# Patient Record
Sex: FEMALE | Race: WHITE | NOT HISPANIC OR LATINO | Employment: OTHER | URBAN - METROPOLITAN AREA
[De-identification: names, ages, dates, MRNs, and addresses within clinical notes are randomized per-mention and may not be internally consistent; named-entity substitution may affect disease eponyms.]

---

## 2017-02-17 ENCOUNTER — HOSPITAL ENCOUNTER (INPATIENT)
Facility: HOSPITAL | Age: 77
LOS: 2 days | Discharge: HOME | DRG: 392 | End: 2017-02-21
Attending: EMERGENCY MEDICINE | Admitting: FAMILY MEDICINE
Payer: COMMERCIAL

## 2017-02-17 ENCOUNTER — GENERIC CONVERSION - ENCOUNTER (OUTPATIENT)
Dept: OTHER | Facility: OTHER | Age: 77
End: 2017-02-17

## 2017-02-17 ENCOUNTER — APPOINTMENT (EMERGENCY)
Dept: RADIOLOGY | Facility: HOSPITAL | Age: 77
DRG: 392 | End: 2017-02-17
Payer: COMMERCIAL

## 2017-02-17 DIAGNOSIS — R07.9 CHEST PAIN: ICD-10-CM

## 2017-02-17 DIAGNOSIS — R13.10 ODYNOPHAGIA: Primary | ICD-10-CM

## 2017-02-17 PROBLEM — E03.9 HYPOTHYROIDISM: Status: ACTIVE | Noted: 2017-02-17

## 2017-02-17 PROBLEM — I10 HYPERTENSION: Status: ACTIVE | Noted: 2017-02-17

## 2017-02-17 PROBLEM — E78.5 DYSLIPIDEMIA: Status: ACTIVE | Noted: 2017-02-17

## 2017-02-17 PROBLEM — H91.90 DEAFNESS: Status: ACTIVE | Noted: 2017-02-17

## 2017-02-17 LAB
ALBUMIN SERPL BCP-MCNC: 3.8 G/DL (ref 3.5–5)
ALP SERPL-CCNC: 65 U/L (ref 46–116)
ALT SERPL W P-5'-P-CCNC: 23 U/L (ref 12–78)
ANION GAP SERPL CALCULATED.3IONS-SCNC: 7 MMOL/L (ref 4–13)
AST SERPL W P-5'-P-CCNC: 20 U/L (ref 5–45)
BACTERIA UR QL AUTO: ABNORMAL /HPF
BASOPHILS # BLD AUTO: 0 THOUSANDS/ΜL (ref 0–0.1)
BASOPHILS NFR BLD AUTO: 1 % (ref 0–1)
BILIRUB SERPL-MCNC: 0.7 MG/DL (ref 0.2–1)
BILIRUB UR QL STRIP: NEGATIVE
BUN SERPL-MCNC: 17 MG/DL (ref 5–25)
CALCIUM SERPL-MCNC: 9.2 MG/DL (ref 8.3–10.1)
CHLORIDE SERPL-SCNC: 101 MMOL/L (ref 100–108)
CLARITY UR: CLEAR
CO2 SERPL-SCNC: 29 MMOL/L (ref 21–32)
COLOR UR: ABNORMAL
CREAT SERPL-MCNC: 0.92 MG/DL (ref 0.6–1.3)
EOSINOPHIL # BLD AUTO: 0.1 THOUSAND/ΜL (ref 0–0.61)
EOSINOPHIL NFR BLD AUTO: 2 % (ref 0–6)
ERYTHROCYTE [DISTWIDTH] IN BLOOD BY AUTOMATED COUNT: 13.7 % (ref 11.6–15.1)
GFR SERPL CREATININE-BSD FRML MDRD: 59.2 ML/MIN/1.73SQ M
GLUCOSE SERPL-MCNC: 86 MG/DL (ref 65–140)
GLUCOSE UR STRIP-MCNC: NEGATIVE MG/DL
HCT VFR BLD AUTO: 46.9 % (ref 37–47)
HGB BLD-MCNC: 15.1 G/DL (ref 12–16)
HGB UR QL STRIP.AUTO: ABNORMAL
KETONES UR STRIP-MCNC: NEGATIVE MG/DL
LEUKOCYTE ESTERASE UR QL STRIP: NEGATIVE
LIPASE SERPL-CCNC: 155 U/L (ref 73–393)
LYMPHOCYTES # BLD AUTO: 1.9 THOUSANDS/ΜL (ref 0.6–4.47)
LYMPHOCYTES NFR BLD AUTO: 37 % (ref 14–44)
MAGNESIUM SERPL-MCNC: 2.1 MG/DL (ref 1.6–2.6)
MCH RBC QN AUTO: 27.5 PG (ref 27–31)
MCHC RBC AUTO-ENTMCNC: 32.2 G/DL (ref 31.4–37.4)
MCV RBC AUTO: 85 FL (ref 82–98)
MONOCYTES # BLD AUTO: 0.6 THOUSAND/ΜL (ref 0.17–1.22)
MONOCYTES NFR BLD AUTO: 11 % (ref 4–12)
NEUTROPHILS # BLD AUTO: 2.6 THOUSANDS/ΜL (ref 1.85–7.62)
NEUTS SEG NFR BLD AUTO: 50 % (ref 43–75)
NITRITE UR QL STRIP: NEGATIVE
NON-SQ EPI CELLS URNS QL MICRO: ABNORMAL /HPF
NRBC BLD AUTO-RTO: 0 /100 WBCS
PH UR STRIP.AUTO: 6 [PH] (ref 5–9)
PLATELET # BLD AUTO: 169 THOUSANDS/UL (ref 130–400)
PMV BLD AUTO: 8.5 FL (ref 8.9–12.7)
POTASSIUM SERPL-SCNC: 4.3 MMOL/L (ref 3.5–5.3)
PROT SERPL-MCNC: 7.6 G/DL (ref 6.4–8.2)
PROT UR STRIP-MCNC: NEGATIVE MG/DL
RBC # BLD AUTO: 5.49 MILLION/UL (ref 4.2–5.4)
RBC #/AREA URNS AUTO: ABNORMAL /HPF
SODIUM SERPL-SCNC: 137 MMOL/L (ref 136–145)
SP GR UR STRIP.AUTO: <=1.005 (ref 1–1.03)
TROPONIN I SERPL-MCNC: <0.02 NG/ML
TSH SERPL DL<=0.05 MIU/L-ACNC: 2.65 UIU/ML (ref 0.36–3.74)
UROBILINOGEN UR QL STRIP.AUTO: 0.2 E.U./DL
WBC # BLD AUTO: 5.2 THOUSAND/UL (ref 4.8–10.8)
WBC #/AREA URNS AUTO: ABNORMAL /HPF

## 2017-02-17 PROCEDURE — 87086 URINE CULTURE/COLONY COUNT: CPT | Performed by: EMERGENCY MEDICINE

## 2017-02-17 PROCEDURE — 99285 EMERGENCY DEPT VISIT HI MDM: CPT

## 2017-02-17 PROCEDURE — 93005 ELECTROCARDIOGRAM TRACING: CPT | Performed by: EMERGENCY MEDICINE

## 2017-02-17 PROCEDURE — 96374 THER/PROPH/DIAG INJ IV PUSH: CPT

## 2017-02-17 PROCEDURE — 87081 CULTURE SCREEN ONLY: CPT | Performed by: FAMILY MEDICINE

## 2017-02-17 PROCEDURE — 84443 ASSAY THYROID STIM HORMONE: CPT | Performed by: EMERGENCY MEDICINE

## 2017-02-17 PROCEDURE — 96361 HYDRATE IV INFUSION ADD-ON: CPT

## 2017-02-17 PROCEDURE — 36415 COLL VENOUS BLD VENIPUNCTURE: CPT | Performed by: EMERGENCY MEDICINE

## 2017-02-17 PROCEDURE — 96375 TX/PRO/DX INJ NEW DRUG ADDON: CPT

## 2017-02-17 PROCEDURE — 83735 ASSAY OF MAGNESIUM: CPT | Performed by: EMERGENCY MEDICINE

## 2017-02-17 PROCEDURE — 81001 URINALYSIS AUTO W/SCOPE: CPT | Performed by: EMERGENCY MEDICINE

## 2017-02-17 PROCEDURE — 85025 COMPLETE CBC W/AUTO DIFF WBC: CPT | Performed by: EMERGENCY MEDICINE

## 2017-02-17 PROCEDURE — 80053 COMPREHEN METABOLIC PANEL: CPT | Performed by: EMERGENCY MEDICINE

## 2017-02-17 PROCEDURE — 71020 HB CHEST X-RAY 2VW FRONTAL&LATL: CPT

## 2017-02-17 PROCEDURE — 84484 ASSAY OF TROPONIN QUANT: CPT | Performed by: EMERGENCY MEDICINE

## 2017-02-17 PROCEDURE — 83690 ASSAY OF LIPASE: CPT | Performed by: EMERGENCY MEDICINE

## 2017-02-17 PROCEDURE — C9113 INJ PANTOPRAZOLE SODIUM, VIA: HCPCS | Performed by: EMERGENCY MEDICINE

## 2017-02-17 RX ORDER — SODIUM CHLORIDE 9 MG/ML
75 INJECTION, SOLUTION INTRAVENOUS CONTINUOUS
Status: DISCONTINUED | OUTPATIENT
Start: 2017-02-17 | End: 2017-02-20

## 2017-02-17 RX ORDER — ONDANSETRON 2 MG/ML
4 INJECTION INTRAMUSCULAR; INTRAVENOUS ONCE
Status: COMPLETED | OUTPATIENT
Start: 2017-02-17 | End: 2017-02-17

## 2017-02-17 RX ORDER — LEVOTHYROXINE SODIUM 0.07 MG/1
75 TABLET ORAL
Status: DISCONTINUED | OUTPATIENT
Start: 2017-02-18 | End: 2017-02-21 | Stop reason: HOSPADM

## 2017-02-17 RX ORDER — NEBIVOLOL 5 MG/1
5 TABLET ORAL DAILY
COMMUNITY
End: 2017-02-21 | Stop reason: HOSPADM

## 2017-02-17 RX ORDER — PANTOPRAZOLE SODIUM 40 MG/1
40 INJECTION, POWDER, FOR SOLUTION INTRAVENOUS ONCE
Status: COMPLETED | OUTPATIENT
Start: 2017-02-17 | End: 2017-02-17

## 2017-02-17 RX ORDER — NEBIVOLOL 5 MG/1
5 TABLET ORAL DAILY
Status: DISCONTINUED | OUTPATIENT
Start: 2017-02-18 | End: 2017-02-19

## 2017-02-17 RX ORDER — ROSUVASTATIN CALCIUM 10 MG/1
10 TABLET, COATED ORAL DAILY
COMMUNITY
End: 2018-04-04 | Stop reason: SDUPTHER

## 2017-02-17 RX ORDER — LEVOTHYROXINE SODIUM 0.07 MG/1
75 TABLET ORAL DAILY
COMMUNITY
End: 2018-03-01 | Stop reason: SDUPTHER

## 2017-02-17 RX ORDER — ASPIRIN 81 MG/1
81 TABLET, CHEWABLE ORAL DAILY
COMMUNITY
End: 2019-05-16 | Stop reason: SINTOL

## 2017-02-17 RX ORDER — PRAVASTATIN SODIUM 80 MG/1
80 TABLET ORAL
Status: DISCONTINUED | OUTPATIENT
Start: 2017-02-18 | End: 2017-02-21 | Stop reason: HOSPADM

## 2017-02-17 RX ADMIN — SODIUM CHLORIDE 125 ML/HR: 0.9 INJECTION, SOLUTION INTRAVENOUS at 16:57

## 2017-02-17 RX ADMIN — ONDANSETRON 4 MG: 2 INJECTION INTRAMUSCULAR; INTRAVENOUS at 16:57

## 2017-02-17 RX ADMIN — PANTOPRAZOLE SODIUM 40 MG: 40 INJECTION, POWDER, FOR SOLUTION INTRAVENOUS at 16:59

## 2017-02-18 LAB — TROPONIN I SERPL-MCNC: <0.02 NG/ML

## 2017-02-18 PROCEDURE — A9270 NON-COVERED ITEM OR SERVICE: HCPCS | Performed by: HOSPITALIST

## 2017-02-18 PROCEDURE — 84484 ASSAY OF TROPONIN QUANT: CPT | Performed by: HOSPITALIST

## 2017-02-18 PROCEDURE — A9270 NON-COVERED ITEM OR SERVICE: HCPCS | Performed by: FAMILY MEDICINE

## 2017-02-18 PROCEDURE — A9270 NON-COVERED ITEM OR SERVICE: HCPCS | Performed by: INTERNAL MEDICINE

## 2017-02-18 RX ORDER — PANTOPRAZOLE SODIUM 40 MG/1
40 INJECTION, POWDER, FOR SOLUTION INTRAVENOUS
Status: DISCONTINUED | OUTPATIENT
Start: 2017-02-19 | End: 2017-02-21 | Stop reason: HOSPADM

## 2017-02-18 RX ORDER — SUCRALFATE ORAL 1 G/10ML
1000 SUSPENSION ORAL
Status: DISCONTINUED | OUTPATIENT
Start: 2017-02-18 | End: 2017-02-21 | Stop reason: HOSPADM

## 2017-02-18 RX ORDER — AMLODIPINE BESYLATE 5 MG/1
5 TABLET ORAL DAILY
Status: DISCONTINUED | OUTPATIENT
Start: 2017-02-18 | End: 2017-02-21 | Stop reason: HOSPADM

## 2017-02-18 RX ADMIN — SUCRALFATE 1000 MG: 1 SUSPENSION ORAL at 17:48

## 2017-02-18 RX ADMIN — SUCRALFATE 1000 MG: 1 SUSPENSION ORAL at 11:36

## 2017-02-18 RX ADMIN — PRAVASTATIN SODIUM 80 MG: 80 TABLET ORAL at 17:48

## 2017-02-18 RX ADMIN — AMLODIPINE BESYLATE 5 MG: 5 TABLET ORAL at 11:36

## 2017-02-18 RX ADMIN — SODIUM CHLORIDE 75 ML/HR: 0.9 INJECTION, SOLUTION INTRAVENOUS at 09:51

## 2017-02-18 RX ADMIN — NEBIVOLOL HYDROCHLORIDE 5 MG: 5 TABLET ORAL at 09:30

## 2017-02-18 RX ADMIN — SODIUM CHLORIDE 75 ML/HR: 0.9 INJECTION, SOLUTION INTRAVENOUS at 21:35

## 2017-02-18 RX ADMIN — LEVOTHYROXINE SODIUM 75 MCG: 75 TABLET ORAL at 05:48

## 2017-02-19 LAB
ANION GAP SERPL CALCULATED.3IONS-SCNC: 5 MMOL/L (ref 4–13)
BACTERIA UR CULT: NORMAL
BUN SERPL-MCNC: 9 MG/DL (ref 5–25)
CALCIUM SERPL-MCNC: 8.4 MG/DL (ref 8.3–10.1)
CHLORIDE SERPL-SCNC: 107 MMOL/L (ref 100–108)
CO2 SERPL-SCNC: 27 MMOL/L (ref 21–32)
CREAT SERPL-MCNC: 0.89 MG/DL (ref 0.6–1.3)
ERYTHROCYTE [DISTWIDTH] IN BLOOD BY AUTOMATED COUNT: 13.4 % (ref 11.6–15.1)
GFR SERPL CREATININE-BSD FRML MDRD: >60 ML/MIN/1.73SQ M
GLUCOSE SERPL-MCNC: 84 MG/DL (ref 65–140)
HCT VFR BLD AUTO: 41.5 % (ref 37–47)
HGB BLD-MCNC: 13.6 G/DL (ref 12–16)
MCH RBC QN AUTO: 27.8 PG (ref 27–31)
MCHC RBC AUTO-ENTMCNC: 32.7 G/DL (ref 31.4–37.4)
MCV RBC AUTO: 85 FL (ref 82–98)
MRSA NOSE QL CULT: NORMAL
PLATELET # BLD AUTO: 165 THOUSANDS/UL (ref 130–400)
PMV BLD AUTO: 8.6 FL (ref 8.9–12.7)
POTASSIUM SERPL-SCNC: 5.1 MMOL/L (ref 3.5–5.3)
RBC # BLD AUTO: 4.88 MILLION/UL (ref 4.2–5.4)
SODIUM SERPL-SCNC: 139 MMOL/L (ref 136–145)
TSH SERPL DL<=0.05 MIU/L-ACNC: 2.82 UIU/ML (ref 0.36–3.74)
WBC # BLD AUTO: 5.1 THOUSAND/UL (ref 4.8–10.8)

## 2017-02-19 PROCEDURE — 80048 BASIC METABOLIC PNL TOTAL CA: CPT | Performed by: FAMILY MEDICINE

## 2017-02-19 PROCEDURE — 85027 COMPLETE CBC AUTOMATED: CPT | Performed by: FAMILY MEDICINE

## 2017-02-19 PROCEDURE — C9113 INJ PANTOPRAZOLE SODIUM, VIA: HCPCS | Performed by: FAMILY MEDICINE

## 2017-02-19 PROCEDURE — A9270 NON-COVERED ITEM OR SERVICE: HCPCS | Performed by: NURSE PRACTITIONER

## 2017-02-19 PROCEDURE — A9270 NON-COVERED ITEM OR SERVICE: HCPCS | Performed by: HOSPITALIST

## 2017-02-19 PROCEDURE — 84443 ASSAY THYROID STIM HORMONE: CPT | Performed by: HOSPITALIST

## 2017-02-19 PROCEDURE — A9270 NON-COVERED ITEM OR SERVICE: HCPCS | Performed by: FAMILY MEDICINE

## 2017-02-19 PROCEDURE — A9270 NON-COVERED ITEM OR SERVICE: HCPCS | Performed by: INTERNAL MEDICINE

## 2017-02-19 RX ORDER — MAGNESIUM HYDROXIDE/ALUMINUM HYDROXICE/SIMETHICONE 120; 1200; 1200 MG/30ML; MG/30ML; MG/30ML
30 SUSPENSION ORAL EVERY 4 HOURS PRN
Status: DISCONTINUED | OUTPATIENT
Start: 2017-02-19 | End: 2017-02-21 | Stop reason: HOSPADM

## 2017-02-19 RX ORDER — DOCUSATE SODIUM 100 MG/1
100 CAPSULE, LIQUID FILLED ORAL 2 TIMES DAILY
Status: DISCONTINUED | OUTPATIENT
Start: 2017-02-19 | End: 2017-02-21 | Stop reason: HOSPADM

## 2017-02-19 RX ORDER — POLYETHYLENE GLYCOL 3350 17 G/17G
17 POWDER, FOR SOLUTION ORAL DAILY PRN
Status: DISCONTINUED | OUTPATIENT
Start: 2017-02-19 | End: 2017-02-21 | Stop reason: HOSPADM

## 2017-02-19 RX ADMIN — NEBIVOLOL HYDROCHLORIDE 5 MG: 5 TABLET ORAL at 08:58

## 2017-02-19 RX ADMIN — PANTOPRAZOLE SODIUM 40 MG: 40 INJECTION, POWDER, FOR SOLUTION INTRAVENOUS at 08:58

## 2017-02-19 RX ADMIN — SODIUM CHLORIDE 75 ML/HR: 0.9 INJECTION, SOLUTION INTRAVENOUS at 22:58

## 2017-02-19 RX ADMIN — SUCRALFATE 1000 MG: 1 SUSPENSION ORAL at 16:46

## 2017-02-19 RX ADMIN — AMLODIPINE BESYLATE 5 MG: 5 TABLET ORAL at 08:59

## 2017-02-19 RX ADMIN — ENOXAPARIN SODIUM 40 MG: 40 INJECTION SUBCUTANEOUS at 08:58

## 2017-02-19 RX ADMIN — SUCRALFATE 1000 MG: 1 SUSPENSION ORAL at 06:05

## 2017-02-19 RX ADMIN — LEVOTHYROXINE SODIUM 75 MCG: 75 TABLET ORAL at 06:05

## 2017-02-19 RX ADMIN — SODIUM CHLORIDE 75 ML/HR: 0.9 INJECTION, SOLUTION INTRAVENOUS at 10:49

## 2017-02-19 RX ADMIN — ALUMINUM HYDROXIDE, MAGNESIUM HYDROXIDE, AND SIMETHICONE 30 ML: 200; 200; 20 SUSPENSION ORAL at 21:49

## 2017-02-19 RX ADMIN — SUCRALFATE 1000 MG: 1 SUSPENSION ORAL at 10:52

## 2017-02-19 RX ADMIN — PRAVASTATIN SODIUM 80 MG: 80 TABLET ORAL at 16:47

## 2017-02-20 ENCOUNTER — HOSPITAL ENCOUNTER (INPATIENT)
Dept: RADIOLOGY | Facility: HOSPITAL | Age: 77
DRG: 392 | End: 2017-02-20
Payer: COMMERCIAL

## 2017-02-20 ENCOUNTER — ANESTHESIA EVENT (INPATIENT)
Dept: GASTROENTEROLOGY | Facility: AMBULARY SURGERY CENTER | Age: 77
DRG: 392 | End: 2017-02-20
Payer: COMMERCIAL

## 2017-02-20 ENCOUNTER — APPOINTMENT (INPATIENT)
Dept: RADIOLOGY | Facility: HOSPITAL | Age: 77
DRG: 392 | End: 2017-02-20
Payer: COMMERCIAL

## 2017-02-20 PROCEDURE — 88305 TISSUE EXAM BY PATHOLOGIST: CPT | Performed by: INTERNAL MEDICINE

## 2017-02-20 PROCEDURE — 70491 CT SOFT TISSUE NECK W/DYE: CPT

## 2017-02-20 PROCEDURE — A9270 NON-COVERED ITEM OR SERVICE: HCPCS | Performed by: HOSPITALIST

## 2017-02-20 PROCEDURE — A9270 NON-COVERED ITEM OR SERVICE: HCPCS | Performed by: FAMILY MEDICINE

## 2017-02-20 PROCEDURE — 0DB68ZX EXCISION OF STOMACH, VIA NATURAL OR ARTIFICIAL OPENING ENDOSCOPIC, DIAGNOSTIC: ICD-10-PCS | Performed by: INTERNAL MEDICINE

## 2017-02-20 PROCEDURE — C9113 INJ PANTOPRAZOLE SODIUM, VIA: HCPCS | Performed by: FAMILY MEDICINE

## 2017-02-20 PROCEDURE — A9270 NON-COVERED ITEM OR SERVICE: HCPCS | Performed by: NURSE PRACTITIONER

## 2017-02-20 PROCEDURE — A9270 NON-COVERED ITEM OR SERVICE: HCPCS | Performed by: INTERNAL MEDICINE

## 2017-02-20 PROCEDURE — 0DB18ZX EXCISION OF UPPER ESOPHAGUS, VIA NATURAL OR ARTIFICIAL OPENING ENDOSCOPIC, DIAGNOSTIC: ICD-10-PCS | Performed by: INTERNAL MEDICINE

## 2017-02-20 PROCEDURE — 88342 IMHCHEM/IMCYTCHM 1ST ANTB: CPT | Performed by: INTERNAL MEDICINE

## 2017-02-20 PROCEDURE — 0DB38ZX EXCISION OF LOWER ESOPHAGUS, VIA NATURAL OR ARTIFICIAL OPENING ENDOSCOPIC, DIAGNOSTIC: ICD-10-PCS | Performed by: INTERNAL MEDICINE

## 2017-02-20 RX ORDER — PROPOFOL 10 MG/ML
INJECTION, EMULSION INTRAVENOUS AS NEEDED
Status: DISCONTINUED | OUTPATIENT
Start: 2017-02-20 | End: 2017-02-20 | Stop reason: SURG

## 2017-02-20 RX ORDER — LIDOCAINE HYDROCHLORIDE 20 MG/ML
INJECTION, SOLUTION EPIDURAL; INFILTRATION; INTRACAUDAL; PERINEURAL AS NEEDED
Status: DISCONTINUED | OUTPATIENT
Start: 2017-02-20 | End: 2017-02-20 | Stop reason: SURG

## 2017-02-20 RX ORDER — SODIUM CHLORIDE, SODIUM LACTATE, POTASSIUM CHLORIDE, CALCIUM CHLORIDE 600; 310; 30; 20 MG/100ML; MG/100ML; MG/100ML; MG/100ML
INJECTION, SOLUTION INTRAVENOUS CONTINUOUS PRN
Status: DISCONTINUED | OUTPATIENT
Start: 2017-02-20 | End: 2017-02-20 | Stop reason: SURG

## 2017-02-20 RX ADMIN — SUCRALFATE 1000 MG: 1 SUSPENSION ORAL at 17:45

## 2017-02-20 RX ADMIN — DOCUSATE SODIUM 100 MG: 100 CAPSULE, LIQUID FILLED ORAL at 17:46

## 2017-02-20 RX ADMIN — PRAVASTATIN SODIUM 80 MG: 80 TABLET ORAL at 17:45

## 2017-02-20 RX ADMIN — AMLODIPINE BESYLATE 5 MG: 5 TABLET ORAL at 09:26

## 2017-02-20 RX ADMIN — IOHEXOL 85 ML: 350 INJECTION, SOLUTION INTRAVENOUS at 20:36

## 2017-02-20 RX ADMIN — SUCRALFATE 1000 MG: 1 SUSPENSION ORAL at 12:23

## 2017-02-20 RX ADMIN — LIDOCAINE HYDROCHLORIDE 40 MG: 20 INJECTION, SOLUTION EPIDURAL; INFILTRATION; INTRACAUDAL; PERINEURAL at 16:53

## 2017-02-20 RX ADMIN — SODIUM CHLORIDE 75 ML/HR: 0.9 INJECTION, SOLUTION INTRAVENOUS at 12:23

## 2017-02-20 RX ADMIN — ALUMINUM HYDROXIDE, MAGNESIUM HYDROXIDE, AND SIMETHICONE 30 ML: 200; 200; 20 SUSPENSION ORAL at 10:41

## 2017-02-20 RX ADMIN — ENOXAPARIN SODIUM 40 MG: 40 INJECTION SUBCUTANEOUS at 18:11

## 2017-02-20 RX ADMIN — PROPOFOL 100 MG: 10 INJECTION, EMULSION INTRAVENOUS at 16:53

## 2017-02-20 RX ADMIN — LEVOTHYROXINE SODIUM 75 MCG: 75 TABLET ORAL at 05:24

## 2017-02-20 RX ADMIN — PROPOFOL 20 MG: 10 INJECTION, EMULSION INTRAVENOUS at 16:56

## 2017-02-20 RX ADMIN — PANTOPRAZOLE SODIUM 40 MG: 40 INJECTION, POWDER, FOR SOLUTION INTRAVENOUS at 09:26

## 2017-02-20 RX ADMIN — PROPOFOL 20 MG: 10 INJECTION, EMULSION INTRAVENOUS at 16:58

## 2017-02-20 RX ADMIN — PROPOFOL 30 MG: 10 INJECTION, EMULSION INTRAVENOUS at 16:54

## 2017-02-20 RX ADMIN — SODIUM CHLORIDE, SODIUM LACTATE, POTASSIUM CHLORIDE, AND CALCIUM CHLORIDE: .6; .31; .03; .02 INJECTION, SOLUTION INTRAVENOUS at 16:51

## 2017-02-20 RX ADMIN — DOCUSATE SODIUM 100 MG: 100 CAPSULE, LIQUID FILLED ORAL at 09:26

## 2017-02-20 RX ADMIN — SUCRALFATE 1000 MG: 1 SUSPENSION ORAL at 06:26

## 2017-02-20 RX ADMIN — ALUMINUM HYDROXIDE, MAGNESIUM HYDROXIDE, AND SIMETHICONE 30 ML: 200; 200; 20 SUSPENSION ORAL at 20:18

## 2017-02-21 ENCOUNTER — HOSPITAL ENCOUNTER (INPATIENT)
Dept: RADIOLOGY | Facility: HOSPITAL | Age: 77
Discharge: HOME | DRG: 392 | End: 2017-02-21
Payer: COMMERCIAL

## 2017-02-21 VITALS
OXYGEN SATURATION: 92 % | RESPIRATION RATE: 18 BRPM | BODY MASS INDEX: 24.37 KG/M2 | HEIGHT: 62 IN | DIASTOLIC BLOOD PRESSURE: 68 MMHG | HEART RATE: 65 BPM | WEIGHT: 132.4 LBS | TEMPERATURE: 98 F | SYSTOLIC BLOOD PRESSURE: 150 MMHG

## 2017-02-21 DIAGNOSIS — R13.10 ODYNOPHAGIA: Primary | ICD-10-CM

## 2017-02-21 PROBLEM — H91.90 DEAFNESS: Status: RESOLVED | Noted: 2017-02-17 | Resolved: 2017-02-21

## 2017-02-21 PROCEDURE — C9113 INJ PANTOPRAZOLE SODIUM, VIA: HCPCS | Performed by: FAMILY MEDICINE

## 2017-02-21 PROCEDURE — A9270 NON-COVERED ITEM OR SERVICE: HCPCS | Performed by: FAMILY MEDICINE

## 2017-02-21 PROCEDURE — A9270 NON-COVERED ITEM OR SERVICE: HCPCS | Performed by: HOSPITALIST

## 2017-02-21 PROCEDURE — A9270 NON-COVERED ITEM OR SERVICE: HCPCS | Performed by: INTERNAL MEDICINE

## 2017-02-21 PROCEDURE — A9270 NON-COVERED ITEM OR SERVICE: HCPCS | Performed by: NURSE PRACTITIONER

## 2017-02-21 PROCEDURE — 74220 X-RAY XM ESOPHAGUS 1CNTRST: CPT

## 2017-02-21 RX ORDER — AMLODIPINE BESYLATE 5 MG/1
5 TABLET ORAL DAILY
Qty: 30 TABLET | Refills: 0 | Status: SHIPPED | OUTPATIENT
Start: 2017-02-21 | End: 2018-04-01 | Stop reason: ALTCHOICE

## 2017-02-21 RX ORDER — PANTOPRAZOLE SODIUM 40 MG/1
40 TABLET, DELAYED RELEASE ORAL DAILY
Qty: 30 TABLET | Refills: 0 | Status: SHIPPED | OUTPATIENT
Start: 2017-02-21 | End: 2019-01-24

## 2017-02-21 RX ADMIN — LEVOTHYROXINE SODIUM 75 MCG: 75 TABLET ORAL at 05:40

## 2017-02-21 RX ADMIN — ALUMINUM HYDROXIDE, MAGNESIUM HYDROXIDE, AND SIMETHICONE 30 ML: 200; 200; 20 SUSPENSION ORAL at 09:48

## 2017-02-21 RX ADMIN — SUCRALFATE 1000 MG: 1 SUSPENSION ORAL at 06:28

## 2017-02-21 RX ADMIN — ALUMINUM HYDROXIDE, MAGNESIUM HYDROXIDE, AND SIMETHICONE 30 ML: 200; 200; 20 SUSPENSION ORAL at 00:47

## 2017-02-21 RX ADMIN — DOCUSATE SODIUM 100 MG: 100 CAPSULE, LIQUID FILLED ORAL at 09:37

## 2017-02-21 RX ADMIN — ALUMINUM HYDROXIDE, MAGNESIUM HYDROXIDE, AND SIMETHICONE 30 ML: 200; 200; 20 SUSPENSION ORAL at 14:19

## 2017-02-21 RX ADMIN — ENOXAPARIN SODIUM 40 MG: 40 INJECTION SUBCUTANEOUS at 09:37

## 2017-02-21 RX ADMIN — PRAVASTATIN SODIUM 80 MG: 80 TABLET ORAL at 16:52

## 2017-02-21 RX ADMIN — AMLODIPINE BESYLATE 5 MG: 5 TABLET ORAL at 09:37

## 2017-02-21 RX ADMIN — SUCRALFATE 1000 MG: 1 SUSPENSION ORAL at 11:38

## 2017-02-21 RX ADMIN — PANTOPRAZOLE SODIUM 40 MG: 40 INJECTION, POWDER, FOR SOLUTION INTRAVENOUS at 09:37

## 2017-02-21 RX ADMIN — SUCRALFATE 1000 MG: 1 SUSPENSION ORAL at 16:52

## 2017-02-22 ENCOUNTER — GENERIC CONVERSION - ENCOUNTER (OUTPATIENT)
Dept: OTHER | Facility: OTHER | Age: 77
End: 2017-02-22

## 2017-02-22 ENCOUNTER — GENERIC CONVERSION - ENCOUNTER (OUTPATIENT)
Dept: FAMILY MEDICINE CLINIC | Facility: CLINIC | Age: 77
End: 2017-02-22

## 2017-02-22 ENCOUNTER — ALLSCRIPTS OFFICE VISIT (OUTPATIENT)
Dept: OTHER | Facility: OTHER | Age: 77
End: 2017-02-22

## 2017-02-22 LAB
ATRIAL RATE: 55 BPM
P AXIS: 50 DEGREES
PR INTERVAL: 124 MS
QRS AXIS: 7 DEGREES
QRSD INTERVAL: 86 MS
QT INTERVAL: 438 MS
QTC INTERVAL: 419 MS
T WAVE AXIS: 3 DEGREES
VENTRICULAR RATE: 55 BPM

## 2017-02-23 ENCOUNTER — GENERIC CONVERSION - ENCOUNTER (OUTPATIENT)
Dept: OTHER | Facility: OTHER | Age: 77
End: 2017-02-23

## 2017-02-24 ENCOUNTER — GENERIC CONVERSION - ENCOUNTER (OUTPATIENT)
Dept: OTHER | Facility: OTHER | Age: 77
End: 2017-02-24

## 2017-03-03 ENCOUNTER — ALLSCRIPTS OFFICE VISIT (OUTPATIENT)
Dept: OTHER | Facility: OTHER | Age: 77
End: 2017-03-03

## 2017-03-03 DIAGNOSIS — R13.12 DYSPHAGIA, OROPHARYNGEAL PHASE: ICD-10-CM

## 2017-03-09 ENCOUNTER — GENERIC CONVERSION - ENCOUNTER (OUTPATIENT)
Dept: OTHER | Facility: OTHER | Age: 77
End: 2017-03-09

## 2017-03-13 ENCOUNTER — TRANSCRIBE ORDERS (OUTPATIENT)
Dept: ADMINISTRATIVE | Facility: HOSPITAL | Age: 77
End: 2017-03-13

## 2017-03-13 DIAGNOSIS — R07.89 OTHER CHEST PAIN: Primary | ICD-10-CM

## 2017-03-16 ENCOUNTER — ALLSCRIPTS OFFICE VISIT (OUTPATIENT)
Dept: OTHER | Facility: OTHER | Age: 77
End: 2017-03-16

## 2017-03-20 ENCOUNTER — HOSPITAL ENCOUNTER (OUTPATIENT)
Dept: RADIOLOGY | Facility: HOSPITAL | Age: 77
End: 2017-03-20
Payer: COMMERCIAL

## 2017-03-20 ENCOUNTER — HOSPITAL ENCOUNTER (OUTPATIENT)
Dept: RADIOLOGY | Facility: HOSPITAL | Age: 77
Discharge: HOME | End: 2017-03-20
Payer: COMMERCIAL

## 2017-03-20 ENCOUNTER — HOSPITAL ENCOUNTER (OUTPATIENT)
Dept: NON INVASIVE DIAGNOSTICS | Facility: HOSPITAL | Age: 77
Discharge: HOME | End: 2017-03-20
Payer: COMMERCIAL

## 2017-03-20 DIAGNOSIS — R07.89 OTHER CHEST PAIN: ICD-10-CM

## 2017-03-20 PROCEDURE — 93306 TTE W/DOPPLER COMPLETE: CPT

## 2017-03-21 ENCOUNTER — GENERIC CONVERSION - ENCOUNTER (OUTPATIENT)
Dept: OTHER | Facility: OTHER | Age: 77
End: 2017-03-21

## 2017-03-21 ENCOUNTER — HOSPITAL ENCOUNTER (OUTPATIENT)
Dept: RADIOLOGY | Facility: HOSPITAL | Age: 77
Discharge: HOME | End: 2017-03-21
Attending: INTERNAL MEDICINE
Payer: COMMERCIAL

## 2017-03-21 DIAGNOSIS — R13.12 DYSPHAGIA, OROPHARYNGEAL PHASE: ICD-10-CM

## 2017-03-21 PROCEDURE — G8997 SWALLOW GOAL STATUS: HCPCS

## 2017-03-21 PROCEDURE — G8996 SWALLOW CURRENT STATUS: HCPCS

## 2017-03-21 PROCEDURE — 92611 MOTION FLUOROSCOPY/SWALLOW: CPT

## 2017-03-21 PROCEDURE — 74230 X-RAY XM SWLNG FUNCJ C+: CPT

## 2017-03-21 PROCEDURE — G8998 SWALLOW D/C STATUS: HCPCS

## 2017-03-24 ENCOUNTER — TRANSCRIBE ORDERS (OUTPATIENT)
Dept: ADMINISTRATIVE | Facility: HOSPITAL | Age: 77
End: 2017-03-24

## 2017-03-24 DIAGNOSIS — R07.9 CHEST PAIN, UNSPECIFIED TYPE: Primary | ICD-10-CM

## 2017-03-30 ENCOUNTER — HOSPITAL ENCOUNTER (OUTPATIENT)
Dept: RADIOLOGY | Facility: HOSPITAL | Age: 77
Discharge: HOME | End: 2017-03-30
Payer: COMMERCIAL

## 2017-03-30 ENCOUNTER — HOSPITAL ENCOUNTER (OUTPATIENT)
Dept: NON INVASIVE DIAGNOSTICS | Facility: HOSPITAL | Age: 77
Discharge: HOME | End: 2017-03-30
Payer: COMMERCIAL

## 2017-03-30 DIAGNOSIS — R07.9 CHEST PAIN, UNSPECIFIED TYPE: ICD-10-CM

## 2017-03-30 LAB
MAX DIASTOLIC BP: 80 MMHG
MAX HEART RATE: 144 BPM
MAX PREDICTED HEART RATE: 143 BPM
MAX. SYSTOLIC BP: 176 MMHG
PROTOCOL NAME: NORMAL
TIME IN EXERCISE PHASE: 249 S

## 2017-03-30 PROCEDURE — 78452 HT MUSCLE IMAGE SPECT MULT: CPT

## 2017-03-30 PROCEDURE — A9502 TC99M TETROFOSMIN: HCPCS

## 2017-03-30 PROCEDURE — 93017 CV STRESS TEST TRACING ONLY: CPT

## 2017-06-07 ENCOUNTER — GENERIC CONVERSION - ENCOUNTER (OUTPATIENT)
Dept: OTHER | Facility: OTHER | Age: 77
End: 2017-06-07

## 2017-06-16 ENCOUNTER — ALLSCRIPTS OFFICE VISIT (OUTPATIENT)
Dept: OTHER | Facility: OTHER | Age: 77
End: 2017-06-16

## 2017-07-27 ENCOUNTER — ALLSCRIPTS OFFICE VISIT (OUTPATIENT)
Dept: OTHER | Facility: OTHER | Age: 77
End: 2017-07-27

## 2017-07-27 DIAGNOSIS — R53.1 WEAKNESS: ICD-10-CM

## 2017-07-27 DIAGNOSIS — R42 DIZZINESS AND GIDDINESS: ICD-10-CM

## 2017-07-27 DIAGNOSIS — I25.10 ATHEROSCLEROTIC HEART DISEASE OF NATIVE CORONARY ARTERY WITHOUT ANGINA PECTORIS: ICD-10-CM

## 2017-07-27 DIAGNOSIS — Z78.0 ASYMPTOMATIC MENOPAUSAL STATE: ICD-10-CM

## 2017-07-27 DIAGNOSIS — R13.12 DYSPHAGIA, OROPHARYNGEAL PHASE: ICD-10-CM

## 2017-08-04 ENCOUNTER — HOSPITAL ENCOUNTER (OUTPATIENT)
Dept: RADIOLOGY | Facility: HOSPITAL | Age: 77
Discharge: HOME | End: 2017-08-04
Attending: INTERNAL MEDICINE
Payer: COMMERCIAL

## 2017-08-04 DIAGNOSIS — Z78.0 ASYMPTOMATIC MENOPAUSAL STATE: ICD-10-CM

## 2017-08-04 PROCEDURE — 77080 DXA BONE DENSITY AXIAL: CPT

## 2017-08-08 ENCOUNTER — GENERIC CONVERSION - ENCOUNTER (OUTPATIENT)
Dept: OTHER | Facility: OTHER | Age: 77
End: 2017-08-08

## 2017-08-15 ENCOUNTER — GENERIC CONVERSION - ENCOUNTER (OUTPATIENT)
Dept: OTHER | Facility: OTHER | Age: 77
End: 2017-08-15

## 2017-08-15 LAB
A/G RATIO (HISTORICAL): 1.5 (ref 1.2–2.2)
ALBUMIN SERPL BCP-MCNC: 4 G/DL (ref 3.5–4.8)
ALP SERPL-CCNC: 62 IU/L (ref 39–117)
ALT SERPL W P-5'-P-CCNC: 13 IU/L (ref 0–32)
AST SERPL W P-5'-P-CCNC: 20 IU/L (ref 0–40)
BASOPHILS # BLD AUTO: 0 X10E3/UL (ref 0–0.2)
BASOPHILS # BLD AUTO: 1 %
BILIRUB SERPL-MCNC: 0.6 MG/DL (ref 0–1.2)
BUN SERPL-MCNC: 16 MG/DL (ref 8–27)
BUN/CREA RATIO (HISTORICAL): 18 (ref 12–28)
CALCIUM SERPL-MCNC: 9.2 MG/DL (ref 8.7–10.3)
CHLORIDE SERPL-SCNC: 102 MMOL/L (ref 96–106)
CHOLEST SERPL-MCNC: 164 MG/DL (ref 100–199)
CHOLEST/HDLC SERPL: 2.2 RATIO UNITS (ref 0–4.4)
CO2 SERPL-SCNC: 23 MMOL/L (ref 18–29)
CREAT SERPL-MCNC: 0.88 MG/DL (ref 0.57–1)
DEPRECATED RDW RBC AUTO: 13.5 % (ref 12.3–15.4)
EGFR AFRICAN AMERICAN (HISTORICAL): 73 ML/MIN/1.73
EGFR-AMERICAN CALC (HISTORICAL): 63 ML/MIN/1.73
EOSINOPHIL # BLD AUTO: 0.1 X10E3/UL (ref 0–0.4)
EOSINOPHIL # BLD AUTO: 3 %
GLUCOSE SERPL-MCNC: 94 MG/DL (ref 65–99)
HCT VFR BLD AUTO: 43.7 % (ref 34–46.6)
HDLC SERPL-MCNC: 75 MG/DL
HGB BLD-MCNC: 14.2 G/DL (ref 11.1–15.9)
IMM.GRANULOCYTES (CD4/8) (HISTORICAL): 0 %
IMM.GRANULOCYTES (CD4/8) (HISTORICAL): 0 X10E3/UL (ref 0–0.1)
LDLC SERPL CALC-MCNC: 78 MG/DL (ref 0–99)
LYMPHOCYTES # BLD AUTO: 1.6 X10E3/UL (ref 0.7–3.1)
LYMPHOCYTES # BLD AUTO: 34 %
MCH RBC QN AUTO: 28 PG (ref 26.6–33)
MCHC RBC AUTO-ENTMCNC: 32.5 G/DL (ref 31.5–35.7)
MCV RBC AUTO: 86 FL (ref 79–97)
MONOCYTES # BLD AUTO: 0.6 X10E3/UL (ref 0.1–0.9)
MONOCYTES (HISTORICAL): 12 %
NEUTROPHILS # BLD AUTO: 2.4 X10E3/UL (ref 1.4–7)
NEUTROPHILS # BLD AUTO: 50 %
PLATELET # BLD AUTO: 185 X10E3/UL (ref 150–379)
POTASSIUM SERPL-SCNC: 4.7 MMOL/L (ref 3.5–5.2)
RBC (HISTORICAL): 5.08 X10E6/UL (ref 3.77–5.28)
SODIUM SERPL-SCNC: 142 MMOL/L (ref 134–144)
TOT. GLOBULIN, SERUM (HISTORICAL): 2.7 G/DL (ref 1.5–4.5)
TOTAL PROTEIN (HISTORICAL): 6.7 G/DL (ref 6–8.5)
TRIGL SERPL-MCNC: 55 MG/DL (ref 0–149)
VLDLC SERPL CALC-MCNC: 11 MG/DL (ref 5–40)
WBC # BLD AUTO: 4.8 X10E3/UL (ref 3.4–10.8)

## 2017-08-16 ENCOUNTER — GENERIC CONVERSION - ENCOUNTER (OUTPATIENT)
Dept: OTHER | Facility: OTHER | Age: 77
End: 2017-08-16

## 2017-08-16 LAB
INTERPRETATION (HISTORICAL): NORMAL
TSH SERPL DL<=0.05 MIU/L-ACNC: 3.22 UIU/ML (ref 0.45–4.5)

## 2017-11-15 ENCOUNTER — GENERIC CONVERSION - ENCOUNTER (OUTPATIENT)
Dept: FAMILY MEDICINE CLINIC | Facility: CLINIC | Age: 77
End: 2017-11-15

## 2017-11-15 ENCOUNTER — GENERIC CONVERSION - ENCOUNTER (OUTPATIENT)
Dept: OTHER | Facility: OTHER | Age: 77
End: 2017-11-15

## 2017-11-15 ENCOUNTER — ALLSCRIPTS OFFICE VISIT (OUTPATIENT)
Dept: OTHER | Facility: OTHER | Age: 77
End: 2017-11-15

## 2017-11-28 ENCOUNTER — GENERIC CONVERSION - ENCOUNTER (OUTPATIENT)
Dept: OTHER | Facility: OTHER | Age: 77
End: 2017-11-28

## 2017-11-30 DIAGNOSIS — Z12.31 ENCOUNTER FOR SCREENING MAMMOGRAM FOR MALIGNANT NEOPLASM OF BREAST: ICD-10-CM

## 2018-01-10 NOTE — MISCELLANEOUS
Dear Germaine Roy,  This letter is informing you of your latest's procedure results  The biopsies from your procedure came back NORMAL  If you have any questions, please contact our office    Thank you,  [facility]      Electronically signed Jus Bhatia   Feb 24 2017  9:38AM EST Author

## 2018-01-10 NOTE — MISCELLANEOUS
Message   Recorded as Task   Date: 06/07/2017 12:11 PM, Created By: Becca Fernandez   Task Name: Care Coordination   Assigned To: Becca Fernandez   Regarding Patient: Sasha Mclaughlin, Status: Active   Comment:    Becca Fernandez - 07 Jun 2017 12:11 PM     TASK CREATED  SPOKE W/SHAKEEL FROM [facility] TO SCHEDULE PT FOR HER APPT ON 6/16 @3PM ROJELIO  SHE WILL FWD PROPER PAPERWORK  Active Problems    1  Adult BMI 28 0-28 9 kg/sq m (V85 24) (Z68 28)   2  Adult body mass index 26 0-26 9 (V85 22) (Z68 26)   3  Allergic rhinitis (477 9) (J30 9)   4  Arteriosclerosis of coronary artery (414 00) (I25 10)   5  Benign essential hypertension (401 1) (I10)   6  Cervicalgia (723 1) (M54 2)   7  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)   8  Depression screening (V79 0) (Z13 89)   9  Dysphagia (787 20) (R13 10)   10  Encounter for screening mammogram for high-risk patient (V76 11) (Z12 31)   11  Encounter for screening mammogram for malignant neoplasm of breast (V76 12)    (Z12 31)   12  Esophageal dysmotility (530 5) (K22 4)   13  Esophageal reflux (530 81) (K21 9)   14  Flu vaccine need (V04 81) (Z23)   15  High cholesterol (272 0) (E78 00)   16  Hypothyroidism (244 9) (E03 9)   17  Mixed hyperlipidemia (272 2) (E78 2)   18  Need for vaccination for pneumococcus (V03 82) (Z23)   19  Oropharyngeal dysphagia (787 22) (R13 12)   20  Osteoporosis (733 00) (M81 0)   21  Screening for genitourinary condition (V81 6) (Z13 89)   22  Screening for neurological condition (V80 09) (Z13 89)   23  Thyroid nodule (241 0) (E04 1)   24  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)    Current Meds   1  Aspirin EC 81 MG Oral Tablet Delayed Release; take 2 tablets daily; Therapy: 21ZHR1099 to Recorded   2  Levothyroxine Sodium 75 MCG Oral Tablet; take one tablet by mouth every day; Therapy: 78Dpb8949 to (Evaluate:15Tph4087)  Requested for: 41DDY9852; Last   Rx:59Lsl8248 Ordered   3  Multi-Day Oral Tablet; TAKE 1 TABLET DAILY;    Therapy: 30LBR3663 to Recorded   4  Pantoprazole Sodium 40 MG Oral Tablet Delayed Release; take 1 tablet 30 minutes   before breakfast daily; Therapy: 42EIC7962 to (Evaluate:80Nmv4961)  Requested for: 54OKE8278; Last   Rx:16Mar2017 Ordered   5  Rosuvastatin Calcium 10 MG Oral Tablet (Crestor); Take 1 tablet daily  Requested for:   69OXI1757; Last Rx:16Mar2017 Ordered   6  Vitamin D 2000 UNIT Oral Capsule; 1 TAB DAILY Recorded    Allergies    1   No Known Drug Allergies    Signatures   Electronically signed by : Carlos Park, ; Jun 7 2017 12:12PM EST                       (Author)

## 2018-01-11 NOTE — RESULT NOTES
Discussion/Summary   Your bone density test is unchanged, showing some osteoporosis  Please take 1200 mg of calcium and 1000 units of vitamin D per day and walk for exercise  - Dr Manny Acosta 99Myg7038 12:47PM Aimee Jauregui Order Number: [id number]    - Patient Instructions: To schedule this appointment, please contact Central Scheduling at [phone]  Test Name Result Flag Reference   DXA BONE DENSITY SPINE HIP AND PELVIS (Report)     CENTRAL DXA SCAN     CLINICAL HISTORY:  65year old post-menopausal  female risk factors include estrogen deficient, follow-up     TECHNIQUE: Bone densitometry was performed using a [employer] bone densitometer  Regions of interest appear properly placed  There are no obvious fractures or other confounding variables which could limit the study  COMPARISON: 2012     RESULTS:    LUMBAR SPINE: L2-L4:   BMD 0 772 gm/cm2   T-score -3 6   Z-score -1 8     LEFT TOTAL HIP:   BMD 0 754 gm/cm2   T-score -2 0   Z-score -0 1     LEFT FEMORAL NECK:   BMD 0 705 gm/cm2   T-score -2 4   Z-score -0 3     RIGHT TOTAL HIP:   BMD 0 763 gm/cm2   T-score -1 9   Z-score 0 0     RIGHT FEMORAL NECK:   BMD 0 696 gm/cm2   T-score -2 5   Z-score -0 4       IMPRESSION:   1  Based on the [facility] classification, the T-score of -3 6 in the lumbar spine is consistent with osteoporosis  2  Since the prior study, there has been an insignificant increase of the BMD in the lumbar spine of 0 013 gm/cm2 or 1 7%  In the median hips, there has been no significant change in BMD of 0 000 gm/cm2 or 0 0%  This increase in the lumbar spine does   not exceed our own least significant change and, therefore, is not statistically significant within 95% confidence level     3  According to the [address] [place] [address], prescription therapy is recommended with a T-score of -2 5 or less in the spine or hip after appropriate evaluation to exclude secondary causes  4  A daily intake of at least 1200 mg Calcium and 800 to 1000 IU of Vitamin D, as well as weight bearing and muscle strengthening exercise, fall prevention and avoidance of tobacco and excessive alcohol intake as basic preventive measures are    suggested  5  Repeat DXA in 18 - 24 months, on the same machine, as clinically indicated  The 10 year risk of hip fracture is 5 7%, with the 10 year risk of major osteoporotic fracture being 17 4%, as calculated by the [facility] fracture risk assessment tool (FRAX)  The current NOF guidelines recommend treating patients with FRAX 10 year risk    score of >3% for hip fracture and >20% for major osteoporotic fracture  WHO CLASSIFICATION:   Normal (a T-score of -1 0 or higher)   Low bone mineral density (a T-score of less than -1 0 but higher than -2 5)   Osteoporosis (a T-score of -2 5 or less)   Severe osteoporosis (a T-score of -2 5 or less with a fragility fracture)             Workstation performed: [id number]     Signed by:   Rhona Severance Gerldine Gemma, MD   8/7/17

## 2018-01-12 NOTE — RESULT NOTES
Verified Results  * MAMMO SCREENING BILATERAL W CAD 80YMO0755 03:35PM Cem Giles Order Number: [id number]    - Patient Instructions: To schedule this appointment, please contact Central Scheduling at [phone]  Do not wear any perfume, powder, lotion or deodorant on breast or underarm area  Please bring your doctors order, referral (if needed) and insurance information with you on the day of the test  Failure to bring this information may result in this test being rescheduled  Arrive 15 minutes prior to your appointment time to register  On the day of your test, please bring any prior mammogram or breast studies with you that were not performed at a [facility]  Failure to bring prior exams may result in your test needing to be rescheduled   Order Number: [id number]    - Patient Instructions: To schedule this appointment, please contact Central Scheduling at [phone]  Do not wear any perfume, powder, lotion or deodorant on breast or underarm area  Please bring your doctors order, referral (if needed) and insurance information with you on the day of the test  Failure to bring this information may result in this test being rescheduled  Arrive 15 minutes prior to your appointment time to register  On the day of your test, please bring any prior mammogram or breast studies with you that were not performed at a [facility]  Failure to bring prior exams may result in your test needing to be rescheduled  Test Name Result Flag Reference   MAMMO SCREENING BILATERAL W CAD (Report)     Patient History:   Family history of breast cancer in mother  Patient's BMI is 26 3  Reason for exam: screening (asymptomatic)  Mammo Screening Bilateral W CAD: November 29, 2016 - Check In #:    [de-identified]   Bilateral CC and MLO view(s) were taken       Technologist: DEBBY Mckenna   Prior study comparison: February 11, 2012, bilateral screening    mammogram performed at [facility]  There are scattered fibroglandular densities  No new dominant    soft tissue mass, architectural distortion or suspicious    calcifications are noted  The skin and nipple structures are    within normal limits  No mammographic evidence of malignancy  No    significant changes when compared with prior studies  ASSESSMENT: BiRad:2 - Benign     Recommendation:   Routine screening mammogram of both breasts in 1 year  Analyzed by CAD     8-10% of cancers will be missed on mammography  Management of a    palpable abnormality must be based on clinical grounds  Patients   will be notified of their results via letter from our facility  Accredited by [employer] of [employer] and FDA       Transcription Location: ARTI Knapp 98: [id number]     Risk Value(s):   Tyrer-Cuzick 10 Year: 5 484%, Tyrer-Cuzick Lifetime: 5 484%,    Myriad Table: 1 5%, FERNY 5 Year: 3 0%, NCI Lifetime: 5 6%   Signed by:   Werner Hui MD   11/30/16       Discussion/Summary   Your mammogram is normal, please repeat yearly  - Dr Lane Labor

## 2018-01-12 NOTE — RESULT NOTES
Discussion/Summary   Your bloodwork looks very good  Please continue your medications  - Dr Gabriella Griffin     Verified Results  (1) CBC/PLT/DIFF 28CAH4269 07:31AM Cielo Madhavi Ericks     Test Name Result Flag Reference   WBC 4 8 x10E3/uL  3 4-10 8   RBC 5 08 x10E6/uL  3 77-5 28   Hemoglobin 14 2 g/dL  11 1-15 9   Hematocrit 43 7 %  34 0-46  6   MCV 86 fL  79-97   MCH 28 0 pg  26 6-33 0   MCHC 32 5 g/dL  31 5-35 7   RDW 13 5 %  12 3-15 4   Platelets 668 Y38K9/UR  150-379   Neutrophils 50 %     Lymphs 34 %     Monocytes 12 %     Eos 3 %     Basos 1 %     Neutrophils (Absolute) 2 4 x10E3/uL  1 4-7 0   Lymphs (Absolute) 1 6 x10E3/uL  0 7-3 1   Monocytes(Absolute) 0 6 x10E3/uL  0 1-0 9   Eos (Absolute) 0 1 x10E3/uL  0 0-0 4   Baso (Absolute) 0 0 x10E3/uL  0 0-0 2   Immature Granulocytes 0 %     Immature Grans (Abs) 0 0 x10E3/uL  0 0-0 1     (1) COMPREHENSIVE METABOLIC PANEL 20OCX1781 46:34XD Cielo Madhavi Breanne     Test Name Result Flag Reference   Glucose, Serum 94 mg/dL  65-99   BUN 16 mg/dL  8-27   Creatinine, Serum 0 88 mg/dL  0 57-1 00   BUN/Creatinine Ratio 18  12-28   Sodium, Serum 142 mmol/L  134-144   Potassium, Serum 4 7 mmol/L  3 5-5 2   Chloride, Serum 102 mmol/L     Carbon Dioxide, Total 23 mmol/L  18-29   Calcium, Serum 9 2 mg/dL  8 7-10 3   Protein, Total, Serum 6 7 g/dL  6 0-8 5   Albumin, Serum 4 0 g/dL  3 5-4 8   Globulin, Total 2 7 g/dL  1 5-4 5   A/G Ratio 1 5  1 2-2 2   Bilirubin, Total 0 6 mg/dL  0 0-1 2   Alkaline Phosphatase, S 62 IU/L     AST (SGOT) 20 IU/L  0-40   ALT (SGPT) 13 IU/L  0-32   eGFR If NonAfricn Am 63 mL/min/1 73  >59   eGFR If Africn Am 73 mL/min/1 73  >59     (1) LIPID PANEL, FASTING 22Cgn3859 07:31AM Madhavi Buck     Test Name Result Flag Reference   Cholesterol, Total 164 mg/dL  100-199   Triglycerides 55 mg/dL  0-149   HDL Cholesterol 75 mg/dL  >39   VLDL Cholesterol Benjamin 11 mg/dL  5-40   LDL Cholesterol Calc 78 mg/dL  0-99   T  Chol/HDL Ratio 2 2 ratio units  0 0-4 4   T  Chol/HDL Ratio                                                             Men  Women                                               1/2 Avg  Risk  3 4    3 3                                                   Avg Risk  5 0    4 4                                                2X Avg  Risk  9 6    7 1                                                3X Avg  Risk 23 4   11 0     (1) TSH WITH FT4 REFLEX 08Rrs1819 07:31AM Ham Clutter     Test Name Result Flag Reference   TSH 3 220 uIU/mL  0 450-4 500     [facility]) Cardiovascular Risk Assessment 12Mjh1910 07:31AM Ham Clutter     Test Name Result Flag Reference   Interpretation Note     Supplement report is available  PDF Image

## 2018-01-13 VITALS
TEMPERATURE: 97.8 F | WEIGHT: 134.13 LBS | OXYGEN SATURATION: 98 % | HEIGHT: 58 IN | HEART RATE: 65 BPM | RESPIRATION RATE: 20 BRPM | BODY MASS INDEX: 28.15 KG/M2 | SYSTOLIC BLOOD PRESSURE: 162 MMHG | DIASTOLIC BLOOD PRESSURE: 82 MMHG

## 2018-01-15 VITALS
SYSTOLIC BLOOD PRESSURE: 144 MMHG | HEART RATE: 82 BPM | DIASTOLIC BLOOD PRESSURE: 84 MMHG | WEIGHT: 133 LBS | BODY MASS INDEX: 27.92 KG/M2 | RESPIRATION RATE: 16 BRPM | TEMPERATURE: 97 F | HEIGHT: 58 IN

## 2018-01-15 VITALS
WEIGHT: 133 LBS | HEIGHT: 58 IN | TEMPERATURE: 97.1 F | DIASTOLIC BLOOD PRESSURE: 76 MMHG | BODY MASS INDEX: 27.92 KG/M2 | SYSTOLIC BLOOD PRESSURE: 144 MMHG | HEART RATE: 84 BPM | RESPIRATION RATE: 18 BRPM

## 2018-01-15 NOTE — RESULT NOTES
Message   Patient is deaf  Please call the  which I believe is listed in the chart or her caretaker  Please inform them I reviewed her visit with the speech pathologist and she had a normal evaluation  Please make sure she continues to take acid medications and I will see her at next follow-up visit  Verified Results  [facility] Dolly Isabel SPEECH 66DTO2403 09:20AM Criss Juarez    Order Number: [id number]    - Patient Instructions: To schedule this appointment, please contact Central Scheduling at [phone]  Test Name Result Flag Reference   FL BARIUM SWALLOW VIDEO W SPEECH (Report)     VIDEO BARIUM SWALLOW     INDICATION: Dysphagia     COMPARISON: Barium swallow examination 2/20/2017     FLUOROSCOPY TIME:  3 6 min      IMAGES: 2       IMPRESSION:   FINDINGS/IMPRESSION:     Video barium swallow was performed by the department of speech pathology utilizing food substances of various thickness  Please refer to the speech pathology report for further details         Workstation performed: [id number]     Signed by:   Birgit Hahn MD   3/21/17

## 2018-01-16 NOTE — MISCELLANEOUS
Message   Recorded as Task   Date: 06/07/2017 12:11 PM, Created By: Becca Fernandez   Task Name: Care Coordination   Assigned To: Becca Fernandez   Regarding Patient: Juancho Elmore, Status: In Progress   Comment:    Becca Fernandez - 07 Jun 2017 12:11 PM     TASK CREATED  SPOKE W/SHAKEEL FROM [facility] TO SCHEDULE PT FOR HER APPT ON 6/16 @3PM ROJELIO  SHE WILL FWD PROPER PAPERWORK  Becca Fernandez - 07 Jun 2017 12:13 PM     TASK IN PROGRESS   Becca Fernandez - 07 Jun 2017 3:16 PM     TASK EDITED  REC'D CONFIRMATIN FROM [facility] FOR APPT ON 6/16  Active Problems    1  Adult BMI 28 0-28 9 kg/sq m (V85 24) (Z68 28)   2  Adult body mass index 26 0-26 9 (V85 22) (Z68 26)   3  Allergic rhinitis (477 9) (J30 9)   4  Arteriosclerosis of coronary artery (414 00) (I25 10)   5  Benign essential hypertension (401 1) (I10)   6  Cervicalgia (723 1) (M54 2)   7  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)   8  Depression screening (V79 0) (Z13 89)   9  Dysphagia (787 20) (R13 10)   10  Encounter for screening mammogram for high-risk patient (V76 11) (Z12 31)   11  Encounter for screening mammogram for malignant neoplasm of breast (V76 12)    (Z12 31)   12  Esophageal dysmotility (530 5) (K22 4)   13  Esophageal reflux (530 81) (K21 9)   14  Flu vaccine need (V04 81) (Z23)   15  High cholesterol (272 0) (E78 00)   16  Hypothyroidism (244 9) (E03 9)   17  Mixed hyperlipidemia (272 2) (E78 2)   18  Need for vaccination for pneumococcus (V03 82) (Z23)   19  Oropharyngeal dysphagia (787 22) (R13 12)   20  Osteoporosis (733 00) (M81 0)   21  Screening for genitourinary condition (V81 6) (Z13 89)   22  Screening for neurological condition (V80 09) (Z13 89)   23  Thyroid nodule (241 0) (E04 1)   24  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)    Current Meds   1  Aspirin EC 81 MG Oral Tablet Delayed Release; take 2 tablets daily; Therapy: 11CNG9935 to Recorded   2   Levothyroxine Sodium 75 MCG Oral Tablet; take one tablet by mouth every day; Therapy: 41Ltp5957 to (Evaluate:60Air9045)  Requested for: 48MNB0396; Last   Rx:66Kyx7438 Ordered   3  Multi-Day Oral Tablet; TAKE 1 TABLET DAILY; Therapy: 29DHV0815 to Recorded   4  Pantoprazole Sodium 40 MG Oral Tablet Delayed Release; take 1 tablet 30 minutes   before breakfast daily; Therapy: 95NGX4174 to (Evaluate:69Eod1923)  Requested for: 67TSE9905; Last   Rx:16Mar2017 Ordered   5  Rosuvastatin Calcium 10 MG Oral Tablet (Crestor); Take 1 tablet daily  Requested for:   86LDJ8981; Last Rx:16Mar2017 Ordered   6  Vitamin D 2000 UNIT Oral Capsule; 1 TAB DAILY Recorded    Allergies    1   No Known Drug Allergies    Signatures   Electronically signed by : Lissette Clements, ; Jun 7 2017  3:17PM EST                       (Author)

## 2018-01-17 NOTE — RESULT NOTES
Verified Results  (1) TISSUE EXAM 11Llv2308 04:56PM Cheyenne Vernoni     Test Name Result Flag Reference   LAB AP CASE REPORT (Report)     Surgical Pathology Report             Case: [id number]                   Authorizing Provider: Emma Hart MD      Collected:      02/20/2017 1656        Ordering Location:   [address] [place] [address] Received:      02/20/2017 2016                    [address] [facility]                                    Pathologist:      Lubna Dennis DO                               Specimens:  A) - Stomach, Antral cold bx rule out H pylori                             B) - Esophagus, Distal esophagus cold bx rule out eosinophilic esophagitis               C) - Esophagus, Proximal esophagus cold bx rule out eosinophilic esophagitis   LAB AP FINAL DIAGNOSIS (Report)     A  Stomach, antrum, biopsy:  - Gastric antral mucosa with no significant pathologic abnormalities  - No H  pylori organisms identified on H&E and immunohistochemical stains  B  Esophagus, distal, biopsy:  - Benign squamous mucosa with nonspecific reactive changes   - No intraepithelial eosinophils, columnar mucosa, intestinal metaplasia   or dysplasia identified  C  Esophagus, proximal, biopsy:  - Benign squamous mucosa with no significant pathologic abnormalities  - No intraepithelial eosinophils, columnar mucosa, intestinal metaplasia   or dysplasia identified  Appropriate positive and negative controls obtained  Interpretation performed at [facility], [place] [address]  Electronically signed by Lubna Dennis DO on 2/22/2017 at 9:07 AM   LAB AP SURGICAL ADDITIONAL INFORMATION (Report)     These tests were developed and their performance characteristics   determined by [facility] or [address] [place] [address]  They may not be cleared or approved by the [place]  Food and   Drug Administration  The FDA has determined that such clearance or   approval is not necessary   These tests are used for clinical purposes  They should not be regarded as investigational or for research  This   laboratory has been approved by CLIA 88, designated as a high-complexity   laboratory and is qualified to perform these tests  LAB AP GROSS DESCRIPTION (Report)     A  The specimen is received in formalin, labeled with the patient's name   and hospital number, and is designated antral biopsy rule out H    pylori  The specimen consists of 2 tan soft tissue fragments measuring   0 3 and 0 4 cm  Entirely submitted  One cassette  B  The specimen is received in formalin, labeled with the patient's name   and hospital number, and is designated distal esophagus biopsy rule out   eosinophilic esophagitis  The specimen consists of 2 white-tan soft   tissue fragments each measuring 0 4-0 5 cm  Entirely submitted  One   cassette  C  The specimen is received in formalin, labeled with the patient's name   and hospital number, and is designated proximal esophagus biopsy rule   out eosinophilic esophagitis  The specimen consists of 2 white-tan soft   tissue fragments each measuring 0 3 cm  Entirely submitted  One cassette  Note: The estimated total formalin fixation time based upon information   provided by the submitting clinician and the standard processing schedule   is 21 0 hours      MAC

## 2018-01-17 NOTE — PROCEDURES
Procedures by ANTHONY Silva at 3/21/2017 12:49 PM      Author:  ANTHONY Silva Service:  (none) Author Type:  Speech and Language Pathologist     Filed:  3/21/2017  1:10 PM Date of Service:  3/21/2017 12:49 PM Status:  Signed     :  ANTHONY Silva (Speech and Language Pathologist)                       Video Fluoroscopic Swallow Study      Patient Name: Chanelle Parada   Today's Date: 3/21/2017    Past Medical History  Past Medical History:     Diagnosis  Date    Deafness     Disease of thyroid gland     Hyperlipidemia     Hypertension      Past Surgical History  Past Surgical History:      Procedure  Laterality Date    CARDIAC SURGERY      ESOPHAGOGASTRODUODENOSCOPY N/A 2017    Procedure: ESOPHAGOGASTRODUODENOSCOPY (EGD); Surgeon: Liam Romero MD;  Location: [facility] GI LAB; Service:        General Information:  Ordering Physician: Dr Carmen Morris  Date of Order: 17  Date of Evaluation: 17  Type of Study: Initial MBS  Diet Prior to this Study: Regular diet and thin liquids  Past Medical History: GERD, HTN, HLD, Thyroid disease, Deafness  Additional History: Patient reported foods feeling stuck in her throat, specifically on the right side  Positionin degrees in the lateran and AP view  Materials Administered: Puree, Soft/ Hard solids, and Thin liquid    Oral Stage:  Within functional limits for all textures  Pharyngeal Stage:  Within Functional Limits for all textures  Swallow Mechanics  Swallow Initiation was:  Prompt  Hyolaryngeal Excursion was: Adequate  Epiglottic Inversion was:  Present  Tongue Drive was: Adequate  Pharyngeal Constriction was: Adequate  Cricopharyngeal Opening/ Relaxation was: Adequate  Cricopharyngeal Prominence/ Bar:  Noted with no impact on swallow function  Pharyngeal Stage Summary:  No laryngeal penetration or aspiration during or following all swallows of foods and thin liquid   There was intermittent residual of foods in the valleculae on the right side post swallows which cleared with a liquid  wash  Esophageal Stage:  Within functional limits at the cervical level  No back flow or stasis evident in the cervical/ proximal esophagus  Impressions:  Oral pharyngeal swallow skills are safe/ within functional limits for regular solids and thin/ all liquids  There was minimal residual of foods noted in the valleculae on the right side intermittently which cleared with a liquid wash  A prominent  cricopharyngeal muscle was noted  No laryngeal penetration or aspiration of all foods or thin liquid during or following all swallows  Suspect reflux impacting patient's swallowing ability also  Recommendations:  Diet Texture: Regular  Liquid Consistency: Thin  Liquid Administration: Cup/ Straw  Medication Administration: Medication with thin liquid  Suggested Positioning: Upright/ 90 degree angle  Strategies: Small bites/ sips, Chew foods thoroughly, Alternate food/ liquid intakes  Dysphagia Treatment Recommended:  May consider pending ENT findings and recommendations  Further Evaluation by: ENT  Consider Follow-up VFSS: PRN    Results and recommendations were discussed with Mr  & Mrs Sirisha Sim following the study  They demonstrated adequate understanding of all information provided at this time  Please call this therapist at [phone] if you should have any questions or  require additional information  It was a pleasure evaluating Mrs Sirisha Sim      Thank you for this referral       Grabiel Henley, [facility], [address] [place], [place] [id number]  Speech Language Pathologist  [address] [place] [address]                             Received for:Provider  EPIC   Mar 21 2017  1:10PM [facility] Standard [facility]

## 2018-01-22 VITALS
BODY MASS INDEX: 28.13 KG/M2 | HEART RATE: 76 BPM | WEIGHT: 134 LBS | RESPIRATION RATE: 20 BRPM | SYSTOLIC BLOOD PRESSURE: 180 MMHG | DIASTOLIC BLOOD PRESSURE: 80 MMHG | HEIGHT: 58 IN | TEMPERATURE: 96.1 F

## 2018-01-22 VITALS — SYSTOLIC BLOOD PRESSURE: 144 MMHG | DIASTOLIC BLOOD PRESSURE: 72 MMHG

## 2018-01-22 VITALS
SYSTOLIC BLOOD PRESSURE: 174 MMHG | HEART RATE: 84 BPM | WEIGHT: 136 LBS | BODY MASS INDEX: 28.55 KG/M2 | HEIGHT: 58 IN | DIASTOLIC BLOOD PRESSURE: 80 MMHG | RESPIRATION RATE: 20 BRPM | TEMPERATURE: 98 F

## 2018-01-22 VITALS — SYSTOLIC BLOOD PRESSURE: 158 MMHG | DIASTOLIC BLOOD PRESSURE: 84 MMHG

## 2018-01-24 NOTE — MISCELLANEOUS
Assessment   1  Atrial flutter (427 32) (I48 92)1   2  Adult BMI 28 0-28 9 kg/sq m (V85 24) (Z68 28)1   3  Benign essential hypertension (401 1) (I10)1   4  High cholesterol (272 0) (E78 00)1      1 Amended By: Kika Savage ; Nov 15 2017 3:03 PM EST    Discussion/Summary  Discussion Summary:   She will continue her eliquis and metoprolol  SHe is tolerating these well and was advised not to stop taking either of these medications without first calling either cardiologist or here  Continue rosuvastatin for hyperlipdemia  Follow up in 4 months  1        1 Amended By: Kika Savage ; Nov 15 2017 3:04 PM EST    Chief Complaint  I spoke with Amrit Lopez through the  via phone conversation on 11/6/17 [facility] on [facility]  Amrit Lopez states that she still has some mild chest discomfort but is definitely better than she was and she is doing "a lot better" She denies shortness of breath or fever  I reviewed her medications with her and updated her chart accordingly  I explained to her that she needed to  the prescriptions that were dropped off to us with her [facility] discharge records so she can take those to her pharmacy to have them filled  She knows to call our office at any time with any questions or concerns and she is aware to go to the ER if any worsening chest pain or any dyspnea, weakness, dizziness,palpitations, fevers, etc KAROLoyle LPN   Chief Complaint Free Text Note Form: follow up from stay in hospital -pounding in chest1        1 Amended By: Ashly Chery; Nov 15 2017 2:26 PM EST    History of Present Illness  TCM Communication [facility]: The patient is being contacted for follow-up after hospitalization  Hospital records were reviewed  She was hospitalized [facility]  The date of discharge: 11/3/17  Diagnosis: Chest Pain, Tachycardia  She was discharged to home  Medications reviewed and updated today     She scheduled a follow up appointment  Counseling was provided to the patient   via phone ca  Topics counseled included instructions for management, risk factor reductions, patient and family education, activities of daily living and importance of compliance with treatment  Communication performed and completed by [facility] LPN 82/7/96   HPI: Here for TCM as above  Feels well with no further palpitations or dizziness  Has appointment for cardiologist 11/28  Doing well with her medications  No side effects  Taking all meds as prescribed  1        1 Amended By: Morena Ames ; Nov 15 2017 2:53 PM EST    Review of Systems  Complete-Female:   Constitutional:1  No fever, no chills, feels well, no tiredness, no recent weight gain or weight loss1   Cardiovascular:1  No complaints of slow heart rate, no fast heart rate, no chest pain, no palpitations, no leg claudication, no lower extremity edema1   Respiratory:1  No complaints of shortness of breath, no wheezing, no cough, no SOB on exertion, no orthopnea, no PND1   Gastrointestinal:1  No complaints of abdominal pain, no constipation, no nausea or vomiting, no diarrhea, no bloody stools1         1 Amended By: Morena Ames ; Nov 15 2017 3:00 PM EST    Active Problems   1  Adult BMI 27 0-27 9 kg/sq m (V85 23) (Z68 27)  2  Adult BMI 28 0-28 9 kg/sq m (V85 24) (Z68 28)  3  Adult body mass index 26 0-26 9 (V85 22) (Z68 26)  4  Allergic rhinitis (477 9) (J30 9)  5  Arteriosclerosis of coronary artery (414 00) (I25 10)  6  Benign essential hypertension (401 1) (I10)  7  Cervicalgia (723 1) (M54 2)  8  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)  9  Depression screening (V79 0) (Z13 89)  10  Dizziness (780 4) (R42)  11  Dysphagia (787 20) (R13 10)  12  Encounter for screening mammogram for high-risk patient (V76 11) (Z12 31)  13  Encounter for screening mammogram for malignant neoplasm of breast (V76 12)    (Z12 31)  14  Esophageal dysmotility (530 5) (K22 4)  15   Esophageal reflux (530 81) (K21 9)  16  Flu vaccine need (V04 81) (Z23)  17  High cholesterol (272 0) (E78 00)  18  Hypothyroidism (244 9) (E03 9)  19  Left-sided weakness (728 87) (R53 1)  20  Menopause (627 2) (Z78 0)  21  Mixed hyperlipidemia (272 2) (E78 2)  22  Need for vaccination for pneumococcus (V03 82) (Z23)  23  Oropharyngeal dysphagia (787 22) (R13 12)  24  Osteoporosis (733 00) (M81 0)  25  Screening for genitourinary condition (V81 6) (Z13 89)  26  Screening for neurological condition (V80 09) (Z13 89)  27  Thyroid nodule (241 0) (E04 1)  28  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)    Past Medical History   1  History of _ (796 2)  2  History of Abdominal pain, left upper quadrant (789 02) (R10 12)  3  Acute sinusitis (461 9) (J01 90)  4  History of Acute sinusitis (461 9) (J01 90)  5  Acute upper respiratory infection (465 9) (J06 9)  6  Dysuria (788 1) (R30 0)  7  History of Flu vaccine need (V04 81) (Z23)  8  History of abdominal pain (V13 89) (Z87 898)  9  History of acute bronchitis (V12 69) (Z87 09)  10  History of acute sinusitis (V12 69) (Z87 09)    Surgical History   1  History of Diagnostic Esophagogastroduodenoscopy  Surgical History Reviewed: The surgical history was reviewed and updated today  1        1 Amended By: Cheryl Espinoza ; Nov 15 2017 3:00 PM EST    Family History  Mother   1  Denied: Family history of Colon cancer  2  Family history of Essential Hypertension  3  Denied: Family history of colonic polyps  4  Denied: Family history of liver disease  5  Family history of malignant neoplasm of breast (V16 3) (Z80 3)  Father   6  Denied: Family history of Colon cancer  7  Denied: Family history of colonic polyps  8  Denied: Family history of liver disease  Family History Reviewed: The family history was reviewed and updated today  1        1 Amended By: Cheryl Espinoza ; Nov 15 2017 3:00 PM EST    Social History    · Never A Smoker   · No alcohol use   · No drug use    Social History Reviewed:  The social history was reviewed and updated today  1  The social history was reviewed and is unchanged  1        1 Amended By: Jeremías Dunbar ; Nov 15 2017 3:00 PM EST    Current Meds  1  Aspirin EC 81 MG Oral Tablet Delayed Release; take 2 tablets daily; Therapy: 28BKQ2279 to Recorded  2  Eliquis 5 MG Oral Tablet; 1 twice daily; Therapy: (Recorded:13Nye3825) to Recorded  3  Levothyroxine Sodium 75 MCG Oral Tablet; take one tablet by mouth every day; Therapy: 07Xrc7619 to (Evaluate:41Sjf2468)  Requested for: 17Dbo6316; Last   Rx:04Fxi3159 Ordered  4  Lopressor 50 MG Oral Tablet; 1/2 TABLET TWICE DAILY; Therapy: [phone]) to Recorded  5  Rosuvastatin Calcium 10 MG Oral Tablet; Take 1 tablet daily  Requested for: 43NIG8781;   Last Rx:07Qob2932 Ordered  6  Vitamin D 2000 UNIT Oral Capsule; 1 TAB DAILY Recorded  Medication List Reviewed: The medication list was reviewed and updated today  Allergies   1  No Known Drug Allergies    Vitals  Signs   Recorded: 30XYM5557 38:68YW    Systolic: 064 1    Diastolic: 72 1    BP Comments: recheck 1   Recorded: 38BND8775 02:22PM    Temperature: 98 F 1    Heart Rate: 84 1    Respiration: 20 1    Systolic: 989 1    Diastolic: 80 1    Height: 4 ft 10 in 1    Weight: 136 lb  1    BMI Calculated: 28 42 1    BSA Calculated: 1 55 1      1 Amended By: Jeremías Dunbar ; Nov 15 2017 3:02 PM EST    Physical Exam    Constitutional1    General appearance: No acute distress, well appearing and well nourished  1    Eyes1    Conjunctiva and lids: No swelling, erythema or discharge  1    Ears, Nose, Mouth, and Throat1    Oropharynx: Normal with no erythema, edema, exudate or lesions  1    Pulmonary1    Respiratory effort: No increased work of breathing or signs of respiratory distress  1    Auscultation of lungs: Clear to auscultation  1    Cardiovascular1    Auscultation of heart: Normal rate and rhythm, normal S1 and S2, without murmurs  1  Regularly regular1      Examination of extremities for edema and/or varicosities: Normal 1    Carotid pulses: Normal 1    Abdomen1    Abdomen: Non-tender, no masses  1    Liver and spleen: No hepatomegaly or splenomegaly  1    Lymphatic1    Palpation of lymph nodes in neck: No lymphadenopathy  1    Musculoskeletal1    Gait and station: Normal 1    Neurologic1    Reflexes: Abnormal  1  1  1 1  1  1  1  1  1  1           1 Amended By: Ayaz Wooten ; Nov 15 2017 3:01 PM EST    Health Management  Encounter for screening mammogram for high-risk patient   * MAMMO SCREENING BILATERAL W CAD; every 1 year; Last 41CVX4410; Next Due: 29Nov2017;  Near Due    Message   Recorded as Task   Date: 11/06/2017 09:22 AM, Created By: Radha Corral   Task Name: Care Coordination   Assigned To: Jason Hernandez   Regarding Patient: Wilfrid Gonzales, Status: Active   Comment:    Nadine Boss - 06 Nov 2017 9:22 AM     TASK CREATED  Caller: Self; Care Coordination; [phone] (Home)  Patient came in this morning and dropped off LORIN forms from [facility] and notes  He is deaf so was difficult understanding patient  He says he needs appt and will need   Left all papers at nurses station  Jason Hernandez - 06 Nov 2017 11:47 AM     TASK REASSIGNED: Previously Assigned To [address] [place] [address] - 06 Nov 2017 11:53 AM     TASK EDITED  Patient is calling again and wants to know when papers will be filled out??? I honestly not sure what they are asking for?/   Recorded as Task   Date: 11/06/2017 09:22 AM, Created By: Radha Corral   Task Name: Care Coordination   Assigned To: Jason Hernandez   Regarding Patient: Wilfrid Gonzales, Status: Active   Comment:    Nadine Boss - 06 Nov 2017 9:22 AM     TASK CREATED  Caller: Self; Care Coordination; [phone] (Home)  Patient came in this morning and dropped off LORIN forms from [facility] and notes  He is deaf so was difficult understanding patient  He says he needs appt and will need    Left all papers at nurses station  Jason Hernandez - 06 Nov 2017 11:47 AM     TASK REASSIGNED: Previously Assigned To [address] - 06 Nov 2017 11:53 AM     TASK EDITED  Patient is calling again and wants to know when papers will be filled out??? I honestly not sure what they are asking for?/     Future Appointments    Date/Time Provider Specialty Site   11/13/2017 03:00 PM N[facility] Bolanos [facility]   11/27/2017 09:30 AM N[facility] Bolanos [facility]     Signatures   Electronically signed by : Tiago Browne, ; Nov 6 2017  3:03PM EST                       (Co-author)    Electronically signed by : NARGIS Yeboah ; Nov 6 2017  3:06PM EST                       (Author)    Electronically signed by : NARGIS Yeboah ; Nov 15 2017  3:05PM EST                       (Author)

## 2018-01-24 NOTE — MISCELLANEOUS
Assessment   1  Benign essential hypertension (401 1) (I10)1   2  Esophageal dysmotility (530 5) (K22 4)1   3  Adult BMI 28 0-28 9 kg/sq m (V85 24) (Z68 28)1      1 Amended By: Cheryl Espinoza ; Feb 22 2017 4:29 PM EST    Discussion/Summary  Discussion Summary:   Hypertension is not well controlled  Due to history of CAD will go back on the bystolic, stop amlodipine  Her bp has been well controlled on this previously  Has appointment in one month and will keep this for bp recheck  Will see GI for possible further workup of dysphagia and possible esophageal dysmotility seen on studies in the hospital 1        1 Amended By: Cheryl Espinoza ; Feb 22 2017 4:29 PM EST    Chief Complaint  Spoke with Patient thru  services after her Hospital discharge on 02/21/2017  Pt was home, still have some discomfort when swallow but nothing severe, is on a bland diet, states just had breakfast this morning and is eating well  denied chest pain or Dyspnea or swallowing difficulties  states feels much better after her Hospital discharge  will set up an appointment with Dr Toni Randhawa today  Patient is aware to call our office with any questions or concern  Patient was advised to go to the Emergency Room if she gets chest pain, dyspnea, odynophagia, vomiting, head ache, dizziness   etc   Patient has appointment for Hospital follow up with Dr Radha Stephens on 02/22/2017 at 3:30 pm     ER/CMA  Chief Complaint Free Text Note Form: Seen for a TCM  er/cma  1        1 Amended By: Smita Norwood; Feb 22 2017 3:31 PM EST    History of Present Illness  TCM Communication [facility]: The patient is being contacted for follow-up after hospitalization  Hospital records were reviewed  She was hospitalized at [address] [place] [address]  The date of admission: 02/17/2017, date of discharge: 02/21/2017  Diagnosis: Odynophagia  She was discharged to home  Medications reviewed and updated today  She scheduled a follow up appointment     Follow-up appointments with other specialists: Dr Rebekah Lopes  Counseling was provided to the patient    Topics counseled included instructions for management, risk factor reductions, patient and family education, activities of daily living and importance of compliance with treatment  Communication performed and completed by ER/CMA  HPI: As per TCM, was admitted for dysphagia and bp was high  Per hospital she had not been taking her bystolic due to side effects but she denies this  They changed her bp med on this basis  Per report she had a question of distal esophageal dysmotility but was told that everything was normal  She and her  state they were not told to follow up with GI as per the discharge summary  Continues to have intermittent chest pressure with swallowing and feels that things are getting stuck on the right side of her throat  1        1 Amended By: Ashley Gray ; Feb 22 2017 4:27 PM EST    Review of Systems  Complete-Female:   Constitutional:1  No fever, no chills, feels well, no tiredness, no recent weight gain or weight loss1   Cardiovascular:1  No complaints of slow heart rate, no fast heart rate, no chest pain, no palpitations, no leg claudication, no lower extremity edema1   Respiratory:1  No complaints of shortness of breath, no wheezing, no cough, no SOB on exertion, no orthopnea, no PND1   Gastrointestinal:1  as noted in Charisse Latif   1 Amended By: Ashley Gray ; Feb 22 2017 4:27 PM EST    Active Problems   1  Adult body mass index 26 0-26 9 (V85 22) (Z68 26)  2  Allergic rhinitis (477 9) (J30 9)  3  Arteriosclerosis of coronary artery (414 00) (I25 10)  4  Benign essential hypertension (401 1) (I10)  5  Cervicalgia (723 1) (M54 2)  6  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)  7  Depression screening (V79 0) (Z13 89)  8  Encounter for screening mammogram for high-risk patient (V76 11) (Z12 31)  9  Encounter for screening mammogram for malignant neoplasm of breast (V76 12)   (Z12 31)  10   Esophageal reflux (530 81) (K21 9)  11  Flu vaccine need (V04 81) (Z23)  12  Hypothyroidism (244 9) (E03 9)  13  Mixed hyperlipidemia (272 2) (E78 2)  14  Need for vaccination for pneumococcus (V03 82) (Z23)  15  Osteoporosis (733 00) (M81 0)  16  Screening for genitourinary condition (V81 6) (Z13 89)  17  Screening for neurological condition (V80 09) (Z13 89)  18  Thyroid nodule (241 0) (E04 1)  19  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)    Past Medical History   1  History of _ (796 2)  2  History of Abdominal pain, left upper quadrant (789 02) (R10 12)  3  Acute sinusitis (461 9) (J01 90)  4  History of Acute sinusitis (461 9) (J01 90)  5  Acute upper respiratory infection (465 9) (J06 9)  6  Dysuria (788 1) (R30 0)  7  History of Flu vaccine need (V04 81) (Z23)  8  History of abdominal pain (V13 89) (Z87 898)  9  History of acute bronchitis (V12 69) (Z87 09)  10  History of acute sinusitis (V12 69) (Z87 09)    Surgical History  Surgical History Reviewed: The surgical history was reviewed and updated today  1        1 Amended By: Radha Pacheco ; Feb 22 2017 4:28 PM EST    Family History  Mother   1  Family history of Essential Hypertension  2  Family history of malignant neoplasm of breast (V16 3) (Z80 3)  Family History Reviewed: The family history was reviewed and updated today  1        1 Amended By: Radha Pacheco ; Feb 22 2017 4:28 PM EST    Social History    · Never A Smoker   · No alcohol use   · No drug use    Social History Reviewed: The social history was reviewed and updated today  1  The social history was reviewed and is unchanged  1        1 Amended By: Radha Pacheco ; Feb 22 2017 4:28 PM EST    Current Meds  1  Aspirin EC 81 MG Oral Tablet Delayed Release; take 2 tablets daily; Therapy: 55NUO2450 to Recorded  2  Bystolic 5 MG Oral Tablet; TAKE 1 TABLET DAILY; Therapy: 56CDS8996 to (Evaluate:03Mar2017)  Requested for: 80REL6299; Last   Rx:03Nov2016 Ordered  3   Levothyroxine Sodium 75 MCG Oral Tablet; take one tablet by mouth every day; Therapy: 52Jlr9184 to (Evaluate:42Smk8087)  Requested for: 27FUN8807; Last   Rx:57Lrt0754 Ordered  4  Multi-Day Oral Tablet; TAKE 1 TABLET DAILY; Therapy: 65QIR7744 to Recorded  5  Rosuvastatin Calcium 10 MG Oral Tablet; Take 1 tablet daily  Requested for: 88Pca9222;   Last Rx:69Ovd7728 Ordered  6  Vitamin D 2000 UNIT Oral Capsule; 1 TAB DAILY Recorded  Medication List Reviewed: The medication list was reviewed and updated today  1        1 Amended By: Zoraida Martinez ; Feb 22 2017 4:28 PM EST    Allergies   1  No Known Drug Allergies    Vitals  Signs   Recorded: 22Feb2017 97:08FR    Systolic: 896 1    Diastolic: 84 1    BP Comments: recheck 1   Recorded: 22Feb2017 03:27PM    Temperature: 96 1 F 1    Heart Rate: 76 1    Respiration: 20 1    Systolic: 726 1    Diastolic: 80 1    Height: 4 ft 10 in 1    Weight: 134 lb  1    BMI Calculated: 28 01 1    BSA Calculated: 1 53 1      1 Amended By: Zoraida Martinez ; Feb 22 2017 4:28 PM EST    Physical Exam    Constitutional1    General appearance: No acute distress, well appearing and well nourished  1    Ears, Nose, Mouth, and Throat1    Oropharynx: Normal with no erythema, edema, exudate or lesions  1    Pulmonary1    Respiratory effort: No increased work of breathing or signs of respiratory distress  1    Auscultation of lungs: Clear to auscultation  1    Cardiovascular1    Auscultation of heart: Normal rate and rhythm, normal S1 and S2, without murmurs  1    Examination of extremities for edema and/or varicosities: Normal 1    Carotid pulses: Normal 1    Abdomen1    Abdomen: Abnormal  1  Bowel sounds were1  normal1   There was  mild1  tenderness1  in the epigastric area1   The abdomen was1  not firm1   no rebound tenderness1  no guarding1    Liver and spleen: No hepatomegaly or splenomegaly  1    Lymphatic1    Palpation of lymph nodes in neck: No lymphadenopathy  1    Musculoskeletal   Skin1    Skin and subcutaneous tissue: Normal without rashes or lesions  1          1 Amended By: Sung Rubin ; Feb 22 2017 4:29 PM EST    Health Management  Encounter for screening mammogram for high-risk patient   * MAMMO SCREENING BILATERAL W CAD; every 1 year; Last 60APP2379; Next Due: 29Nov2017; Active    Message   Recorded as Task   Date: 02/21/2017 06:30 PM, Created By: System   Task Name: [facility]OR[facility]   Assigned To: Irene Buck   Regarding Patient: Erin Espinoza, Status: Active   Comment:    System - 21 Feb 2017 6:30 PM     Patient discharged from hospital   Patient Name: Eloisa Kehr  Patient YOB: 1940  Discharge Date: 2/21/2017  Facility: [facility] Appointments    Date/Time Provider Specialty Site   02/22/2017 03:30 PM NARGIS Rodriguez  [address] [place] [address]   03/16/2017 09:30 AM NARGIS Rodriguez   [address] [place] [address]     Signatures   Electronically signed by : Marcel Parikh, ; Feb 22 2017  9:39AM EST                       (Co-author)    Electronically signed by : NARGIS Spencer ; Feb 22 2017 10:44AM EST                       (Author)    Electronically signed by : NARGIS Spencer ; Feb 22 2017  4:30PM EST                       (Author)

## 2018-03-01 DIAGNOSIS — E03.9 HYPOTHYROIDISM, UNSPECIFIED TYPE: Primary | ICD-10-CM

## 2018-03-01 RX ORDER — LEVOTHYROXINE SODIUM 0.07 MG/1
TABLET ORAL
Qty: 30 TABLET | Refills: 5 | Status: SHIPPED | OUTPATIENT
Start: 2018-03-01 | End: 2018-04-16 | Stop reason: SDUPTHER

## 2018-03-20 PROBLEM — I48.92 ATRIAL FLUTTER (HCC): Status: ACTIVE | Noted: 2017-11-06

## 2018-03-20 PROBLEM — K22.4 ESOPHAGEAL DYSMOTILITY: Status: ACTIVE | Noted: 2017-02-22

## 2018-03-20 PROBLEM — R07.9 CHEST PAIN: Status: RESOLVED | Noted: 2017-02-17 | Resolved: 2018-03-20

## 2018-03-29 ENCOUNTER — TELEPHONE (OUTPATIENT)
Dept: FAMILY MEDICINE CLINIC | Facility: CLINIC | Age: 78
End: 2018-03-29

## 2018-03-29 DIAGNOSIS — Z12.31 SCREENING MAMMOGRAM, ENCOUNTER FOR: Primary | ICD-10-CM

## 2018-03-29 NOTE — TELEPHONE ENCOUNTER
calling to get a 3D Mammo for Renay Ch  He said his daughter said Darek Lopez should have a 3D mammo  She has an apt on April 4th  [address]    Thank you

## 2018-03-29 NOTE — TELEPHONE ENCOUNTER
I ordered a 3D mammogram for her, but please let them know they will have to check to see if this will be covered  She does not have any diagnosis that would warrant a 3D mammogram vs a regular mammogram   She does not have dense breast tissue  She may get a bill of Medicare or her secondary do not cover this

## 2018-04-01 ENCOUNTER — HOSPITAL ENCOUNTER (EMERGENCY)
Facility: HOSPITAL | Age: 78
Discharge: HOME | End: 2018-04-01
Attending: EMERGENCY MEDICINE | Admitting: EMERGENCY MEDICINE
Payer: COMMERCIAL

## 2018-04-01 ENCOUNTER — APPOINTMENT (EMERGENCY)
Dept: RADIOLOGY | Facility: HOSPITAL | Age: 78
End: 2018-04-01
Payer: COMMERCIAL

## 2018-04-01 VITALS
SYSTOLIC BLOOD PRESSURE: 139 MMHG | OXYGEN SATURATION: 95 % | TEMPERATURE: 97.8 F | DIASTOLIC BLOOD PRESSURE: 64 MMHG | RESPIRATION RATE: 16 BRPM | HEART RATE: 56 BPM

## 2018-04-01 DIAGNOSIS — I10 HYPERTENSION: Primary | ICD-10-CM

## 2018-04-01 DIAGNOSIS — F41.9 ANXIETY: ICD-10-CM

## 2018-04-01 DIAGNOSIS — K21.9 GERD (GASTROESOPHAGEAL REFLUX DISEASE): ICD-10-CM

## 2018-04-01 LAB
ALBUMIN SERPL BCP-MCNC: 3.3 G/DL (ref 3.5–5)
ALP SERPL-CCNC: 58 U/L (ref 46–116)
ALT SERPL W P-5'-P-CCNC: 23 U/L (ref 12–78)
ANION GAP SERPL CALCULATED.3IONS-SCNC: 6 MMOL/L (ref 4–13)
APTT PPP: 30 SECONDS (ref 24–33)
AST SERPL W P-5'-P-CCNC: 22 U/L (ref 5–45)
BASOPHILS # BLD AUTO: 0.1 THOUSANDS/ΜL (ref 0–0.1)
BASOPHILS NFR BLD AUTO: 1 % (ref 0–1)
BILIRUB SERPL-MCNC: 0.7 MG/DL (ref 0.2–1)
BUN SERPL-MCNC: 18 MG/DL (ref 5–25)
CALCIUM SERPL-MCNC: 8.8 MG/DL (ref 8.3–10.1)
CHLORIDE SERPL-SCNC: 100 MMOL/L (ref 100–108)
CO2 SERPL-SCNC: 27 MMOL/L (ref 21–32)
CREAT SERPL-MCNC: 0.85 MG/DL (ref 0.6–1.3)
EOSINOPHIL # BLD AUTO: 0.2 THOUSAND/ΜL (ref 0–0.61)
EOSINOPHIL NFR BLD AUTO: 5 % (ref 0–6)
ERYTHROCYTE [DISTWIDTH] IN BLOOD BY AUTOMATED COUNT: 13.3 % (ref 11.6–15.1)
GFR SERPL CREATININE-BSD FRML MDRD: 66 ML/MIN/1.73SQ M
GLUCOSE SERPL-MCNC: 85 MG/DL (ref 65–140)
HCT VFR BLD AUTO: 42.6 % (ref 37–47)
HGB BLD-MCNC: 14.1 G/DL (ref 12–16)
INR PPP: 1.09 (ref 0.86–1.16)
LYMPHOCYTES # BLD AUTO: 1.6 THOUSANDS/ΜL (ref 0.6–4.47)
LYMPHOCYTES NFR BLD AUTO: 30 % (ref 14–44)
MCH RBC QN AUTO: 28.5 PG (ref 27–31)
MCHC RBC AUTO-ENTMCNC: 33 G/DL (ref 31.4–37.4)
MCV RBC AUTO: 86 FL (ref 82–98)
MONOCYTES # BLD AUTO: 0.7 THOUSAND/ΜL (ref 0.17–1.22)
MONOCYTES NFR BLD AUTO: 13 % (ref 4–12)
NEUTROPHILS # BLD AUTO: 2.7 THOUSANDS/ΜL (ref 1.85–7.62)
NEUTS SEG NFR BLD AUTO: 51 % (ref 43–75)
NRBC BLD AUTO-RTO: 0 /100 WBCS
NT-PROBNP SERPL-MCNC: 201 PG/ML
PLATELET # BLD AUTO: 190 THOUSANDS/UL (ref 130–400)
PMV BLD AUTO: 7.6 FL (ref 8.9–12.7)
POTASSIUM SERPL-SCNC: 4.1 MMOL/L (ref 3.5–5.3)
PROT SERPL-MCNC: 7 G/DL (ref 6.4–8.2)
PROTHROMBIN TIME: 11.5 SECONDS (ref 9.4–11.7)
RBC # BLD AUTO: 4.94 MILLION/UL (ref 4.2–5.4)
SODIUM SERPL-SCNC: 133 MMOL/L (ref 136–145)
TROPONIN I SERPL-MCNC: <0.02 NG/ML
WBC # BLD AUTO: 5.3 THOUSAND/UL (ref 4.8–10.8)

## 2018-04-01 PROCEDURE — 83880 ASSAY OF NATRIURETIC PEPTIDE: CPT | Performed by: EMERGENCY MEDICINE

## 2018-04-01 PROCEDURE — 85610 PROTHROMBIN TIME: CPT | Performed by: EMERGENCY MEDICINE

## 2018-04-01 PROCEDURE — 85730 THROMBOPLASTIN TIME PARTIAL: CPT | Performed by: EMERGENCY MEDICINE

## 2018-04-01 PROCEDURE — 36415 COLL VENOUS BLD VENIPUNCTURE: CPT | Performed by: EMERGENCY MEDICINE

## 2018-04-01 PROCEDURE — 84484 ASSAY OF TROPONIN QUANT: CPT | Performed by: EMERGENCY MEDICINE

## 2018-04-01 PROCEDURE — 85025 COMPLETE CBC W/AUTO DIFF WBC: CPT | Performed by: EMERGENCY MEDICINE

## 2018-04-01 PROCEDURE — 93005 ELECTROCARDIOGRAM TRACING: CPT

## 2018-04-01 PROCEDURE — 99284 EMERGENCY DEPT VISIT MOD MDM: CPT

## 2018-04-01 PROCEDURE — 80053 COMPREHEN METABOLIC PANEL: CPT | Performed by: EMERGENCY MEDICINE

## 2018-04-01 PROCEDURE — 71045 X-RAY EXAM CHEST 1 VIEW: CPT

## 2018-04-01 RX ORDER — METOPROLOL TARTRATE 5 MG/5ML
5 INJECTION INTRAVENOUS ONCE
Status: DISCONTINUED | OUTPATIENT
Start: 2018-04-01 | End: 2018-04-01 | Stop reason: HOSPADM

## 2018-04-01 NOTE — ED PROVIDER NOTES
History  Chief Complaint   Patient presents with    Hypertension     pt woke up at 2am, c/o blood pressure being high     40-year-old white female extremely anxious woke up and was concerned her blood pressure was elevated  Injected a couple times and thought that it was high so came into the emergency room for evaluation  Patient compliant with her medication  No chest pain, no shortness of breath, no leg edema, no headache, no change in vision  History provided by:  Patient  Hypertension   Severity:  Mild  Onset quality:  Unable to specify  Timing:  Intermittent  Progression:  Improving  Chronicity:  Recurrent  Context: not medication change, not noncompliance and not stress    Relieved by:  Medication  Associated symptoms: anxiety    Associated symptoms: no abdominal pain, no blurred vision, no chest pain, no confusion, no dizziness, no ear pain, no epistaxis, no fever, no headaches, no nausea, no neck pain, no palpitations, no shortness of breath and not vomiting    Risk factors: cardiac disease and family hx of HTN        Prior to Admission Medications   Prescriptions Last Dose Informant Patient Reported? Taking?    Cholecalciferol (VITAMIN D) 2000 units CAPS   Yes Yes   Sig: Take 1 capsule by mouth daily   apixaban (ELIQUIS) 5 mg   Yes Yes   Sig: Take 1 tablet by mouth 2 (two) times a day   aspirin 81 mg chewable tablet   Yes Yes   Sig: Chew 81 mg daily   levothyroxine 75 mcg tablet   No Yes   Sig: TAKE ONE TABLET BY MOUTH EVERY DAY   metoprolol tartrate (LOPRESSOR) 50 mg tablet   Yes Yes   Sig: Take 0 5 tablets by mouth 2 (two) times a day   pantoprazole (PROTONIX) 40 mg tablet   No No   Sig: Take 1 tablet by mouth daily for 30 days      Facility-Administered Medications: None       Past Medical History:   Diagnosis Date    Deafness     Disease of thyroid gland     Hyperlipidemia     Hypertension        Past Surgical History:   Procedure Laterality Date    CARDIAC SURGERY      ESOPHAGOGASTRODUODENOSCOPY N/A 2/20/2017    Procedure: ESOPHAGOGASTRODUODENOSCOPY (EGD); Surgeon: Elizabeth Interiano MD;  Location: [facility]; Service:        Family History   Problem Relation Age of Onset    Hypertension Mother      essential    Breast cancer Mother      I have reviewed and agree with the history as documented  Social History   Substance Use Topics    Smoking status: Never Smoker    Smokeless tobacco: Not on file    Alcohol use No        Review of Systems   Constitutional: Negative  Negative for chills and fever  HENT: Negative  Negative for ear pain and nosebleeds  Eyes: Negative for blurred vision  Respiratory: Negative for chest tightness, shortness of breath, wheezing and stridor  Cardiovascular: Negative for chest pain, palpitations and leg swelling  Gastrointestinal: Negative for abdominal pain, nausea and vomiting  Genitourinary: Negative  Musculoskeletal: Negative for neck pain  Skin: Negative  Neurological: Negative for dizziness and headaches  Hematological: Negative  Psychiatric/Behavioral: Negative for confusion  The patient is nervous/anxious  All other systems reviewed and are negative  Physical Exam  ED Triage Vitals   Temperature Pulse Respirations Blood Pressure SpO2   04/01/18 0413 04/01/18 0413 04/01/18 0413 04/01/18 0413 04/01/18 0413   97 8 °F (36 6 °C) 64 20 (!) 211/94 95 %      Temp Source Heart Rate Source Patient Position - Orthostatic VS BP Location FiO2 (%)   04/01/18 0413 04/01/18 0440 04/01/18 0413 04/01/18 0413 --   Tympanic Monitor Lying Right arm       Pain Score       04/01/18 0413       No Pain           Orthostatic Vital Signs  Vitals:    04/01/18 0413 04/01/18 0440 04/01/18 0504 04/01/18 0530   BP: (!) 211/94 166/77 149/70 139/64   Pulse: 64 55 57 56   Patient Position - Orthostatic VS: Lying Sitting Sitting Sitting       Physical Exam   Constitutional: She is oriented to person, place, and time   She appears well-developed and well-nourished  HENT:   Head: Normocephalic and atraumatic  Right Ear: External ear normal    Left Ear: External ear normal    Nose: Nose normal    Mouth/Throat: Oropharynx is clear and moist    Eyes: Conjunctivae and EOM are normal    Neck: Normal range of motion  Neck supple  Cardiovascular: Normal rate, regular rhythm, normal heart sounds and intact distal pulses  Pulmonary/Chest: Effort normal and breath sounds normal    Abdominal: Bowel sounds are normal    Musculoskeletal: Normal range of motion  Neurological: She is alert and oriented to person, place, and time  Skin: Skin is warm and dry  Capillary refill takes less than 2 seconds  Psychiatric: Her speech is normal and behavior is normal  Judgment and thought content normal  Her mood appears anxious  Cognition and memory are normal    Nursing note and vitals reviewed  ED Medications  Medications - No data to display    Diagnostic Studies  Results Reviewed     Procedure Component Value Units Date/Time    BNP [[id number]]  (Normal) Collected:  04/01/18 0432    Lab Status:  Final result Specimen:  Blood from Arm, Left Updated:  04/01/18 0524     NT-proBNP 201 pg/mL     Troponin I [[id number]]  (Normal) Collected:  04/01/18 0432    Lab Status:  Final result Specimen:  Blood from Arm, Left Updated:  04/01/18 0457     Troponin I <0 02 ng/mL     Narrative:         [employer] Chemistry analyzer 99% cutoff is > 0 04 ng/mL in network labs    o cTnI 99% cutoff is useful only when applied to patients in the clinical setting of myocardial ischemia  o cTnI 99% cutoff should be interpreted in the context of clinical history, ECG findings and possibly cardiac imaging to establish correct diagnosis  o cTnI 99% cutoff may be suggestive but clearly not indicative of a coronary event without the clinical setting of myocardial ischemia      Protime-INR [[id number]]  (Normal) Collected:  04/01/18 0432    Lab Status:  Final result Specimen:  Blood from Arm, Left Updated:  04/01/18 0454     Protime 11 5 seconds      INR 1 09    APTT [[id number]]  (Normal) Collected:  04/01/18 0432    Lab Status:  Final result Specimen:  Blood from Arm, Left Updated:  04/01/18 0454     PTT 30 seconds     Narrative: Therapeutic Heparin Range = 60-90 seconds    Comprehensive metabolic panel [[id number]]  (Abnormal) Collected:  04/01/18 0432    Lab Status:  Final result Specimen:  Blood from Arm, Left Updated:  04/01/18 0453     Sodium 133 (L) mmol/L      Potassium 4 1 mmol/L      Chloride 100 mmol/L      CO2 27 mmol/L      Anion Gap 6 mmol/L      BUN 18 mg/dL      Creatinine 0 85 mg/dL      Glucose 85 mg/dL      Calcium 8 8 mg/dL      AST 22 U/L      ALT 23 U/L      Alkaline Phosphatase 58 U/L      Total Protein 7 0 g/dL      Albumin 3 3 (L) g/dL      Total Bilirubin 0 70 mg/dL      eGFR 66 ml/min/1 73sq m     Narrative:         National Kidney Disease Education Program recommendations are as follows:  GFR calculation is accurate only with a steady state creatinine  Chronic Kidney disease less than 60 ml/min/1 73 sq  meters  Kidney failure less than 15 ml/min/1 73 sq  meters      CBC and differential [[id number]]  (Abnormal) Collected:  04/01/18 0432    Lab Status:  Final result Specimen:  Blood from Arm, Left Updated:  04/01/18 0438     WBC 5 30 Thousand/uL      RBC 4 94 Million/uL      Hemoglobin 14 1 g/dL      Hematocrit 42 6 %      MCV 86 fL      MCH 28 5 pg      MCHC 33 0 g/dL      RDW 13 3 %      MPV 7 6 (L) fL      Platelets 424 Thousands/uL      nRBC 0 /100 WBCs      Neutrophils Relative 51 %      Lymphocytes Relative 30 %      Monocytes Relative 13 (H) %      Eosinophils Relative 5 %      Basophils Relative 1 %      Neutrophils Absolute 2 70 Thousands/µL      Lymphocytes Absolute 1 60 Thousands/µL      Monocytes Absolute 0 70 Thousand/µL      Eosinophils Absolute 0 20 Thousand/µL      Basophils Absolute 0 10 Thousands/µL                  X-ray chest 1 view portable Final Result by Nedra Nance MD (04/01 1145)      No acute cardiopulmonary disease  Workstation performed: [id number]                    Procedures  ECG 12 Lead Documentation  Date/Time: 4/1/2018 4:15 AM  Performed by: Reza Sam  Authorized by: Mica MONTENEGRO     ECG reviewed by me, the ED Provider: yes    Patient location:  ED  Previous ECG:     Comparison to cardiac monitor: Yes (NSR 61)    Interpretation:     Interpretation: normal    Rate:     ECG rate:  61    ECG rate assessment: normal    Rhythm:     Rhythm: sinus rhythm    Ectopy:     Ectopy: none    QRS:     QRS axis:  Normal    QRS intervals:  Normal  Conduction:     Conduction: normal    ST segments:     ST segments:  Normal  T waves:     T waves: normal             Phone Contacts  ED Phone Contact    ED Course  ED Course                                MDM  CritCare Time    Disposition  Final diagnoses:   Hypertension   GERD (gastroesophageal reflux disease)   Anxiety     Time reflects when diagnosis was documented in both MDM as applicable and the Disposition within this note     Time User Action Codes Description Comment    4/1/2018  5:40 AM Lucas Early Add [I10] Hypertension     4/1/2018  5:41 AM Lucas Early Add [K21 9] GERD (gastroesophageal reflux disease)     4/1/2018  5:42 AM Lucas Early Add [F41 9] Anxiety       ED Disposition     ED Disposition Condition Comment    Discharge  [facility] discharge to home/self care  Condition at discharge: Stable        Follow-up Information     Follow up With Specialties Details Why Αμαλίας MD [facility] Schedule an appointment as soon as possible for a visit in 3 days  [address] [address]  [phone]          Discharge Medication List as of 4/1/2018  5:44 AM      CONTINUE these medications which have NOT CHANGED    Details   apixaban (ELIQUIS) 5 mg Take 1 tablet by mouth 2 (two) times a day, Historical Med      aspirin 81 mg chewable tablet Chew 81 mg daily, Until Discontinued, Historical Med      Cholecalciferol (VITAMIN D) 2000 units CAPS Take 1 capsule by mouth daily, Historical Med      levothyroxine 75 mcg tablet TAKE ONE TABLET BY MOUTH EVERY DAY, Normal      metoprolol tartrate (LOPRESSOR) 50 mg tablet Take 0 5 tablets by mouth 2 (two) times a day, Historical Med      rosuvastatin (CRESTOR) 10 MG tablet Take 10 mg by mouth daily, Until Discontinued, Historical Med      pantoprazole (PROTONIX) 40 mg tablet Take 1 tablet by mouth daily for 30 days, Starting 2/21/2017, Until Thu 3/23/17, Normal           No discharge procedures on file      ED Provider  Electronically Signed by           Elizabeth Harris MD  04/05/18 6081

## 2018-04-01 NOTE — ED NOTES
Pt denies pain and feels ok to go home, communicated via paper and   No distress noted       Gabriella Hong RN  04/01/18 0036

## 2018-04-01 NOTE — ED NOTES
Communicating with pt via pen and paper, pt denies pain, feeling ok to go home  Pt ambulated to bathroom, gait steady       Derik Salgado RN  04/01/18 6290

## 2018-04-01 NOTE — DISCHARGE INSTRUCTIONS
Anxiety   WHAT YOU SHOULD KNOW:   Anxiety is a condition that causes you to feel excessive worry, uneasiness, or fear  Family or work stress, smoking, caffeine, and alcohol can increase your risk for anxiety  Certain medicines or health conditions can also increase your risk  Anxiety may begin gradually, and can become a long-term condition if it is not managed or treated  AFTER YOU LEAVE:   Medicines:   · Medicines  can help you feel more calm and relaxed, and decrease your symptoms  · Take your medicine as directed  Contact your healthcare provider if you think your medicine is not helping or if you have side effects  Tell him if you are allergic to any medicine  Keep a list of the medicines, vitamins, and herbs you take  Include the amounts, and when and why you take them  Bring the list or the pill bottles to follow-up visits  Carry your medicine list with you in case of an emergency  Follow up with your healthcare provider within 2 weeks or as directed:  Write down your questions so you remember to ask them during your visits  Manage anxiety:   · Go to counseling as directed  Cognitive behavioral therapy can help you understand and change how you react to events that trigger your symptoms  · Find ways to manage your symptoms  Activities such as exercise, meditation, or listening to music can help you relax  · Practice deep breathing  Breathing can change how your body reacts to stress  Focus on taking slow, deep breaths several times a day, or during an anxiety attack  Breathe in through your nose, and out through your mouth  · Avoid caffeine  Caffeine can make your symptoms worse  Avoid foods or drinks that are meant to increase your energy level  · Limit or avoid alcohol  Ask your healthcare provider if alcohol is safe for you  You may not be able to drink alcohol if you take certain anxiety or depression medicines  Limit alcohol to 1 drink per day if you are a woman   Limit alcohol to 2 drinks per day if you are a man  A drink of alcohol is 12 ounces of beer, 5 ounces of wine, or 1½ ounces of liquor  Contact your healthcare provider if:   · Your symptoms get worse or do not get better with treatment  · You think your medicine may be causing side effects  · Your anxiety keeps you from doing your regular daily activities  · You have new symptoms since your last visit  · You have questions or concerns about your condition or care  Seek care immediately or call 911 if:   · You have chest pain, tightness, or heaviness that may spread to your shoulders, arms, jaw, neck, or back  · You feel like hurting yourself or someone else  · You feel dizzy, lightheaded, or faint  © 2014 [address] is for End User's use only and may not be sold, redistributed or otherwise used for commercial purposes  All illustrations and images included in CareNotes® are the copyrighted property of [employer] M [employer]  or [employer]  The above information is an  only  It is not intended as medical advice for individual conditions or treatments  Talk to your doctor, nurse or pharmacist before following any medical regimen to see if it is safe and effective for you  Chronic Hypertension   AMBULATORY CARE:   Hypertension  is high blood pressure (BP)  Your BP is the force of your blood moving against the walls of your arteries  Normal BP is less than 120/80  Prehypertension is between 120/80 and 139/89  Hypertension is 140/90 or higher  Hypertension causes your BP to get so high that your heart has to work much harder than normal  This can damage your heart  Chronic hypertension is a long-term condition that you can control with a healthy lifestyle or medicines  A controlled blood pressure helps protect your organs, such as your heart, lungs, brain, and kidneys     Common symptoms include the following:   · Headache     · Blurred vision    · Chest pain · Dizziness or weakness     · Trouble breathing     · Nosebleeds  Call 911 for any of the following:   · You have discomfort in your chest that feels like squeezing, pressure, fullness, or pain  · You become confused or have difficulty speaking  · You suddenly feel lightheaded or have trouble breathing  · You have pain or discomfort in your back, neck, jaw, stomach, or arm  Seek care immediately if:   · You have a severe headache or vision loss  · You have weakness in an arm or leg  Contact your healthcare provider if:   · You feel faint, dizzy, confused, or drowsy  · You have been taking your BP medicine and your BP is still higher than your healthcare provider says it should be  · You have questions or concerns about your condition or care  Treatment for chronic hypertension  may include medicine to lower your BP and lower your cholesterol level  A low cholesterol level helps prevent heart disease and makes it easier to control your blood pressure  Heart disease can make your blood pressure harder to control  You may also need to make lifestyle changes  Take your medicine exactly as directed  Manage chronic hypertension:  Talk with your healthcare provider about these and other ways to manage hypertension:  · Take your BP at home  Sit and rest for 5 minutes before you take your BP  Extend your arm and support it on a flat surface  Your arm should be at the same level as your heart  Follow the directions that came with your BP monitor  If possible, take at least 2 BP readings each time  Take your BP at least twice a day at the same times each day, such as morning and evening  Keep a record of your BP readings and bring it to your follow-up visits  Ask your healthcare provider what your blood pressure should be  · Limit sodium (salt) as directed  Too much sodium can affect your fluid balance  Check labels to find low-sodium or no-salt-added foods   Some low-sodium foods use potassium salts for flavor  Too much potassium can also cause health problems  Your healthcare provider will tell you how much sodium and potassium are safe for you to have in a day  He or she may recommend that you limit sodium to 2,300 mg a day  · Follow the meal plan recommended by your healthcare provider  A dietitian or your provider can give you more information on low-sodium plans or the DASH (Dietary Approaches to Stop Hypertension) eating plan  The DASH plan is low in sodium, unhealthy fats, and total fat  It is high in potassium, calcium, and fiber  · Exercise to maintain a healthy weight  Exercise at least 30 minutes per day, on most days of the week  This will help decrease your blood pressure  Ask about the best exercise plan for you  · Decrease stress  This may help lower your BP  Learn ways to relax, such as deep breathing or listening to music  · Limit alcohol  Women should limit alcohol to 1 drink a day  Men should limit alcohol to 2 drinks a day  A drink of alcohol is 12 ounces of beer, 5 ounces of wine, or 1½ ounces of liquor  · Do not smoke  Nicotine and other chemicals in cigarettes and cigars can increase your BP and also cause lung damage  Ask your healthcare provider for information if you currently smoke and need help to quit  E-cigarettes or smokeless tobacco still contain nicotine  Talk to your healthcare provider before you use these products  Follow up with your healthcare provider as directed: You will need to return to have your BP checked and to have other lab tests done  Write down your questions so you remember to ask them during your visits  © 2017 [address] Information is for End User's use only and may not be sold, redistributed or otherwise used for commercial purposes  All illustrations and images included in CareNotes® are the copyrighted property of [employer] M [employer]  or [employer]vez    The above information is an  only  It is not intended as medical advice for individual conditions or treatments  Talk to your doctor, nurse or pharmacist before following any medical regimen to see if it is safe and effective for you  Gastroesophageal Reflux Disease   WHAT YOU NEED TO KNOW:   Gastroesophageal reflux occurs when acid and food in the stomach back up into the esophagus  Gastroesophageal reflux disease (GERD) is reflux that occurs more than twice a week for a few weeks  It usually causes heartburn and other symptoms  GERD can cause other health problems over time if it is not treated  DISCHARGE INSTRUCTIONS:   Return to the emergency department if:   · You feel full and cannot burp or vomit  · You have severe chest pain and sudden trouble breathing  · Your bowel movements are black, bloody, or tarry-looking  · Your vomit looks like coffee grounds or has blood in it  Contact your healthcare provider if:   · You vomit large amounts, or you vomit often  · You have trouble breathing after you vomit  · You have trouble swallowing, or pain with swallowing  · You are losing weight without trying  · Your symptoms get worse or do not improve with treatment  · You have questions or concerns about your condition or care  Medicines:   · Medicines  are used to decrease stomach acid  Medicine may also be used to help your lower esophageal sphincter and stomach contract (tighten) more  · Take your medicine as directed  Contact your healthcare provider if you think your medicine is not helping or if you have side effects  Tell him of her if you are allergic to any medicine  Keep a list of the medicines, vitamins, and herbs you take  Include the amounts, and when and why you take them  Bring the list or the pill bottles to follow-up visits  Carry your medicine list with you in case of an emergency  Manage GERD:   · Do not have foods or drinks that may increase heartburn    These include chocolate, peppermint, fried or fatty foods, drinks that contain caffeine, or carbonated drinks (soda)  Other foods include spicy foods, onions, tomatoes, and tomato-based foods  Do not have foods or drinks that can irritate your esophagus, such as citrus fruits, juices, and alcohol  · Do not eat large meals  When you eat a lot of food at one time, your stomach needs more acid to digest it  Eat 6 small meals each day instead of 3 large ones, and eat slowly  Do not eat meals 2 to 3 hours before bedtime  · Elevate the head of your bed  Place 6-inch blocks under the head of your bed frame  You may also use more than one pillow under your head and shoulders while you sleep  · Maintain a healthy weight  If you are overweight, weight loss may help relieve symptoms of GERD  · Do not smoke  Smoking weakens the lower esophageal sphincter and increases the risk of GERD  Ask your healthcare provider for information if you currently smoke and need help to quit  E-cigarettes or smokeless tobacco still contain nicotine  Talk to your healthcare provider before you use these products  · Do not wear clothing that is tight around your waist   Tight clothing can put pressure on your stomach and cause or worsen GERD symptoms  Follow up with your healthcare provider as directed:  Write down your questions so you remember to ask them during your visits  © 2017 [address]  Information is for End User's use only and may not be sold, redistributed or otherwise used for commercial purposes  All illustrations and images included in CareNotes® are the copyrighted property of [employer] M , Inc  or [employer] Ch[employer]vez  The above information is an  only  It is not intended as medical advice for individual conditions or treatments  Talk to your doctor, nurse or pharmacist before following any medical regimen to see if it is safe and effective for you      Hypertension   WHAT YOU NEED TO KNOW: Hypertension is high blood pressure (BP)  Your BP is the force of your blood moving against the walls of your arteries  Normal BP is less than 120/80  Prehypertension is between 120/80 and 139/89  Hypertension is 140/90 or higher  Hypertension causes your BP to get so high that your heart has to work much harder than normal  This can damage your heart  You can control hypertension with a healthy lifestyle or medicines  A controlled blood pressure helps protect your organs, such as your heart, lungs, brain, and kidneys  DISCHARGE INSTRUCTIONS:   Call 911 for any of the following:   · You have discomfort in your chest that feels like squeezing, pressure, fullness, or pain  · You become confused or have difficulty speaking  · You suddenly feel lightheaded or have trouble breathing  · You have pain or discomfort in your back, neck, jaw, stomach, or arm  Seek care immediately if:   · You have a severe headache or vision loss  · You have weakness in an arm or leg  Contact your healthcare provider if:   · You feel faint, dizzy, confused, or drowsy  · You have been taking your BP medicine and your BP is still higher than your healthcare provider says it should be  · You have questions or concerns about your condition or care  Medicines: You may  need any of the following:  · Medicine  may be used to help lower your BP  You may need more than one type of medicine  Take the medicine exactly as directed  · Diuretics  help decrease extra fluid that collects in your body  This will help lower your BP  You may urinate more often while you take this medicine  · Cholesterol medicine  helps lower your cholesterol level  A low cholesterol level helps prevent heart disease and makes it easier to control your blood pressure  · Take your medicine as directed  Contact your healthcare provider if you think your medicine is not helping or if you have side effects   Tell him or her if you are allergic to any medicine  Keep a list of the medicines, vitamins, and herbs you take  Include the amounts, and when and why you take them  Bring the list or the pill bottles to follow-up visits  Carry your medicine list with you in case of an emergency  Follow up with your healthcare provider as directed: You will need to return to have your BP checked and to have other lab tests done  Write down your questions so you remember to ask them during your visits  Manage hypertension:  Talk with your healthcare provider about these and other ways to manage hypertension:  · Check your BP at home  Sit and rest for 5 minutes before you take your BP  Extend your arm and support it on a flat surface  Your arm should be at the same level as your heart  Follow the directions that came with your BP monitor  If possible, take at least 2 BP readings each time  Take your BP at least twice a day at the same times each day, such as morning and evening  Keep a record of your BP readings and bring it to your follow-up visits  Ask your healthcare provider what your BP should be  · Limit sodium (salt) as directed  Too much sodium can affect your fluid balance  Check labels to find low-sodium or no-salt-added foods  Some low-sodium foods use potassium salts for flavor  Too much potassium can also cause health problems  Your healthcare provider will tell you how much sodium and potassium are safe for you to have in a day  He or she may recommend that you limit sodium to 2,300 mg a day  · Follow the meal plan recommended by your healthcare provider  A dietitian or your provider can give you more information on low-sodium plans or the DASH (Dietary Approaches to Stop Hypertension) eating plan  The DASH plan is low in sodium, unhealthy fats, and total fat  It is high in potassium, calcium, and fiber  · Exercise to maintain a healthy weight  Exercise at least 30 minutes per day, on most days of the week   This will help decrease your blood pressure  Ask your healthcare provider about the best exercise plan for you  · Decrease stress  This may help lower your BP  Learn ways to relax, such as deep breathing or listening to music  · Limit alcohol  Women should limit alcohol to 1 drink a day  Men should limit alcohol to 2 drinks a day  A drink of alcohol is 12 ounces of beer, 5 ounces of wine, or 1½ ounces of liquor  · Do not smoke  Nicotine and other chemicals in cigarettes and cigars can increase your BP and also cause lung damage  Ask your healthcare provider for information if you currently smoke and need help to quit  E-cigarettes or smokeless tobacco still contain nicotine  Talk to your healthcare provider before you use these products  · Manage any other health conditions you have  Health conditions such as diabetes can increase your risk for hypertension  Follow your healthcare provider's instructions and take all your medicines as directed  © 2017 [address] Information is for End User's use only and may not be sold, redistributed or otherwise used for commercial purposes  All illustrations and images included in CareNotes® are the copyrighted property of [email or web address]  or [employer]  The above information is an  only  It is not intended as medical advice for individual conditions or treatments  Talk to your doctor, nurse or pharmacist before following any medical regimen to see if it is safe and effective for you

## 2018-04-02 ENCOUNTER — VBI (OUTPATIENT)
Dept: ADMINISTRATIVE | Facility: OTHER | Age: 78
End: 2018-04-02

## 2018-04-02 LAB
ATRIAL RATE: 61 BPM
P AXIS: 32 DEGREES
PR INTERVAL: 138 MS
QRS AXIS: 11 DEGREES
QRSD INTERVAL: 78 MS
QT INTERVAL: 394 MS
QTC INTERVAL: 396 MS
T WAVE AXIS: 3 DEGREES
VENTRICULAR RATE: 61 BPM

## 2018-04-02 PROCEDURE — 93010 ELECTROCARDIOGRAM REPORT: CPT | Performed by: INTERNAL MEDICINE

## 2018-04-02 NOTE — TELEPHONE ENCOUNTER
I spoke with pt/interpretor, pt refuses to schedule an ED follow up appt at this time but states her BP is fine today  She was reminded to keep her 4/19 appt  ED visit on 4/1/18 documented in ED log

## 2018-04-03 ENCOUNTER — TELEPHONE (OUTPATIENT)
Dept: FAMILY MEDICINE CLINIC | Facility: CLINIC | Age: 78
End: 2018-04-03

## 2018-04-03 NOTE — TELEPHONE ENCOUNTER
Daughter called requesting a mammo order for her mother-please call stuart  # [phone] when ready to p/u-thanks--lj

## 2018-04-04 ENCOUNTER — HOSPITAL ENCOUNTER (OUTPATIENT)
Dept: RADIOLOGY | Facility: HOSPITAL | Age: 78
Discharge: HOME | End: 2018-04-04
Attending: INTERNAL MEDICINE
Payer: COMMERCIAL

## 2018-04-04 DIAGNOSIS — E78.5 DYSLIPIDEMIA: Primary | ICD-10-CM

## 2018-04-04 DIAGNOSIS — Z12.31 SCREENING MAMMOGRAM, ENCOUNTER FOR: ICD-10-CM

## 2018-04-04 PROCEDURE — 77067 SCR MAMMO BI INCL CAD: CPT

## 2018-04-04 PROCEDURE — 77063 BREAST TOMOSYNTHESIS BI: CPT

## 2018-04-04 RX ORDER — ROSUVASTATIN CALCIUM 10 MG/1
TABLET, COATED ORAL
Qty: 90 TABLET | Refills: 3 | Status: SHIPPED | OUTPATIENT
Start: 2018-04-04 | End: 2018-04-16 | Stop reason: SDUPTHER

## 2018-04-16 ENCOUNTER — OFFICE VISIT (OUTPATIENT)
Dept: FAMILY MEDICINE CLINIC | Facility: CLINIC | Age: 78
End: 2018-04-16
Payer: COMMERCIAL

## 2018-04-16 VITALS
WEIGHT: 137 LBS | HEART RATE: 78 BPM | BODY MASS INDEX: 25.21 KG/M2 | RESPIRATION RATE: 18 BRPM | HEIGHT: 62 IN | SYSTOLIC BLOOD PRESSURE: 130 MMHG | TEMPERATURE: 97.8 F | DIASTOLIC BLOOD PRESSURE: 80 MMHG

## 2018-04-16 DIAGNOSIS — E78.5 DYSLIPIDEMIA: ICD-10-CM

## 2018-04-16 DIAGNOSIS — I25.10 ARTERIOSCLEROSIS OF CORONARY ARTERY: ICD-10-CM

## 2018-04-16 DIAGNOSIS — I10 ESSENTIAL HYPERTENSION: ICD-10-CM

## 2018-04-16 DIAGNOSIS — J01.90 ACUTE NON-RECURRENT SINUSITIS, UNSPECIFIED LOCATION: ICD-10-CM

## 2018-04-16 DIAGNOSIS — Z00.00 MEDICARE ANNUAL WELLNESS VISIT, INITIAL: Primary | ICD-10-CM

## 2018-04-16 DIAGNOSIS — E03.9 HYPOTHYROIDISM, UNSPECIFIED TYPE: ICD-10-CM

## 2018-04-16 PROCEDURE — 3079F DIAST BP 80-89 MM HG: CPT | Performed by: INTERNAL MEDICINE

## 2018-04-16 PROCEDURE — G0438 PPPS, INITIAL VISIT: HCPCS | Performed by: INTERNAL MEDICINE

## 2018-04-16 PROCEDURE — 99214 OFFICE O/P EST MOD 30 MIN: CPT | Performed by: INTERNAL MEDICINE

## 2018-04-16 PROCEDURE — 3075F SYST BP GE 130 - 139MM HG: CPT | Performed by: INTERNAL MEDICINE

## 2018-04-16 RX ORDER — LEVOTHYROXINE SODIUM 0.07 MG/1
75 TABLET ORAL DAILY
Qty: 90 TABLET | Refills: 3 | Status: SHIPPED | OUTPATIENT
Start: 2018-04-16 | End: 2019-06-24 | Stop reason: SDUPTHER

## 2018-04-16 RX ORDER — AMOXICILLIN 875 MG/1
875 TABLET, COATED ORAL 2 TIMES DAILY
Qty: 20 TABLET | Refills: 0 | Status: SHIPPED | OUTPATIENT
Start: 2018-04-16 | End: 2018-04-26

## 2018-04-16 RX ORDER — ROSUVASTATIN CALCIUM 10 MG/1
10 TABLET, COATED ORAL DAILY
Qty: 90 TABLET | Refills: 3 | Status: SHIPPED | OUTPATIENT
Start: 2018-04-16 | End: 2019-04-25 | Stop reason: SDUPTHER

## 2018-04-16 NOTE — PROGRESS NOTES
AWV Clinical     ISAR:   Previous hospitalizations?:  Yes       Once in a Lifetime Medicare Screening:   EKG performed?:  Yes        Medicare Screening Tests and Risk Assessment:   AAA Risk Assessment    None Indicated:  Yes    Osteoporosis Risk Assessment    :  Yes    Age over 48:  Yes    HIV Risk Assessment        Drug and Alcohol Use:   Tobacco use    Cigarettes:  never smoker    Tobacco use duration    Tobacco Cessation Readiness    Alcohol use    Alcohol use:  never    Alcohol Treatment Readiness   Illicit Drug Use    Drug use:  never    Drug type:  no sedative use       Diet & Exercise:   Diet   What is your diet?:  Regular   How many servings a day of the following:   Exercise    Do you currently exercise?:  yes    Frequency:  daily    Type of exercise:  walking       Cognitive Impairment Screening:   Anxiety screenings preformed:   Yes Anxiety screen score:  0   Depression screening preformed:  Yes Depression screen score:  0   Cognitive Impairment Screening    Do you have difficulty learning or retaining new information?:  No Do you have difficulty handling new tasks?:  No   Do you have difficulty with reasoning?:  No Do you have difficulty with spatial ability and orientation?:  No   Do you have difficulty with language?:  No Do you have difficulty with behavior?:  No       Functional Ability/Level of Safety:   Hearing    Hearing difficulties:  Yes    Hearing Impairment Assessment    Current Activities    Status:  unlimited ADL's, unlimited driving, unlimited IADL's, unlimited social activities   Help needed with the folllowing:    Using the phone:  Yes Transportation:  No   Shopping:  No Preparing Meals:  No   Doing Housework:  No Doing Laundry:  No   Managing Medications:  No Managing Money:  No   ADL    Fall Risk   Have you fallen in the last 12 months?:  No    Injury History   Polypharmacy:  No Antidepressant Use:  No   Sedative Use:  No Antihypertensive Use:  Yes   Previous Fall:  No Alcohol Use:  No   Deconditioning:  No Visual Impairment:  No   Cogitive Impairment:  No Mmobility Impairment:  No   Postural Hypotension:  No Urinary Incontinence:  No       Home Safety:   Home Safety Risk Factors   Unfamilar with surroundings:  No Uneven floors:  No   Stairs or handrail saftey risk:  No Loose rugs:  No   Household clutter:  No Poor household lighting:  No   No grab bars in bathroom:  No Further evaluation needed:  No       Advanced Directives:   Advanced Directives    Living Will:  No Durable POA for healthcare:  No   Advanced directive:  No    Patient's End of Life Decisions    End of life decisions comments:  Does not want information       Urinary Incontinence:   Do you have urinary incontinence?:  No        Glaucoma:             HPI:  Leno Dalton is a 65 y o  female here for her Initial Wellness Visit  Patient Active Problem List   Diagnosis    Odynophagia    Dyslipidemia    Essential hypertension    Hypothyroidism    Allergic rhinitis    Arteriosclerosis of coronary artery    Atrial flutter (HCC)    Esophageal dysmotility    Esophageal reflux    Osteoporosis    Thyroid nodule    Screening mammogram, encounter for     Past Medical History:   Diagnosis Date    Deafness     Disease of thyroid gland     Hyperlipidemia     Hypertension      Past Surgical History:   Procedure Laterality Date    CARDIAC SURGERY      ESOPHAGOGASTRODUODENOSCOPY N/A 2/20/2017    Procedure: ESOPHAGOGASTRODUODENOSCOPY (EGD); Surgeon: Pablo Espino MD;  Location: [facility];   Service:      Family History   Problem Relation Age of Onset    Hypertension Mother      essential    Breast cancer Mother      History   Smoking Status    Never Smoker   Smokeless Tobacco    Never Used     History   Alcohol Use No      History   Drug Use No     /80   Pulse 78   Temp 97 8 °F (36 6 °C)   Resp 18   Ht 5' 2" (1 575 m)   Wt 62 1 kg (137 lb)   BMI 25 06 kg/m²       Current Outpatient Prescriptions Medication Sig Dispense Refill    apixaban (ELIQUIS) 5 mg Take 1 tablet by mouth 2 (two) times a day      aspirin 81 mg chewable tablet Chew 81 mg daily      Cholecalciferol (VITAMIN D) 2000 units CAPS Take 1 capsule by mouth daily      levothyroxine 75 mcg tablet Take 1 tablet (75 mcg total) by mouth daily 90 tablet 3    metoprolol tartrate (LOPRESSOR) 50 mg tablet Take 0 5 tablets by mouth 2 (two) times a day      pantoprazole (PROTONIX) 40 mg tablet Take 1 tablet by mouth daily for 30 days 30 tablet 0    rosuvastatin (CRESTOR) 10 MG tablet Take 1 tablet (10 mg total) by mouth daily 90 tablet 3    amoxicillin (AMOXIL) 875 mg tablet Take 1 tablet (875 mg total) by mouth 2 (two) times a day for 10 days 20 tablet 0     No current facility-administered medications for this visit        Allergies   Allergen Reactions    Amlodipine Other (See Comments)    Lisinopril Other (See Comments)     Immunization History   Administered Date(s) Administered    Influenza Split High Dose Preservative Free IM 10/22/2012, 11/07/2013, 11/07/2013, 10/05/2015, 11/03/2016, 11/02/2017    Influenza TIV (IM) 12/21/2011, 11/03/2014    Pneumococcal Conjugate 13-Valent 11/03/2016    Pneumococcal Conjugate PCV 7 12/21/2011    Tdap 07/02/2013       Patient Care Team:  Jeny Gonzales MD as PCP - MD Domingo Saunders MD Niki Imam, MD Maryelizabeth Gutta, MD      Medicare Screening Tests and Risk Assessments:  AWV Clinical     ISAR:   Previous hospitalizations?:  Yes       Once in a Lifetime Medicare Screening:   EKG performed?:  Yes        Medicare Screening Tests and Risk Assessment:   AAA Risk Assessment    None Indicated:  Yes    Osteoporosis Risk Assessment    :  Yes    Age over 48:  Yes    HIV Risk Assessment        Drug and Alcohol Use:   Tobacco use    Cigarettes:  never smoker    Tobacco use duration    Tobacco Cessation Readiness    Alcohol use    Alcohol use:  never    Alcohol Treatment Readiness   Illicit Drug Use    Drug use:  never    Drug type:  no sedative use       Diet & Exercise:   Diet   What is your diet?:  Regular   How many servings a day of the following:   Exercise    Do you currently exercise?:  yes    Frequency:  daily    Type of exercise:  walking       Cognitive Impairment Screening:   Anxiety screenings preformed:   Yes Anxiety screen score:  0   Depression screening preformed:  Yes Depression screen score:  0   Cognitive Impairment Screening    Do you have difficulty learning or retaining new information?:  No Do you have difficulty handling new tasks?:  No   Do you have difficulty with reasoning?:  No Do you have difficulty with spatial ability and orientation?:  No   Do you have difficulty with language?:  No Do you have difficulty with behavior?:  No       Functional Ability/Level of Safety:   Hearing    Hearing difficulties:  Yes    Hearing Impairment Assessment    Current Activities    Status:  unlimited ADL's, unlimited driving, unlimited IADL's, unlimited social activities   Help needed with the folllowing:    Using the phone:  Yes Transportation:  No   Shopping:  No Preparing Meals:  No   Doing Housework:  No Doing Laundry:  No   Managing Medications:  No Managing Money:  No   ADL    Fall Risk   Have you fallen in the last 12 months?:  No    Injury History   Polypharmacy:  No Antidepressant Use:  No   Sedative Use:  No Antihypertensive Use:  Yes   Previous Fall:  No Alcohol Use:  No   Deconditioning:  No Visual Impairment:  No   Cogitive Impairment:  No Mmobility Impairment:  No   Postural Hypotension:  No Urinary Incontinence:  No       Home Safety:   Home Safety Risk Factors   Unfamilar with surroundings:  No Uneven floors:  No   Stairs or handrail saftey risk:  No Loose rugs:  No   Household clutter:  No Poor household lighting:  No   No grab bars in bathroom:  No Further evaluation needed:  No       Advanced Directives:   Advanced Directives    Living Will:  No Durable POA for healthcare:  No   Advanced directive:  No    Patient's End of Life Decisions    End of life decisions comments:  Does not want information       Urinary Incontinence:   Do you have urinary incontinence?:  No        Glaucoma:            Provider Screening     Preventative Screening/Counseling:   Cardiovascular Screening/Counseling:   (Labs Q5 years, EKG optional one-time)   General:  Risks and Benefits Discussed, Screening Current Counseling:  Healthy Diet, Healthy Weight, Improve Cholesterol, Improve Blood Pressure          Diabetes Screening/Counseling:   (2 tests/year if Pre-Diabetes or 1 test/year if no Diabetes)   General:  Screening Current, Risks and Benefits Discussed           Colorectal Cancer Screening/Counseling:   (FOBT Q1 yr; Flex Sig Q4 yrs or Q10 yrs after Screening Colonoscopy; Screening Colonoscpy Q2 yrs High Risk or Q10 yrs Low Risk; Barium Enema Q2 yrs High Risk or Q4 yrs Low Risk)   General:  Screening Not Indicated           Prostate Cancer Screening/Counseling:   (Annual)          Breast Cancer Screening/Counseling:   (Baseline Age 28 - 43; Annual Age 36+)   General:  Screening Current          Cervical Cancer Screening/Counseling:   (Annual for High Risk or Childbearing Age with Abnormal Pap in Last 3 yrs; Every 2 all others)   General:  Screening Not Indicated           Osteoporosis Screening/Counseling:   (Every 2 Yrs if at risk or more if medically necessary)   General:  Patient Declines           AAA Screening/Counseling:   (Once per Lifetime with risk factors)    General:  Screening Not Indicated           Glaucoma Screening/Counseling:   (Annual)   General:  Patient Declines          HIV Screening/Counseling:   (Voluntary; Once annually for high risk OR 3 times for Pregnancy at diagnosis of IUP; 3rd trimester; and at Labor   General:  Screening Not Indicated           Hepatitis C Screening:             Immunizations:        Other Preventative Couseling (Non-Medicare Wellness Visit Required):       Referrals (Non-Medicare Wellness Visit Required):       Medical Equipment/Suppliers:           No exam data present  Reviewed Updated [facility] Prior Wellness Visits:   Last Medicare wellness visit information was reviewed, patient interviewed , no change since last AWVyes  Last Medicare wellness visit information was reviewed, patient interviewed and updates made to the record today yes    Assessment and Plan:  1  Medicare annual wellness visit, initial     2  Essential hypertension     3  Dyslipidemia  rosuvastatin (CRESTOR) 10 MG tablet   4  Arteriosclerosis of coronary artery     5  Hypothyroidism, unspecified type  levothyroxine 75 mcg tablet   6  Acute non-recurrent sinusitis, unspecified location  amoxicillin (AMOXIL) 875 mg tablet    Will treat as above, advised saline NS and mucinex DM as well          Health Maintenance Due   Topic Date Due    Depression Screening PHQ-9  05/19/1952    Fall Risk  05/19/2004    Urinary Incontinence Screening  05/19/2004    PNEUMOCOCCAL POLYSACCHARIDE VACCINE AGE 65 AND OVER  05/19/2004    GLAUCOMA SCREENING 67+ YR  05/19/2006

## 2018-04-16 NOTE — PROGRESS NOTES
Subjective:      Patient ID: Chidi Malik is a 65 y o  female  Chief Complaint   Patient presents with    Follow-up     [facility] ER  [id number]       Here for follow up from Er  She woke suddenly in the middle of the night 4/1 and felt her bp was high, checked this and it was 420 systolic  Went to the ER and all studies negative  She is taking all meds as prescribed  Denies cardiac symptoms  Has been having some L sinus pain and pressure and discharge  Has not been taking any meds for this  The following portions of the patient's history were reviewed and updated as appropriate: allergies, current medications, past family history, past medical history, past social history, past surgical history and problem list     Review of Systems   Constitutional: Negative  HENT: Positive for congestion and sinus pain  Respiratory: Negative  Cardiovascular: Negative  Current Outpatient Prescriptions   Medication Sig Dispense Refill    apixaban (ELIQUIS) 5 mg Take 1 tablet by mouth 2 (two) times a day      aspirin 81 mg chewable tablet Chew 81 mg daily      Cholecalciferol (VITAMIN D) 2000 units CAPS Take 1 capsule by mouth daily      levothyroxine 75 mcg tablet Take 1 tablet (75 mcg total) by mouth daily 90 tablet 3    metoprolol tartrate (LOPRESSOR) 50 mg tablet Take 0 5 tablets by mouth 2 (two) times a day      pantoprazole (PROTONIX) 40 mg tablet Take 1 tablet by mouth daily for 30 days 30 tablet 0    rosuvastatin (CRESTOR) 10 MG tablet Take 1 tablet (10 mg total) by mouth daily 90 tablet 3    amoxicillin (AMOXIL) 875 mg tablet Take 1 tablet (875 mg total) by mouth 2 (two) times a day for 10 days 20 tablet 0     No current facility-administered medications for this visit          Objective:    /80   Pulse 78   Temp 97 8 °F (36 6 °C)   Resp 18   Ht 5' 2" (1 575 m)   Wt 62 1 kg (137 lb)   BMI 25 06 kg/m²        Physical Exam   Constitutional: She appears well-developed and well-nourished  HENT:   Right Ear: Tympanic membrane is retracted  Left Ear: Tympanic membrane is retracted  Nose: Mucosal edema present  Mouth/Throat: Oropharynx is clear and moist    Mucosal erythema   Eyes: Conjunctivae are normal    Neck: Neck supple  No JVD present  No thyromegaly present  Cardiovascular: Normal rate, regular rhythm, normal heart sounds and intact distal pulses  Exam reveals no gallop and no friction rub  No murmur heard  Pulmonary/Chest: Effort normal and breath sounds normal  She has no wheezes  She has no rales  Abdominal: Soft  Bowel sounds are normal  She exhibits no distension  There is no tenderness  Musculoskeletal: She exhibits no edema  Assessment/Plan:    Hypothyroidism  Labs are up to date  Refilled meds today  Essential hypertension  Hypertension currently stable, bp readings now normal, continue meds  Arteriosclerosis of coronary artery  Recent cardiac studies favorable  Negative in Er  Continues follow up with cardiology  Diagnoses and all orders for this visit:    Essential hypertension    Dyslipidemia  -     rosuvastatin (CRESTOR) 10 MG tablet; Take 1 tablet (10 mg total) by mouth daily    Arteriosclerosis of coronary artery    Hypothyroidism, unspecified type  -     levothyroxine 75 mcg tablet; Take 1 tablet (75 mcg total) by mouth daily    Acute non-recurrent sinusitis, unspecified location  Comments: Will treat as above, advised saline NS and mucinex DM as well  Orders:  -     amoxicillin (AMOXIL) 875 mg tablet; Take 1 tablet (875 mg total) by mouth 2 (two) times a day for 10 days          Return in about 4 months (around 8/16/2018)         Cheyenne Malloy MD

## 2018-04-18 ENCOUNTER — TELEPHONE (OUTPATIENT)
Dept: FAMILY MEDICINE CLINIC | Facility: CLINIC | Age: 78
End: 2018-04-18

## 2018-04-18 NOTE — TELEPHONE ENCOUNTER
Left message for patricia regarding the cancellation for apt tomorrow with Dr Mina Huerta    I told her to call back so I know she got my message

## 2018-07-05 ENCOUNTER — OFFICE VISIT (OUTPATIENT)
Dept: FAMILY MEDICINE CLINIC | Facility: CLINIC | Age: 78
End: 2018-07-05
Payer: COMMERCIAL

## 2018-07-05 VITALS
DIASTOLIC BLOOD PRESSURE: 80 MMHG | HEART RATE: 78 BPM | BODY MASS INDEX: 26.13 KG/M2 | SYSTOLIC BLOOD PRESSURE: 158 MMHG | WEIGHT: 142 LBS | TEMPERATURE: 98.8 F | HEIGHT: 62 IN | RESPIRATION RATE: 20 BRPM

## 2018-07-05 DIAGNOSIS — J01.90 ACUTE NON-RECURRENT SINUSITIS, UNSPECIFIED LOCATION: Primary | ICD-10-CM

## 2018-07-05 PROCEDURE — 99213 OFFICE O/P EST LOW 20 MIN: CPT | Performed by: INTERNAL MEDICINE

## 2018-07-05 PROCEDURE — 1101F PT FALLS ASSESS-DOCD LE1/YR: CPT | Performed by: INTERNAL MEDICINE

## 2018-07-05 PROCEDURE — 3725F SCREEN DEPRESSION PERFORMED: CPT | Performed by: INTERNAL MEDICINE

## 2018-07-05 RX ORDER — AMOXICILLIN 875 MG/1
875 TABLET, COATED ORAL 2 TIMES DAILY
Qty: 20 TABLET | Refills: 0 | Status: SHIPPED | OUTPATIENT
Start: 2018-07-05 | End: 2018-07-10

## 2018-07-05 RX ORDER — DEXTROMETHORPHAN HYDROBROMIDE AND PROMETHAZINE HYDROCHLORIDE 15; 6.25 MG/5ML; MG/5ML
5 SYRUP ORAL 4 TIMES DAILY PRN
Qty: 118 ML | Refills: 0 | Status: SHIPPED | OUTPATIENT
Start: 2018-07-05 | End: 2018-07-10

## 2018-07-05 NOTE — PROGRESS NOTES
Subjective:      Patient ID: Ivy Martell is a 77 y o  female  Chief Complaint   Patient presents with    Cough     prcma    Nasal Congestion     for 5 days     chest pains     cant sleep       Sinusitis   This is a new problem  The current episode started in the past 7 days  The problem has been gradually worsening since onset  Maximum temperature: feels hot and having sweats  Associated symptoms include congestion and coughing  Pertinent negatives include no shortness of breath  Treatments tried: robitussin PRN without relief  The treatment provided no relief  The following portions of the patient's history were reviewed and updated as appropriate: allergies, current medications, past family history, past medical history, past social history, past surgical history and problem list     Review of Systems   Constitutional: Positive for appetite change, fatigue and fever  HENT: Positive for congestion  Respiratory: Positive for cough  Negative for shortness of breath  Cardiovascular: Negative            Current Outpatient Prescriptions   Medication Sig Dispense Refill    apixaban (ELIQUIS) 5 mg Take 1 tablet by mouth 2 (two) times a day      aspirin 81 mg chewable tablet Chew 81 mg daily      Cholecalciferol (VITAMIN D) 2000 units CAPS Take 1 capsule by mouth daily      levothyroxine 75 mcg tablet Take 1 tablet (75 mcg total) by mouth daily 90 tablet 3    metoprolol tartrate (LOPRESSOR) 50 mg tablet Take 0 5 tablets by mouth 2 (two) times a day      rosuvastatin (CRESTOR) 10 MG tablet Take 1 tablet (10 mg total) by mouth daily 90 tablet 3    amoxicillin (AMOXIL) 875 mg tablet Take 1 tablet (875 mg total) by mouth 2 (two) times a day for 10 days 20 tablet 0    pantoprazole (PROTONIX) 40 mg tablet Take 1 tablet by mouth daily for 30 days 30 tablet 0    promethazine-dextromethorphan (PHENERGAN-DM) 6 25-15 mg/5 mL oral syrup Take 5 mL by mouth 4 (four) times a day as needed for cough 118 mL 0     No current facility-administered medications for this visit  Objective:    /80   Pulse 78   Temp 98 8 °F (37 1 °C)   Resp 20   Ht 5' 2" (1 575 m)   Wt 64 4 kg (142 lb)   BMI 25 97 kg/m²        Physical Exam   Constitutional: She appears well-developed and well-nourished  HENT:   Right Ear: Tympanic membrane is retracted  Left Ear: Tympanic membrane is retracted  Nose: Mucosal edema and rhinorrhea present  Mouth/Throat: Oropharynx is clear and moist    Eyes: Conjunctivae are normal    Neck: Neck supple  No JVD present  No thyromegaly present  Cardiovascular: Normal rate, regular rhythm, normal heart sounds and intact distal pulses  Exam reveals no gallop and no friction rub  No murmur heard  Pulmonary/Chest: Effort normal  She has no wheezes  She has no rales  Scattered rhonchi throughout   Abdominal: Soft  Bowel sounds are normal  She exhibits no distension  There is no tenderness  Musculoskeletal: She exhibits no edema  Assessment/Plan:    No problem-specific Assessment & Plan notes found for this encounter  Diagnoses and all orders for this visit:    Acute non-recurrent sinusitis, unspecified location  Comments:  Advised saline NS prn, tylenol or advil, call or return for any worsening symptoms  Orders:  -     amoxicillin (AMOXIL) 875 mg tablet; Take 1 tablet (875 mg total) by mouth 2 (two) times a day for 10 days  -     promethazine-dextromethorphan (PHENERGAN-DM) 6 25-15 mg/5 mL oral syrup; Take 5 mL by mouth 4 (four) times a day as needed for cough          Return if symptoms worsen or fail to improve         Cheyenne Malloy MD

## 2018-07-10 ENCOUNTER — OFFICE VISIT (OUTPATIENT)
Dept: FAMILY MEDICINE CLINIC | Facility: CLINIC | Age: 78
End: 2018-07-10
Payer: COMMERCIAL

## 2018-07-10 ENCOUNTER — TELEPHONE (OUTPATIENT)
Dept: FAMILY MEDICINE CLINIC | Facility: CLINIC | Age: 78
End: 2018-07-10

## 2018-07-10 ENCOUNTER — HOSPITAL ENCOUNTER (OUTPATIENT)
Dept: RADIOLOGY | Facility: HOSPITAL | Age: 78
Discharge: HOME | End: 2018-07-10
Payer: COMMERCIAL

## 2018-07-10 ENCOUNTER — TRANSCRIBE ORDERS (OUTPATIENT)
Dept: ADMINISTRATIVE | Facility: HOSPITAL | Age: 78
End: 2018-07-10

## 2018-07-10 VITALS
TEMPERATURE: 97.7 F | HEART RATE: 80 BPM | SYSTOLIC BLOOD PRESSURE: 162 MMHG | DIASTOLIC BLOOD PRESSURE: 80 MMHG | WEIGHT: 131 LBS | HEIGHT: 62 IN | RESPIRATION RATE: 20 BRPM | BODY MASS INDEX: 24.11 KG/M2

## 2018-07-10 DIAGNOSIS — J20.9 ACUTE BRONCHITIS, UNSPECIFIED ORGANISM: Primary | ICD-10-CM

## 2018-07-10 DIAGNOSIS — J20.9 ACUTE BRONCHITIS, UNSPECIFIED ORGANISM: ICD-10-CM

## 2018-07-10 PROCEDURE — 3008F BODY MASS INDEX DOCD: CPT | Performed by: NURSE PRACTITIONER

## 2018-07-10 PROCEDURE — 1036F TOBACCO NON-USER: CPT | Performed by: NURSE PRACTITIONER

## 2018-07-10 PROCEDURE — 99213 OFFICE O/P EST LOW 20 MIN: CPT | Performed by: NURSE PRACTITIONER

## 2018-07-10 PROCEDURE — 71046 X-RAY EXAM CHEST 2 VIEWS: CPT

## 2018-07-10 RX ORDER — DOXYCYCLINE HYCLATE 100 MG/1
100 CAPSULE ORAL EVERY 12 HOURS SCHEDULED
Qty: 20 CAPSULE | Refills: 0 | Status: SHIPPED | OUTPATIENT
Start: 2018-07-10 | End: 2018-07-20

## 2018-07-10 NOTE — PROGRESS NOTES
Assessment/Plan:    CXR ordered to r/o pneumonia  Advised to D/C Amoxicillin and Promethazine  Will start on Doxycycline, push fluids, and continue Mucinex DM as needed for cough     Problem List Items Addressed This Visit     None      Visit Diagnoses     Acute bronchitis, unspecified organism    -  Primary    Relevant Medications    doxycycline hyclate (VIBRAMYCIN) 100 mg capsule    Other Relevant Orders    XR chest pa & lateral (Completed)          Patient Instructions   Chest Xray to rule out pneumonia  Continue Mucinex DM every 12 hours  Stop Amoxicillin and Promethazine cough syrup and start new antibiotic  You do not need an appointment for the chest xray at [facility]             Return if symptoms worsen or fail to improve  Subjective:      Patient ID: Jm Zapata is a 77 y o  female  Chief Complaint   Patient presents with    Cough     pt was here few days ago still not feeling well  drhlpn       Pt here today with her   She has been taking the Amoxicillin and Promethazine which make her throat itch  She has a persistent cough, which is mostly dry  She has pains in her chest on the left side with the cough  Denies fevers, SOB, or wheezing  She feels fatigued  Has tried Mucinex DM OTC which does help temporarily  Denies sinus congestion, sore throat, ear pain, LE edema, or exertional symptoms  The following portions of the patient's history were reviewed and updated as appropriate: allergies, current medications, past family history, past medical history, past social history, past surgical history and problem list     Review of Systems   Constitutional: Positive for fatigue  Negative for chills and fever  HENT: Negative for congestion, ear pain, postnasal drip, rhinorrhea, sinus pressure and sore throat  Respiratory: Positive for cough  Negative for shortness of breath and wheezing  Cardiovascular: Positive for chest pain     Gastrointestinal: Negative for abdominal pain, diarrhea, nausea and vomiting  Musculoskeletal: Negative for arthralgias  Skin: Negative for rash  Neurological: Negative for headaches  Current Outpatient Prescriptions   Medication Sig Dispense Refill    apixaban (ELIQUIS) 5 mg Take 1 tablet by mouth 2 (two) times a day      aspirin 81 mg chewable tablet Chew 81 mg daily      Cholecalciferol (VITAMIN D) 2000 units CAPS Take 1 capsule by mouth daily      levothyroxine 75 mcg tablet Take 1 tablet (75 mcg total) by mouth daily 90 tablet 3    metoprolol tartrate (LOPRESSOR) 50 mg tablet Take 0 5 tablets by mouth 2 (two) times a day      rosuvastatin (CRESTOR) 10 MG tablet Take 1 tablet (10 mg total) by mouth daily 90 tablet 3    doxycycline hyclate (VIBRAMYCIN) 100 mg capsule Take 1 capsule (100 mg total) by mouth every 12 (twelve) hours for 10 days 20 capsule 0    pantoprazole (PROTONIX) 40 mg tablet Take 1 tablet by mouth daily for 30 days 30 tablet 0     No current facility-administered medications for this visit  Objective:    /80   Pulse 80   Temp 97 7 °F (36 5 °C)   Resp 20   Ht 5' 2" (1 575 m)   Wt 59 4 kg (131 lb)   BMI 23 96 kg/m²        Physical Exam   Constitutional: She appears well-developed and well-nourished  HENT:   Right Ear: Tympanic membrane, external ear and ear canal normal    Left Ear: Tympanic membrane, external ear and ear canal normal    Nose: No mucosal edema  Mouth/Throat: Oropharynx is clear and moist and mucous membranes are normal    Eyes: Conjunctivae are normal    Cardiovascular: Normal rate, regular rhythm and normal heart sounds  Pulmonary/Chest: Effort normal  She has decreased breath sounds  She has rales in the left lower field  Abdominal: Bowel sounds are normal  She exhibits no distension  There is no splenomegaly or hepatomegaly  There is no tenderness  Lymphadenopathy:        Right cervical: No superficial cervical adenopathy present         Left cervical: No superficial cervical adenopathy present  Skin: No rash noted  Psychiatric: She has a normal mood and affect  Nursing note and vitals reviewed               Virginia Hauser

## 2018-07-10 NOTE — PATIENT INSTRUCTIONS
Chest Xray to rule out pneumonia  Continue Mucinex DM every 12 hours  Stop Amoxicillin and Promethazine cough syrup and start new antibiotic    You do not need an appointment for the chest xray at [facility]

## 2018-10-22 DIAGNOSIS — I48.92 ATRIAL FLUTTER, UNSPECIFIED TYPE (HCC): Primary | ICD-10-CM

## 2018-10-22 RX ORDER — APIXABAN 5 MG/1
TABLET, FILM COATED ORAL
Qty: 60 TABLET | Refills: 5 | Status: SHIPPED | OUTPATIENT
Start: 2018-10-22 | End: 2019-01-26 | Stop reason: HOSPADM

## 2018-10-29 ENCOUNTER — OFFICE VISIT (OUTPATIENT)
Dept: FAMILY MEDICINE CLINIC | Facility: CLINIC | Age: 78
End: 2018-10-29
Payer: COMMERCIAL

## 2018-10-29 VITALS
BODY MASS INDEX: 26.61 KG/M2 | SYSTOLIC BLOOD PRESSURE: 160 MMHG | RESPIRATION RATE: 16 BRPM | HEIGHT: 62 IN | WEIGHT: 144.6 LBS | TEMPERATURE: 98.6 F | DIASTOLIC BLOOD PRESSURE: 80 MMHG | HEART RATE: 60 BPM

## 2018-10-29 DIAGNOSIS — E03.9 HYPOTHYROIDISM, UNSPECIFIED TYPE: ICD-10-CM

## 2018-10-29 DIAGNOSIS — R14.0 ABDOMINAL DISTENSION: Primary | ICD-10-CM

## 2018-10-29 DIAGNOSIS — R42 DIZZINESS: ICD-10-CM

## 2018-10-29 DIAGNOSIS — E78.5 DYSLIPIDEMIA: ICD-10-CM

## 2018-10-29 PROCEDURE — 1160F RVW MEDS BY RX/DR IN RCRD: CPT | Performed by: INTERNAL MEDICINE

## 2018-10-29 PROCEDURE — 3008F BODY MASS INDEX DOCD: CPT | Performed by: INTERNAL MEDICINE

## 2018-10-29 PROCEDURE — 99213 OFFICE O/P EST LOW 20 MIN: CPT | Performed by: INTERNAL MEDICINE

## 2018-10-29 RX ORDER — MECLIZINE HCL 12.5 MG/1
12.5 TABLET ORAL 3 TIMES DAILY PRN
Qty: 30 TABLET | Refills: 0 | Status: SHIPPED | OUTPATIENT
Start: 2018-10-29 | End: 2018-12-03

## 2018-10-29 RX ORDER — MULTIVIT,IRON,MINERALS/LUTEIN
TABLET ORAL
COMMUNITY
End: 2021-07-12

## 2018-10-29 NOTE — PROGRESS NOTES
Subjective:      Patient ID: Chong Simons is a 77 y o  female  Chief Complaint   Patient presents with    Dizziness     prcma    Blood Pressure Check     Pt states this morning BP was 207/211    bowel issue   [facility]       She is here with a few issues  She and her  are here without the benefit of a sign , as they called to make the appointment just today  She took her thyroid medication around 8 am and had sudden room spinning  This was new  Lasted a few minutes  They checked her bp at home at that time and it was very high  She does have some nasal congestion but no other neurologic complaints  No therapies tried  What is more concerning to her is that her abdomen has been progressively more tight and distended over past year  She thinks this occurred after starting eliquis and metoprolol  She has no pain  Has intermittent constipation  Last colonoscopy about 9 years ago  The following portions of the patient's history were reviewed and updated as appropriate: allergies, current medications, past family history, past medical history, past social history, past surgical history and problem list     Review of Systems   Constitutional: Negative  Respiratory: Negative  Cardiovascular: Negative  Gastrointestinal: Positive for abdominal distention and constipation  Negative for abdominal pain  Neurological: Positive for dizziness  Negative for weakness, numbness and headaches           Current Outpatient Prescriptions   Medication Sig Dispense Refill    aspirin 81 mg chewable tablet Chew 81 mg daily      Cholecalciferol (VITAMIN D) 2000 units CAPS Take 1 capsule by mouth daily      ELIQUIS 5 MG TAKE ONE TABLET BY MOUTH TWICE A DAY 60 tablet 5    levothyroxine 75 mcg tablet Take 1 tablet (75 mcg total) by mouth daily 90 tablet 3    metoprolol tartrate (LOPRESSOR) 50 mg tablet Take 0 5 tablets by mouth 2 (two) times a day      Multiple Vitamins-Minerals (CENTRUM SILVER ULTRA WOMENS) TABS Take by mouth      pantoprazole (PROTONIX) 40 mg tablet Take 1 tablet by mouth daily for 30 days 30 tablet 0    rosuvastatin (CRESTOR) 10 MG tablet Take 1 tablet (10 mg total) by mouth daily 90 tablet 3    meclizine (ANTIVERT) 12 5 MG tablet Take 1 tablet (12 5 mg total) by mouth 3 (three) times a day as needed for dizziness 30 tablet 0     No current facility-administered medications for this visit  Objective:    /80   Pulse 60   Temp 98 6 °F (37 °C)   Resp 16   Ht 5' 2" (1 575 m)   Wt 65 6 kg (144 lb 9 6 oz)   BMI 26 45 kg/m²        Physical Exam   Constitutional: She is oriented to person, place, and time  She appears well-developed and well-nourished  Eyes: Conjunctivae are normal    No nystagmus   Neck: Neck supple  No JVD present  No thyromegaly present  Cardiovascular: Normal rate, regular rhythm, normal heart sounds and intact distal pulses  Exam reveals no gallop and no friction rub  No murmur heard  Pulmonary/Chest: Effort normal and breath sounds normal  She has no wheezes  She has no rales  Abdominal: Soft  Bowel sounds are normal  She exhibits distension  She exhibits no shifting dullness and no fluid wave  There is no tenderness  Abdomen distended, tense, no clear fluid wave appreciated  Non tender  No mass appreciated  Musculoskeletal: She exhibits no edema  Neurological: She is alert and oriented to person, place, and time  She has normal reflexes  Negative rhomberg and normal heel to shin         Assessment/Plan:    Hypothyroidism  Will check labs due to intermittent constipation  Diagnoses and all orders for this visit:    Abdominal distension  Comments:  New and worsening over past one year  US ordered and I called and made appt for 11/2 @ 9:45  R/o ascites  Er for worsening symptoms  Orders:  -     US abdomen limited;  Future    Hypothyroidism, unspecified type  -     CBC and differential; Future  - Comprehensive metabolic panel; Future  -     Lipid panel; Future  -     TSH, 3rd generation with Free T4 reflex; Future    Dyslipidemia  -     CBC and differential; Future  -     Comprehensive metabolic panel; Future  -     Lipid panel; Future    Dizziness  Comments:  Suspect inner ear cause, rx for meclizine PRN  Written instructions given to patient and her   Orders:  -     meclizine (ANTIVERT) 12 5 MG tablet; Take 1 tablet (12 5 mg total) by mouth 3 (three) times a day as needed for dizziness    Other orders  -     Multiple Vitamins-Minerals (CENTRUM SILVER ULTRA WOMENS) TABS; Take by mouth          No Follow-up on file         Nellie Nelson MD

## 2018-11-01 ENCOUNTER — TELEPHONE (OUTPATIENT)
Dept: FAMILY MEDICINE CLINIC | Facility: CLINIC | Age: 78
End: 2018-11-01

## 2018-11-01 NOTE — TELEPHONE ENCOUNTER
Left message for Huy Jack at [phone]  to see if she is still interested in signing as an  for [facility]      [facility]  Heri Mccracken Si

## 2018-11-02 ENCOUNTER — HOSPITAL ENCOUNTER (OUTPATIENT)
Dept: RADIOLOGY | Facility: HOSPITAL | Age: 78
Discharge: HOME | End: 2018-11-02
Attending: INTERNAL MEDICINE
Payer: COMMERCIAL

## 2018-11-02 DIAGNOSIS — R14.0 ABDOMINAL DISTENSION: ICD-10-CM

## 2018-11-02 PROCEDURE — 76705 ECHO EXAM OF ABDOMEN: CPT

## 2018-11-02 NOTE — TELEPHONE ENCOUNTER
Anju All returned phone call, she will interpret patients appointment  Formed appointment day time and location  No further actions required at this time     Bria Paige LPN

## 2018-11-04 LAB
ALBUMIN SERPL-MCNC: 3.8 G/DL (ref 3.5–4.8)
ALBUMIN/GLOB SERPL: 1.4 {RATIO} (ref 1.2–2.2)
ALP SERPL-CCNC: 63 IU/L (ref 39–117)
ALT SERPL-CCNC: 15 IU/L (ref 0–32)
AST SERPL-CCNC: 19 IU/L (ref 0–40)
BASOPHILS # BLD AUTO: 0 X10E3/UL (ref 0–0.2)
BASOPHILS NFR BLD AUTO: 1 %
BILIRUB SERPL-MCNC: 0.7 MG/DL (ref 0–1.2)
BUN SERPL-MCNC: 17 MG/DL (ref 8–27)
BUN/CREAT SERPL: 17 (ref 12–28)
CALCIUM SERPL-MCNC: 9.1 MG/DL (ref 8.7–10.3)
CHLORIDE SERPL-SCNC: 101 MMOL/L (ref 96–106)
CHOLEST SERPL-MCNC: 155 MG/DL (ref 100–199)
CO2 SERPL-SCNC: 23 MMOL/L (ref 20–29)
CREAT SERPL-MCNC: 1 MG/DL (ref 0.57–1)
EOSINOPHIL # BLD AUTO: 0.1 X10E3/UL (ref 0–0.4)
EOSINOPHIL NFR BLD AUTO: 3 %
ERYTHROCYTE [DISTWIDTH] IN BLOOD BY AUTOMATED COUNT: 13.9 % (ref 12.3–15.4)
GLOBULIN SER-MCNC: 2.7 G/DL (ref 1.5–4.5)
GLUCOSE SERPL-MCNC: 89 MG/DL (ref 65–99)
HCT VFR BLD AUTO: 43.3 % (ref 34–46.6)
HDLC SERPL-MCNC: 55 MG/DL
HGB BLD-MCNC: 14.4 G/DL (ref 11.1–15.9)
IMM GRANULOCYTES # BLD: 0 X10E3/UL (ref 0–0.1)
IMM GRANULOCYTES NFR BLD: 0 %
LABCORP COMMENT: NORMAL
LDLC SERPL CALC-MCNC: 83 MG/DL (ref 0–99)
LYMPHOCYTES # BLD AUTO: 1.9 X10E3/UL (ref 0.7–3.1)
LYMPHOCYTES NFR BLD AUTO: 41 %
MCH RBC QN AUTO: 28.1 PG (ref 26.6–33)
MCHC RBC AUTO-ENTMCNC: 33.3 G/DL (ref 31.5–35.7)
MCV RBC AUTO: 84 FL (ref 79–97)
MICRODELETION SYND BLD/T FISH: NORMAL
MICRODELETION SYND BLD/T FISH: NORMAL
MONOCYTES # BLD AUTO: 0.5 X10E3/UL (ref 0.1–0.9)
MONOCYTES NFR BLD AUTO: 11 %
NEUTROPHILS # BLD AUTO: 2.1 X10E3/UL (ref 1.4–7)
NEUTROPHILS NFR BLD AUTO: 44 %
PLATELET # BLD AUTO: 183 X10E3/UL (ref 150–379)
POTASSIUM SERPL-SCNC: 5.2 MMOL/L (ref 3.5–5.2)
PROT SERPL-MCNC: 6.5 G/DL (ref 6–8.5)
RBC # BLD AUTO: 5.13 X10E6/UL (ref 3.77–5.28)
SL AMB EGFR AFRICAN AMERICAN: 62 ML/MIN/1.73
SL AMB EGFR NON AFRICAN AMERICAN: 54 ML/MIN/1.73
SL AMB PDF IMAGE: NORMAL
SL AMB T4, FREE (DIRECT): 1.12 NG/DL (ref 0.82–1.77)
SL AMB VLDL CHOLESTEROL CALC: 17 MG/DL (ref 5–40)
SODIUM SERPL-SCNC: 139 MMOL/L (ref 134–144)
TRIGL SERPL-MCNC: 85 MG/DL (ref 0–149)
TSH SERPL DL<=0.005 MIU/L-ACNC: 5.86 UIU/ML (ref 0.45–4.5)
WBC # BLD AUTO: 4.7 X10E3/UL (ref 3.4–10.8)

## 2018-11-05 ENCOUNTER — TELEPHONE (OUTPATIENT)
Dept: FAMILY MEDICINE CLINIC | Facility: CLINIC | Age: 78
End: 2018-11-05

## 2018-11-05 NOTE — TELEPHONE ENCOUNTER
Please let her know her ultrasound is normal   Due to her symptoms, I would have her see GI  Order is in the chart for Dr Bindu Marcum

## 2018-11-28 NOTE — PROGRESS NOTES
Subjective:      Patient ID: Albin Calabrese is a 77 y o  female  Chief Complaint   Patient presents with    Follow-up     abdominal discomfort  rmklpn       She is here to follow up on abdominal bloating  Does not want to take her eliquis anymore because she feels this is causing her bloating  Has not seen the cardiologist since March and is not due for appointment  Denies chest pain, dyspnea  No weight changes  No nausea or vomitting  Has not taken protonix in over a year because she felt it made her dizzy and this improved after she stopped the protonix  The following portions of the patient's history were reviewed and updated as appropriate: allergies, current medications, past family history, past medical history, past social history, past surgical history and problem list     Review of Systems   Constitutional: Negative  Respiratory: Negative  Cardiovascular: Negative  Gastrointestinal: Positive for abdominal distention  Negative for abdominal pain  Current Outpatient Prescriptions   Medication Sig Dispense Refill    aspirin 81 mg chewable tablet Chew 81 mg daily      Cholecalciferol (VITAMIN D) 2000 units CAPS Take 1 capsule by mouth daily      ELIQUIS 5 MG TAKE ONE TABLET BY MOUTH TWICE A DAY 60 tablet 5    levothyroxine 75 mcg tablet Take 1 tablet (75 mcg total) by mouth daily 90 tablet 3    metoprolol tartrate (LOPRESSOR) 50 mg tablet Take 0 5 tablets by mouth 2 (two) times a day      Multiple Vitamins-Minerals (CENTRUM SILVER ULTRA WOMENS) TABS Take by mouth      rosuvastatin (CRESTOR) 10 MG tablet Take 1 tablet (10 mg total) by mouth daily 90 tablet 3    pantoprazole (PROTONIX) 40 mg tablet Take 1 tablet by mouth daily for 30 days 30 tablet 0    ranitidine (ZANTAC) 150 MG capsule Take 1 capsule (150 mg total) by mouth 2 (two) times a day as needed for indigestion or heartburn 60 capsule 1     No current facility-administered medications for this visit  Objective:    /84   Pulse 72   Temp 98 2 °F (36 8 °C)   Resp 18   Ht 5' 2" (1 575 m)   Wt 64 4 kg (142 lb)   BMI 25 97 kg/m²        Physical Exam   Constitutional: She appears well-developed and well-nourished  Eyes: Conjunctivae are normal    Neck: Neck supple  No JVD present  No thyromegaly present  Cardiovascular: Normal rate, regular rhythm, normal heart sounds and intact distal pulses  Exam reveals no gallop and no friction rub  No murmur heard  Pulmonary/Chest: Effort normal and breath sounds normal  She has no wheezes  She has no rales  Abdominal: Soft  Bowel sounds are normal  She exhibits no distension  There is no tenderness  Musculoskeletal: She exhibits no edema  Assessment/Plan:    Atrial flutter ([address] )  She was advised through an  not to stop this medication due to risk of stroke, but she will make an appointment to discuss with her cardiologist, whether she can stop or change this medication  Essential hypertension  This is stable, continue beta blocker  Diagnoses and all orders for this visit:    Essential hypertension    Abdominal bloating  Comments: Will try zantac prn for bloating  US reviewed and is normal   If not improving she is aware she will need to see GI for possible further studies  Orders:  -     ranitidine (ZANTAC) 150 MG capsule; Take 1 capsule (150 mg total) by mouth 2 (two) times a day as needed for indigestion or heartburn    Needs flu shot  -     influenza vaccine, [id number], high-dose, PF 0 5 mL, for patients 65 yr+ (FLUZONE HIGH-DOSE)    Atrial flutter, unspecified type ([address] )          Return in about 4 months (around 4/3/2019)         Madyson San MD

## 2018-12-03 ENCOUNTER — OFFICE VISIT (OUTPATIENT)
Dept: FAMILY MEDICINE CLINIC | Facility: CLINIC | Age: 78
End: 2018-12-03
Payer: COMMERCIAL

## 2018-12-03 VITALS
TEMPERATURE: 98.2 F | WEIGHT: 142 LBS | HEIGHT: 62 IN | DIASTOLIC BLOOD PRESSURE: 84 MMHG | BODY MASS INDEX: 26.13 KG/M2 | HEART RATE: 72 BPM | RESPIRATION RATE: 18 BRPM | SYSTOLIC BLOOD PRESSURE: 140 MMHG

## 2018-12-03 DIAGNOSIS — Z23 NEEDS FLU SHOT: ICD-10-CM

## 2018-12-03 DIAGNOSIS — I48.92 ATRIAL FLUTTER, UNSPECIFIED TYPE (HCC): ICD-10-CM

## 2018-12-03 DIAGNOSIS — I10 ESSENTIAL HYPERTENSION: Primary | ICD-10-CM

## 2018-12-03 DIAGNOSIS — R14.0 ABDOMINAL BLOATING: ICD-10-CM

## 2018-12-03 PROCEDURE — 90662 IIV NO PRSV INCREASED AG IM: CPT

## 2018-12-03 PROCEDURE — 3008F BODY MASS INDEX DOCD: CPT | Performed by: INTERNAL MEDICINE

## 2018-12-03 PROCEDURE — G0008 ADMIN INFLUENZA VIRUS VAC: HCPCS

## 2018-12-03 PROCEDURE — 1160F RVW MEDS BY RX/DR IN RCRD: CPT

## 2018-12-03 PROCEDURE — 4040F PNEUMOC VAC/ADMIN/RCVD: CPT | Performed by: INTERNAL MEDICINE

## 2018-12-03 PROCEDURE — 99213 OFFICE O/P EST LOW 20 MIN: CPT | Performed by: INTERNAL MEDICINE

## 2018-12-03 PROCEDURE — 1160F RVW MEDS BY RX/DR IN RCRD: CPT | Performed by: INTERNAL MEDICINE

## 2018-12-03 RX ORDER — RANITIDINE 150 MG/1
150 CAPSULE ORAL 2 TIMES DAILY PRN
Qty: 60 CAPSULE | Refills: 1 | Status: SHIPPED | OUTPATIENT
Start: 2018-12-03 | End: 2019-01-24

## 2018-12-03 NOTE — ASSESSMENT & PLAN NOTE
She was advised through an  not to stop this medication due to risk of stroke, but she will make an appointment to discuss with her cardiologist, whether she can stop or change this medication

## 2019-01-23 ENCOUNTER — TELEPHONE (OUTPATIENT)
Dept: FAMILY MEDICINE CLINIC | Facility: CLINIC | Age: 79
End: 2019-01-23

## 2019-01-23 NOTE — PROGRESS NOTES
Subjective:      Patient ID: Sophie Pulido is a 77 y o  female  Chief Complaint   Patient presents with    Nasal Congestion    throat spasms    Shortness of Breath    Chest Pain    Fatigue     symptoms for 1 month  rmklpn       Here with her   For about a month she feels like her chest and breathing go into spasm, especially when she goes up stairs  She feels like she can't catch her breath when this happens  Denies chest pain or palpitations  Has history of aflutter but last EKG in our record from May showed NSR  Denies cough or congestion, other URI symptoms  Is not generally compliant with her cardiac meds due to perceived side effects  The following portions of the patient's history were reviewed and updated as appropriate: allergies, current medications, past family history, past medical history, past social history, past surgical history and problem list     Review of Systems   Constitutional: Negative  Respiratory: Positive for shortness of breath  Cardiovascular: Positive for chest pain  Negative for leg swelling  No current facility-administered medications for this visit  No current outpatient prescriptions on file       Facility-Administered Medications Ordered in Other Visits   Medication Dose Route Frequency Provider Last Rate Last Dose    acetaminophen (TYLENOL) tablet 650 mg  650 mg Oral Q6H PRN Coty Mustafa DO   650 mg at 01/25/19 0217    aspirin chewable tablet 81 mg  81 mg Oral Daily Lucian Antoine MD        cholecalciferol (VITAMIN D3) tablet 1,000 Units  1,000 Units Oral Daily Lucian Antoine MD        diltiazem (CARDIZEM) 125 mg in sodium chloride 0 9 % 125 mL infusion  5 mg/hr Intravenous Continuous Lucian Antoine MD 5 mL/hr at 01/24/19 1730 5 mg/hr at 01/24/19 1730    enoxaparin (LOVENOX) subcutaneous injection 70 mg  1 mg/kg Subcutaneous Q12H Felipe Grady MD   70 mg at 01/24/19 2158    levothyroxine tablet 75 mcg 75 mcg Oral Early Morning Nica Arriaga MD   75 mcg at 01/25/19 0517    melatonin tablet 3 mg  3 mg Oral HS PRN Cotybri Porrasar, DO   3 mg at 01/25/19 0217    metoprolol tartrate (LOPRESSOR) tablet 25 mg  25 mg Oral BID Nica Arriaga MD   25 mg at 01/24/19 1738    multivitamin-minerals (CENTRUM) tablet 1 tablet  1 tablet Oral Daily Nica Arriaga MD        ondansetron (ZOFRAN) injection 4 mg  4 mg Intravenous Q6H PRN Nica Arriaga MD        pneumococcal 23-valent polysaccharide vaccine (PNEUMOVAX-23) injection 0 5 mL  0 5 mL Subcutaneous Prior to discharge Nica Arriaga MD        pravastatin (PRAVACHOL) tablet 80 mg  80 mg Oral Daily With Lona Tanner MD   80 mg at 01/24/19 1738       Objective:    /80   Pulse 78   Temp 97 8 °F (36 6 °C)   Resp 22   Ht 5' 2" (1 575 m)   Wt 65 8 kg (145 lb)   BMI 26 52 kg/m²        Physical Exam   Constitutional: She appears well-developed and well-nourished  Eyes: Conjunctivae are normal    Neck: Neck supple  No JVD present  No thyromegaly present  Cardiovascular: Normal heart sounds and intact distal pulses  An irregularly irregular rhythm present  Tachycardia present  Exam reveals no gallop and no friction rub  No murmur heard  Pulmonary/Chest: Effort normal and breath sounds normal  She has no wheezes  She has no rales  Abdominal: Soft  Bowel sounds are normal  She exhibits no distension  There is no tenderness  Musculoskeletal: She exhibits no edema  EKG shows afib/flutter with rate of 128, no acute ischemia    Assessment/Plan:    Atrial flutter ([address] )  With rapid rate here in the office, symptomatic with chest pressure and dyspnea  Patient was sent to the ER for rate control and further management  Diagnoses and all orders for this visit:    Atrial flutter, unspecified type ([address] )  -     POCT ECG          No Follow-up on file         Stephenie Claudio MD

## 2019-01-23 NOTE — TELEPHONE ENCOUNTER
Message was left on  message system regarding notes about  [facility]  availability    Mariangel Hubbard MA

## 2019-01-23 NOTE — TELEPHONE ENCOUNTER
Boramelyssa Skinner has an apt tomorrow afternoon  Can we call to see if we can get  for this apt? Please call patient back if one is NOT avail      Thank You

## 2019-01-24 ENCOUNTER — OFFICE VISIT (OUTPATIENT)
Dept: FAMILY MEDICINE CLINIC | Facility: CLINIC | Age: 79
End: 2019-01-24
Payer: COMMERCIAL

## 2019-01-24 ENCOUNTER — APPOINTMENT (EMERGENCY)
Dept: RADIOLOGY | Facility: HOSPITAL | Age: 79
DRG: 308 | End: 2019-01-24
Payer: COMMERCIAL

## 2019-01-24 ENCOUNTER — HOSPITAL ENCOUNTER (INPATIENT)
Facility: HOSPITAL | Age: 79
LOS: 2 days | Discharge: HOME | DRG: 308 | End: 2019-01-26
Attending: EMERGENCY MEDICINE | Admitting: INTERNAL MEDICINE
Payer: COMMERCIAL

## 2019-01-24 VITALS
HEIGHT: 62 IN | HEART RATE: 78 BPM | WEIGHT: 145 LBS | RESPIRATION RATE: 22 BRPM | TEMPERATURE: 97.8 F | SYSTOLIC BLOOD PRESSURE: 140 MMHG | BODY MASS INDEX: 26.68 KG/M2 | DIASTOLIC BLOOD PRESSURE: 80 MMHG

## 2019-01-24 DIAGNOSIS — I50.33 ACUTE ON CHRONIC DIASTOLIC CONGESTIVE HEART FAILURE (HCC): ICD-10-CM

## 2019-01-24 DIAGNOSIS — I48.92 ATRIAL FLUTTER (HCC): Primary | ICD-10-CM

## 2019-01-24 DIAGNOSIS — I48.92 ATRIAL FLUTTER, UNSPECIFIED TYPE (HCC): Primary | ICD-10-CM

## 2019-01-24 DIAGNOSIS — R06.00 DYSPNEA: ICD-10-CM

## 2019-01-24 LAB
ALBUMIN SERPL BCP-MCNC: 3.6 G/DL (ref 3.5–5)
ALP SERPL-CCNC: 81 U/L (ref 46–116)
ALT SERPL W P-5'-P-CCNC: 52 U/L (ref 12–78)
ANION GAP SERPL CALCULATED.3IONS-SCNC: 11 MMOL/L (ref 4–13)
AST SERPL W P-5'-P-CCNC: 36 U/L (ref 5–45)
BASOPHILS # BLD AUTO: 0.05 THOUSANDS/ΜL (ref 0–0.1)
BASOPHILS NFR BLD AUTO: 1 % (ref 0–1)
BILIRUB SERPL-MCNC: 0.9 MG/DL (ref 0.2–1)
BILIRUB UR QL STRIP: NEGATIVE
BUN SERPL-MCNC: 19 MG/DL (ref 5–25)
CALCIUM SERPL-MCNC: 9.2 MG/DL (ref 8.3–10.1)
CHLORIDE SERPL-SCNC: 100 MMOL/L (ref 100–108)
CLARITY UR: CLEAR
CO2 SERPL-SCNC: 26 MMOL/L (ref 21–32)
COLOR UR: NORMAL
CREAT SERPL-MCNC: 1.04 MG/DL (ref 0.6–1.3)
EOSINOPHIL # BLD AUTO: 0.1 THOUSAND/ΜL (ref 0–0.61)
EOSINOPHIL NFR BLD AUTO: 2 % (ref 0–6)
ERYTHROCYTE [DISTWIDTH] IN BLOOD BY AUTOMATED COUNT: 13.6 % (ref 11.6–15.1)
GFR SERPL CREATININE-BSD FRML MDRD: 51 ML/MIN/1.73SQ M
GLUCOSE SERPL-MCNC: 91 MG/DL (ref 65–140)
GLUCOSE UR STRIP-MCNC: NEGATIVE MG/DL
HCT VFR BLD AUTO: 44.3 % (ref 34.8–46.1)
HGB BLD-MCNC: 14.5 G/DL (ref 11.5–15.4)
HGB UR QL STRIP.AUTO: NEGATIVE
IMM GRANULOCYTES # BLD AUTO: 0.01 THOUSAND/UL (ref 0–0.2)
IMM GRANULOCYTES NFR BLD AUTO: 0 % (ref 0–2)
KETONES UR STRIP-MCNC: NEGATIVE MG/DL
LEUKOCYTE ESTERASE UR QL STRIP: NEGATIVE
LYMPHOCYTES # BLD AUTO: 1.83 THOUSANDS/ΜL (ref 0.6–4.47)
LYMPHOCYTES NFR BLD AUTO: 32 % (ref 14–44)
MAGNESIUM SERPL-MCNC: 2.4 MG/DL (ref 1.6–2.6)
MCH RBC QN AUTO: 28.4 PG (ref 26.8–34.3)
MCHC RBC AUTO-ENTMCNC: 32.7 G/DL (ref 31.4–37.4)
MCV RBC AUTO: 87 FL (ref 82–98)
MONOCYTES # BLD AUTO: 0.63 THOUSAND/ΜL (ref 0.17–1.22)
MONOCYTES NFR BLD AUTO: 11 % (ref 4–12)
NEUTROPHILS # BLD AUTO: 3.07 THOUSANDS/ΜL (ref 1.85–7.62)
NEUTS SEG NFR BLD AUTO: 54 % (ref 43–75)
NITRITE UR QL STRIP: NEGATIVE
NRBC BLD AUTO-RTO: 0 /100 WBCS
NT-PROBNP SERPL-MCNC: 1855 PG/ML
PH UR STRIP.AUTO: 7 [PH] (ref 5–9)
PLATELET # BLD AUTO: 209 THOUSANDS/UL (ref 149–390)
PMV BLD AUTO: 9.7 FL (ref 8.9–12.7)
POTASSIUM SERPL-SCNC: 4.3 MMOL/L (ref 3.5–5.3)
PROT SERPL-MCNC: 7.4 G/DL (ref 6.4–8.2)
PROT UR STRIP-MCNC: NEGATIVE MG/DL
RBC # BLD AUTO: 5.11 MILLION/UL (ref 3.81–5.12)
SODIUM SERPL-SCNC: 137 MMOL/L (ref 136–145)
SP GR UR STRIP.AUTO: <=1.005 (ref 1–1.03)
TROPONIN I SERPL-MCNC: 0.02 NG/ML
TROPONIN I SERPL-MCNC: <0.02 NG/ML
TROPONIN I SERPL-MCNC: <0.02 NG/ML
UROBILINOGEN UR QL STRIP.AUTO: 0.2 E.U./DL
WBC # BLD AUTO: 5.69 THOUSAND/UL (ref 4.31–10.16)

## 2019-01-24 PROCEDURE — 99254 IP/OBS CNSLTJ NEW/EST MOD 60: CPT | Performed by: INTERNAL MEDICINE

## 2019-01-24 PROCEDURE — 99285 EMERGENCY DEPT VISIT HI MDM: CPT

## 2019-01-24 PROCEDURE — 84484 ASSAY OF TROPONIN QUANT: CPT | Performed by: EMERGENCY MEDICINE

## 2019-01-24 PROCEDURE — 87081 CULTURE SCREEN ONLY: CPT | Performed by: INTERNAL MEDICINE

## 2019-01-24 PROCEDURE — 99213 OFFICE O/P EST LOW 20 MIN: CPT | Performed by: INTERNAL MEDICINE

## 2019-01-24 PROCEDURE — 96361 HYDRATE IV INFUSION ADD-ON: CPT

## 2019-01-24 PROCEDURE — 83880 ASSAY OF NATRIURETIC PEPTIDE: CPT | Performed by: EMERGENCY MEDICINE

## 2019-01-24 PROCEDURE — 1111F DSCHRG MED/CURRENT MED MERGE: CPT | Performed by: INTERNAL MEDICINE

## 2019-01-24 PROCEDURE — 93005 ELECTROCARDIOGRAM TRACING: CPT

## 2019-01-24 PROCEDURE — 84484 ASSAY OF TROPONIN QUANT: CPT | Performed by: INTERNAL MEDICINE

## 2019-01-24 PROCEDURE — 96374 THER/PROPH/DIAG INJ IV PUSH: CPT

## 2019-01-24 PROCEDURE — 99223 1ST HOSP IP/OBS HIGH 75: CPT | Performed by: INTERNAL MEDICINE

## 2019-01-24 PROCEDURE — 83735 ASSAY OF MAGNESIUM: CPT | Performed by: EMERGENCY MEDICINE

## 2019-01-24 PROCEDURE — 93000 ELECTROCARDIOGRAM COMPLETE: CPT | Performed by: INTERNAL MEDICINE

## 2019-01-24 PROCEDURE — 81003 URINALYSIS AUTO W/O SCOPE: CPT | Performed by: EMERGENCY MEDICINE

## 2019-01-24 PROCEDURE — 80053 COMPREHEN METABOLIC PANEL: CPT | Performed by: EMERGENCY MEDICINE

## 2019-01-24 PROCEDURE — 71045 X-RAY EXAM CHEST 1 VIEW: CPT

## 2019-01-24 PROCEDURE — 85025 COMPLETE CBC W/AUTO DIFF WBC: CPT | Performed by: EMERGENCY MEDICINE

## 2019-01-24 PROCEDURE — 36415 COLL VENOUS BLD VENIPUNCTURE: CPT | Performed by: EMERGENCY MEDICINE

## 2019-01-24 RX ORDER — MELATONIN
1000 DAILY
Status: DISCONTINUED | OUTPATIENT
Start: 2019-01-25 | End: 2019-01-26 | Stop reason: HOSPADM

## 2019-01-24 RX ORDER — ASPIRIN 81 MG/1
81 TABLET, CHEWABLE ORAL DAILY
Status: DISCONTINUED | OUTPATIENT
Start: 2019-01-25 | End: 2019-01-26 | Stop reason: HOSPADM

## 2019-01-24 RX ORDER — PRAVASTATIN SODIUM 80 MG/1
80 TABLET ORAL
Status: DISCONTINUED | OUTPATIENT
Start: 2019-01-24 | End: 2019-01-26 | Stop reason: HOSPADM

## 2019-01-24 RX ORDER — FUROSEMIDE 10 MG/ML
20 INJECTION INTRAMUSCULAR; INTRAVENOUS ONCE
Status: COMPLETED | OUTPATIENT
Start: 2019-01-25 | End: 2019-01-25

## 2019-01-24 RX ORDER — LEVOTHYROXINE SODIUM 0.07 MG/1
75 TABLET ORAL
Status: DISCONTINUED | OUTPATIENT
Start: 2019-01-25 | End: 2019-01-26 | Stop reason: HOSPADM

## 2019-01-24 RX ORDER — FUROSEMIDE 10 MG/ML
20 INJECTION INTRAMUSCULAR; INTRAVENOUS ONCE
Status: COMPLETED | OUTPATIENT
Start: 2019-01-24 | End: 2019-01-24

## 2019-01-24 RX ORDER — DILTIAZEM HYDROCHLORIDE 5 MG/ML
15 INJECTION INTRAVENOUS ONCE
Status: COMPLETED | OUTPATIENT
Start: 2019-01-24 | End: 2019-01-24

## 2019-01-24 RX ORDER — ONDANSETRON 2 MG/ML
4 INJECTION INTRAMUSCULAR; INTRAVENOUS EVERY 6 HOURS PRN
Status: DISCONTINUED | OUTPATIENT
Start: 2019-01-24 | End: 2019-01-26 | Stop reason: HOSPADM

## 2019-01-24 RX ADMIN — NITROGLYCERIN 0.5 INCH: 20 OINTMENT TOPICAL at 16:11

## 2019-01-24 RX ADMIN — METOPROLOL TARTRATE 25 MG: 25 TABLET, FILM COATED ORAL at 17:38

## 2019-01-24 RX ADMIN — ENOXAPARIN SODIUM 70 MG: 80 INJECTION SUBCUTANEOUS at 21:58

## 2019-01-24 RX ADMIN — SODIUM CHLORIDE 500 ML: 0.9 INJECTION, SOLUTION INTRAVENOUS at 15:25

## 2019-01-24 RX ADMIN — DILTIAZEM HYDROCHLORIDE 15 MG: 5 INJECTION INTRAVENOUS at 15:35

## 2019-01-24 RX ADMIN — PRAVASTATIN SODIUM 80 MG: 80 TABLET ORAL at 17:38

## 2019-01-24 RX ADMIN — DILTIAZEM HYDROCHLORIDE 2.5 MG/HR: 5 INJECTION INTRAVENOUS at 16:28

## 2019-01-24 RX ADMIN — FUROSEMIDE 20 MG: 10 INJECTION, SOLUTION INTRAMUSCULAR; INTRAVENOUS at 16:47

## 2019-01-24 NOTE — ED NOTES
On arrival Fib/flutter to 160"s  Evaluated by physician  Diltiazem SIVP given  HR 80's flutter with PVC's physician aware For repeat EKG   No drip at this time     Eusebio Raman RN  01/24/19 7719

## 2019-01-24 NOTE — CONSULTS
Consultation - Cardiology   Jonna Cornejo 77 y o  female MRN: [id number]  Unit/Bed#: ED 04 Encounter: [id number]  01/24/19  4:56 PM          Physician Requesting Consult: Nela Salvador MD  Reason for Consult / Principal Problem: atrial flutter      Assessment:  1  Atrial flutter - will plan for SANTI and cardioversion in AM   Continue cardizem gtt  2  CHF - acute on chronic diastolic CHF - IV lasix * 1 now  Patient had an echocardiogram in 2017 which showed normal EF  3  CAD with previous PCI  4  Hypertension -  On cardizem     Plan:  As above      History of Present Illness   HPI: Jonna Cornejo is a 77y o  year old female who presents with shortness of breath and palpitations  The patient has had symptoms of shortness of breath and chest tightness that has been present for the past month  She has had lower extremity edema during this time as well  She denies any orthopnea or paroxysmal nocturnal dyspnea  The patient had a cardiac catheterization in 2012 with stent placed  She believes symptoms are similar to pain she was feeling prior to PCI  She also has a history of atrial flutter in 2017 which converted with beta blocker  Was previously taking Eliquis but stopped due to nausea  In ER, noted to be in atrial flutter with RVR  She was given IV cardizem 20 mg *1 with improvement of rate  Troponin is negative  Review of Systems:    Review of Systems   Constitutional: Negative for chills, fatigue and fever  HENT: Positive for hearing loss  Negative for congestion, nosebleeds and postnasal drip  Mute     Respiratory: Positive for chest tightness and shortness of breath  Negative for cough  Cardiovascular: Negative for chest pain, palpitations and leg swelling  Gastrointestinal: Negative for abdominal distention, abdominal pain, diarrhea, nausea and vomiting  Endocrine: Negative for polydipsia, polyphagia and polyuria     Musculoskeletal: Negative for gait problem and myalgias  Skin: Negative for color change, pallor and rash  Allergic/Immunologic: Negative for environmental allergies, food allergies and immunocompromised state  Neurological: Negative for dizziness, seizures, syncope and light-headedness  Hematological: Negative for adenopathy  Does not bruise/bleed easily  Psychiatric/Behavioral: Negative for dysphoric mood  The patient is not nervous/anxious  Historical Information   Past Medical History:   Diagnosis Date    Atrial flutter ([address] )     CAD (coronary artery disease)     s/p PCIx1 -2012    Deafness     Disease of thyroid gland     Hyperlipidemia     Hypertension      Past Surgical History:   Procedure Laterality Date    CARDIAC SURGERY      stents    ESOPHAGOGASTRODUODENOSCOPY N/A 2/20/2017    Procedure: ESOPHAGOGASTRODUODENOSCOPY (EGD); Surgeon: Pattie Mckee MD;  Location: [facility];   Service:      History   Alcohol Use No     History   Drug Use No     History   Smoking Status    Never Smoker   Smokeless Tobacco    Never Used       Family History:   Family History   Problem Relation Age of Onset    Hypertension Mother         essential    Breast cancer Mother        Meds/Allergies   current meds:   Current Facility-Administered Medications   Medication Dose Route Frequency    [START ON 1/25/2019] aspirin chewable tablet 81 mg  81 mg Oral Daily    [START ON 1/25/2019] cholecalciferol (VITAMIN D3) tablet 1,000 Units  1,000 Units Oral Daily    diltiazem (CARDIZEM) 125 mg in sodium chloride 0 9 % 125 mL infusion  5 mg/hr Intravenous Titrated    enoxaparin (LOVENOX) subcutaneous injection 70 mg  1 mg/kg Subcutaneous Q12H [facility]    [START ON 1/25/2019] levothyroxine tablet 75 mcg  75 mcg Oral Early Morning    metoprolol tartrate (LOPRESSOR) tablet 25 mg  25 mg Oral BID    [START ON 1/25/2019] multivitamin-minerals (CENTRUM) tablet 1 tablet  1 tablet Oral Daily    ondansetron (ZOFRAN) injection 4 mg  4 mg Intravenous Q6H PRN    pravastatin (PRAVACHOL) tablet 80 mg  80 mg Oral Daily With Dinner     Allergies   Allergen Reactions    Amlodipine Other (See Comments)    Lisinopril Other (See Comments)       Objective   Vitals: Blood pressure 143/77, pulse 88, temperature 98 2 °F (36 8 °C), temperature source Tympanic, resp  rate (!) 28, weight 65 5 kg (144 lb 8 oz), SpO2 91 % , Body mass index is 26 43 kg/m²  Physical Exam   Constitutional: She appears healthy  No distress  HENT:   Nose: Nose normal    Mouth/Throat: Oropharynx is clear  Eyes: Pupils are equal, round, and reactive to light  Conjunctivae are normal    Neck: Neck supple  No JVD present  Cardiovascular: Normal rate and regular rhythm  Exam reveals no distant heart sounds and no friction rub  Murmur heard  Pulmonary/Chest: Effort normal  She has no wheezes  She has bibasilar rales  She exhibits no tenderness  Abdominal: Soft  She exhibits no distension  There is no tenderness  Musculoskeletal: She exhibits edema  Neurological: She is alert and oriented to person, place, and time  Skin: Skin is warm and dry  No rash noted         Lab Results:     Troponins:   Results from last 7 days  Lab Units 01/24/19  1524   TROPONIN I ng/mL <0 02       CBC with diff:   Results from last 7 days  Lab Units 01/24/19  1524   WBC Thousand/uL 5 69   HEMOGLOBIN g/dL 14 5   HEMATOCRIT % 44 3   MCV fL 87   PLATELETS Thousands/uL 209   MCH pg 28 4   MCHC g/dL 32 7   RDW % 13 6   MPV fL 9 7   NRBC AUTO /100 WBCs 0       CMP:   Results from last 7 days  Lab Units 01/24/19  1524   POTASSIUM mmol/L 4 3   CHLORIDE mmol/L 100   CO2 mmol/L 26   BUN mg/dL 19   CREATININE mg/dL 1 04   CALCIUM mg/dL 9 2   AST U/L 36   ALT U/L 52   ALK PHOS U/L 81   EGFR ml/min/1 73sq m 51       Magnesium:   Results from last 7 days  Lab Units 01/24/19  1524   MAGNESIUM mg/dL 2 4       Coags:       Lipid Profile:         Cardiac testing:   Results for orders placed during the hospital encounter of 03/20/17 Echo complete with contrast if indicated    Narrative [address] [place] [address]  [facility]   [phone]    Transthoracic Echocardiogram  2D, M-mode, Doppler, and Color Doppler    Study date:  20-Mar-2017    Patient: Khadijah Araujo  MR number: [id number]  Account number: [de-identified]  : 1940  Age: 67 years  Gender: Female  Status: Routine  Location: Echo lab  Height: 62 in  Weight: 131 8 lb  BP: 144/ 84 mmHg    Indications: Hypertension    Diagnoses: 401 9 - HYPERTENSION NOS    Sonographer:  Gregory Keane  Primary Physician:  Marj Milan MD  Referring Physician:  Paty Mares MD  Group:  Avril Vargas  Interpreting Physician:  Jhony Camp DO    SUMMARY    LEFT VENTRICLE:  Size was normal   Systolic function was normal by visual assessment  Ejection fraction was estimated in the range of 55 % to 60 %  There were no regional wall motion abnormalities  There was mild concentric hypertrophy  Doppler parameters were consistent with abnormal left ventricular relaxation (grade 1 diastolic dysfunction)  MITRAL VALVE:  There was moderate annular calcification  There was mild regurgitation  AORTIC VALVE:  There was no evidence for stenosis  There was no regurgitation  TRICUSPID VALVE:  There was moderate regurgitation  Pulmonary artery systolic pressure was mildly increased  HISTORY: PRIOR HISTORY: Chest Pain, HTN    PROCEDURE: The procedure was performed in the echo lab  This was a routine study  The transthoracic approach was used  The study included complete 2D imaging, M-mode, complete spectral Doppler, and color Doppler  The heart rate was 70 bpm,  at the start of the study  Image quality was adequate  LEFT VENTRICLE: Size was normal  Systolic function was normal by visual assessment  Ejection fraction was estimated in the range of 55 % to 60 %  There were no regional wall motion abnormalities   There was mild concentric hypertrophy  DOPPLER: Doppler parameters were consistent with abnormal left ventricular relaxation (grade 1 diastolic dysfunction)  RIGHT VENTRICLE: The size was normal  Systolic function was normal  DOPPLER: Systolic pressure was within the normal range  LEFT ATRIUM: The atrium was moderately dilated  RIGHT ATRIUM: The atrium was mildly dilated  MITRAL VALVE: There was moderate annular calcification  There was normal leaflet separation  No echocardiographic evidence for prolapse  DOPPLER: The transmitral velocity was within the normal range  There was no evidence for stenosis  There was mild regurgitation  AORTIC VALVE: The valve was trileaflet  Leaflets exhibited mild calcification and normal cuspal separation  DOPPLER: Transaortic velocity was within the normal range  There was no evidence for stenosis  There was no regurgitation  TRICUSPID VALVE: The valve structure was normal  There was normal leaflet separation  DOPPLER: The transtricuspid velocity was within the normal range  There was moderate regurgitation  Pulmonary artery systolic pressure was mildly  increased  Estimated peak PA pressure was 45 mmHg  PULMONIC VALVE: Leaflets exhibited normal thickness, no calcification, and normal cuspal separation  DOPPLER: The transpulmonic velocity was within the normal range  There was no regurgitation  PERICARDIUM: There was no thickening  There was no pericardial effusion  AORTA: The root exhibited normal size      PULMONARY ARTERY: The size was normal  The morphology appeared normal     SYSTEM MEASUREMENT TABLES    2D mode  AoR Diam 2D: 2 8 cm  LA Diam (2D): 4 3 cm  LA/Ao (2D): 1 54  FS (2D Teich): 29 %  IVSd (2D): 1 16 cm  LVDEV: 80 4 cm³  LVESV: 35 3 cm³  LVIDd(2D): 4 24 cm  LVISd (2D): 3 01 cm  LVOT Area 2D: 2 84 cm squared  LVPWd (2D): 1 19 cm  SV (Teich): 45 1 cm³    Apical four chamber  LVEF A4C: 57 %    Unspecified Scan Mode  LAILA Cont Eq (Peak Kushal): 1 43 cm squared  LAILA Cont Eq (VTI): 1 57 cm squared  LVOT (VTI): 20 9 cm  LVOT Diam : 1 9 cm  LVOT Vmax: 1010 mm/s  LVOT Vmax; Mean: 1010 mm/s  Peak Grad ; Mean: 4 mm[Hg]  SV (LVOT): 59 cm³  VTI;Mean: 2 mm[Hg]  MV Peak A Kushal: 385 mm/s  MV Peak E Kushal  Mean: 979 mm/s  MVA (PHT): 4 78 cm squared  PHT: 46 ms  Max P mm[Hg]  V Max: 3050 mm/s  Vmax: 2990 mm/s  RA Area: 15 7 cm squared  RA Volume: 36 9 cm³  TAPSE: 1 8 cm    [facility]    Prepared and electronically signed by    Curt Gardner DO  Signed 20-Mar-2017 18:05:15         Results for orders placed during the hospital encounter of 17   NM myocardial perfusion spect (rx stress and/or rest)    [facility] [address]  [address] [facility]   [phone]    Rest/Stress Gated SPECT Myocardial Perfusion Imaging After Exercise    Patient: Rashidrenzo Mike  MR number: [id number]  Account number: [de-identified]  : 1940  Age: 67 years  Gender: Female  Status: Outpatient  Location: Stress lab  Height: 58 in  Weight: 133 lb  BP: 136/ 80 mmHg    Allergies: NO KNOWN ALLERGIES    Diagnosis: R07 9 - Chest pain, unspecified    Primary Physician:  Blas Clifford MD  RN:  GISELE Darby  Referring Physician:  Reshma Anna MD  Group:  Taylor Rodríguez  Report Prepared By[de-identified]  GISELE Darby  Interpreting Physician:  Curt Gardner DO    INDICATIONS: Detection of coronary artery disease  HISTORY: Patient accompanied by her , both deaf and mute, Communication achieved through [employer]   The patient is a 79year old  female  Chest pain status: chest pain  Coronary artery disease risk factors:  dyslipidemia, hypertension, and family history of premature coronary artery disease  Cardiovascular history: none significant  Medications: aspirin, a lipid lowering agent, and thyroid medications      PHYSICAL EXAM: Baseline physical exam screening: normal and no wheezes audible  REST ECG: Normal sinus rhythm  Normal baseline ECG  PROCEDURE: The study was performed in the stress lab  Treadmill exercise testing was performed, using the Shanti protocol  Gated SPECT myocardial perfusion imaging was performed after stress and at rest  Systolic blood pressure was 136  mmHg, at the start of the study  Diastolic blood pressure was 80 mmHg, at the start of the study  The heart rate was 63 bpm, at the start of the study  Patient was not experiencing chest pain at the time of the injection of the  radiopharmaceutical  IV double checked  SHANTI PROTOCOL:  HR bpm SBP mmHg DBP mmHg Symptoms ST change Rhythm/conduct  Baseline 65 136 80 none none NSR, no ectopy, NSR  Stage 1 123 136 80 mild dyspnea, leg pain -- occasional PAC's, occasional PVC's  Immediate 125 176 80 same as above -- --  Recovery 1 88 168 80 chest discomfort -- --  Recovery 2 77 140 80 subsiding -- --  Patient held on Stage 1 due to leg pain  No medications or fluids given  STRESS SUMMARY: Duration of exercise was 4 min and 9 sec  The patient exercised to protocol stage 2  Maximal work rate was 5 2 METs  Functional capacity was normal  Maximal heart rate during stress was 144 bpm ( 104 % of maximal predicted  heart rate)  The heart rate response to stress was normal  There was normal resting blood pressure with an appropriate response to stress  The rate-pressure product for the peak heart rate and blood pressure was 56279  There was no chest  pain during stress  The patient experienced chest pain during stress; pain resolved spontaneously  The stress test was terminated due to achievement of target heart rate and leg pain  Pre oxygen saturation: 97 %  Peak oxygen saturation: 98  %  The stress ECG was negative for ischemia and normal  There were no stress arrhythmias or conduction abnormalities      ISOTOPE ADMINISTRATION:  Resting isotope administration Stress isotope administration  Agent Sestamibi Sestamibi  Dose 10 9 mCi 30 mCi  Date 03/30/2017 03/30/2017    The radiopharmaceutical was injected at the peak effect of pharmacologic stress  Radiopharmaceutical was administered 2 min, 37 sec into the stress protocol  There was 1 min of exercise after the injection  MYOCARDIAL PERFUSION IMAGING:  The image quality was good  Left ventricular size was normal     PERFUSION DEFECTS:  -  There were no perfusion defects  GATED SPECT:  The calculated left ventricular ejection fraction was 78 %  Left ventricular ejection fraction was within normal limits by visual estimate  There was no diagnostic evidence for left ventricular regional abnormality  SUMMARY:  -  Stress results: Duration of exercise was 4 min and 9 sec  Target heart rate was achieved  There was no chest pain during stress  The patient experienced chest pain during stress; pain resolved spontaneously  -  ECG conclusions: The stress ECG was negative for ischemia and normal   -  Perfusion imaging: There were no perfusion defects   -  Gated SPECT: The calculated left ventricular ejection fraction was 78 %  Left ventricular ejection fraction was within normal limits by visual estimate  There was no diagnostic evidence for left ventricular regional abnormality  IMPRESSIONS: Normal study after maximal exercise  Myocardial perfusion imaging was normal at rest and with stress   Left ventricular systolic function was normal     Prepared and signed by    Augie Brooke DO  Signed 03/30/2017 16:03:11         EKG: Personally reviewed: atrial flutter    Imaging: I have personally reviewed pertinent films in PACS

## 2019-01-24 NOTE — ASSESSMENT & PLAN NOTE
Initial troponin was negative and EKG did not show any significant ST-T changes  Monitor serial cardiac enzymes  Check fasting lipid profile  Continue metoprolol  Patient's history of CAD status post PCI  Patient most likely might need cardiac catheterization    Continue aspirin, pravastatin and metoprolol

## 2019-01-24 NOTE — ED NOTES
----- Message from Karlie Nur MD sent at 10/23/2018 11:03 AM EDT -----   Please schedule patient for a two unit appt with Dr Ventura in 9 weeks with CT chest a week before. Thanks, Karlie   HANANEG done @2364     [facility]  01/24/19 [id number]

## 2019-01-24 NOTE — ED PROVIDER NOTES
History  Chief Complaint   Patient presents with    Chest Pain     Patient c/o chest pain for 1 month, was seen at [facility] prior to arrival     HPI    76 yo F with PMH HTN, HLD, Atrial flutter who presents today with chest pain for about 1 month  Patient states she has been having intermittent chest pain which she describes as pressure in the substernal portion along with shortness of breath  Patient states she has nausea no cardiology regarding these symptoms  She followed up with PCP  When they followed up with PCP they found that she was in atrial fibrillation  Patient is deaf unable to get a full story  Spouse at bedside is also deaf they do not want a video conference for interpretation  History obtained through writing questions  Patient states her chest pain is worse when she goes up stairs  She can't catch her breath  Patient does take Eliquis for history of atrial flutter  No fevers or chills no abdominal pain no urinary complaints  69-year-old female that presents today with chest pain  Patient will require cardiac workup  On evaluation patient is currently tachycardic with heart rate in the 170s  Will give patient Cardizem do a cardiac workup  Dissipated admission to the hospital     Prior to Admission Medications   Prescriptions Last Dose Informant Patient Reported? Taking?    Cholecalciferol (VITAMIN D) 2000 units CAPS Unknown at Unknown time  Yes No   Sig: Take 1 capsule by mouth daily   ELIQUIS 5 MG 1/24/2019 at 0930  No Yes   Sig: TAKE ONE TABLET BY MOUTH TWICE A DAY   Multiple Vitamins-Minerals (CENTRUM SILVER ULTRA WOMENS) TABS Unknown at Unknown time Self Yes No   Sig: Take by mouth   aspirin 81 mg chewable tablet 1/24/2019 at Unknown time  Yes Yes   Sig: Chew 81 mg daily   levothyroxine 75 mcg tablet 1/24/2019 at Unknown time  No Yes   Sig: Take 1 tablet (75 mcg total) by mouth daily   metoprolol tartrate (LOPRESSOR) 50 mg tablet 1/24/2019 at 0930  Yes Yes   Sig: Take 0 5 tablets by mouth 2 (two) times a day   rosuvastatin (CRESTOR) 10 MG tablet 1/24/2019 at Unknown time  No Yes   Sig: Take 1 tablet (10 mg total) by mouth daily      Facility-Administered Medications: None       Past Medical History:   Diagnosis Date    Atrial flutter ([address] )     CAD (coronary artery disease)     s/p PCIx1 -2012    Deafness     Disease of thyroid gland     Hyperlipidemia     Hypertension        Past Surgical History:   Procedure Laterality Date    CARDIAC SURGERY      stents    ESOPHAGOGASTRODUODENOSCOPY N/A 2/20/2017    Procedure: ESOPHAGOGASTRODUODENOSCOPY (EGD); Surgeon: Sen Paige MD;  Location: [facility] GI LAB; Service:        Family History   Problem Relation Age of Onset    Hypertension Mother         essential    Breast cancer Mother      I have reviewed and agree with the history as documented  Social History   Substance Use Topics    Smoking status: Never Smoker    Smokeless tobacco: Never Used    Alcohol use No        Review of Systems   Constitutional: Negative for diaphoresis and fever  HENT: Negative  Respiratory: Positive for shortness of breath  Negative for cough and wheezing  Cardiovascular: Positive for chest pain and palpitations  Negative for leg swelling  Gastrointestinal: Negative for abdominal distention, abdominal pain, nausea and vomiting  Genitourinary: Negative  Musculoskeletal: Negative  Skin: Negative  Neurological: Positive for weakness  Psychiatric/Behavioral: Negative  All other systems reviewed and are negative  Physical Exam  Physical Exam   Constitutional: She is oriented to person, place, and time  She appears well-developed  HENT:   Head: Normocephalic and atraumatic  Eyes: Pupils are equal, round, and reactive to light  EOM are normal    Neck: Normal range of motion  Neck supple  Cardiovascular: Normal heart sounds  No murmur heard    Irregularly irregular  Increased rate   Pulmonary/Chest: Effort normal and breath sounds normal  No respiratory distress  She has no wheezes  Abdominal: Soft  Bowel sounds are normal  She exhibits no distension  There is no tenderness  There is no rebound and no guarding  Musculoskeletal: Normal range of motion  She exhibits no edema or deformity  Neurological: She is alert and oriented to person, place, and time  Skin: Skin is warm and dry  Capillary refill takes less than 2 seconds  No rash noted  Psychiatric: She has a normal mood and affect  Vitals reviewed        Vital Signs  ED Triage Vitals [01/24/19 1511]   Temperature Pulse Respirations Blood Pressure SpO2   98 2 °F (36 8 °C) (!) 129 18 (!) 154/111 94 %      Temp Source Heart Rate Source Patient Position - Orthostatic VS BP Location FiO2 (%)   Tympanic Monitor Lying Left arm --      Pain Score       3           Vitals:    01/24/19 1725 01/25/19 0016 01/25/19 0335 01/25/19 0800   BP: 144/87 119/66 124/60 141/89   Pulse: 81 78 92 (!) 109   Patient Position - Orthostatic VS: Lying Lying Lying Lying       Visual Acuity  Visual Acuity      Most Recent Value   L Pupil Size (mm)  3   R Pupil Size (mm)  3   L Pupil Shape  Round   R Pupil Shape  Round          ED Medications  Medications   aspirin chewable tablet 81 mg (not administered)   cholecalciferol (VITAMIN D3) tablet 1,000 Units (not administered)   levothyroxine tablet 75 mcg (75 mcg Oral Given 1/25/19 0517)   metoprolol tartrate (LOPRESSOR) tablet 25 mg (25 mg Oral Given 1/24/19 1738)   multivitamin-minerals (CENTRUM) tablet 1 tablet (not administered)   pravastatin (PRAVACHOL) tablet 80 mg (80 mg Oral Given 1/24/19 1738)   ondansetron (ZOFRAN) injection 4 mg (not administered)   diltiazem (CARDIZEM) 125 mg in sodium chloride 0 9 % 125 mL infusion (5 mg/hr Intravenous Restarted 1/24/19 1730)   enoxaparin (LOVENOX) subcutaneous injection 70 mg (70 mg Subcutaneous Given 1/24/19 2158)   pneumococcal 23-valent polysaccharide vaccine (PNEUMOVAX-23) injection 0 5 mL (not administered)   acetaminophen (TYLENOL) tablet 650 mg (650 mg Oral Given 1/25/19 0820)   melatonin tablet 3 mg (3 mg Oral Given 1/25/19 0217)   diltiazem (CARDIZEM) injection 15 mg (15 mg Intravenous Given 1/24/19 1535)   nitroglycerin (NITRO-BID) 2 % TD ointment 0 5 inch (0 5 inches Topical Given 1/24/19 1611)   diltiazem (CARDIZEM) 125 mg in sodium chloride 0 9 % 125 mL infusion (0 mg/hr Intravenous Stopped 1/24/19 1726)   furosemide (LASIX) injection 20 mg (20 mg Intravenous Given 1/24/19 1647)   furosemide (LASIX) injection 20 mg (20 mg Intravenous Given 1/25/19 0517)       Diagnostic Studies  Results Reviewed     Procedure Component Value Units Date/Time    B-type natriuretic peptide [[id number]]  (Abnormal) Collected:  01/24/19 1524    Lab Status:  Final result Specimen:  Blood from Arm, Right Updated:  01/24/19 1600     NT-proBNP 1,855 (H) pg/mL     Magnesium [[id number]]  (Normal) Collected:  01/24/19 1524    Lab Status:  Final result Specimen:  Blood from Arm, Right Updated:  01/24/19 1600     Magnesium 2 4 mg/dL     UA w Reflex to Microscopic [[id number]] Collected:  01/24/19 1547    Lab Status:  Final result Specimen:  Urine from Urine, Clean Catch Updated:  01/24/19 1556     Color, UA Light Yellow     Clarity, UA Clear     Specific Gravity, UA <=1 005     pH, UA 7 0     Leukocytes, UA Negative     Nitrite, UA Negative     Protein, UA Negative mg/dl      Glucose, UA Negative mg/dl      Ketones, UA Negative mg/dl      Urobilinogen, UA 0 2 E U /dl      Bilirubin, UA Negative     Blood, UA Negative    Troponin I [[id number]]  (Normal) Collected:  01/24/19 1524    Lab Status:  Final result Specimen:  Blood from Arm, Right Updated:  01/24/19 1551     Troponin I <0 02 ng/mL     Comprehensive metabolic panel [[id number]] Collected:  01/24/19 1524    Lab Status:  Final result Specimen:  Blood from Arm, Right Updated:  01/24/19 1547     Sodium 137 mmol/L      Potassium 4 3 mmol/L      Chloride 100 mmol/L      CO2 26 mmol/L      ANION GAP 11 mmol/L      BUN 19 mg/dL      Creatinine 1 04 mg/dL      Glucose 91 mg/dL      Calcium 9 2 mg/dL      AST 36 U/L      ALT 52 U/L      Alkaline Phosphatase 81 U/L      Total Protein 7 4 g/dL      Albumin 3 6 g/dL      Total Bilirubin 0 90 mg/dL      eGFR 51 ml/min/1 73sq m     Narrative:         National Kidney Disease Education Program recommendations are as follows:  GFR calculation is accurate only with a steady state creatinine  Chronic Kidney disease less than 60 ml/min/1 73 sq  meters  Kidney failure less than 15 ml/min/1 73 sq  meters  CBC and differential [[id number]] Collected:  01/24/19 1524    Lab Status:  Final result Specimen:  Blood from Arm, Right Updated:  01/24/19 1531     WBC 5 69 Thousand/uL      RBC 5 11 Million/uL      Hemoglobin 14 5 g/dL      Hematocrit 44 3 %      MCV 87 fL      MCH 28 4 pg      MCHC 32 7 g/dL      RDW 13 6 %      MPV 9 7 fL      Platelets 841 Thousands/uL      nRBC 0 /100 WBCs      Neutrophils Relative 54 %      Immat GRANS % 0 %      Lymphocytes Relative 32 %      Monocytes Relative 11 %      Eosinophils Relative 2 %      Basophils Relative 1 %      Neutrophils Absolute 3 07 Thousands/µL      Immature Grans Absolute 0 01 Thousand/uL      Lymphocytes Absolute 1 83 Thousands/µL      Monocytes Absolute 0 63 Thousand/µL      Eosinophils Absolute 0 10 Thousand/µL      Basophils Absolute 0 05 Thousands/µL                  XR chest portable   Final Result by Kyleigh Agarwal MD (01/24 1628)      Mild vascular congestion              Workstation performed: [id number]                    Procedures  Procedures       Phone Contacts  ED Phone Contact    ED Course  ED Course as of Jan 25 1102   Thu Jan 24, 2019   1528 Procedure Note: EKG  Date/Time: 01/24/19 3:28 PM   Performed by: Piero Bowen by: Mirtha Dickerson   Indications / Diagnosis: CP  ECG reviewed by me, the ED Provider: yes   The EKG demonstrates:  Rhythm: irregular irregular rhythm  Intervals: normal intervals  Axis: normal axis  QRS/Blocks: normal QRS  ST Changes:No acute ST Changes, no STD/SILVIA  Afib with RVR       1551 After receiving 15 of Cardizem patient has come back to underlying rhythm of atrial flutter with PVCs  No ST elevations or depressions appreciated  [address] [facility] with Cardiology Dr Julio Otoole who recommended putting patient on a Cardizem drip and bring the patient in for evaluation  1558 Despite heart rate better patient still complaining of chest pain  Will give patient nitro to assess          HEART Risk Score      Most Recent Value   History  1 Filed at: 01/24/2019 1559   ECG  1 Filed at: 01/24/2019 1559   Age  2 Filed at: 01/24/2019 1559   Risk Factors  1 Filed at: 01/24/2019 1559   Troponin  0 Filed at: 01/24/2019 1559   Heart Score Risk Calculator   History  1 Filed at: 01/24/2019 1559   ECG  1 Filed at: 01/24/2019 1559   Age  2 Filed at: 01/24/2019 1559   Risk Factors  1 Filed at: 01/24/2019 1559   Troponin  0 Filed at: 01/24/2019 1559   HEART Score  5 Filed at: 01/24/2019 1559   HEART Score  5 Filed at: 01/24/2019 1559                            MDM  CritCare Time    Disposition  Final diagnoses:   Atrial flutter ([address] )   Dyspnea     Time reflects when diagnosis was documented in both MDM as applicable and the Disposition within this note     Time User Action Codes Description Comment    1/24/2019  4:21 PM Brennan Bolton U  8  [I48 92] Atrial flutter ([address] )     1/24/2019  4:21 PM Galdino Bolton Add [R06 00] Dyspnea       ED Disposition     ED Disposition Condition Comment    Admit  Case was discussed with medicine and the patient's admission status was agreed to be Admission Status: inpatient status to the service of Dr Rachel Yee           Follow-up Information    None         Current Discharge Medication List      CONTINUE these medications which have NOT CHANGED    Details   aspirin 81 mg chewable tablet Chew 81 mg daily      ELIQUIS 5 MG TAKE ONE TABLET BY MOUTH TWICE A DAY  Qty: 60 tablet, Refills: 5    Associated Diagnoses: Atrial flutter, unspecified type (HCC)      levothyroxine 75 mcg tablet Take 1 tablet (75 mcg total) by mouth daily  Qty: 90 tablet, Refills: 3    Associated Diagnoses: Hypothyroidism, unspecified type      metoprolol tartrate (LOPRESSOR) 50 mg tablet Take 0 5 tablets by mouth 2 (two) times a day      rosuvastatin (CRESTOR) 10 MG tablet Take 1 tablet (10 mg total) by mouth daily  Qty: 90 tablet, Refills: 3    Associated Diagnoses: Dyslipidemia      Cholecalciferol (VITAMIN D) 2000 units CAPS Take 1 capsule by mouth daily      Multiple Vitamins-Minerals (CENTRUM SILVER ULTRA WOMENS) TABS Take by mouth           No discharge procedures on file      ED Provider  Electronically Signed by           Josh Gonzales MD  01/25/19 [address] [place] [address] MD Echo  01/25/19 6235

## 2019-01-24 NOTE — ASSESSMENT & PLAN NOTE
Patient noted to be in atrial flutter with rapid ventricular rate in the ED  Patient has history of atrial flutter but stop taking Eliquis as she had nausea associated with it  Patient received 15 milligrams of Cardizem in the E D  Patient will continue on Cardizem drip at 5 milligram/hour  Continue metoprolol 25 milligram p o  B i d  Patient also started on anticoagulation with Lovenox 1 milligram/kilogram subcutaneously b i d   Cardiology consult appreciated  Patient will be scheduled for SANTI with cardioversion tomorrow if patient does not convert and Cardizem drip

## 2019-01-24 NOTE — H&P
H&P- Remona Stamp 1940, 77 y o  female MRN: [id number]    Unit/Bed#: [address] [address] Encounter: [id number]    Primary Care Provider: Shell Payne MD   Date and time admitted to hospital: 1/24/2019  3:06 PM        * Atrial flutter [facility])   Assessment & Plan    Patient noted to be in atrial flutter with rapid ventricular rate in the ED  Patient has history of atrial flutter but stop taking Eliquis as she had nausea associated with it  Patient received 15 milligrams of Cardizem in the E D  Patient will continue on Cardizem drip at 5 milligram/hour  Continue metoprolol 25 milligram p o  B i d  Patient also started on anticoagulation with Lovenox 1 milligram/kilogram subcutaneously b i d   Cardiology consult appreciated  Patient will be scheduled for SANTI with cardioversion tomorrow if patient does not convert and Cardizem drip  Chest pain   Assessment & Plan    Initial troponin was negative and EKG did not show any significant ST-T changes  Monitor serial cardiac enzymes  Check fasting lipid profile  Continue metoprolol  Patient's history of CAD status post PCI  Patient most likely might need cardiac catheterization  Continue aspirin, pravastatin and metoprolol     Acute on chronic diastolic congestive heart failure [facility])   Assessment & Plan    Patient complaining of exertional shortness of breath with leg swelling and patient noted elevated proBNP in the ED  Patient was given Lasix 20 milligrams IV in the ED  Will give another dose in a m  Monitor strict I&O and daily weight  Check 2D echo  Patient's previous 2D echo showed EF of 50 for 85%, grade 1 diastolic dysfunction without any significant wall motion abnormalities     Essential hypertension   Assessment & Plan    Continue metoprolol and Cardizem     Dyslipidemia   Assessment & Plan    Continue statin  Check fasting lipid profile in a m       Hypothyroidism   Assessment & Plan    Continue levothyroxine       VTE Prophylaxis: Enoxaparin (Lovenox)  / sequential compression device   Code Status: Level 1 - Full Code    Anticipated Length of Stay:  Patient will be admitted on an Inpatient basis with an anticipated length of stay of  more than 2 midnights  Justification for Hospital Stay:  Atrial flutter, acute exacerbation of CHF, chest pain    Total Time for Visit, including Counseling / Coordination of Care: 1 hour  Greater than 50% of this total time spent on direct patient counseling and coordination of care  Chief Complaint:   Chest Pain (Patient c/o chest pain for 1 month, was seen at [facility] prior to arrival)      History of Present Illness:    Edgar Jung is a 77 y o  female with a PMH of hypertension, hyperlipidemia, hypothyroidism, odynophagia, GERD, CAD status post PCI, atrial flutter who presents to the ER complaining of exertional shortness of breath, palpitations and chest tightness  Patient is deaf and mute and  her  is also deaf and mute  Most of the information obtained by writing on the paper  Patient complains of shortness of breath on exertion for the past 1 month  Patient also reports intermittent chest tightness without any radiation for the past 1 month  Patient denies any orthopnea, proximal nocturnal dyspnea  Patient also reported some intermittent palpitations  Patient also has bilateral lower extremity edema again over the past 1 month  Patient denies any cough, headache, dizziness, fever, chills or urinary complaints  Patient's history of CAD status post PCI in 2012 and follows up with Dr Christina Alvarenga  Patient was on Eliquis previously for anticoagulation which she stopped due to nausea  In the ED patient was noted to be in atrial flutter with rapid ventricular rate  Patient noted to have elevated proBNP and chest x-ray showed mild CHF      Review of Systems:    Review of Systems   Constitutional: Negative for activity change, appetite change, chills, diaphoresis, fatigue, fever and unexpected weight change  HENT: Negative for congestion, ear discharge, ear pain, facial swelling, hearing loss, mouth sores, nosebleeds, postnasal drip, rhinorrhea, sinus pressure, sneezing, sore throat, tinnitus, trouble swallowing and voice change  Eyes: Negative for photophobia, discharge, redness and visual disturbance  Respiratory: Positive for chest tightness and shortness of breath  Negative for cough, wheezing and stridor  Cardiovascular: Positive for leg swelling  Negative for chest pain and palpitations  Gastrointestinal: Negative for abdominal distention, abdominal pain, anal bleeding, blood in stool, constipation, diarrhea, nausea and vomiting  Endocrine: Negative for polydipsia, polyphagia and polyuria  Genitourinary: Negative for decreased urine volume, difficulty urinating, dysuria, flank pain, hematuria, menstrual problem, pelvic pain, urgency, vaginal bleeding and vaginal discharge  Musculoskeletal: Negative for arthralgias, back pain and neck stiffness  Skin: Negative for pallor and rash  Neurological: Negative for dizziness, seizures, facial asymmetry, speech difficulty, light-headedness, numbness and headaches  Hematological: Negative for adenopathy  Psychiatric/Behavioral: Negative for agitation and confusion  Past Medical and Surgical History:     Past Medical History:   Diagnosis Date    [address] [address] [address]     CAD (coronary artery disease)     s/p PCIx1 -2012    Deafness     Disease of thyroid gland     Hyperlipidemia     Hypertension        Past Surgical History:   Procedure Laterality Date    CARDIAC SURGERY      stents    ESOPHAGOGASTRODUODENOSCOPY N/A 2/20/2017    Procedure: ESOPHAGOGASTRODUODENOSCOPY (EGD); Surgeon: Leonora Perea MD;  Location: [facility] GI LAB; Service:        Meds/Allergies:    Prior to Admission medications    Medication Sig Start Date End Date Taking?  Authorizing Provider   aspirin 81 mg chewable tablet Chew 81 mg daily   Yes Historical Provider, MD   ELIQUIS 5 MG TAKE ONE TABLET BY MOUTH TWICE A DAY 10/22/18  Yes Townsend Schlatter, MD   levothyroxine 75 mcg tablet Take 1 tablet (75 mcg total) by mouth daily 4/16/18  Yes Townsend Schlatter, MD   metoprolol tartrate (LOPRESSOR) 50 mg tablet Take 0 5 tablets by mouth 2 (two) times a day   Yes Historical Provider, MD   rosuvastatin (CRESTOR) 10 MG tablet Take 1 tablet (10 mg total) by mouth daily 4/16/18  Yes Townsend Schlatter, MD   Cholecalciferol (VITAMIN D) 2000 units CAPS Take 1 capsule by mouth daily    Historical Provider, MD   Multiple Vitamins-Minerals (CENTRUM SILVER ULTRA WOMENS) TABS Take by mouth    Historical Provider, MD   pantoprazole (PROTONIX) 40 mg tablet Take 1 tablet by mouth daily for 30 days 2/21/17 1/24/19  Kristina Enriquez MD   ranitidine (ZANTAC) 150 MG capsule Take 1 capsule (150 mg total) by mouth 2 (two) times a day as needed for indigestion or heartburn 12/3/18 1/24/19  Townsend Schlatter, MD       Allergies: Allergies   Allergen Reactions    Amlodipine Other (See Comments)    Lisinopril Other (See Comments)       Social History:     Marital Status: /Civil Union   Substance Use History:   History   Alcohol Use No     History   Smoking Status    Never Smoker   Smokeless Tobacco    Never Used     History   Drug Use No       Family History:    Family History   Problem Relation Age of Onset    Hypertension Mother         essential    Breast cancer Mother        Physical Exam:     Vitals:   Blood Pressure: 144/87 (01/24/19 1725)  Pulse: 81 (01/24/19 1725)  Temperature: 97 5 °F (36 4 °C) (01/24/19 1725)  Temp Source: Tympanic (01/24/19 1725)  Respirations: (!) 24 (01/24/19 1725)  Weight - Scale: 65 5 kg (144 lb 8 oz) (01/24/19 1511)  SpO2: 96 % (01/24/19 1725)    Physical Exam   Constitutional: No distress  HENT:   Head: Normocephalic and atraumatic  Nose: Nose normal    Eyes: Pupils are equal, round, and reactive to light   Conjunctivae are normal    Neck: Normal range of motion  Neck supple  Cardiovascular: Normal rate and regular rhythm  Murmur heard  Pulmonary/Chest: Effort normal  No respiratory distress  She has no wheezes  She has rales  Bibasilar crackles   Abdominal: Soft  Bowel sounds are normal  She exhibits no distension  There is no tenderness  There is no rebound and no guarding  Musculoskeletal: She exhibits edema  Bilateral +1 pedal edema   Neurological: She is alert  No cranial nerve deficit  Skin: Skin is warm and dry  No rash noted  Additional Data:     Lab Results: I have personally reviewed pertinent films in PACS      Results from last 7 days  Lab Units 01/24/19  1524   WBC Thousand/uL 5 69   HEMOGLOBIN g/dL 14 5   HEMATOCRIT % 44 3   PLATELETS Thousands/uL 209   NEUTROS PCT % 54       Results from last 7 days  Lab Units 01/24/19  1524   SODIUM mmol/L 137   POTASSIUM mmol/L 4 3   CHLORIDE mmol/L 100   CO2 mmol/L 26   BUN mg/dL 19   CREATININE mg/dL 1 04   CALCIUM mg/dL 9 2   TOTAL BILIRUBIN mg/dL 0 90   ALK PHOS U/L 81   ALT U/L 52   AST U/L 36           Results from last 7 days  Lab Units 01/24/19  1524   TROPONIN I ng/mL <0 02               Imaging: I have personally reviewed pertinent films in PACS    XR chest portable   Final Result by Paresh Multani MD (01/24 1628)      Mild vascular congestion  Workstation performed: [id number]             XR chest portable   Final Result      Mild vascular congestion  Workstation performed: [id number]             EKG, Pathology, and Other Studies Reviewed on Admission:   · EKG:  EKG shows atrial flutter with variable block without any ST-T changes    Allscripts / Epic Records Reviewed: Yes     ** Please Note: This note has been constructed using a voice recognition system   **

## 2019-01-24 NOTE — ASSESSMENT & PLAN NOTE
Patient complaining of exertional shortness of breath with leg swelling and patient noted elevated proBNP in the ED  Patient was given Lasix 20 milligrams IV in the ED  Will give another dose in a m    Monitor strict I&O and daily weight  Check 2D echo  Patient's previous 2D echo showed EF of 50 for 56%, grade 1 diastolic dysfunction without any significant wall motion abnormalities

## 2019-01-24 NOTE — PLAN OF CARE
CARDIOVASCULAR - ADULT     Maintains optimal cardiac output and hemodynamic stability Progressing     Absence of cardiac dysrhythmias or at baseline rhythm Progressing        INFECTION - ADULT     Absence or prevention of progression during hospitalization Progressing     Absence of fever/infection during neutropenic period Progressing        Knowledge Deficit     Patient/family/caregiver demonstrates understanding of disease process, treatment plan, medications, and discharge instructions Progressing        PAIN - ADULT     Verbalizes/displays adequate comfort level or baseline comfort level Progressing        RESPIRATORY - ADULT     Achieves optimal ventilation and oxygenation Progressing        SAFETY ADULT     Patient will remain free of falls Progressing     Maintain or return to baseline ADL function Progressing     Maintain or return mobility status to optimal level Progressing

## 2019-01-24 NOTE — ED NOTES
Patient was seen by hospitalist and cardiologist while in ED  Report called  Rate remains in 80"s   O2 at 2 LNC for sat trend 90-91 %     Luis Felipe Lynn RN  01/24/19 8945

## 2019-01-25 ENCOUNTER — ANESTHESIA EVENT (INPATIENT)
Dept: NON INVASIVE DIAGNOSTICS | Facility: HOSPITAL | Age: 79
DRG: 308 | End: 2019-01-25
Payer: COMMERCIAL

## 2019-01-25 ENCOUNTER — APPOINTMENT (INPATIENT)
Dept: NON INVASIVE DIAGNOSTICS | Facility: HOSPITAL | Age: 79
DRG: 308 | End: 2019-01-25
Attending: INTERNAL MEDICINE
Payer: COMMERCIAL

## 2019-01-25 LAB
ANION GAP SERPL CALCULATED.3IONS-SCNC: 11 MMOL/L (ref 4–13)
ATRIAL RATE: 133 BPM
ATRIAL RATE: 153 BPM
BUN SERPL-MCNC: 20 MG/DL (ref 5–25)
CALCIUM SERPL-MCNC: 9 MG/DL (ref 8.3–10.1)
CHLORIDE SERPL-SCNC: 99 MMOL/L (ref 100–108)
CHOLEST SERPL-MCNC: 138 MG/DL (ref 50–200)
CO2 SERPL-SCNC: 24 MMOL/L (ref 21–32)
CREAT SERPL-MCNC: 1.04 MG/DL (ref 0.6–1.3)
ERYTHROCYTE [DISTWIDTH] IN BLOOD BY AUTOMATED COUNT: 13.4 % (ref 11.6–15.1)
GFR SERPL CREATININE-BSD FRML MDRD: 51 ML/MIN/1.73SQ M
GLUCOSE SERPL-MCNC: 87 MG/DL (ref 65–140)
GLUCOSE SERPL-MCNC: 97 MG/DL (ref 65–140)
HCT VFR BLD AUTO: 41 % (ref 34.8–46.1)
HDLC SERPL-MCNC: 48 MG/DL (ref 40–60)
HGB BLD-MCNC: 13.2 G/DL (ref 11.5–15.4)
LDLC SERPL CALC-MCNC: 78 MG/DL (ref 0–100)
MCH RBC QN AUTO: 28 PG (ref 26.8–34.3)
MCHC RBC AUTO-ENTMCNC: 32.2 G/DL (ref 31.4–37.4)
MCV RBC AUTO: 87 FL (ref 82–98)
NONHDLC SERPL-MCNC: 90 MG/DL
PLATELET # BLD AUTO: 192 THOUSANDS/UL (ref 149–390)
PMV BLD AUTO: 10 FL (ref 8.9–12.7)
POTASSIUM SERPL-SCNC: 4 MMOL/L (ref 3.5–5.3)
QRS AXIS: -6 DEGREES
QRS AXIS: 8 DEGREES
QRSD INTERVAL: 118 MS
QRSD INTERVAL: 120 MS
QT INTERVAL: 324 MS
QT INTERVAL: 402 MS
QTC INTERVAL: 427 MS
QTC INTERVAL: 490 MS
RBC # BLD AUTO: 4.72 MILLION/UL (ref 3.81–5.12)
SODIUM SERPL-SCNC: 134 MMOL/L (ref 136–145)
T WAVE AXIS: -8 DEGREES
T WAVE AXIS: 15 DEGREES
TRIGL SERPL-MCNC: 58 MG/DL
VENTRICULAR RATE: 138 BPM
VENTRICULAR RATE: 68 BPM
WBC # BLD AUTO: 8 THOUSAND/UL (ref 4.31–10.16)

## 2019-01-25 PROCEDURE — 5A2204Z RESTORATION OF CARDIAC RHYTHM, SINGLE: ICD-10-PCS | Performed by: INTERNAL MEDICINE

## 2019-01-25 PROCEDURE — 80061 LIPID PANEL: CPT | Performed by: INTERNAL MEDICINE

## 2019-01-25 PROCEDURE — 93306 TTE W/DOPPLER COMPLETE: CPT | Performed by: INTERNAL MEDICINE

## 2019-01-25 PROCEDURE — 92960 CARDIOVERSION ELECTRIC EXT: CPT

## 2019-01-25 PROCEDURE — 93010 ELECTROCARDIOGRAM REPORT: CPT | Performed by: INTERNAL MEDICINE

## 2019-01-25 PROCEDURE — 80048 BASIC METABOLIC PNL TOTAL CA: CPT | Performed by: INTERNAL MEDICINE

## 2019-01-25 PROCEDURE — 93325 DOPPLER ECHO COLOR FLOW MAPG: CPT | Performed by: INTERNAL MEDICINE

## 2019-01-25 PROCEDURE — 93320 DOPPLER ECHO COMPLETE: CPT | Performed by: INTERNAL MEDICINE

## 2019-01-25 PROCEDURE — 85027 COMPLETE CBC AUTOMATED: CPT | Performed by: INTERNAL MEDICINE

## 2019-01-25 PROCEDURE — 99232 SBSQ HOSP IP/OBS MODERATE 35: CPT | Performed by: INTERNAL MEDICINE

## 2019-01-25 PROCEDURE — 82948 REAGENT STRIP/BLOOD GLUCOSE: CPT

## 2019-01-25 PROCEDURE — 93312 ECHO TRANSESOPHAGEAL: CPT

## 2019-01-25 PROCEDURE — 93312 ECHO TRANSESOPHAGEAL: CPT | Performed by: INTERNAL MEDICINE

## 2019-01-25 PROCEDURE — 93306 TTE W/DOPPLER COMPLETE: CPT

## 2019-01-25 RX ORDER — FUROSEMIDE 10 MG/ML
40 INJECTION INTRAMUSCULAR; INTRAVENOUS DAILY
Status: DISCONTINUED | OUTPATIENT
Start: 2019-01-26 | End: 2019-01-26 | Stop reason: HOSPADM

## 2019-01-25 RX ORDER — ACETAMINOPHEN 325 MG/1
650 TABLET ORAL EVERY 6 HOURS PRN
Status: DISCONTINUED | OUTPATIENT
Start: 2019-01-25 | End: 2019-01-26 | Stop reason: HOSPADM

## 2019-01-25 RX ORDER — LANOLIN ALCOHOL/MO/W.PET/CERES
3 CREAM (GRAM) TOPICAL
Status: DISCONTINUED | OUTPATIENT
Start: 2019-01-25 | End: 2019-01-26 | Stop reason: HOSPADM

## 2019-01-25 RX ORDER — PROPOFOL 10 MG/ML
INJECTION, EMULSION INTRAVENOUS AS NEEDED
Status: DISCONTINUED | OUTPATIENT
Start: 2019-01-25 | End: 2019-01-25 | Stop reason: SURG

## 2019-01-25 RX ORDER — SODIUM CHLORIDE 9 MG/ML
INJECTION, SOLUTION INTRAVENOUS CONTINUOUS PRN
Status: DISCONTINUED | OUTPATIENT
Start: 2019-01-25 | End: 2019-01-25 | Stop reason: SURG

## 2019-01-25 RX ADMIN — PROPOFOL 10 MG: 10 INJECTION, EMULSION INTRAVENOUS at 14:11

## 2019-01-25 RX ADMIN — Medication 5 MG/HR: at 13:42

## 2019-01-25 RX ADMIN — PROPOFOL 10 MG: 10 INJECTION, EMULSION INTRAVENOUS at 14:18

## 2019-01-25 RX ADMIN — ACETAMINOPHEN 650 MG: 325 TABLET, FILM COATED ORAL at 02:17

## 2019-01-25 RX ADMIN — PROPOFOL 20 MG: 10 INJECTION, EMULSION INTRAVENOUS at 14:13

## 2019-01-25 RX ADMIN — FUROSEMIDE 20 MG: 10 INJECTION, SOLUTION INTRAMUSCULAR; INTRAVENOUS at 05:17

## 2019-01-25 RX ADMIN — ACETAMINOPHEN 650 MG: 325 TABLET, FILM COATED ORAL at 08:20

## 2019-01-25 RX ADMIN — METOPROLOL TARTRATE 25 MG: 25 TABLET, FILM COATED ORAL at 18:29

## 2019-01-25 RX ADMIN — PRAVASTATIN SODIUM 80 MG: 80 TABLET ORAL at 18:29

## 2019-01-25 RX ADMIN — PROPOFOL 80 MG: 10 INJECTION, EMULSION INTRAVENOUS at 14:06

## 2019-01-25 RX ADMIN — MELATONIN 3 MG: 3 TAB ORAL at 02:17

## 2019-01-25 RX ADMIN — LEVOTHYROXINE SODIUM 75 MCG: 75 TABLET ORAL at 05:17

## 2019-01-25 RX ADMIN — PROPOFOL 20 MG: 10 INJECTION, EMULSION INTRAVENOUS at 14:09

## 2019-01-25 RX ADMIN — RIVAROXABAN 20 MG: 10 TABLET, FILM COATED ORAL at 18:29

## 2019-01-25 RX ADMIN — Medication: at 13:42

## 2019-01-25 NOTE — UTILIZATION REVIEW
Continued Stay Review    Date: 1/25/19  Vital Signs: /89 (BP Location: Left arm)   Pulse (!) 109   Temp 97 8 °F (36 6 °C) (Tympanic)   Resp 18   Ht 5' 2" (1 575 m)   Wt 89 9 kg (198 lb 3 1 oz)   SpO2 96%   BMI 36 25 kg/m²   Assessment/Plan:   Atrial flutter (HCC)   Assessment & Plan     Patient noted to be in atrial flutter with rapid ventricular rate in the ED  Patient has history of atrial flutter but stop taking Eliquis as she had nausea associated with it  Patient initially received 15 milligrams of Cardizem and later was on Cardizem drip  Patient underwent cardioversion to sinus rhythm today  Continue metoprolol 25 milligram p o  B i d  Patient also started on anticoagulation with Lovenox 1 milligram/kilogram subcutaneously b i d   Cardiology consult appreciated  Patient did not tolerate Eliquis previously  Possible discharge on Xarelto   Chest pain   Assessment & Plan     Serial troponins were negative and EKG was unremarkable  Patient also is having some exertional shortness of breath  Continue metoprolol  Patient's history of CAD status post PCI  Discussed with patient,  and daughter in detail  Patient will need follow-up with outpatient primary cardiologist [facility] for cardiac catheterization   Acute on chronic diastolic congestive heart failure [facility])   Assessment & Plan     Patient complaining of exertional shortness of breath with leg swelling and patient noted elevated proBNP in the ED  Echo showed EF of 55%, moderate MR without any regional wall motion abnormalities  Continue Lasix but will increase the dose to 40 milligram IV daily  Possible transition to p o   Lasix / Demadex upon discharge   Essential hypertension   Assessment & Plan     Continue metoprolol and Cardizem   Dyslipidemia   Assessment & Plan     Continue statin  Lipid panel reviewed   Hypothyroidism   Assessment & Plan     Continue levothyroxine         TELE MON  ECHO  CARDIOVERSION  SANTI  Medications:  Scheduled Meds:   Current Facility-Administered Medications:  acetaminophen 650 mg Oral Q6H PRN Coty Mustafa, DO    aspirin 81 mg Oral Daily Redd Gunn MD    cholecalciferol 1,000 Units Oral Daily Lucian Antoine MD    diltiazem 5 mg/hr Intravenous Continuous Lucian Antoine MD Last Rate: 5 mg/hr (01/24/19 1730)   enoxaparin 1 mg/kg Subcutaneous Q12H Felipe 47MD Cheri    levothyroxine 75 mcg Oral Early Morning Redd Gunn MD    melatonin 3 mg Oral HS PRN Coty Mustafa DO    metoprolol tartrate 25 mg Oral BID Redd Gunn MD    multivitamin-minerals 1 tablet Oral Daily Redd Gunn MD    ondansetron 4 mg Intravenous Q6H PRN Redd Gunn MD    pneumococcal 23-valent polysaccharide vaccine 0 5 mL Subcutaneous Prior to discharge Redd Gunn MD    pravastatin 80 mg Oral Daily With Birdie Cedillo MD      Continuous Infusions:   diltiazem 5 mg/hr Last Rate: 5 mg/hr (01/24/19 1730)     PRN Meds:   acetaminophen    melatonin    ondansetron    pneumococcal 23-valent polysaccharide vaccine  Pertinent Labs/Diagnostic Results:  CL 99 TROPONIN <0 02   ECHO Ejection fraction was estimated to be 55 %  Age/Sex: 77 y o  female   Discharge Plan:

## 2019-01-25 NOTE — ASSESSMENT & PLAN NOTE
With rapid rate here in the office, symptomatic with chest pressure and dyspnea  Patient was sent to the ER for rate control and further management

## 2019-01-25 NOTE — PROGRESS NOTES
Progress Note - Cardiology   Andrew Theodore 77 y o  female MRN: [id number]  Unit/Bed#: 51164 [facility] 023-09 Encounter: [id number]  01/25/19  3:01 PM        Assessment:  1  Atrial flutter - paroxysmal - patient to undergo cardioversion today  Discussed with patient using   Consent obtained  2  CHF - acute on chronic diastolic dysfunction - significantly improved with IV diuretics  3  CAD with previous PCI in 2012 - continue ASA  4  Hypertension - on metoprolol    Plan:  As above      Subjective: Andrew Theodore denies any chest pain or shortness of breath  No overnight events  LE edema improved  Meds/Allergies   current meds:   Current Facility-Administered Medications   Medication Dose Route Frequency    acetaminophen (TYLENOL) tablet 650 mg  650 mg Oral Q6H PRN    aspirin chewable tablet 81 mg  81 mg Oral Daily    cholecalciferol (VITAMIN D3) tablet 1,000 Units  1,000 Units Oral Daily    levothyroxine tablet 75 mcg  75 mcg Oral Early Morning    melatonin tablet 3 mg  3 mg Oral HS PRN    metoprolol tartrate (LOPRESSOR) tablet 25 mg  25 mg Oral BID    multivitamin-minerals (CENTRUM) tablet 1 tablet  1 tablet Oral Daily    ondansetron (ZOFRAN) injection 4 mg  4 mg Intravenous Q6H PRN    pneumococcal 23-valent polysaccharide vaccine (PNEUMOVAX-23) injection 0 5 mL  0 5 mL Subcutaneous Prior to discharge    pravastatin (PRAVACHOL) tablet 80 mg  80 mg Oral Daily With Dinner    rivaroxaban (XARELTO) tablet 20 mg  20 mg Oral Daily With Dinner     Allergies   Allergen Reactions    Amlodipine Other (See Comments)    Lisinopril Other (See Comments)       Objective   Vitals: Blood pressure 102/65, pulse 65, temperature 97 8 °F (36 6 °C), temperature source Tympanic, resp  rate 17, height 5' 2" (1 575 m), weight 89 9 kg (198 lb 3 1 oz), SpO2 96 %  , Body mass index is 36 25 kg/m²          Intake/Output Summary (Last 24 hours) at 01/25/19 1501  Last data filed at 01/25/19 1419   Gross per 24 hour   Intake              850 ml   Output                0 ml   Net              850 ml       Physical Exam   Constitutional: She appears healthy  No distress  HENT:   Nose: Nose normal    Mouth/Throat: Oropharynx is clear  Eyes: Pupils are equal, round, and reactive to light  Conjunctivae are normal    Neck: Neck supple  No JVD present  Cardiovascular: Normal rate  An irregularly irregular rhythm present  Exam reveals no distant heart sounds and no friction rub  No murmur heard  Pulmonary/Chest: Effort normal and breath sounds normal  She has no wheezes  She has no rales  Abdominal: Soft  She exhibits no distension  There is no tenderness  Musculoskeletal: She exhibits no edema  Neurological: She is alert and oriented to person, place, and time  Skin: Skin is warm and dry  No rash noted         Lab Results:     Troponins:   Results from last 7 days  Lab Units 01/24/19 2059 01/24/19  1809 01/24/19  1524   TROPONIN I ng/mL <0 02 0 02 <0 02       Recent Results (from the past 24 hour(s))   ECG 12 lead    Collection Time: 01/24/19  3:14 PM   Result Value Ref Range    Ventricular Rate 138 BPM    Atrial Rate 153 BPM    LA Interval  ms    QRSD Interval 118 ms    QT Interval 324 ms    QTC Interval 490 ms    P Axis  degrees    QRS Axis 8 degrees    T Wave Axis 15 degrees   CBC and differential    Collection Time: 01/24/19  3:24 PM   Result Value Ref Range    WBC 5 69 4 31 - 10 16 Thousand/uL    RBC 5 11 3 81 - 5 12 Million/uL    Hemoglobin 14 5 11 5 - 15 4 g/dL    Hematocrit 44 3 34 8 - 46 1 %    MCV 87 82 - 98 fL    MCH 28 4 26 8 - 34 3 pg    MCHC 32 7 31 4 - 37 4 g/dL    RDW 13 6 11 6 - 15 1 %    MPV 9 7 8 9 - 12 7 fL    Platelets 134 914 - 930 Thousands/uL    nRBC 0 /100 WBCs    Neutrophils Relative 54 43 - 75 %    Immat GRANS % 0 0 - 2 %    Lymphocytes Relative 32 14 - 44 %    Monocytes Relative 11 4 - 12 %    Eosinophils Relative 2 0 - 6 %    Basophils Relative 1 0 - 1 %    Neutrophils Absolute 3 07 1 85 - 7 62 Thousands/µL    Immature Grans Absolute 0 01 0 00 - 0 20 Thousand/uL    Lymphocytes Absolute 1 83 0 60 - 4 47 Thousands/µL    Monocytes Absolute 0 63 0 17 - 1 22 Thousand/µL    Eosinophils Absolute 0 10 0 00 - 0 61 Thousand/µL    Basophils Absolute 0 05 0 00 - 0 10 Thousands/µL   Comprehensive metabolic panel    Collection Time: 01/24/19  3:24 PM   Result Value Ref Range    Sodium 137 136 - 145 mmol/L    Potassium 4 3 3 5 - 5 3 mmol/L    Chloride 100 100 - 108 mmol/L    CO2 26 21 - 32 mmol/L    ANION GAP 11 4 - 13 mmol/L    BUN 19 5 - 25 mg/dL    Creatinine 1 04 0 60 - 1 30 mg/dL    Glucose 91 65 - 140 mg/dL    Calcium 9 2 8 3 - 10 1 mg/dL    AST 36 5 - 45 U/L    ALT 52 12 - 78 U/L    Alkaline Phosphatase 81 46 - 116 U/L    Total Protein 7 4 6 4 - 8 2 g/dL    Albumin 3 6 3 5 - 5 0 g/dL    Total Bilirubin 0 90 0 20 - 1 00 mg/dL    eGFR 51 ml/min/1 73sq m   Troponin I    Collection Time: 01/24/19  3:24 PM   Result Value Ref Range    Troponin I <0 02 <=0 04 ng/mL   B-type natriuretic peptide    Collection Time: 01/24/19  3:24 PM   Result Value Ref Range    NT-proBNP 1,855 (H) <450 pg/mL   Magnesium    Collection Time: 01/24/19  3:24 PM   Result Value Ref Range    Magnesium 2 4 1 6 - 2 6 mg/dL   UA w Reflex to Microscopic    Collection Time: 01/24/19  3:47 PM   Result Value Ref Range    Color, UA Light Yellow     Clarity, UA Clear     Specific Gravity, UA <=1 005 1 000 - 1 030    pH, UA 7 0 5 0 - 9 0    Leukocytes, UA Negative Negative    Nitrite, UA Negative Negative    Protein, UA Negative Negative mg/dl    Glucose, UA Negative Negative mg/dl    Ketones, UA Negative Negative mg/dl    Urobilinogen, UA 0 2 0 2, 1 0 E U /dl E U /dl    Bilirubin, UA Negative Negative    Blood, UA Negative Negative   ECG 12 lead    Collection Time: 01/24/19  3:48 PM   Result Value Ref Range    Ventricular Rate 68 BPM    Atrial Rate 133 BPM    WV Interval  ms    QRSD Interval 120 ms    QT Interval 402 ms    QTC Interval 427 ms    P Axis  degrees    QRS Axis -6 degrees    T Wave Axis -8 degrees   Troponin I    Collection Time: 19  6:09 PM   Result Value Ref Range    Troponin I 0 02 <=0 04 ng/mL   Troponin I    Collection Time: 19  8:59 PM   Result Value Ref Range    Troponin I <0 02 <=0 04 ng/mL   Basic metabolic panel    Collection Time: 19  5:21 AM   Result Value Ref Range    Sodium 134 (L) 136 - 145 mmol/L    Potassium 4 0 3 5 - 5 3 mmol/L    Chloride 99 (L) 100 - 108 mmol/L    CO2 24 21 - 32 mmol/L    ANION GAP 11 4 - 13 mmol/L    BUN 20 5 - 25 mg/dL    Creatinine 1 04 0 60 - 1 30 mg/dL    Glucose 97 65 - 140 mg/dL    Calcium 9 0 8 3 - 10 1 mg/dL    eGFR 51 ml/min/1 73sq m   CBC (With Platelets)    Collection Time: 19  5:21 AM   Result Value Ref Range    WBC 8 00 4 31 - 10 16 Thousand/uL    RBC 4 72 3 81 - 5 12 Million/uL    Hemoglobin 13 2 11 5 - 15 4 g/dL    Hematocrit 41 0 34 8 - 46 1 %    MCV 87 82 - 98 fL    MCH 28 0 26 8 - 34 3 pg    MCHC 32 2 31 4 - 37 4 g/dL    RDW 13 4 11 6 - 15 1 %    Platelets 093 605 - 094 Thousands/uL    MPV 10 0 8 9 - 12 7 fL   Lipid panel    Collection Time: 19  5:21 AM   Result Value Ref Range    Cholesterol 138 50 - 200 mg/dL    Triglycerides 58 <=150 mg/dL    HDL, Direct 48 40 - 60 mg/dL    LDL Calculated 78 0 - 100 mg/dL    Non-HDL-Chol (CHOL-HDL) 90 mg/dl          Cardiac testing:   Results for orders placed during the hospital encounter of 19   Echo complete with contrast if indicated    Narrative Tiffanie 39  [address] [facility] [phone]    Transthoracic Echocardiogram  2D, M-mode, Doppler, and Color Doppler    Study date:  2019    Patient: Gwen Miller  MR number: [id number]  Account number: [de-identified]  : 1940  Age: 77 years  Gender: Female  Status: Inpatient  Location: Bedside  Height: 62 in  Weight: 197 6 lb  BP: 132/ 77 mmHg    Indications: Heart Faiolure    Diagnoses: 428 9 - HEART FAILURE NOS    Sonographer:  MARISA Hummel  Primary Physician:  Kj Atkins MD  Referring Physician:  Cecy Hawk MD  Group:  [facility]  Interpreting Physician:  DO JUANITA Herbert    LEFT VENTRICLE:  Systolic function was normal by visual assessment  Ejection fraction was estimated to be 55 %  There were no regional wall motion abnormalities  There was mild concentric hypertrophy  LEFT ATRIUM:  The atrium was massively dilated  MITRAL VALVE:  There was moderate annular calcification  There was moderate regurgitation  AORTIC VALVE:  The valve was trileaflet  Leaflets exhibited normal thickness, calcification, and mildly reduced cuspal separation  Transaortic velocity was increased due to valvular stenosis  There was mild stenosis  There was no regurgitation  TRICUSPID VALVE:  There was moderate regurgitation  Pulmonary artery systolic pressure was mildly increased  HISTORY: PRIOR HISTORY: HTN, Thyroid Disease, CAD, A-Fib    PROCEDURE: The procedure was performed at the bedside  This was a routine study  The transthoracic approach was used  The study included complete 2D imaging, M-mode, complete spectral Doppler, and color Doppler  The heart rate was 90 bpm,  at the start of the study  Image quality was adequate  LEFT VENTRICLE: Size was normal  Systolic function was normal by visual assessment  Ejection fraction was estimated to be 55 %  There were no regional wall motion abnormalities  There was mild concentric hypertrophy  DOPPLER: The study was  not technically sufficient to allow evaluation of LV diastolic function  RIGHT VENTRICLE: The size was normal  Systolic function was normal  DOPPLER: Systolic pressure was within the normal range  LEFT ATRIUM: The atrium was massively dilated  RIGHT ATRIUM: The atrium was mildly dilated  MITRAL VALVE: There was moderate annular calcification  There was normal leaflet separation  No echocardiographic evidence for prolapse  DOPPLER: The transmitral velocity was within the normal range  There was no evidence for stenosis  There was moderate regurgitation  AORTIC VALVE: The valve was trileaflet  Leaflets exhibited normal thickness, calcification, and mildly reduced cuspal separation  DOPPLER: Transaortic velocity was increased due to valvular stenosis  There was mild stenosis  There was no  regurgitation  TRICUSPID VALVE: The valve structure was normal  There was normal leaflet separation  DOPPLER: The transtricuspid velocity was within the normal range  There was moderate regurgitation  Pulmonary artery systolic pressure was mildly  increased  Estimated peak PA pressure was 48 mmHg  PULMONIC VALVE: Leaflets exhibited normal thickness, no calcification, and normal cuspal separation  DOPPLER: The transpulmonic velocity was within the normal range  There was no regurgitation  PERICARDIUM: There was no thickening  There was no pericardial effusion  AORTA: The root exhibited normal size  PULMONARY ARTERY: The size was normal  The morphology appeared normal     SYSTEM MEASUREMENT TABLES    2D mode  AoR Diam 2D: 3 2 cm  LA Diam (2D): 4 7 cm  LA/Ao (2D): 1 47  FS (2D Teich): 26 1 %  IVSd (2D): 1 3 cm  LVDEV: 71 3 cm³  LVESV: 34 4 cm³  LVIDd(2D): 4 03 cm  LVISd (2D): 2 98 cm  LVOT Area 2D: 2 84 cm squared  LVPWd (2D): 1 28 cm  SV (Teich): 36 9 cm³    Apical four chamber  LVEF A4C: 66 %    Unspecified Scan Mode  LAILA Cont Eq (Peak Kushal): 1 4 cm squared  LAILA Cont Eq (VTI): 1 43 cm squared  LVOT (VTI): 22 3 cm  LVOT Diam : 1 9 cm  LVOT Vmax: 1090 mm/s  LVOT Vmax; Mean: 1090 mm/s  Peak Grad ; Mean: 5 mm[Hg]  SV (LVOT): 63 cm³  VTI;Mean: 3 mm[Hg]  LAILA Cont Eq (VTI): 2 71 cm squared  MV Peak E Kushal   Mean: 1340 mm/s  MVA (PHT): 4 68 cm squared  PHT: 47 ms  Max P mm[Hg]  V Max: 3010 mm/s  Vmax: 2840 mm/s  RA Area: 20 2 cm squared  RA Volume: 56 8 cm³  TAPSE: 1 8 cm    [employer] Accredited Echocardiography Laboratory    Prepared and electronically signed by    Brianna Rios DO  Signed 2019 13:06:03       No results found for this or any previous visit  No results found for this or any previous visit  No results found for this or any previous visit  No results found for this or any previous visit  Results for orders placed during the hospital encounter of 17   NM myocardial perfusion spect (rx stress and/or rest)    [address] [phone]    Rest/Stress Gated SPECT Myocardial Perfusion Imaging After Exercise    Patient: Frances Marroquin  MR number: [id number]  Account number: [de-identified]  : 1940  Age: 67 years  Gender: Female  Status: Outpatient  Location: Stress lab  Height: 58 in  Weight: 133 lb  BP: 136/ 80 mmHg    Allergies: NO KNOWN ALLERGIES    Diagnosis: R07 9 - Chest pain, unspecified    Primary Physician:  Kandis Tam MD  RN:  GISELE Sim  Referring Physician:  Macho Huerta MD  Group:  [facility]  Report Prepared By[de-identified]  GSIELE Sim  Interpreting Physician:  Brianna Rios DO    INDICATIONS: Detection of coronary artery disease  HISTORY: Patient accompanied by her , both deaf and mute, Communication achieved through [employer]   The patient is a 79year old  female  Chest pain status: chest pain  Coronary artery disease risk factors:  dyslipidemia, hypertension, and family history of premature coronary artery disease  Cardiovascular history: none significant  Medications: aspirin, a lipid lowering agent, and thyroid medications  PHYSICAL EXAM: Baseline physical exam screening: normal and no wheezes audible  REST ECG: Normal sinus rhythm  Normal baseline ECG  PROCEDURE: The study was performed in the stress lab  Treadmill exercise testing was performed, using the Fortino protocol   Gated SPECT myocardial perfusion imaging was performed after stress and at rest  Systolic blood pressure was 136  mmHg, at the start of the study  Diastolic blood pressure was 80 mmHg, at the start of the study  The heart rate was 63 bpm, at the start of the study  Patient was not experiencing chest pain at the time of the injection of the  radiopharmaceutical  IV double checked  SHANTI PROTOCOL:  HR bpm SBP mmHg DBP mmHg Symptoms ST change Rhythm/conduct  Baseline 65 136 80 none none NSR, no ectopy, NSR  Stage 1 123 136 80 mild dyspnea, leg pain -- occasional PAC's, occasional PVC's  Immediate 125 176 80 same as above -- --  Recovery 1 88 168 80 chest discomfort -- --  Recovery 2 77 140 80 subsiding -- --  Patient held on Stage 1 due to leg pain  No medications or fluids given  STRESS SUMMARY: Duration of exercise was 4 min and 9 sec  The patient exercised to protocol stage 2  Maximal work rate was 5 2 METs  Functional capacity was normal  Maximal heart rate during stress was 144 bpm ( 104 % of maximal predicted  heart rate)  The heart rate response to stress was normal  There was normal resting blood pressure with an appropriate response to stress  The rate-pressure product for the peak heart rate and blood pressure was 99531  There was no chest  pain during stress  The patient experienced chest pain during stress; pain resolved spontaneously  The stress test was terminated due to achievement of target heart rate and leg pain  Pre oxygen saturation: 97 %  Peak oxygen saturation: 98  %  The stress ECG was negative for ischemia and normal  There were no stress arrhythmias or conduction abnormalities  ISOTOPE ADMINISTRATION:  Resting isotope administration Stress isotope administration  Agent Sestamibi Sestamibi  Dose 10 9 mCi 30 mCi  Date 03/30/2017 03/30/2017    The radiopharmaceutical was injected at the peak effect of pharmacologic stress  Radiopharmaceutical was administered 2 min, 37 sec into the stress protocol   There was 1 min of exercise after the injection  MYOCARDIAL PERFUSION IMAGING:  The image quality was good  Left ventricular size was normal     PERFUSION DEFECTS:  -  There were no perfusion defects  GATED SPECT:  The calculated left ventricular ejection fraction was 78 %  Left ventricular ejection fraction was within normal limits by visual estimate  There was no diagnostic evidence for left ventricular regional abnormality  SUMMARY:  -  Stress results: Duration of exercise was 4 min and 9 sec  Target heart rate was achieved  There was no chest pain during stress  The patient experienced chest pain during stress; pain resolved spontaneously  -  ECG conclusions: The stress ECG was negative for ischemia and normal   -  Perfusion imaging: There were no perfusion defects   -  Gated SPECT: The calculated left ventricular ejection fraction was 78 %  Left ventricular ejection fraction was within normal limits by visual estimate  There was no diagnostic evidence for left ventricular regional abnormality  IMPRESSIONS: Normal study after maximal exercise  Myocardial perfusion imaging was normal at rest and with stress   Left ventricular systolic function was normal     Prepared and signed by    Vincenzo Spivey DO  Signed 03/30/2017 16:03:11         EKG/TELE: Personally reviewed  Atrial flutter    Imaging: I have personally reviewed pertinent films in PACS

## 2019-01-25 NOTE — SOCIAL WORK
Met with pt   usually assists with translation to effectively communicate with family  Pt resides with   States she walks without a device  Asked if she thought she needed a visiting nurse, indicated in writing she did not  Anticipate no d/c needs  Wrote that her  will be here tomorrow after 12PM to take her home

## 2019-01-25 NOTE — SEDATION DOCUMENTATION
[facility] computer used by anesthesia and cardiologist to obtain consent via sign language interpretor

## 2019-01-25 NOTE — UTILIZATION REVIEW
Initial Clinical Review    Admission: Date/Time/Statement: 1/24/19 @ 1622   Orders Placed This Encounter   Procedures    Inpatient Admission (expected length of stay for this patient is greater than two midnights)     Standing Status:   Standing     Number of Occurrences:   1     Order Specific Question:   Admitting Physician     Answer:   Justin Garcia     Order Specific Question:   Level of Care     Answer:   Med Surg [16]     Order Specific Question:   Estimated length of stay     Answer:   More than 2 Midnights     Order Specific Question:   Certification     Answer:   I certify that inpatient services are medically necessary for this patient for a duration of greater than two midnights  See H&P and MD Progress Notes for additional information about the patient's course of treatment  ED: Date/Time/Mode of Arrival:   ED Arrival Information     Expected Arrival Acuity Means of Arrival Escorted By Service Admission Type    - 1/24/2019 14:57 Urgent [address] Urgent    Arrival Complaint    chest pain        Chief Complaint:   Chief Complaint   Patient presents with    Chest Pain     Patient c/o chest pain for 1 month, was seen at [facility] prior to arrival     History of Illness: Angelo Saba is a 77 y o  female with a PMH of hypertension, hyperlipidemia, hypothyroidism, odynophagia, GERD, CAD status post PCI, atrial flutter who presents to the ER complaining of exertional shortness of breath, palpitations and chest tightness  Patient is deaf and mute and  her  is also deaf and mute  Most of the information obtained by writing on the paper  Patient complains of shortness of breath on exertion for the past 1 month  Patient also reports intermittent chest tightness without any radiation for the past 1 month  Patient denies any orthopnea, proximal nocturnal dyspnea  Patient also reported some intermittent palpitations    Patient also has bilateral lower extremity edema again over the past 1 month  Patient denies any cough, headache, dizziness, fever, chills or urinary complaints  Patient's history of CAD status post PCI in 2012 and follows up with Dr Wolf Oscar  Patient was on Eliquis previously for anticoagulation which she stopped due to nausea  In the ED patient was noted to be in atrial flutter with rapid ventricular rate  Patient noted to have elevated proBNP and chest x-ray showed mild CHF  She has rales  Bibasilar crackles  She exhibits edema  ED Vital Signs:   ED Triage Vitals [01/24/19 1511]   Temperature Pulse Respirations Blood Pressure SpO2   98 2 °F (36 8 °C) (!) 129 18 (!) 154/111 94 %      Temp Source Heart Rate Source Patient Position - Orthostatic VS BP Location FiO2 (%)   Tympanic Monitor Lying Left arm --      Pain Score       3        Wt Readings from Last 1 Encounters:   01/25/19 89 9 kg (198 lb 3 1 oz)     Vital Signs (abnormal):   Pertinent Labs/Diagnostic Test Results: BNP 1855 TROPONIN <0 02 X2  CXR Mild vascular congestion  ED Treatment:   Medication Administration from 01/24/2019 1457 to 01/24/2019 1714       Date/Time Order Dose Route Action Action by Comments     01/24/2019 1612 sodium chloride 0 9 % bolus 500 mL 0 mL Intravenous Stopped Nazia Lai RN      01/24/2019 1525 sodium chloride 0 9 % bolus 500 mL 500 mL Intravenous [address] Nazia Lai RN      01/24/2019 1535 diltiazem (CARDIZEM) injection 15 mg 15 mg Intravenous Given Nazia Lai RN /72     01/24/2019 1611 nitroglycerin (NITRO-BID) 2 % TD ointment 0 5 inch 0 5 inch Topical Given Nazia Lai RN      01/24/2019 1628 diltiazem (CARDIZEM) 125 mg in sodium chloride 0 9 % 125 mL infusion 2 5 mg/hr Intravenous New Bag Nazia Lai RN bp 948/42     01/24/2019 1647 furosemide (LASIX) injection 20 mg 20 mg Intravenous Given Nazia Lai RN         Past Medical/Surgical History:    Active Ambulatory Problems     Diagnosis Date Noted    Odynophagia 02/17/2017    Chest pain 02/17/2017    Dyslipidemia 02/17/2017    Essential hypertension 02/17/2017    Hypothyroidism 02/17/2017    Allergic rhinitis 09/04/2012    Arteriosclerosis of coronary artery 08/07/2013    Atrial flutter ([address] ) 11/06/2017    Esophageal dysmotility 02/22/2017    Esophageal reflux 09/04/2012    Osteoporosis 09/04/2012    Thyroid nodule 03/30/2015    Screening mammogram, encounter for 03/29/2018     Resolved Ambulatory Problems     Diagnosis Date Noted    Deafness 02/17/2017     Past Medical History:   Diagnosis Date    Atrial flutter ([address] )     CAD (coronary artery disease)     Deafness     Disease of thyroid gland     Hyperlipidemia     Hypertension      Admitting Diagnosis: Atrial flutter ([address] ) [I48 92]  Chest pain [R07 9]  Dyspnea [R06 00]  Age/Sex: 77 y o  female  Assessment/Plan:   Atrial flutter ([address] )   Assessment & Plan     Patient noted to be in atrial flutter with rapid ventricular rate in the ED  Patient has history of atrial flutter but stop taking Eliquis as she had nausea associated with it  Patient received 15 milligrams of Cardizem in the E D  Patient will continue on Cardizem drip at 5 milligram/hour  Continue metoprolol 25 milligram p o  B i d  Patient also started on anticoagulation with Lovenox 1 milligram/kilogram subcutaneously b i d   Cardiology consult appreciated  Patient will be scheduled for SANTI with cardioversion tomorrow if patient does not convert and Cardizem drip  Chest pain   Assessment & Plan     Initial troponin was negative and EKG did not show any significant ST-T changes  Monitor serial cardiac enzymes  Check fasting lipid profile  Continue metoprolol  Patient's history of CAD status post PCI  Patient most likely might need cardiac catheterization    Continue aspirin, pravastatin and metoprolol   Acute on chronic diastolic congestive heart failure [facility])   Assessment & Plan     Patient complaining of exertional shortness of breath with leg swelling and patient noted elevated proBNP in the ED  Patient was given Lasix 20 milligrams IV in the ED  Will give another dose in a m  Monitor strict I&O and daily weight  Check 2D echo  Patient's previous 2D echo showed EF of 50 for 35%, grade 1 diastolic dysfunction without any significant wall motion abnormalities   Essential hypertension   Assessment & Plan     Continue metoprolol and Cardizem   Dyslipidemia   Assessment & Plan     Continue statin  Check fasting lipid profile in a m  Hypothyroidism   Assessment & Plan     Continue levothyroxine     CARDIOLOGY CONSULT  Assessment:  1  Atrial flutter - will plan for SANTI and cardioversion in AM   Continue cardizem gtt  2  CHF - acute on chronic diastolic CHF - IV lasix * 1 now  Patient had an echocardiogram in 2017 which showed normal EF  3  CAD with previous PCI  4   Hypertension -  On cardizem   Admission Orders:  TELE MON  NPO  CONSULT CARDIOLOGY  DAILY WEIGHT I/O  SANTI  CARDIOVERSION AM  Scheduled Meds:   Current Facility-Administered Medications:  acetaminophen 650 mg Oral Q6H PRN Coty Revankar, DO    aspirin 81 mg Oral Daily Anna Cole MD    cholecalciferol 1,000 Units Oral Daily Lucian Antoine MD    diltiazem 5 mg/hr Intravenous Continuous Lucian Antoine MD Last Rate: 5 mg/hr (01/24/19 1730)   enoxaparin 1 mg/kg Subcutaneous Q12H [facility] Anna Cole MD    levothyroxine 75 mcg Oral Early Morning Anna Cole MD    melatonin 3 mg Oral HS PRN Coty Revankar, DO    metoprolol tartrate 25 mg Oral BID Anna Cole MD    multivitamin-minerals 1 tablet Oral Daily Anna Cole MD    ondansetron 4 mg Intravenous Q6H PRN Lucian Antoine MD    pneumococcal 23-valent polysaccharide vaccine 0 5 mL Subcutaneous Prior to discharge Anna Cole MD    pravastatin 80 mg Oral Daily With Jass Bailey MD      Continuous Infusions:   diltiazem 5 mg/hr Last Rate: 5 mg/hr (01/24/19 1730)     PRN Meds:   acetaminophen    melatonin    ondansetron    pneumococcal 23-valent polysaccharide vaccine

## 2019-01-26 VITALS
WEIGHT: 139.8 LBS | SYSTOLIC BLOOD PRESSURE: 174 MMHG | HEIGHT: 62 IN | HEART RATE: 67 BPM | TEMPERATURE: 98.2 F | OXYGEN SATURATION: 94 % | DIASTOLIC BLOOD PRESSURE: 76 MMHG | BODY MASS INDEX: 25.73 KG/M2 | RESPIRATION RATE: 18 BRPM

## 2019-01-26 LAB
ANION GAP SERPL CALCULATED.3IONS-SCNC: 11 MMOL/L (ref 4–13)
BUN SERPL-MCNC: 16 MG/DL (ref 5–25)
CALCIUM SERPL-MCNC: 8.8 MG/DL (ref 8.3–10.1)
CHLORIDE SERPL-SCNC: 99 MMOL/L (ref 100–108)
CO2 SERPL-SCNC: 26 MMOL/L (ref 21–32)
CREAT SERPL-MCNC: 1 MG/DL (ref 0.6–1.3)
GFR SERPL CREATININE-BSD FRML MDRD: 54 ML/MIN/1.73SQ M
GLUCOSE SERPL-MCNC: 70 MG/DL (ref 65–140)
MRSA NOSE QL CULT: NORMAL
POTASSIUM SERPL-SCNC: 4 MMOL/L (ref 3.5–5.3)
SODIUM SERPL-SCNC: 136 MMOL/L (ref 136–145)

## 2019-01-26 PROCEDURE — 99232 SBSQ HOSP IP/OBS MODERATE 35: CPT | Performed by: INTERNAL MEDICINE

## 2019-01-26 PROCEDURE — 80048 BASIC METABOLIC PNL TOTAL CA: CPT | Performed by: INTERNAL MEDICINE

## 2019-01-26 PROCEDURE — 99239 HOSP IP/OBS DSCHRG MGMT >30: CPT | Performed by: INTERNAL MEDICINE

## 2019-01-26 RX ORDER — AMIODARONE HYDROCHLORIDE 400 MG/1
400 TABLET ORAL 2 TIMES DAILY
Qty: 14 TABLET | Refills: 0 | Status: SHIPPED | OUTPATIENT
Start: 2019-01-26 | End: 2019-02-11

## 2019-01-26 RX ORDER — FUROSEMIDE 40 MG/1
40 TABLET ORAL DAILY
Qty: 60 TABLET | Refills: 0 | Status: SHIPPED | OUTPATIENT
Start: 2019-01-26 | End: 2019-02-11 | Stop reason: SDUPTHER

## 2019-01-26 RX ORDER — POTASSIUM CHLORIDE 20 MEQ/1
20 TABLET, EXTENDED RELEASE ORAL 2 TIMES DAILY
Qty: 30 TABLET | Refills: 0 | Status: SHIPPED | OUTPATIENT
Start: 2019-01-26 | End: 2019-04-03 | Stop reason: SDUPTHER

## 2019-01-26 RX ORDER — AMIODARONE HYDROCHLORIDE 200 MG/1
200 TABLET ORAL DAILY
Qty: 30 TABLET | Refills: 0 | Status: SHIPPED | OUTPATIENT
Start: 2019-02-03 | End: 2019-02-11

## 2019-01-26 RX ADMIN — FUROSEMIDE 40 MG: 10 INJECTION, SOLUTION INTRAMUSCULAR; INTRAVENOUS at 09:49

## 2019-01-26 RX ADMIN — VITAMIN D, TAB 1000IU (100/BT) 1000 UNITS: 25 TAB at 09:47

## 2019-01-26 RX ADMIN — LEVOTHYROXINE SODIUM 75 MCG: 75 TABLET ORAL at 05:39

## 2019-01-26 RX ADMIN — METOPROLOL TARTRATE 25 MG: 25 TABLET, FILM COATED ORAL at 09:47

## 2019-01-26 RX ADMIN — ASPIRIN 81 MG 81 MG: 81 TABLET ORAL at 09:48

## 2019-01-26 RX ADMIN — Medication 1 TABLET: at 09:47

## 2019-01-26 NOTE — ASSESSMENT & PLAN NOTE
Patient complaining of exertional shortness of breath with leg swelling and patient noted elevated proBNP in the ED  Echo showed EF of 55%, moderate MR without any regional wall motion abnormalities  Continue Lasix but will increase the dose to 40 milligram IV daily  Possible transition to p o   Lasix / Demadex upon discharge

## 2019-01-26 NOTE — ASSESSMENT & PLAN NOTE
Serial troponins were negative and EKG was unremarkable  Patient also is having some exertional shortness of breath  Continue metoprolol  Patient's history of CAD status post PCI  Discussed with patient,  and daughter in detail  Patient will need follow-up with outpatient primary cardiologist [address] [place] [address] for cardiac catheterization

## 2019-01-26 NOTE — DISCHARGE SUMMARY
Discharge- Rosa Chawla 1940, 77 y o  female MRN: [id number]    Unit/Bed#: [address] [address] Encounter: [id number]    Primary Care Provider: Josey Salinas MD   Date and time admitted to hospital: 1/24/2019  3:06 PM        * Atrial flutter [facility])   Assessment & Plan    Patient noted to be in atrial flutter with rapid ventricular rate in the ED  Patient has history of atrial flutter but stop taking Eliquis as she had nausea associated with it  Patient initially received 15 milligrams of Cardizem and later was on Cardizem drip  Patient underwent cardioversion to sinus rhythm today  Continue metoprolol 25 milligram p o  B i d  Patient also started on anticoagulation with Lovenox 1 milligram/kilogram subcutaneously b i d   Patient will also be loaded with amiodarone 400 milligram p o  B i d   for 1 week followed by 200 milligram p o  Daily  Patient will follow up outpatient with Dr Christina Singh  Patient will be discharged on Xarelto 20 milligram p o  Daily     Chest pain   Assessment & Plan    Serial troponins were negative and EKG was unremarkable  Patient also is having some exertional shortness of breath  Continue metoprolol  Patient's history of CAD status post PCI  Discussed with patient,  and daughter in detail  Patient will need follow-up with outpatient primary cardiologist [address] [place] [address] for cardiac catheterization     Acute on chronic diastolic congestive heart failure [facility])   Assessment & Plan    Patient complaining of exertional shortness of breath with leg swelling and patient noted elevated proBNP in the ED  Echo showed EF of 55%, moderate MR without any regional wall motion abnormalities  Patient had good diuresis with Lasix 40 milligram IV daily  Patient be discharged on Lasix 40 milligram p o  Daily along with KCl 20 milliequivalents p o   Daily  Follow up outpatient with Cardiology Dr Gonzalo Anderson hypertension   Assessment & Plan    Continue metoprolol and Cardizem Dyslipidemia   Assessment & Plan    Continue statin  Lipid panel reviewed     Hypothyroidism   Assessment & Plan    Continue levothyroxine           Discharging Physician / Practitioner: Lucia Perry MD  PCP: Tigre Morris MD  Admission Date: 1/24/2019  Discharge Date: 01/26/19    Reason for Admission: Chest Pain (Patient c/o chest pain for 1 month, was seen at [facility] prior to arrival)        Resolved Problems  Date Reviewed: 1/26/2019    None          Consultations During Hospital Stay:  IP CONSULT TO CARDIOLOGY    Procedures Performed:     · SANTI with cardioversion    Significant Findings / Test Results:     ·     Results from last 7 days  Lab Units 01/25/19  0521 01/24/19  1524   WBC Thousand/uL 8 00 5 69   HEMOGLOBIN g/dL 13 2 14 5   PLATELETS Thousands/uL 192 209       Results from last 7 days  Lab Units 01/26/19  0553 01/25/19  0521 01/24/19  1524   SODIUM mmol/L 136 134* 137   POTASSIUM mmol/L 4 0 4 0 4 3   CHLORIDE mmol/L 99* 99* 100   CO2 mmol/L 26 24 26   BUN mg/dL 16 20 19   CREATININE mg/dL 1 00 1 04 1 04   CALCIUM mg/dL 8 8 9 0 9 2   TOTAL BILIRUBIN mg/dL  --   --  0 90   ALK PHOS U/L  --   --  81   ALT U/L  --   --  52   AST U/L  --   --  36           Results from last 7 days  Lab Units 01/24/19  2059 01/24/19  1809 01/24/19  1524   TROPONIN I ng/mL <0 02 0 02 <0 02     No results found for: HGBA1C    Results from last 7 days  Lab Units 01/25/19  1624   POC GLUCOSE mg/dl 87         Blood Culture: No results found for: BLOODCX  Urine Culture:   Lab Results   Component Value Date    URINECX 70,000-79,000 cfu/ml Mixed Contaminants X4 02/17/2017     Sputum Culture: No components found for: SPUTUMCX  Wound Culture: No results found for: WOUNDCULT     XR chest portable   Final Result by Tracey Wagner MD (01/24 7098)      Mild vascular congestion              Workstation performed: [id number]              Outpatient Tests Requested:  · Follow-up with primary cardiology in 1 week    Complications: None    Reason for Admission:   Chief Complaint   Patient presents with    Chest Pain     Patient c/o chest pain for 1 month, was seen at [facility] prior to arrival       Hospital Course:     Alfonso Robledo is a 77 y o  female patient with a PMH of hypertension, hyperlipidemia, hypothyroidism, CAD status post PCI, atrial flutter who originally presented to the hospital on 1/24/2019 due to exertional shortness of breath, palpitations and chest tightness  In the ED patient was noted to be in atrial flutter with rapid ventricular rate  Patient was given a Cardizem bolus and was started on Cardizem drip  Patient also noted elevated proBNP and chest x-ray showed mild CHF and patient was also given Lasix  Patient was continued on IV Lasix with good diuresis  Later patient was seen by Cardiology and patient underwent SANTI guided cardioversion to sinus rhythm  Patient was continued on metoprolol and received Lovenox during the hospital stay  Patient had intolerance to Eliquis previously  Patient will be discharged on Xarelto 20 milligram p o  Daily for anticoagulation along with amiodarone for loading  Patient advised to follow up with primary cardiologist for outpatient cardiac catheterization  Patient will also be on Lasix 40 milligram p o  Daily  I discussed the coordination of care in detail with the patient,  and daughter  Please see above list of diagnoses and related plan for additional information  Condition at Discharge: stable       Discharge Day Visit / Exam:     Subjective:  Patient denies any chest pain, palpitations, shortness of breath, headache, dizziness or urinary complaints    Vitals: Blood Pressure: (!) 174/76 (01/26/19 1218)  Pulse: 67 (01/26/19 1218)  Temperature: 98 2 °F (36 8 °C) (01/26/19 1218)  Temp Source: Tympanic (01/26/19 1218)  Respirations: 18 (01/26/19 1218)  Height: 5' 2" (157 5 cm) (01/25/19 0016)  Weight - Scale: 63 4 kg (139 lb 12 8 oz) (Patient was weighed standing and bed with same results ) (01/26/19 0600)  SpO2: 94 % (01/26/19 1218)  Exam:   Physical Exam   Constitutional: No distress  HENT:   Head: Normocephalic and atraumatic  Nose: Nose normal    Eyes: Pupils are equal, round, and reactive to light  Conjunctivae are normal    Neck: Normal range of motion  Neck supple  Cardiovascular: Normal rate, regular rhythm and normal heart sounds  Pulmonary/Chest: Effort normal and breath sounds normal  No respiratory distress  She has no wheezes  She has no rales  Abdominal: Soft  Bowel sounds are normal  She exhibits no distension  There is no tenderness  There is no rebound and no guarding  Musculoskeletal: She exhibits no edema  Neurological: She is alert  No cranial nerve deficit  Skin: Skin is warm and dry  No rash noted  Discharge instructions/Information to patient and family:   See after visit summary for information provided to patient and family  Provisions for Follow-Up Care:  See after visit summary for information related to follow-up care and any pertinent home health orders  Disposition:     Home    Planned Readmission:  No     Discharge Statement:  I spent 35 minutes discharging the patient  This time was spent on the day of discharge  I had direct contact with the patient on the day of discharge  Greater than 50% of the total time was spent examining patient, answering all patient questions, arranging and discussing plan of care with patient as well as directly providing post-discharge instructions  Additional time then spent on discharge activities  Discharge Medications:  See after visit summary for reconciled discharge medications provided to patient and family        ** Please Note: This note has been constructed using a voice recognition system **

## 2019-01-26 NOTE — PROGRESS NOTES
Cardiology Progress Note  ASSESSMENT:    1  Status post electrical cardioversion of recurrent paroxysmal atrial flutter on 01/25/2019 with prior episode in 2017   2  Resolved acutely decompensated chronic diastolic heart failure 3  Moderate mitral and tricuspid regurgitation with moderate pulmonary hypertension and left more than MARÍA ELENA, based on echo and SANTI done on 01/25/2019  3  Stable CAD with remote PCI in 2012  4  Essential hypertension with systolic blood pressure elevated to 156 this morning  5  Dyslipidemia, treated with rosuvastatin  6  Acquired hypothyroidism, treated      PLAN:    1  May discharge patient home with prompt follow-up by Dr Zak Nick  2  Suggest starting patient on amiodarone 400 milligrams b i d  for 1 week, followed by 200 milligrams daily thereafter  3  Begin furosemide 40 milligrams p o  once daily and KCl 20 milliequivalents once daily  4  Continue Xarelto and rosuvastatin 10 milligrams daily, as well as metoprolol and levothyroxine          Current Facility-Administered Medications   Medication Dose Route Frequency Provider Last Rate Last Dose    acetaminophen (TYLENOL) tablet 650 mg  650 mg Oral Q6H PRN Coty Mustafa, DO   650 mg at 01/25/19 0820    aspirin chewable tablet 81 mg  81 mg Oral Daily Lucian Antoine MD   81 mg at 01/26/19 0948    cholecalciferol (VITAMIN D3) tablet 1,000 Units  1,000 Units Oral Daily Nacho Fuentes MD   1,000 Units at 01/26/19 0947    furosemide (LASIX) injection 40 mg  40 mg Intravenous Daily Nacho Fuentes MD   40 mg at 01/26/19 0949    levothyroxine tablet 75 mcg  75 mcg Oral Early Morning Lucian Antoine MD   75 mcg at 01/26/19 0539    melatonin tablet 3 mg  3 mg Oral HS PRN Coty Pereraankar, DO   3 mg at 01/25/19 0217    metoprolol tartrate (LOPRESSOR) tablet 25 mg  25 mg Oral BID Nacho Fuentes MD   25 mg at 01/26/19 0947    multivitamin-minerals (CENTRUM) tablet 1 tablet  1 tablet Oral Daily Nacho Fuentes MD 1 tablet at 01/26/19 0947    ondansetron (ZOFRAN) injection 4 mg  4 mg Intravenous Q6H PRN Chivo Rivera MD        pneumococcal 23-valent polysaccharide vaccine (PNEUMOVAX-23) injection 0 5 mL  0 5 mL Subcutaneous Prior to discharge Chivo Rivera MD        pravastatin (PRAVACHOL) tablet 80 mg  80 mg Oral Daily With Arcelia Gilliam MD   80 mg at 01/25/19 1829    rivaroxaban (XARELTO) tablet 20 mg  20 mg Oral Daily With Salvatore Callahan DO   20 mg at 01/25/19 1829         HPI:    Ms  Ana Maurer Denies new cardiopulmonary or  medical symptoms  She states she is feeling well  Objective       Physical Exam:   /69 (BP Location: Right arm)   Pulse 74   Temp 98 4 °F (36 9 °C) (Oral)   Resp 18   Ht 5' 2" (1 575 m)   Wt 63 4 kg (139 lb 12 8 oz) Comment: Patient was weighed standing and bed with same results  SpO2 97%   BMI 25 57 kg/m²   Body mass index is 25 57 kg/m²      General Appearance:  Alert, cooperative, no distress, appears stated age and is mute but able to read my notes   Head:  Normocephalic, without obvious abnormality, atraumatic   Eyes:  PERRL, conjunctiva/corneas clear, EOM's intact,   both eyes   Ears:  Normal TM's and external ear canals, both ears   Nose: Nares normal, septum midline, mucosa normal, no drainage or sinus tenderness   Throat: Lips, mucosa, and tongue normal; teeth and gums normal   Neck: Supple, symmetrical, trachea midline, no adenopathy, thyroid: not enlarged, symmetric, no tenderness/mass/nodules, no carotid bruit or JVD   Back:   Symmetric, no curvature, ROM normal, no CVA tenderness   Lungs:   Clear to auscultation bilaterally, respirations unlabored   Chest Wall:  No tenderness or deformity   Heart:  Occasional extrasystoles, S1, S2 normal, no murmur, rub or gallop   Abdomen:   Soft, non-tender, bowel sounds active all four quadrants,  no masses, no organomegaly   Extremities: Extremities normal, atraumatic, no cyanosis or edema   Pulses: 2+ and symmetric   Skin: Skin showed normal color, texture, turgor and no rashes or lesions   Lymph nodes: Cervical, supraclavicular, and axillary nodes normal   Neurologic: Normal         Cardiographics              SANTI  01/25/2019:    55% LVEF  2+ MR with trace AI had 2-3 +TR    Transthoracic echocardiogram 01/25/2019:                   55% LVEF with mild LVH  Marked LAE and mild MARÍA ELENA  Moderate MAC with 2+ MR/TR  Mild aortic stenosis  Moderate pulmonary hypertension with PA systolic pressure 48 mmHg    Telemetry 01/26/2019:    Normal sinus rhythm with occasional PVCs      Lab Review   Recent Results (from the past 24 hour(s))   Fingerstick Glucose (POCT)    Collection Time: 01/25/19  4:24 PM   Result Value Ref Range    POC Glucose 87 65 - 140 mg/dl   Basic metabolic panel    Collection Time: 01/26/19  5:53 AM   Result Value Ref Range    Sodium 136 136 - 145 mmol/L    Potassium 4 0 3 5 - 5 3 mmol/L    Chloride 99 (L) 100 - 108 mmol/L    CO2 26 21 - 32 mmol/L    ANION GAP 11 4 - 13 mmol/L    BUN 16 5 - 25 mg/dL    Creatinine 1 00 0 60 - 1 30 mg/dL    Glucose 70 65 - 140 mg/dL    Calcium 8 8 8 3 - 10 1 mg/dL    eGFR 54 ml/min/1 73sq m                 NARGIS Delacruz  Time spent in review of records and with patient today was 41 minutes

## 2019-01-26 NOTE — NURSING NOTE
Patient discharged to home  IV removed  Prescriptions given to patient and spouse  Discharge paperwork and directions for blood work and follow up appointments also reviewed with patient

## 2019-01-26 NOTE — ASSESSMENT & PLAN NOTE
Serial troponins were negative and EKG was unremarkable  Patient also is having some exertional shortness of breath  Continue metoprolol  Patient's history of CAD status post PCI  Discussed with patient,  and daughter in detail  Patient will need follow-up with outpatient primary cardiologist [address] [place] [address] for cardiac catheterization

## 2019-01-26 NOTE — ASSESSMENT & PLAN NOTE
Patient noted to be in atrial flutter with rapid ventricular rate in the ED  Patient has history of atrial flutter but stop taking Eliquis as she had nausea associated with it  Patient initially received 15 milligrams of Cardizem and later was on Cardizem drip  Patient underwent cardioversion to sinus rhythm today  Continue metoprolol 25 milligram p o  B i d  Patient also started on anticoagulation with Lovenox 1 milligram/kilogram subcutaneously b i d   Cardiology consult appreciated  Patient did not tolerate Eliquis previously    Possible discharge on Xarelto

## 2019-01-26 NOTE — ASSESSMENT & PLAN NOTE
Patient complaining of exertional shortness of breath with leg swelling and patient noted elevated proBNP in the ED  Echo showed EF of 55%, moderate MR without any regional wall motion abnormalities  Patient had good diuresis with Lasix 40 milligram IV daily  Patient be discharged on Lasix 40 milligram p o  Daily along with KCl 20 milliequivalents p o   Daily  Follow up outpatient with Cardiology Dr Nathanael Purcell

## 2019-01-26 NOTE — ASSESSMENT & PLAN NOTE
Patient noted to be in atrial flutter with rapid ventricular rate in the ED  Patient has history of atrial flutter but stop taking Eliquis as she had nausea associated with it  Patient initially received 15 milligrams of Cardizem and later was on Cardizem drip  Patient underwent cardioversion to sinus rhythm today  Continue metoprolol 25 milligram p o  B i d  Patient also started on anticoagulation with Lovenox 1 milligram/kilogram subcutaneously b i d   Patient will also be loaded with amiodarone 400 milligram p o  B i d   for 1 week followed by 200 milligram p o  Daily  Patient will follow up outpatient with Dr Tasha Mullen  Patient will be discharged on Xarelto 20 milligram p o   Daily

## 2019-01-26 NOTE — PROGRESS NOTES
Progress Note Moo De Oliveira 1940, 77 y o  female MRN: [id number]    Unit/Bed#: [address] [address] Encounter: [id number]    Primary Care Provider: Nellie Nelson MD   Date and time admitted to hospital: 1/24/2019  3:06 PM        * Atrial flutter [facility])   Assessment & Plan    Patient noted to be in atrial flutter with rapid ventricular rate in the ED  Patient has history of atrial flutter but stop taking Eliquis as she had nausea associated with it  Patient initially received 15 milligrams of Cardizem and later was on Cardizem drip  Patient underwent cardioversion to sinus rhythm today  Continue metoprolol 25 milligram p o  B i d  Patient also started on anticoagulation with Lovenox 1 milligram/kilogram subcutaneously b i d   Cardiology consult appreciated  Patient did not tolerate Eliquis previously  Possible discharge on Xarelto     Chest pain   Assessment & Plan    Serial troponins were negative and EKG was unremarkable  Patient also is having some exertional shortness of breath  Continue metoprolol  Patient's history of CAD status post PCI  Discussed with patient,  and daughter in detail  Patient will need follow-up with outpatient primary cardiologist [address] [place] [address] for cardiac catheterization     Acute on chronic diastolic congestive heart failure [facility])   Assessment & Plan    Patient complaining of exertional shortness of breath with leg swelling and patient noted elevated proBNP in the ED  Echo showed EF of 55%, moderate MR without any regional wall motion abnormalities  Continue Lasix but will increase the dose to 40 milligram IV daily  Possible transition to p o   Lasix / Demadex upon discharge     Essential hypertension   Assessment & Plan    Continue metoprolol and Cardizem     Dyslipidemia   Assessment & Plan    Continue statin  Lipid panel reviewed     Hypothyroidism   Assessment & Plan    Continue levothyroxine           VTE Pharmacologic Prophylaxis:   Pharmacologic: Rivaroxaban (Xarelto)  Mechanical VTE Prophylaxis in Place: Yes    Patient Centered Rounds: I have performed bedside rounds with nursing staff today  Discussions with Specialists or Other Care Team Provider: Bibi Herr  Education and Discussions with Family / Patient:Yes  and daughter in detail  Time Spent for Care: 45 minutes  More than 50% of total time spent on counseling and coordination of care as described above  Current Length of Stay: 1 day(s)  Current Patient Status: Inpatient     Discharge Plan:  Home    Code Status: Level 1 - Full Code      Subjective:   Patient denies any further chest pain or palpitations  Patient reported that she has been urinating a lot  Patient underwent cardioversion earlier this morning and converted to sinus rhythm  Objective:     Vitals:   Temp (24hrs), Av 7 °F (36 5 °C), Min:97 1 °F (36 2 °C), Max:98 4 °F (36 9 °C)    Temp:  [97 1 °F (36 2 °C)-98 4 °F (36 9 °C)] 98 4 °F (36 9 °C)  HR:  [] 72  Resp:  [15-22] 18  BP: (102-141)/(60-89) 140/79  SpO2:  [64 %-97 %] 94 %  Body mass index is 36 25 kg/m²  Input and Output Summary (last 24 hours): Intake/Output Summary (Last 24 hours) at 19  Last data filed at 19 1419   Gross per 24 hour   Intake              350 ml   Output                0 ml   Net              350 ml        Physical Exam:     Physical Exam   Constitutional: No distress  HENT:   Head: Normocephalic and atraumatic  Nose: Nose normal    Eyes: Pupils are equal, round, and reactive to light  Conjunctivae are normal    Neck: Normal range of motion  Neck supple  Cardiovascular: Normal rate, regular rhythm and normal heart sounds  Pulmonary/Chest: Effort normal  No respiratory distress  She has no wheezes  She has rales  Bibasilar crackles   Abdominal: Soft  Bowel sounds are normal  She exhibits no distension  There is no tenderness  There is no rebound and no guarding  Musculoskeletal: She exhibits edema     Neurological: She is alert  No cranial nerve deficit  Skin: Skin is warm and dry  No rash noted  Additional Data:     Labs:      Results from last 7 days  Lab Units 01/25/19  0521 01/24/19  1524   WBC Thousand/uL 8 00 5 69   HEMOGLOBIN g/dL 13 2 14 5   HEMATOCRIT % 41 0 44 3   PLATELETS Thousands/uL 192 209   NEUTROS PCT %  --  54       Results from last 7 days  Lab Units 01/25/19  0521 01/24/19  1524   SODIUM mmol/L 134* 137   POTASSIUM mmol/L 4 0 4 3   CHLORIDE mmol/L 99* 100   CO2 mmol/L 24 26   BUN mg/dL 20 19   CREATININE mg/dL 1 04 1 04   CALCIUM mg/dL 9 0 9 2   TOTAL BILIRUBIN mg/dL  --  0 90   ALK PHOS U/L  --  81   ALT U/L  --  52   AST U/L  --  36           Results from last 7 days  Lab Units 01/24/19  2059 01/24/19  1809 01/24/19  1524   TROPONIN I ng/mL <0 02 0 02 <0 02     No results found for: HGBA1C    Results from last 7 days  Lab Units 01/25/19  1624   POC GLUCOSE mg/dl 87           * I Have Reviewed All Lab Data Listed Above  * Additional Pertinent Lab Tests Reviewed: [facility]    Imaging:     XR chest portable   Final Result by Darleen Brewer MD (01/24 1628)      Mild vascular congestion              Workstation performed: [id number]           Imaging Reports Reviewed by myself    Cultures:   Blood Culture: No results found for: BLOODCX  Urine Culture:   Lab Results   Component Value Date    URINECX 70,000-79,000 cfu/ml Mixed Contaminants X4 02/17/2017     Sputum Culture: No components found for: SPUTUMCX  Wound Culture: No results found for: WOUNDCULT    Last 24 Hours Medication List:     Current Facility-Administered Medications:  acetaminophen 650 mg Oral Q6H PRN Coty Mustafa DO   aspirin 81 mg Oral Daily Whitley Cruz MD   cholecalciferol 1,000 Units Oral Daily Whitley Cruz MD   [START ON 1/26/2019] furosemide 40 mg Intravenous Daily Whitley Cruz MD   levothyroxine 75 mcg Oral Early Morning Whitley Cruz MD   melatonin 3 mg Oral HS PRN Coty Mustafa DO   metoprolol tartrate 25 mg Oral BID Alyce Dee MD   multivitamin-minerals 1 tablet Oral Daily Alyce Dee MD   ondansetron 4 mg Intravenous Q6H PRN Alyce Dee MD   pneumococcal 23-valent polysaccharide vaccine 0 5 mL Subcutaneous Prior to discharge Alyce Dee MD   pravastatin 80 mg Oral Daily With Jac Lennon MD   rivaroxaban 20 mg Oral Daily With Pao Youssef DO        Today, Patient Was Seen By: Alyce Dee MD    ** Please Note: Dragon 360 Dictation voice to text software may have been used in the creation of this document   **

## 2019-01-28 ENCOUNTER — TRANSITIONAL CARE MANAGEMENT (OUTPATIENT)
Dept: FAMILY MEDICINE CLINIC | Facility: CLINIC | Age: 79
End: 2019-01-28

## 2019-01-31 ENCOUNTER — TELEPHONE (OUTPATIENT)
Dept: FAMILY MEDICINE CLINIC | Facility: CLINIC | Age: 79
End: 2019-01-31

## 2019-01-31 NOTE — TELEPHONE ENCOUNTER
I left a message on Cierra's phone requesting a call back  Pt has a TCM scheduled for 2/11/2019  Would she be able to accompany pt at this visit   Jair Cottrell, [address]

## 2019-01-31 NOTE — TELEPHONE ENCOUNTER
Ernst Check called back states she can not come in on 2/11/19  Need to call patient and see if we can schedule before 2/8/19  in order for visit to be a TCM  af/rma

## 2019-02-01 NOTE — UTILIZATION REVIEW
Auth:  [id number]    Notification of Discharge  This is a Notification of Discharge from our facility [address]  Please be advised that this patient has been discharge from our facility  Below you will find the admission and discharge date and time including the patients disposition  PRESENTATION DATE: 1/24/2019  3:06 PM  IP ADMISSION DATE: 1/24/19 1622  DISCHARGE DATE: 1/26/2019  1:39 PM  DISPOSITION: [address] Utilization Review Department  Phone: [phone]; Fax [phone]  [email or web address]  ATTENTION: Please call with any questions or concerns to [phone]  and carefully listen to the prompts so that you are directed to the right person  Send all requests for admission clinical reviews, approved or denied determinations and any other requests to fax [phone]   All voicemails are confidential

## 2019-02-05 ENCOUNTER — TELEPHONE (OUTPATIENT)
Dept: INPATIENT UNIT | Facility: HOSPITAL | Age: 79
End: 2019-02-05

## 2019-02-05 NOTE — TELEPHONE ENCOUNTER
Lita Quintanilla called for clarification on the appointment have not been able to get a hold of patient to make an appointment and clarify with   Fidelia Norton the  and notified  I left a message for patient requesting a call  Back to schedule TCM  An appointment after 2/9/19  Can not be a TCM and will have to be a hospital follow up  af/rma

## 2019-02-06 NOTE — PROGRESS NOTES
Subjective:      Patient ID: Alfonso Robledo is a 77 y o  female  Chief Complaint   Patient presents with    Transition of Care Management     [facility]       As per TCM  Feels well  Denies further chest tightness or palpitations since being in the hospital  Already saw Dr Renee Jama in follow up from cardiology 3 days ago  Is tolerating her medications without side effects but is not happy because she does not like to take so many medications  Denies chest pain, dyspnea, edema  Feels her abdominal distension is better since she is on the new medication  She feels occasionally dizzy but feels this is getting better as well  TCM Call (since 1/11/2019)     Date and time call was made  1/28/2019  2:35 PM    Hospital care reviewed  Records reviewed    Patient was hospitialized at  [address] [place] [address]    Date of Admission  01/24/19    Date of discharge  01/26/19    Diagnosis  Atrial Flutter    Disposition  Home    Current Symptoms  None pt denies any chest pain, palpitation, SOB, nausea/vomiting  Pt states she is feeling much better since the discharge  TCM Call (since 1/11/2019)     Post hospital issues  None    Should patient be enrolled in anticoag monitoring? No    Scheduled for follow up?   Yes    Patients specialists  Cardiologist    Cardiologist name  2/8/2019 with [facility] at 11:00 am    Did you obtain your prescribed medications  Yes    Do you need help managing your prescriptions or medications  No    Is transportation to your appointment needed  Yes    I have advised the patient to call PCP with any new or worsening symptoms  Godwin Beard, [facility]    The type of support provided  Physical; Emotional    Do you have social support  Yes, as much as I need    Are you recieving any outpatient services  No    Are you recieving home care services  No    Are you using any community resources  No    Current waiver services  No    Have you fallen in the last 12 months  No Interperter language line needed  Yes    Counseling  Family    Counseling topics  Diagnostic results; Prognosis; Activities of daily living; Importance of RX compliance; patient and family education; instructions for management    Comments  I spoke with  who was signing with pts   He states that pt is feeling much better since discharge  She does havea follow up with her Cardiologist 2/8/19  I made nessecary changes to pts med list  I will need to call an  for the appointment on 2/11/19  I advised that if pt is having chest pain, palpitation, SOB that she should contact our office or go back to the ER  The following portions of the patient's history were reviewed and updated as appropriate: allergies, current medications, past family history, past medical history, past social history, past surgical history and problem list     Review of Systems   Constitutional: Negative  HENT: Negative  Eyes: Negative  Respiratory: Negative  Cardiovascular: Negative  Gastrointestinal: Negative  Endocrine: Negative  Genitourinary: Negative  Musculoskeletal: Negative  Skin: Negative  Allergic/Immunologic: Negative  Neurological: Negative  Hematological: Negative  Psychiatric/Behavioral: Negative            Current Outpatient Medications   Medication Sig Dispense Refill    amiodarone 200 mg tablet Take 1 tablet (200 mg total) by mouth daily for 30 days 90 tablet 3    aspirin 81 mg chewable tablet Chew 81 mg daily      Cholecalciferol (VITAMIN D) 2000 units CAPS Take 1 capsule by mouth daily      furosemide (LASIX) 40 mg tablet Take 1 tablet (40 mg total) by mouth daily for 30 days 90 tablet 3    levothyroxine 75 mcg tablet Take 1 tablet (75 mcg total) by mouth daily 90 tablet 3    Multiple Vitamins-Minerals (CENTRUM SILVER ULTRA WOMENS) TABS Take by mouth      potassium chloride (K-DUR,KLOR-CON) 20 mEq tablet Take 1 tablet (20 mEq total) by mouth 2 (two) times a day 30 tablet 0    rivaroxaban (XARELTO) 20 mg tablet Take 1 tablet (20 mg total) by mouth daily with dinner 90 tablet 3    rosuvastatin (CRESTOR) 10 MG tablet Take 1 tablet (10 mg total) by mouth daily 90 tablet 3     No current facility-administered medications for this visit  Objective:    /80 Comment: 170/90  Pulse 56   Temp (!) 97 3 °F (36 3 °C)   Resp 16   Ht 5' 2" (1 575 m)   Wt 64 4 kg (142 lb)   BMI 25 97 kg/m²        Physical Exam   Constitutional: She appears well-developed and well-nourished  Eyes: Conjunctivae are normal    Neck: Neck supple  No JVD present  No thyromegaly present  Cardiovascular: Normal rate, normal heart sounds and intact distal pulses  An irregular rhythm present  Exam reveals no gallop and no friction rub  No murmur heard  Pulmonary/Chest: Effort normal and breath sounds normal  She has no wheezes  She has no rales  Abdominal: Soft  Bowel sounds are normal  She exhibits no distension  There is no tenderness  Musculoskeletal: She exhibits no edema  Assessment/Plan:    Hypothyroidism  Stable on current medication and clinically euthyroid  Continue levothyroxine  Atrial flutter ([address] )  Rate controlled on amiodarone, symptomatically improved  Up to date with cardiology follow up  Stressed the importance of continuing her medication and being consistent, despite the fact that she does not like being on the medications  Explained the need for blood thinners and amiodarone  Acute on chronic diastolic congestive heart failure (HCC)  Much improved on daily furosemide, continue this and potassium replacement  Diagnoses and all orders for this visit:    Typical atrial flutter (HCC)    Acute on chronic diastolic congestive heart failure (HCC)  -     furosemide (LASIX) 40 mg tablet;  Take 1 tablet (40 mg total) by mouth daily for 30 days    Dyslipidemia    Hypothyroidism, unspecified type    Atrial flutter ([address] )  - rivaroxaban (XARELTO) 20 mg tablet; Take 1 tablet (20 mg total) by mouth daily with dinner  -     amiodarone 200 mg tablet; Take 1 tablet (200 mg total) by mouth daily for 30 days          Return in about 3 months (around 5/11/2019)         Geremias Mcclain MD

## 2019-02-07 ENCOUNTER — TELEPHONE (OUTPATIENT)
Dept: FAMILY MEDICINE CLINIC | Facility: CLINIC | Age: 79
End: 2019-02-07

## 2019-02-07 NOTE — TELEPHONE ENCOUNTER
Patient has a TCM scheduled 1:30 Monday 2/11/19  They called to ask if we have an , I told them we had contacted [facility], but she is not available  I asked if they needed to r/s, I was told that we need to just call  services/look them up and have them available for the patient  They said we should have a list of interpreters in the area for patients  I was not sure if we had a phone number besides Cierra's for services that are contracted with [facility]   They said they will call again tomorrow

## 2019-02-08 ENCOUNTER — TELEPHONE (OUTPATIENT)
Dept: FAMILY MEDICINE CLINIC | Facility: CLINIC | Age: 79
End: 2019-02-08

## 2019-02-08 NOTE — TELEPHONE ENCOUNTER
I googled "directory of [facility]", and found the  referrals in [place]  I printed out the list and then called the one from [place]  Her contact info is :  Mita [address]one[address] [place]  [phone]  [email or web address]    I spoke with Jessica De Santiago, who agreed to accompany patient at her visit on 02/11/19 at 1:30pm  I sent her our office information  I then called the patient and left her a message to let her know we found an  to be at her visit   No further action is required Danae Anguiano MA

## 2019-02-08 NOTE — TELEPHONE ENCOUNTER
I left a message for the  Dickson Lilly to call me back  ([phone]) I want to ask her if she has a name/number or any resources/agencies for us to reach another  to come to  [facility] appt with Renay Stock LPN

## 2019-02-08 NOTE — TELEPHONE ENCOUNTER
Spoke with , Aury Winters  She states she does not have any names/phone numbers for any other people/agencies that can provide an   Aury Winters states that we can check the Directory of [address]   Aury Winters states to [employer] Services[employer]preter referral" Preston Gentile MA

## 2019-02-08 NOTE — TELEPHONE ENCOUNTER
Please see other message regarding this  We have tried to make contact with the pt  Suki Chaudhry will not be available the time that pt has her appointment on 2/11/2019  If we schedule for any day after it will no longer be a TCM  I am not sure if there is another  that may work for [address] that we can contact  I am only aware of Cierra  Can we please follow up with this tomorrow   Heri Galvan

## 2019-02-08 NOTE — TELEPHONE ENCOUNTER
Patients  called back and was very upset because the interpretor cant come on the 11th  He said we need to find a new agency or he is going to get an  involved  Please advise

## 2019-02-11 ENCOUNTER — OFFICE VISIT (OUTPATIENT)
Dept: FAMILY MEDICINE CLINIC | Facility: CLINIC | Age: 79
End: 2019-02-11
Payer: COMMERCIAL

## 2019-02-11 VITALS
HEART RATE: 56 BPM | WEIGHT: 142 LBS | DIASTOLIC BLOOD PRESSURE: 80 MMHG | HEIGHT: 62 IN | TEMPERATURE: 97.3 F | SYSTOLIC BLOOD PRESSURE: 140 MMHG | RESPIRATION RATE: 16 BRPM | BODY MASS INDEX: 26.13 KG/M2

## 2019-02-11 DIAGNOSIS — E03.9 HYPOTHYROIDISM, UNSPECIFIED TYPE: ICD-10-CM

## 2019-02-11 DIAGNOSIS — I48.3 TYPICAL ATRIAL FLUTTER (HCC): Primary | ICD-10-CM

## 2019-02-11 DIAGNOSIS — E78.5 DYSLIPIDEMIA: ICD-10-CM

## 2019-02-11 DIAGNOSIS — I50.33 ACUTE ON CHRONIC DIASTOLIC CONGESTIVE HEART FAILURE (HCC): ICD-10-CM

## 2019-02-11 DIAGNOSIS — I48.92 ATRIAL FLUTTER (HCC): ICD-10-CM

## 2019-02-11 PROCEDURE — 99496 TRANSJ CARE MGMT HIGH F2F 7D: CPT | Performed by: INTERNAL MEDICINE

## 2019-02-11 PROCEDURE — 1160F RVW MEDS BY RX/DR IN RCRD: CPT | Performed by: INTERNAL MEDICINE

## 2019-02-11 RX ORDER — AMIODARONE HYDROCHLORIDE 200 MG/1
200 TABLET ORAL DAILY
Qty: 90 TABLET | Refills: 3 | Status: SHIPPED | OUTPATIENT
Start: 2019-02-11 | End: 2019-07-18 | Stop reason: ALTCHOICE

## 2019-02-11 RX ORDER — FUROSEMIDE 40 MG/1
40 TABLET ORAL DAILY
Qty: 90 TABLET | Refills: 3 | Status: SHIPPED | OUTPATIENT
Start: 2019-02-11 | End: 2019-07-18 | Stop reason: SDUPTHER

## 2019-02-11 RX ORDER — FUROSEMIDE 40 MG/1
40 TABLET ORAL DAILY
Qty: 90 TABLET | Refills: 3 | Status: SHIPPED | OUTPATIENT
Start: 2019-02-11 | End: 2019-02-11 | Stop reason: SDUPTHER

## 2019-02-11 NOTE — ASSESSMENT & PLAN NOTE
Rate controlled on amiodarone, symptomatically improved  Up to date with cardiology follow up  Stressed the importance of continuing her medication and being consistent, despite the fact that she does not like being on the medications  Explained the need for blood thinners and amiodarone

## 2019-02-21 DIAGNOSIS — I48.92 ATRIAL FLUTTER (HCC): ICD-10-CM

## 2019-02-21 DIAGNOSIS — I50.33 ACUTE ON CHRONIC DIASTOLIC CONGESTIVE HEART FAILURE (HCC): ICD-10-CM

## 2019-02-21 NOTE — TELEPHONE ENCOUNTER
These were already escribed by Dr Dio Calles on 2/11  I am not sure what Apotex is  Does the pharmacy know when they will get it in stock, or can she try a different pharmacy? I would be hesitant to switch this   Tirso Broussard

## 2019-02-21 NOTE — TELEPHONE ENCOUNTER
Pt's  came in to ask for refills of her medications  She needs Furosemide 40mg, xarelto 20mg, andamiodaroneHCL 200mg she also needs Rosuvatatin calcium but the pharmacy says they are out of stack and want to know if she can have the Apotex instead, so all of these please  Please call to let know if ok  Then refill to Pharmacy

## 2019-02-22 NOTE — TELEPHONE ENCOUNTER
Tried calling both the patient and her  and could not leave a message on  Either phone  ( no Voicemail option)  Wanted to relate [facility] message    Carol Goodman MA

## 2019-02-25 RX ORDER — AMIODARONE HYDROCHLORIDE 200 MG/1
200 TABLET ORAL DAILY
Qty: 90 TABLET | Refills: 0 | OUTPATIENT
Start: 2019-02-25 | End: 2019-03-27

## 2019-02-25 RX ORDER — FUROSEMIDE 40 MG/1
40 TABLET ORAL DAILY
Qty: 90 TABLET | Refills: 0 | OUTPATIENT
Start: 2019-02-25 | End: 2019-03-27

## 2019-02-25 NOTE — TELEPHONE ENCOUNTER
Pt's  informed  He states he must have been mistaken about the apotex   He is fine with the 3 medications that were sent  [facility]

## 2019-04-03 ENCOUNTER — OFFICE VISIT (OUTPATIENT)
Dept: FAMILY MEDICINE CLINIC | Facility: CLINIC | Age: 79
End: 2019-04-03
Payer: COMMERCIAL

## 2019-04-03 ENCOUNTER — TELEPHONE (OUTPATIENT)
Dept: CARDIOLOGY CLINIC | Facility: CLINIC | Age: 79
End: 2019-04-03

## 2019-04-03 VITALS
WEIGHT: 139 LBS | SYSTOLIC BLOOD PRESSURE: 168 MMHG | TEMPERATURE: 98.1 F | HEART RATE: 70 BPM | BODY MASS INDEX: 25.58 KG/M2 | HEIGHT: 62 IN | DIASTOLIC BLOOD PRESSURE: 79 MMHG | RESPIRATION RATE: 16 BRPM

## 2019-04-03 DIAGNOSIS — I50.33 ACUTE ON CHRONIC DIASTOLIC CONGESTIVE HEART FAILURE (HCC): ICD-10-CM

## 2019-04-03 DIAGNOSIS — I10 ESSENTIAL HYPERTENSION: Primary | ICD-10-CM

## 2019-04-03 DIAGNOSIS — I48.3 TYPICAL ATRIAL FLUTTER (HCC): ICD-10-CM

## 2019-04-03 DIAGNOSIS — E78.5 DYSLIPIDEMIA: ICD-10-CM

## 2019-04-03 PROCEDURE — 1160F RVW MEDS BY RX/DR IN RCRD: CPT | Performed by: INTERNAL MEDICINE

## 2019-04-03 PROCEDURE — 1036F TOBACCO NON-USER: CPT | Performed by: INTERNAL MEDICINE

## 2019-04-03 PROCEDURE — 99214 OFFICE O/P EST MOD 30 MIN: CPT | Performed by: INTERNAL MEDICINE

## 2019-04-03 RX ORDER — POTASSIUM CHLORIDE 20 MEQ/1
20 TABLET, EXTENDED RELEASE ORAL DAILY
Qty: 30 TABLET | Refills: 0
Start: 2019-04-03 | End: 2020-03-10 | Stop reason: HOSPADM

## 2019-04-03 RX ORDER — NEBIVOLOL 5 MG/1
5 TABLET ORAL DAILY
Qty: 30 TABLET | Refills: 1 | Status: SHIPPED | OUTPATIENT
Start: 2019-04-03 | End: 2019-05-16 | Stop reason: SINTOL

## 2019-04-15 LAB
BUN SERPL-MCNC: 15 MG/DL (ref 8–27)
BUN/CREAT SERPL: 15 (ref 12–28)
CALCIUM SERPL-MCNC: 9.4 MG/DL (ref 8.7–10.3)
CHLORIDE SERPL-SCNC: 100 MMOL/L (ref 96–106)
CO2 SERPL-SCNC: 26 MMOL/L (ref 20–29)
CREAT SERPL-MCNC: 1 MG/DL (ref 0.57–1)
GLUCOSE SERPL-MCNC: 90 MG/DL (ref 65–99)
LABCORP COMMENT: NORMAL
MICRODELETION SYND BLD/T FISH: NORMAL
POTASSIUM SERPL-SCNC: 4.1 MMOL/L (ref 3.5–5.2)
SL AMB EGFR AFRICAN AMERICAN: 62 ML/MIN/1.73
SL AMB EGFR NON AFRICAN AMERICAN: 54 ML/MIN/1.73
SODIUM SERPL-SCNC: 140 MMOL/L (ref 134–144)

## 2019-04-25 DIAGNOSIS — E78.5 DYSLIPIDEMIA: ICD-10-CM

## 2019-04-25 RX ORDER — ROSUVASTATIN CALCIUM 10 MG/1
10 TABLET, COATED ORAL DAILY
Qty: 90 TABLET | Refills: 3 | Status: SHIPPED | OUTPATIENT
Start: 2019-04-25 | End: 2020-03-16

## 2019-05-16 ENCOUNTER — OFFICE VISIT (OUTPATIENT)
Dept: CARDIOLOGY CLINIC | Facility: CLINIC | Age: 79
End: 2019-05-16
Payer: COMMERCIAL

## 2019-05-16 VITALS
HEIGHT: 62 IN | BODY MASS INDEX: 25.76 KG/M2 | SYSTOLIC BLOOD PRESSURE: 170 MMHG | HEART RATE: 73 BPM | OXYGEN SATURATION: 97 % | WEIGHT: 140 LBS | DIASTOLIC BLOOD PRESSURE: 80 MMHG

## 2019-05-16 DIAGNOSIS — I50.33 ACUTE ON CHRONIC DIASTOLIC CONGESTIVE HEART FAILURE (HCC): ICD-10-CM

## 2019-05-16 DIAGNOSIS — I48.3 TYPICAL ATRIAL FLUTTER (HCC): ICD-10-CM

## 2019-05-16 DIAGNOSIS — E78.5 DYSLIPIDEMIA: ICD-10-CM

## 2019-05-16 DIAGNOSIS — I10 ESSENTIAL HYPERTENSION: Primary | ICD-10-CM

## 2019-05-16 PROCEDURE — 93000 ELECTROCARDIOGRAM COMPLETE: CPT | Performed by: INTERNAL MEDICINE

## 2019-05-16 PROCEDURE — 99213 OFFICE O/P EST LOW 20 MIN: CPT | Performed by: INTERNAL MEDICINE

## 2019-05-16 RX ORDER — AMLODIPINE BESYLATE 5 MG/1
5 TABLET ORAL DAILY
Qty: 30 TABLET | Refills: 3 | Status: SHIPPED | OUTPATIENT
Start: 2019-05-16 | End: 2019-06-13 | Stop reason: ALTCHOICE

## 2019-05-29 ENCOUNTER — TRANSCRIBE ORDERS (OUTPATIENT)
Dept: ADMINISTRATIVE | Facility: HOSPITAL | Age: 79
End: 2019-05-29

## 2019-05-29 DIAGNOSIS — Z12.31 ENCOUNTER FOR SCREENING MAMMOGRAM FOR MALIGNANT NEOPLASM OF BREAST: Primary | ICD-10-CM

## 2019-06-06 ENCOUNTER — OFFICE VISIT (OUTPATIENT)
Dept: FAMILY MEDICINE CLINIC | Facility: CLINIC | Age: 79
End: 2019-06-06
Payer: COMMERCIAL

## 2019-06-06 VITALS
RESPIRATION RATE: 20 BRPM | HEIGHT: 62 IN | WEIGHT: 141 LBS | BODY MASS INDEX: 25.95 KG/M2 | DIASTOLIC BLOOD PRESSURE: 70 MMHG | TEMPERATURE: 99 F | HEART RATE: 72 BPM | SYSTOLIC BLOOD PRESSURE: 142 MMHG

## 2019-06-06 DIAGNOSIS — I87.2 STASIS DERMATITIS OF BOTH LEGS: Primary | ICD-10-CM

## 2019-06-06 PROCEDURE — 1101F PT FALLS ASSESS-DOCD LE1/YR: CPT | Performed by: NURSE PRACTITIONER

## 2019-06-06 PROCEDURE — 3725F SCREEN DEPRESSION PERFORMED: CPT | Performed by: NURSE PRACTITIONER

## 2019-06-06 PROCEDURE — 99214 OFFICE O/P EST MOD 30 MIN: CPT | Performed by: NURSE PRACTITIONER

## 2019-06-11 ENCOUNTER — HOSPITAL ENCOUNTER (OUTPATIENT)
Dept: RADIOLOGY | Facility: HOSPITAL | Age: 79
Discharge: HOME | End: 2019-06-11
Attending: INTERNAL MEDICINE
Payer: COMMERCIAL

## 2019-06-11 VITALS — BODY MASS INDEX: 26.81 KG/M2 | WEIGHT: 142 LBS | HEIGHT: 61 IN

## 2019-06-11 DIAGNOSIS — Z12.31 ENCOUNTER FOR SCREENING MAMMOGRAM FOR MALIGNANT NEOPLASM OF BREAST: ICD-10-CM

## 2019-06-11 PROCEDURE — 77063 BREAST TOMOSYNTHESIS BI: CPT

## 2019-06-11 PROCEDURE — 77067 SCR MAMMO BI INCL CAD: CPT

## 2019-06-13 ENCOUNTER — OFFICE VISIT (OUTPATIENT)
Dept: CARDIOLOGY CLINIC | Facility: CLINIC | Age: 79
End: 2019-06-13
Payer: COMMERCIAL

## 2019-06-13 VITALS
SYSTOLIC BLOOD PRESSURE: 144 MMHG | HEIGHT: 62 IN | DIASTOLIC BLOOD PRESSURE: 84 MMHG | OXYGEN SATURATION: 97 % | HEART RATE: 69 BPM | WEIGHT: 141.6 LBS | BODY MASS INDEX: 26.06 KG/M2

## 2019-06-13 DIAGNOSIS — R60.0 LOCALIZED EDEMA: ICD-10-CM

## 2019-06-13 DIAGNOSIS — I25.10 ARTERIOSCLEROSIS OF CORONARY ARTERY: ICD-10-CM

## 2019-06-13 DIAGNOSIS — I10 ESSENTIAL HYPERTENSION: Primary | ICD-10-CM

## 2019-06-13 DIAGNOSIS — I48.3 TYPICAL ATRIAL FLUTTER (HCC): ICD-10-CM

## 2019-06-13 DIAGNOSIS — I50.33 ACUTE ON CHRONIC DIASTOLIC CONGESTIVE HEART FAILURE (HCC): ICD-10-CM

## 2019-06-13 DIAGNOSIS — E78.5 DYSLIPIDEMIA: ICD-10-CM

## 2019-06-13 PROCEDURE — 93000 ELECTROCARDIOGRAM COMPLETE: CPT | Performed by: INTERNAL MEDICINE

## 2019-06-13 PROCEDURE — 99214 OFFICE O/P EST MOD 30 MIN: CPT | Performed by: INTERNAL MEDICINE

## 2019-06-20 ENCOUNTER — HOSPITAL ENCOUNTER (OUTPATIENT)
Dept: RADIOLOGY | Facility: HOSPITAL | Age: 79
Discharge: HOME | End: 2019-06-20
Attending: INTERNAL MEDICINE
Payer: COMMERCIAL

## 2019-06-20 DIAGNOSIS — R60.0 LOCALIZED EDEMA: ICD-10-CM

## 2019-06-20 PROCEDURE — 93970 EXTREMITY STUDY: CPT

## 2019-06-21 PROCEDURE — 93970 EXTREMITY STUDY: CPT | Performed by: SURGERY

## 2019-06-24 ENCOUNTER — TELEPHONE (OUTPATIENT)
Dept: CARDIOLOGY CLINIC | Facility: CLINIC | Age: 79
End: 2019-06-24

## 2019-06-24 DIAGNOSIS — E03.9 HYPOTHYROIDISM, UNSPECIFIED TYPE: ICD-10-CM

## 2019-06-24 RX ORDER — LEVOTHYROXINE SODIUM 0.07 MG/1
TABLET ORAL
Qty: 90 TABLET | Refills: 3 | Status: SHIPPED | OUTPATIENT
Start: 2019-06-24 | End: 2020-10-15

## 2019-06-27 ENCOUNTER — TELEPHONE (OUTPATIENT)
Dept: CARDIOLOGY CLINIC | Facility: CLINIC | Age: 79
End: 2019-06-27

## 2019-06-27 ENCOUNTER — TELEPHONE (OUTPATIENT)
Dept: FAMILY MEDICINE CLINIC | Facility: CLINIC | Age: 79
End: 2019-06-27

## 2019-06-27 ENCOUNTER — HOSPITAL ENCOUNTER (EMERGENCY)
Facility: HOSPITAL | Age: 79
Discharge: HOME | End: 2019-06-27
Attending: EMERGENCY MEDICINE | Admitting: EMERGENCY MEDICINE
Payer: COMMERCIAL

## 2019-06-27 ENCOUNTER — APPOINTMENT (EMERGENCY)
Dept: RADIOLOGY | Facility: HOSPITAL | Age: 79
End: 2019-06-27
Payer: COMMERCIAL

## 2019-06-27 VITALS
HEART RATE: 66 BPM | BODY MASS INDEX: 26.7 KG/M2 | DIASTOLIC BLOOD PRESSURE: 78 MMHG | TEMPERATURE: 98.1 F | WEIGHT: 146 LBS | SYSTOLIC BLOOD PRESSURE: 170 MMHG | OXYGEN SATURATION: 95 % | RESPIRATION RATE: 18 BRPM

## 2019-06-27 DIAGNOSIS — R60.9 PERIPHERAL EDEMA: Primary | ICD-10-CM

## 2019-06-27 LAB
ANION GAP SERPL CALCULATED.3IONS-SCNC: 7 MMOL/L (ref 4–13)
APTT PPP: 34 SECONDS (ref 24–33)
BASOPHILS # BLD AUTO: 0.06 THOUSANDS/ΜL (ref 0–0.1)
BASOPHILS NFR BLD AUTO: 1 % (ref 0–1)
BUN SERPL-MCNC: 17 MG/DL (ref 5–25)
CALCIUM SERPL-MCNC: 9.6 MG/DL (ref 8.3–10.1)
CHLORIDE SERPL-SCNC: 101 MMOL/L (ref 100–108)
CO2 SERPL-SCNC: 28 MMOL/L (ref 21–32)
CREAT SERPL-MCNC: 1.05 MG/DL (ref 0.6–1.3)
EOSINOPHIL # BLD AUTO: 0.2 THOUSAND/ΜL (ref 0–0.61)
EOSINOPHIL NFR BLD AUTO: 4 % (ref 0–6)
ERYTHROCYTE [DISTWIDTH] IN BLOOD BY AUTOMATED COUNT: 14.7 % (ref 11.6–15.1)
GFR SERPL CREATININE-BSD FRML MDRD: 50 ML/MIN/1.73SQ M
GLUCOSE SERPL-MCNC: 108 MG/DL (ref 65–140)
HCT VFR BLD AUTO: 43.6 % (ref 34.8–46.1)
HGB BLD-MCNC: 14 G/DL (ref 11.5–15.4)
IMM GRANULOCYTES # BLD AUTO: 0.02 THOUSAND/UL (ref 0–0.2)
IMM GRANULOCYTES NFR BLD AUTO: 0 % (ref 0–2)
INR PPP: 1.17 (ref 0.86–1.16)
LYMPHOCYTES # BLD AUTO: 1.42 THOUSANDS/ΜL (ref 0.6–4.47)
LYMPHOCYTES NFR BLD AUTO: 25 % (ref 14–44)
MCH RBC QN AUTO: 27.8 PG (ref 26.8–34.3)
MCHC RBC AUTO-ENTMCNC: 32.1 G/DL (ref 31.4–37.4)
MCV RBC AUTO: 87 FL (ref 82–98)
MONOCYTES # BLD AUTO: 0.79 THOUSAND/ΜL (ref 0.17–1.22)
MONOCYTES NFR BLD AUTO: 14 % (ref 4–12)
NEUTROPHILS # BLD AUTO: 3.23 THOUSANDS/ΜL (ref 1.85–7.62)
NEUTS SEG NFR BLD AUTO: 56 % (ref 43–75)
NRBC BLD AUTO-RTO: 0 /100 WBCS
NT-PROBNP SERPL-MCNC: 244 PG/ML
PLATELET # BLD AUTO: 205 THOUSANDS/UL (ref 149–390)
PMV BLD AUTO: 9.3 FL (ref 8.9–12.7)
POTASSIUM SERPL-SCNC: 3.9 MMOL/L (ref 3.5–5.3)
PROTHROMBIN TIME: 12.2 SECONDS (ref 9.4–11.7)
RBC # BLD AUTO: 5.03 MILLION/UL (ref 3.81–5.12)
SODIUM SERPL-SCNC: 136 MMOL/L (ref 136–145)
TROPONIN I SERPL-MCNC: <0.02 NG/ML
WBC # BLD AUTO: 5.72 THOUSAND/UL (ref 4.31–10.16)

## 2019-06-27 PROCEDURE — 84484 ASSAY OF TROPONIN QUANT: CPT | Performed by: EMERGENCY MEDICINE

## 2019-06-27 PROCEDURE — 71046 X-RAY EXAM CHEST 2 VIEWS: CPT

## 2019-06-27 PROCEDURE — 93005 ELECTROCARDIOGRAM TRACING: CPT

## 2019-06-27 PROCEDURE — 99284 EMERGENCY DEPT VISIT MOD MDM: CPT

## 2019-06-27 PROCEDURE — 36415 COLL VENOUS BLD VENIPUNCTURE: CPT | Performed by: EMERGENCY MEDICINE

## 2019-06-27 PROCEDURE — 85730 THROMBOPLASTIN TIME PARTIAL: CPT | Performed by: EMERGENCY MEDICINE

## 2019-06-27 PROCEDURE — 85610 PROTHROMBIN TIME: CPT | Performed by: EMERGENCY MEDICINE

## 2019-06-27 PROCEDURE — 80048 BASIC METABOLIC PNL TOTAL CA: CPT | Performed by: EMERGENCY MEDICINE

## 2019-06-27 PROCEDURE — 85025 COMPLETE CBC W/AUTO DIFF WBC: CPT | Performed by: EMERGENCY MEDICINE

## 2019-06-27 PROCEDURE — 83880 ASSAY OF NATRIURETIC PEPTIDE: CPT | Performed by: EMERGENCY MEDICINE

## 2019-06-27 RX ORDER — AMLODIPINE BESYLATE 5 MG/1
5 TABLET ORAL DAILY
COMMUNITY
End: 2019-07-18 | Stop reason: SINTOL

## 2019-06-27 RX ORDER — NITROGLYCERIN 0.4 MG/1
0.4 TABLET SUBLINGUAL ONCE
Status: DISCONTINUED | OUTPATIENT
Start: 2019-06-27 | End: 2019-06-27

## 2019-06-27 RX ORDER — FUROSEMIDE 40 MG/1
40 TABLET ORAL ONCE
Status: COMPLETED | OUTPATIENT
Start: 2019-06-27 | End: 2019-06-27

## 2019-06-27 RX ORDER — FUROSEMIDE 40 MG/1
40 TABLET ORAL DAILY
Qty: 20 TABLET | Refills: 0 | Status: SHIPPED | OUTPATIENT
Start: 2019-06-27 | End: 2019-11-20 | Stop reason: SDUPTHER

## 2019-06-27 RX ADMIN — FUROSEMIDE 40 MG: 40 TABLET ORAL at 17:48

## 2019-06-27 NOTE — TELEPHONE ENCOUNTER
Patient called c/o legs feeling worse, hard  I explained that Dr Emre Lopez does not have anything open right now and should reach out to her PCP

## 2019-06-28 ENCOUNTER — TELEPHONE (OUTPATIENT)
Dept: ADMINISTRATIVE | Facility: OTHER | Age: 79
End: 2019-06-28

## 2019-06-28 ENCOUNTER — VBI (OUTPATIENT)
Dept: FAMILY MEDICINE CLINIC | Facility: CLINIC | Age: 79
End: 2019-06-28

## 2019-06-28 LAB
ATRIAL RATE: 61 BPM
P AXIS: 57 DEGREES
PR INTERVAL: 152 MS
QRS AXIS: 4 DEGREES
QRSD INTERVAL: 130 MS
QT INTERVAL: 450 MS
QTC INTERVAL: 453 MS
T WAVE AXIS: 14 DEGREES
VENTRICULAR RATE: 61 BPM

## 2019-06-28 PROCEDURE — 93010 ELECTROCARDIOGRAM REPORT: CPT | Performed by: INTERNAL MEDICINE

## 2019-06-28 NOTE — TELEPHONE ENCOUNTER
Pt was triaged to ED yesterday  ED instructed pt to follow up with cardio  Does Dr Frieda Homans also want to see pt before her August OV already scheduled? I'm sorry, I do not remember the process of scheduling this pt, as I know she needs a  for every OV

## 2019-07-17 LAB
ALBUMIN SERPL-MCNC: 4.2 G/DL (ref 3.5–4.7)
ALBUMIN/GLOB SERPL: 1.6 {RATIO} (ref 1.2–2.2)
ALP SERPL-CCNC: 89 IU/L (ref 39–117)
ALT SERPL-CCNC: 19 IU/L (ref 0–32)
AST SERPL-CCNC: 23 IU/L (ref 0–40)
BILIRUB SERPL-MCNC: 0.7 MG/DL (ref 0–1.2)
BUN SERPL-MCNC: 15 MG/DL (ref 8–27)
BUN/CREAT SERPL: 14 (ref 12–28)
CALCIUM SERPL-MCNC: 9 MG/DL (ref 8.7–10.3)
CHLORIDE SERPL-SCNC: 99 MMOL/L (ref 96–106)
CO2 SERPL-SCNC: 24 MMOL/L (ref 20–29)
CREAT SERPL-MCNC: 1.08 MG/DL (ref 0.57–1)
GLOBULIN SER-MCNC: 2.7 G/DL (ref 1.5–4.5)
GLUCOSE SERPL-MCNC: 91 MG/DL (ref 65–99)
POTASSIUM SERPL-SCNC: 4.5 MMOL/L (ref 3.5–5.2)
PROT SERPL-MCNC: 6.9 G/DL (ref 6–8.5)
SL AMB EGFR AFRICAN AMERICAN: 56 ML/MIN/1.73
SL AMB EGFR NON AFRICAN AMERICAN: 49 ML/MIN/1.73
SODIUM SERPL-SCNC: 137 MMOL/L (ref 134–144)
TSH SERPL DL<=0.005 MIU/L-ACNC: 5.63 UIU/ML (ref 0.45–4.5)

## 2019-07-18 ENCOUNTER — OFFICE VISIT (OUTPATIENT)
Dept: CARDIOLOGY CLINIC | Facility: CLINIC | Age: 79
End: 2019-07-18
Payer: COMMERCIAL

## 2019-07-18 VITALS
OXYGEN SATURATION: 96 % | HEIGHT: 62 IN | SYSTOLIC BLOOD PRESSURE: 162 MMHG | BODY MASS INDEX: 25.58 KG/M2 | WEIGHT: 139 LBS | DIASTOLIC BLOOD PRESSURE: 68 MMHG | HEART RATE: 62 BPM

## 2019-07-18 DIAGNOSIS — I50.33 ACUTE ON CHRONIC DIASTOLIC CONGESTIVE HEART FAILURE (HCC): ICD-10-CM

## 2019-07-18 DIAGNOSIS — R60.0 LOCALIZED EDEMA: ICD-10-CM

## 2019-07-18 DIAGNOSIS — R60.9 PERIPHERAL EDEMA: Primary | ICD-10-CM

## 2019-07-18 DIAGNOSIS — E78.5 DYSLIPIDEMIA: ICD-10-CM

## 2019-07-18 DIAGNOSIS — I25.10 ARTERIOSCLEROSIS OF CORONARY ARTERY: ICD-10-CM

## 2019-07-18 DIAGNOSIS — I48.3 TYPICAL ATRIAL FLUTTER (HCC): ICD-10-CM

## 2019-07-18 DIAGNOSIS — L50.9 URTICARIA: ICD-10-CM

## 2019-07-18 DIAGNOSIS — I10 ESSENTIAL HYPERTENSION: ICD-10-CM

## 2019-07-18 DIAGNOSIS — I50.33 ACUTE ON CHRONIC DIASTOLIC (CONGESTIVE) HEART FAILURE (HCC): ICD-10-CM

## 2019-07-18 PROCEDURE — 93000 ELECTROCARDIOGRAM COMPLETE: CPT | Performed by: INTERNAL MEDICINE

## 2019-07-18 PROCEDURE — 99214 OFFICE O/P EST MOD 30 MIN: CPT | Performed by: INTERNAL MEDICINE

## 2019-07-18 RX ORDER — BETAMETHASONE DIPROPIONATE 0.5 MG/ML
LOTION, AUGMENTED TOPICAL 2 TIMES DAILY
Qty: 30 ML | Refills: 0 | Status: SHIPPED | OUTPATIENT
Start: 2019-07-18 | End: 2020-03-10 | Stop reason: HOSPADM

## 2019-07-18 NOTE — PROGRESS NOTES
Cardiology Follow Up    Moon Lopez  1940  [id number]      Interval History: Moon Lopez is here for followup of lower extremity edema  She has noted bilateral lower extremity edema with left leg considerably worse than right during her last vist   She has chronic exertional dyspnea without change recently  She denies orthopnea, PND or chest pain  Edema was present during last exam and barely coincided with initiation of amlodipine therapy  During her last visit, amlodipine was discontinued and furosemide was increased to 80 mg daily  Edema continued and patient went to the emergency room last week  There, it was realized that the patient continued on amlodipine and had stopped furosemide  She has now stopped amlodipine and is on 80 mg of furosemide  Lower extremity edema is slowly improving  She was previously admitted to [facility] in January 2019 with chest pain and atrial flutter  She was successfully cardioverted to sinus rhythm and was started on amiodarone afterwards  Since hospitalization, she had no further episodes of atrial fibrillation  Prior to her last visit, she was started on Bystolic and stopped due to nausea  Previously, she felt metoprolol was causing her to feel bad as well  She has been taking amiodarone and Xarelto regularly      The following portions of the patient's history were reviewed and updated as appropriate: allergies, current medications, past family history, past medical history, past social history, past surgical history and problem list     Current Outpatient Medications on File Prior to Visit   Medication Sig Dispense Refill    Cholecalciferol (VITAMIN D) 2000 units CAPS Take 1 capsule by mouth daily      furosemide (LASIX) 40 mg tablet Take 1 tablet (40 mg total) by mouth daily for 30 days 20 tablet 0    halobetasol (ULTRAVATE) 0 05 % cream   0    levothyroxine 75 mcg tablet TAKE ONE TABLET BY MOUTH EVERY DAY 90 tablet 3    Multiple Vitamins-Minerals (CENTRUM SILVER ULTRA WOMENS) TABS Take by mouth      rivaroxaban (XARELTO) 20 mg tablet Take 1 tablet (20 mg total) by mouth daily with dinner 90 tablet 3    rosuvastatin (CRESTOR) 10 MG tablet Take 1 tablet (10 mg total) by mouth daily 90 tablet 3    [DISCONTINUED] amiodarone 200 mg tablet Take 1 tablet (200 mg total) by mouth daily for 30 days 90 tablet 3    [DISCONTINUED] furosemide (LASIX) 40 mg tablet Take 1 tablet (40 mg total) by mouth daily for 30 days 90 tablet 3    potassium chloride (K-DUR,KLOR-CON) 20 mEq tablet Take 1 tablet (20 mEq total) by mouth daily (Patient not taking: Reported on 5/16/2019) 30 tablet 0    [DISCONTINUED] amLODIPine (NORVASC) 5 mg tablet Take 5 mg by mouth daily       No current facility-administered medications on file prior to visit  Review of Systems:  Review of Systems   Constitutional: Negative for chills, fatigue and fever  HENT: Positive for hearing loss  Negative for congestion, nosebleeds and postnasal drip  Respiratory: Positive for shortness of breath  Negative for cough and chest tightness  Cardiovascular: Positive for leg swelling  Negative for chest pain and palpitations  Gastrointestinal: Negative for abdominal distention, abdominal pain, diarrhea, nausea and vomiting  Endocrine: Negative for polydipsia, polyphagia and polyuria  Musculoskeletal: Negative for gait problem and myalgias  Skin: Negative for color change, pallor and rash  Allergic/Immunologic: Negative for environmental allergies, food allergies and immunocompromised state  Neurological: Negative for dizziness, seizures, syncope and light-headedness  Hematological: Negative for adenopathy  Does not bruise/bleed easily  Psychiatric/Behavioral: Negative for dysphoric mood  The patient is not nervous/anxious          Physical Exam:  /68 (BP Location: Left arm, Patient Position: Sitting, Cuff Size: Large)   Pulse 62 Comment: apical  Ht 5' 2" (1 575 m)   Wt 63 kg (139 lb)   SpO2 96%   BMI 25 42 kg/m²     Physical Exam   Constitutional: She is oriented to person, place, and time  She appears well-developed  No distress  HENT:   Head: Normocephalic and atraumatic  Eyes: Pupils are equal, round, and reactive to light  Conjunctivae and EOM are normal    Neck: Neck supple  No JVD present  No thyromegaly present  Cardiovascular: Normal rate and regular rhythm  Exam reveals no gallop and no friction rub  Murmur heard  Systolic murmur is present with a grade of 3/6  Pulmonary/Chest: Effort normal and breath sounds normal    Abdominal: Soft  She exhibits no distension  There is no tenderness  Musculoskeletal: She exhibits edema (2+ bilaterally to the knees  )  Neurological: She is alert and oriented to person, place, and time  No cranial nerve deficit  Skin: Skin is warm and dry  No rash noted  She is not diaphoretic  No erythema  Psychiatric: She has a normal mood and affect   Her behavior is normal  Judgment and thought content normal        Cardiographics  ECG:  Normal sinus rhythm with right bundle branch block and left anterior fascicular block 65 beats per min    Labs:  Lab Results   Component Value Date     08/15/2017    K 4 5 07/16/2019    CL 99 07/16/2019    CO2 24 07/16/2019    BUN 15 07/16/2019    CREATININE 1 08 (H) 07/16/2019    CREATININE 1 05 06/27/2019    CREATININE 0 88 08/15/2017    GLUCOSE 94 08/15/2017    CALCIUM 9 6 06/27/2019    CALCIUM 9 2 08/15/2017     Lab Results   Component Value Date    WBC 5 72 06/27/2019    WBC 4 8 08/15/2017    HGB 14 0 06/27/2019    HGB 14 2 08/15/2017    HCT 43 6 06/27/2019    HCT 43 7 08/15/2017    MCV 87 06/27/2019    MCV 86 08/15/2017     06/27/2019     08/15/2017     Lab Results   Component Value Date    CHOL 164 08/15/2017    TRIG 58 01/25/2019    TRIG 85 11/02/2018    HDL 48 01/25/2019    HDL 55 11/02/2018 Imaging: No results found  Discussion/Summary:  1  Peripheral edema    2  Essential hypertension    3  Typical atrial flutter ([facility] [address] )    4  Arteriosclerosis of coronary artery    5  Acute on chronic diastolic (congestive) heart failure ([facility] [address] )    6  Urticaria    7  Dyslipidemia      - bilateral lower extremity edema continues but improving  Will continue furosemide at 40 mg daily  - amlodipine was discontinued  - Doppler ultrasound did not show DVT  Patient is on Xarelto for atrial fibrillation  - patient remains in sinus rhythm after successful cardioversion in January  She continues on amiodarone therapy  Aspirin was discontinued and she was continued on Xarelto alone because of excessive bruising    - will continue amiodarone   - TSH and CMP were ordered previously - TSH is high but is consistent with her previous levels  - follow-up in 1-2 months

## 2019-07-26 ENCOUNTER — TELEPHONE (OUTPATIENT)
Dept: FAMILY MEDICINE CLINIC | Facility: CLINIC | Age: 79
End: 2019-07-26

## 2019-07-26 NOTE — TELEPHONE ENCOUNTER
Sacha monsalve was rescheduled because Dr Jose Enrique Waters is not in office  Can we please call Grady Griffin () to reschedule her as well        Grady Griffin [phone]    Thank you

## 2019-08-20 ENCOUNTER — OFFICE VISIT (OUTPATIENT)
Dept: CARDIOLOGY CLINIC | Facility: CLINIC | Age: 79
End: 2019-08-20
Payer: COMMERCIAL

## 2019-08-20 VITALS
HEIGHT: 62 IN | BODY MASS INDEX: 25.21 KG/M2 | OXYGEN SATURATION: 97 % | HEART RATE: 109 BPM | SYSTOLIC BLOOD PRESSURE: 158 MMHG | WEIGHT: 137 LBS | DIASTOLIC BLOOD PRESSURE: 78 MMHG

## 2019-08-20 DIAGNOSIS — I48.3 TYPICAL ATRIAL FLUTTER (HCC): Primary | ICD-10-CM

## 2019-08-20 DIAGNOSIS — I10 ESSENTIAL HYPERTENSION: ICD-10-CM

## 2019-08-20 DIAGNOSIS — E78.5 DYSLIPIDEMIA: ICD-10-CM

## 2019-08-20 DIAGNOSIS — R60.0 LOCALIZED EDEMA: ICD-10-CM

## 2019-08-20 DIAGNOSIS — I50.33 ACUTE ON CHRONIC DIASTOLIC CONGESTIVE HEART FAILURE (HCC): ICD-10-CM

## 2019-08-20 DIAGNOSIS — I25.10 ARTERIOSCLEROSIS OF CORONARY ARTERY: ICD-10-CM

## 2019-08-20 PROCEDURE — 93000 ELECTROCARDIOGRAM COMPLETE: CPT | Performed by: INTERNAL MEDICINE

## 2019-08-20 PROCEDURE — 99214 OFFICE O/P EST MOD 30 MIN: CPT | Performed by: INTERNAL MEDICINE

## 2019-08-20 NOTE — PROGRESS NOTES
Cardiology Follow Up    Omar Ugalde  1940  [id number]      Interval History: Omar Ugalde is here for followup of lower extremity edema  She was having bilateral lower extremity edema with left leg considerably worse than right that has now improved with furosemide and discontinuation of amlodipine  Since her last visit, she was returned to atrial flutter however  Overall, she has not felt poorly but has noticed a mild increase in exertional dyspnea along with an indigestion feeling  She denies orthopnea, PND or chest pain  She was previously admitted to [facility] in January 2019 with chest pain and atrial flutter  She was successfully cardioverted to sinus rhythm and was started on amiodarone afterwards  Since hospitalization, she had no further episodes of atrial fibrillation  Prior to her last visit, she was started on Bystolic and stopped due to nausea  Previously, she felt metoprolol was causing her to feel bad as well  She has been taking amiodarone and Xarelto regularly      The following portions of the patient's history were reviewed and updated as appropriate: allergies, current medications, past family history, past medical history, past social history, past surgical history and problem list     Current Outpatient Medications on File Prior to Visit   Medication Sig Dispense Refill    betamethasone, augmented, (DIPROLENE) 0 05 % lotion Apply topically 2 (two) times a day 30 mL 0    Cholecalciferol (VITAMIN D) 2000 units CAPS Take 1 capsule by mouth daily      furosemide (LASIX) 40 mg tablet Take 1 tablet (40 mg total) by mouth daily for 30 days 20 tablet 0    halobetasol (ULTRAVATE) 0 05 % cream   0    levothyroxine 75 mcg tablet TAKE ONE TABLET BY MOUTH EVERY DAY 90 tablet 3    Multiple Vitamins-Minerals (CENTRUM SILVER ULTRA WOMENS) TABS Take by mouth      potassium chloride (K-DUR,KLOR-CON) 20 mEq tablet Take 1 tablet (20 mEq total) by mouth daily (Patient not taking: Reported on 5/16/2019) 30 tablet 0    rivaroxaban (XARELTO) 20 mg tablet Take 1 tablet (20 mg total) by mouth daily with dinner 90 tablet 3    rosuvastatin (CRESTOR) 10 MG tablet Take 1 tablet (10 mg total) by mouth daily 90 tablet 3     No current facility-administered medications on file prior to visit  Review of Systems:  Review of Systems   Constitutional: Positive for fatigue  Negative for chills and fever  HENT: Positive for hearing loss  Negative for congestion, nosebleeds and postnasal drip  Respiratory: Positive for shortness of breath  Negative for cough and chest tightness  Cardiovascular: Negative for chest pain, palpitations and leg swelling  Gastrointestinal: Negative for abdominal distention, abdominal pain, diarrhea, nausea and vomiting  Endocrine: Negative for polydipsia, polyphagia and polyuria  Musculoskeletal: Negative for gait problem and myalgias  Skin: Negative for color change, pallor and rash  Allergic/Immunologic: Negative for environmental allergies, food allergies and immunocompromised state  Neurological: Negative for dizziness, seizures, syncope and light-headedness  Hematological: Negative for adenopathy  Does not bruise/bleed easily  Psychiatric/Behavioral: Negative for dysphoric mood  The patient is not nervous/anxious  Physical Exam:  /80 (BP Location: Left arm, Patient Position: Sitting, Cuff Size: Standard)   Ht 5' 2" (1 575 m)   Wt 62 1 kg (137 lb)   BMI 25 06 kg/m²     Physical Exam   Constitutional: She is oriented to person, place, and time  She appears well-developed  No distress  HENT:   Head: Normocephalic and atraumatic  Eyes: Pupils are equal, round, and reactive to light  Conjunctivae and EOM are normal    Neck: Neck supple  No JVD present  No thyromegaly present  Cardiovascular: Normal rate  An irregularly irregular rhythm present   Exam reveals no gallop and no friction rub  Murmur heard  Pulmonary/Chest: Effort normal and breath sounds normal    Abdominal: Soft  She exhibits no distension  There is no tenderness  Musculoskeletal: She exhibits no edema  Neurological: She is alert and oriented to person, place, and time  No cranial nerve deficit  Skin: Skin is warm and dry  No rash noted  She is not diaphoretic  No erythema  Psychiatric: She has a normal mood and affect  Her behavior is normal  Judgment and thought content normal        Cardiographics  ECG:  Atrial flutter with RBBB and LAFB rate 110    Labs:  Lab Results   Component Value Date     08/15/2017    K 4 5 07/16/2019    CL 99 07/16/2019    CO2 24 07/16/2019    BUN 15 07/16/2019    CREATININE 1 08 (H) 07/16/2019    CREATININE 1 05 06/27/2019    CREATININE 0 88 08/15/2017    GLUCOSE 94 08/15/2017    CALCIUM 9 6 06/27/2019    CALCIUM 9 2 08/15/2017     Lab Results   Component Value Date    WBC 5 72 06/27/2019    WBC 4 8 08/15/2017    HGB 14 0 06/27/2019    HGB 14 2 08/15/2017    HCT 43 6 06/27/2019    HCT 43 7 08/15/2017    MCV 87 06/27/2019    MCV 86 08/15/2017     06/27/2019     08/15/2017     Lab Results   Component Value Date    CHOL 164 08/15/2017    TRIG 58 01/25/2019    TRIG 85 11/02/2018    HDL 48 01/25/2019    HDL 55 11/02/2018     Imaging: No results found  Discussion/Summary:  1  Acute on chronic diastolic congestive heart failure ([facility] [address] )    2  Essential hypertension    3  Arteriosclerosis of coronary artery    4  Typical atrial flutter ([facility] [address] )    5  Localized edema    6  Dyslipidemia      - bilateral lower extremity edema resolved  - Continue furosemide 40 mg daily  - amlodipine was discontinued  - Doppler ultrasound did not show DVT  Patient is on Xarelto for atrial fibrillation  - patient has returned to atrial flutter  Will plan for cardioversion  She continues on amiodarone therapy     Aspirin was discontinued and she was continued on Xarelto alone because of excessive bruising    - TSH and CMP were ordered previously - TSH is high but is consistent with her previous levels

## 2019-08-27 ENCOUNTER — ANESTHESIA EVENT (OUTPATIENT)
Dept: NON INVASIVE DIAGNOSTICS | Facility: HOSPITAL | Age: 79
End: 2019-08-27

## 2019-08-27 ENCOUNTER — ANESTHESIA (OUTPATIENT)
Dept: NON INVASIVE DIAGNOSTICS | Facility: HOSPITAL | Age: 79
End: 2019-08-27

## 2019-08-27 ENCOUNTER — HOSPITAL ENCOUNTER (OUTPATIENT)
Dept: NON INVASIVE DIAGNOSTICS | Facility: HOSPITAL | Age: 79
Discharge: HOME | End: 2019-08-27
Attending: INTERNAL MEDICINE | Admitting: INTERNAL MEDICINE
Payer: COMMERCIAL

## 2019-08-27 VITALS
OXYGEN SATURATION: 93 % | SYSTOLIC BLOOD PRESSURE: 113 MMHG | RESPIRATION RATE: 20 BRPM | DIASTOLIC BLOOD PRESSURE: 62 MMHG | HEART RATE: 57 BPM

## 2019-08-27 DIAGNOSIS — I48.3 TYPICAL ATRIAL FLUTTER (HCC): ICD-10-CM

## 2019-08-27 PROCEDURE — 92960 CARDIOVERSION ELECTRIC EXT: CPT | Performed by: INTERNAL MEDICINE

## 2019-08-27 PROCEDURE — 92960 CARDIOVERSION ELECTRIC EXT: CPT

## 2019-08-27 RX ORDER — PROPOFOL 10 MG/ML
INJECTION, EMULSION INTRAVENOUS AS NEEDED
Status: DISCONTINUED | OUTPATIENT
Start: 2019-08-27 | End: 2019-08-27 | Stop reason: SURG

## 2019-08-27 RX ORDER — LIDOCAINE HYDROCHLORIDE 10 MG/ML
INJECTION, SOLUTION INFILTRATION; PERINEURAL AS NEEDED
Status: DISCONTINUED | OUTPATIENT
Start: 2019-08-27 | End: 2019-08-27 | Stop reason: SURG

## 2019-08-27 RX ORDER — SODIUM CHLORIDE 9 MG/ML
INJECTION, SOLUTION INTRAVENOUS CONTINUOUS PRN
Status: DISCONTINUED | OUTPATIENT
Start: 2019-08-27 | End: 2019-08-27 | Stop reason: SURG

## 2019-08-27 RX ADMIN — PROPOFOL 50 MG: 10 INJECTION, EMULSION INTRAVENOUS at 09:54

## 2019-08-27 RX ADMIN — Medication: at 09:47

## 2019-08-27 RX ADMIN — LIDOCAINE HYDROCHLORIDE 10 MG: 10 INJECTION, SOLUTION INFILTRATION; PERINEURAL at 09:54

## 2019-08-27 NOTE — DISCHARGE INSTR - OTHER ORDERS
Continue taking your medications as you were prior to procedure    Do not drive for the rest of the day

## 2019-08-27 NOTE — PROCEDURES
[facility]  [id number]  8/27/2019  Eli Beckman MD      PRE-OP DIAGNOSIS: Persistant atrial fibrillation      POST-OP DIAGNOSIS: Successful electrical cardioversion of atrial fibrillation to normal sinus rhythm with PACs  : Dr Toy Hyde MD  [facility]    ANESTHESIA: Propofol given by anesthesiology  COMPLICATIONS: None  OPERATIVE TERM: DC cardioversion  The nature of the procedure, risks and alternatives were discussed with the patient who gave informed consent with help of sign  as patient is deaf  Pt  and family understands risks associated with procedure and complication of stroke etc  were discussed with them in detail  OPERATIVE TECHNIQUE: The patient was sedated with propofol  When the patient was no longer responsive to quiet voice, DC cardioversion was performed with 150 J biphasically and synchronously  The patient was then monitored until fully alert and left the procedure area in stable condition  IMPRESSION: Successful DC cardioversion of atrial fibrillation to normal sinus rhythm with PACs  Toy Hyde MD [facility]      "This note has been constructed using a voice recognition system  Therefore there may be syntax, spelling, and/or grammatical errors   Please call if you have any questions  "

## 2019-08-27 NOTE — DISCHARGE INSTRUCTIONS
Cardioversion     WHAT YOU SHOULD KNOW:     Cardioversion is a procedure to correct arrhythmias, which is when your heart beats too fast or irregularly  Arrhythmias may prevent your body from getting the blood and oxygen it needs  Cardioversion delivers a shock of electricity to your heart to help it return to its normal rhythm  AFTER YOU LEAVE:     Medicines: Make sure you continue to take your medicines as directed (blood thinner, anti-arrhythmic drugs)  Driving: No driving for 24 hours because you received sedation  Sedation: Avoid any alcohol today - anesthesia may heighten the effects of the alcohol  Avoid making any legal/major decisions today, as the effects of the anesthesia may impair your judgement  Follow up with your cardiologist as directed: Write down your questions so you remember to ask them during your visits  Contact your cardiologist if:  · You have new or worsening weakness or tiredness  · You have questions or concerns about your condition or care  Seek care immediately or call 911 if:   · You feel like your heart is fluttering or jumping in your chest    · You feel lightheaded or you have fainted  · You have chest pain when you take a deep breath or cough  You may cough up blood  · You have discomfort in your chest that feels like squeezing, pressure, fullness, or pain  · You have pain or discomfort in your back, neck, jaw, stomach, or arm  · You have weakness or numbness in part of your body  · You have sudden trouble breathing  · You become confused or have difficulty speaking  · You have dizziness, a severe headache, or vision loss

## 2019-08-27 NOTE — SEDATION DOCUMENTATION
Procedure ended  Patient successfully cardioverted to normal sinus rhythm  Discharge instructions given to patient and spouse  Patient discharged to home in stable condition via wheelchair accompanied by

## 2019-08-28 NOTE — PROGRESS NOTES
Subjective:      Patient ID: Emerson Feliciano is a [de-identified] y o  female  Chief Complaint   Patient presents with   [facility] Wellness Visit     [facility]       Here for her follow up appointment  Has a low grade fever today and does not feel sick; yesterday had a bit of a chill but this has resolved  No URI symptoms, diarrhea, constipation  No cough, has her usual dyspnea on stairs but this is unchanged  Does have some skin discomfort where they placed her paddles for cardioversion  This was successful  She is back to her usual edema and is wearing support hose  No palpitations  The following portions of the patient's history were reviewed and updated as appropriate: allergies, current medications, past family history, past medical history, past social history, past surgical history and problem list     Review of Systems   Constitutional: Positive for fever  HENT: Negative  Respiratory: Negative  Cardiovascular: Negative  Skin: Positive for rash           Current Outpatient Medications   Medication Sig Dispense Refill    Cholecalciferol (VITAMIN D) 2000 units CAPS Take 1 capsule by mouth daily      furosemide (LASIX) 40 mg tablet Take 1 tablet (40 mg total) by mouth daily for 30 days 20 tablet 0    levothyroxine 75 mcg tablet TAKE ONE TABLET BY MOUTH EVERY DAY 90 tablet 3    Multiple Vitamins-Minerals (CENTRUM SILVER ULTRA WOMENS) TABS Take by mouth      rivaroxaban (XARELTO) 20 mg tablet Take 1 tablet (20 mg total) by mouth daily with dinner 90 tablet 3    rosuvastatin (CRESTOR) 10 MG tablet Take 1 tablet (10 mg total) by mouth daily 90 tablet 3    betamethasone, augmented, (DIPROLENE) 0 05 % lotion Apply topically 2 (two) times a day (Patient not taking: Reported on 8/20/2019) 30 mL 0    halobetasol (ULTRAVATE) 0 05 % cream   0    potassium chloride (K-DUR,KLOR-CON) 20 mEq tablet Take 1 tablet (20 mEq total) by mouth daily (Patient not taking: Reported on 5/16/2019) 30 tablet 0     No current facility-administered medications for this visit  Objective:    /68   Pulse 80   Temp 100 4 °F (38 °C)   Resp 16   Ht 5' 2" (1 575 m)   Wt 63 kg (139 lb)   BMI 25 42 kg/m²        Physical Exam   Constitutional: She appears well-developed and well-nourished  HENT:   Right Ear: Tympanic membrane normal    Left Ear: Tympanic membrane normal    Nose: No mucosal edema  Mouth/Throat: Oropharynx is clear and moist and mucous membranes are normal    Paddle shaped erythematous patch on chest   Eyes: Conjunctivae are normal    Neck: Neck supple  No JVD present  No thyromegaly present  Cardiovascular: Normal rate, regular rhythm, normal heart sounds and intact distal pulses  Exam reveals no gallop and no friction rub  No murmur heard  Pulmonary/Chest: Effort normal and breath sounds normal  She has no wheezes  She has no rales  Abdominal: Soft  Bowel sounds are normal  She exhibits no distension  There is no tenderness  Musculoskeletal: She exhibits no edema  Assessment/Plan:    Essential hypertension  Well controlled, now only on furosemide and will continue  Hypothyroidism  Reviewed labs and TSH is adequate  She has a lot of side effects with higher dose of levothyroxine and so will continue this dose and continue to monitor TSH and symptoms  Acute on chronic diastolic congestive heart failure (HCC)  Wt Readings from Last 3 Encounters:   08/29/19 63 kg (139 lb)   08/20/19 62 1 kg (137 lb)   07/18/19 63 kg (139 lb)         Appears well compensated on current medication  Edema is now resolved  History of atrial flutter  This has resolved s/p cardioversion and now appears to be regular          Diagnoses and all orders for this visit:    Medicare annual wellness visit, subsequent    Essential hypertension    Acute on chronic diastolic congestive heart failure (HCC)    Hypothyroidism, unspecified type    Dermatitis    History of atrial flutter          Return in about 4 months (around 12/29/2019)         Brenda Phipps MD

## 2019-08-29 ENCOUNTER — OFFICE VISIT (OUTPATIENT)
Dept: FAMILY MEDICINE CLINIC | Facility: CLINIC | Age: 79
End: 2019-08-29
Payer: COMMERCIAL

## 2019-08-29 VITALS
TEMPERATURE: 100.4 F | DIASTOLIC BLOOD PRESSURE: 68 MMHG | SYSTOLIC BLOOD PRESSURE: 138 MMHG | RESPIRATION RATE: 16 BRPM | BODY MASS INDEX: 25.58 KG/M2 | HEIGHT: 62 IN | HEART RATE: 80 BPM | WEIGHT: 139 LBS

## 2019-08-29 DIAGNOSIS — Z86.79 HISTORY OF ATRIAL FLUTTER: ICD-10-CM

## 2019-08-29 DIAGNOSIS — E03.9 HYPOTHYROIDISM, UNSPECIFIED TYPE: ICD-10-CM

## 2019-08-29 DIAGNOSIS — L30.9 DERMATITIS: ICD-10-CM

## 2019-08-29 DIAGNOSIS — Z00.00 MEDICARE ANNUAL WELLNESS VISIT, SUBSEQUENT: Primary | ICD-10-CM

## 2019-08-29 DIAGNOSIS — I10 ESSENTIAL HYPERTENSION: ICD-10-CM

## 2019-08-29 DIAGNOSIS — I50.33 ACUTE ON CHRONIC DIASTOLIC CONGESTIVE HEART FAILURE (HCC): ICD-10-CM

## 2019-08-29 PROCEDURE — 1170F FXNL STATUS ASSESSED: CPT | Performed by: INTERNAL MEDICINE

## 2019-08-29 PROCEDURE — 99214 OFFICE O/P EST MOD 30 MIN: CPT | Performed by: INTERNAL MEDICINE

## 2019-08-29 PROCEDURE — G0439 PPPS, SUBSEQ VISIT: HCPCS | Performed by: INTERNAL MEDICINE

## 2019-08-29 PROCEDURE — 1036F TOBACCO NON-USER: CPT | Performed by: INTERNAL MEDICINE

## 2019-08-29 PROCEDURE — 1160F RVW MEDS BY RX/DR IN RCRD: CPT | Performed by: INTERNAL MEDICINE

## 2019-08-29 PROCEDURE — 1125F AMNT PAIN NOTED PAIN PRSNT: CPT | Performed by: INTERNAL MEDICINE

## 2019-08-29 PROCEDURE — 3075F SYST BP GE 130 - 139MM HG: CPT | Performed by: INTERNAL MEDICINE

## 2019-08-29 NOTE — PROGRESS NOTES
Assessment and Plan:     Problem List Items Addressed This Visit        Endocrine    Hypothyroidism     Reviewed labs and TSH is adequate  She has a lot of side effects with higher dose of levothyroxine and so will continue this dose and continue to monitor TSH and symptoms  Cardiovascular and Mediastinum    Essential hypertension     Well controlled, now only on furosemide and will continue  Acute on chronic diastolic congestive heart failure (HCC)     Wt Readings from Last 3 Encounters:   08/29/19 63 kg (139 lb)   08/20/19 62 1 kg (137 lb)   07/18/19 63 kg (139 lb)         Appears well compensated on current medication  Edema is now resolved  Other    History of atrial flutter     This has resolved s/p cardioversion and now appears to be regular              Other Visit Diagnoses     Medicare annual wellness visit, subsequent    -  Primary    Dermatitis             History of Present Illness:     Patient presents for Medicare Annual Wellness visit    Patient Care Team:  Rhea Warren MD as PCP - MD Brenda Kaiser MD Karin Callow, MD Bailey Dane, MD     Problem List:     Patient Active Problem List   Diagnosis    Dyslipidemia    Essential hypertension    Hypothyroidism    Allergic rhinitis    Arteriosclerosis of coronary artery    Atrial flutter ([address]    Esophageal dysmotility    Esophageal reflux    Osteoporosis    Thyroid nodule    Screening mammogram, encounter for    Acute on chronic diastolic congestive heart failure ([address] )    BMI 25 0-25 9,adult    Stasis dermatitis of both legs    Localized edema    History of atrial flutter      Past Medical and Surgical History:     Past Medical History:   Diagnosis Date    Atrial flutter ([address] )     CAD (coronary artery disease)     s/p PCIx1 -2012    Deafness     Disease of thyroid gland     Hyperlipidemia     Hypertension      Past Surgical History:   Procedure Laterality Date    BREAST BIOPSY Left     negative    CARDIAC SURGERY      stents    ESOPHAGOGASTRODUODENOSCOPY N/A 2/20/2017    Procedure: ESOPHAGOGASTRODUODENOSCOPY (EGD); Surgeon: Devika Baldwin MD;  Location: [facility]; Service:       Family History:     Family History   Problem Relation Age of Onset    Hypertension Mother         essential    Breast cancer Mother     No Known Problems Sister     No Known Problems Daughter     No Known Problems Sister     No Known Problems Sister       Social History:     Social History     Tobacco Use   Smoking Status Never Smoker   Smokeless Tobacco Never Used     Social History     Substance and Sexual Activity   Alcohol Use No     Social History     Substance and Sexual Activity   Drug Use No      Medications and Allergies:     Current Outpatient Medications   Medication Sig Dispense Refill    Cholecalciferol (VITAMIN D) 2000 units CAPS Take 1 capsule by mouth daily      furosemide (LASIX) 40 mg tablet Take 1 tablet (40 mg total) by mouth daily for 30 days 20 tablet 0    levothyroxine 75 mcg tablet TAKE ONE TABLET BY MOUTH EVERY DAY 90 tablet 3    Multiple Vitamins-Minerals (CENTRUM SILVER ULTRA WOMENS) TABS Take by mouth      rivaroxaban (XARELTO) 20 mg tablet Take 1 tablet (20 mg total) by mouth daily with dinner 90 tablet 3    rosuvastatin (CRESTOR) 10 MG tablet Take 1 tablet (10 mg total) by mouth daily 90 tablet 3    betamethasone, augmented, (DIPROLENE) 0 05 % lotion Apply topically 2 (two) times a day (Patient not taking: Reported on 8/20/2019) 30 mL 0    halobetasol (ULTRAVATE) 0 05 % cream   0    potassium chloride (K-DUR,KLOR-CON) 20 mEq tablet Take 1 tablet (20 mEq total) by mouth daily (Patient not taking: Reported on 5/16/2019) 30 tablet 0     No current facility-administered medications for this visit        Allergies   Allergen Reactions    Amlodipine Other (See Comments)    Lisinopril Other (See Comments)      Immunizations:     Immunization History Administered Date(s) Administered    Influenza Split High Dose Preservative Free IM 10/22/2012, 11/07/2013, 11/07/2013, 10/05/2015, 11/03/2016, 11/02/2017    Influenza TIV (IM) 12/21/2011, 11/03/2014    Influenza, high dose seasonal 0 5 mL 12/03/2018    Pneumococcal Conjugate 13-Valent 11/03/2016    Pneumococcal Conjugate PCV 7 12/21/2011    Tdap 07/02/2013      Medicare Screening Tests and Risk Assessments:     Reyes Santiago is here for her Subsequent Wellness visit  Health Risk Assessment:  Patient rates overall health as good  Patient feels that their physical health rating is Same  Eyesight was rated as Same  Hearing was rated as Same  Patient feels that their emotional and mental health rating is Same  Pain experienced by patient in the last 7 days has been A lot  Patient's pain rating has been 5/10  Patient states that she has experienced no weight loss or gain in last 6 months  Emotional/Mental Health:  Patient has been feeling nervous/anxious  PHQ-9 Depression Screening:    Frequency of the following problems over the past two weeks:      1  Little interest or pleasure in doing things: 0 - not at all      2  Feeling down, depressed, or hopeless: 0 - not at all  PHQ-2 Score: 0          Broken Bones/Falls: Fall Risk Assessment:    In the past year, patient has experienced: No history of falling in past year          Bladder/Bowel:  Patient has not leaked urine accidently in the last six months  Patient reports no loss of bowel control  Immunizations:  Patient has had a flu vaccination within the last year  Patient has received a pneumonia shot  Patient has not received a shingles shot  Patient has received tetanus/diphtheria shot  Home Safety:  Patient has trouble with stairs inside or outside of their home  Patient currently reports that there are no safety hazards present in home, working smoke alarms, no working carbon monoxide detectors        Preventative Screenings: Breast cancer screening performed, no colon cancer screen completed, cholesterol screen completed, glaucoma eye exam completed,     Nutrition:  Current diet: Regular with servings of the following:    Medications:  Patient is currently taking over-the-counter supplements  Patient is able to manage medications  Lifestyle Choices:  Patient reports no tobacco use  Patient has not smoked or used tobacco in the past   Patient reports no alcohol use  Alcohol use per week: social   Patient does not drive a vehicle  Patient wears seat belt  Current level of exercise of physical activity described by patient as: walk daily   Activities of Daily Living:  Can get out of bed by his or her self, able to dress self, able to make own meals, able to do own shopping, able to bathe self, can do own laundry/housekeeping, can manage own money, pay bills and track expensesAdditional Comments:      Previous Hospitalizations:  Hospitalization or ED visit in past 12 months  Number of hospitalizations within the last year: 1-2        Advanced Directives:  Patient has decided on a power of   Patient has spoken to designated power of   Patient has completed advanced directive          Preventative Screening/Counseling:      Cardiovascular:      General: Risks and Benefits Discussed and Screening Current      Counseling: Healthy Diet, Healthy Weight, Improve Cholesterol, Improve Blood Pressure and Improve Exercise Tolerance          Diabetes:      General: Risks and Benefits Discussed and Screening Current          Colorectal Cancer:      General: Screening Not Indicated      Counseling: high fiber diet          Breast Cancer:      General: Screening Not Indicated          Cervical Cancer:      General: Screening Not Indicated          Osteoporosis:      General: Patient Declines      Counseling: Calcium and Vitamin D Intake and Regular Weightbearing Exercise          AAA:      General: Screening Not Indicated          Glaucoma:      General: Screening Current          HIV:      General: Screening Not Indicated          Hepatitis C:      General: Screening Not Indicated        Advanced Directives:   Patient has living will for healthcare, has durable POA for healthcare,     Immunizations:      Influenza: Risks & Benefits Discussed      Pneumococcal: Lifetime Vaccine Completed      Shingrix: Risks & Benefits Discussed      Hepatitis B (Low risk patients): Series Not Indicated      Other Preventative Counseling (Non-Medicare):   Fall Prevention, Increase physical activity and Weight reduction discussed

## 2019-08-29 NOTE — PATIENT INSTRUCTIONS
Obesity   AMBULATORY CARE:   Obesity  is when your body mass index (BMI) is greater than 30  Your healthcare provider will use your height and weight to measure your BMI  The risks of obesity include  many health problems, such as injuries or physical disability  You may need tests to check for the following:  · Diabetes     · High blood pressure or high cholesterol     · Heart disease     · Gallbladder or liver disease     · Cancer of the colon, breast, prostate, liver, or kidney     · Sleep apnea     · Arthritis or gout  Seek care immediately if:   · You have a severe headache, confusion, or difficulty speaking  · You have weakness on one side of your body  · You have chest pain, sweating, or shortness of breath  Contact your healthcare provider if:   · You have symptoms of gallbladder or liver disease, such as pain in your upper abdomen  · You have knee or hip pain and discomfort while walking  · You have symptoms of diabetes, such as intense hunger and thirst, and frequent urination  · You have symptoms of sleep apnea, such as snoring or daytime sleepiness  · You have questions or concerns about your condition or care  Treatment for obesity  focuses on helping you lose weight to improve your health  Even a small decrease in BMI can reduce the risk for many health problems  Your healthcare provider will help you set a weight-loss goal   · Lifestyle changes  are the first step in treating obesity  These include making healthy food choices and getting regular physical activity  Your healthcare provider may suggest a weight-loss program that involves coaching, education, and therapy  · Medicine  may help you lose weight when it is used with a healthy diet and physical activity  · Surgery  can help you lose weight if you are very obese and have other health problems  There are several types of weight-loss surgery  Ask your healthcare provider for more information    Be successful losing weight:   · Set small, realistic goals  An example of a small goal is to walk for 20 minutes 5 days a week  Anther goal is to lose 5% of your body weight  · Tell friends, family members, and coworkers about your goals  and ask for their support  Ask a friend to lose weight with you, or join a weight-loss support group  · Identify foods or triggers that may cause you to overeat , and find ways to avoid them  Remove tempting high-calorie foods from your home and workplace  Place a bowl of fresh fruit on your kitchen counter  If stress causes you to eat, then find other ways to cope with stress  · Keep a diary to track what you eat and drink  Also write down how many minutes of physical activity you do each day  Weigh yourself once a week and record it in your diary  Eating changes: You will need to eat 500 to 1,000 fewer calories each day than you currently eat to lose 1 to 2 pounds a week  The following changes will help you cut calories:  · Eat smaller portions  Use small plates, no larger than 9 inches in diameter  Fill your plate half full of fruits and vegetables  Measure your food using measuring cups until you know what a serving size looks like  · Eat 3 meals and 1 or 2 snacks each day  Plan your meals in advance  Madyson Pel and eat at home most of the time  Eat slowly  · Eat fruits and vegetables at every meal   They are low in calories and high in fiber, which makes you feel full  Do not add butter, margarine, or cream sauce to vegetables  Use herbs to season steamed vegetables  · Eat less fat and fewer fried foods  Eat more baked or grilled chicken and fish  These protein sources are lower in calories and fat than red meat  Limit fast food  Dress your salads with olive oil and vinegar instead of bottled dressing  · Limit the amount of sugar you eat  Do not drink sugary beverages  Limit alcohol  Activity changes:  Physical activity is good for your body in many ways   It helps you burn calories and build strong muscles  It decreases stress and depression, and improves your mood  It can also help you sleep better  Talk to your healthcare provider before you begin an exercise program   · Exercise for at least 30 minutes 5 days a week  Start slowly  Set aside time each day for physical activity that you enjoy and that is convenient for you  It is best to do both weight training and an activity that increases your heart rate, such as walking, bicycling, or swimming  · Find ways to be more active  Do yard work and housecleaning  Walk up the stairs instead of using elevators  Spend your leisure time going to events that require walking, such as outdoor festivals or fairs  This extra physical activity can help you lose weight and keep it off  Follow up with your healthcare provider as directed: You may need to meet with a dietitian  Write down your questions so you remember to ask them during your visits  © 2017 [employer] Information is for End User's use only and may not be sold, redistributed or otherwise used for commercial purposes  All illustrations and images included in CareNotes® are the copyrighted property of [employer]  or Julio Chávez  The above information is an  only  It is not intended as medical advice for individual conditions or treatments  Talk to your doctor, nurse or pharmacist before following any medical regimen to see if it is safe and effective for you  Urinary Incontinence   WHAT YOU NEED TO KNOW:   What is urinary incontinence? Urinary incontinence (UI) is when you lose control of your bladder  What causes UI? UI occurs because your bladder cannot store or empty urine properly  The following are the most common types of UI:  · Stress incontinence  is when you leak urine due to increased bladder pressure  This may happen when you cough, sneeze, or exercise       · Urge incontinence  is when you feel the need to urinate right away and leak urine accidentally  · Mixed incontinence  is when you have both stress and urge UI  What are the signs and symptoms of UI?   · You feel like your bladder does not empty completely when you urinate  · You urinate often and need to urinate immediately  · You leak urine when you sleep, or you wake up with the urge to urinate  · You leak urine when you cough, sneeze, exercise, or laugh  How is UI diagnosed? Your healthcare provider will ask how often you leak urine and whether you have stress or urge symptoms  Tell him which medicines you take, how often you urinate, and how much liquid you drink each day  You may need any of the following tests:  · Urine tests  may show infection or kidney function  · A pelvic exam  may be done to check for blockages  A pelvic exam will also show if your bladder, uterus, or other organs have moved out of place  · An x-ray, ultrasound, or CT  may show problems with parts of your urinary system  You may be given contrast liquid to help your organs show up better in the pictures  Tell the healthcare provider if you have ever had an allergic reaction to contrast liquid  Do not enter the MRI room with anything metal  Metal can cause serious injury  Tell the healthcare provider if you have any metal in or on your body  · A bladder scan  will show how much urine is left in your bladder after you urinate  You will be asked to urinate and then healthcare providers will use a small ultrasound machine to check the urine left in your bladder  · Cystometry  is used to check the function of your urinary system  Your healthcare provider checks the pressure in your bladder while filling it with fluid  Your bladder pressure may also be tested when your bladder is full and while you urinate  How is UI treated? · Medicines  can help strengthen your bladder control      · Electrical stimulation  is used to send a small amount of electrical DISPLAY PLAN FREE TEXT energy to your pelvic floor muscles  This helps control your bladder function  Electrodes may be placed outside your body or in your rectum  For women, the electrodes may be placed in the vagina  · A bulking agent  may be injected into the wall of your urethra to make it thicker  This helps keep your urethra closed and decreases urine leakage  · Devices  such as a clamp, pessary, or tampon may help stop urine leaks  Ask your healthcare provider for more information about these and other devices  · Surgery  may be needed if other treatments do not work  Several types of surgery can help improve your bladder control  Ask your healthcare provider for more information about the surgery you may need  How can I manage my symptoms? · Do pelvic muscle exercises often  Your pelvic muscles help you stop urinating  Squeeze these muscles tight for 5 seconds, then relax for 5 seconds  Gradually work up to squeezing for 10 seconds  Do 3 sets of 15 repetitions a day, or as directed  This will help strengthen your pelvic muscles and improve bladder control  · A catheter  may be used to help empty your bladder  A catheter is a tiny, plastic tube that is put into your bladder to drain your urine  Your healthcare provider may tell you to use a catheter to prevent your bladder from getting too full and leaking urine  · Keep a UI record  Write down how often you leak urine and how much you leak  Make a note of what you were doing when you leaked urine  · Train your bladder  Go to the bathroom at set times, such as every 2 hours, even if you do not feel the urge to go  You can also try to hold your urine when you feel the urge to go  For example, hold your urine for 5 minutes when you feel the urge to go  As that becomes easier, hold your urine for 10 minutes  · Drink liquids as directed  Ask your healthcare provider how much liquid to drink each day and which liquids are best for you   You may need to limit the amount of liquid you drink to help control your urine leakage  Limit or do not have drinks that contain caffeine or alcohol  Do not drink any liquid right before you go to bed  · Prevent constipation  Eat a variety of high-fiber foods  Good examples are high-fiber cereals, beans, vegetables, and whole-grain breads  Prune juice may help make your bowel movement softer  Walking is the best way to trigger your intestines to have a bowel movement  · Exercise regularly and maintain a healthy weight  Ask your healthcare provider how much you should weigh and about the best exercise plan for you  Weight loss and exercise will decrease pressure on your bladder and help you control your leakage  Ask him to help you create a weight loss plan if you are overweight  When should I seek immediate care? · You have severe pain  · You are confused or cannot think clearly  When should I contact my healthcare provider? · You have a fever  · You see blood in your urine  · You have pain when you urinate  · You have new or worse pain, even after treatment  · Your mouth feels dry or you have vision changes  · Your urine is cloudy or smells bad  · You have questions or concerns about your condition or care  CARE AGREEMENT:   You have the right to help plan your care  Learn about your health condition and how it may be treated  Discuss treatment options with your caregivers to decide what care you want to receive  You always have the right to refuse treatment  The above information is an  only  It is not intended as medical advice for individual conditions or treatments  Talk to your doctor, nurse or pharmacist before following any medical regimen to see if it is safe and effective for you  © 2017 [address] [employer] Information is for End User's use only and may not be sold, redistributed or otherwise used for commercial purposes   All illustrations and images included in CareNotes® are the copyrighted property of A [employer] A M [employer] Inc  or Julio Chávez  Cigarette Smoking and Your Health   AMBULATORY CARE:   Risks to your health if you smoke:  Nicotine and other chemicals found in tobacco damage every cell in your body  Even if you are a light smoker, you have an increased risk for cancer, heart disease, and lung disease  If you are pregnant or have diabetes, smoking increases your risk for complications  Benefits to your health if you stop smoking:   · You decrease respiratory symptoms such as coughing, wheezing, and shortness of breath  · You reduce your risk for cancers of the lung, mouth, throat, kidney, bladder, pancreas, stomach, and cervix  If you already have cancer, you increase the benefits of chemotherapy  You also reduce your risk for cancer returning or a second cancer from developing  · You reduce your risk for heart disease, blood clots, heart attack, and stroke  · You reduce your risk for lung infections, and diseases such as pneumonia, asthma, chronic bronchitis, and emphysema  · Your circulation improves  More oxygen can be delivered to your body  If you have diabetes, you lower your risk for complications, such as kidney, artery, and eye diseases  You also lower your risk for nerve damage  Nerve damage can lead to amputations, poor vision, and blindness  · You improve your body's ability to heal and to fight infections  Benefits to the health of others if you stop smoking:  Tobacco is harmful to nonsmokers who breathe in your secondhand smoke  The following are ways the health of others around you may improve when you stop smoking:  · You lower the risks for lung cancer and heart disease in nonsmoking adults  · If you are pregnant, you lower the risk for miscarriage, early delivery, low birth weight, and stillbirth  You also lower your baby's risk for SIDS, obesity, developmental delay, and neurobehavioral problems, such as ADHD  · If you have children, you lower their risk for ear infections, colds, pneumonia, bronchitis, and asthma  For more information and support to stop smoking:   · Smokefree  gov  Phone: 4- 250 - 466-8069  Web Address: www smokefree  gov  Follow up with your healthcare provider as directed:  Write down your questions so you remember to ask them during your visits  © 2017 [address] [employer] Information is for End User's use only and may not be sold, redistributed or otherwise used for commercial purposes  All illustrations and images included in CareNotes® are the copyrighted property of A [employer] M [employer]  or Julio Chávez  The above information is an  only  It is not intended as medical advice for individual conditions or treatments  Talk to your doctor, nurse or pharmacist before following any medical regimen to see if it is safe and effective for you  Fall Prevention   AMBULATORY CARE:   Fall prevention  includes ways to make your home and other areas safer  It also includes ways you can move more carefully to prevent a fall  Health conditions that cause changes in your blood pressure, vision, or muscle strength and coordination may increase your risk for falls  Medicines may also increase your risk for falls if they make you dizzy, weak, or sleepy  Call 911 or have someone else call if:   · You have fallen and are unconscious  · You have fallen and cannot move part of your body  Contact your healthcare provider if:   · You have fallen and have pain or a headache  · You have questions or concerns about your condition or care  Fall prevention tips:   · Stand or sit up slowly  This may help you keep your balance and prevent falls  · Use assistive devices as directed  Your healthcare provider may suggest that you use a cane or walker to help you keep your balance  You may need to have grab bars put in your bathroom near the toilet or in the shower      · Wear shoes that fit well and have soles that   Wear shoes both inside and outside  Use slippers with good   Do not wear shoes with high heels  · Wear a personal alarm  This is a device that allows you to call 911 if you fall and need help  Ask your healthcare provider for more information  · Stay active  Exercise can help strengthen your muscles and improve your balance  Your healthcare provider may recommend water aerobics or walking  He or she may also recommend physical therapy to improve your coordination  Never start an exercise program without talking to your healthcare provider first      · Manage your medical conditions  Keep all appointments with your healthcare providers  Visit your eye doctor as directed  Home safety tips:   · Add items to prevent falls in the bathroom  Put nonslip strips on your bath or shower floor to prevent you from slipping  Use a bath mat if you do not have carpet in the bathroom  This will prevent you from falling when you step out of the bath or shower  Use a shower seat so you do not need to stand while you shower  Sit on the toilet or a chair in your bathroom to dry yourself and put on clothing  This will prevent you from losing your balance from drying or dressing yourself while you are standing  · Keep paths clear  Remove books, shoes, and other objects from walkways and stairs  Place cords for telephones and lamps out of the way so that you do not need to walk over them  Tape them down if you cannot move them  Remove small rugs  If you cannot remove a rug, secure it with double-sided tape  This will prevent you from tripping  · Install bright lights in your home  Use night lights to help light paths to the bathroom or kitchen  Always turn on the light before you start walking  · Keep items you use often on shelves within reach  Do not use a step stool to help you reach an item  · Paint or place reflective tape on the edges of your stairs    This will help you see the stairs better  Follow up with your healthcare provider as directed:  Write down your questions so you remember to ask them during your visits  © 2017 [address] Information is for End User's use only and may not be sold, redistributed or otherwise used for commercial purposes  All illustrations and images included in CareNotes® are the copyrighted property of A [employer] M [employer]  or Julio Chávez  The above information is an  only  It is not intended as medical advice for individual conditions or treatments  Talk to your doctor, nurse or pharmacist before following any medical regimen to see if it is safe and effective for you  Advance Directives   WHAT YOU NEED TO KNOW:   What are advance directives? Advance directives are legal documents that state your wishes and plans for medical care  These plans are made ahead of time in case you lose your ability to make decisions for yourself  Advance directives can apply to any medical decision, such as the treatments you want, and if you want to donate organs  What are the types of advance directives? There are many types of advance directives, and each state has rules about how to use them  You may choose a combination of any of the following:  · Living will: This is a written record of the treatment you want  You can also choose which treatments you do not want, which to limit, and which to stop at a certain time  This includes surgery, medicine, IV fluid, and tube feedings  · Durable power of  for healthcare [facility]): This is a written record that states who you want to make healthcare choices for you when you are unable to make them for yourself  This person, called a proxy, is usually a family member or a friend  You may choose more than 1 proxy  · Do not resuscitate (DNR) order:  A DNR order is used in case your heart stops beating or you stop breathing   It is a request not to have certain forms of treatment, such as CPR  A DNR order may be included in other types of advance directives  · Medical directive: This covers the care that you want if you are in a coma, near death, or unable to make decisions for yourself  You can list the treatments you want for each condition  Treatment may include pain medicine, surgery, blood transfusions, dialysis, IV or tube feedings, and a ventilator (breathing machine)  · Values history: This document has questions about your views, beliefs, and how you feel and think about life  This information can help others choose the care that you would choose  Why are advance directives important? An advance directive helps you control your care  Although spoken wishes may be used, it is better to have your wishes written down  Spoken wishes can be misunderstood, or not followed  Treatments may be given even if you do not want them  An advance directive may make it easier for your family to make difficult choices about your care  How do I decide what to put in my advance directives? · Make informed decisions:  Make sure you fully understand treatments or care you may receive  Think about the benefits and problems your decisions could cause for you or your family  Talk to healthcare providers if you have concerns or questions before you write down your wishes  You may also want to talk with your Alev[employer] or , or a   Check your state laws to make sure that what you put in your advance directive is legal      · Sign all forms:  Sign and date your advance directive when you have finished  You may also need 2 witnesses to sign the forms  Witnesses cannot be your doctor or his staff, your spouse, heirs or beneficiaries, people you owe money to, or your chosen proxy  Talk to your family, proxy, and healthcare providers about your advance directive  Give each person a copy, and keep one for yourself in a place you can get to easily   Do not keep it hidden or locked away  · Review and revise your plans: You can revise your advance directive at any time, as long as you are able to make decisions  Review your plan every year, and when there are changes in your life, or your health  When you make changes, let your family, proxy, and healthcare providers know  Give each a new copy  Where can I find more information? · [employer]  [address] [place] , [place] [address]  Phone: 2- 946 - 735-9489  Phone: 8- 008 - [phone]  Web Address: http://www  aafp org  · [address] [facility])  [address] [address] [facility], [address]  Phone: 3- [phone]  Phone: [phone]  Web Address: Christei davila  CARE AGREEMENT:   You have the right to help plan your care  To help with this plan, you must learn about your health condition and treatment options  You must also learn about advance directives and how they are used  Work with your healthcare providers to decide what care will be used to treat you  You always have the right to refuse treatment  The above information is an  only  It is not intended as medical advice for individual conditions or treatments  Talk to your doctor, nurse or pharmacist before following any medical regimen to see if it is safe and effective for you  © 2017 [address] [employer] Information is for End User's use only and may not be sold, redistributed or otherwise used for commercial purposes  All illustrations and images included in CareNotes® are the copyrighted property of [employer]  or Julio Chávez          Please use over the counter HYDROCORTISONE CREAM 1% for the irritated area on your chest  never used

## 2019-08-29 NOTE — ASSESSMENT & PLAN NOTE
Reviewed labs and TSH is adequate  She has a lot of side effects with higher dose of levothyroxine and so will continue this dose and continue to monitor TSH and symptoms

## 2019-08-29 NOTE — ASSESSMENT & PLAN NOTE
Wt Readings from Last 3 Encounters:   08/29/19 63 kg (139 lb)   08/20/19 62 1 kg (137 lb)   07/18/19 63 kg (139 lb)         Appears well compensated on current medication  Edema is now resolved

## 2019-09-17 ENCOUNTER — TELEPHONE (OUTPATIENT)
Dept: CARDIOLOGY CLINIC | Facility: CLINIC | Age: 79
End: 2019-09-17

## 2019-09-17 NOTE — TELEPHONE ENCOUNTER
She had an appt today  Her 's Carlin MavenHutHemanth,  called to say that Gaylon Fleischer was admitted to the hospital  The appt was canceled  Pt wants to reschedule but does not want to wait until Gaylon Fleischer is available  I spoke with [facility] translation line, Kodi Lyons, gave her the new appt info and she will call me back when she has the name etc of the

## 2019-09-24 NOTE — ANESTHESIA POSTPROCEDURE EVALUATION
Post-Op Assessment Note      CV Status:  Stable    Mental Status:  Somnolent    Hydration Status:  Stable    PONV Controlled:  None    Airway Patency:  Patent    Post Op Vitals Reviewed: Yes          Staff: Anesthesiologist       Comments: converted to SR successfully          BP      Temp      Pulse    Resp      SpO2 75 yo F with large liver lesion on CT scan with unknown primary.    Plan:  -pain control prn  -will need CT chest for staging  -c/w IVF   -f/u liver biopsy results  -GI/Oncology follow up  -medical management as per primary team    Plan discussed with Dr. Baird

## 2019-10-08 ENCOUNTER — OFFICE VISIT (OUTPATIENT)
Dept: CARDIOLOGY CLINIC | Facility: CLINIC | Age: 79
End: 2019-10-08
Payer: COMMERCIAL

## 2019-10-08 VITALS
BODY MASS INDEX: 25.4 KG/M2 | HEART RATE: 69 BPM | SYSTOLIC BLOOD PRESSURE: 156 MMHG | OXYGEN SATURATION: 97 % | DIASTOLIC BLOOD PRESSURE: 80 MMHG | WEIGHT: 138 LBS | HEIGHT: 62 IN

## 2019-10-08 DIAGNOSIS — I48.92 ATRIAL FLUTTER, UNSPECIFIED TYPE (HCC): Primary | ICD-10-CM

## 2019-10-08 DIAGNOSIS — I10 ESSENTIAL HYPERTENSION: ICD-10-CM

## 2019-10-08 DIAGNOSIS — I25.10 ARTERIOSCLEROSIS OF CORONARY ARTERY: ICD-10-CM

## 2019-10-08 DIAGNOSIS — I50.33 ACUTE ON CHRONIC DIASTOLIC CONGESTIVE HEART FAILURE (HCC): ICD-10-CM

## 2019-10-08 DIAGNOSIS — E78.5 DYSLIPIDEMIA: ICD-10-CM

## 2019-10-08 PROCEDURE — 93000 ELECTROCARDIOGRAM COMPLETE: CPT | Performed by: INTERNAL MEDICINE

## 2019-10-08 PROCEDURE — 99214 OFFICE O/P EST MOD 30 MIN: CPT | Performed by: INTERNAL MEDICINE

## 2019-10-08 NOTE — PROGRESS NOTES
Cardiology Follow Up    Zunilda Ba  1940  [id number]      Interval History: Zunilda Ba is here for followup of atrial fibrillation and edema  She underwent cardioversion on 8/27/2019  Has been feeling well since then but does feel dyspnea as she goes up stairs  She has no problems while walking on level ground  She denies any chest pain, orthopnea, PND  Previously, she was having bilateral lower extremity edema with left leg considerably worse than right that has now improved with furosemide and discontinuation of amlodipine  She was previously admitted to [facility] in January 2019 with chest pain and atrial flutter  She was successfully cardioverted to sinus rhythm and was started on amiodarone afterwards  Amiodarone was stopped because of GI side effects  Prior to her last visit, she was started on Bystolic and stopped due to nausea  Previously, she felt metoprolol was causing her to feel bad as well  She has been taking Xarelto regularly      The following portions of the patient's history were reviewed and updated as appropriate: allergies, current medications, past family history, past medical history, past social history, past surgical history and problem list     Current Outpatient Medications on File Prior to Visit   Medication Sig Dispense Refill    levothyroxine 75 mcg tablet TAKE ONE TABLET BY MOUTH EVERY DAY 90 tablet 3    Multiple Vitamins-Minerals (CENTRUM SILVER ULTRA WOMENS) TABS Take by mouth      rivaroxaban (XARELTO) 20 mg tablet Take 1 tablet (20 mg total) by mouth daily with dinner 90 tablet 3    rosuvastatin (CRESTOR) 10 MG tablet Take 1 tablet (10 mg total) by mouth daily 90 tablet 3    betamethasone, augmented, (DIPROLENE) 0 05 % lotion Apply topically 2 (two) times a day (Patient not taking: Reported on 10/8/2019) 30 mL 0    Cholecalciferol (VITAMIN D) 2000 units CAPS Take 1 capsule by mouth daily      furosemide (LASIX) 40 mg tablet Take 1 tablet (40 mg total) by mouth daily for 30 days 20 tablet 0    halobetasol (ULTRAVATE) 0 05 % cream   0    potassium chloride (K-DUR,KLOR-CON) 20 mEq tablet Take 1 tablet (20 mEq total) by mouth daily (Patient not taking: Reported on 10/8/2019) 30 tablet 0     No current facility-administered medications on file prior to visit  Review of Systems:  Review of Systems   Constitutional: Positive for fatigue  HENT: Positive for hearing loss  Respiratory: Positive for shortness of breath  Cardiovascular: Negative for chest pain, palpitations and leg swelling  All other systems reviewed and are negative  Physical Exam:  /80 (BP Location: Left arm, Patient Position: Sitting, Cuff Size: Large)   Pulse 69   Ht 5' 2" (1 575 m)   Wt 62 6 kg (138 lb)   SpO2 97% Comment: JUSTA  BMI 25 24 kg/m²     Physical Exam   Constitutional: She is oriented to person, place, and time  She appears well-developed  No distress  HENT:   Head: Normocephalic and atraumatic  Eyes: Pupils are equal, round, and reactive to light  Conjunctivae and EOM are normal    Neck: Neck supple  No JVD present  No thyromegaly present  Cardiovascular: Normal rate and regular rhythm  Exam reveals no gallop and no friction rub  Murmur heard  Pulmonary/Chest: Effort normal and breath sounds normal    Abdominal: Soft  She exhibits no distension  There is no tenderness  Musculoskeletal: She exhibits no edema  Neurological: She is alert and oriented to person, place, and time  No cranial nerve deficit  Skin: Skin is warm and dry  No rash noted  She is not diaphoretic  No erythema  Psychiatric: She has a normal mood and affect   Her behavior is normal  Judgment and thought content normal        Cardiographics  ECG:  NSR with PAC at rate of 70    Labs:  Lab Results   Component Value Date     08/15/2017    K 4 5 07/16/2019    CL 99 07/16/2019    CO2 24 07/16/2019    BUN 15 07/16/2019    CREATININE 1 08 (H) 07/16/2019    CREATININE 1 05 06/27/2019    CREATININE 0 88 08/15/2017    GLUCOSE 94 08/15/2017    CALCIUM 9 6 06/27/2019    CALCIUM 9 2 08/15/2017     Lab Results   Component Value Date    WBC 5 72 06/27/2019    WBC 4 8 08/15/2017    HGB 14 0 06/27/2019    HGB 14 2 08/15/2017    HCT 43 6 06/27/2019    HCT 43 7 08/15/2017    MCV 87 06/27/2019    MCV 86 08/15/2017     06/27/2019     08/15/2017     Lab Results   Component Value Date    CHOL 164 08/15/2017    TRIG 58 01/25/2019    TRIG 85 11/02/2018    HDL 48 01/25/2019    HDL 55 11/02/2018     Imaging: No results found  Discussion/Summary:  1  Atrial flutter, unspecified type ([facility] [address] )    2  Arteriosclerosis of coronary artery    3  Essential hypertension    4  Acute on chronic diastolic congestive heart failure ([facility] [address] )    5  Dyslipidemia      - bilateral lower extremity edema resolved  - Continue furosemide 40 mg daily for treatment of LE edema and hypertension  Will reassess BP next visit and add additional agent as needed  She has multiple medication intolerances  - amlodipine was discontinued  - Doppler ultrasound did not show DVT  Patient is on Xarelto for atrial fibrillation   - Aspirin was discontinued and she was continued on Xarelto alone because of excessive bruising    - Amiodarone was stopped due to GI side effects and abnormal TSH     - Consider referral for RFA procedure

## 2019-10-11 ENCOUNTER — OFFICE VISIT (OUTPATIENT)
Dept: FAMILY MEDICINE CLINIC | Facility: CLINIC | Age: 79
End: 2019-10-11
Payer: COMMERCIAL

## 2019-10-11 ENCOUNTER — APPOINTMENT (OUTPATIENT)
Dept: RADIOLOGY | Facility: CLINIC | Age: 79
End: 2019-10-11
Payer: COMMERCIAL

## 2019-10-11 VITALS
TEMPERATURE: 97.7 F | BODY MASS INDEX: 25.4 KG/M2 | DIASTOLIC BLOOD PRESSURE: 70 MMHG | WEIGHT: 138 LBS | HEART RATE: 52 BPM | HEIGHT: 62 IN | RESPIRATION RATE: 16 BRPM | OXYGEN SATURATION: 97 % | SYSTOLIC BLOOD PRESSURE: 150 MMHG

## 2019-10-11 DIAGNOSIS — M54.6 THORACIC SPINE PAIN: ICD-10-CM

## 2019-10-11 DIAGNOSIS — M54.6 THORACIC SPINE PAIN: Primary | ICD-10-CM

## 2019-10-11 PROCEDURE — 99213 OFFICE O/P EST LOW 20 MIN: CPT | Performed by: FAMILY MEDICINE

## 2019-10-11 PROCEDURE — 72072 X-RAY EXAM THORAC SPINE 3VWS: CPT

## 2019-10-11 RX ORDER — CYCLOBENZAPRINE HCL 10 MG
10 TABLET ORAL
Qty: 21 TABLET | Refills: 0 | Status: SHIPPED | OUTPATIENT
Start: 2019-10-11 | End: 2019-11-20 | Stop reason: ALTCHOICE

## 2019-10-11 RX ORDER — PREDNISONE 20 MG/1
TABLET ORAL
Qty: 32 TABLET | Refills: 0 | Status: SHIPPED | OUTPATIENT
Start: 2019-10-11 | End: 2020-03-10 | Stop reason: HOSPADM

## 2019-10-11 NOTE — PROGRESS NOTES
Assessment/Plan:    1  Thoracic spine pain  -     predniSONE 20 mg tablet; 4 tabs for three days, 3 tabs for three days, 2 tabs for three days, 1 tab for three days, 1/2 tab for 4 days  -     cyclobenzaprine (FLEXERIL) 10 mg tablet; Take 1 tablet (10 mg total) by mouth daily at bedtime for 21 days  -     XR spine thoracic 3 vw; Future; Expected date: 10/11/2019          BMI Counseling: Body mass index is 25 24 kg/m²  Discussed the patient's BMI with her  The BMI is above normal  Nutrition recommendations include reducing portion sizes  There are no Patient Instructions on file for this visit  No follow-ups on file  Subjective:      Patient ID: Irwin Gowers is a [de-identified] y o  female  Chief Complaint   Patient presents with    Back Pain     one week ago patient went to look for something under sink and pulled a muscle, pain when breathing in pain on Left side cant sleep -wmcma       Pt is here for a same day appt for back pain  Pt states on 10/3 was reacing under a cabinet and felt like she pulled a muscle on her left side  Has been hurting for about a week  Hurts when she breaths in  Rough to sleep      The following portions of the patient's history were reviewed and updated as appropriate: allergies, current medications, past family history, past medical history, past social history, past surgical history and problem list     Review of Systems   Constitutional: Negative  Negative for activity change, appetite change, chills, diaphoresis and fatigue  HENT: Negative  Negative for dental problem, ear pain, sinus pressure and sore throat  Eyes: Negative  Negative for photophobia, pain, discharge, redness, itching and visual disturbance  Respiratory: Negative for apnea and chest tightness  Cardiovascular: Negative  Negative for chest pain, palpitations and leg swelling  Gastrointestinal: Negative  Negative for abdominal distention, abdominal pain, constipation and diarrhea     Endocrine: Negative  Negative for cold intolerance and heat intolerance  Genitourinary: Negative  Negative for difficulty urinating and dyspareunia  Musculoskeletal: Positive for arthralgias, back pain and myalgias  Skin: Negative  Allergic/Immunologic: Negative for environmental allergies  Neurological: Negative  Negative for dizziness  Psychiatric/Behavioral: Negative  Negative for agitation  Current Outpatient Medications   Medication Sig Dispense Refill    furosemide (LASIX) 40 mg tablet Take 1 tablet (40 mg total) by mouth daily for 30 days 20 tablet 0    levothyroxine 75 mcg tablet TAKE ONE TABLET BY MOUTH EVERY DAY 90 tablet 3    Multiple Vitamins-Minerals (CENTRUM SILVER ULTRA WOMENS) TABS Take by mouth      rivaroxaban (XARELTO) 20 mg tablet Take 1 tablet (20 mg total) by mouth daily with dinner 90 tablet 3    rosuvastatin (CRESTOR) 10 MG tablet Take 1 tablet (10 mg total) by mouth daily 90 tablet 3    betamethasone, augmented, (DIPROLENE) 0 05 % lotion Apply topically 2 (two) times a day (Patient not taking: Reported on 10/8/2019) 30 mL 0    Cholecalciferol (VITAMIN D) 2000 units CAPS Take 1 capsule by mouth daily      cyclobenzaprine (FLEXERIL) 10 mg tablet Take 1 tablet (10 mg total) by mouth daily at bedtime for 21 days 21 tablet 0    halobetasol (ULTRAVATE) 0 05 % cream   0    potassium chloride (K-DUR,KLOR-CON) 20 mEq tablet Take 1 tablet (20 mEq total) by mouth daily (Patient not taking: Reported on 10/8/2019) 30 tablet 0    predniSONE 20 mg tablet 4 tabs for three days, 3 tabs for three days, 2 tabs for three days, 1 tab for three days, 1/2 tab for 4 days 32 tablet 0     No current facility-administered medications for this visit  Objective:    /70   Pulse (!) 52   Temp 97 7 °F (36 5 °C)   Resp 16   Ht 5' 2" (1 575 m)   Wt 62 6 kg (138 lb)   SpO2 97%   BMI 25 24 kg/m²        Physical Exam   Constitutional: She appears well-developed and well-nourished   No distress  HENT:   Head: Normocephalic and atraumatic  Right Ear: External ear normal    Left Ear: External ear normal    Nose: Nose normal    Mouth/Throat: Oropharynx is clear and moist  No oropharyngeal exudate  Eyes: Pupils are equal, round, and reactive to light  EOM are normal  Right eye exhibits no discharge  Left eye exhibits no discharge  No scleral icterus  Neck: No thyromegaly present  Cardiovascular: Normal rate and normal heart sounds  No murmur heard  Pulmonary/Chest: Effort normal and breath sounds normal  No respiratory distress  She has no wheezes  Abdominal: Soft  Bowel sounds are normal  She exhibits no distension and no mass  There is no tenderness  There is no rebound and no guarding  Musculoskeletal: Normal range of motion  Arms:  Neurological: She is alert  She displays normal reflexes  Coordination normal    Skin: Skin is warm and dry  No rash noted  She is not diaphoretic  No erythema  Psychiatric: She has a normal mood and affect  Her behavior is normal    Nursing note and vitals reviewed               Sj Bearded, DO

## 2019-11-20 ENCOUNTER — APPOINTMENT (OUTPATIENT)
Dept: RADIOLOGY | Facility: CLINIC | Age: 79
End: 2019-11-20
Payer: COMMERCIAL

## 2019-11-20 ENCOUNTER — OFFICE VISIT (OUTPATIENT)
Dept: FAMILY MEDICINE CLINIC | Facility: CLINIC | Age: 79
End: 2019-11-20
Payer: COMMERCIAL

## 2019-11-20 VITALS
OXYGEN SATURATION: 96 % | RESPIRATION RATE: 16 BRPM | TEMPERATURE: 98.3 F | SYSTOLIC BLOOD PRESSURE: 150 MMHG | WEIGHT: 141 LBS | BODY MASS INDEX: 25.95 KG/M2 | HEART RATE: 64 BPM | HEIGHT: 62 IN | DIASTOLIC BLOOD PRESSURE: 80 MMHG

## 2019-11-20 DIAGNOSIS — R60.9 PERIPHERAL EDEMA: ICD-10-CM

## 2019-11-20 DIAGNOSIS — R05.9 COUGH: Primary | ICD-10-CM

## 2019-11-20 DIAGNOSIS — I48.92 ATRIAL FLUTTER (HCC): ICD-10-CM

## 2019-11-20 DIAGNOSIS — R05.9 COUGH: ICD-10-CM

## 2019-11-20 PROCEDURE — 1036F TOBACCO NON-USER: CPT | Performed by: FAMILY MEDICINE

## 2019-11-20 PROCEDURE — 71046 X-RAY EXAM CHEST 2 VIEWS: CPT

## 2019-11-20 PROCEDURE — 1160F RVW MEDS BY RX/DR IN RCRD: CPT | Performed by: FAMILY MEDICINE

## 2019-11-20 PROCEDURE — 99213 OFFICE O/P EST LOW 20 MIN: CPT | Performed by: FAMILY MEDICINE

## 2019-11-20 RX ORDER — PROMETHAZINE HYDROCHLORIDE AND CODEINE PHOSPHATE 6.25; 1 MG/5ML; MG/5ML
5 SYRUP ORAL EVERY 4 HOURS PRN
Qty: 120 ML | Refills: 0 | Status: SHIPPED | OUTPATIENT
Start: 2019-11-20 | End: 2020-03-10 | Stop reason: HOSPADM

## 2019-11-20 RX ORDER — AZITHROMYCIN 250 MG/1
TABLET, FILM COATED ORAL
Qty: 6 TABLET | Refills: 0 | Status: SHIPPED | OUTPATIENT
Start: 2019-11-20 | End: 2019-11-25

## 2019-11-20 RX ORDER — FUROSEMIDE 40 MG/1
40 TABLET ORAL DAILY
Qty: 30 TABLET | Refills: 0 | Status: SHIPPED | OUTPATIENT
Start: 2019-11-20 | End: 2019-11-20 | Stop reason: SDUPTHER

## 2019-11-20 RX ORDER — FUROSEMIDE 40 MG/1
40 TABLET ORAL DAILY
Qty: 30 TABLET | Refills: 0 | Status: SHIPPED | OUTPATIENT
Start: 2019-11-20 | End: 2020-02-14 | Stop reason: SDUPTHER

## 2019-11-20 NOTE — PROGRESS NOTES
Assessment/Plan:     Diagnoses and all orders for this visit:    Cough  -     XR chest pa & lateral; Future  -     promethazine-codeine (PHENERGAN WITH CODEINE) 6 25-10 mg/5 mL syrup; Take 5 mL by mouth every 4 (four) hours as needed for cough  -     azithromycin (ZITHROMAX) 250 mg tablet; Take 2 tablets (500 mg total) by mouth daily for 1 day, THEN 1 tablet (250 mg total) daily for 4 days  Subjective:      Patient ID: Pia Sorto is a [de-identified] y o  female  HPI     Patient presents today for evaluation of a cough  States she has had a cough for the past 3 weeks which has been progressively worsening  It is associated with yellow-brown color phlegm, itchy throat, occasional chest pain while coughing and mild SOB  Denies fevers, chills, headaches, dizziness, nausea, vomiting, wheezing  Has not tried any medications at home  The following portions of the patient's history were reviewed and updated as appropriate: allergies, current medications, past family history, past medical history, past social history, past surgical history and problem list     Review of Systems   Constitutional: Negative for activity change, appetite change, chills, diaphoresis, fatigue and fever  HENT: Negative  Respiratory: Positive for cough, chest tightness and shortness of breath  Negative for choking and wheezing  Cardiovascular: Negative for chest pain, palpitations and leg swelling  Gastrointestinal: Negative for abdominal distention, abdominal pain, constipation, diarrhea, nausea and vomiting  Genitourinary: Negative for difficulty urinating, dyspareunia, dysuria, enuresis, flank pain, frequency, menstrual problem, pelvic pain and urgency  Musculoskeletal: Negative for arthralgias, back pain, gait problem, joint swelling, myalgias, neck pain and neck stiffness  Skin: Negative  Neurological: Negative for dizziness, tremors, syncope, facial asymmetry, weakness, light-headedness, numbness and headaches  Psychiatric/Behavioral: Negative  Objective:      /80   Pulse 64   Temp 98 3 °F (36 8 °C)   Resp 16   Ht 5' 2" (1 575 m)   Wt 64 kg (141 lb)   SpO2 96%   BMI 25 79 kg/m²          Physical Exam   Constitutional: She is oriented to person, place, and time  She appears well-developed and well-nourished  No distress  HENT:   Head: Normocephalic and atraumatic  Right Ear: External ear normal    Left Ear: External ear normal    Nose: Nose normal    Mouth/Throat: Oropharynx is clear and moist  No oropharyngeal exudate  Eyes: Pupils are equal, round, and reactive to light  Conjunctivae and EOM are normal  Right eye exhibits no discharge  Left eye exhibits no discharge  Neck: Normal range of motion  Neck supple  Cardiovascular: Normal rate, regular rhythm, normal heart sounds and intact distal pulses  Exam reveals no gallop and no friction rub  No murmur heard  Pulmonary/Chest: Effort normal and breath sounds normal  No respiratory distress  She has no wheezes  She has no rales  She exhibits no tenderness  Musculoskeletal: Normal range of motion  She exhibits no edema or deformity  Lymphadenopathy:     She has no cervical adenopathy  Neurological: She is alert and oriented to person, place, and time  Skin: Skin is warm and dry  She is not diaphoretic  Psychiatric: She has a normal mood and affect   Her behavior is normal  Judgment and thought content normal

## 2020-01-02 NOTE — PROGRESS NOTES
Assessment/Plan:    1  Essential hypertension  Assessment & Plan:  Well controlled, continue furosemide  Orders:  -     CBC and differential; Future  -     Comprehensive metabolic panel; Future  -     Lipid panel; Future  -     CBC and differential  -     Comprehensive metabolic panel  -     Lipid panel    2  Hypothyroidism, unspecified type  Assessment & Plan:  Last TSH was elevated and will recheck to r/o undertreated hypothyroidism  She states she is compliant with her medications  Orders:  -     TSH, 3rd generation with Free T4 reflex; Future  -     TSH, 3rd generation with Free T4 reflex    3  Typical atrial flutter ([address] )    4  Arteriosclerosis of coronary artery  Assessment & Plan:  She continues to follow with cardiology and is asymptomatic  5  Chronic left shoulder pain  Comments:  She will try diclofenac topically  Orders:  -     diclofenac sodium (VOLTAREN) 1 %; Apply 2 g topically 4 (four) times a day    6  Plantar fasciitis of left foot  Comments:  Advised tylenol, more supportive shoes, and ice alternating with heat  She will call if not improving and may need podiatry consult at that time  7  Dyslipidemia  Assessment & Plan:  Continue rosuvastatin and will continue to follow LFT's and lipid profile  Advised on weight loss and low fat diet  8  Need for vaccination  -     influenza vaccine, [id number], high-dose, PF 0 5 mL (FLUZONE HIGH-DOSE)    9  Gastroesophageal reflux disease, esophagitis presence not specified  Assessment & Plan:  She was advised otc zantac or pepcid prn  This is infrequent  Patient Instructions   You can try over the counter zantac (ranitidine) for the heartburn  Use heat (epsom salt soaks) and alternate with ice (frozen water bottle) for the foot pain  If not getting better we can refer you to a podiatrist   We are going to check bloodwork for your thyroid and other testing  Return in about 4 months (around 5/6/2020)      Subjective: Patient ID: Graciela Mason is a [de-identified] y o  female  Chief Complaint   Patient presents with    Follow-up     blood pressure check and medication review  [id number]       She is here for a follow up of hypertension, hyperlipidemia, and other issues  She is feeling tired and sluggish, sleeping all the time, feels she has gained some weight  Recent TSH checked in July was mildly elevated  She denies chest pain, dyspnea, edema, palpitations  Wearing support hose  States compliant with medications  Has been having L shoulder pain and L heel and arch pain  No prior injury  Tylenol helps  Has some epigastric discomfort, usually with eating and relieved with cold water, never with exertion  No medications tried  The following portions of the patient's history were reviewed and updated as appropriate: allergies, current medications, past family history, past medical history, past social history, past surgical history and problem list     Review of Systems   Constitutional: Negative  Respiratory: Negative  Cardiovascular: Negative      Musculoskeletal:        L heel and arch pain, L shoulder pain         Current Outpatient Medications   Medication Sig Dispense Refill    furosemide (LASIX) 40 mg tablet Take 1 tablet (40 mg total) by mouth daily 30 tablet 0    levothyroxine 75 mcg tablet TAKE ONE TABLET BY MOUTH EVERY DAY 90 tablet 3    Multiple Vitamins-Minerals (CENTRUM SILVER ULTRA WOMENS) TABS Take by mouth      rivaroxaban (XARELTO) 20 mg tablet Take 1 tablet (20 mg total) by mouth daily with dinner 90 tablet 3    rosuvastatin (CRESTOR) 10 MG tablet Take 1 tablet (10 mg total) by mouth daily 90 tablet 3    betamethasone, augmented, (DIPROLENE) 0 05 % lotion Apply topically 2 (two) times a day (Patient not taking: Reported on 10/8/2019) 30 mL 0    Cholecalciferol (VITAMIN D) 2000 units CAPS Take 1 capsule by mouth daily      diclofenac sodium (VOLTAREN) 1 % Apply 2 g topically 4 (four) times a day 1 Tube 3    halobetasol (ULTRAVATE) 0 05 % cream   0    potassium chloride (K-DUR,KLOR-CON) 20 mEq tablet Take 1 tablet (20 mEq total) by mouth daily (Patient not taking: Reported on 10/8/2019) 30 tablet 0    predniSONE 20 mg tablet 4 tabs for three days, 3 tabs for three days, 2 tabs for three days, 1 tab for three days, 1/2 tab for 4 days (Patient not taking: Reported on 11/20/2019) 32 tablet 0    promethazine-codeine (PHENERGAN WITH CODEINE) 6 25-10 mg/5 mL syrup Take 5 mL by mouth every 4 (four) hours as needed for cough (Patient not taking: Reported on 1/6/2020) 120 mL 0     No current facility-administered medications for this visit  Objective:    /80   Pulse (!) 52   Temp 97 8 °F (36 6 °C)   Resp 20   Ht 5' 2" (1 575 m)   Wt 63 kg (139 lb)   SpO2 98%   BMI 25 42 kg/m²        Physical Exam   Constitutional: She appears well-developed and well-nourished  Eyes: Conjunctivae are normal    Neck: Neck supple  No JVD present  No thyromegaly present  Cardiovascular: Normal rate, regular rhythm, normal heart sounds and intact distal pulses  Exam reveals no gallop and no friction rub  No murmur heard  Pulmonary/Chest: Effort normal and breath sounds normal  She has no wheezes  She has no rales  Abdominal: Soft  Bowel sounds are normal  She exhibits no distension  There is tenderness in the epigastric area  Mild epigastric tenderness to palpation   Musculoskeletal: She exhibits no edema  Left shoulder: She exhibits decreased range of motion and crepitus  L heel pain to palpitation, pain over plantar fascia                    Clance MD Miranda

## 2020-01-06 ENCOUNTER — OFFICE VISIT (OUTPATIENT)
Dept: FAMILY MEDICINE CLINIC | Facility: CLINIC | Age: 80
End: 2020-01-06
Payer: COMMERCIAL

## 2020-01-06 ENCOUNTER — TELEPHONE (OUTPATIENT)
Dept: FAMILY MEDICINE CLINIC | Facility: CLINIC | Age: 80
End: 2020-01-06

## 2020-01-06 VITALS
TEMPERATURE: 97.8 F | SYSTOLIC BLOOD PRESSURE: 132 MMHG | OXYGEN SATURATION: 98 % | HEART RATE: 52 BPM | BODY MASS INDEX: 25.58 KG/M2 | DIASTOLIC BLOOD PRESSURE: 80 MMHG | HEIGHT: 62 IN | WEIGHT: 139 LBS | RESPIRATION RATE: 20 BRPM

## 2020-01-06 DIAGNOSIS — Z23 NEED FOR VACCINATION: ICD-10-CM

## 2020-01-06 DIAGNOSIS — I48.3 TYPICAL ATRIAL FLUTTER (HCC): ICD-10-CM

## 2020-01-06 DIAGNOSIS — E78.5 DYSLIPIDEMIA: ICD-10-CM

## 2020-01-06 DIAGNOSIS — E03.9 HYPOTHYROIDISM, UNSPECIFIED TYPE: ICD-10-CM

## 2020-01-06 DIAGNOSIS — I10 ESSENTIAL HYPERTENSION: Primary | ICD-10-CM

## 2020-01-06 DIAGNOSIS — G89.29 CHRONIC LEFT SHOULDER PAIN: ICD-10-CM

## 2020-01-06 DIAGNOSIS — K21.9 GASTROESOPHAGEAL REFLUX DISEASE, ESOPHAGITIS PRESENCE NOT SPECIFIED: ICD-10-CM

## 2020-01-06 DIAGNOSIS — M72.2 PLANTAR FASCIITIS OF LEFT FOOT: ICD-10-CM

## 2020-01-06 DIAGNOSIS — I25.10 ARTERIOSCLEROSIS OF CORONARY ARTERY: ICD-10-CM

## 2020-01-06 DIAGNOSIS — M25.512 CHRONIC LEFT SHOULDER PAIN: ICD-10-CM

## 2020-01-06 PROCEDURE — 3079F DIAST BP 80-89 MM HG: CPT | Performed by: INTERNAL MEDICINE

## 2020-01-06 PROCEDURE — 90662 IIV NO PRSV INCREASED AG IM: CPT

## 2020-01-06 PROCEDURE — 3075F SYST BP GE 130 - 139MM HG: CPT | Performed by: INTERNAL MEDICINE

## 2020-01-06 PROCEDURE — 1036F TOBACCO NON-USER: CPT | Performed by: INTERNAL MEDICINE

## 2020-01-06 PROCEDURE — 1160F RVW MEDS BY RX/DR IN RCRD: CPT

## 2020-01-06 PROCEDURE — 99214 OFFICE O/P EST MOD 30 MIN: CPT | Performed by: INTERNAL MEDICINE

## 2020-01-06 PROCEDURE — G0008 ADMIN INFLUENZA VIRUS VAC: HCPCS

## 2020-01-06 PROCEDURE — 3725F SCREEN DEPRESSION PERFORMED: CPT

## 2020-01-06 NOTE — PATIENT INSTRUCTIONS
You can try over the counter zantac (ranitidine) for the heartburn  Use heat (epsom salt soaks) and alternate with ice (frozen water bottle) for the foot pain  If not getting better we can refer you to a podiatrist   We are going to check bloodwork for your thyroid and other testing

## 2020-01-06 NOTE — ASSESSMENT & PLAN NOTE
Last TSH was elevated and will recheck to r/o undertreated hypothyroidism  She states she is compliant with her medications

## 2020-01-06 NOTE — TELEPHONE ENCOUNTER
Voltaren is not available  Can we call something else in?   Adele Torres was seen by Dr Ying Hart today    Thank you     [employer]

## 2020-01-07 NOTE — TELEPHONE ENCOUNTER
Prior Auth done on Cover My Meds Key [id number]  ID [id number]  With [employer]  PA was denied  Please advise klpn

## 2020-02-12 LAB
ALBUMIN SERPL-MCNC: 4 G/DL (ref 3.7–4.7)
ALBUMIN/GLOB SERPL: 1.5 {RATIO} (ref 1.2–2.2)
ALP SERPL-CCNC: 90 IU/L (ref 39–117)
ALT SERPL-CCNC: 15 IU/L (ref 0–32)
AST SERPL-CCNC: 21 IU/L (ref 0–40)
BASOPHILS # BLD AUTO: 0 X10E3/UL (ref 0–0.2)
BASOPHILS NFR BLD AUTO: 1 %
BILIRUB SERPL-MCNC: 0.8 MG/DL (ref 0–1.2)
BUN SERPL-MCNC: 21 MG/DL (ref 8–27)
BUN/CREAT SERPL: 18 (ref 12–28)
CALCIUM SERPL-MCNC: 9.4 MG/DL (ref 8.7–10.3)
CHLORIDE SERPL-SCNC: 102 MMOL/L (ref 96–106)
CHOLEST SERPL-MCNC: 158 MG/DL (ref 100–199)
CHOLEST/HDLC SERPL: 2.5 RATIO (ref 0–4.4)
CO2 SERPL-SCNC: 25 MMOL/L (ref 20–29)
CREAT SERPL-MCNC: 1.15 MG/DL (ref 0.57–1)
EOSINOPHIL # BLD AUTO: 0.1 X10E3/UL (ref 0–0.4)
EOSINOPHIL NFR BLD AUTO: 2 %
ERYTHROCYTE [DISTWIDTH] IN BLOOD BY AUTOMATED COUNT: 14.2 % (ref 11.7–15.4)
GLOBULIN SER-MCNC: 2.6 G/DL (ref 1.5–4.5)
GLUCOSE SERPL-MCNC: 93 MG/DL (ref 65–99)
HCT VFR BLD AUTO: 44.6 % (ref 34–46.6)
HDLC SERPL-MCNC: 62 MG/DL
HGB BLD-MCNC: 15 G/DL (ref 11.1–15.9)
IMM GRANULOCYTES # BLD: 0 X10E3/UL (ref 0–0.1)
IMM GRANULOCYTES NFR BLD: 0 %
LDLC SERPL CALC-MCNC: 84 MG/DL (ref 0–99)
LYMPHOCYTES # BLD AUTO: 1.7 X10E3/UL (ref 0.7–3.1)
LYMPHOCYTES NFR BLD AUTO: 35 %
MCH RBC QN AUTO: 29.2 PG (ref 26.6–33)
MCHC RBC AUTO-ENTMCNC: 33.6 G/DL (ref 31.5–35.7)
MCV RBC AUTO: 87 FL (ref 79–97)
MICRODELETION SYND BLD/T FISH: NORMAL
MICRODELETION SYND BLD/T FISH: NORMAL
MONOCYTES # BLD AUTO: 0.6 X10E3/UL (ref 0.1–0.9)
MONOCYTES NFR BLD AUTO: 11 %
NEUTROPHILS # BLD AUTO: 2.6 X10E3/UL (ref 1.4–7)
NEUTROPHILS NFR BLD AUTO: 51 %
PLATELET # BLD AUTO: 196 X10E3/UL (ref 150–450)
POTASSIUM SERPL-SCNC: 4.6 MMOL/L (ref 3.5–5.2)
PROT SERPL-MCNC: 6.6 G/DL (ref 6–8.5)
RBC # BLD AUTO: 5.14 X10E6/UL (ref 3.77–5.28)
SL AMB EGFR AFRICAN AMERICAN: 52 ML/MIN/1.73
SL AMB EGFR NON AFRICAN AMERICAN: 45 ML/MIN/1.73
SL AMB PDF IMAGE: NORMAL
SL AMB T4, FREE (DIRECT): 1.22 NG/DL (ref 0.82–1.77)
SL AMB VLDL CHOLESTEROL CALC: 12 MG/DL (ref 5–40)
SODIUM SERPL-SCNC: 141 MMOL/L (ref 134–144)
TRIGL SERPL-MCNC: 62 MG/DL (ref 0–149)
TSH SERPL DL<=0.005 MIU/L-ACNC: 7.69 UIU/ML (ref 0.45–4.5)
WBC # BLD AUTO: 5 X10E3/UL (ref 3.4–10.8)

## 2020-02-14 DIAGNOSIS — R60.9 PERIPHERAL EDEMA: ICD-10-CM

## 2020-02-14 DIAGNOSIS — I48.92 ATRIAL FLUTTER (HCC): ICD-10-CM

## 2020-02-14 RX ORDER — RIVAROXABAN 20 MG/1
TABLET, FILM COATED ORAL
Qty: 90 TABLET | Refills: 3 | Status: SHIPPED | OUTPATIENT
Start: 2020-02-14 | End: 2020-03-10 | Stop reason: HOSPADM

## 2020-02-14 RX ORDER — FUROSEMIDE 40 MG/1
TABLET ORAL
Qty: 90 TABLET | Refills: 3 | Status: SHIPPED | OUTPATIENT
Start: 2020-02-14 | End: 2020-07-20

## 2020-03-04 ENCOUNTER — OFFICE VISIT (OUTPATIENT)
Dept: CARDIOLOGY CLINIC | Facility: CLINIC | Age: 80
End: 2020-03-04
Payer: COMMERCIAL

## 2020-03-04 ENCOUNTER — APPOINTMENT (EMERGENCY)
Dept: RADIOLOGY | Facility: HOSPITAL | Age: 80
DRG: 308 | End: 2020-03-04
Payer: COMMERCIAL

## 2020-03-04 ENCOUNTER — HOSPITAL ENCOUNTER (INPATIENT)
Facility: HOSPITAL | Age: 80
LOS: 5 days | Discharge: HOME | DRG: 308 | End: 2020-03-10
Attending: EMERGENCY MEDICINE | Admitting: INTERNAL MEDICINE
Payer: COMMERCIAL

## 2020-03-04 VITALS
HEIGHT: 62 IN | OXYGEN SATURATION: 97 % | BODY MASS INDEX: 25.58 KG/M2 | SYSTOLIC BLOOD PRESSURE: 152 MMHG | WEIGHT: 139 LBS | DIASTOLIC BLOOD PRESSURE: 100 MMHG | HEART RATE: 160 BPM

## 2020-03-04 DIAGNOSIS — E87.1 HYPONATREMIA: ICD-10-CM

## 2020-03-04 DIAGNOSIS — I10 ESSENTIAL HYPERTENSION: ICD-10-CM

## 2020-03-04 DIAGNOSIS — I50.33 ACUTE ON CHRONIC DIASTOLIC HEART FAILURE (HCC): ICD-10-CM

## 2020-03-04 DIAGNOSIS — I48.3 TYPICAL ATRIAL FLUTTER (HCC): Primary | ICD-10-CM

## 2020-03-04 DIAGNOSIS — I48.91 ATRIAL FIBRILLATION WITH RVR (HCC): Primary | ICD-10-CM

## 2020-03-04 PROBLEM — I50.32 CHRONIC DIASTOLIC HEART FAILURE (HCC): Status: ACTIVE | Noted: 2019-01-24

## 2020-03-04 LAB
ALBUMIN SERPL BCP-MCNC: 3.5 G/DL (ref 3.5–5)
ALP SERPL-CCNC: 92 U/L (ref 46–116)
ALT SERPL W P-5'-P-CCNC: 21 U/L (ref 12–78)
ANION GAP SERPL CALCULATED.3IONS-SCNC: 9 MMOL/L (ref 4–13)
APTT PPP: 34 SECONDS (ref 23–37)
AST SERPL W P-5'-P-CCNC: 24 U/L (ref 5–45)
BACTERIA UR QL AUTO: ABNORMAL /HPF
BASOPHILS # BLD AUTO: 0.05 THOUSANDS/ΜL (ref 0–0.1)
BASOPHILS NFR BLD AUTO: 1 % (ref 0–1)
BILIRUB SERPL-MCNC: 0.6 MG/DL (ref 0.2–1)
BILIRUB UR QL STRIP: NEGATIVE
BUN SERPL-MCNC: 26 MG/DL (ref 5–25)
CALCIUM SERPL-MCNC: 9.3 MG/DL (ref 8.3–10.1)
CHLORIDE SERPL-SCNC: 100 MMOL/L (ref 100–108)
CLARITY UR: CLEAR
CO2 SERPL-SCNC: 28 MMOL/L (ref 21–32)
COLOR UR: ABNORMAL
CREAT SERPL-MCNC: 0.96 MG/DL (ref 0.6–1.3)
EOSINOPHIL # BLD AUTO: 0.13 THOUSAND/ΜL (ref 0–0.61)
EOSINOPHIL NFR BLD AUTO: 2 % (ref 0–6)
ERYTHROCYTE [DISTWIDTH] IN BLOOD BY AUTOMATED COUNT: 13.3 % (ref 11.6–15.1)
GFR SERPL CREATININE-BSD FRML MDRD: 56 ML/MIN/1.73SQ M
GLUCOSE SERPL-MCNC: 95 MG/DL (ref 65–140)
GLUCOSE UR STRIP-MCNC: NEGATIVE MG/DL
HCT VFR BLD AUTO: 44.8 % (ref 34.8–46.1)
HGB BLD-MCNC: 14.8 G/DL (ref 11.5–15.4)
HGB UR QL STRIP.AUTO: ABNORMAL
IMM GRANULOCYTES # BLD AUTO: 0.01 THOUSAND/UL (ref 0–0.2)
IMM GRANULOCYTES NFR BLD AUTO: 0 % (ref 0–2)
INR PPP: 1.23 (ref 0.91–1.09)
KETONES UR STRIP-MCNC: NEGATIVE MG/DL
LEUKOCYTE ESTERASE UR QL STRIP: NEGATIVE
LYMPHOCYTES # BLD AUTO: 1.77 THOUSANDS/ΜL (ref 0.6–4.47)
LYMPHOCYTES NFR BLD AUTO: 31 % (ref 14–44)
MCH RBC QN AUTO: 29.2 PG (ref 26.8–34.3)
MCHC RBC AUTO-ENTMCNC: 33 G/DL (ref 31.4–37.4)
MCV RBC AUTO: 89 FL (ref 82–98)
MONOCYTES # BLD AUTO: 0.69 THOUSAND/ΜL (ref 0.17–1.22)
MONOCYTES NFR BLD AUTO: 12 % (ref 4–12)
NEUTROPHILS # BLD AUTO: 3.16 THOUSANDS/ΜL (ref 1.85–7.62)
NEUTS SEG NFR BLD AUTO: 54 % (ref 43–75)
NITRITE UR QL STRIP: NEGATIVE
NON-SQ EPI CELLS URNS QL MICRO: ABNORMAL /HPF
NRBC BLD AUTO-RTO: 0 /100 WBCS
NT-PROBNP SERPL-MCNC: 893 PG/ML
PH UR STRIP.AUTO: 6 [PH]
PLATELET # BLD AUTO: 238 THOUSANDS/UL (ref 149–390)
PMV BLD AUTO: 9.9 FL (ref 8.9–12.7)
POTASSIUM SERPL-SCNC: 4.6 MMOL/L (ref 3.5–5.3)
PROT SERPL-MCNC: 7.4 G/DL (ref 6.4–8.2)
PROT UR STRIP-MCNC: NEGATIVE MG/DL
PROTHROMBIN TIME: 13.2 SECONDS (ref 9.8–12)
RBC # BLD AUTO: 5.06 MILLION/UL (ref 3.81–5.12)
RBC #/AREA URNS AUTO: ABNORMAL /HPF
SODIUM SERPL-SCNC: 137 MMOL/L (ref 136–145)
SP GR UR STRIP.AUTO: <=1.005 (ref 1–1.03)
TROPONIN I SERPL-MCNC: 0.02 NG/ML
UROBILINOGEN UR QL STRIP.AUTO: 0.2 E.U./DL
WBC # BLD AUTO: 5.81 THOUSAND/UL (ref 4.31–10.16)
WBC #/AREA URNS AUTO: ABNORMAL /HPF

## 2020-03-04 PROCEDURE — 93005 ELECTROCARDIOGRAM TRACING: CPT

## 2020-03-04 PROCEDURE — 93000 ELECTROCARDIOGRAM COMPLETE: CPT | Performed by: INTERNAL MEDICINE

## 2020-03-04 PROCEDURE — 99220 PR INITIAL OBSERVATION CARE/DAY 70 MINUTES: CPT | Performed by: NURSE PRACTITIONER

## 2020-03-04 PROCEDURE — 71045 X-RAY EXAM CHEST 1 VIEW: CPT

## 2020-03-04 PROCEDURE — 1160F RVW MEDS BY RX/DR IN RCRD: CPT | Performed by: INTERNAL MEDICINE

## 2020-03-04 PROCEDURE — 85610 PROTHROMBIN TIME: CPT | Performed by: EMERGENCY MEDICINE

## 2020-03-04 PROCEDURE — 84484 ASSAY OF TROPONIN QUANT: CPT | Performed by: EMERGENCY MEDICINE

## 2020-03-04 PROCEDURE — 3008F BODY MASS INDEX DOCD: CPT | Performed by: INTERNAL MEDICINE

## 2020-03-04 PROCEDURE — 1036F TOBACCO NON-USER: CPT | Performed by: INTERNAL MEDICINE

## 2020-03-04 PROCEDURE — 3077F SYST BP >= 140 MM HG: CPT | Performed by: INTERNAL MEDICINE

## 2020-03-04 PROCEDURE — 99214 OFFICE O/P EST MOD 30 MIN: CPT | Performed by: INTERNAL MEDICINE

## 2020-03-04 PROCEDURE — 3080F DIAST BP >= 90 MM HG: CPT | Performed by: INTERNAL MEDICINE

## 2020-03-04 PROCEDURE — 80053 COMPREHEN METABOLIC PANEL: CPT | Performed by: EMERGENCY MEDICINE

## 2020-03-04 PROCEDURE — 83880 ASSAY OF NATRIURETIC PEPTIDE: CPT | Performed by: EMERGENCY MEDICINE

## 2020-03-04 PROCEDURE — 99283 EMERGENCY DEPT VISIT LOW MDM: CPT | Performed by: EMERGENCY MEDICINE

## 2020-03-04 PROCEDURE — 81001 URINALYSIS AUTO W/SCOPE: CPT | Performed by: NURSE PRACTITIONER

## 2020-03-04 PROCEDURE — 4040F PNEUMOC VAC/ADMIN/RCVD: CPT | Performed by: INTERNAL MEDICINE

## 2020-03-04 PROCEDURE — 99285 EMERGENCY DEPT VISIT HI MDM: CPT

## 2020-03-04 PROCEDURE — 36415 COLL VENOUS BLD VENIPUNCTURE: CPT | Performed by: EMERGENCY MEDICINE

## 2020-03-04 PROCEDURE — 85025 COMPLETE CBC W/AUTO DIFF WBC: CPT | Performed by: EMERGENCY MEDICINE

## 2020-03-04 PROCEDURE — 85730 THROMBOPLASTIN TIME PARTIAL: CPT | Performed by: EMERGENCY MEDICINE

## 2020-03-04 RX ORDER — LEVOTHYROXINE SODIUM 0.07 MG/1
75 TABLET ORAL
Status: DISCONTINUED | OUTPATIENT
Start: 2020-03-05 | End: 2020-03-10 | Stop reason: HOSPADM

## 2020-03-04 RX ORDER — PRAVASTATIN SODIUM 80 MG/1
80 TABLET ORAL
Status: DISCONTINUED | OUTPATIENT
Start: 2020-03-05 | End: 2020-03-10 | Stop reason: HOSPADM

## 2020-03-04 RX ORDER — ACETAMINOPHEN 325 MG/1
650 TABLET ORAL EVERY 6 HOURS PRN
Status: DISCONTINUED | OUTPATIENT
Start: 2020-03-04 | End: 2020-03-10 | Stop reason: HOSPADM

## 2020-03-04 RX ORDER — ONDANSETRON 2 MG/ML
4 INJECTION INTRAMUSCULAR; INTRAVENOUS EVERY 6 HOURS PRN
Status: DISCONTINUED | OUTPATIENT
Start: 2020-03-04 | End: 2020-03-10 | Stop reason: HOSPADM

## 2020-03-04 RX ORDER — FUROSEMIDE 40 MG/1
40 TABLET ORAL DAILY
Status: DISCONTINUED | OUTPATIENT
Start: 2020-03-05 | End: 2020-03-10 | Stop reason: HOSPADM

## 2020-03-04 RX ORDER — MELATONIN
1000 DAILY
Status: DISCONTINUED | OUTPATIENT
Start: 2020-03-05 | End: 2020-03-10 | Stop reason: HOSPADM

## 2020-03-04 RX ORDER — DILTIAZEM HYDROCHLORIDE 5 MG/ML
20 INJECTION INTRAVENOUS ONCE
Status: DISCONTINUED | OUTPATIENT
Start: 2020-03-04 | End: 2020-03-04

## 2020-03-04 RX ADMIN — RIVAROXABAN 15 MG: 15 TABLET, FILM COATED ORAL at 23:00

## 2020-03-04 NOTE — ED PROVIDER NOTES
History  Chief Complaint   Patient presents with    Shortness of Breath     C/O SOB and runny nose x 1 month  [de-identified] yo female sent from cardiology admission for admission for Atrial fib with RVR  Rate was 160's in office  Pt  C/o nasal congestion, palpitations and mild sob when laying flat  No chest pain  No fever  History provided by:  Patient   used: No    Shortness of Breath   Associated symptoms: no abdominal pain, no chest pain, no cough, no fever, no headaches, no rash and no vomiting        Prior to Admission Medications   Prescriptions Last Dose Informant Patient Reported? Taking?    Cholecalciferol (VITAMIN D) 2000 units CAPS  Self Yes No   Sig: Take 1 capsule by mouth daily   Multiple Vitamins-Minerals (CENTRUM SILVER ULTRA WOMENS) TABS  Self Yes No   Sig: Take by mouth   XARELTO 20 MG tablet  Self No No   Sig: TAKE ONE TABLET BY MOUTH EVERY DAY WITH DINNER   betamethasone, augmented, (DIPROLENE) 0 05 % lotion  Self No No   Sig: Apply topically 2 (two) times a day   Patient not taking: Reported on 10/8/2019   diclofenac sodium (VOLTAREN) 1 %  Self No No   Sig: Apply 2 g topically 4 (four) times a day   Patient not taking: Reported on 3/4/2020   furosemide (LASIX) 40 mg tablet  Self No No   Sig: TAKE ONE TABLET BY MOUTH EVERY DAY   halobetasol (ULTRAVATE) 0 05 % cream  Self Yes No   levothyroxine 75 mcg tablet  Self No No   Sig: TAKE ONE TABLET BY MOUTH EVERY DAY   potassium chloride (K-DUR,KLOR-CON) 20 mEq tablet  Self No No   Sig: Take 1 tablet (20 mEq total) by mouth daily   Patient not taking: Reported on 10/8/2019   predniSONE 20 mg tablet  Self No No   Si tabs for three days, 3 tabs for three days, 2 tabs for three days, 1 tab for three days, 1/2 tab for 4 days   Patient not taking: Reported on 2019   promethazine-codeine (PHENERGAN WITH CODEINE) 6 25-10 mg/5 mL syrup  Self No No   Sig: Take 5 mL by mouth every 4 (four) hours as needed for cough   Patient not taking: Reported on 1/6/2020   rosuvastatin (CRESTOR) 10 MG tablet  Self No No   Sig: Take 1 tablet (10 mg total) by mouth daily      Facility-Administered Medications: None       Past Medical History:   Diagnosis Date    Atrial flutter ([address] )     CAD (coronary artery disease)     s/p PCIx1 -2012    Deafness     Disease of thyroid gland     Hyperlipidemia     Hypertension        Past Surgical History:   Procedure Laterality Date    BREAST BIOPSY Left     negative    CARDIAC SURGERY      stents    ESOPHAGOGASTRODUODENOSCOPY N/A 2/20/2017    Procedure: ESOPHAGOGASTRODUODENOSCOPY (EGD); Surgeon: Bethany Quintero MD;  Location: [facility]; Service:        Family History   Problem Relation Age of Onset    Hypertension Mother         essential    Breast cancer Mother     No Known Problems Sister     No Known Problems Daughter     No Known Problems Sister     No Known Problems Sister      I have reviewed and agree with the history as documented  E-Cigarette/Vaping    E-Cigarette Use Never User      E-Cigarette/Vaping Substances     Social History     Tobacco Use    Smoking status: Never Smoker    Smokeless tobacco: Never Used   Substance Use Topics    Alcohol use: No    Drug use: No       Review of Systems   Constitutional: Negative  Negative for fever  HENT: Positive for congestion  Eyes: Negative  Respiratory: Positive for shortness of breath  Negative for cough  Cardiovascular: Positive for palpitations  Negative for chest pain  Gastrointestinal: Negative  Negative for abdominal pain, diarrhea, nausea and vomiting  Genitourinary: Negative  Negative for dysuria and flank pain  Musculoskeletal: Negative  Negative for back pain and myalgias  Skin: Negative  Negative for rash and wound  Neurological: Negative  Negative for dizziness and headaches  Hematological: Does not bruise/bleed easily  Psychiatric/Behavioral: Negative      All other systems reviewed and are negative  Physical Exam  Physical Exam   Constitutional: She is oriented to person, place, and time  She appears well-developed and well-nourished  No distress  HENT:   Head: Normocephalic and atraumatic  Eyes: Conjunctivae are normal    Neck: Normal range of motion  Neck supple  Cardiovascular: Normal heart sounds  An irregularly irregular rhythm present  Tachycardia present  No murmur heard  Pulmonary/Chest: Effort normal and breath sounds normal  No respiratory distress  Abdominal: Soft  Bowel sounds are normal  She exhibits no distension  There is no tenderness  Musculoskeletal: Normal range of motion  She exhibits no edema or deformity  Neurological: She is alert and oriented to person, place, and time  No cranial nerve deficit  She exhibits normal muscle tone  Skin: Skin is warm and dry  No rash noted  She is not diaphoretic  No pallor  Psychiatric: She has a normal mood and affect  Her behavior is normal    Nursing note and vitals reviewed        Vital Signs  ED Triage Vitals [03/04/20 1743]   Temperature Pulse Respirations Blood Pressure SpO2   99 1 °F (37 3 °C) 92 22 (!) 160/106 93 %      Temp Source Heart Rate Source Patient Position - Orthostatic VS BP Location FiO2 (%)   Tympanic Monitor -- -- --      Pain Score       No Pain           Vitals:    03/04/20 1743 03/04/20 1800 03/04/20 1815 03/04/20 1845   BP: (!) 160/106 (!) 151/109 140/83 139/87   Pulse: 92 104 94 88         Visual Acuity      ED Medications  Medications - No data to display    Diagnostic Studies  Results Reviewed     Procedure Component Value Units Date/Time    Comprehensive metabolic panel [[id number]]  (Abnormal) Collected:  03/04/20 1746    Lab Status:  Final result Specimen:  Blood from Arm, Left Updated:  03/04/20 1819     Sodium 137 mmol/L      Potassium 4 6 mmol/L      Chloride 100 mmol/L      CO2 28 mmol/L      ANION GAP 9 mmol/L      BUN 26 mg/dL      Creatinine 0 96 mg/dL      Glucose 95 mg/dL      Calcium 9 3 mg/dL      AST 24 U/L      ALT 21 U/L      Alkaline Phosphatase 92 U/L      Total Protein 7 4 g/dL      Albumin 3 5 g/dL      Total Bilirubin 0 60 mg/dL      eGFR 56 ml/min/1 73sq m     Narrative:       [place] guidelines for Chronic Kidney Disease (CKD):     Stage 1 with normal or high GFR (GFR > 90 mL/min/1 73 square meters)    Stage 2 Mild CKD (GFR = 60-89 mL/min/1 73 square meters)    Stage 3A Moderate CKD (GFR = 45-59 mL/min/1 73 square meters)    Stage 3B Moderate CKD (GFR = 30-44 mL/min/1 73 square meters)    Stage 4 Severe CKD (GFR = 15-29 mL/min/1 73 square meters)    Stage 5 End Stage CKD (GFR <15 mL/min/1 73 square meters)  Note: GFR calculation is accurate only with a steady state creatinine    NT-BNP PRO [[id number]]  (Abnormal) Collected:  03/04/20 1746    Lab Status:  Final result Specimen:  Blood from Arm, Left Updated:  03/04/20 1819     NT-proBNP 893 pg/mL     Troponin I [[id number]]  (Normal) Collected:  03/04/20 1746    Lab Status:  Final result Specimen:  Blood from Arm, Left Updated:  03/04/20 1817     Troponin I 0 02 ng/mL     Protime-INR [[id number]]  (Abnormal) Collected:  03/04/20 1746    Lab Status:  Final result Specimen:  Blood from Arm, Left Updated:  03/04/20 1813     Protime 13 2 seconds      INR 1 23    APTT [[id number]]  (Normal) Collected:  03/04/20 1746    Lab Status:  Final result Specimen:  Blood from Arm, Left Updated:  03/04/20 1813     PTT 34 seconds     CBC and differential [[id number]] Collected:  03/04/20 1746    Lab Status:  Final result Specimen:  Blood from Arm, Left Updated:  03/04/20 1755     WBC 5 81 Thousand/uL      RBC 5 06 Million/uL      Hemoglobin 14 8 g/dL      Hematocrit 44 8 %      MCV 89 fL      MCH 29 2 pg      MCHC 33 0 g/dL      RDW 13 3 %      MPV 9 9 fL      Platelets 849 Thousands/uL      nRBC 0 /100 WBCs      Neutrophils Relative 54 %      Immat GRANS % 0 %      Lymphocytes Relative 31 %      Monocytes Relative 12 % Eosinophils Relative 2 %      Basophils Relative 1 %      Neutrophils Absolute 3 16 Thousands/µL      Immature Grans Absolute 0 01 Thousand/uL      Lymphocytes Absolute 1 77 Thousands/µL      Monocytes Absolute 0 69 Thousand/µL      Eosinophils Absolute 0 13 Thousand/µL      Basophils Absolute 0 05 Thousands/µL     UA (URINE) with reflex to Scope [[id number]]     Lab Status:  No result Specimen:  Urine                  XR chest 1 view portable   ED Interpretation by Helen Mckeon MD (77/76 [id number])   CM, NAD                 Procedures  ECG 12 Lead Documentation Only  Date/Time: 3/4/2020 5:32 PM  Performed by: Helen Mckeon MD  Authorized by: Helen Mckeon MD     Indications / Diagnosis:  Afib  ECG reviewed by me, the ED Provider: yes    Patient location:  ED  Previous ECG:     Previous ECG:  Compared to current  Interpretation:     Interpretation: abnormal    Rate:     ECG rate:  107    ECG rate assessment: tachycardic    Rhythm:     Rhythm: sinus tachycardia    Ectopy:     Ectopy: none    QRS:     QRS axis:  Left  Conduction:     Conduction: abnormal      Abnormal conduction: complete RBBB and LAFB    ST segments:     ST segments:  Normal  T waves:     T waves: normal               ED Course  ED Course as of Mar 04 1902   Wed Mar 04, 2020   1814 Heart rate in the 90's now before cardizem  Will hold and monitor  MDM  Number of Diagnoses or Management Options  Diagnosis management comments: 8033 - pt  Seen by me and resident  HR mainly in the 90's without med  Will admit for cardioversion tomorrow as per Dr Elsa Andino          Disposition  Final diagnoses:   Atrial fibrillation with RVR ([address] )     Time reflects when diagnosis was documented in both MDM as applicable and the Disposition within this note     Time User Action Codes Description Comment    5/8/1094  7:86 PM Ivanna MCGEE Add [M23 43] Atrial fibrillation with RVR [facility])       ED Disposition     ED Disposition Condition Date/Time Comment    Admit Stable Wed Mar 4, 2020  6:40 PM Case was discussed with *Dr Apolinar Severino** and the patient's admission status was agreed to be Admission Status: observation status to the service of Dr Roel Harperspitalvernell*   Follow-up Information    None         Patient's Medications   Discharge Prescriptions    No medications on file     No discharge procedures on file      PDMP Review     None          ED Provider  Electronically Signed by           Marie Garcia MD  75/[id number]/63 [id number]

## 2020-03-04 NOTE — PROGRESS NOTES
Cardiology Follow Up    Salomón Georges  1940  [id number]      Interval History: Salomón Georges is here for followup of atrial fibrillation and edema  For the past month, she has been feeling palpitations, shortness of breath with exertion and discomfort after eating  She has had a large amount of nasal congestion  Fluttering feeling in chest at night while laying down  She does not have LE edema  ECG today shows atrial flutter has returned  She underwent cardioversion on 8/27/2019  Had maintained sinus rhythm until now  Her last episode prior to that occurred in January 2019  She was successfully cardioverted to sinus rhythm and was started on amiodarone afterwards  Amiodarone was stopped because of GI side effects  Prior to her last visit, she was started on Bystolic and stopped due to nausea  Previously, she felt metoprolol was causing her to feel bad as well  She has been taking Xarelto regularly      The following portions of the patient's history were reviewed and updated as appropriate: allergies, current medications, past family history, past medical history, past social history, past surgical history and problem list     Current Outpatient Medications on File Prior to Visit   Medication Sig Dispense Refill    furosemide (LASIX) 40 mg tablet TAKE ONE TABLET BY MOUTH EVERY DAY 90 tablet 3    halobetasol (ULTRAVATE) 0 05 % cream   0    levothyroxine 75 mcg tablet TAKE ONE TABLET BY MOUTH EVERY DAY 90 tablet 3    Multiple Vitamins-Minerals (CENTRUM SILVER ULTRA WOMENS) TABS Take by mouth      rosuvastatin (CRESTOR) 10 MG tablet Take 1 tablet (10 mg total) by mouth daily 90 tablet 3    XARELTO 20 MG tablet TAKE ONE TABLET BY MOUTH EVERY DAY WITH DINNER 90 tablet 3    betamethasone, augmented, (DIPROLENE) 0 05 % lotion Apply topically 2 (two) times a day (Patient not taking: Reported on 10/8/2019) 30 mL 0    Cholecalciferol (VITAMIN D) 2000 units CAPS Take 1 capsule by mouth daily      diclofenac sodium (VOLTAREN) 1 % Apply 2 g topically 4 (four) times a day (Patient not taking: Reported on 3/4/2020) 1 Tube 3    potassium chloride (K-DUR,KLOR-CON) 20 mEq tablet Take 1 tablet (20 mEq total) by mouth daily (Patient not taking: Reported on 10/8/2019) 30 tablet 0    predniSONE 20 mg tablet 4 tabs for three days, 3 tabs for three days, 2 tabs for three days, 1 tab for three days, 1/2 tab for 4 days (Patient not taking: Reported on 11/20/2019) 32 tablet 0    promethazine-codeine (PHENERGAN WITH CODEINE) 6 25-10 mg/5 mL syrup Take 5 mL by mouth every 4 (four) hours as needed for cough (Patient not taking: Reported on 1/6/2020) 120 mL 0     No current facility-administered medications on file prior to visit  Review of Systems:  Review of Systems   Constitutional: Positive for fatigue  HENT: Positive for hearing loss  Respiratory: Positive for shortness of breath  Cardiovascular: Positive for palpitations  Negative for chest pain and leg swelling  Musculoskeletal: Positive for arthralgias and gait problem  All other systems reviewed and are negative  Physical Exam:  /100 (BP Location: Left arm, Patient Position: Sitting, Cuff Size: Standard)   Pulse (!) 160   Ht 5' 2" (1 575 m)   Wt 63 kg (139 lb)   SpO2 97% Comment: RA  BMI 25 42 kg/m²     Physical Exam   Constitutional: She is oriented to person, place, and time  She appears well-developed  No distress  HENT:   Head: Normocephalic and atraumatic  Eyes: Pupils are equal, round, and reactive to light  Conjunctivae and EOM are normal    Neck: Neck supple  No JVD present  No thyromegaly present  Cardiovascular: An irregularly irregular rhythm present  Tachycardia present  Exam reveals no gallop and no friction rub  Murmur heard  Pulmonary/Chest: Effort normal and breath sounds normal    Abdominal: Soft  She exhibits no distension  There is no tenderness  Musculoskeletal: She exhibits no edema  Neurological: She is alert and oriented to person, place, and time  No cranial nerve deficit  Skin: Skin is warm and dry  No rash noted  She is not diaphoretic  No erythema  Psychiatric: She has a normal mood and affect  Her behavior is normal  Judgment and thought content normal        Cardiographics  ECG:  Atrial flutter with rate 140 bpm    Labs:  Lab Results   Component Value Date     08/15/2017    K 4 6 02/10/2020     02/10/2020    CO2 25 02/10/2020    BUN 21 02/10/2020    CREATININE 1 15 (H) 02/10/2020    CREATININE 1 05 06/27/2019    CREATININE 0 88 08/15/2017    GLUCOSE 94 08/15/2017    CALCIUM 9 6 06/27/2019    CALCIUM 9 2 08/15/2017     Lab Results   Component Value Date    WBC 5 0 02/10/2020    WBC 5 72 06/27/2019    WBC 4 8 08/15/2017    HGB 15 0 02/10/2020    HGB 14 0 06/27/2019    HGB 14 2 08/15/2017    HCT 44 6 02/10/2020    HCT 43 6 06/27/2019    HCT 43 7 08/15/2017    MCV 87 02/10/2020    MCV 87 06/27/2019    MCV 86 08/15/2017     02/10/2020     06/27/2019     08/15/2017     Lab Results   Component Value Date    CHOL 164 08/15/2017    TRIG 62 02/10/2020    HDL 62 02/10/2020     Imaging: No results found  Discussion/Summary:  1  Typical atrial flutter ([address] )    2  Essential hypertension      - She has returned to atrial flutter  Was previously on amiodarone which caused multiple side effects  Could not tolerate beta blocker either  Will attempt rate control with cardizem  - Discussed with patient and her  in detail  She is symptomatic and tachycardic  Encouraged to go to ER for rate control and cardioversion tomorrow  - Patient is on Xarelto for atrial fibrillation   - Aspirin was discontinued and she was continued on Xarelto alone because of excessive bruising    - Amiodarone was stopped due to GI side effects and abnormal TSH     - Will refer to EP as outpatient for RFA of atrial flutter

## 2020-03-05 ENCOUNTER — ANESTHESIA (OUTPATIENT)
Dept: NON INVASIVE DIAGNOSTICS | Facility: HOSPITAL | Age: 80
DRG: 308 | End: 2020-03-05
Payer: COMMERCIAL

## 2020-03-05 ENCOUNTER — APPOINTMENT (OUTPATIENT)
Dept: NON INVASIVE DIAGNOSTICS | Facility: HOSPITAL | Age: 80
DRG: 308 | End: 2020-03-05
Attending: INTERNAL MEDICINE
Payer: COMMERCIAL

## 2020-03-05 LAB
ANION GAP SERPL CALCULATED.3IONS-SCNC: 7 MMOL/L (ref 4–13)
ATRIAL RATE: 129 BPM
BUN SERPL-MCNC: 20 MG/DL (ref 5–25)
CALCIUM SERPL-MCNC: 8.7 MG/DL (ref 8.3–10.1)
CHLORIDE SERPL-SCNC: 102 MMOL/L (ref 100–108)
CO2 SERPL-SCNC: 28 MMOL/L (ref 21–32)
CREAT SERPL-MCNC: 0.87 MG/DL (ref 0.6–1.3)
ERYTHROCYTE [DISTWIDTH] IN BLOOD BY AUTOMATED COUNT: 13.2 % (ref 11.6–15.1)
GFR SERPL CREATININE-BSD FRML MDRD: 63 ML/MIN/1.73SQ M
GLUCOSE P FAST SERPL-MCNC: 97 MG/DL (ref 65–99)
GLUCOSE SERPL-MCNC: 97 MG/DL (ref 65–140)
HCT VFR BLD AUTO: 44.2 % (ref 34.8–46.1)
HGB BLD-MCNC: 14.4 G/DL (ref 11.5–15.4)
MAGNESIUM SERPL-MCNC: 2.3 MG/DL (ref 1.6–2.6)
MCH RBC QN AUTO: 28.8 PG (ref 26.8–34.3)
MCHC RBC AUTO-ENTMCNC: 32.6 G/DL (ref 31.4–37.4)
MCV RBC AUTO: 88 FL (ref 82–98)
PHOSPHATE SERPL-MCNC: 3.2 MG/DL (ref 2.3–4.1)
PLATELET # BLD AUTO: 213 THOUSANDS/UL (ref 149–390)
PMV BLD AUTO: 9.4 FL (ref 8.9–12.7)
POTASSIUM SERPL-SCNC: 4.2 MMOL/L (ref 3.5–5.3)
QRS AXIS: -53 DEGREES
QRSD INTERVAL: 124 MS
QT INTERVAL: 308 MS
QTC INTERVAL: 411 MS
RBC # BLD AUTO: 5 MILLION/UL (ref 3.81–5.12)
SODIUM SERPL-SCNC: 137 MMOL/L (ref 136–145)
T WAVE AXIS: -5 DEGREES
TSH SERPL DL<=0.05 MIU/L-ACNC: 7.13 UIU/ML (ref 0.36–3.74)
VENTRICULAR RATE: 107 BPM
WBC # BLD AUTO: 6.3 THOUSAND/UL (ref 4.31–10.16)

## 2020-03-05 PROCEDURE — 92960 CARDIOVERSION ELECTRIC EXT: CPT

## 2020-03-05 PROCEDURE — 93306 TTE W/DOPPLER COMPLETE: CPT

## 2020-03-05 PROCEDURE — ND001 PR NO DOCUMENTATION: Performed by: INTERNAL MEDICINE

## 2020-03-05 PROCEDURE — 93306 TTE W/DOPPLER COMPLETE: CPT | Performed by: INTERNAL MEDICINE

## 2020-03-05 PROCEDURE — 84100 ASSAY OF PHOSPHORUS: CPT | Performed by: NURSE PRACTITIONER

## 2020-03-05 PROCEDURE — 99232 SBSQ HOSP IP/OBS MODERATE 35: CPT | Performed by: INTERNAL MEDICINE

## 2020-03-05 PROCEDURE — 84443 ASSAY THYROID STIM HORMONE: CPT | Performed by: NURSE PRACTITIONER

## 2020-03-05 PROCEDURE — 92960 CARDIOVERSION ELECTRIC EXT: CPT | Performed by: INTERNAL MEDICINE

## 2020-03-05 PROCEDURE — NC001 PR NO CHARGE: Performed by: INTERNAL MEDICINE

## 2020-03-05 PROCEDURE — 93312 ECHO TRANSESOPHAGEAL: CPT

## 2020-03-05 PROCEDURE — 85027 COMPLETE CBC AUTOMATED: CPT | Performed by: NURSE PRACTITIONER

## 2020-03-05 PROCEDURE — 5A2204Z RESTORATION OF CARDIAC RHYTHM, SINGLE: ICD-10-PCS | Performed by: INTERNAL MEDICINE

## 2020-03-05 PROCEDURE — 93312 ECHO TRANSESOPHAGEAL: CPT | Performed by: INTERNAL MEDICINE

## 2020-03-05 PROCEDURE — 93325 DOPPLER ECHO COLOR FLOW MAPG: CPT | Performed by: INTERNAL MEDICINE

## 2020-03-05 PROCEDURE — 80048 BASIC METABOLIC PNL TOTAL CA: CPT | Performed by: NURSE PRACTITIONER

## 2020-03-05 PROCEDURE — 99223 1ST HOSP IP/OBS HIGH 75: CPT | Performed by: INTERNAL MEDICINE

## 2020-03-05 PROCEDURE — 93010 ELECTROCARDIOGRAM REPORT: CPT | Performed by: INTERNAL MEDICINE

## 2020-03-05 PROCEDURE — 83735 ASSAY OF MAGNESIUM: CPT | Performed by: NURSE PRACTITIONER

## 2020-03-05 RX ORDER — SOTALOL HYDROCHLORIDE 80 MG/1
80 TABLET ORAL EVERY 12 HOURS SCHEDULED
Status: DISCONTINUED | OUTPATIENT
Start: 2020-03-05 | End: 2020-03-06

## 2020-03-05 RX ORDER — PROPOFOL 10 MG/ML
INJECTION, EMULSION INTRAVENOUS AS NEEDED
Status: DISCONTINUED | OUTPATIENT
Start: 2020-03-05 | End: 2020-03-05 | Stop reason: SURG

## 2020-03-05 RX ORDER — SODIUM CHLORIDE 9 MG/ML
INJECTION, SOLUTION INTRAVENOUS CONTINUOUS PRN
Status: DISCONTINUED | OUTPATIENT
Start: 2020-03-05 | End: 2020-03-05 | Stop reason: SURG

## 2020-03-05 RX ORDER — LIDOCAINE HYDROCHLORIDE 10 MG/ML
INJECTION, SOLUTION EPIDURAL; INFILTRATION; INTRACAUDAL; PERINEURAL AS NEEDED
Status: DISCONTINUED | OUTPATIENT
Start: 2020-03-05 | End: 2020-03-05 | Stop reason: SURG

## 2020-03-05 RX ADMIN — SOTALOL HYDROCHLORIDE 80 MG: 80 TABLET ORAL at 20:50

## 2020-03-05 RX ADMIN — SOTALOL HYDROCHLORIDE 80 MG: 80 TABLET ORAL at 12:11

## 2020-03-05 RX ADMIN — RIVAROXABAN 20 MG: 10 TABLET, FILM COATED ORAL at 17:18

## 2020-03-05 RX ADMIN — PROPOFOL 20 MG: 10 INJECTION, EMULSION INTRAVENOUS at 10:53

## 2020-03-05 RX ADMIN — PRAVASTATIN SODIUM 80 MG: 80 TABLET ORAL at 17:18

## 2020-03-05 RX ADMIN — PROPOFOL 80 MG: 10 INJECTION, EMULSION INTRAVENOUS at 10:47

## 2020-03-05 RX ADMIN — DILTIAZEM HYDROCHLORIDE 5 MG/HR: 5 INJECTION INTRAVENOUS at 09:13

## 2020-03-05 RX ADMIN — SODIUM CHLORIDE: 0.9 INJECTION, SOLUTION INTRAVENOUS at 10:45

## 2020-03-05 RX ADMIN — VITAMIN D, TAB 1000IU (100/BT) 1000 UNITS: 25 TAB at 08:28

## 2020-03-05 RX ADMIN — LIDOCAINE HYDROCHLORIDE 50 MG: 10 INJECTION, SOLUTION EPIDURAL; INFILTRATION; INTRACAUDAL; PERINEURAL at 10:47

## 2020-03-05 RX ADMIN — PROPOFOL 30 MG: 10 INJECTION, EMULSION INTRAVENOUS at 10:49

## 2020-03-05 RX ADMIN — FUROSEMIDE 40 MG: 40 TABLET ORAL at 08:28

## 2020-03-05 RX ADMIN — PROPOFOL 20 MG: 10 INJECTION, EMULSION INTRAVENOUS at 10:51

## 2020-03-05 RX ADMIN — LEVOTHYROXINE SODIUM 75 MCG: 75 TABLET ORAL at 06:16

## 2020-03-05 NOTE — PROGRESS NOTES
Progress Note Khadijah Dee 1940, [de-identified] y o  female MRN: [id number]    Unit/Bed#: [address] [place] [address] Encounter: [id number]    Primary Care Provider: Earnestine Pierson MD   Date and time admitted to hospital: 3/4/2020  5:35 PM        * Atrial fibrillation with RVR [facility])  Assessment & Plan  Underwent cardioversion on 8/27/2019  Had maintained sinus rhythm until now when seen by her primary cardiologist on 3/4/2020 who noted her heart rate to be in the 160s and advised admission to the hospital for Cardizem drip followed by cardioversion  EKG in the ED showed atrial fibrillation with RVR and heart rate of 107  · Patient was on Cardizem drip for a brief period of time  · Patient is on anticoagulation with Xarelto at a lower dose dose of which was increased to 20 milligram p o  Daily  · Not on Amiodarone, Metoprolol, or Bystolic due to side effects  · Patient underwent cardioversion to sinus rhythm  · Patient was started on sotalol 80 milligram p o  B i d   · Patient will need monitoring of the QTC 48 hours  Chronic diastolic heart failure [facility])  Assessment & Plan  Wt Readings from Last 3 Encounters:   03/05/20 63 kg (138 lb 14 2 oz)   03/04/20 63 kg (139 lb)   01/06/20 63 kg (139 lb)     Trace lower extremity edema  No crackles   which is slightly higher than baseline  No shortness of breath at rest but is complaining of shortness of breath with exertion which could be related to atrial fibrillation  · Hold off on any IV Lasix at this time  · Continue Lasix 40 milligram p o  Daily  · Monitor I&O, daily weights    Essential hypertension  Assessment & Plan  No longer on any blood pressure medications  · Continue Lasix    Dyslipidemia  Assessment & Plan  · Continue statin (auto substitute Crestor with Pravastatin while hospitalized)    Hypothyroidism  Assessment & Plan    · Continue Synthroid 75 mcg daily  · TSH is slightly high at 7    Will hold off on increasing Synthroid S patient had atrial fibrillation  · Follow-up outpatient with PCP          VTE Pharmacologic Prophylaxis:   Pharmacologic: Rivaroxaban (Xarelto)  Mechanical VTE Prophylaxis in Place: Yes    Patient Centered Rounds: I have performed bedside rounds with nursing staff today  Discussions with Specialists or Other Care Team Provider: Yes  Education and Discussions with Family / Alfonso Cabot  Time Spent for Care: 45 minutes  More than 50% of total time spent on counseling and coordination of care as described above  Current Length of Stay: 0 day(s)  Current Patient Status: Inpatient     Discharge Plan:  Home    Code Status: Level 1 - Full Code      Subjective:   Patient is feeling better  Denies any shortness of breath, chest pain or palpitations  Objective:     Vitals:   Temp (24hrs), Av 4 °F (36 9 °C), Min:97 9 °F (36 6 °C), Max:99 1 °F (37 3 °C)    Temp:  [97 9 °F (36 6 °C)-99 1 °F (37 3 °C)] 98 8 °F (37 1 °C)  HR:  [] 72  Resp:  [18-25] 22  BP: (108-174)/() 150/80  SpO2:  [92 %-97 %] 95 %  Body mass index is 31 24 kg/m²  Input and Output Summary (last 24 hours): Intake/Output Summary (Last 24 hours) at 3/5/2020 1436  Last data filed at 3/5/2020 1100  Gross per 24 hour   Intake 250 ml   Output 1350 ml   Net -1100 ml        Physical Exam:     Physical Exam   Constitutional: No distress  HENT:   Head: Normocephalic and atraumatic  Nose: Nose normal    Eyes: Pupils are equal, round, and reactive to light  Conjunctivae are normal    Neck: Normal range of motion  Neck supple  Cardiovascular: Normal rate, regular rhythm and normal heart sounds  Pulmonary/Chest: Effort normal and breath sounds normal  No respiratory distress  She has no wheezes  She has no rales  Abdominal: Soft  Bowel sounds are normal  She exhibits no distension  There is no tenderness  There is no rebound and no guarding  Musculoskeletal: She exhibits edema  Trace edema   Neurological: She is alert   No cranial nerve deficit  Skin: Skin is warm and dry  No rash noted  Additional Data:     Labs:    Results from last 7 days   Lab Units 03/05/20  0616 03/04/20  1746   WBC Thousand/uL 6 30 5 81   HEMOGLOBIN g/dL 14 4 14 8   HEMATOCRIT % 44 2 44 8   PLATELETS Thousands/uL 213 238   NEUTROS PCT %  --  54     Results from last 7 days   Lab Units 03/05/20  0616 03/04/20  1746   SODIUM mmol/L 137 137   POTASSIUM mmol/L 4 2 4 6   CHLORIDE mmol/L 102 100   CO2 mmol/L 28 28   BUN mg/dL 20 26*   CREATININE mg/dL 0 87 0 96   CALCIUM mg/dL 8 7 9 3   TOTAL BILIRUBIN mg/dL  --  0 60   ALK PHOS U/L  --  92   ALT U/L  --  21   AST U/L  --  24     Results from last 7 days   Lab Units 03/04/20  1746   INR  1 23*     Results from last 7 days   Lab Units 03/04/20  1746   TROPONIN I ng/mL 0 02     No results found for: HGBA1C            * I Have Reviewed All Lab Data Listed Above  * Additional Pertinent Lab Tests Reviewed: [address] Admission Reviewed    Imaging:     XR chest 1 view portable   ED Interpretation by Vashti Peres MD (01/32 1601)   CM, NAD      Final Result by Carie Love MD (03/05 0805)      Minimal bibasilar atelectasis  Cardiomegaly              Workstation performed: [id number]           Imaging Reports Reviewed by myself    Cultures:   Blood Culture: No results found for: BLOODCX  Urine Culture:   Lab Results   Component Value Date    URINECX 70,000-79,000 cfu/ml Mixed Contaminants X4 02/17/2017     Sputum Culture: No components found for: SPUTUMCX  Wound Culture: No results found for: WOUNDCULT    Last 24 Hours Medication List:     Current Facility-Administered Medications:  acetaminophen 650 mg Oral Q6H PRN Kurt Hamburger, CRNP   cholecalciferol 1,000 Units Oral Daily Kurt Hamburger, CRNP   furosemide 40 mg Oral Daily Kurt Hamburger, CRNP   levothyroxine 75 mcg Oral Early Morning Kurt Hamburger, CRNP   ondansetron 4 mg Intravenous Q6H PRN Kurt Hamburger, CRNP pravastatin 80 mg Oral Daily With [facility]   rivaroxaban 20 mg Oral Daily With Jorge L Flowers MD   sotalol 80 mg Oral Q12H [address] Lydia Espinosa MD        Today, Patient Was Seen By: Nehemias Hui MD    ** Please Note: Dragon 360 Dictation voice to text software may have been used in the creation of this document   **

## 2020-03-05 NOTE — UTILIZATION REVIEW
Initial Clinical Review    Admission: Date/Time/Statement: Admission Orders (From admission, onward)     Ordered        03/04/20 1901  Place in Observation (expected length of stay for this patient is less than two midnights)  Once                   Orders Placed This Encounter   Procedures    Place in Observation (expected length of stay for this patient is less than two midnights)     Standing Status:   Standing     Number of Occurrences:   1     Order Specific Question:   Admitting Physician     Answer:   Rachid Gorman     Order Specific Question:   Level of Care     Answer:   Med Surg [16]     ED Arrival Information     Expected Arrival Acuity Means of Arrival Escorted By Service Admission Type    - 3/4/2020 17:24 Emergent [address] Emergency    Arrival Complaint    Chest Pain        Chief Complaint   Patient presents with    Shortness of Breath     C/O SOB and runny nose x 1 month  Assessment/Plan: Atrial fibrillation with RVR [facility])  Assessment & Plan  Underwent cardioversion on 8/27/2019  Had maintained sinus rhythm until now when seen by her primary cardiologist on 3/4/2020 who noted her heart rate to be in the 160s and advised admission to the hospital for Cardizem drip followed by cardioversion  EKG in the ED showed atrial fibrillation with RVR and heart rate of 107  · Currently, heart rate in the 90s without Cardizem  · Will hold off on Cardizem drip and monitor heart rate on continuous telemetry  · Consult cardiology  · Cardioversion in the morning  · NPO after midnight  · Continue Xarelto   · Not on Amiodarone, Metoprolol, or Bystolic due to side effects  · Per cardiology,  Will need referral to EP as outpatient for RFA of atrial flutter     Chronic diastolic heart failure ([facility] [address] )  Assessment & Plan      Wt Readings from Last 3 Encounters:   03/04/20 63 kg (139 lb)   01/06/20 63 kg (139 lb)   11/20/19 64 kg (141 lb)      Trace lower extremity edema  No crackles    BNP 893 which is slightly higher than baseline  No shortness of breath at rest but is complaining of shortness of breath with exertion  · Hold off on any IV Lasix at this time  · Continue home Lasix 40 mg po daily starting tomorrow  · Monitor I&O, daily weights     Essential hypertension  Assessment & Plan  No longer on any blood pressure medications  Most recent blood pressure controlled at 153/86  · Monitor     Hypothyroidism  Assessment & Plan  Check TSH  · Continue Synthroid 75 mcg daily     Dyslipidemia  Assessment & Plan  · Continue statin (auto substitute Crestor with Pravastatin while hospitalized)        VTE Prophylaxis: Rivaroxaban (Xarelto)  / reason for no mechanical VTE prophylaxis on Xarelto    Code Status:  Full code, level 1  POLST: POLST form is not discussed and not completed at this time  Discussion with family:   at bedside     Anticipated Length of Stay:  Patient will be admitted on an Observation basis with an anticipated length of stay of less than 2 midnights     Justification for Hospital Stay:  Atrial fibrillation with RVR planning for cardioversion tomorrow     ED Triage Vitals   Temperature Pulse Respirations Blood Pressure SpO2   03/04/20 1743 03/04/20 1743 03/04/20 1743 03/04/20 1743 03/04/20 1743   99 1 °F (37 3 °C) 92 22 (!) 160/106 93 %      Temp Source Heart Rate Source Patient Position - Orthostatic VS BP Location FiO2 (%)   03/04/20 1743 03/04/20 1743 03/04/20 1944 03/04/20 1944 --   Tympanic Monitor Sitting Right arm       Pain Score       03/04/20 1743       No Pain        Wt Readings from Last 1 Encounters:   03/05/20 63 kg (138 lb 14 2 oz)     Additional Vital Signs:   Pertinent Labs/Diagnostic Test Results:   Results from last 7 days   Lab Units 03/05/20  0616 03/04/20  1746   WBC Thousand/uL 6 30 5 81   HEMOGLOBIN g/dL 14 4 14 8   HEMATOCRIT % 44 2 44 8   PLATELETS Thousands/uL 213 238   NEUTROS ABS Thousands/µL  --  3 16         Results from last 7 days   Lab Units 03/05/20  0616 03/04/20  1746   SODIUM mmol/L 137 137   POTASSIUM mmol/L 4 2 4 6   CHLORIDE mmol/L 102 100   CO2 mmol/L 28 28   ANION GAP mmol/L 7 9   BUN mg/dL 20 26*   CREATININE mg/dL 0 87 0 96   EGFR ml/min/1 73sq m 63 56   CALCIUM mg/dL 8 7 9 3   MAGNESIUM mg/dL 2 3  --    PHOSPHORUS mg/dL 3 2  --      Results from last 7 days   Lab Units 03/04/20  1746   AST U/L 24   ALT U/L 21   ALK PHOS U/L 92   TOTAL PROTEIN g/dL 7 4   ALBUMIN g/dL 3 5   TOTAL BILIRUBIN mg/dL 0 60         Results from last 7 days   Lab Units 03/05/20  0616 03/04/20  1746   GLUCOSE RANDOM mg/dL 97 95             No results found for: BETA-HYDROXYBUTYRATE                   Results from last 7 days   Lab Units 03/04/20  1746   TROPONIN I ng/mL 0 02         Results from last 7 days   Lab Units 03/04/20  1746   PROTIME seconds 13 2*   INR  1 23*   PTT seconds 34     Results from last 7 days   Lab Units 03/05/20  0616   TSH 3RD GENERATON uIU/mL 7 130*                     Results from last 7 days   Lab Units 03/04/20  1746   NT-PRO BNP pg/mL 893*                         Results from last 7 days   Lab Units 03/04/20  2304   CLARITY UA  Clear   COLOR UA  Light Yellow   SPEC GRAV UA  <=1 005   PH UA  6 0   GLUCOSE UA mg/dl Negative   KETONES UA mg/dl Negative   BLOOD UA  Trace-Intact*   PROTEIN UA mg/dl Negative   NITRITE UA  Negative   BILIRUBIN UA  Negative   UROBILINOGEN UA E U /dl 0 2   LEUKOCYTES UA  Negative   WBC UA /hpf 0-1*   RBC UA /hpf 0-1*   BACTERIA UA /hpf Occasional   EPITHELIAL CELLS WET PREP /hpf Occasional                                           ED Treatment:   Medication Administration from 03/04/2020 1723 to 03/04/2020 2010     None        Past Medical History:   Diagnosis Date    Atrial flutter ([address] )     CAD (coronary artery disease)     s/p PCIx1 -2012    Deafness     Disease of thyroid gland     Hyperlipidemia     Hypertension      Present on Admission:   Hypothyroidism   Essential hypertension   Dyslipidemia   Chronic diastolic heart failure (HCC)      Admitting Diagnosis: Atrial fibrillation with RVR (HCC) [I48 91]  Shortness of breath [R06 02]  Age/Sex: [de-identified] y o  female  Admission Orders:  Scheduled Medications:    Medications:  cholecalciferol 1,000 Units Oral Daily   furosemide 40 mg Oral Daily   levothyroxine 75 mcg Oral Early Morning   pravastatin 80 mg Oral Daily With Dinner   rivaroxaban 15 mg Oral Daily With Dinner     Continuous IV Infusions:     PRN Meds:    acetaminophen 650 mg Oral Q6H PRN   ondansetron 4 mg Intravenous Q6H PRN       IP CONSULT TO CARDIOLOGY    Network Utilization Review Department  [email or web address]  ATTENTION: Please call with any questions or concerns to [phone] and carefully listen to the prompts so that you are directed to the right person  All voicemails are confidential   Mary Lou Juan all requests for admission clinical reviews, approved or denied determinations and any other requests to dedicated fax number below belonging to the campus where the patient is receiving treatment   List of dedicated fax numbers for the Facilities:  [address] [place] [address] DENIALS (Administrative/Medical Necessity) [phone]   [address] (Maternity/NICU/Pediatrics) [phone]   [place]ene[place] Notice [phone]   Socorro Pradhan [phone]   Girish Long [phone]   Cherelle Amado [phone]   [address] [phone]   [facility]  [phone]   [address] [facility] [address] [facility]  [address] [facility] [address] [facility] [phone]

## 2020-03-05 NOTE — ASSESSMENT & PLAN NOTE
Wt Readings from Last 3 Encounters:   03/04/20 63 kg (139 lb)   01/06/20 63 kg (139 lb)   11/20/19 64 kg (141 lb)     Trace lower extremity edema  No crackles     which is slightly higher than baseline  No shortness of breath at rest but is complaining of shortness of breath with exertion  · Hold off on any IV Lasix at this time  · Continue home Lasix 40 mg po daily starting tomorrow  · Monitor I&O, daily weights

## 2020-03-05 NOTE — ASSESSMENT & PLAN NOTE
Underwent cardioversion on 8/27/2019  Had maintained sinus rhythm until now when seen by her primary cardiologist on 3/4/2020 who noted her heart rate to be in the 160s and advised admission to the hospital for Cardizem drip followed by cardioversion  EKG in the ED showed atrial fibrillation with RVR and heart rate of 107  · Patient was on Cardizem drip for a brief period of time  · Patient is on anticoagulation with Xarelto at a lower dose dose of which was increased to 20 milligram p o  Daily  · Not on Amiodarone, Metoprolol, or Bystolic due to side effects  · Patient underwent cardioversion to sinus rhythm  · Patient was started on sotalol 80 milligram p o  B i d   · Patient will need monitoring of the QTC 48 hours

## 2020-03-05 NOTE — PROCEDURES
David Lilly  [id number]  3/5/2020  Teresa Loving MD      PRE-OP DIAGNOSIS: Persistant atrial fibrillation      POST-OP DIAGNOSIS: Successful electrical cardioversion of atrial fibrillation to normal sinus rhythm with PACs  : Dr Lydia Espinosa MD  [facility]    ANESTHESIA: Propofol given by anesthesiology  COMPLICATIONS: None  OPERATIVE TERM: DC cardioversion  The nature of the procedure, risks and alternatives were discussed with the patient who gave informed consent  Pt  and family understands risks associated with procedure and complication of stroke etc  were discussed with them in detail  OPERATIVE TECHNIQUE: The patient was sedated with propofol  When the patient was no longer responsive to quiet voice, DC cardioversion was performed with 200 J biphasically and synchronously  The patient was then monitored until fully alert and left the procedure area in stable condition  IMPRESSION: Successful DC cardioversion of atrial fibrillation to normal sinus rhythm with PACs  Lydia Espinosa MD [facility]      "This note has been constructed using a voice recognition system  Therefore there may be syntax, spelling, and/or grammatical errors   Please call if you have any questions  "

## 2020-03-05 NOTE — ASSESSMENT & PLAN NOTE
Underwent cardioversion on 8/27/2019  Had maintained sinus rhythm until now when seen by her primary cardiologist on 3/4/2020 who noted her heart rate to be in the 160s and advised admission to the hospital for Cardizem drip followed by cardioversion  EKG in the ED showed atrial fibrillation with RVR and heart rate of 107     · Currently, heart rate in the 90s without Cardizem  · Will hold off on Cardizem drip and monitor heart rate on continuous telemetry  · Consult cardiology  · Cardioversion in the morning  · NPO after midnight  · Continue Xarelto   · Not on Amiodarone, Metoprolol, or Bystolic due to side effects  · Per cardiology,  Will need referral to EP as outpatient for RFA of atrial flutter

## 2020-03-05 NOTE — ASSESSMENT & PLAN NOTE
No longer on any blood pressure medications  Most recent blood pressure controlled at 153/86  · Monitor

## 2020-03-05 NOTE — H&P
H&P- Thyra List 1940, [de-identified] y o  female MRN: [id number]    Unit/Bed#: RAIZA Encounter: [id number]    Primary Care Provider: Darline Santana MD   Date and time admitted to hospital: 3/4/2020  5:35 PM        * Atrial fibrillation with RVR [facility])  Assessment & Plan  Underwent cardioversion on 8/27/2019  Had maintained sinus rhythm until now when seen by her primary cardiologist on 3/4/2020 who noted her heart rate to be in the 160s and advised admission to the hospital for Cardizem drip followed by cardioversion  EKG in the ED showed atrial fibrillation with RVR and heart rate of 107  · Currently, heart rate in the 90s without Cardizem  · Will hold off on Cardizem drip and monitor heart rate on continuous telemetry  · Consult cardiology  · Cardioversion in the morning  · NPO after midnight  · Continue Xarelto   · Not on Amiodarone, Metoprolol, or Bystolic due to side effects  · Per cardiology,  Will need referral to EP as outpatient for RFA of atrial flutter    Chronic diastolic heart failure ([facility] [address] )  Assessment & Plan  Wt Readings from Last 3 Encounters:   03/04/20 63 kg (139 lb)   01/06/20 63 kg (139 lb)   11/20/19 64 kg (141 lb)     Trace lower extremity edema  No crackles     which is slightly higher than baseline  No shortness of breath at rest but is complaining of shortness of breath with exertion  · Hold off on any IV Lasix at this time  · Continue home Lasix 40 mg po daily starting tomorrow  · Monitor I&O, daily weights    Essential hypertension  Assessment & Plan  No longer on any blood pressure medications  Most recent blood pressure controlled at 153/86  · Monitor    Hypothyroidism  Assessment & Plan  Check TSH  · Continue Synthroid 75 mcg daily    Dyslipidemia  Assessment & Plan  · Continue statin (auto substitute Crestor with Pravastatin while hospitalized)      VTE Prophylaxis: Rivaroxaban (Xarelto)  / reason for no mechanical VTE prophylaxis on Xarelto    Code Status:  Full code, level 1  POLST: POLST form is not discussed and not completed at this time  Discussion with family:   at bedside    Anticipated Length of Stay:  Patient will be admitted on an Observation basis with an anticipated length of stay of less than 2 midnights  Justification for Hospital Stay:  Atrial fibrillation with RVR planning for cardioversion tomorrow     Total Time for Visit, including Counseling / Coordination of Care: 1 hour  Greater than 50% of this total time spent on direct patient counseling and coordination of care  Chief Complaint:   Sent in by cardiologist for atrial fibrillation with RVR noted in the office     History of Present Illness:    Allan Mark is a [de-identified] y o  female with a past medical history including atrial fibrillation, coronary artery disease, hypertension, chronic diastolic heart failure, and dyslipidemia who presents after recommendation from her outpatient cardiologist for admission to the hospital for symptomatic atrial fibrillation with RVR  Patient was found to have a heart rate in the 160s in the cardiologist office  She denies any chest pain  She reports intermittent palpitations, shortness of breath when walking up stairs and when lying flat, and nasal congestion  EKG in the emergency department showed atrial fibrillation with RVR with a heart rate of 107 bpm   Outpatient cardiologist had advised to start Cardizem drip overnight, however, her heart rate is now in the 90s before Cardizem was given, therefore, will hold off on Cardizem and monitor on continuous telemetry  The plan is for cardioversion in the morning  Of note, the patient is no longer on Amiodarone, Bystolic, or Metoprolol due to side effects  She will likely need referral to EP as an outpatient for recurrent atrial fibrillation  Review of Systems:    Review of Systems   Constitutional: Negative for appetite change, chills and fever     HENT: Negative for congestion, postnasal drip, rhinorrhea and sore throat  Eyes: Negative for photophobia and visual disturbance  Respiratory: Positive for shortness of breath (With exertion)  Negative for cough, chest tightness and wheezing  Cardiovascular: Positive for palpitations  Negative for chest pain and leg swelling  Gastrointestinal: Negative for abdominal distention, abdominal pain, constipation, diarrhea, nausea and vomiting  Genitourinary: Negative for difficulty urinating, dysuria and hematuria  Musculoskeletal: Negative for arthralgias, gait problem and myalgias  Skin: Negative for rash and wound  Neurological: Negative for dizziness, weakness, light-headedness, numbness and headaches  Psychiatric/Behavioral: Negative for confusion  The patient is not nervous/anxious  Past Medical and Surgical History:     Past Medical History:   Diagnosis Date    Atrial flutter ([address] )     CAD (coronary artery disease)     s/p PCIx1 -2012    Deafness     Disease of thyroid gland     Hyperlipidemia     Hypertension        Past Surgical History:   Procedure Laterality Date    BREAST BIOPSY Left     negative    CARDIAC SURGERY      stents    ESOPHAGOGASTRODUODENOSCOPY N/A 2/20/2017    Procedure: ESOPHAGOGASTRODUODENOSCOPY (EGD); Surgeon: Zachary Suh MD;  Location: [facility] GI LAB; Service:        Meds/Allergies:    Prior to Admission medications    Medication Sig Start Date End Date Taking?  Authorizing Provider   betamethasone, augmented, (DIPROLENE) 0 05 % lotion Apply topically 2 (two) times a day  Patient not taking: Reported on 10/8/2019 7/18/19   Rashawn Fine DO   Cholecalciferol (VITAMIN D) 2000 units CAPS Take 1 capsule by mouth daily    Historical Provider, MD   diclofenac sodium (VOLTAREN) 1 % Apply 2 g topically 4 (four) times a day  Patient not taking: Reported on 3/4/2020 1/6/20   Dave Henriquez MD   furosemide (LASIX) 40 mg tablet TAKE ONE TABLET BY MOUTH EVERY DAY 2/14/20   Dave Henriquez MD   halobetasol (ULTRAVATE) 0 05 % cream 5/30/19   Historical Provider, MD   levothyroxine 75 mcg tablet TAKE ONE TABLET BY MOUTH EVERY DAY 6/24/19   Orly Daniels MD   Multiple Vitamins-Minerals (CENTRUM SILVER ULTRA WOMENS) TABS Take by mouth    Historical Provider, MD   potassium chloride (K-DUR,KLOR-CON) 20 mEq tablet Take 1 tablet (20 mEq total) by mouth daily  Patient not taking: Reported on 10/8/2019 4/3/19   Orly Daniels MD   predniSONE 20 mg tablet 4 tabs for three days, 3 tabs for three days, 2 tabs for three days, 1 tab for three days, 1/2 tab for 4 days  Patient not taking: Reported on 11/20/2019 10/11/19   Al Amaral DO   promethazine-codeine (PHENERGAN WITH CODEINE) 6 25-10 mg/5 mL syrup Take 5 mL by mouth every 4 (four) hours as needed for cough  Patient not taking: Reported on 1/6/2020 11/20/19   Minh Granda MD   rosuvastatin (CRESTOR) 10 MG tablet Take 1 tablet (10 mg total) by mouth daily 4/25/19   Orly Daniels MD   XARELTO 20 MG tablet TAKE ONE TABLET BY MOUTH EVERY DAY WITH DINNER 2/14/20   Orly Daniels MD     I have reviewed home medications with patient personally  Allergies:    Allergies   Allergen Reactions    Amlodipine Other (See Comments)    Lisinopril Other (See Comments)       Social History:     Marital Status: /Civil Union   Occupation:  Retired  Patient Pre-hospital Living Situation:  Home  Patient Pre-hospital Level of Mobility:  Independent  Patient Pre-hospital Diet Restrictions:  Low-sodium  Substance Use History:   Social History     Substance and Sexual Activity   Alcohol Use No     Social History     Tobacco Use   Smoking Status Never Smoker   Smokeless Tobacco Never Used     Social History     Substance and Sexual Activity   Drug Use No       Family History:    Family History   Problem Relation Age of Onset    Hypertension Mother         essential    Breast cancer Mother     No Known Problems Sister     No Known Problems Daughter     No Known Problems Sister     No Known Problems Sister        Physical Exam:     Vitals:   Blood Pressure: 153/86 (03/04/20 1944)  Pulse: 88 (03/04/20 1944)  Temperature: 98 5 °F (36 9 °C) (03/04/20 1944)  Temp Source: Tympanic (03/04/20 1944)  Respirations: 20 (03/04/20 1944)  SpO2: 95 % (03/04/20 1944)    Physical Exam   Constitutional: She is oriented to person, place, and time  She appears well-developed  No distress  Pleasant, deaf female resting comfortably in bed on room air   HENT:   Head: Normocephalic  Neck: Normal range of motion  Cardiovascular: An irregularly irregular rhythm present  Tachycardia present  Pulmonary/Chest: Effort normal  No stridor  No tachypnea  No respiratory distress  She has decreased breath sounds in the right lower field and the left lower field  She has no wheezes  She has no rhonchi  She has no rales  Abdominal: Soft  Bowel sounds are normal  She exhibits no distension  There is no tenderness  Obese   Musculoskeletal: Normal range of motion  She exhibits edema (trace BLE)  She exhibits no tenderness  Neurological: She is alert and oriented to person, place, and time  She has normal reflexes  Skin: Skin is warm and dry  No rash noted  She is not diaphoretic  Psychiatric: She has a normal mood and affect  Judgment normal    Nursing note and vitals reviewed  Additional Data:     Lab Results: I have personally reviewed pertinent reports        Results from last 7 days   Lab Units 03/04/20  1746   WBC Thousand/uL 5 81   HEMOGLOBIN g/dL 14 8   HEMATOCRIT % 44 8   PLATELETS Thousands/uL 238   NEUTROS PCT % 54   LYMPHS PCT % 31   MONOS PCT % 12   EOS PCT % 2     Results from last 7 days   Lab Units 03/04/20  1746   SODIUM mmol/L 137   POTASSIUM mmol/L 4 6   CHLORIDE mmol/L 100   CO2 mmol/L 28   BUN mg/dL 26*   CREATININE mg/dL 0 96   ANION GAP mmol/L 9   CALCIUM mg/dL 9 3   ALBUMIN g/dL 3 5   TOTAL BILIRUBIN mg/dL 0 60   ALK PHOS U/L 92   ALT U/L 21   AST U/L 24   GLUCOSE RANDOM mg/dL 95     Results from last 7 days   Lab Units 03/04/20  1746   INR  1 23*                   Imaging: I have personally reviewed pertinent reports  XR chest 1 view portable   ED Interpretation by Sky Aguayo MD ([phone])   CM, NAD          EKG, Pathology, and Other Studies Reviewed on Admission:   · EKG:  Atrial fibrillation with RVR, heart rate 107    Allscripts / Epic Records Reviewed: Yes     ** Please Note: This note has been constructed using a voice recognition system   **

## 2020-03-05 NOTE — ASSESSMENT & PLAN NOTE
· Continue Synthroid 75 mcg daily  · TSH is slightly high at 7    Will hold off on increasing Synthroid S patient had atrial fibrillation  · Follow-up outpatient with PCP

## 2020-03-05 NOTE — ANESTHESIA PREPROCEDURE EVALUATION
Review of Systems/Medical History  Patient summary reviewed  Chart reviewed  No history of anesthetic complications     Cardiovascular  Hyperlipidemia, Hypertension , CAD , Dysrhythmias , atrial flutter,    Pulmonary       GI/Hepatic    GERD ,             Endo/Other  History of thyroid disease , hypothyroidism,      GYN       Hematology   Musculoskeletal       Neurology   Psychology           Physical Exam    Airway    Mallampati score: II  TM Distance: >3 FB  Neck ROM: full     Dental   No notable dental hx     Cardiovascular  Cardiovascular exam normal    Pulmonary  Pulmonary exam normal     Other Findings        Anesthesia Plan  ASA Score- 3     Anesthesia Type- IV sedation with anesthesia with ASA Monitors  Additional Monitors:   Airway Plan:     Comment: Patient is deaf and mute  Interpretation services were used        Plan Factors-    Induction- intravenous  Postoperative Plan- Plan for postoperative opioid use  Informed Consent- Anesthetic plan and risks discussed with patient  I personally reviewed this patient with the CRNA  Discussed and agreed on the Anesthesia Plan with the CRNA  Shiv Haro

## 2020-03-05 NOTE — ANESTHESIA POSTPROCEDURE EVALUATION
Post-Op Assessment Note    CV Status:  Stable  Pain Score: 0    Pain management: adequate     Mental Status:  Alert and awake   Hydration Status:  Stable   PONV Controlled:  None   Airway Patency:  Patent   Post Op Vitals Reviewed: Yes      Staff: Anesthesiologist, CRNA           BP   143/67   Temp      Pulse  68   Resp   14   SpO2   99

## 2020-03-05 NOTE — ASSESSMENT & PLAN NOTE
Wt Readings from Last 3 Encounters:   03/05/20 63 kg (138 lb 14 2 oz)   03/04/20 63 kg (139 lb)   01/06/20 63 kg (139 lb)     Trace lower extremity edema  No crackles   which is slightly higher than baseline  No shortness of breath at rest but is complaining of shortness of breath with exertion which could be related to atrial fibrillation  · Hold off on any IV Lasix at this time  · Continue Lasix 40 milligram p o   Daily  · Monitor I&O, daily weights

## 2020-03-05 NOTE — CONSULTS
Consultation - Cardiology   [facility]     [facility] [de-identified] y o  female MRN: [id number]  : 1940  Unit/Bed#: [address] [place] [address] Encounter: [id number]      Assessment & Plan     1  Atrial fib/atrial flutter with rapid ventricular rate   Patient rate has been faster  She is symptomatic feeling palpitations she will be scheduled for SANTI guided cardioversion    2  History of coronary artery disease status post angioplasty of unknown vessel in 2012 with no symptoms of angina troponins are negative    3  Essential hypertension    4  Dyslipidemia    5  Obesity with BMI around 31    6  Congenitally deaf and mute    Summary of Recommendations:        Spoke to patient and family with help of writing on the paper  The told me she has been compliant with her blood thinner but she has been taking a lower dose of Xarelto  Will increased dose to 20 mg daily  Start the patient on Cardizem drip and which was to p o  Cardizem  Patient has multiple allergies, previously she has been on amiodarone and beta-blocker and there were discontinued as she could not tolerate it  She is tentatively scheduled to have radiofrequency ablation of her AFib flutter    Addendum to above    Patient SANTI report reviewed  Patient has markedly dilated left atrium, moderate TR moderate MR and EF has dropped to around 45%  She is not likely to stay in sinus rhythm without any antiarrhythmics  She has not been tried on sotalol  Will add sotalol 80 mg Q 12 hourly  Message left for the daughter  She will require monitoring for at least 48 hours  Her calculated creatinine clearance is around 51  Thank you for your consultation      Physician Requesting Consult: Jeni Suarez MD    Reason for Consult / Principal Problem:  Palpitations, atrial fib/ flutter with rapid ventricular rate    Consults    HPI: [facility] is a [de-identified]y o  year old female who presents with palpitations to our office and was seen yesterday and was found to have AFib/atrial flutter with rapid ventricular rate  She has history of coronary artery disease status post angioplasty and catheterization in 2012, history of atrial flutter status post last cardioversion in August of 2019, history of multiple allergies, could not tolerate amiodarone,  beta-blockers, very hard of hearing who was again last night admitted with atrial fibrillation with rapid ventricular rate  She has hearing loss hard to communicate with her  Most of the history is taken from review of the old chart as well as talking to her with written words  She has wears she is admitted for cardioversion  Her family was present bedside  She denies any chest pain  Her last workup from 2019 reviewed  She does speak as she has congenital deafness and she is mute  Review of Systems   Constitutional: Positive for activity change and fatigue  Negative for chills, diaphoresis, fever and unexpected weight change  HENT: Positive for hearing loss  Negative for congestion  Eyes: Negative for discharge and redness  Respiratory: Negative for cough, chest tightness, shortness of breath and wheezing  Cardiovascular: Positive for palpitations  Negative for chest pain and leg swelling  Gastrointestinal: Negative for abdominal pain, diarrhea and nausea  Endocrine: Negative  Genitourinary: Negative for decreased urine volume and urgency  Musculoskeletal: Negative  Negative for arthralgias, back pain and gait problem  Skin: Negative for rash and wound  Allergic/Immunologic: Negative  Neurological: Negative for dizziness, seizures, syncope, weakness, light-headedness and headaches  Hematological: Negative  Psychiatric/Behavioral: Negative for agitation and confusion  The patient is nervous/anxious          Historical Information   Past Medical History:   Diagnosis Date    Atrial flutter ([address] )     CAD (coronary artery disease)     s/p PCIx1 -2012    Deafness     Disease of thyroid gland     Hyperlipidemia     Hypertension      Past Surgical History:   Procedure Laterality Date    BREAST BIOPSY Left     negative    CARDIAC SURGERY      stents    ESOPHAGOGASTRODUODENOSCOPY N/A 2/20/2017    Procedure: ESOPHAGOGASTRODUODENOSCOPY (EGD); Surgeon: Delroy Beaulieu MD;  Location: [facility];   Service:      Social History     Substance and Sexual Activity   Alcohol Use Not Currently    Alcohol/week: 0 0 standard drinks    Frequency: Never    Drinks per session: Patient refused    Binge frequency: Patient refused     Social History     Substance and Sexual Activity   Drug Use No     Social History     Tobacco Use   Smoking Status Never Smoker   Smokeless Tobacco Never Used     Family History:   Family History   Problem Relation Age of Onset    Hypertension Mother         essential    Breast cancer Mother     No Known Problems Sister     No Known Problems Daughter     No Known Problems Sister     No Known Problems Sister        Meds/Allergies    PTA meds:    Medications Prior to Admission   Medication    furosemide (LASIX) 40 mg tablet    levothyroxine 75 mcg tablet    Multiple Vitamins-Minerals (CENTRUM SILVER ULTRA WOMENS) TABS    rosuvastatin (CRESTOR) 10 MG tablet    XARELTO 20 MG tablet    betamethasone, augmented, (DIPROLENE) 0 05 % lotion    Cholecalciferol (VITAMIN D) 2000 units CAPS    diclofenac sodium (VOLTAREN) 1 %    halobetasol (ULTRAVATE) 0 05 % cream    potassium chloride (K-DUR,KLOR-CON) 20 mEq tablet    predniSONE 20 mg tablet    promethazine-codeine (PHENERGAN WITH CODEINE) 6 25-10 mg/5 mL syrup      Allergies   Allergen Reactions    Amlodipine Other (See Comments)    Lisinopril Other (See Comments)       Current Facility-Administered Medications:     acetaminophen (TYLENOL) tablet 650 mg, 650 mg, Oral, Q6H PRN, CHA Thomas    cholecalciferol (VITAMIN D3) tablet 1,000 Units, 1,000 Units, Oral, Daily, CHA Thomas   diltiazem (CARDIZEM) 125 mg in sodium chloride 0 9 % 125 mL infusion, 5 mg/hr, Intravenous, Titrated, Jamal Gallegos MD    furosemide (LASIX) tablet 40 mg, 40 mg, Oral, Daily, CHA Nobles    levothyroxine tablet 75 mcg, 75 mcg, Oral, Early Morning, CHA Reynolds, 75 mcg at 03/05/20 2532    ondansetron [facility] injection 4 mg, 4 mg, Intravenous, Q6H PRN, CHA Reynolds    pravastatin (PRAVACHOL) tablet 80 mg, 80 mg, Oral, Daily With Dinner, CHA Reynolds    rivaroxaban Lonell Chatters) tablet 15 mg, 15 mg, Oral, Daily With Dinner, CHA Reynolds, 15 mg at 03/04/20 2300    VTE Pharmacologic Prophylaxis:   Xarelto    Objective:   Vitals: Blood pressure 135/81, pulse 77, temperature 98 8 °F (37 1 °C), temperature source Oral, resp  rate 20, height 4' 7 91" (1 42 m), weight 63 kg (138 lb 14 2 oz), SpO2 97 %  Body mass index is 31 24 kg/m²    BP Readings from Last 3 Encounters:   03/05/20 135/81   03/04/20 152/100   01/06/20 132/80     Orthostatic Blood Pressures      Most Recent Value   Blood Pressure  135/81 filed at 03/05/2020 0825   Patient Position - Orthostatic VS  Lying filed at 03/05/2020 0825          Intake/Output Summary (Last 24 hours) at 3/5/2020 0827  Last data filed at 3/5/2020 3843  Gross per 24 hour   Intake    Output 1000 ml   Net -1000 ml       Invasive Devices     Peripheral Intravenous Line            Peripheral IV 03/04/20 Left Antecubital less than 1 day                  Physical Exam:   Physical Exam    Neurologic:  Alert & oriented x 3, no new focal deficits, still having palpitations she is mute  Constitutional:  Well developed, well nourished, non-toxic appearance   Eyes:  Pupil equal and reacting to light, conjunctiva normal   HENT:  Atraumatic, oropharynx moist, Neck- normal range of motion, no tenderness, supple   Respiratory:  Bilateral air entry, mostly clear to auscultation  Cardiovascular:  S1-S2 irregular tachycardia  GI: Soft, nondistended, normal bowel sounds, nontender, no hepatosplenomegaly appreciated  Musculoskeletal:  No edema, no tenderness, no deformities     Skin:  Well hydrated, no rash   Lymphatic:  No lymphadenopathy noted   Extremities:  Trace to mild edema    Labs:   Troponins:   Results from last 7 days   Lab Units 03/04/20  1746   TROPONIN I ng/mL 0 02       CBC with diff:   Results from last 7 days   Lab Units 03/05/20  0616 03/04/20  1746   WBC Thousand/uL 6 30 5 81   HEMOGLOBIN g/dL 14 4 14 8   HEMATOCRIT % 44 2 44 8   MCV fL 88 89   PLATELETS Thousands/uL 213 238   MCH pg 28 8 29 2   MCHC g/dL 32 6 33 0   RDW % 13 2 13 3   MPV fL 9 4 9 9   NRBC AUTO /100 WBCs  --  0       CMP:   Results from last 7 days   Lab Units 03/05/20  0616 03/04/20  1746   SODIUM mmol/L 137 137   POTASSIUM mmol/L 4 2 4 6   CHLORIDE mmol/L 102 100   CO2 mmol/L 28 28   ANION GAP mmol/L 7 9   BUN mg/dL 20 26*   CREATININE mg/dL 0 87 0 96   GLUCOSE FASTING mg/dL 97  --    CALCIUM mg/dL 8 7 9 3   AST U/L  --  24   ALT U/L  --  21   ALK PHOS U/L  --  92   TOTAL PROTEIN g/dL  --  7 4   ALBUMIN g/dL  --  3 5   TOTAL BILIRUBIN mg/dL  --  0 60   EGFR ml/min/1 73sq m 63 56   GLUCOSE RANDOM mg/dL 97 95       Magnesium:   Results from last 7 days   Lab Units 03/05/20  0616   MAGNESIUM mg/dL 2 3     Coags:   Results from last 7 days   Lab Units 03/04/20  1746   PTT seconds 34   INR  1 23*     TSH:    Results from last 7 days   Lab Units 03/05/20  0616   TSH 3RD GENERATON uIU/mL 7 130*     Lipid Profile:   Lab Results   Component Value Date    CHOLESTEROL 158 02/10/2020    HDL 62 02/10/2020    LDLCALC 78 01/25/2019    TRIG 62 02/10/2020     NT-proBNP:   Recent Labs     03/04/20  1746   NTBNP 893*        Imaging & Testing   Cardiac testing:   Results for orders placed during the hospital encounter of 01/24/19   Echo complete with contrast if indicated    Narrative [address]  [address] [facility] [address]  [phone]    Transthoracic Echocardiogram  2D, M-mode, Doppler, and Color Doppler    Study date:  2019    Patient: Jael Wallis  MR number: [id number]  Account number: [de-identified]  : 1940  Age: 77 years  Gender: Female  Status: Inpatient  Location: Bedside  Height: 62 in  Weight: 197 6 lb  BP: 132/ 77 mmHg    Indications: Heart Faiolure    Diagnoses: 428 9 - HEART FAILURE NOS    Sonographer:  MARISA Castle  Primary Physician:  Jose Miguel Mills MD  Referring Physician:  Jv Gandhi MD  Group:  [facility]  Interpreting Physician:  Jessi Wiley DO    SUMMARY    LEFT VENTRICLE:  Systolic function was normal by visual assessment  Ejection fraction was estimated to be 55 %  There were no regional wall motion abnormalities  There was mild concentric hypertrophy  LEFT ATRIUM:  The atrium was massively dilated  MITRAL VALVE:  There was moderate annular calcification  There was moderate regurgitation  AORTIC VALVE:  The valve was trileaflet  Leaflets exhibited normal thickness, calcification, and mildly reduced cuspal separation  Transaortic velocity was increased due to valvular stenosis  There was mild stenosis  There was no regurgitation  TRICUSPID VALVE:  There was moderate regurgitation  Pulmonary artery systolic pressure was mildly increased  HISTORY: PRIOR HISTORY: HTN, Thyroid Disease, CAD, A-Fib    PROCEDURE: The procedure was performed at the bedside  This was a routine study  The transthoracic approach was used  The study included complete 2D imaging, M-mode, complete spectral Doppler, and color Doppler  The heart rate was 90 bpm,  at the start of the study  Image quality was adequate  LEFT VENTRICLE: Size was normal  Systolic function was normal by visual assessment  Ejection fraction was estimated to be 55 %  There were no regional wall motion abnormalities  There was mild concentric hypertrophy   DOPPLER: The study was  not technically sufficient to allow evaluation of LV diastolic function  RIGHT VENTRICLE: The size was normal  Systolic function was normal  DOPPLER: Systolic pressure was within the normal range  LEFT ATRIUM: The atrium was massively dilated  RIGHT ATRIUM: The atrium was mildly dilated  MITRAL VALVE: There was moderate annular calcification  There was normal leaflet separation  No echocardiographic evidence for prolapse  DOPPLER: The transmitral velocity was within the normal range  There was no evidence for stenosis  There was moderate regurgitation  AORTIC VALVE: The valve was trileaflet  Leaflets exhibited normal thickness, calcification, and mildly reduced cuspal separation  DOPPLER: Transaortic velocity was increased due to valvular stenosis  There was mild stenosis  There was no  regurgitation  TRICUSPID VALVE: The valve structure was normal  There was normal leaflet separation  DOPPLER: The transtricuspid velocity was within the normal range  There was moderate regurgitation  Pulmonary artery systolic pressure was mildly  increased  Estimated peak PA pressure was 48 mmHg  PULMONIC VALVE: Leaflets exhibited normal thickness, no calcification, and normal cuspal separation  DOPPLER: The transpulmonic velocity was within the normal range  There was no regurgitation  PERICARDIUM: There was no thickening  There was no pericardial effusion  AORTA: The root exhibited normal size      PULMONARY ARTERY: The size was normal  The morphology appeared normal     SYSTEM MEASUREMENT TABLES    2D mode  AoR Diam 2D: 3 2 cm  LA Diam (2D): 4 7 cm  LA/Ao (2D): 1 47  FS (2D Teich): 26 1 %  IVSd (2D): 1 3 cm  LVDEV: 71 3 cm³  LVESV: 34 4 cm³  LVIDd(2D): 4 03 cm  LVISd (2D): 2 98 cm  LVOT Area 2D: 2 84 cm squared  LVPWd (2D): 1 28 cm  SV (Teich): 36 9 cm³    Apical four chamber  LVEF A4C: 66 %    Unspecified Scan Mode  LAILA Cont Eq (Peak Kushal): 1 4 cm squared  LAILA Cont Eq (VTI): 1 43 cm squared  LVOT (VTI): 22 3 cm  LVOT Diam : 1 9 cm  LVOT Vmax: 1090 mm/s  LVOT Vmax; Mean: 1090 mm/s  Peak Grad ; Mean: 5 mm[Hg]  SV (LVOT): 63 cm³  VTI;Mean: 3 mm[Hg]  LAILA Cont Eq (VTI): 2 71 cm squared  MV Peak E Kushal  Mean: 1340 mm/s  MVA (PHT): 4 68 cm squared  PHT: 47 ms  Max P mm[Hg]  V Max: 3010 mm/s  Vmax: 2840 mm/s  RA Area: 20 2 cm squared  RA Volume: 56 8 cm³  TAPSE: 1 8 cm    [facility]    Prepared and electronically signed by    Dayne Puri DO  Signed 2019 13:06:03       Results for orders placed during the hospital encounter of 19   SANTI    Narrative [address]  [address] [facility], [address]  [phone]    Transesophageal Echocardiogram  2D, Doppler, and Color Doppler    Study date:  2019    Patient: Shekhar Thomas  MR number: [id number]  Account number: [de-identified]  : 1940  Age: 77 years  Gender: Female  Status: Inpatient  Location: Bedside  Height: 62 in  Weight: 198 lb  BP: 153/ 90 mmHg    Indications: heart failure    Diagnoses: I50 9 - Heart failure, unspecified    Sonographer:  MARISA Freeman  Primary Physician:  Jose A aShu MD  Group:  [facility]  RN:  Jose Alfredo Umanzor RN  Interpreting Physician:  Dayne Puri DO    SUMMARY    LEFT VENTRICLE:  Systolic function was normal by visual assessment  Ejection fraction was estimated to be 55 %  There were no regional wall motion abnormalities  LEFT ATRIAL APPENDAGE:  No thrombus was identified  ATRIAL SEPTUM:  No defect or patent foramen ovale was identified  Contrast injection was performed  There was no right-to-left shunt, with provocative maneuvers to increase right atrial pressure  MITRAL VALVE:  There was moderate regurgitation  AORTIC VALVE:  There was trace regurgitation  TRICUSPID VALVE:  There was moderate to severe regurgitation  AORTA:  There was mild atheroma      HISTORY: PRIOR HISTORY: Hyperlipidemia, HTN,Thyroid disease, CAD, A Fib  PROCEDURE: The procedure was performed at the bedside  This was a routine study  The risks and alternatives of the procedure were explained to the patient and informed consent was obtained  The transesophageal approach was used  The study  included complete 2D imaging, limited spectral Doppler, and color Doppler  The heart rate was 132 bpm, at the start of the study  An adult omniplane probe was inserted by the attending cardiologist  Images were obtained from the  parasternal, apical, subcostal, and suprasternal notch acoustic windows  Intubated with ease  One intubation attempt(s)  There was no blood detected on the probe  Image quality was adequate  LEFT VENTRICLE: Size was normal  Systolic function was normal by visual assessment  Ejection fraction was estimated to be 55 %  There were no regional wall motion abnormalities  Wall thickness was normal     RIGHT VENTRICLE: The size was normal  Systolic function was normal  Wall thickness was normal     LEFT ATRIUM: Size was normal  APPENDAGE: The size was normal  No thrombus was identified  DOPPLER: The function was normal (normal emptying velocity)  ATRIAL SEPTUM: No defect or patent foramen ovale was identified  Contrast injection was performed  There was no right-to-left shunt, with provocative maneuvers to increase right atrial pressure  RIGHT ATRIUM: Size was normal     MITRAL VALVE: Valve structure was normal  There was normal leaflet separation  DOPPLER: The transmitral velocity was within the normal range  There was no evidence for stenosis  There was moderate regurgitation  AORTIC VALVE: The valve was trileaflet  Leaflets exhibited normal thickness and normal cuspal separation  DOPPLER: Transaortic velocity was within the normal range  There was no evidence for stenosis  There was trace regurgitation  TRICUSPID VALVE: The valve structure was normal  There was normal leaflet separation  DOPPLER: There was moderate to severe regurgitation  PULMONIC VALVE: Leaflets exhibited normal thickness, no calcification, and normal cuspal separation  DOPPLER: The transpulmonic velocity was within the normal range  There was no significant regurgitation  PERICARDIUM: There was no pericardial effusion  The pericardium was normal in appearance  AORTA: The root exhibited normal size  There was mild atheroma  SYSTEMIC VEINS: IVC: The inferior vena cava was normal in size  MEASUREMENT TABLES    DOPPLER MEASUREMENTS  Left atrium   (Reference normals)  SARAH peak shawn   40 cm/s   (--)    [facility] Accredited [facility] Laboratory    Prepared and electronically signed by    Rinku Salgado DO  Signed 25-Jan-2019 16:11:46       Imaging: I have personally reviewed pertinent reports  Xr Chest 1 View Portable    Result Date: 3/5/2020  Narrative: CHEST INDICATION:   chest pain  COMPARISON:  11/20/2019 EXAM PERFORMED/VIEWS:  XR CHEST PORTABLE  AP semierect Images: 1 FINDINGS: The heart is enlarged  Mediastinum and hilar structures however are normal in size and contour  Minimal bibasilar subsegmental atelectasis without lobar consolidation or superimposed edema  No pneumothorax or pleural effusion  Osseous structures appear within normal limits for patient age  Impression: Minimal bibasilar atelectasis  Cardiomegaly  Workstation performed: [id number]     EKG/ Monitor: Personally reviewed  Twelve lead EKG shows atrial fibrillation RBBB heart rate around 120 beats per minute  Over the monitor heart rate ranging from 70 to 150 beats per minute  Code Status: Level 1 - Full Code  Advance Directive and Living Will:      POLST:        Tito Weinstein MD MD, [facility]      "This note has been constructed using a voice recognition system  Therefore there may be syntax, spelling, and/or grammatical errors   Please call if you have any questions  "

## 2020-03-05 NOTE — ED NOTES
Received the patient, resting in bed  Denies any pain  Pt is deaf, communicating with RN through written note  Pt made aware that urine specimen is needed  Pt doesn't want to urinate yet        Roxana Dunlap RN  03/04/20 1932

## 2020-03-05 NOTE — PLAN OF CARE
Problem: CARDIOVASCULAR - ADULT  Goal: Maintains optimal cardiac output and hemodynamic stability  Description  INTERVENTIONS:  - Monitor I/O, vital signs and rhythm  - Monitor for S/S and trends of decreased cardiac output  - Administer and titrate ordered vasoactive medications to optimize hemodynamic stability  - Assess quality of pulses, skin color and temperature  - Assess for signs of decreased coronary artery perfusion  - Instruct patient to report change in severity of symptoms  Outcome: Progressing  Goal: Absence of cardiac dysrhythmias or at baseline rhythm  Description  INTERVENTIONS:  - Continuous cardiac monitoring, vital signs, obtain 12 lead EKG if ordered  - Administer antiarrhythmic and heart rate control medications as ordered  - Monitor electrolytes and administer replacement therapy as ordered  Outcome: Progressing     Problem: PAIN - ADULT  Goal: Verbalizes/displays adequate comfort level or baseline comfort level  Description  Interventions:  - Encourage patient to monitor pain and request assistance  - Assess pain using appropriate pain scale  - Administer analgesics based on type and severity of pain and evaluate response  - Implement non-pharmacological measures as appropriate and evaluate response  - Consider cultural and social influences on pain and pain management  - Notify physician/advanced practitioner if interventions unsuccessful or patient reports new pain  Outcome: Progressing     Problem: INFECTION - ADULT  Goal: Absence or prevention of progression during hospitalization  Description  INTERVENTIONS:  - Assess and monitor for signs and symptoms of infection  - Monitor lab/diagnostic results  - Monitor all insertion sites, i e  indwelling lines, tubes, and drains  - Kathryn appropriate cooling/warming therapies per order  - Administer medications as ordered  - Instruct and encourage patient and family to use good hand hygiene technique  - Identify and instruct in appropriate isolation precautions for identified infection/condition   Outcome: Progressing     Problem: SAFETY ADULT  Goal: Patient will remain free of falls  Description  INTERVENTIONS:  - Assess patient frequently for physical needs  -  Identify cognitive and physical deficits and behaviors that affect risk of falls    -  [place] fall precautions as indicated by assessment   - Educate patient/family on patient safety including physical limitations  - Instruct patient to call for assistance with activity based on assessment  - Modify environment to reduce risk of injury  - Consider OT/PT consult to assist with strengthening/mobility  Outcome: Progressing  Goal: Maintain or return to baseline ADL function  Description  INTERVENTIONS:  -  Assess patient's ability to carry out ADLs; assess patient's baseline for ADL function and identify physical deficits which impact ability to perform ADLs (bathing, care of mouth/teeth, toileting, grooming, dressing, etc )  - Assess/evaluate cause of self-care deficits   - Assess range of motion  - Assess patient's mobility; develop plan if impaired  - Assess patient's need for assistive devices and provide as appropriate  - Encourage maximum independence but intervene and supervise when necessary  - Involve family in performance of ADLs  - Assess for home care needs following discharge   - Consider OT consult to assist with ADL evaluation and planning for discharge  - Provide patient education as appropriate  Outcome: Progressing  Goal: Maintain or return mobility status to optimal level  Description  INTERVENTIONS:  - Assess patient's baseline mobility status (ambulation, transfers, stairs, etc )    - Identify cognitive and physical deficits and behaviors that affect mobility  - Identify mobility aids required to assist with transfers and/or ambulation (gait belt, sit-to-stand, lift, walker, cane, etc )  - [place] fall precautions as indicated by assessment  - Record patient progress and toleration of activity level on Mobility SBAR; progress patient to next Phase/Stage  - Instruct patient to call for assistance with activity based on assessment  - Consider rehabilitation consult to assist with strengthening/weightbearing, etc   Outcome: Progressing     Problem: DISCHARGE PLANNING  Goal: Discharge to home or other facility with appropriate resources  Description  INTERVENTIONS:  - Identify barriers to discharge w/patient and caregiver  - Arrange for needed discharge resources and transportation as appropriate  - Identify discharge learning needs (meds, wound care, etc )  - Arrange for interpretive services to assist at discharge as needed  - Refer to Case Management Department for coordinating discharge planning if the patient needs post-hospital services based on physician/advanced practitioner order or complex needs related to functional status, cognitive ability, or social support system  Outcome: Progressing     Problem: Knowledge Deficit  Goal: Patient/family/caregiver demonstrates understanding of disease process, treatment plan, medications, and discharge instructions  Description  Complete learning assessment and assess knowledge base    Interventions:  - Provide teaching at level of understanding  - Provide teaching via preferred learning methods  Outcome: Progressing

## 2020-03-06 LAB
ANION GAP SERPL CALCULATED.3IONS-SCNC: 6 MMOL/L (ref 4–13)
ATRIAL RATE: 52 BPM
ATRIAL RATE: 54 BPM
BUN SERPL-MCNC: 26 MG/DL (ref 5–25)
CALCIUM SERPL-MCNC: 8.9 MG/DL (ref 8.3–10.1)
CHLORIDE SERPL-SCNC: 100 MMOL/L (ref 100–108)
CO2 SERPL-SCNC: 28 MMOL/L (ref 21–32)
CREAT SERPL-MCNC: 1.25 MG/DL (ref 0.6–1.3)
GFR SERPL CREATININE-BSD FRML MDRD: 41 ML/MIN/1.73SQ M
GLUCOSE SERPL-MCNC: 100 MG/DL (ref 65–140)
P AXIS: 65 DEGREES
P AXIS: 75 DEGREES
POTASSIUM SERPL-SCNC: 4.7 MMOL/L (ref 3.5–5.3)
PR INTERVAL: 136 MS
PR INTERVAL: 142 MS
QRS AXIS: -40 DEGREES
QRS AXIS: -41 DEGREES
QRSD INTERVAL: 108 MS
QRSD INTERVAL: 110 MS
QT INTERVAL: 494 MS
QT INTERVAL: 502 MS
QTC INTERVAL: 466 MS
QTC INTERVAL: 468 MS
SODIUM SERPL-SCNC: 134 MMOL/L (ref 136–145)
T WAVE AXIS: -32 DEGREES
T WAVE AXIS: -41 DEGREES
VENTRICULAR RATE: 52 BPM
VENTRICULAR RATE: 54 BPM

## 2020-03-06 PROCEDURE — 93005 ELECTROCARDIOGRAM TRACING: CPT

## 2020-03-06 PROCEDURE — 99232 SBSQ HOSP IP/OBS MODERATE 35: CPT | Performed by: INTERNAL MEDICINE

## 2020-03-06 PROCEDURE — 80048 BASIC METABOLIC PNL TOTAL CA: CPT | Performed by: INTERNAL MEDICINE

## 2020-03-06 PROCEDURE — 93010 ELECTROCARDIOGRAM REPORT: CPT | Performed by: INTERNAL MEDICINE

## 2020-03-06 RX ORDER — ECHINACEA PURPUREA EXTRACT 125 MG
1 TABLET ORAL 2 TIMES DAILY PRN
Status: DISCONTINUED | OUTPATIENT
Start: 2020-03-06 | End: 2020-03-10 | Stop reason: HOSPADM

## 2020-03-06 RX ORDER — DIAPER,BRIEF,INFANT-TODD,DISP
1 EACH MISCELLANEOUS 4 TIMES DAILY PRN
Status: DISCONTINUED | OUTPATIENT
Start: 2020-03-06 | End: 2020-03-10 | Stop reason: HOSPADM

## 2020-03-06 RX ORDER — SOTALOL HYDROCHLORIDE 80 MG/1
40 TABLET ORAL EVERY 12 HOURS SCHEDULED
Status: DISCONTINUED | OUTPATIENT
Start: 2020-03-06 | End: 2020-03-10 | Stop reason: HOSPADM

## 2020-03-06 RX ADMIN — PRAVASTATIN SODIUM 80 MG: 80 TABLET ORAL at 16:15

## 2020-03-06 RX ADMIN — ACETAMINOPHEN 650 MG: 325 TABLET, FILM COATED ORAL at 03:05

## 2020-03-06 RX ADMIN — RIVAROXABAN 15 MG: 15 TABLET, FILM COATED ORAL at 16:14

## 2020-03-06 RX ADMIN — VITAMIN D, TAB 1000IU (100/BT) 1000 UNITS: 25 TAB at 08:00

## 2020-03-06 RX ADMIN — HYDROCORTISONE 1 APPLICATION: 1 CREAM TOPICAL at 12:43

## 2020-03-06 RX ADMIN — HYDROCORTISONE 1 APPLICATION: 1 CREAM TOPICAL at 19:13

## 2020-03-06 RX ADMIN — SALINE NASAL SPRAY 1 SPRAY: 1.5 SOLUTION NASAL at 20:16

## 2020-03-06 RX ADMIN — FUROSEMIDE 40 MG: 40 TABLET ORAL at 08:00

## 2020-03-06 RX ADMIN — LEVOTHYROXINE SODIUM 75 MCG: 75 TABLET ORAL at 05:37

## 2020-03-06 RX ADMIN — SOTALOL HYDROCHLORIDE 40 MG: 80 TABLET ORAL at 21:33

## 2020-03-06 NOTE — PROGRESS NOTES
Progress Note - Cardiology   [facility]     [facility] [de-identified] y o  female MRN: [id number]  : 1940  Unit/Bed#: [address] [place] [address] Encounter: [id number]    Assessment and Plan:   1  Paroxysmal atrial fibrillation:  Patient underwent SANTI guided cardioversion on 2020, which was successful  Patient was started on sotalol, initially 80 mg b i d  With Xarelto  Will decrease sotalol to 40 mg b i d  And continue monitor telemetry  2  Hypertension:  Blood pressure stable, continue current medications    3  Coronary artery disease with history of PCI:  Asymptomatic  Subjective / Objective:   Patient seen and examined  She is maintaining sinus rhythm since cardioversion  Noted to be bradycardic  GFR in 40s  Will decrease Xarelto to 15 mg once a day and decreased sotalol to 40 mg twice a day  Continue to monitor for QTC prolongation  Vitals: Blood pressure 136/81, pulse (!) 50, temperature 98 7 °F (37 1 °C), temperature source Tympanic, resp  rate 19, height 4' 7 91" (1 42 m), weight 62 3 kg (137 lb 5 6 oz), SpO2 95 %  Vitals:    20 0600 20 0600   Weight: 63 kg (138 lb 14 2 oz) 62 3 kg (137 lb 5 6 oz)     Body mass index is 30 9 kg/m²  BP Readings from Last 3 Encounters:   20 136/81   20 152/100   20 132/80     Orthostatic Blood Pressures      Most Recent Value   Blood Pressure  136/81 filed at 2020 0747   Patient Position - Orthostatic VS  Lying filed at 2020 0747        I/O       / 0701 - 03/05 0700 03/05 0701 - 03/06 0700 03/06 0701 - 03/ 0700    P  O   1250 250    I V  (mL/kg)  210 (3 4)     Total Intake(mL/kg)  1460 (23 4) 250 (4)    Urine (mL/kg/hr) 800 2000 (1 3)     Blood  0     Total Output 800 2000     Net -800 -540 +250               Invasive Devices     Peripheral Intravenous Line            Peripheral IV 20 Left Antecubital 1 day                  Intake/Output Summary (Last 24 hours) at 3/6/2020 1013  Last data filed at 3/6/2020 0750  Gross per 24 hour   Intake 1660 ml   Output 1450 ml   Net 210 ml         Physical Exam:   Physical Exam   Constitutional: She is oriented to person, place, and time  She appears well-developed and well-nourished  No distress  HENT:   Head: Normocephalic  Right Ear: External ear normal    Left Ear: External ear normal    Eyes: Pupils are equal, round, and reactive to light  Conjunctivae are normal  Right eye exhibits no discharge  Left eye exhibits no discharge  No scleral icterus  Neck: Normal range of motion  Neck supple  No JVD present  No thyromegaly present  Cardiovascular: Normal rate, regular rhythm and intact distal pulses  Pulmonary/Chest: Effort normal  No accessory muscle usage  No respiratory distress  She has decreased breath sounds in the right lower field and the left lower field  She has no wheezes  She has no rhonchi  She has no rales  Abdominal: Soft  Bowel sounds are normal  She exhibits no distension and no mass  Musculoskeletal: She exhibits edema  Trace pedal and ankle edema   Neurological: She is alert and oriented to person, place, and time  Skin: Skin is warm and dry  Capillary refill takes less than 2 seconds  She is not diaphoretic  Psychiatric: She has a normal mood and affect  Her behavior is normal  Judgment and thought content normal    Nursing note and vitals reviewed                  Medications/ Allergies:     Current Facility-Administered Medications:  acetaminophen 650 mg Oral Q6H PRN CHA Marino   cholecalciferol 1,000 Units Oral Daily CHA Marino   furosemide 40 mg Oral Daily CHA Marino   levothyroxine 75 mcg Oral Early Morning CHA Marino   ondansetron 4 mg Intravenous Q6H PRN CHA Marino   pravastatin 80 mg Oral Daily With Dinner CHA Marino   rivaroxaban 15 mg Oral Daily With [address] [place]   sotalol 40 mg Oral Q12H CHA Herrera acetaminophen 650 mg Q6H PRN   ondansetron 4 mg Q6H PRN     Allergies   Allergen Reactions    Amlodipine Other (See Comments)    Lisinopril Other (See Comments)       VTE Pharmacologic Prophylaxis:   Xarelto    Labs:   Troponins:  Results from last 7 days   Lab Units 03/04/20  1746   TROPONIN I ng/mL 0 02     CBC with diff:  Results from last 7 days   Lab Units 03/05/20  0616 03/04/20  1746   WBC Thousand/uL 6 30 5 81   HEMOGLOBIN g/dL 14 4 14 8   HEMATOCRIT % 44 2 44 8   MCV fL 88 89   PLATELETS Thousands/uL 213 238   MCH pg 28 8 29 2   MCHC g/dL 32 6 33 0   RDW % 13 2 13 3   MPV fL 9 4 9 9   NRBC AUTO /100 WBCs  --  0     CMP:  Results from last 7 days   Lab Units 03/06/20  0503 03/05/20  0616 03/04/20  1746   SODIUM mmol/L 134* 137 137   POTASSIUM mmol/L 4 7 4 2 4 6   CHLORIDE mmol/L 100 102 100   CO2 mmol/L 28 28 28   ANION GAP mmol/L 6 7 9   BUN mg/dL 26* 20 26*   CREATININE mg/dL 1 25 0 87 0 96   GLUCOSE FASTING mg/dL  --  97  --    CALCIUM mg/dL 8 9 8 7 9 3   AST U/L  --   --  24   ALT U/L  --   --  21   ALK PHOS U/L  --   --  92   TOTAL PROTEIN g/dL  --   --  7 4   ALBUMIN g/dL  --   --  3 5   TOTAL BILIRUBIN mg/dL  --   --  0 60   EGFR ml/min/1 73sq m 41 63 56     Magnesium:  Results from last 7 days   Lab Units 03/05/20  0616   MAGNESIUM mg/dL 2 3     Coags:  Results from last 7 days   Lab Units 03/04/20  1746   PTT seconds 34   INR  1 23*     TSH:  Results from last 7 days   Lab Units 03/05/20  0616   TSH 3RD GENERATON uIU/mL 7 130*     NT-proBNP:   Recent Labs     03/04/20  1746   NTBNP 893*        Imaging & Testing   I have personally reviewed pertinent reports  Xr Chest 1 View Portable    Result Date: 3/5/2020  Narrative: CHEST INDICATION:   chest pain  COMPARISON:  11/20/2019 EXAM PERFORMED/VIEWS:  XR CHEST PORTABLE  AP semierect Images: 1 FINDINGS: The heart is enlarged  Mediastinum and hilar structures however are normal in size and contour   Minimal bibasilar subsegmental atelectasis without lobar consolidation or superimposed edema  No pneumothorax or pleural effusion  Osseous structures appear within normal limits for patient age  Impression: Minimal bibasilar atelectasis  Cardiomegaly  Workstation performed: [id number]        EKG / Monitor: Personally reviewed  Sinus bradycardia of  milliseconds    Cardiac testing:   Results for orders placed during the hospital encounter of 20   Echo complete with contrast if indicated    Narrative [address]  [address] [facility]   [phone]    Transthoracic Echocardiogram  2D, M-mode, Doppler, and Color Doppler    Study date:  05-Mar-2020    Patient: Lemon Cogan  MR number: [id number]  Account number: [de-identified]  : 1940  Age: [de-identified] years  Gender: Female  Status: Inpatient  Location: Bedside  Height: 56 in  Weight: 137 7 lb  BP: 126/ 74 mmHg    Indications: Atrial Fibrillation    Diagnoses: 427 31 - ATRIAL FIBRILLATION    Sonographer:  MARISA Tafoya  Primary Physician:  Abdirahman Paulson MD  Referring Physician:  Clayton Mckeon MD  Group:  [facility]  Interpreting Physician:  Clayton Mckeon MD    SUMMARY    LEFT VENTRICLE:  Systolic function was mildly reduced  Ejection fraction was estimated to be 45 %  There was mild diffuse hypokinesis  Wall thickness was mildly increased  There was mild concentric hypertrophy  LEFT ATRIUM:  The atrium was moderately to markedly dilated  Area 26 cm2    MITRAL VALVE:  There was moderate annular calcification  There was moderate regurgitation  AORTIC VALVE:  The valve was trileaflet  Leaflets exhibited moderately increased thickness, mild calcification, and mildly reduced cuspal separation  Transaortic velocity was increased due to valvular stenosis  There was very mild stenosis  There was trace regurgitation  TRICUSPID VALVE:  There was moderate regurgitation    Estimated peak PA pressure was 40 mmHg     PULMONIC VALVE:  There was mild regurgitation  HISTORY: PRIOR HISTORY: HTN, CAD, Atrial Flutter, CHF, Dyslipidemia, GERD, Dermatitis    PROCEDURE: The procedure was performed at the bedside  This was a routine study  The transthoracic approach was used  The study included complete 2D imaging, M-mode, complete spectral Doppler, and color Doppler  The heart rate was 96 bpm,  at the start of the study  Image quality was adequate  LEFT VENTRICLE: Size was normal  Systolic function was mildly reduced  Ejection fraction was estimated to be 45 %  There was mild diffuse hypokinesis  Wall thickness was mildly increased  There was mild concentric hypertrophy  DOPPLER: The  study was not technically sufficient to allow evaluation of LV diastolic function  RIGHT VENTRICLE: The size was normal  Systolic function was normal     LEFT ATRIUM: The atrium was moderately to markedly dilated  Area 26 cm2 No mass was present  RIGHT ATRIUM: Size was normal     MITRAL VALVE: There was moderate annular calcification  There was normal leaflet separation  DOPPLER: There was no evidence for stenosis  There was moderate regurgitation  AORTIC VALVE: The valve was trileaflet  Leaflets exhibited moderately increased thickness, mild calcification, and mildly reduced cuspal separation  DOPPLER: Transaortic velocity was increased due to valvular stenosis  There was very mild  stenosis  There was trace regurgitation  TRICUSPID VALVE: DOPPLER: There was moderate regurgitation  Estimated peak PA pressure was 40 mmHg  PULMONIC VALVE: DOPPLER: There was mild regurgitation  PERICARDIUM: There was no thickening or calcification  There was no pericardial effusion  AORTA: The root exhibited normal size      SYSTEM MEASUREMENT TABLES    2D mode  AoR Diam 2D: 3 1 cm  LA Diam (2D): 4 6 cm  LA/Ao (2D): 1 48  FS (2D Teich): 23 6 %  IVSd (2D): 1 12 cm  LVDEV: 85 8 cmï¾³  LVESV: 45 1 cmï¾³  LVIDd(2D): 4 36 cm  LVISd (2D): 3 33 cm  LVOT Area 2D: 2 84 cmï¾²  LVPWd (2D): 1 17 cm  SV (Teich): 40 7 cmï¾³    Apical four chamber  LVEF A4C: 44 %    Unspecified Scan Mode  LAILA Cont Eq (Peak Kushal): 1 32 cmï¾²  LAILA Cont Eq (VTI): 1 11 cmï¾²  LVOT (VTI): 14 2 cm  LVOT Diam : 1 9 cm  LVOT Vmax: 867 mm/s  LVOT Vmax; Mean: 867 mm/s  Peak Grad ; Mean: 3 mm[Hg]  SV (LVOT): 40 cmï¾³  VTI;Mean: 2 mm[Hg]  LAILA Cont Eq (VTI): 1 4 cmï¾²  MV Peak E Kushal  Mean: 1330 mm/s  MVA (PHT): 4 cmï¾²  PHT: 55 ms  Max P mm[Hg]  V Max: 2810 mm/s  Vmax: 2670 mm/s  RA Area: 18 4 cmï¾²  RA Volume: 52 cmï¾³  TAPSE: 1 5 cm    [facility]    Prepared and electronically signed by    Rodger Marc MD  Signed 05-Mar-2020 17:21:02       Results for orders placed during the hospital encounter of 20   SANTI    Narrative [address]  [address] [facility]  [phone]    Transesophageal Echocardiogram    Study date:  05-Mar-2020    Patient: Narinder Solis  MR number: [id number]  Account number: [de-identified]  : 1940  Age: [de-identified] years  Gender: Female  Status: Inpatient  Location: Cath lab  Height: 56 in  Weight: 138 lb  BP: 159/ 86 mmHg    Indications: Atrial Fibrillation    Diagnoses: 427 31 - ATRIAL FIBRILLATION    Sonographer:  MARISA Mancia  Primary Physician:  Caden De La O MD  Referring Physician:  Rodger Marc MD  Group:  [facility]  RN:  Christina Terry RN  Interpreting Physician:  Rodger Marc MD    SUMMARY    LEFT VENTRICLE:  Systolic function was mildly to moderately reduced  Ejection fraction was estimated in the range of 40 % to 45 % to be 45 %  There was moderate diffuse hypokinesis  Wall thickness was mildly increased  There was mild concentric hypertrophy  LEFT ATRIUM:  The atrium was moderately to markedly dilated  ATRIAL SEPTUM:  No defect or patent foramen ovale was identified      RIGHT ATRIUM:  The atrium was mildly dilated  MITRAL VALVE:  There was mild to moderate annular calcification  There was moderate regurgitation  AORTIC VALVE:  There was very mild stenosis  TRICUSPID VALVE:  There was mild to moderate regurgitation  PULMONIC VALVE:  There was mild regurgitation  HISTORY: PRIOR HISTORY: HTN, CAD, Atrial Flutter, CHF, GERD    PROCEDURE: The procedure was performed in the catheterization laboratory  This was a routine study  The transesophageal approach was used  The heart rate was 104 bpm, at the start of the study  An adult omniplane probe was inserted by the  attending cardiologist  Intubated with ease  Two intubation attempt(s)  There was no blood detected on the probe  Intravenous contrast (agitated saline, 10 ml) was administered to evaluate shunting  LEFT VENTRICLE: Size was normal  Systolic function was mildly to moderately reduced  Ejection fraction was estimated in the range of 40 % to 45 % to be 45 %  There was moderate diffuse hypokinesis  Wall thickness was mildly increased  There was mild concentric hypertrophy  DOPPLER: The study was not technically sufficient to allow evaluation of LV diastolic function  RIGHT VENTRICLE: The size was normal  Systolic function was normal     LEFT ATRIUM: The atrium was moderately to markedly dilated  APPENDAGE: The size was normal  No foreign bodies were observed  No mass was identified  There was no spontaneous echo contrast ("smoke") in the appendage  DOPPLER: The function  was normal (normal emptying velocity)  ATRIAL SEPTUM: No defect or patent foramen ovale was identified  RIGHT ATRIUM: The atrium was mildly dilated  MITRAL VALVE: There was mild to moderate annular calcification  DOPPLER: There was no evidence for stenosis  There was moderate regurgitation  AORTIC VALVE: The valve was trileaflet  Leaflets exhibited mildly to moderately increased thickness, mild to moderate calcification, and mildly reduced cuspal separation  There was no echocardiographic evidence of vegetation  DOPPLER:  There was very mild stenosis  There was no regurgitation  TRICUSPID VALVE: DOPPLER: There was mild to moderate regurgitation  PULMONIC VALVE: DOPPLER: There was mild regurgitation  PERICARDIUM: There was no thickening or calcification  There was no pericardial effusion  AORTA: The root exhibited normal size  [address] Accredited Echocardiography Laboratory    Prepared and electronically signed by    Jelly Méndez MD  Signed 05-Mar-2020 17:23:45         [facility]        "This note has been constructed using a voice recognition system  Therefore there may be syntax, spelling, and/or grammatical errors   Please call if you have any questions  "

## 2020-03-06 NOTE — UTILIZATION REVIEW
Initial Clinical Review    PATIENT WAS OBSERVATION STATUS 3/4/20 CONVERTED TO INPATIENT ADMISSION 3/5/20 S/P CARDIOVERSION AND STARTED ON SOTALOL NEEDING CONTINUE QT EKG MONITORING  Admission: Date/Time/Statement:   Admission Orders (From admission, onward)     Ordered        03/05/20 1430  Inpatient Admission  Once                   Orders Placed This Encounter   Procedures    Inpatient Admission     Standing Status:   Standing     Number of Occurrences:   1     Order Specific Question:   Admitting Physician     Answer:   Vandana Crooks     Order Specific Question:   Level of Care     Answer:   Med Surg [16]     Order Specific Question:   Estimated length of stay     Answer:   More than 2 Midnights     Order Specific Question:   Certification     Answer:   I certify that inpatient services are medically necessary for this patient for a duration of greater than two midnights  See H&P and MD Progress Notes for additional information about the patient's course of treatment  ED Arrival Information     Expected Arrival Acuity Means of Arrival Escorted By Service Admission Type    - 3/4/2020 17:24 Emergent Walk-In Spouse Hospitalist Emergency    Arrival Complaint    Chest Pain        Chief Complaint   Patient presents with    Shortness of Breath     C/O SOB and runny nose x 1 month  Assessment/Plan:   Atrial fibrillation with RVR ([facility])  EKG in the ED showed atrial fibrillation with RVR and heart rate of 107  Patient was on Cardizem drip for a brief period of time  Patient is on anticoagulation with Xarelto at a lower dose dose of which was increased to 20 milligram p o  Daily  Not on Amiodarone, Metoprolol, or Bystolic due to side effects  Patient underwent cardioversion to sinus rhythm  Patient was started on sotalol 80 milligram p o  B i d  Patient will need monitoring of the QTC 48 hours    CHRONIC CHF    which is slightly higher than baseline  No shortness of breath at rest but is complaining of shortness of breath with exertion which could be related to atrial fibrillation  Hold off on any IV Lasix at this time  Continue Lasix 40 milligram p o  Daily  Monitor I&O, daily weights  CARDIOLOGY   Spoke to patient and family with help of writing on the paper  The told me she has been compliant with her blood thinner but she has been taking a lower dose of Xarelto  Will increased dose to 20 mg daily  Start the patient on Cardizem drip and which was to p o  Cardizem  Patient has multiple allergies, previously she has been on amiodarone and beta-blocker and there were discontinued as she could not tolerate it  She is tentatively scheduled to have radiofrequency ablation of her AFib flutter  Addendum to above  Patient SANTI report reviewed  Patient has markedly dilated left atrium, moderate TR moderate MR and EF has dropped to around 45%  She is not likely to stay in sinus rhythm without any antiarrhythmics  She has not been tried on sotalol  Will add sotalol 80 mg Q 12 hourly  Message left for the daughter  She will require monitoring for at least 48 hours      ED Triage Vitals   Temperature Pulse Respirations Blood Pressure SpO2   20 1743 03/04/20 1743 03/04/20 1743 03/04/20 1743   99 1 °F (37 3 °C) 92 22 (!) 160/106 93 %      Temp Source Heart Rate Source Patient Position - Orthostatic VS BP Location FiO2 (%)   20 --   Tympanic Monitor Sitting Right arm       Pain Score       20       No Pain          Wt Readings from Last 1 Encounters:   20 62 3 kg (137 lb 5 6 oz)     Additional Vital Signs:   Temp (24hrs), Av 4 °F (36 9 °C), Min:97 9 °F (36 6 °C), Max:99 1 °F (37 3 °C)  Temp:  [97 9 °F (36 6 °C)-99 1 °F (37 3 °C)] 98 8 °F (37 1 °C)  HR:  [] 72  Resp:  [18-25] 22  BP: (108-174)/() 150/80  SpO2:  [92 %-97 %] 95 %  Body mass index is 31 24 kg/m²     Pertinent Labs/Diagnostic Test Results: Results from last 7 days   Lab Units 03/05/20  0616 03/04/20  1746   WBC Thousand/uL 6 30 5 81   HEMOGLOBIN g/dL 14 4 14 8   HEMATOCRIT % 44 2 44 8   PLATELETS Thousands/uL 213 238   NEUTROS ABS Thousands/µL  --  3 16     Results from last 7 days   Lab Units 03/06/20  0503 03/05/20  0616 03/04/20  1746   SODIUM mmol/L 134* 137 137   POTASSIUM mmol/L 4 7 4 2 4 6   CHLORIDE mmol/L 100 102 100   CO2 mmol/L 28 28 28   ANION GAP mmol/L 6 7 9   BUN mg/dL 26* 20 26*   CREATININE mg/dL 1 25 0 87 0 96   EGFR ml/min/1 73sq m 41 63 56   CALCIUM mg/dL 8 9 8 7 9 3   MAGNESIUM mg/dL  --  2 3  --    PHOSPHORUS mg/dL  --  3 2  --      Results from last 7 days   Lab Units 03/04/20  1746   AST U/L 24   ALT U/L 21   ALK PHOS U/L 92   TOTAL PROTEIN g/dL 7 4   ALBUMIN g/dL 3 5   TOTAL BILIRUBIN mg/dL 0 60     Results from last 7 days   Lab Units 03/06/20  0503 03/05/20  0616 03/04/20  1746   GLUCOSE RANDOM mg/dL 100 97 95     Results from last 7 days   Lab Units 03/04/20  1746   TROPONIN I ng/mL 0 02     Results from last 7 days   Lab Units 03/04/20  1746   PROTIME seconds 13 2*   INR  1 23*   PTT seconds 34     Results from last 7 days   Lab Units 03/05/20  0616   TSH 3RD GENERATON uIU/mL 7 130*     Results from last 7 days   Lab Units 03/04/20  1746   NT-PRO BNP pg/mL 893*     Results from last 7 days   Lab Units 03/04/20  2304   CLARITY UA  Clear   COLOR UA  Light Yellow   SPEC GRAV UA  <=1 005   PH UA  6 0   GLUCOSE UA mg/dl Negative   KETONES UA mg/dl Negative   BLOOD UA  Trace-Intact*   PROTEIN UA mg/dl Negative   NITRITE UA  Negative   BILIRUBIN UA  Negative   UROBILINOGEN UA E U /dl 0 2   LEUKOCYTES UA  Negative   WBC UA /hpf 0-1*   RBC UA /hpf 0-1*   BACTERIA UA /hpf Occasional   EPITHELIAL CELLS WET PREP /hpf Occasional     ED Treatment:   Medication Administration from 03/04/2020 1723 to 03/04/2020 2010     None        Past Medical History:   Diagnosis Date    Atrial flutter ([facility] [address] )     CAD (coronary artery disease)     s/p PCIx1 -2012    Deafness     Disease of thyroid gland     Hyperlipidemia     Hypertension      Present on Admission:   Hypothyroidism   Essential hypertension   Dyslipidemia   Chronic diastolic heart failure (HCC)      Admitting Diagnosis: Atrial fibrillation with RVR (HCC) [I48 91]  Shortness of breath [R06 02]  Age/Sex: [de-identified] y o  female  Admission Orders:  TELE MON  DAILY WEIGHT I/O  SANTI   Scheduled Medications:    Medications:  cholecalciferol 1,000 Units Oral Daily   furosemide 40 mg Oral Daily   levothyroxine 75 mcg Oral Early Morning   pravastatin 80 mg Oral Daily With Dinner   rivaroxaban 20 mg Oral Daily With Dinner   sotalol 80 mg Oral Q12H [facility]     Continuous IV Infusions:     PRN Meds:    acetaminophen 650 mg Oral Q6H PRN   ondansetron 4 mg Intravenous Q6H PRN       IP CONSULT TO CARDIOLOGY    Network Utilization Review Department  [email or web address]  ATTENTION: Please call with any questions or concerns to [phone] and carefully listen to the prompts so that you are directed to the right person  All voicemails are confidential   Richar Tanner all requests for admission clinical reviews, approved or denied determinations and any other requests to dedicated fax number below belonging to the campus where the patient is receiving treatment   List of dedicated fax numbers for the Facilities:  [address] DENIALS (Administrative/Medical Necessity) [phone]   [address] (Maternity/NICU/Pediatrics) [phone]   [place] [phone]   Mariela Rocher [phone]   [place] [phone]   [address]  [phone]   [address] [phone]   [facility]  [phone]   [address] [facility] [address]  [address] [facility] [address] [facility] [phone]

## 2020-03-06 NOTE — ASSESSMENT & PLAN NOTE
Wt Readings from Last 3 Encounters:   03/06/20 62 3 kg (137 lb 5 6 oz)   03/04/20 63 kg (139 lb)   01/06/20 63 kg (139 lb)     Trace lower extremity edema  No crackles   which is slightly higher than baseline  No shortness of breath at rest but is complaining of shortness of breath with exertion which could be related to atrial fibrillation  · Hold off on any IV Lasix at this time  · Continue Lasix 40 milligram p o   Daily  · Monitor I&O, daily weights

## 2020-03-06 NOTE — ASSESSMENT & PLAN NOTE
· Continue Synthroid 75 mcg daily  · TSH is slightly high at 7    Will hold off on increasing Synthroid as patient had atrial fibrillation  · Follow-up outpatient with PCP

## 2020-03-06 NOTE — PROGRESS NOTES
Progress Note Luzma Olvera 1940, [de-identified] y o  female MRN: [id number]    Unit/Bed#: [address] [place] [address] Encounter: [id number]    Primary Care Provider: Severa Comes, MD   Date and time admitted to hospital: 3/4/2020  5:35 PM        * Atrial fibrillation with RVR [facility])  Assessment & Plan  Underwent cardioversion on 8/27/2019  Had maintained sinus rhythm until now when seen by her primary cardiologist on 3/4/2020 who noted her heart rate to be in the 160s and advised admission to the hospital for Cardizem drip followed by cardioversion  EKG in the ED showed atrial fibrillation with RVR and heart rate of 107  · Patient was on Cardizem drip for a brief period of time  · Patient is on anticoagulation with Xarelto at a lower dose dose of which was increased to 20 milligram p o  Daily  · Not on Amiodarone, Metoprolol, or Bystolic due to side effects  · Patient underwent cardioversion to sinus rhythm  · Patient was started on sotalol 80 milligram p o  B i d  which was decreased to 40 milligram twice a day as patient was bradycardic  · Patient to be monitored overnight on telemetry  · Xarelto dose was also decreased to 15 milligram p o  daily as her creatinine clearance is low  · Patient will need ischemia workup at some point    Chronic diastolic heart failure [facility])  Assessment & Plan  Wt Readings from Last 3 Encounters:   03/06/20 62 3 kg (137 lb 5 6 oz)   03/04/20 63 kg (139 lb)   01/06/20 63 kg (139 lb)     Trace lower extremity edema  No crackles   which is slightly higher than baseline  No shortness of breath at rest but is complaining of shortness of breath with exertion which could be related to atrial fibrillation  · Hold off on any IV Lasix at this time  · Continue Lasix 40 milligram p o   Daily  · Monitor I&O, daily weights    Essential hypertension  Assessment & Plan  No longer on any blood pressure medications  · Continue Lasix    Dyslipidemia  Assessment & Plan  · Continue statin (auto substitute Crestor with Pravastatin while hospitalized)    Hypothyroidism  Assessment & Plan    · Continue Synthroid 75 mcg daily  · TSH is slightly high at 7  Will hold off on increasing Synthroid as patient had atrial fibrillation  · Follow-up outpatient with PCP    CAD (coronary artery disease)  Assessment & Plan  History of PCI        VTE Pharmacologic Prophylaxis:   Pharmacologic: Rivaroxaban (Xarelto)  Mechanical VTE Prophylaxis in Place: Yes    Patient Centered Rounds: I have performed bedside rounds with nursing staff today  Discussions with Specialists or Other Care Team Provider: Kaykay Mason  Education and Discussions with Family / Patient:Yes  Time Spent for Care: 30 minutes  More than 50% of total time spent on counseling and coordination of care as described above  Current Length of Stay: 1 day(s)  Current Patient Status: Inpatient     Discharge Plan: Home    Code Status: Level 1 - Full Code      Subjective:   Patient denies any chest pain, shortness of breath, palpitations  Patient complains of injury from cardioversion      Objective:     Vitals:   Temp (24hrs), Av 9 °F (36 6 °C), Min:97 1 °F (36 2 °C), Max:98 7 °F (37 1 °C)    Temp:  [97 1 °F (36 2 °C)-98 7 °F (37 1 °C)] 97 1 °F (36 2 °C)  HR:  [50-61] 60  Resp:  [18-20] 18  BP: (136-158)/(68-81) 158/72  SpO2:  [93 %-95 %] 93 %  Body mass index is 30 9 kg/m²  Input and Output Summary (last 24 hours): Intake/Output Summary (Last 24 hours) at 3/6/2020 1837  Last data filed at 3/6/2020 0750  Gross per 24 hour   Intake 610 ml   Output 500 ml   Net 110 ml        Physical Exam:     Physical Exam   Constitutional: No distress  HENT:   Head: Normocephalic and atraumatic  Nose: Nose normal    Eyes: Pupils are equal, round, and reactive to light  Conjunctivae are normal    Neck: Normal range of motion  Neck supple  Cardiovascular: Regular rhythm and normal heart sounds     Slow heart rate   Pulmonary/Chest: Effort normal and breath sounds normal  No respiratory distress  She has no wheezes  She has no rales  Burn from cardioversion on the chest   Abdominal: Soft  Bowel sounds are normal  She exhibits no distension  There is no tenderness  There is no rebound and no guarding  Musculoskeletal: She exhibits no edema  Neurological: She is alert  No cranial nerve deficit  Skin: Skin is warm and dry  No rash noted  Additional Data:     Labs:    Results from last 7 days   Lab Units 03/05/20  0616 03/04/20  1746   WBC Thousand/uL 6 30 5 81   HEMOGLOBIN g/dL 14 4 14 8   HEMATOCRIT % 44 2 44 8   PLATELETS Thousands/uL 213 238   NEUTROS PCT %  --  54     Results from last 7 days   Lab Units 03/06/20  0503 03/05/20  0616 03/04/20  1746   SODIUM mmol/L 134* 137 137   POTASSIUM mmol/L 4 7 4 2 4 6   CHLORIDE mmol/L 100 102 100   CO2 mmol/L 28 28 28   BUN mg/dL 26* 20 26*   CREATININE mg/dL 1 25 0 87 0 96   CALCIUM mg/dL 8 9 8 7 9 3   TOTAL BILIRUBIN mg/dL  --   --  0 60   ALK PHOS U/L  --   --  92   ALT U/L  --   --  21   AST U/L  --   --  24     Results from last 7 days   Lab Units 03/04/20  1746   INR  1 23*     Results from last 7 days   Lab Units 03/04/20  1746   TROPONIN I ng/mL 0 02     No results found for: HGBA1C            * I Have Reviewed All Lab Data Listed Above  * Additional Pertinent Lab Tests Reviewed: [facility] 66 Admission Reviewed    Imaging:     XR chest 1 view portable   ED Interpretation by Bautista Donato MD (60/71 1253)   CM, NAD      Final Result by Blas Saha MD (03/05 0805)      Minimal bibasilar atelectasis  Cardiomegaly              Workstation performed: [id number]           Imaging Reports Reviewed by myself    Cultures:   Blood Culture: No results found for: BLOODCX  Urine Culture:   Lab Results   Component Value Date    URINECX 70,000-79,000 cfu/ml Mixed Contaminants X4 02/17/2017     Sputum Culture: No components found for: SPUTUMCX  Wound Culture: No results found for: WOUNDCULT    Last 24 Hours Medication List:     Current Facility-Administered Medications:  acetaminophen 650 mg Oral Q6H PRN Edrie Filter, CRNP   cholecalciferol 1,000 Units Oral Daily Edrie Filter, CRNP   furosemide 40 mg Oral Daily Edrie Filter, CRNP   hydrocortisone 1 application Topical 4x Daily PRN Jean Singleton MD   levothyroxine 75 mcg Oral Early Morning Edrie Filter, CRNP   ondansetron 4 mg Intravenous Q6H PRN Edrie Filter, CRNP   pravastatin 80 mg Oral Daily With Dinner Edrie Filter, CRNP   rivaroxaban 15 mg Oral Daily With [address], CRNP   sotalol 40 mg Oral Q12H [facility] Joaquin        Today, Patient Was Seen By: Jean Singleton MD    ** Please Note: Dragon 360 Dictation voice to text software may have been used in the creation of this document   **

## 2020-03-06 NOTE — SOCIAL WORK
LOS day 1  Pt is CPR2, not a bund;e or readmission  Pt is deaf  Pt lives with her spouse  Does not use any DME  PCP: Dr Amaya Alston: Adam Dickerson [place]    DCP:  No discharge needs identified  Pt declined home care  Family will transport pt home at discharge  CM dept will remain available for any discharge needs  CM reviewed discharge planning process including the following: identifying caregivers at home, preference for d/c planning needs, availability of treatment team to discuss questions or concerns patient and/or family may have regarding diagnosis, plan of care, old or new medications and discharge planning  Discharge Checklist reviewed and CM will continue to monitor for progress toward discharge goals in nursing and provider rounds

## 2020-03-06 NOTE — ASSESSMENT & PLAN NOTE
Underwent cardioversion on 8/27/2019  Had maintained sinus rhythm until now when seen by her primary cardiologist on 3/4/2020 who noted her heart rate to be in the 160s and advised admission to the hospital for Cardizem drip followed by cardioversion  EKG in the ED showed atrial fibrillation with RVR and heart rate of 107  · Patient was on Cardizem drip for a brief period of time  · Patient is on anticoagulation with Xarelto at a lower dose dose of which was increased to 20 milligram p o   Daily  · Not on Amiodarone, Metoprolol, or Bystolic due to side effects  · Patient underwent cardioversion to sinus rhythm  · Patient was started on sotalol 80 milligram p o  B i d  which was decreased to 40 milligram twice a day as patient was bradycardic  · Patient to be monitored overnight on telemetry  · Xarelto dose was also decreased to 15 milligram p o  daily as her creatinine clearance is low  · Patient will need ischemia workup at some point

## 2020-03-07 LAB
ANION GAP SERPL CALCULATED.3IONS-SCNC: 8 MMOL/L (ref 4–13)
BUN SERPL-MCNC: 23 MG/DL (ref 5–25)
CALCIUM SERPL-MCNC: 8.5 MG/DL (ref 8.3–10.1)
CHLORIDE SERPL-SCNC: 100 MMOL/L (ref 100–108)
CO2 SERPL-SCNC: 23 MMOL/L (ref 21–32)
CREAT SERPL-MCNC: 1.03 MG/DL (ref 0.6–1.3)
GFR SERPL CREATININE-BSD FRML MDRD: 51 ML/MIN/1.73SQ M
GLUCOSE SERPL-MCNC: 179 MG/DL (ref 65–140)
POTASSIUM SERPL-SCNC: 4 MMOL/L (ref 3.5–5.3)
SODIUM SERPL-SCNC: 131 MMOL/L (ref 136–145)

## 2020-03-07 PROCEDURE — 80048 BASIC METABOLIC PNL TOTAL CA: CPT | Performed by: INTERNAL MEDICINE

## 2020-03-07 PROCEDURE — 99232 SBSQ HOSP IP/OBS MODERATE 35: CPT | Performed by: INTERNAL MEDICINE

## 2020-03-07 RX ORDER — MAGNESIUM HYDROXIDE/ALUMINUM HYDROXICE/SIMETHICONE 120; 1200; 1200 MG/30ML; MG/30ML; MG/30ML
30 SUSPENSION ORAL EVERY 4 HOURS PRN
Status: DISCONTINUED | OUTPATIENT
Start: 2020-03-07 | End: 2020-03-10 | Stop reason: HOSPADM

## 2020-03-07 RX ORDER — AMLODIPINE BESYLATE 5 MG/1
5 TABLET ORAL DAILY
Status: DISCONTINUED | OUTPATIENT
Start: 2020-03-07 | End: 2020-03-10 | Stop reason: HOSPADM

## 2020-03-07 RX ADMIN — SOTALOL HYDROCHLORIDE 40 MG: 80 TABLET ORAL at 09:01

## 2020-03-07 RX ADMIN — RIVAROXABAN 15 MG: 15 TABLET, FILM COATED ORAL at 16:20

## 2020-03-07 RX ADMIN — LEVOTHYROXINE SODIUM 75 MCG: 75 TABLET ORAL at 06:06

## 2020-03-07 RX ADMIN — ALUMINUM HYDROXIDE, MAGNESIUM HYDROXIDE, AND SIMETHICONE 30 ML: 200; 200; 20 SUSPENSION ORAL at 16:38

## 2020-03-07 RX ADMIN — SOTALOL HYDROCHLORIDE 40 MG: 80 TABLET ORAL at 20:55

## 2020-03-07 RX ADMIN — VITAMIN D, TAB 1000IU (100/BT) 1000 UNITS: 25 TAB at 09:02

## 2020-03-07 RX ADMIN — PRAVASTATIN SODIUM 80 MG: 80 TABLET ORAL at 16:20

## 2020-03-07 RX ADMIN — HYDROCORTISONE 1 APPLICATION: 1 CREAM TOPICAL at 16:20

## 2020-03-07 RX ADMIN — FUROSEMIDE 40 MG: 40 TABLET ORAL at 09:02

## 2020-03-07 RX ADMIN — AMLODIPINE BESYLATE 5 MG: 5 TABLET ORAL at 11:53

## 2020-03-07 RX ADMIN — ALUMINUM HYDROXIDE, MAGNESIUM HYDROXIDE, AND SIMETHICONE 30 ML: 200; 200; 20 SUSPENSION ORAL at 06:43

## 2020-03-07 NOTE — ASSESSMENT & PLAN NOTE
Wt Readings from Last 3 Encounters:   03/07/20 62 3 kg (137 lb 5 6 oz)   03/04/20 63 kg (139 lb)   01/06/20 63 kg (139 lb)     Trace lower extremity edema  No crackles   which is slightly higher than baseline  No shortness of breath at rest but is complaining of shortness of breath with exertion which could be related to atrial fibrillation  · Hold off on any IV Lasix at this time  · Continue Lasix 40 milligram p o   Daily  · Monitor I&O, daily weights

## 2020-03-07 NOTE — ASSESSMENT & PLAN NOTE
Underwent cardioversion on 8/27/2019  Had maintained sinus rhythm until now when seen by her primary cardiologist on 3/4/2020 who noted her heart rate to be in the 160s and advised admission to the hospital for Cardizem drip followed by cardioversion  EKG in the ED showed atrial fibrillation with RVR and heart rate of 107  · Patient was on Cardizem drip for a brief period of time  · Patient is on anticoagulation with Xarelto at a lower dose dose of which was increased to 20 milligram p o   Daily  · Not on Amiodarone, Metoprolol, or Bystolic due to side effects  · Patient underwent cardioversion to sinus rhythm  · Patient was started on sotalol 80 milligram p o  B i d  which was decreased to 40 milligram twice a day as patient was bradycardic  · Patient's heart rate is better now and QTC was acceptable on the EKG  · Xarelto dose was also decreased to 15 milligram p o  daily as her creatinine clearance is low  · Patient will need ischemia workup at some point

## 2020-03-07 NOTE — PROGRESS NOTES
Progress Note - Cardiology   [facility]     [facility] [de-identified] y o  female MRN: [id number]  : 1940  Unit/Bed#: [address] [place] [address] Encounter: [id number]    Assessment and Plan:   1  Paroxysmal atrial fibrillation:  Patient maintaining sinus rhythm  Started on sotalol and dose decreased to 40 mg b i d  On 2020 due to bradycardia and GFR  Patient tolerating current dose with stable QTC  May be discharged tomorrow if stable  Continue Xarelto  2  Hypertension:  Continue current medications  Continue to monitor  Patient marked with allergy for Norvasc and lisinopril  3  Coronary artery disease with history of PCI:  Asymptomatic  Subjective / Objective:   Patient seen and examined, on a m  Labs are pending  No complaints offered  QTC is stable  Vitals: Blood pressure 159/72, pulse 68, temperature 98 °F (36 7 °C), temperature source Tympanic, resp  rate 18, height 4' 7 91" (1 42 m), weight 62 3 kg (137 lb 5 6 oz), SpO2 94 %  Vitals:    20 0600 20 06   Weight: 62 3 kg (137 lb 5 6 oz) 62 3 kg (137 lb 5 6 oz)     Body mass index is 30 9 kg/m²  BP Readings from Last 3 Encounters:   20 159/72   20 152/100   20 132/80     Orthostatic Blood Pressures      Most Recent Value   Blood Pressure  159/72 filed at 2020 0327   Patient Position - Orthostatic VS  Lying filed at 2020 0327        I/O       / 0701 - / 0700  07 -  0700 / 07 - / 0700    P  O  1250 1390     I V  (mL/kg) 210 (3 4) 20 (0 3)     Total Intake(mL/kg) 1460 (23 4) 1410 (22 6)     Urine (mL/kg/hr) 2000 (1 3) 1800 (1 2)     Blood 0      Total Output 2000 1800     Net -540 -390                Invasive Devices     Peripheral Intravenous Line            Peripheral IV 20 Left Antecubital 2 days                  Intake/Output Summary (Last 24 hours) at 3/7/2020 5210  Last data filed at 3/6/2020 2021  Gross per 24 hour   Intake 1160 ml   Output 1800 ml   Net -640 ml         Physical Exam:   Physical Exam   Constitutional: She is oriented to person, place, and time  She appears well-developed and well-nourished  No distress  HENT:   Head: Normocephalic and atraumatic  Right Ear: External ear normal    Left Ear: External ear normal    Eyes: Pupils are equal, round, and reactive to light  Conjunctivae are normal  Right eye exhibits no discharge  Left eye exhibits no discharge  No scleral icterus  Neck: Normal range of motion  Neck supple  No JVD present  No thyromegaly present  Cardiovascular: Normal rate, regular rhythm, intact distal pulses and normal pulses  Occasional extrasystoles are present  No murmur heard  Pulmonary/Chest: Effort normal and breath sounds normal  No respiratory distress  She has no wheezes  She has no rales  Abdominal: Soft  Bowel sounds are normal  She exhibits no distension  Musculoskeletal: She exhibits no edema  Neurological: She is alert and oriented to person, place, and time  Skin: Skin is warm and dry  Capillary refill takes less than 2 seconds  She is not diaphoretic  Psychiatric: She has a normal mood and affect  Her behavior is normal  Judgment and thought content normal    Nursing note and vitals reviewed                  Medications/ Allergies:     Current Facility-Administered Medications:  acetaminophen 650 mg Oral Q6H PRN Joie Sevin, CRNP   aluminum-magnesium hydroxide-simethicone 30 mL Oral Q4H PRN Heidi Sapp PA-C   cholecalciferol 1,000 Units Oral Daily Joie Sevin, CRNP   furosemide 40 mg Oral Daily Joie Sevin, CRNP   hydrocortisone 1 application Topical 4x Daily PRN Lisa Conte MD   levothyroxine 75 mcg Oral Early Morning Joie Sevin, CRNP   ondansetron 4 mg Intravenous Q6H PRN Joie Sevin, CRNP   pravastatin 80 mg Oral Daily With Dinner Joie Oneilin, CRNP   rivaroxaban 15 mg Oral Daily With [facility]ie [facility]   sodium chloride 1 spray Each Nare BID PRN Heidi Sapp PA-C   sotalol 40 mg Oral Q12H [address] Quan Furlong, CRNP       acetaminophen 650 mg Q6H PRN   aluminum-magnesium hydroxide-simethicone 30 mL Q4H PRN   hydrocortisone 1 application 4x Daily PRN   ondansetron 4 mg Q6H PRN   sodium chloride 1 spray BID PRN     Allergies   Allergen Reactions    Amlodipine Other (See Comments)    Lisinopril Other (See Comments)       VTE Pharmacologic Prophylaxis:   Xarelto    Labs:   Troponins:  Results from last 7 days   Lab Units 03/04/20  1746   TROPONIN I ng/mL 0 02     CBC with diff:  Results from last 7 days   Lab Units 03/05/20  0616 03/04/20  1746   WBC Thousand/uL 6 30 5 81   HEMOGLOBIN g/dL 14 4 14 8   HEMATOCRIT % 44 2 44 8   MCV fL 88 89   PLATELETS Thousands/uL 213 238   MCH pg 28 8 29 2   MCHC g/dL 32 6 33 0   RDW % 13 2 13 3   MPV fL 9 4 9 9   NRBC AUTO /100 WBCs  --  0     CMP:  Results from last 7 days   Lab Units 03/06/20  0503 03/05/20  0616 03/04/20  1746   SODIUM mmol/L 134* 137 137   POTASSIUM mmol/L 4 7 4 2 4 6   CHLORIDE mmol/L 100 102 100   CO2 mmol/L 28 28 28   ANION GAP mmol/L 6 7 9   BUN mg/dL 26* 20 26*   CREATININE mg/dL 1 25 0 87 0 96   GLUCOSE FASTING mg/dL  --  97  --    CALCIUM mg/dL 8 9 8 7 9 3   AST U/L  --   --  24   ALT U/L  --   --  21   ALK PHOS U/L  --   --  92   TOTAL PROTEIN g/dL  --   --  7 4   ALBUMIN g/dL  --   --  3 5   TOTAL BILIRUBIN mg/dL  --   --  0 60   EGFR ml/min/1 73sq m 41 63 56     Magnesium:  Results from last 7 days   Lab Units 03/05/20  0616   MAGNESIUM mg/dL 2 3     Coags:  Results from last 7 days   Lab Units 03/04/20  1746   PTT seconds 34   INR  1 23*     TSH:  Results from last 7 days   Lab Units 03/05/20  0616   TSH 3RD GENERATON uIU/mL 7 130*     NT-proBNP:   Recent Labs     03/04/20  1746   NTBNP 893*        Imaging & Testing   I have personally reviewed pertinent reports  Xr Chest 1 View Portable    Result Date: 3/5/2020  Narrative: CHEST INDICATION:   chest pain   COMPARISON: 2019 EXAM PERFORMED/VIEWS:  XR CHEST PORTABLE  AP semierect Images: 1 FINDINGS: The heart is enlarged  Mediastinum and hilar structures however are normal in size and contour  Minimal bibasilar subsegmental atelectasis without lobar consolidation or superimposed edema  No pneumothorax or pleural effusion  Osseous structures appear within normal limits for patient age  Impression: Minimal bibasilar atelectasis  Cardiomegaly  Workstation performed: [id number]        EKG / Monitor: Personally reviewed  2020:  Monitor demonstrates sinus rhythm with occasional PAC  milliseconds  2020:  Monitor demonstrates sinus bradycardia with  milliseconds    Cardiac testing:   Results for orders placed during the hospital encounter of 20   Echo complete with contrast if indicated    Narrative [address]  [address] [facility]   [phone]    Transthoracic Echocardiogram  2D, M-mode, Doppler, and Color Doppler    Study date:  05-Mar-2020    Patient: Lito Bro  MR number: [id number]  Account number: [de-identified]  : 1940  Age: [de-identified] years  Gender: Female  Status: Inpatient  Location: Bedside  Height: 56 in  Weight: 137 7 lb  BP: 126/ 74 mmHg    Indications: Atrial Fibrillation    Diagnoses: 427 31 - ATRIAL FIBRILLATION    Sonographer:  MARISA Lilly  Primary Physician:  Gracy Hong MD  Referring Physician:  Brett Wright MD  Group:  [facility]  Interpreting Physician:  Brett Wright MD    SUMMARY    LEFT VENTRICLE:  Systolic function was mildly reduced  Ejection fraction was estimated to be 45 %  There was mild diffuse hypokinesis  Wall thickness was mildly increased  There was mild concentric hypertrophy  LEFT ATRIUM:  The atrium was moderately to markedly dilated  Area 26 cm2    MITRAL VALVE:  There was moderate annular calcification  There was moderate regurgitation      AORTIC VALVE:  The valve was trileaflet  Leaflets exhibited moderately increased thickness, mild calcification, and mildly reduced cuspal separation  Transaortic velocity was increased due to valvular stenosis  There was very mild stenosis  There was trace regurgitation  TRICUSPID VALVE:  There was moderate regurgitation  Estimated peak PA pressure was 40 mmHg  PULMONIC VALVE:  There was mild regurgitation  HISTORY: PRIOR HISTORY: HTN, CAD, Atrial Flutter, CHF, Dyslipidemia, GERD, Dermatitis    PROCEDURE: The procedure was performed at the bedside  This was a routine study  The transthoracic approach was used  The study included complete 2D imaging, M-mode, complete spectral Doppler, and color Doppler  The heart rate was 96 bpm,  at the start of the study  Image quality was adequate  LEFT VENTRICLE: Size was normal  Systolic function was mildly reduced  Ejection fraction was estimated to be 45 %  There was mild diffuse hypokinesis  Wall thickness was mildly increased  There was mild concentric hypertrophy  DOPPLER: The  study was not technically sufficient to allow evaluation of LV diastolic function  RIGHT VENTRICLE: The size was normal  Systolic function was normal     LEFT ATRIUM: The atrium was moderately to markedly dilated  Area 26 cm2 No mass was present  RIGHT ATRIUM: Size was normal     MITRAL VALVE: There was moderate annular calcification  There was normal leaflet separation  DOPPLER: There was no evidence for stenosis  There was moderate regurgitation  AORTIC VALVE: The valve was trileaflet  Leaflets exhibited moderately increased thickness, mild calcification, and mildly reduced cuspal separation  DOPPLER: Transaortic velocity was increased due to valvular stenosis  There was very mild  stenosis  There was trace regurgitation  TRICUSPID VALVE: DOPPLER: There was moderate regurgitation  Estimated peak PA pressure was 40 mmHg      PULMONIC VALVE: DOPPLER: There was mild regurgitation  PERICARDIUM: There was no thickening or calcification  There was no pericardial effusion  AORTA: The root exhibited normal size  SYSTEM MEASUREMENT TABLES    2D mode  AoR Diam 2D: 3 1 cm  LA Diam (2D): 4 6 cm  LA/Ao (2D): 1 48  FS (2D Teich): 23 6 %  IVSd (2D): 1 12 cm  LVDEV: 85 8 cmï¾³  LVESV: 45 1 cmï¾³  LVIDd(2D): 4 36 cm  LVISd (2D): 3 33 cm  LVOT Area 2D: 2 84 cmï¾²  LVPWd (2D): 1 17 cm  SV (Teich): 40 7 cmï¾³    Apical four chamber  LVEF A4C: 44 %    Unspecified Scan Mode  LAILA Cont Eq (Peak Kushal): 1 32 cmï¾²  LAILA Cont Eq (VTI): 1 11 cmï¾²  LVOT (VTI): 14 2 cm  LVOT Diam : 1 9 cm  LVOT Vmax: 867 mm/s  LVOT Vmax; Mean: 867 mm/s  Peak Grad ; Mean: 3 mm[Hg]  SV (LVOT): 40 cmï¾³  VTI;Mean: 2 mm[Hg]  LAILA Cont Eq (VTI): 1 4 cmï¾²  MV Peak E Kushal   Mean: 1330 mm/s  MVA (PHT): 4 cmï¾²  PHT: 55 ms  Max P mm[Hg]  V Max: 2810 mm/s  Vmax: 2670 mm/s  RA Area: 18 4 cmï¾²  RA Volume: 52 cmï¾³  TAPSE: 1 5 cm    [facility] Accredited Echocardiography Laboratory    Prepared and electronically signed by    Mildred Candelaria MD  Signed 05-Mar-2020 17:21:02       Results for orders placed during the hospital encounter of 20   SANTI    Narrative [address]  [address] [facility] 6 [phone]    Transesophageal Echocardiogram    Study date:  05-Mar-2020    Patient: David Portillo  MR number: [id number]  Account number: [de-identified]  : 1940  Age: [de-identified] years  Gender: Female  Status: Inpatient  Location: Cath lab  Height: 56 in  Weight: 138 lb  BP: 159/ 86 mmHg    Indications: Atrial Fibrillation    Diagnoses: 427 31 - ATRIAL FIBRILLATION    Sonographer:  MARISA Quintanilla  Primary Physician:  Shola Garcia MD  Referring Physician:  Mildred Candelaria MD  Group:  [facility]  RN:  Stephanie Hickey RN  Interpreting Physician:  Mildred Candelaria MD    SUMMARY    LEFT VENTRICLE:  Systolic function was mildly to moderately reduced  Ejection fraction was estimated in the range of 40 % to 45 % to be 45 %  There was moderate diffuse hypokinesis  Wall thickness was mildly increased  There was mild concentric hypertrophy  LEFT ATRIUM:  The atrium was moderately to markedly dilated  ATRIAL SEPTUM:  No defect or patent foramen ovale was identified  RIGHT ATRIUM:  The atrium was mildly dilated  MITRAL VALVE:  There was mild to moderate annular calcification  There was moderate regurgitation  AORTIC VALVE:  There was very mild stenosis  TRICUSPID VALVE:  There was mild to moderate regurgitation  PULMONIC VALVE:  There was mild regurgitation  HISTORY: PRIOR HISTORY: HTN, CAD, Atrial Flutter, CHF, GERD    PROCEDURE: The procedure was performed in the catheterization laboratory  This was a routine study  The transesophageal approach was used  The heart rate was 104 bpm, at the start of the study  An adult omniplane probe was inserted by the  attending cardiologist  Intubated with ease  Two intubation attempt(s)  There was no blood detected on the probe  Intravenous contrast (agitated saline, 10 ml) was administered to evaluate shunting  LEFT VENTRICLE: Size was normal  Systolic function was mildly to moderately reduced  Ejection fraction was estimated in the range of 40 % to 45 % to be 45 %  There was moderate diffuse hypokinesis  Wall thickness was mildly increased  There was mild concentric hypertrophy  DOPPLER: The study was not technically sufficient to allow evaluation of LV diastolic function  RIGHT VENTRICLE: The size was normal  Systolic function was normal     LEFT ATRIUM: The atrium was moderately to markedly dilated  APPENDAGE: The size was normal  No foreign bodies were observed  No mass was identified  There was no spontaneous echo contrast ("smoke") in the appendage  DOPPLER: The function  was normal (normal emptying velocity)      ATRIAL SEPTUM: No defect or patent foramen ovale was identified  RIGHT ATRIUM: The atrium was mildly dilated  MITRAL VALVE: There was mild to moderate annular calcification  DOPPLER: There was no evidence for stenosis  There was moderate regurgitation  AORTIC VALVE: The valve was trileaflet  Leaflets exhibited mildly to moderately increased thickness, mild to moderate calcification, and mildly reduced cuspal separation  There was no echocardiographic evidence of vegetation  DOPPLER:  There was very mild stenosis  There was no regurgitation  TRICUSPID VALVE: DOPPLER: There was mild to moderate regurgitation  PULMONIC VALVE: DOPPLER: There was mild regurgitation  PERICARDIUM: There was no thickening or calcification  There was no pericardial effusion  AORTA: The root exhibited normal size  [address] Accredited Echocardiography Laboratory    Prepared and electronically signed by    Tito Weinstein MD  Signed 05-Mar-2020 17:23:45           CHA Rees        "This note has been constructed using a voice recognition system  Therefore there may be syntax, spelling, and/or grammatical errors   Please call if you have any questions  "

## 2020-03-07 NOTE — PROGRESS NOTES
Progress Note Khadijah Dee 1940, [de-identified] y o  female MRN: [id number]    Unit/Bed#: [address] [place] [address] Encounter: [id number]    Primary Care Provider: Earnestine Pierson MD   Date and time admitted to hospital: 3/4/2020  5:35 PM        * Atrial fibrillation with RVR [facility])  Assessment & Plan  Underwent cardioversion on 8/27/2019  Had maintained sinus rhythm until now when seen by her primary cardiologist on 3/4/2020 who noted her heart rate to be in the 160s and advised admission to the hospital for Cardizem drip followed by cardioversion  EKG in the ED showed atrial fibrillation with RVR and heart rate of 107  · Patient was on Cardizem drip for a brief period of time  · Patient is on anticoagulation with Xarelto at a lower dose dose of which was increased to 20 milligram p o  Daily  · Not on Amiodarone, Metoprolol, or Bystolic due to side effects  · Patient underwent cardioversion to sinus rhythm  · Patient was started on sotalol 80 milligram p o  B i d  which was decreased to 40 milligram twice a day as patient was bradycardic  · Patient's heart rate is better now and QTC was acceptable on the EKG  · Xarelto dose was also decreased to 15 milligram p o  daily as her creatinine clearance is low  · Patient will need ischemia workup at some point    Chronic diastolic heart failure [facility])  Assessment & Plan  Wt Readings from Last 3 Encounters:   03/07/20 62 3 kg (137 lb 5 6 oz)   03/04/20 63 kg (139 lb)   01/06/20 63 kg (139 lb)     Trace lower extremity edema  No crackles   which is slightly higher than baseline  No shortness of breath at rest but is complaining of shortness of breath with exertion which could be related to atrial fibrillation  · Hold off on any IV Lasix at this time  · Continue Lasix 40 milligram p o   Daily  · Monitor I&O, daily weights    Essential hypertension  Assessment & Plan  No longer on any blood pressure medications  · Blood pressure is only moderately controlled  · Patient was started on Norvasc 5 milligram p o  Daily by Cardiology after discussing with daughter given her multiple allergies to medications  · Continue Lasix    Dyslipidemia  Assessment & Plan  · Continue statin (auto substitute Crestor with Pravastatin while hospitalized)    Hypothyroidism  Assessment & Plan    · Continue Synthroid 75 mcg daily  · TSH is slightly high at 7  Will hold off on increasing Synthroid as patient had atrial fibrillation  · Follow-up outpatient with PCP    CAD (coronary artery disease)  Assessment & Plan  History of PCI          VTE Pharmacologic Prophylaxis:   Pharmacologic: Rivaroxaban (Xarelto)  Mechanical VTE Prophylaxis in Place: Yes    Patient Centered Rounds: I have performed bedside rounds with nursing staff today  Discussions with Specialists or Other Care Team Provider: Graciela Shaver  Education and Discussions with Family / Patient:Haley  Time Spent for Care: 30 minutes  More than 50% of total time spent on counseling and coordination of care as described above  Current Length of Stay: 2 day(s)  Current Patient Status: Inpatient     Discharge Plan: Home    Code Status: Level 1 - Full Code      Subjective:   Patient had some nausea and slight vomiting last night  Patient denies any chest pain, palpitations, headache or dizziness  Objective:     Vitals:   Temp (24hrs), Av 1 °F (36 7 °C), Min:97 5 °F (36 4 °C), Max:98 7 °F (37 1 °C)    Temp:  [97 5 °F (36 4 °C)-98 7 °F (37 1 °C)] 98 1 °F (36 7 °C)  HR:  [54-68] 59  Resp:  [14-20] 20  BP: (134-177)/(65-86) 177/76  SpO2:  [91 %-97 %] 97 %  Body mass index is 30 9 kg/m²  Input and Output Summary (last 24 hours): Intake/Output Summary (Last 24 hours) at 3/7/2020 1654  Last data filed at 3/7/2020 1230  Gross per 24 hour   Intake 1520 ml   Output 1800 ml   Net -280 ml        Physical Exam:     Physical Exam   Constitutional: No distress  HENT:   Head: Normocephalic and atraumatic     Nose: Nose normal    Eyes: Pupils are equal, round, and reactive to light  Conjunctivae are normal    Neck: Normal range of motion  Neck supple  Cardiovascular: Normal rate, regular rhythm and normal heart sounds  Pulmonary/Chest: Effort normal  No respiratory distress  She has no wheezes  She has rales  Few crackles at the bases   Abdominal: Soft  Bowel sounds are normal  She exhibits no distension  There is no tenderness  There is no rebound and no guarding  Musculoskeletal: She exhibits no edema  Neurological: She is alert  No cranial nerve deficit  Skin: Skin is warm and dry  No rash noted  Additional Data:     Labs:    Results from last 7 days   Lab Units 03/05/20  0616 03/04/20  1746   WBC Thousand/uL 6 30 5 81   HEMOGLOBIN g/dL 14 4 14 8   HEMATOCRIT % 44 2 44 8   PLATELETS Thousands/uL 213 238   NEUTROS PCT %  --  54     Results from last 7 days   Lab Units 03/07/20  0920 03/06/20  0503 03/05/20  0616 03/04/20  1746   SODIUM mmol/L 131* 134* 137 137   POTASSIUM mmol/L 4 0 4 7 4 2 4 6   CHLORIDE mmol/L 100 100 102 100   CO2 mmol/L 23 28 28 28   BUN mg/dL 23 26* 20 26*   CREATININE mg/dL 1 03 1 25 0 87 0 96   CALCIUM mg/dL 8 5 8 9 8 7 9 3   TOTAL BILIRUBIN mg/dL  --   --   --  0 60   ALK PHOS U/L  --   --   --  92   ALT U/L  --   --   --  21   AST U/L  --   --   --  24     Results from last 7 days   Lab Units 03/04/20  1746   INR  1 23*     Results from last 7 days   Lab Units 03/04/20  1746   TROPONIN I ng/mL 0 02     No results found for: HGBA1C            * I Have Reviewed All Lab Data Listed Above  * Additional Pertinent Lab Tests Reviewed: [facility] Admission Reviewed    Imaging:     XR chest 1 view portable   ED Interpretation by Jonnie Mckeon MD (00/13 6900)   CM, NAD      Final Result by Kalani Elizabeth MD (03/05 0805)      Minimal bibasilar atelectasis  Cardiomegaly  Workstation performed: [id number]           Imaging Reports Reviewed by myself    Cultures:   Blood Culture:  No results found for: BLOODCX  Urine Culture:   Lab Results   Component Value Date    URINECX 70,000-79,000 cfu/ml Mixed Contaminants X4 02/17/2017     Sputum Culture: No components found for: SPUTUMCX  Wound Culture: No results found for: WOUNDCULT    Last 24 Hours Medication List:     Current Facility-Administered Medications:  acetaminophen 650 mg Oral Q6H PRN Theresa Heimlich, CRNP   aluminum-magnesium hydroxide-simethicone 30 mL Oral Q4H PRN Nishant Ireland PA-C   amLODIPine 5 mg Oral Daily Mark Arceo MD   cholecalciferol 1,000 Units Oral Daily Theresa Heimlich, CRNP   furosemide 40 mg Oral Daily Theresa Heimlich, CRNP   hydrocortisone 1 application Topical 4x Daily PRN Be Farley MD   levothyroxine 75 mcg Oral Early Morning Theresa Heimlich, CRNP   ondansetron 4 mg Intravenous Q6H PRN Theresa Heimlich, CRNP   pravastatin 80 mg Oral Daily With Dinner Theresa Heimlich, CRNP   rivaroxaban 15 mg Oral Daily With Mearl Done, [facility]   sodium chloride 1 spray Each Nare BID PRN Nishant Ireland PA-C   sotalol 40 mg Oral Q12H [facility] [place]        Today, Patient Was Seen By: Be Farley MD    ** Please Note: Dragon 360 Dictation voice to text software may have been used in the creation of this document   **

## 2020-03-07 NOTE — ASSESSMENT & PLAN NOTE
No longer on any blood pressure medications  · Blood pressure is only moderately controlled  · Patient was started on Norvasc 5 milligram p o   Daily by Cardiology after discussing with daughter given her multiple allergies to medications  · Continue Lasix

## 2020-03-08 ENCOUNTER — APPOINTMENT (INPATIENT)
Dept: RADIOLOGY | Facility: HOSPITAL | Age: 80
DRG: 308 | End: 2020-03-08
Payer: COMMERCIAL

## 2020-03-08 LAB
ANION GAP SERPL CALCULATED.3IONS-SCNC: 8 MMOL/L (ref 4–13)
BUN SERPL-MCNC: 16 MG/DL (ref 5–25)
CALCIUM SERPL-MCNC: 8.8 MG/DL (ref 8.3–10.1)
CHLORIDE SERPL-SCNC: 101 MMOL/L (ref 100–108)
CO2 SERPL-SCNC: 28 MMOL/L (ref 21–32)
CREAT SERPL-MCNC: 0.95 MG/DL (ref 0.6–1.3)
GFR SERPL CREATININE-BSD FRML MDRD: 57 ML/MIN/1.73SQ M
GLUCOSE SERPL-MCNC: 147 MG/DL (ref 65–140)
POTASSIUM SERPL-SCNC: 4 MMOL/L (ref 3.5–5.3)
SODIUM SERPL-SCNC: 137 MMOL/L (ref 136–145)

## 2020-03-08 PROCEDURE — 71046 X-RAY EXAM CHEST 2 VIEWS: CPT

## 2020-03-08 PROCEDURE — 99232 SBSQ HOSP IP/OBS MODERATE 35: CPT | Performed by: INTERNAL MEDICINE

## 2020-03-08 PROCEDURE — 80048 BASIC METABOLIC PNL TOTAL CA: CPT | Performed by: INTERNAL MEDICINE

## 2020-03-08 RX ORDER — FUROSEMIDE 10 MG/ML
40 INJECTION INTRAMUSCULAR; INTRAVENOUS ONCE
Status: COMPLETED | OUTPATIENT
Start: 2020-03-08 | End: 2020-03-08

## 2020-03-08 RX ADMIN — FUROSEMIDE 40 MG: 40 TABLET ORAL at 09:27

## 2020-03-08 RX ADMIN — SOTALOL HYDROCHLORIDE 40 MG: 80 TABLET ORAL at 21:15

## 2020-03-08 RX ADMIN — PRAVASTATIN SODIUM 80 MG: 80 TABLET ORAL at 18:03

## 2020-03-08 RX ADMIN — SOTALOL HYDROCHLORIDE 40 MG: 80 TABLET ORAL at 09:27

## 2020-03-08 RX ADMIN — AMLODIPINE BESYLATE 5 MG: 5 TABLET ORAL at 09:27

## 2020-03-08 RX ADMIN — VITAMIN D, TAB 1000IU (100/BT) 1000 UNITS: 25 TAB at 09:27

## 2020-03-08 RX ADMIN — LEVOTHYROXINE SODIUM 75 MCG: 75 TABLET ORAL at 06:40

## 2020-03-08 RX ADMIN — RIVAROXABAN 15 MG: 15 TABLET, FILM COATED ORAL at 18:02

## 2020-03-08 RX ADMIN — FUROSEMIDE 40 MG: 10 INJECTION, SOLUTION INTRAMUSCULAR; INTRAVENOUS at 11:29

## 2020-03-08 NOTE — ASSESSMENT & PLAN NOTE
No longer on any blood pressure medications  · Blood pressure is only moderately controlled  · Patient was started on Norvasc 5 milligram p o   Daily by Cardiology after discussing with daughter given her multiple allergies to medications  · Continue Lasix  · Blood pressure is better after adding Norvasc

## 2020-03-08 NOTE — PROGRESS NOTES
Progress Note Chance Saez 1940, [de-identified] y o  female MRN: [id number]    Unit/Bed#: [address] [place] [address] Encounter: [id number]    Primary Care Provider: Una Harris MD   Date and time admitted to hospital: 3/4/2020  5:35 PM        * Atrial fibrillation with RVR [facility])  Assessment & Plan  Underwent cardioversion on 8/27/2019  Had maintained sinus rhythm until now when seen by her primary cardiologist on 3/4/2020 who noted her heart rate to be in the 160s and advised admission to the hospital for Cardizem drip followed by cardioversion  EKG in the ED showed atrial fibrillation with RVR and heart rate of 107  · Patient was on Cardizem drip for a brief period of time  · Patient is on anticoagulation with Xarelto at a lower dose dose of which was increased to 20 milligram p o  Daily  · Not on Amiodarone, Metoprolol, or Bystolic due to side effects  · Patient underwent cardioversion to sinus rhythm  · Patient was started on sotalol 80 milligram p o  B i d  which was decreased to 40 milligram twice a day as patient was bradycardic  · Patient's heart rate is better now and QTC was acceptable on the EKG  · Xarelto dose was also decreased to 15 milligram p o  daily as her creatinine clearance is low  · Patient will need ischemia workup at some point    Acute on chronic diastolic heart failure (HCC)  Assessment & Plan  Wt Readings from Last 3 Encounters:   03/08/20 61 8 kg (136 lb 3 9 oz)   03/04/20 63 kg (139 lb)   01/06/20 63 kg (139 lb)     Patient had trace lower extremity edema with a BNP of 893 upon admission  Patient is having desaturation especially on exertion as per RN  · Patient noted to have some capsule for upon examination and chest x-ray showed some pulmonary vascular congestion  · Patient was given Lasix 40 milligram IV x1 dose along with home dosing of 40 milligram p o   Daily    Essential hypertension  Assessment & Plan  No longer on any blood pressure medications  · Blood pressure is only moderately controlled  · Patient was started on Norvasc 5 milligram p o  Daily by Cardiology after discussing with daughter given her multiple allergies to medications  · Continue Lasix  · Blood pressure is better after adding Norvasc    Dyslipidemia  Assessment & Plan  · Continue statin (auto substitute Crestor with Pravastatin while hospitalized)    Hypothyroidism  Assessment & Plan    · Continue Synthroid 75 mcg daily  · TSH is slightly high at 7  Will hold off on increasing Synthroid as patient had atrial fibrillation  · Follow-up outpatient with PCP    CAD (coronary artery disease)  Assessment & Plan  History of PCI        VTE Pharmacologic Prophylaxis:   Pharmacologic: Rivaroxaban (Xarelto)  Mechanical VTE Prophylaxis in Place: Yes    Patient Centered Rounds: I have performed bedside rounds with nursing staff today  Discussions with Specialists or Other Care Team Provider: Yes  Education and Discussions with Family / Patient:Yes  Time Spent for Care: 45 minutes  More than 50% of total time spent on counseling and coordination of care as described above  Current Length of Stay: 3 day(s)  Current Patient Status: Inpatient     Discharge Plan:  Home    Code Status: Level 1 - Full Code      Subjective:   Patient complaining of shortness of breath and cough on lying down  Patient also desaturating into high 80s to low 90s especially on exertion per RN  Objective:     Vitals:   Temp (24hrs), Av 4 °F (36 9 °C), Min:98 °F (36 7 °C), Max:98 8 °F (37 1 °C)    Temp:  [98 °F (36 7 °C)-98 8 °F (37 1 °C)] 98 °F (36 7 °C)  HR:  [55-73] 55  Resp:  [18-20] 20  BP: (123-169)/(56-74) 138/63  SpO2:  [92 %-98 %] 98 %  Body mass index is 30 65 kg/m²  Input and Output Summary (last 24 hours):      Intake/Output Summary (Last 24 hours) at 3/8/2020 1714  Last data filed at 3/8/2020 1401  Gross per 24 hour   Intake 130 ml   Output 1950 ml   Net -1820 ml        Physical Exam:     Physical Exam   Constitutional: No distress  HENT:   Head: Normocephalic and atraumatic  Nose: Nose normal    Eyes: Pupils are equal, round, and reactive to light  Conjunctivae are normal    Neck: Normal range of motion  Neck supple  Cardiovascular: Normal rate and regular rhythm  Murmur heard  Pulmonary/Chest: Effort normal  No respiratory distress  She has no wheezes  She has rales  Bibasilar rales   Abdominal: Soft  Bowel sounds are normal  She exhibits no distension  There is no tenderness  There is no rebound and no guarding  Musculoskeletal: She exhibits no edema  Neurological: She is alert  No cranial nerve deficit  Skin: Skin is warm and dry  No rash noted  Additional Data:     Labs:    Results from last 7 days   Lab Units 03/05/20  0616 03/04/20  1746   WBC Thousand/uL 6 30 5 81   HEMOGLOBIN g/dL 14 4 14 8   HEMATOCRIT % 44 2 44 8   PLATELETS Thousands/uL 213 238   NEUTROS PCT %  --  54     Results from last 7 days   Lab Units 03/08/20  1323 03/07/20  0920 03/06/20  0503  03/04/20  1746   SODIUM mmol/L 137 131* 134*   < > 137   POTASSIUM mmol/L 4 0 4 0 4 7   < > 4 6   CHLORIDE mmol/L 101 100 100   < > 100   CO2 mmol/L 28 23 28   < > 28   BUN mg/dL 16 23 26*   < > 26*   CREATININE mg/dL 0 95 1 03 1 25   < > 0 96   CALCIUM mg/dL 8 8 8 5 8 9   < > 9 3   TOTAL BILIRUBIN mg/dL  --   --   --   --  0 60   ALK PHOS U/L  --   --   --   --  92   ALT U/L  --   --   --   --  21   AST U/L  --   --   --   --  24    < > = values in this interval not displayed  Results from last 7 days   Lab Units 03/04/20  1746   INR  1 23*     Results from last 7 days   Lab Units 03/04/20  1746   TROPONIN I ng/mL 0 02     No results found for: HGBA1C            * I Have Reviewed All Lab Data Listed Above  * Additional Pertinent Lab Tests Reviewed:  Obed 66 Admission Reviewed    Imaging:     XR chest 1 view portable   ED Interpretation by Bautista Donato MD (75/32 [id number])   CM, NAD      Final Result by Blas Saha MD (03/05 0805)      Minimal bibasilar atelectasis  Cardiomegaly  Workstation performed: [id number]         XR chest pa & lateral    (Results Pending)     Imaging Reports Reviewed by myself    Cultures:   Blood Culture: No results found for: BLOODCX  Urine Culture:   Lab Results   Component Value Date    URINECX 70,000-79,000 cfu/ml Mixed Contaminants X4 02/17/2017     Sputum Culture: No components found for: SPUTUMCX  Wound Culture: No results found for: WOUNDCULT    Last 24 Hours Medication List:     Current Facility-Administered Medications:  acetaminophen 650 mg Oral Q6H PRN CHA Loomis   aluminum-magnesium hydroxide-simethicone 30 mL Oral Q4H PRN Sean Moreno PA-C   amLODIPine 5 mg Oral Daily Cody Shaw MD   cholecalciferol 1,000 Units Oral Daily CHA Loomis   furosemide 40 mg Oral Daily CHA Loomis   hydrocortisone 1 application Topical 4x Daily PRN Rasta Hicks MD   levothyroxine 75 mcg Oral Early Morning CHA Loomis   ondansetron 4 mg Intravenous Q6H PRN CHA Loomis   pravastatin 80 mg Oral Daily With Dinner CHA Loomis   rivaroxaban 15 mg Oral Daily With Lior BenchCHA   sodium chloride 1 spray Each Nare BID PRN Sean Moreno PA-C   sotalol 40 mg Oral Q12H [facility] CHA Valenzuela        Today, Patient Was Seen By: Rasta Hicks MD    ** Please Note: Dragon 360 Dictation voice to text software may have been used in the creation of this document   **

## 2020-03-08 NOTE — PROGRESS NOTES
Progress Note - Cardiology   [facility]     [facility] [de-identified] y o  female MRN: [id number]  : 1940  Unit/Bed#: [address] [place] [address] Encounter: [id number]    Assessment and Plan:   1  Paroxysmal atrial fibrillation:  Patient is maintaining sinus rhythm on sotalol 40 mg b i d  QTC is stable  Patient also on Xarelto for thromboembolism       2  Hypertension:  Patient started on Norvasc  Will monitor for lower extremity edema  3  Volume overload secondary to diastolic heart failure:  Patient received Lasix 40 mg p o  Today, but will give her an additional Lasix 40 mg IV and monitor output  Continue monitor O2 and activity response  4  Coronary artery disease with history of PCI:  Asymptomatic    Subjective / Objective:   Patient seen and examined  She is noted to be dyspneic with exertion  Discussed with nursing staff, patient noted to drop to 88% with oxygen with physical activity  Patient noted to be dyspneic on exertion  Chest x-ray concerning for increase CHF, will give extra IV Lasix today  Vitals: Blood pressure 138/63, pulse 55, temperature 98 °F (36 7 °C), temperature source Oral, resp  rate 20, height 4' 7 91" (1 42 m), weight 61 8 kg (136 lb 3 9 oz), SpO2 98 %  Vitals:    20 0600 20 0532   Weight: 62 3 kg (137 lb 5 6 oz) 61 8 kg (136 lb 3 9 oz)     Body mass index is 30 65 kg/m²  BP Readings from Last 3 Encounters:   20 138/63   20 152/100   20 132/80     Orthostatic Blood Pressures      Most Recent Value   Blood Pressure  138/63 filed at 2020 1210   Patient Position - Orthostatic VS  Lying filed at 2020 1210        I/O       / 0701 -  0700 / 07 -  0700 / 07 -  0700    P  O  1390 480     I V  (mL/kg) 20 (0 3) 10 (0 2)     Total Intake(mL/kg) 1410 (22 6) 490 (7 9)     Urine (mL/kg/hr) 1800 (1 2) 400 (0 3) 650 (2)    Blood       Total Output 1800 400 650    Net -390 +90 -650 Invasive Devices     Peripheral Intravenous Line            Peripheral IV 03/08/20 Left Antecubital less than 1 day                  Intake/Output Summary (Last 24 hours) at 3/8/2020 1216  Last data filed at 3/8/2020 1201  Gross per 24 hour   Intake 250 ml   Output 1050 ml   Net -800 ml         Physical Exam:   Physical Exam   Constitutional: She is oriented to person, place, and time  She appears well-developed and well-nourished  No distress  HENT:   Head: Normocephalic  Right Ear: External ear normal    Left Ear: External ear normal    Eyes: Pupils are equal, round, and reactive to light  Conjunctivae are normal  Right eye exhibits no discharge  Left eye exhibits no discharge  No scleral icterus  Neck: Normal range of motion  Neck supple  No JVD present  No thyromegaly present  Cardiovascular: Normal rate, regular rhythm and intact distal pulses  Pulmonary/Chest: Effort normal  No accessory muscle usage  No respiratory distress  She has decreased breath sounds in the right lower field and the left lower field  She has no wheezes  She has rales in the right lower field and the left lower field  Abdominal: Soft  Bowel sounds are normal    Musculoskeletal: She exhibits no edema  Neurological: She is alert and oriented to person, place, and time  Skin: Skin is warm and dry  She is not diaphoretic  Psychiatric: She has a normal mood and affect  Nursing note and vitals reviewed                  Medications/ Allergies:     Current Facility-Administered Medications:  acetaminophen 650 mg Oral Q6H PRN CHA Still   aluminum-magnesium hydroxide-simethicone 30 mL Oral Q4H PRN Elza Knig PA-C   amLODIPine 5 mg Oral Daily To Hernandze MD   cholecalciferol 1,000 Units Oral Daily CHA Still   furosemide 40 mg Oral Daily CHA Still   hydrocortisone 1 application Topical 4x Daily PRN Carolyn Cole MD   levothyroxine 75 mcg Oral Early Morning Kerrikirsten Hi CHA Lewis   ondansetron 4 mg Intravenous Q6H PRN Maranda Santiago, CRLUIS MANUEL   pravastatin 80 mg Oral Daily With Anika Pina, CRLUIS MANUEL   rivaroxaban 15 mg Oral Daily With CHA Randall   sodium chloride 1 spray Each Nare BID PRN Jeferson Fontenot PA-C   sotalol 40 mg Oral Q12H [address] Luz Bah, CRLUIS MANUEL       acetaminophen 650 mg Q6H PRN   aluminum-magnesium hydroxide-simethicone 30 mL Q4H PRN   hydrocortisone 1 application 4x Daily PRN   ondansetron 4 mg Q6H PRN   sodium chloride 1 spray BID PRN     Allergies   Allergen Reactions    Amlodipine Other (See Comments)    Lisinopril Other (See Comments)       VTE Pharmacologic Prophylaxis:   Sequential compression device (Venodyne)     Labs:   Troponins:  Results from last 7 days   Lab Units 03/04/20  1746   TROPONIN I ng/mL 0 02     CBC with diff:  Results from last 7 days   Lab Units 03/05/20  0616 03/04/20  1746   WBC Thousand/uL 6 30 5 81   HEMOGLOBIN g/dL 14 4 14 8   HEMATOCRIT % 44 2 44 8   MCV fL 88 89   PLATELETS Thousands/uL 213 238   MCH pg 28 8 29 2   MCHC g/dL 32 6 33 0   RDW % 13 2 13 3   MPV fL 9 4 9 9   NRBC AUTO /100 WBCs  --  0     CMP:  Results from last 7 days   Lab Units 03/07/20  0920 03/06/20  0503 03/05/20  0616 03/04/20  1746   SODIUM mmol/L 131* 134* 137 137   POTASSIUM mmol/L 4 0 4 7 4 2 4 6   CHLORIDE mmol/L 100 100 102 100   CO2 mmol/L 23 28 28 28   ANION GAP mmol/L 8 6 7 9   BUN mg/dL 23 26* 20 26*   CREATININE mg/dL 1 03 1 25 0 87 0 96   GLUCOSE FASTING mg/dL  --   --  97  --    CALCIUM mg/dL 8 5 8 9 8 7 9 3   AST U/L  --   --   --  24   ALT U/L  --   --   --  21   ALK PHOS U/L  --   --   --  92   TOTAL PROTEIN g/dL  --   --   --  7 4   ALBUMIN g/dL  --   --   --  3 5   TOTAL BILIRUBIN mg/dL  --   --   --  0 60   EGFR ml/min/1 73sq m 51 41 63 56     Magnesium:  Results from last 7 days   Lab Units 03/05/20  0616   MAGNESIUM mg/dL 2 3     Coags:  Results from last 7 days   Lab Units 03/04/20  1746   PTT seconds 34 INR  1 23*     TSH:  Results from last 7 days   Lab Units 20  0616   TSH 3RD GENERATON uIU/mL 7 130*       Imaging & Testing   I have personally reviewed pertinent reports  Xr Chest 1 View Portable    Result Date: 3/5/2020  Narrative: CHEST INDICATION:   chest pain  COMPARISON:  2019 EXAM PERFORMED/VIEWS:  XR CHEST PORTABLE  AP semierect Images: 1 FINDINGS: The heart is enlarged  Mediastinum and hilar structures however are normal in size and contour  Minimal bibasilar subsegmental atelectasis without lobar consolidation or superimposed edema  No pneumothorax or pleural effusion  Osseous structures appear within normal limits for patient age  Impression: Minimal bibasilar atelectasis  Cardiomegaly  Workstation performed: [id number]     Cardiac testing:   Results for orders placed during the hospital encounter of 20   Echo complete with contrast if indicated    Narrative T[address]  [address] [facility] 6  [phone]    Transthoracic Echocardiogram  2D, M-mode, Doppler, and Color Doppler    Study date:  05-Mar-2020    Patient: Elsa Iqbal  MR number: [id number]  Account number: [de-identified]  : 1940  Age: [de-identified] years  Gender: Female  Status: Inpatient  Location: Bedside  Height: 56 in  Weight: 137 7 lb  BP: 126/ 74 mmHg    Indications: Atrial Fibrillation    Diagnoses: 427 31 - ATRIAL FIBRILLATION    Sonographer:  MARISA Sweet  Primary Physician:  Braulio Gamez MD  Referring Physician:  Kendra Crystal MD  [facility]  [facility]  Interpreting Physician:  Kendra Crystal MD    SUMMARY    LEFT VENTRICLE:  Systolic function was mildly reduced  Ejection fraction was estimated to be 45 %  There was mild diffuse hypokinesis  Wall thickness was mildly increased  There was mild concentric hypertrophy  LEFT ATRIUM:  The atrium was moderately to markedly dilated   Area 26 cm2    MITRAL VALVE:  There was moderate annular calcification  There was moderate regurgitation  AORTIC VALVE:  The valve was trileaflet  Leaflets exhibited moderately increased thickness, mild calcification, and mildly reduced cuspal separation  Transaortic velocity was increased due to valvular stenosis  There was very mild stenosis  There was trace regurgitation  TRICUSPID VALVE:  There was moderate regurgitation  Estimated peak PA pressure was 40 mmHg  PULMONIC VALVE:  There was mild regurgitation  HISTORY: PRIOR HISTORY: HTN, CAD, Atrial Flutter, CHF, Dyslipidemia, GERD, Dermatitis    PROCEDURE: The procedure was performed at the bedside  This was a routine study  The transthoracic approach was used  The study included complete 2D imaging, M-mode, complete spectral Doppler, and color Doppler  The heart rate was 96 bpm,  at the start of the study  Image quality was adequate  LEFT VENTRICLE: Size was normal  Systolic function was mildly reduced  Ejection fraction was estimated to be 45 %  There was mild diffuse hypokinesis  Wall thickness was mildly increased  There was mild concentric hypertrophy  DOPPLER: The  study was not technically sufficient to allow evaluation of LV diastolic function  RIGHT VENTRICLE: The size was normal  Systolic function was normal     LEFT ATRIUM: The atrium was moderately to markedly dilated  Area 26 cm2 No mass was present  RIGHT ATRIUM: Size was normal     MITRAL VALVE: There was moderate annular calcification  There was normal leaflet separation  DOPPLER: There was no evidence for stenosis  There was moderate regurgitation  AORTIC VALVE: The valve was trileaflet  Leaflets exhibited moderately increased thickness, mild calcification, and mildly reduced cuspal separation  DOPPLER: Transaortic velocity was increased due to valvular stenosis  There was very mild  stenosis  There was trace regurgitation  TRICUSPID VALVE: DOPPLER: There was moderate regurgitation   Estimated peak PA pressure was 40 mmHg  PULMONIC VALVE: DOPPLER: There was mild regurgitation  PERICARDIUM: There was no thickening or calcification  There was no pericardial effusion  AORTA: The root exhibited normal size  SYSTEM MEASUREMENT TABLES    2D mode  AoR Diam 2D: 3 1 cm  LA Diam (2D): 4 6 cm  LA/Ao (2D): 1 48  FS (2D Teich): 23 6 %  IVSd (2D): 1 12 cm  LVDEV: 85 8 cmï¾³  LVESV: 45 1 cmï¾³  LVIDd(2D): 4 36 cm  LVISd (2D): 3 33 cm  LVOT Area 2D: 2 84 cmï¾²  LVPWd (2D): 1 17 cm  SV (Teich): 40 7 cmï¾³    Apical four chamber  LVEF A4C: 44 %    Unspecified Scan Mode  LAILA Cont Eq (Peak Kushal): 1 32 cmï¾²  LAILA Cont Eq (VTI): 1 11 cmï¾²  LVOT (VTI): 14 2 cm  LVOT Diam : 1 9 cm  LVOT Vmax: 867 mm/s  LVOT Vmax; Mean: 867 mm/s  Peak Grad ; Mean: 3 mm[Hg]  SV (LVOT): 40 cmï¾³  VTI;Mean: 2 mm[Hg]  LAILA Cont Eq (VTI): 1 4 cmï¾²  MV Peak E Kushal   Mean: 1330 mm/s  MVA (PHT): 4 cmï¾²  PHT: 55 ms  Max P mm[Hg]  V Max: 2810 mm/s  Vmax: 2670 mm/s  RA Area: 18 4 cmï¾²  RA Volume: 52 cmï¾³  TAPSE: 1 5 cm    [facility]    Prepared and electronically signed by    Alec Medeiros MD  Signed 05-Mar-2020 17:21:02       Results for orders placed during the hospital encounter of 20   SANTI    Narrative [address] 39  [address] [facility] 6 [phone]    Transesophageal Echocardiogram    Study date:  05-Mar-2020    Patient: Vita Pugh  MR number: [id number]  Account number: [de-identified]  : 1940  Age: [de-identified] years  Gender: Female  Status: Inpatient  Location: Cath lab  Height: 56 in  Weight: 138 lb  BP: 159/ 86 mmHg    Indications: Atrial Fibrillation    Diagnoses: 427 31 - ATRIAL FIBRILLATION    Sonographer:  MARISA Toscano  Primary Physician:  Quan Espinal MD  Referring Physician:  Alec Medeiros MD  Group:  [facility]  RN:  Vicente Houser RN  Interpreting Physician:  Alec Medeiros MD    SUMMARY    LEFT VENTRICLE:  Systolic function was mildly to moderately reduced  Ejection fraction was estimated in the range of 40 % to 45 % to be 45 %  There was moderate diffuse hypokinesis  Wall thickness was mildly increased  There was mild concentric hypertrophy  LEFT ATRIUM:  The atrium was moderately to markedly dilated  ATRIAL SEPTUM:  No defect or patent foramen ovale was identified  RIGHT ATRIUM:  The atrium was mildly dilated  MITRAL VALVE:  There was mild to moderate annular calcification  There was moderate regurgitation  AORTIC VALVE:  There was very mild stenosis  TRICUSPID VALVE:  There was mild to moderate regurgitation  PULMONIC VALVE:  There was mild regurgitation  HISTORY: PRIOR HISTORY: HTN, CAD, Atrial Flutter, CHF, GERD    PROCEDURE: The procedure was performed in the catheterization laboratory  This was a routine study  The transesophageal approach was used  The heart rate was 104 bpm, at the start of the study  An adult omniplane probe was inserted by the  attending cardiologist  Intubated with ease  Two intubation attempt(s)  There was no blood detected on the probe  Intravenous contrast (agitated saline, 10 ml) was administered to evaluate shunting  LEFT VENTRICLE: Size was normal  Systolic function was mildly to moderately reduced  Ejection fraction was estimated in the range of 40 % to 45 % to be 45 %  There was moderate diffuse hypokinesis  Wall thickness was mildly increased  There was mild concentric hypertrophy  DOPPLER: The study was not technically sufficient to allow evaluation of LV diastolic function  RIGHT VENTRICLE: The size was normal  Systolic function was normal     LEFT ATRIUM: The atrium was moderately to markedly dilated  APPENDAGE: The size was normal  No foreign bodies were observed  No mass was identified  There was no spontaneous echo contrast ("smoke") in the appendage  DOPPLER: The function  was normal (normal emptying velocity)      ATRIAL SEPTUM: No defect or patent foramen ovale was identified  RIGHT ATRIUM: The atrium was mildly dilated  MITRAL VALVE: There was mild to moderate annular calcification  DOPPLER: There was no evidence for stenosis  There was moderate regurgitation  AORTIC VALVE: The valve was trileaflet  Leaflets exhibited mildly to moderately increased thickness, mild to moderate calcification, and mildly reduced cuspal separation  There was no echocardiographic evidence of vegetation  DOPPLER:  There was very mild stenosis  There was no regurgitation  TRICUSPID VALVE: DOPPLER: There was mild to moderate regurgitation  PULMONIC VALVE: DOPPLER: There was mild regurgitation  PERICARDIUM: There was no thickening or calcification  There was no pericardial effusion  AORTA: The root exhibited normal size  [address] Accredited Echocardiography Laboratory    Prepared and electronically signed by    García Grayson MD  Signed 05-Mar-2020 17:23:45         CHA Dietz        "This note has been constructed using a voice recognition system  Therefore there may be syntax, spelling, and/or grammatical errors   Please call if you have any questions  "

## 2020-03-08 NOTE — ASSESSMENT & PLAN NOTE
Wt Readings from Last 3 Encounters:   03/08/20 61 8 kg (136 lb 3 9 oz)   03/04/20 63 kg (139 lb)   01/06/20 63 kg (139 lb)     Patient had trace lower extremity edema with a BNP of 893 upon admission  Patient is having desaturation especially on exertion as per RN  · Patient noted to have some capsule for upon examination and chest x-ray showed some pulmonary vascular congestion  · Patient was given Lasix 40 milligram IV x1 dose along with home dosing of 40 milligram p o   Daily

## 2020-03-09 PROBLEM — E87.1 HYPONATREMIA: Status: ACTIVE | Noted: 2020-03-09

## 2020-03-09 LAB
ANION GAP SERPL CALCULATED.3IONS-SCNC: 7 MMOL/L (ref 4–13)
BUN SERPL-MCNC: 17 MG/DL (ref 5–25)
CALCIUM SERPL-MCNC: 8.6 MG/DL (ref 8.3–10.1)
CHLORIDE SERPL-SCNC: 97 MMOL/L (ref 100–108)
CO2 SERPL-SCNC: 26 MMOL/L (ref 21–32)
CREAT SERPL-MCNC: 0.78 MG/DL (ref 0.6–1.3)
GFR SERPL CREATININE-BSD FRML MDRD: 72 ML/MIN/1.73SQ M
GLUCOSE SERPL-MCNC: 87 MG/DL (ref 65–140)
POTASSIUM SERPL-SCNC: 4 MMOL/L (ref 3.5–5.3)
SODIUM SERPL-SCNC: 130 MMOL/L (ref 136–145)

## 2020-03-09 PROCEDURE — 80048 BASIC METABOLIC PNL TOTAL CA: CPT | Performed by: INTERNAL MEDICINE

## 2020-03-09 PROCEDURE — 99232 SBSQ HOSP IP/OBS MODERATE 35: CPT | Performed by: INTERNAL MEDICINE

## 2020-03-09 RX ORDER — FUROSEMIDE 10 MG/ML
20 INJECTION INTRAMUSCULAR; INTRAVENOUS ONCE
Status: DISCONTINUED | OUTPATIENT
Start: 2020-03-09 | End: 2020-03-09

## 2020-03-09 RX ORDER — FUROSEMIDE 10 MG/ML
40 INJECTION INTRAMUSCULAR; INTRAVENOUS ONCE
Status: COMPLETED | OUTPATIENT
Start: 2020-03-09 | End: 2020-03-09

## 2020-03-09 RX ADMIN — SOTALOL HYDROCHLORIDE 40 MG: 80 TABLET ORAL at 11:16

## 2020-03-09 RX ADMIN — FUROSEMIDE 40 MG: 40 TABLET ORAL at 09:14

## 2020-03-09 RX ADMIN — PRAVASTATIN SODIUM 80 MG: 80 TABLET ORAL at 17:37

## 2020-03-09 RX ADMIN — SOTALOL HYDROCHLORIDE 40 MG: 80 TABLET ORAL at 20:57

## 2020-03-09 RX ADMIN — AMLODIPINE BESYLATE 5 MG: 5 TABLET ORAL at 09:14

## 2020-03-09 RX ADMIN — VITAMIN D, TAB 1000IU (100/BT) 1000 UNITS: 25 TAB at 09:14

## 2020-03-09 RX ADMIN — FUROSEMIDE 40 MG: 10 INJECTION, SOLUTION INTRAMUSCULAR; INTRAVENOUS at 20:45

## 2020-03-09 RX ADMIN — LEVOTHYROXINE SODIUM 75 MCG: 75 TABLET ORAL at 05:18

## 2020-03-09 RX ADMIN — RIVAROXABAN 15 MG: 15 TABLET, FILM COATED ORAL at 17:37

## 2020-03-09 NOTE — ASSESSMENT & PLAN NOTE
Underwent cardioversion on 8/27/2019  Had maintained sinus rhythm until now when seen by her primary cardiologist on 3/4/2020 who noted her heart rate to be in the 160s and advised admission to the hospital for Cardizem drip followed by cardioversion  EKG in the ED showed atrial fibrillation with RVR and heart rate of 107     · Patient was on Cardizem drip for a brief period of time  · Patient is on anticoagulation with Xarelto at a lower dose dose of 15 mg  · Not on Amiodarone, Metoprolol, or Bystolic due to side effects  · Patient underwent cardioversion to sinus rhythm  · Patient was started on sotalol 80 milligram p o  B i d  which was decreased to 40 milligram twice a day as patient was bradycardic  · Patient's heart rate improved and QTC was acceptable on the EKG  · Patient will need outpatient ischemia workup  · Patient is scheduled to see EP as outpatient

## 2020-03-09 NOTE — ASSESSMENT & PLAN NOTE
Underwent cardioversion on 8/27/2019  Had maintained sinus rhythm until now when seen by her primary cardiologist on 3/4/2020 who noted her heart rate to be in the 160s and advised admission to the hospital for Cardizem drip followed by cardioversion  EKG in the ED showed atrial fibrillation with RVR and heart rate of 107  · Patient was on Cardizem drip for a brief period of time  · Patient is on anticoagulation with Xarelto at a lower dose dose of which was increased to 20 milligram p o   Daily  · Not on Amiodarone, Metoprolol, or Bystolic due to side effects  · Patient underwent cardioversion to sinus rhythm  · Patient was started on sotalol 80 milligram p o  B i d  which was decreased to 40 milligram twice a day as patient was bradycardic  · Patient's heart rate is better now and QTC was acceptable on the EKG  · Xarelto dose was also decreased to 15 milligram p o  daily as her creatinine clearance is low  · Patient will need outpatient ischemia workup  · Patient is scheduled to see EP as outpatient

## 2020-03-09 NOTE — ASSESSMENT & PLAN NOTE
Patient's sodium level dropped to 130 and has improved to 133 today  Likely secondary to hypervolemic  Patient given Lasix 40 milligram IV extra dose yesterday  Follow-up BMP after discharge

## 2020-03-09 NOTE — ASSESSMENT & PLAN NOTE
Wt Readings from Last 3 Encounters:   03/09/20 64 2 kg (141 lb 8 oz)   03/04/20 63 kg (139 lb)   01/06/20 63 kg (139 lb)     Patient had trace lower extremity edema with a BNP of 893 upon admission  Patient is having desaturation especially on exertion as per RN  · Patient noted to have some crackles upon examination and chest x-ray showed some pulmonary vascular congestion  · Patient was given Lasix 40 milligram IV x1 dose along with home dosing of 40 milligram p o   Daily

## 2020-03-09 NOTE — ASSESSMENT & PLAN NOTE
Continue Synthroid 75 mcg daily  · TSH is high at 7     · Follow-up outpatient with PCP and get repeat lab work after discharge

## 2020-03-09 NOTE — ASSESSMENT & PLAN NOTE
Patient was started on Norvasc 5 milligram p o   Daily by Cardiology after discussing with daughter given her multiple allergies to medications with improvement in blood pressures  · Continue Lasix

## 2020-03-09 NOTE — PROGRESS NOTES
Progress Note Poornima Castanon 1940, [de-identified] y o  female MRN: [id number]    Unit/Bed#: [address] [place] [address] Encounter: [id number]    Primary Care Provider: Cody Griffin MD   Date and time admitted to hospital: 3/4/2020  5:35 PM        * Atrial fibrillation with RVR [facility])  Assessment & Plan  Underwent cardioversion on 8/27/2019  Had maintained sinus rhythm until now when seen by her primary cardiologist on 3/4/2020 who noted her heart rate to be in the 160s and advised admission to the hospital for Cardizem drip followed by cardioversion  EKG in the ED showed atrial fibrillation with RVR and heart rate of 107  · Patient was on Cardizem drip for a brief period of time  · Patient is on anticoagulation with Xarelto at a lower dose dose of which was increased to 20 milligram p o  Daily  · Not on Amiodarone, Metoprolol, or Bystolic due to side effects  · Patient underwent cardioversion to sinus rhythm  · Patient was started on sotalol 80 milligram p o  B i d  which was decreased to 40 milligram twice a day as patient was bradycardic  · Patient's heart rate is better now and QTC was acceptable on the EKG  · Xarelto dose was also decreased to 15 milligram p o  daily as her creatinine clearance is low  · Patient will need outpatient ischemia workup  · Patient is scheduled to see EP as outpatient    Hyponatremia  Assessment & Plan  Patient's sodium level dropped to 130  Likely secondary to hypervolemic  Will give Lasix 40 milligram IV extra dose today  Patient placed on fluid restriction 1800 milliliters per day  Follow-up BMP in a m  Acute on chronic diastolic heart failure (HCC)  Assessment & Plan  Wt Readings from Last 3 Encounters:   03/09/20 64 2 kg (141 lb 8 oz)   03/04/20 63 kg (139 lb)   01/06/20 63 kg (139 lb)     Patient had trace lower extremity edema with a BNP of 893 upon admission  Patient is having desaturation especially on exertion as per RN    · Patient noted to have some crackles upon examination and chest x-ray showed some pulmonary vascular congestion  · Patient was given Lasix 40 milligram IV x1 dose along with home dosing of 40 milligram p o  Daily    Essential hypertension  Assessment & Plan  No longer on any blood pressure medications  · Blood pressure is only moderately controlled  · Patient was started on Norvasc 5 milligram p o  Daily by Cardiology after discussing with daughter given her multiple allergies to medications  · Continue Lasix  · Blood pressure is better after adding Norvasc    Dyslipidemia  Assessment & Plan  · Continue statin (auto substitute Crestor with Pravastatin while hospitalized)    Hypothyroidism  Assessment & Plan    · Continue Synthroid 75 mcg daily  · TSH is slightly high at 7  Will hold off on increasing Synthroid as patient had atrial fibrillation  · Follow-up outpatient with PCP    CAD (coronary artery disease)  Assessment & Plan  History of PCI        VTE Pharmacologic Prophylaxis:   Pharmacologic: Rivaroxaban (Xarelto)  Mechanical VTE Prophylaxis in Place: Yes    Patient Centered Rounds: I have performed bedside rounds with nursing staff today  Discussions with Specialists or Other Care Team Provider: Yes  Education and Discussions with Family / Patient:Yes  Time Spent for Care: 30 minutes  More than 50% of total time spent on counseling and coordination of care as described above  Current Length of Stay: 4 day(s)  Current Patient Status: Inpatient     Discharge Plan: Home    Code Status: Level 1 - Full Code      Subjective:   Patient is still complaining of some cough and left-sided chest pain radiating to the back  Patient denies any shortness of breath    Patient O2 saturation remained in 90s range with exertion      Objective:     Vitals:   Temp (24hrs), Av 8 °F (36 6 °C), Min:97 5 °F (36 4 °C), Max:98 °F (36 7 °C)    Temp:  [97 5 °F (36 4 °C)-98 °F (36 7 °C)] 97 7 °F (36 5 °C)  HR:  [56-59] 56  Resp:  [18-20] 18  BP: (116-162)/(57-76) 162/76  SpO2:  [92 %-96 %] 92 %  Body mass index is 31 83 kg/m²  Input and Output Summary (last 24 hours): Intake/Output Summary (Last 24 hours) at 3/9/2020 1953  Last data filed at 3/9/2020 1800  Gross per 24 hour   Intake 1710 ml   Output 1300 ml   Net 410 ml        Physical Exam:     Physical Exam   Constitutional: No distress  HENT:   Head: Normocephalic and atraumatic  Nose: Nose normal    Eyes: Pupils are equal, round, and reactive to light  Conjunctivae are normal    Neck: Normal range of motion  Neck supple  Cardiovascular: Normal rate, regular rhythm and normal heart sounds  Pulmonary/Chest: Effort normal  No respiratory distress  She has no wheezes  She has rales  Few basilar rales   Abdominal: Soft  Bowel sounds are normal  She exhibits no distension  There is no tenderness  There is no rebound and no guarding  Musculoskeletal: She exhibits no edema  Neurological: She is alert  No cranial nerve deficit  Skin: Skin is warm and dry  No rash noted  Additional Data:     Labs:    Results from last 7 days   Lab Units 03/05/20  0616 03/04/20  1746   WBC Thousand/uL 6 30 5 81   HEMOGLOBIN g/dL 14 4 14 8   HEMATOCRIT % 44 2 44 8   PLATELETS Thousands/uL 213 238   NEUTROS PCT %  --  54     Results from last 7 days   Lab Units 03/09/20  0528 03/08/20  1323 03/07/20  0920  03/04/20  1746   SODIUM mmol/L 130* 137 131*   < > 137   POTASSIUM mmol/L 4 0 4 0 4 0   < > 4 6   CHLORIDE mmol/L 97* 101 100   < > 100   CO2 mmol/L 26 28 23   < > 28   BUN mg/dL 17 16 23   < > 26*   CREATININE mg/dL 0 78 0 95 1 03   < > 0 96   CALCIUM mg/dL 8 6 8 8 8 5   < > 9 3   TOTAL BILIRUBIN mg/dL  --   --   --   --  0 60   ALK PHOS U/L  --   --   --   --  92   ALT U/L  --   --   --   --  21   AST U/L  --   --   --   --  24    < > = values in this interval not displayed       Results from last 7 days   Lab Units 03/04/20  1746   INR  1 23*     Results from last 7 days   Lab Units 03/04/20  1746   TROPONIN I ng/mL 0 02     No results found for: HGBA1C            * I Have Reviewed All Lab Data Listed Above  * Additional Pertinent Lab Tests Reviewed: [place] [facility] Admission Reviewed    Imaging:     XR chest pa & lateral   Final Result by Arsalan Lerner DO (03/09 1653)      Mild pulmonary edema  Stable cardiomegaly  Workstation performed: [id number]         XR chest 1 view portable   ED Interpretation by Jonnie Mckeon MD (79/48 1109)   CM, NAD      Final Result by Kalani Elizabeth MD (03/05 0805)      Minimal bibasilar atelectasis  Cardiomegaly              Workstation performed: [id number]           Imaging Reports Reviewed by myself    Cultures:   Blood Culture: No results found for: BLOODCX  Urine Culture:   Lab Results   Component Value Date    URINECX 70,000-79,000 cfu/ml Mixed Contaminants X4 02/17/2017     Sputum Culture: No components found for: SPUTUMCX  Wound Culture: No results found for: WOUNDCULT    Last 24 Hours Medication List:     Current Facility-Administered Medications:  acetaminophen 650 mg Oral Q6H PRN CHA Lopez   aluminum-magnesium hydroxide-simethicone 30 mL Oral Q4H PRN Papito Chavez PA-C   amLODIPine 5 mg Oral Daily Judy Morales MD   cholecalciferol 1,000 Units Oral Daily CHA Lopez   furosemide 40 mg Intravenous Once Chadd Emery MD   furosemide 40 mg Oral Daily CHA Lopez   hydrocortisone 1 application Topical 4x Daily PRN Chadd Emery MD   levothyroxine 75 mcg Oral Early Morning CHA Lopez   ondansetron 4 mg Intravenous Q6H PRN CHA Lopez   pravastatin 80 mg Oral Daily With Dinner CHA Lopze   rivaroxaban 15 mg Oral Daily With CHA Hernandez   sodium chloride 1 spray Each Nare BID PRN Papito Chavez PA-C   sotalol 40 mg Oral Q12H [address] CHA Garrido        Today, Patient Was Seen By: Chadd Emery MD    ** Please Note: Dragon 360 Dictation voice to text software may have been used in the creation of this document   **

## 2020-03-09 NOTE — ASSESSMENT & PLAN NOTE
Wt Readings from Last 3 Encounters:   03/10/20 60 3 kg (132 lb 15 oz)   03/04/20 63 kg (139 lb)   01/06/20 63 kg (139 lb)     Patient had trace lower extremity edema with a BNP of 893 upon admission  Patient was having desaturation especially on exertion as per RN  · chest x-ray showed some pulmonary vascular congestion  · Patient was given Lasix 40 milligram IV x1 dose along with home dosing of 40 milligram p o   Daily  · Denies any shortness of breath

## 2020-03-09 NOTE — PROGRESS NOTES
Progress Note - Cardiology   [facility]     [facility] [de-identified] y o  female MRN: [id number]  : 1940  Unit/Bed#: [place] [address] Encounter: [id number]    Assessment and Plan:   1  Paroxysmal atrial fibrillation:  Patient is maintaining sinus rhythm on sotalol 40 mg b i d  QTC is stable  Patient also on Xarelto for thromboembolism        2  Hypertension:  Patient started on Norvasc  Will monitor for lower extremity edema        3  Chronic diastolic heart failure:  Patient appears to be euvolemic, despite crackles at left base  Patient's sodium dropped to 130 after receiving p r n  dose of Lasix yesterday  Will hold extra dose of IV Lasix today  And continue to monitor      4  Coronary artery disease with history of PCI:  Asymptomatic    Subjective / Objective:   Patient seen and examined  She does not offer any complaints of chest discomfort  Patient complains of persistent cough even at rest   Question possible component of postnasal drip  Sodium dropped to 30 after receiving IV Lasix yesterday  Will hold and continue monitor  Vitals: Blood pressure 116/57, pulse 57, temperature 97 9 °F (36 6 °C), temperature source Temporal, resp  rate 18, height 4' 7 91" (1 42 m), weight 64 2 kg (141 lb 8 oz), SpO2 95 %  Vitals:    20 0532 20 0600   Weight: 61 8 kg (136 lb 3 9 oz) 64 2 kg (141 lb 8 oz)     Body mass index is 31 83 kg/m²  BP Readings from Last 3 Encounters:   20 116/57   20 152/100   20 132/80     Orthostatic Blood Pressures      Most Recent Value   Blood Pressure  116/57 filed at 2020 0349   Patient Position - Orthostatic VS  Lying filed at 2020 0349        I/O        0701 -  0700  07 -  0700  07 - 03/10 0700    P  O  480 200     I V  (mL/kg) 10 (0 2)      Total Intake(mL/kg) 490 (7 9) 200 (3 1)     Urine (mL/kg/hr) 400 (0 3) 2300 (1 5)     Total Output 400 2300     Net +90 -2100 Invasive Devices     Peripheral Intravenous Line            Peripheral IV 03/08/20 Left Antecubital 1 day                  Intake/Output Summary (Last 24 hours) at 3/9/2020 1114  Last data filed at 3/9/2020 0501  Gross per 24 hour   Intake 200 ml   Output 1850 ml   Net -1650 ml         Physical Exam:   Physical Exam   Constitutional: She is oriented to person, place, and time  She appears well-developed and well-nourished  No distress  HENT:   Head: Normocephalic and atraumatic  Right Ear: External ear normal    Left Ear: External ear normal    Eyes: Pupils are equal, round, and reactive to light  Conjunctivae are normal  Right eye exhibits no discharge  Left eye exhibits no discharge  No scleral icterus  Neck: Normal range of motion  Neck supple  No JVD present  No thyromegaly present  Cardiovascular: Normal rate, regular rhythm, intact distal pulses and normal pulses  Occasional extrasystoles are present  No murmur heard  Pulmonary/Chest: Effort normal  No accessory muscle usage  No respiratory distress  She has decreased breath sounds in the right lower field and the left lower field  She has rales in the left lower field  Abdominal: Soft  Bowel sounds are normal  She exhibits no distension  There is no rebound  Musculoskeletal: She exhibits no edema  Neurological: She is alert and oriented to person, place, and time  Skin: Skin is warm and dry  Capillary refill takes less than 2 seconds  She is not diaphoretic  Psychiatric: She has a normal mood and affect  Her behavior is normal  Judgment and thought content normal    Nursing note and vitals reviewed                Medications/ Allergies:     Current Facility-Administered Medications:  acetaminophen 650 mg Oral Q6H PRN CHA Vang   aluminum-magnesium hydroxide-simethicone 30 mL Oral Q4H PRN Therese Reyez PA-C   amLODIPine 5 mg Oral Daily Clayton Mckeon MD   cholecalciferol 1,000 Units Oral Daily Ben Alegria CHA   furosemide 40 mg Oral Daily CHA Barrow   hydrocortisone 1 application Topical 4x Daily PRN David Silveira MD   levothyroxine 75 mcg Oral Early Morning CHA Barrow   ondansetron 4 mg Intravenous Q6H PRN CHA Barrow   pravastatin 80 mg Oral Daily With Dinner CHA Barrow   rivaroxaban 15 mg Oral Daily With CHA Parikh   sodium chloride 1 spray Each Nare BID PRN Varghese Barnes PA-C   sotalol 40 mg Oral Q12H [address] Elke Gauss, CHA       acetaminophen 650 mg Q6H PRN   aluminum-magnesium hydroxide-simethicone 30 mL Q4H PRN   hydrocortisone 1 application 4x Daily PRN   ondansetron 4 mg Q6H PRN   sodium chloride 1 spray BID PRN     Allergies   Allergen Reactions    Amlodipine Other (See Comments)    Lisinopril Other (See Comments)       VTE Pharmacologic Prophylaxis:   Sequential compression device (Venodyne)     Labs:   Troponins:  Results from last 7 days   Lab Units 03/04/20  1746   TROPONIN I ng/mL 0 02     CBC with diff:  Results from last 7 days   Lab Units 03/05/20  0616 03/04/20  1746   WBC Thousand/uL 6 30 5 81   HEMOGLOBIN g/dL 14 4 14 8   HEMATOCRIT % 44 2 44 8   MCV fL 88 89   PLATELETS Thousands/uL 213 238   MCH pg 28 8 29 2   MCHC g/dL 32 6 33 0   RDW % 13 2 13 3   MPV fL 9 4 9 9   NRBC AUTO /100 WBCs  --  0     CMP:  Results from last 7 days   Lab Units 03/09/20  0528 03/08/20  1323 03/07/20  0920 03/06/20  0503 03/05/20  0616 03/04/20  1746   SODIUM mmol/L 130* 137 131* 134* 137 137   POTASSIUM mmol/L 4 0 4 0 4 0 4 7 4 2 4 6   CHLORIDE mmol/L 97* 101 100 100 102 100   CO2 mmol/L 26 28 23 28 28 28   ANION GAP mmol/L 7 8 8 6 7 9   BUN mg/dL 17 16 23 26* 20 26*   CREATININE mg/dL 0 78 0 95 1 03 1 25 0 87 0 96   GLUCOSE FASTING mg/dL  --   --   --   --  97  --    CALCIUM mg/dL 8 6 8 8 8 5 8 9 8 7 9 3   AST U/L  --   --   --   --   --  24   ALT U/L  --   --   --   --   --  21   ALK PHOS U/L  --   --   --   --   --  92   TOTAL PROTEIN g/dL  --   --   --   --   --  7 4   ALBUMIN g/dL  --   --   --   --   --  3 5   TOTAL BILIRUBIN mg/dL  --   --   --   --   --  0 60   EGFR ml/min/1 73sq m 72 57 51 41 63 56     Magnesium:  Results from last 7 days   Lab Units 20  0616   MAGNESIUM mg/dL 2 3     Coags:  Results from last 7 days   Lab Units 20  1746   PTT seconds 34   INR  1 23*     TSH:  Results from last 7 days   Lab Units 20  0616   TSH 3RD GENERATON uIU/mL 7 130*       Imaging & Testing   I have personally reviewed pertinent reports  Xr Chest 1 View Portable    Result Date: 3/5/2020  Narrative: CHEST INDICATION:   chest pain  COMPARISON:  2019 EXAM PERFORMED/VIEWS:  XR CHEST PORTABLE  AP semierect Images: 1 FINDINGS: The heart is enlarged  Mediastinum and hilar structures however are normal in size and contour  Minimal bibasilar subsegmental atelectasis without lobar consolidation or superimposed edema  No pneumothorax or pleural effusion  Osseous structures appear within normal limits for patient age  Impression: Minimal bibasilar atelectasis  Cardiomegaly  Workstation performed: [id number]        EKG / Monitor: Personally reviewed      Sinus rhythm with occasional PAC    Cardiac testing:   Results for orders placed during the hospital encounter of 20   Echo complete with contrast if indicated    Narrative [address]  [phone]    Transthoracic Echocardiogram  2D, M-mode, Doppler, and Color Doppler    Study date:  05-Mar-2020    Patient: Grace Jansen  MR number: [id number]  Account number: [de-identified]  : 1940  Age: [de-identified] years  Gender: Female  Status: Inpatient  Location: Bedside  Height: 56 in  Weight: 137 7 lb  BP: 126/ 74 mmHg    Indications: Atrial Fibrillation    Diagnoses: 427 31 - ATRIAL FIBRILLATION    Sonographer:  MARISA Barbosa  Primary Physician:  Denisa Kelley MD  Referring Physician:  Susanne Cortes MD  Group:  [id number]  Interpreting Physician:  Alec Medeiros MD    SUMMARY    LEFT VENTRICLE:  Systolic function was mildly reduced  Ejection fraction was estimated to be 45 %  There was mild diffuse hypokinesis  Wall thickness was mildly increased  There was mild concentric hypertrophy  LEFT ATRIUM:  The atrium was moderately to markedly dilated  Area 26 cm2    MITRAL VALVE:  There was moderate annular calcification  There was moderate regurgitation  AORTIC VALVE:  The valve was trileaflet  Leaflets exhibited moderately increased thickness, mild calcification, and mildly reduced cuspal separation  Transaortic velocity was increased due to valvular stenosis  There was very mild stenosis  There was trace regurgitation  TRICUSPID VALVE:  There was moderate regurgitation  Estimated peak PA pressure was 40 mmHg  PULMONIC VALVE:  There was mild regurgitation  HISTORY: PRIOR HISTORY: HTN, CAD, Atrial Flutter, CHF, Dyslipidemia, GERD, Dermatitis    PROCEDURE: The procedure was performed at the bedside  This was a routine study  The transthoracic approach was used  The study included complete 2D imaging, M-mode, complete spectral Doppler, and color Doppler  The heart rate was 96 bpm,  at the start of the study  Image quality was adequate  LEFT VENTRICLE: Size was normal  Systolic function was mildly reduced  Ejection fraction was estimated to be 45 %  There was mild diffuse hypokinesis  Wall thickness was mildly increased  There was mild concentric hypertrophy  DOPPLER: The  study was not technically sufficient to allow evaluation of LV diastolic function  RIGHT VENTRICLE: The size was normal  Systolic function was normal     LEFT ATRIUM: The atrium was moderately to markedly dilated  Area 26 cm2 No mass was present  RIGHT ATRIUM: Size was normal     MITRAL VALVE: There was moderate annular calcification  There was normal leaflet separation   DOPPLER: There was no evidence for stenosis  There was moderate regurgitation  AORTIC VALVE: The valve was trileaflet  Leaflets exhibited moderately increased thickness, mild calcification, and mildly reduced cuspal separation  DOPPLER: Transaortic velocity was increased due to valvular stenosis  There was very mild  stenosis  There was trace regurgitation  TRICUSPID VALVE: DOPPLER: There was moderate regurgitation  Estimated peak PA pressure was 40 mmHg  PULMONIC VALVE: DOPPLER: There was mild regurgitation  PERICARDIUM: There was no thickening or calcification  There was no pericardial effusion  AORTA: The root exhibited normal size  SYSTEM MEASUREMENT TABLES    2D mode  AoR Diam 2D: 3 1 cm  LA Diam (2D): 4 6 cm  LA/Ao (2D): 1 48  FS (2D Teich): 23 6 %  IVSd (2D): 1 12 cm  LVDEV: 85 8 cmï¾³  LVESV: 45 1 cmï¾³  LVIDd(2D): 4 36 cm  LVISd (2D): 3 33 cm  LVOT Area 2D: 2 84 cmï¾²  LVPWd (2D): 1 17 cm  SV (Teich): 40 7 cmï¾³    Apical four chamber  LVEF A4C: 44 %    Unspecified Scan Mode  LAILA Cont Eq (Peak Kushal): 1 32 cmï¾²  LAILA Cont Eq (VTI): 1 11 cmï¾²  LVOT (VTI): 14 2 cm  LVOT Diam : 1 9 cm  LVOT Vmax: 867 mm/s  LVOT Vmax; Mean: 867 mm/s  Peak Grad ; Mean: 3 mm[Hg]  SV (LVOT): 40 cmï¾³  VTI;Mean: 2 mm[Hg]  LAILA Cont Eq (VTI): 1 4 cmï¾²  MV Peak E Kushal   Mean: 1330 mm/s  MVA (PHT): 4 cmï¾²  PHT: 55 ms  Max P mm[Hg]  V Max: 2810 mm/s  Vmax: 2670 mm/s  RA Area: 18 4 cmï¾²  RA Volume: 52 cmï¾³  TAPSE: 1 5 cm    [employer] Accredited Echocardiography Laboratory    Prepared and electronically signed by    Jocelin Hayden MD  Signed 05-Mar-2020 17:21:02       Results for orders placed during the hospital encounter of 20   SANTI    Narrative [address]  [address] [facility]  [phone]    Transesophageal Echocardiogram    Study date:  05-Mar-2020    Patient: Talya Duncan  MR number: [id number]  Account number: [de-identified]  : 1940  Age: [de-identified] years  Gender: Female  Status: Inpatient  Location: Cath lab  Height: 56 in  Weight: 138 lb  BP: 159/ 86 mmHg    Indications: Atrial Fibrillation    Diagnoses: 427 31 - ATRIAL FIBRILLATION    Sonographer:  MARISA Sweet  Primary Physician:  Brauilo Gamez MD  Referring Physician:  Kendra Crystal MD  Group:  [facility]  RN:  Joshua Heredia RN  Interpreting Physician:  Kendra Crystal MD    SUMMARY    LEFT VENTRICLE:  Systolic function was mildly to moderately reduced  Ejection fraction was estimated in the range of 40 % to 45 % to be 45 %  There was moderate diffuse hypokinesis  Wall thickness was mildly increased  There was mild concentric hypertrophy  LEFT ATRIUM:  The atrium was moderately to markedly dilated  ATRIAL SEPTUM:  No defect or patent foramen ovale was identified  RIGHT ATRIUM:  The atrium was mildly dilated  MITRAL VALVE:  There was mild to moderate annular calcification  There was moderate regurgitation  AORTIC VALVE:  There was very mild stenosis  TRICUSPID VALVE:  There was mild to moderate regurgitation  PULMONIC VALVE:  There was mild regurgitation  HISTORY: PRIOR HISTORY: HTN, CAD, Atrial Flutter, CHF, GERD    PROCEDURE: The procedure was performed in the catheterization laboratory  This was a routine study  The transesophageal approach was used  The heart rate was 104 bpm, at the start of the study  An adult omniplane probe was inserted by the  attending cardiologist  Intubated with ease  Two intubation attempt(s)  There was no blood detected on the probe  Intravenous contrast (agitated saline, 10 ml) was administered to evaluate shunting  LEFT VENTRICLE: Size was normal  Systolic function was mildly to moderately reduced  Ejection fraction was estimated in the range of 40 % to 45 % to be 45 %  There was moderate diffuse hypokinesis  Wall thickness was mildly increased  There was mild concentric hypertrophy   DOPPLER: The study was not technically sufficient to allow evaluation of LV diastolic function  RIGHT VENTRICLE: The size was normal  Systolic function was normal     LEFT ATRIUM: The atrium was moderately to markedly dilated  APPENDAGE: The size was normal  No foreign bodies were observed  No mass was identified  There was no spontaneous echo contrast ("smoke") in the appendage  DOPPLER: The function  was normal (normal emptying velocity)  ATRIAL SEPTUM: No defect or patent foramen ovale was identified  RIGHT ATRIUM: The atrium was mildly dilated  MITRAL VALVE: There was mild to moderate annular calcification  DOPPLER: There was no evidence for stenosis  There was moderate regurgitation  AORTIC VALVE: The valve was trileaflet  Leaflets exhibited mildly to moderately increased thickness, mild to moderate calcification, and mildly reduced cuspal separation  There was no echocardiographic evidence of vegetation  DOPPLER:  There was very mild stenosis  There was no regurgitation  TRICUSPID VALVE: DOPPLER: There was mild to moderate regurgitation  PULMONIC VALVE: DOPPLER: There was mild regurgitation  PERICARDIUM: There was no thickening or calcification  There was no pericardial effusion  AORTA: The root exhibited normal size  [address] Accredited Echocardiography Laboratory    Prepared and electronically signed by    Mark Arceo MD  Signed 05-Mar-2020 17:23:45           [facility] Arzola        "This note has been constructed using a voice recognition system  Therefore there may be syntax, spelling, and/or grammatical errors   Please call if you have any questions  "

## 2020-03-10 VITALS
TEMPERATURE: 97.7 F | HEART RATE: 60 BPM | WEIGHT: 132.94 LBS | SYSTOLIC BLOOD PRESSURE: 136 MMHG | DIASTOLIC BLOOD PRESSURE: 63 MMHG | OXYGEN SATURATION: 95 % | BODY MASS INDEX: 29.9 KG/M2 | RESPIRATION RATE: 18 BRPM | HEIGHT: 56 IN

## 2020-03-10 PROBLEM — I48.91 ATRIAL FIBRILLATION WITH RVR (HCC): Status: RESOLVED | Noted: 2020-03-04 | Resolved: 2020-03-10

## 2020-03-10 PROBLEM — I50.33 ACUTE ON CHRONIC DIASTOLIC HEART FAILURE (HCC): Status: RESOLVED | Noted: 2019-01-24 | Resolved: 2020-03-10

## 2020-03-10 LAB
ANION GAP SERPL CALCULATED.3IONS-SCNC: 6 MMOL/L (ref 4–13)
BUN SERPL-MCNC: 17 MG/DL (ref 5–25)
CALCIUM SERPL-MCNC: 8.7 MG/DL (ref 8.3–10.1)
CHLORIDE SERPL-SCNC: 97 MMOL/L (ref 100–108)
CO2 SERPL-SCNC: 30 MMOL/L (ref 21–32)
CREAT SERPL-MCNC: 0.94 MG/DL (ref 0.6–1.3)
GFR SERPL CREATININE-BSD FRML MDRD: 57 ML/MIN/1.73SQ M
GLUCOSE SERPL-MCNC: 89 MG/DL (ref 65–140)
POTASSIUM SERPL-SCNC: 3.7 MMOL/L (ref 3.5–5.3)
SODIUM SERPL-SCNC: 133 MMOL/L (ref 136–145)

## 2020-03-10 PROCEDURE — 80048 BASIC METABOLIC PNL TOTAL CA: CPT | Performed by: INTERNAL MEDICINE

## 2020-03-10 PROCEDURE — 99239 HOSP IP/OBS DSCHRG MGMT >30: CPT | Performed by: FAMILY MEDICINE

## 2020-03-10 PROCEDURE — 99232 SBSQ HOSP IP/OBS MODERATE 35: CPT | Performed by: INTERNAL MEDICINE

## 2020-03-10 RX ORDER — SOTALOL HYDROCHLORIDE 80 MG/1
40 TABLET ORAL EVERY 12 HOURS SCHEDULED
Qty: 60 TABLET | Refills: 0 | Status: SHIPPED | OUTPATIENT
Start: 2020-03-10 | End: 2020-03-16

## 2020-03-10 RX ORDER — AMLODIPINE BESYLATE 5 MG/1
5 TABLET ORAL DAILY
Qty: 30 TABLET | Refills: 0 | Status: SHIPPED | OUTPATIENT
Start: 2020-03-11 | End: 2020-07-20

## 2020-03-10 RX ADMIN — FUROSEMIDE 40 MG: 40 TABLET ORAL at 09:55

## 2020-03-10 RX ADMIN — SOTALOL HYDROCHLORIDE 40 MG: 80 TABLET ORAL at 09:56

## 2020-03-10 RX ADMIN — VITAMIN D, TAB 1000IU (100/BT) 1000 UNITS: 25 TAB at 12:08

## 2020-03-10 RX ADMIN — AMLODIPINE BESYLATE 5 MG: 5 TABLET ORAL at 09:55

## 2020-03-10 RX ADMIN — LEVOTHYROXINE SODIUM 75 MCG: 75 TABLET ORAL at 05:23

## 2020-03-10 NOTE — PROGRESS NOTES
Progress Note - Cardiology   [facility]     [facility] [place] [de-identified] y o  female MRN: [id number]  : 1940  Unit/Bed#: [place] [address] Encounter: [id number]    Assessment and Plan:   1  Paroxysmal atrial fibrillation:  Patient is maintaining sinus rhythm on sotalol 40 mg b i d  Bill Hunt is stable   Patient also on Xarelto for thromboembolism     Patient has a prescheduled appointment with Dr Krishna Daigle on 3/23/2020 at 3:30 p m  to discuss other options for atrial fibrillation treatment      2  Hypertension:  Patient started on Norvasc   Will monitor for lower extremity edema        3  Chronic diastolic heart failure:  Patient appears to be euvolemic, despite crackles at left base  Patient's sodium had dropped to 130 yesterday, today is 133  Would continue to monitor in the outpatient setting  Continue her current medications      4  Coronary artery disease with history of PCI:  Asymptomatic    Subjective / Objective:   Patient seen and examined  She states that she is feeling well  Maintaining sinus rhythm with rare to occasional PAC on monitor  Sodium improved to 133  Vitals: Blood pressure 136/63, pulse 60, temperature 97 7 °F (36 5 °C), temperature source Oral, resp  rate 18, height 4' 7 91" (1 42 m), weight 60 3 kg (132 lb 15 oz), SpO2 95 %  Vitals:    20 0600 03/10/20 0559   Weight: 64 2 kg (141 lb 8 oz) 60 3 kg (132 lb 15 oz)     Body mass index is 29 9 kg/m²  BP Readings from Last 3 Encounters:   03/10/20 136/63   20 152/100   20 132/80     Orthostatic Blood Pressures      Most Recent Value   Blood Pressure  136/63 filed at 03/10/2020 0951   Patient Position - Orthostatic VS  Sitting filed at 03/10/2020 0951        I/O        07 -  0700  07 - 03/10 0700 03/10 07 -  0700    P  O  200 1700     I V  (mL/kg)  20 (0 3)     Total Intake(mL/kg) 200 (3 1) 1720 (28 5)     Urine (mL/kg/hr) 2300 (1 5) 1000 (0 7)     Total Output 2300 1000     Net -2100 +720                Invasive Devices     Peripheral Intravenous Line            Peripheral IV 03/08/20 Left Antecubital 2 days                  Intake/Output Summary (Last 24 hours) at 3/10/2020 1035  Last data filed at 3/9/2020 1958  Gross per 24 hour   Intake 1720 ml   Output 1000 ml   Net 720 ml         Physical Exam:   Physical Exam   Constitutional: She is oriented to person, place, and time  She appears well-developed and well-nourished  No distress  HENT:   Head: Normocephalic and atraumatic  Right Ear: External ear normal    Left Ear: External ear normal    Eyes: Pupils are equal, round, and reactive to light  Conjunctivae are normal  Right eye exhibits no discharge  Left eye exhibits no discharge  No scleral icterus  Neck: Normal range of motion  Neck supple  No JVD present  No thyromegaly present  Cardiovascular: Normal rate and regular rhythm  Occasional extrasystoles are present  Pulmonary/Chest: Effort normal  No accessory muscle usage  No respiratory distress  She has decreased breath sounds in the right lower field and the left lower field  She has no wheezes  She has rales in the left lower field  Abdominal: Soft  Bowel sounds are normal  She exhibits no distension  There is no rebound  Musculoskeletal: She exhibits no edema  Neurological: She is alert and oriented to person, place, and time  Skin: Skin is warm and dry  Capillary refill takes less than 2 seconds  She is not diaphoretic  Psychiatric: She has a normal mood and affect  Her behavior is normal  Judgment and thought content normal    Nursing note and vitals reviewed                Medications/ Allergies:     Current Facility-Administered Medications:  acetaminophen 650 mg Oral Q6H PRN CHA Mckeon   aluminum-magnesium hydroxide-simethicone 30 mL Oral Q4H PRN Israel West PA-C   amLODIPine 5 mg Oral Daily Carol Trcay MD   cholecalciferol 1,000 Units Oral Daily CHA Mckeon   furosemide 40 mg Oral Daily Mary Anne Saenz, CRNP   hydrocortisone 1 application Topical 4x Daily PRN Jaylyn Enriquez MD   levothyroxine 75 mcg Oral Early Morning Mary Anne Read, CRNP   ondansetron 4 mg Intravenous Q6H PRN Mary Annelesly Saenz, CRNP   pravastatin 80 mg Oral Daily With Dinner Mary Annelesly Saenz, CRNP   rivaroxaban 15 mg Oral Daily With Casey Breannaluz, CRNP   sodium chloride 1 spray Each Nare BID PRN Rachel Stokes PA-C   sotalol 40 mg Oral Q12H [address] Georgina Balarturo, CRNP       acetaminophen 650 mg Q6H PRN   aluminum-magnesium hydroxide-simethicone 30 mL Q4H PRN   hydrocortisone 1 application 4x Daily PRN   ondansetron 4 mg Q6H PRN   sodium chloride 1 spray BID PRN     Allergies   Allergen Reactions    Amlodipine Other (See Comments)    Lisinopril Other (See Comments)       VTE Pharmacologic Prophylaxis:   Sequential compression device (Venodyne)     Labs:   Troponins:  Results from last 7 days   Lab Units 03/04/20  1746   TROPONIN I ng/mL 0 02     CBC with diff:  Results from last 7 days   Lab Units 03/05/20  0616 03/04/20  1746   WBC Thousand/uL 6 30 5 81   HEMOGLOBIN g/dL 14 4 14 8   HEMATOCRIT % 44 2 44 8   MCV fL 88 89   PLATELETS Thousands/uL 213 238   MCH pg 28 8 29 2   MCHC g/dL 32 6 33 0   RDW % 13 2 13 3   MPV fL 9 4 9 9   NRBC AUTO /100 WBCs  --  0     CMP:  Results from last 7 days   Lab Units 03/10/20  0523 03/09/20  0528 03/08/20  1323 03/07/20  0920 03/06/20  0503 03/05/20  0616 03/04/20  1746   SODIUM mmol/L 133* 130* 137 131* 134* 137 137   POTASSIUM mmol/L 3 7 4 0 4 0 4 0 4 7 4 2 4 6   CHLORIDE mmol/L 97* 97* 101 100 100 102 100   CO2 mmol/L 30 26 28 23 28 28 28   ANION GAP mmol/L 6 7 8 8 6 7 9   BUN mg/dL 17 17 16 23 26* 20 26*   CREATININE mg/dL 0 94 0 78 0 95 1 03 1 25 0 87 0 96   GLUCOSE FASTING mg/dL  --   --   --   --   --  97  --    CALCIUM mg/dL 8 7 8 6 8 8 8 5 8 9 8 7 9 3   AST U/L  --   --   --   --   --   --  24   ALT U/L  --   --   --   --   --   --  21   ALK PHOS U/L --   --   --   --   --   --  92   TOTAL PROTEIN g/dL  --   --   --   --   --   --  7 4   ALBUMIN g/dL  --   --   --   --   --   --  3 5   TOTAL BILIRUBIN mg/dL  --   --   --   --   --   --  0 60   EGFR ml/min/1 73sq m 57 72 57 51 41 63 56     Magnesium:  Results from last 7 days   Lab Units 03/05/20  0616   MAGNESIUM mg/dL 2 3     Coags:  Results from last 7 days   Lab Units 03/04/20  1746   PTT seconds 34   INR  1 23*     TSH:  Results from last 7 days   Lab Units 03/05/20  0616   TSH 3RD GENERATON uIU/mL 7 130*       Imaging & Testing   I have personally reviewed pertinent reports  Xr Chest 1 View Portable    Result Date: 3/5/2020  Narrative: CHEST INDICATION:   chest pain  COMPARISON:  11/20/2019 EXAM PERFORMED/VIEWS:  XR CHEST PORTABLE  AP semierect Images: 1 FINDINGS: The heart is enlarged  Mediastinum and hilar structures however are normal in size and contour  Minimal bibasilar subsegmental atelectasis without lobar consolidation or superimposed edema  No pneumothorax or pleural effusion  Osseous structures appear within normal limits for patient age  Impression: Minimal bibasilar atelectasis  Cardiomegaly  Workstation performed: [id number]     Xr Chest Pa & Lateral    Result Date: 3/9/2020  Narrative: CHEST INDICATION:   SOB  COMPARISON:  3/4/2020 EXAM PERFORMED/VIEWS:  XR CHEST PA & LATERAL Images: 2 FINDINGS: Stable mild cardiomegaly  Slight central vascular congestion and basilar interstitial edema  Blunting of the costophrenic angles may represent trace effusions  Osseous structures appear within normal limits for patient age  Impression: Mild pulmonary edema  Stable cardiomegaly  Workstation performed: [id number]        EKG / Monitor: Personally reviewed      Sinus rhythm with rare PAC on telemetry    Cardiac testing:   Results for orders placed during the hospital encounter of 03/04/20   Echo complete with contrast if indicated    Narrative [address] [place] [address]  [phone]    Transthoracic Echocardiogram  2D, M-mode, Doppler, and Color Doppler    Study date:  05-Mar-2020    Patient: David Portillo  MR number: [id number]  Account number: [de-identified]  : 1940  Age: [de-identified] years  Gender: Female  Status: Inpatient  Location: Bedside  Height: 56 in  Weight: 137 7 lb  BP: 126/ 74 mmHg    Indications: Atrial Fibrillation    Diagnoses: 427 31 - ATRIAL FIBRILLATION    Sonographer:  MARISA Quintanilla  Primary Physician:  Shola Garcia MD  Referring Physician:  Mildred Candelaria MD  Group:  [facility]  Interpreting Physician:  Mildred Candelaria MD    SUMMARY    LEFT VENTRICLE:  Systolic function was mildly reduced  Ejection fraction was estimated to be 45 %  There was mild diffuse hypokinesis  Wall thickness was mildly increased  There was mild concentric hypertrophy  LEFT ATRIUM:  The atrium was moderately to markedly dilated  Area 26 cm2    MITRAL VALVE:  There was moderate annular calcification  There was moderate regurgitation  AORTIC VALVE:  The valve was trileaflet  Leaflets exhibited moderately increased thickness, mild calcification, and mildly reduced cuspal separation  Transaortic velocity was increased due to valvular stenosis  There was very mild stenosis  There was trace regurgitation  TRICUSPID VALVE:  There was moderate regurgitation  Estimated peak PA pressure was 40 mmHg  PULMONIC VALVE:  There was mild regurgitation  HISTORY: PRIOR HISTORY: HTN, CAD, Atrial Flutter, CHF, Dyslipidemia, GERD, Dermatitis    PROCEDURE: The procedure was performed at the bedside  This was a routine study  The transthoracic approach was used  The study included complete 2D imaging, M-mode, complete spectral Doppler, and color Doppler  The heart rate was 96 bpm,  at the start of the study  Image quality was adequate  LEFT VENTRICLE: Size was normal  Systolic function was mildly reduced   Ejection fraction was estimated to be 45 %  There was mild diffuse hypokinesis  Wall thickness was mildly increased  There was mild concentric hypertrophy  DOPPLER: The  study was not technically sufficient to allow evaluation of LV diastolic function  RIGHT VENTRICLE: The size was normal  Systolic function was normal     LEFT ATRIUM: The atrium was moderately to markedly dilated  Area 26 cm2 No mass was present  RIGHT ATRIUM: Size was normal     MITRAL VALVE: There was moderate annular calcification  There was normal leaflet separation  DOPPLER: There was no evidence for stenosis  There was moderate regurgitation  AORTIC VALVE: The valve was trileaflet  Leaflets exhibited moderately increased thickness, mild calcification, and mildly reduced cuspal separation  DOPPLER: Transaortic velocity was increased due to valvular stenosis  There was very mild  stenosis  There was trace regurgitation  TRICUSPID VALVE: DOPPLER: There was moderate regurgitation  Estimated peak PA pressure was 40 mmHg  PULMONIC VALVE: DOPPLER: There was mild regurgitation  PERICARDIUM: There was no thickening or calcification  There was no pericardial effusion  AORTA: The root exhibited normal size  SYSTEM MEASUREMENT TABLES    2D mode  AoR Diam 2D: 3 1 cm  LA Diam (2D): 4 6 cm  LA/Ao (2D): 1 48  FS (2D Teich): 23 6 %  IVSd (2D): 1 12 cm  LVDEV: 85 8 cmï¾³  LVESV: 45 1 cmï¾³  LVIDd(2D): 4 36 cm  LVISd (2D): 3 33 cm  LVOT Area 2D: 2 84 cmï¾²  LVPWd (2D): 1 17 cm  SV (Teich): 40 7 cmï¾³    Apical four chamber  LVEF A4C: 44 %    Unspecified Scan Mode  LAILA Cont Eq (Peak Kushal): 1 32 cmï¾²  LAILA Cont Eq (VTI): 1 11 cmï¾²  LVOT (VTI): 14 2 cm  LVOT Diam : 1 9 cm  LVOT Vmax: 867 mm/s  LVOT Vmax; Mean: 867 mm/s  Peak Grad ; Mean: 3 mm[Hg]  SV (LVOT): 40 cmï¾³  VTI;Mean: 2 mm[Hg]  LAILA Cont Eq (VTI): 1 4 cmï¾²  MV Peak E Kushal   Mean: 1330 mm/s  MVA (PHT): 4 cmï¾²  PHT: 55 ms  Max P mm[Hg]  V Max: 2810 mm/s  Vmax: 2670 mm/s  RA Area: 18 4 cmï¾²  RA Volume: 52 cmï¾³  TAPSE: 1 5 cm    [facility]    Prepared and electronically signed by    Eh Brasher MD  Signed 05-Mar-2020 17:21:02       Results for orders placed during the hospital encounter of 20   SANTI    Narrative [address]  [address] [facility], [place] [address]  [phone]    Transesophageal Echocardiogram    Study date:  05-Mar-2020    Patient: Shekhar Thomas  MR number: [id number]  Account number: [de-identified]  : 1940  Age: [de-identified] years  Gender: Female  Status: Inpatient  Location: Cath lab  Height: 56 in  Weight: 138 lb  BP: 159/ 86 mmHg    Indications: Atrial Fibrillation    Diagnoses: 427 31 - ATRIAL FIBRILLATION    Sonographer:  MARISA Toledo  Primary Physician:  Jose A Sahu MD  Referring Physician:  Eh Brasher MD  Group:  [facility]  RN:  Swapna Arguelles RN  Interpreting Physician:  Eh Brasher MD    SUMMARY    LEFT VENTRICLE:  Systolic function was mildly to moderately reduced  Ejection fraction was estimated in the range of 40 % to 45 % to be 45 %  There was moderate diffuse hypokinesis  Wall thickness was mildly increased  There was mild concentric hypertrophy  LEFT ATRIUM:  The atrium was moderately to markedly dilated  ATRIAL SEPTUM:  No defect or patent foramen ovale was identified  RIGHT ATRIUM:  The atrium was mildly dilated  MITRAL VALVE:  There was mild to moderate annular calcification  There was moderate regurgitation  AORTIC VALVE:  There was very mild stenosis  TRICUSPID VALVE:  There was mild to moderate regurgitation  PULMONIC VALVE:  There was mild regurgitation  HISTORY: PRIOR HISTORY: HTN, CAD, Atrial Flutter, CHF, GERD    PROCEDURE: The procedure was performed in the catheterization laboratory  This was a routine study  The transesophageal approach was used   The heart rate was 104 bpm, at the start of the study  An adult omniplane probe was inserted by the  attending cardiologist  Intubated with ease  Two intubation attempt(s)  There was no blood detected on the probe  Intravenous contrast (agitated saline, 10 ml) was administered to evaluate shunting  LEFT VENTRICLE: Size was normal  Systolic function was mildly to moderately reduced  Ejection fraction was estimated in the range of 40 % to 45 % to be 45 %  There was moderate diffuse hypokinesis  Wall thickness was mildly increased  There was mild concentric hypertrophy  DOPPLER: The study was not technically sufficient to allow evaluation of LV diastolic function  RIGHT VENTRICLE: The size was normal  Systolic function was normal     LEFT ATRIUM: The atrium was moderately to markedly dilated  APPENDAGE: The size was normal  No foreign bodies were observed  No mass was identified  There was no spontaneous echo contrast ("smoke") in the appendage  DOPPLER: The function  was normal (normal emptying velocity)  ATRIAL SEPTUM: No defect or patent foramen ovale was identified  RIGHT ATRIUM: The atrium was mildly dilated  MITRAL VALVE: There was mild to moderate annular calcification  DOPPLER: There was no evidence for stenosis  There was moderate regurgitation  AORTIC VALVE: The valve was trileaflet  Leaflets exhibited mildly to moderately increased thickness, mild to moderate calcification, and mildly reduced cuspal separation  There was no echocardiographic evidence of vegetation  DOPPLER:  There was very mild stenosis  There was no regurgitation  TRICUSPID VALVE: DOPPLER: There was mild to moderate regurgitation  PULMONIC VALVE: DOPPLER: There was mild regurgitation  PERICARDIUM: There was no thickening or calcification  There was no pericardial effusion  AORTA: The root exhibited normal size      [address] Accredited Echocardiography Laboratory    Prepared and electronically signed by    Sary Cleaning MD  Signed 05-Mar-2020 17:23:45           CHA Baker        "This note has been constructed using a voice recognition system  Therefore there may be syntax, spelling, and/or grammatical errors   Please call if you have any questions  "

## 2020-03-10 NOTE — DISCHARGE SUMMARY
[facility]    Patient Information: Eugune Aase [de-identified] y o  female MRN: [id number]  Unit/Bed#: [address] [place] [address] Encounter: [id number]    Discharging Physician / Practitioner: Reny Arevalo DO  PCP: Anshul Olvera MD  Admission Date: 3/4/2020  Discharge Date: 03/10/20    Reason for Admission: Shortness of Breath (C/O SOB and runny nose x 1 month   )      Discharge Diagnoses:     Principal Problem (Resolved):    Atrial fibrillation with RVR ([facility] )  Active Problems:    Dyslipidemia    Essential hypertension    Hypothyroidism    CAD (coronary artery disease)    Hyponatremia  Resolved Problems:    Acute on chronic diastolic heart failure ([facility] )        * Atrial fibrillation with RVR (HCC)resolved as of 3/10/2020  Assessment & Plan  Underwent cardioversion on 8/27/2019  Had maintained sinus rhythm until now when seen by her primary cardiologist on 3/4/2020 who noted her heart rate to be in the 160s and advised admission to the hospital for Cardizem drip followed by cardioversion  EKG in the ED showed atrial fibrillation with RVR and heart rate of 107  · Patient was on Cardizem drip for a brief period of time  · Patient is on anticoagulation with Xarelto at a lower dose dose of 15 mg  · Not on Amiodarone, Metoprolol, or Bystolic due to side effects  · Patient underwent cardioversion to sinus rhythm  · Patient was started on sotalol 80 milligram p o  B i d  which was decreased to 40 milligram twice a day as patient was bradycardic  · Patient's heart rate improved and QTC was acceptable on the EKG  · Patient will need outpatient ischemia workup  · Patient is scheduled to see EP as outpatient    Hyponatremia  Assessment & Plan  Patient's sodium level dropped to 130 and has improved to 133 today  Likely secondary to hypervolemic  Patient given Lasix 40 milligram IV extra dose yesterday  Follow-up BMP after discharge    CAD (coronary artery disease)  Assessment & Plan  History of PCI  Continue statin    Hypothyroidism  Assessment & Plan  Continue Synthroid 75 mcg daily  · TSH is high at 7     · Follow-up outpatient with PCP and get repeat lab work after discharge    Essential hypertension  Assessment & Plan  Patient was started on Norvasc 5 milligram p o  Daily by Cardiology after discussing with daughter given her multiple allergies to medications with improvement in blood pressures  · Continue Lasix    Dyslipidemia  Assessment & Plan  · Continue statin (auto substituted Crestor with Pravastatin while hospitalized)    Acute on chronic diastolic heart failure (HCC)resolved as of 3/10/2020  Assessment & Plan  Wt Readings from Last 3 Encounters:   03/10/20 60 3 kg (132 lb 15 oz)   03/04/20 63 kg (139 lb)   01/06/20 63 kg (139 lb)     Patient had trace lower extremity edema with a BNP of 893 upon admission  Patient was having desaturation especially on exertion as per RN  · chest x-ray showed some pulmonary vascular congestion  · Patient was given Lasix 40 milligram IV x1 dose along with home dosing of 40 milligram p o  Daily  · Denies any shortness of breath      Consultations During Hospital Stay:  [address]    Procedures Performed:     · Cardioversion    Significant Findings:     · Refer to hospital course and above listed diagnosis related plan for details    Imaging while in hospital:    Xr Chest 1 View Portable    Result Date: 3/5/2020  Narrative: CHEST INDICATION:   chest pain  COMPARISON:  11/20/2019 EXAM PERFORMED/VIEWS:  XR CHEST PORTABLE  AP semierect Images: 1 FINDINGS: The heart is enlarged  Mediastinum and hilar structures however are normal in size and contour  Minimal bibasilar subsegmental atelectasis without lobar consolidation or superimposed edema  No pneumothorax or pleural effusion  Osseous structures appear within normal limits for patient age  Impression: Minimal bibasilar atelectasis  Cardiomegaly   Workstation performed: [id number]     Xr Chest Pa & Lateral    Result Date: 3/9/2020  Narrative: CHEST INDICATION:   SOB  COMPARISON:  3/4/2020 EXAM PERFORMED/VIEWS:  XR CHEST PA & LATERAL Images: 2 FINDINGS: Stable mild cardiomegaly  Slight central vascular congestion and basilar interstitial edema  Blunting of the costophrenic angles may represent trace effusions  Osseous structures appear within normal limits for patient age  Impression: Mild pulmonary edema  Stable cardiomegaly  Workstation performed: [id number]       Incidental Findings:   · none    Test Results Pending at Discharge (will require follow up):   · As per After Visit Summary     Outpatient Tests Requested:  · TSH, BMP    Complications:  Refer to hospital course and above listed diagnosis related plan, if any    Hospital Course:     Larry Pimentel is a [de-identified] y o  female patient who originally presented to the hospital on 3/4/2020 due to atrial fibrillation with RVR  At she was found to have a heart rate in the 160s in her primary cardiologist's office  She reported palpitations, shortness of breath  Patient was initially started on Cardizem drip  She was seen by Cardiology while here and underwent cardioversion to sinus rhythm  She was also started on sotalol  Symptoms improved while here  She was cleared by Cardiology prior to discharge  Discharge plan was discussed with patient and spouse present at bedside  As patient is deaf, I communicated with the patient by writing things down on a piece of paper  Please see above list of diagnoses and related plan for additional information         Condition at Discharge: stable     Discharge Day Visit / Exam:     Subjective:  Patient denies any chest pain, shortness of breath    Vitals: Blood Pressure: 136/63 (03/10/20 0951)  Pulse: 60 (03/10/20 0951)  Temperature: 97 7 °F (36 5 °C) (03/10/20 0951)  Temp Source: Oral (03/10/20 0951)  Respirations: 18 (03/10/20 0951)  Height: 4' 7 91" (142 cm) (03/04/20 2011)  Weight - Scale: 60 3 kg (132 lb 15 oz) (03/10/20 0559)  SpO2: 95 % (03/10/20 0951)  Exam:   Physical Exam   Constitutional: No distress  HENT:   Head: Normocephalic and atraumatic  Patient is deaf   Eyes: Right eye exhibits no discharge  Left eye exhibits no discharge  No scleral icterus  Cardiovascular: Normal rate and regular rhythm  Pulmonary/Chest: Effort normal  No respiratory distress  She has no wheezes  Decreased breath sounds bilaterally  Few rales noted   Abdominal: Soft  Bowel sounds are normal  She exhibits no distension  There is no tenderness  Musculoskeletal: She exhibits no edema  Neurological: She is alert  No cranial nerve deficit  Skin: She is not diaphoretic  Discharge instructions/Information to patient and family:(Discharge Medications and Follow up):   See after visit summary for information provided to patient and family  Provisions for Follow-Up Care:  See after visit summary for information related to follow-up care and any pertinent home health orders  Disposition: Home    Planned Readmission:  No     Discharge Statement:  I spent 35 minutes discharging the patient  This time was spent on the day of discharge  I had direct contact with the patient on the day of discharge  Greater than 50% of the total time was spent examining patient, answering all patient questions, arranging and discussing plan of care with patient as well as directly providing post-discharge instructions  Additional time then spent on discharge activities  Discharge Medications:  See after visit summary for reconciled discharge medications provided to patient and family  ** Please Note: "This note has been constructed using a voice recognition system  Therefore there may be syntax, spelling, and/or grammatical errors   Please call if you have any questions  "**

## 2020-03-11 ENCOUNTER — TRANSITIONAL CARE MANAGEMENT (OUTPATIENT)
Dept: FAMILY MEDICINE CLINIC | Facility: CLINIC | Age: 80
End: 2020-03-11

## 2020-03-11 NOTE — UTILIZATION REVIEW
Notification of Discharge  This is a Notification of Discharge from our facility [address]  Please be advised that this patient has been discharge from our facility  Below you will find the admission and discharge date and time including the patients disposition  PRESENTATION DATE: 3/4/2020  5:35 PM  OBS ADMISSION DATE:   IP ADMISSION DATE: 3/5/20 1430   DISCHARGE DATE: 3/10/2020  3:42 PM  DISPOSITION: Home/Self Care Home/Self Care   Admission Orders listed below:  Admission Orders (From admission, onward)     Ordered        03/05/20 1430  Inpatient Admission  Once         03/04/20 1901  Place in Observation (expected length of stay for this patient is less than two midnights)  Once                   Please contact the UR Department if additional information is required to close this patient's authorization/case  [facility] Utilization Review Department  Main: [phone] x carefully listen to the prompts  All voicemails are confidential   [email or web address]  Send all requests for admission clinical reviews, approved or denied determinations and any other requests to dedicated fax number below belonging to the campus where the patient is receiving treatment   List of dedicated fax numbers:  [address] DENIALS (Administrative/Medical Necessity) [phone]   [address] (Maternity/NICU/Pediatrics) [phone]   Lavern Lopez [phone]   Ghassan Daniels [phone]   [facility] [phone]   [address] [phone]   [facility]  [phone] [address] [facility]  [address] [facility] [address] [facility] [phone]

## 2020-03-12 ENCOUNTER — TELEPHONE (OUTPATIENT)
Dept: CARDIOLOGY CLINIC | Facility: CLINIC | Age: 80
End: 2020-03-12

## 2020-03-12 NOTE — TELEPHONE ENCOUNTER
Pt has a cough and itching internal - started sotalol, amlodipine and xarelto -  thinks the sotalol is causing the itching  Please advise

## 2020-03-16 ENCOUNTER — OFFICE VISIT (OUTPATIENT)
Dept: CARDIOLOGY CLINIC | Facility: CLINIC | Age: 80
End: 2020-03-16
Payer: COMMERCIAL

## 2020-03-16 VITALS
HEIGHT: 55 IN | DIASTOLIC BLOOD PRESSURE: 70 MMHG | WEIGHT: 142 LBS | BODY MASS INDEX: 32.86 KG/M2 | SYSTOLIC BLOOD PRESSURE: 160 MMHG | OXYGEN SATURATION: 98 %

## 2020-03-16 DIAGNOSIS — I10 ESSENTIAL HYPERTENSION: ICD-10-CM

## 2020-03-16 DIAGNOSIS — E78.5 DYSLIPIDEMIA: ICD-10-CM

## 2020-03-16 DIAGNOSIS — I48.3 TYPICAL ATRIAL FLUTTER (HCC): Primary | ICD-10-CM

## 2020-03-16 DIAGNOSIS — I25.10 CORONARY ARTERY DISEASE INVOLVING NATIVE CORONARY ARTERY OF NATIVE HEART WITHOUT ANGINA PECTORIS: ICD-10-CM

## 2020-03-16 PROCEDURE — 3078F DIAST BP <80 MM HG: CPT | Performed by: INTERNAL MEDICINE

## 2020-03-16 PROCEDURE — 1160F RVW MEDS BY RX/DR IN RCRD: CPT | Performed by: INTERNAL MEDICINE

## 2020-03-16 PROCEDURE — 93000 ELECTROCARDIOGRAM COMPLETE: CPT | Performed by: INTERNAL MEDICINE

## 2020-03-16 PROCEDURE — 3077F SYST BP >= 140 MM HG: CPT | Performed by: INTERNAL MEDICINE

## 2020-03-16 PROCEDURE — 4040F PNEUMOC VAC/ADMIN/RCVD: CPT | Performed by: INTERNAL MEDICINE

## 2020-03-16 PROCEDURE — 99214 OFFICE O/P EST MOD 30 MIN: CPT | Performed by: INTERNAL MEDICINE

## 2020-03-16 PROCEDURE — 3008F BODY MASS INDEX DOCD: CPT | Performed by: INTERNAL MEDICINE

## 2020-03-16 PROCEDURE — 1111F DSCHRG MED/CURRENT MED MERGE: CPT | Performed by: INTERNAL MEDICINE

## 2020-03-16 PROCEDURE — 1036F TOBACCO NON-USER: CPT | Performed by: INTERNAL MEDICINE

## 2020-03-16 NOTE — PROGRESS NOTES
Cardiology Follow Up    Aruna Singh  1940  [id number]      Interval History: Aruna Singh is here for followup of atrial fibrillation and edema  Last visit, she was sent to ER because of recurrence of atrial flutter with RVR  She underwent cardioversion after diuresis  She was started on sotalol but stopped it independently because of side effects  She denies any chest pain or shortness of breath  No palpitations  Remains in sinus rhythm  Previously,  she underwent cardioversion on 8/27/2019  Had maintained sinus rhythm until recently  Her last episode prior to that occurred in January 2019  She was successfully cardioverted to sinus rhythm and was started on amiodarone afterwards  Amiodarone was stopped because of GI side effects  Prior to her last visit, she was started on Bystolic and stopped due to nausea  Previously, she felt metoprolol was causing her to feel bad as well  Amlodipine was stopped in the past because of LE edema  She has been taking Xarelto regularly      The following portions of the patient's history were reviewed and updated as appropriate: allergies, current medications, past family history, past medical history, past social history, past surgical history and problem list     Current Outpatient Medications on File Prior to Visit   Medication Sig Dispense Refill    amLODIPine (NORVASC) 5 mg tablet Take 1 tablet (5 mg total) by mouth daily 30 tablet 0    Cholecalciferol (VITAMIN D) 2000 units CAPS Take 1 capsule by mouth daily      furosemide (LASIX) 40 mg tablet TAKE ONE TABLET BY MOUTH EVERY DAY 90 tablet 3    levothyroxine 75 mcg tablet TAKE ONE TABLET BY MOUTH EVERY DAY 90 tablet 3    rivaroxaban (XARELTO) 15 mg tablet Take 1 tablet (15 mg total) by mouth daily with dinner 30 tablet 0    halobetasol (ULTRAVATE) 0 05 % cream   0    Multiple Vitamins-Minerals (CENTRUM SILVER ULTRA WOMENS) TABS Take by mouth      [DISCONTINUED] rosuvastatin (CRESTOR) 10 MG tablet Take 1 tablet (10 mg total) by mouth daily (Patient not taking: Reported on 3/16/2020) 90 tablet 3    [DISCONTINUED] sotalol (BETAPACE) 80 mg tablet Take 0 5 tablets (40 mg total) by mouth every 12 (twelve) hours (Patient not taking: Reported on 3/16/2020) 60 tablet 0     No current facility-administered medications on file prior to visit  Review of Systems:  Review of Systems   Constitutional: Positive for fatigue  Negative for chills and fever  HENT: Positive for hearing loss  Negative for congestion, nosebleeds and postnasal drip  Respiratory: Negative for cough, chest tightness and shortness of breath  Cardiovascular: Negative for chest pain, palpitations and leg swelling  Gastrointestinal: Negative for abdominal distention, abdominal pain, diarrhea, nausea and vomiting  Endocrine: Negative for polydipsia, polyphagia and polyuria  Musculoskeletal: Positive for arthralgias  Negative for gait problem and myalgias  Skin: Negative for color change, pallor and rash  Allergic/Immunologic: Negative for environmental allergies, food allergies and immunocompromised state  Neurological: Negative for dizziness, seizures, syncope and light-headedness  Hematological: Negative for adenopathy  Does not bruise/bleed easily  Psychiatric/Behavioral: Negative for dysphoric mood  The patient is not nervous/anxious  Physical Exam:  /70 (BP Location: Left arm, Patient Position: Sitting, Cuff Size: Large)   Ht 4' 7 25" (1 403 m)   Wt 64 4 kg (142 lb)   SpO2 98%   BMI 32 71 kg/m²     Physical Exam   Constitutional: She is oriented to person, place, and time  She appears well-developed  No distress  HENT:   Head: Normocephalic and atraumatic  Eyes: Pupils are equal, round, and reactive to light  Conjunctivae and EOM are normal    Neck: Neck supple  No JVD present  No thyromegaly present     Cardiovascular: Normal rate and regular rhythm  Frequent extrasystoles are present  Exam reveals no gallop and no friction rub  Murmur heard  Pulmonary/Chest: Effort normal and breath sounds normal    Abdominal: Soft  She exhibits no distension  There is no tenderness  Musculoskeletal: She exhibits no edema  Neurological: She is alert and oriented to person, place, and time  No cranial nerve deficit  Skin: Skin is warm and dry  No rash noted  She is not diaphoretic  No erythema  Psychiatric: She has a normal mood and affect  Her behavior is normal  Judgment and thought content normal        Cardiographics  ECG:  Sinus rhythm with frequent PVCs and RBBB    Labs:  Lab Results   Component Value Date     08/15/2017    K 3 7 03/10/2020    K 4 6 02/10/2020    CL 97 (L) 03/10/2020     02/10/2020    CO2 30 03/10/2020    CO2 25 02/10/2020    BUN 17 03/10/2020    BUN 21 02/10/2020    CREATININE 0 94 03/10/2020    CREATININE 0 88 08/15/2017    GLUCOSE 94 08/15/2017    CALCIUM 8 7 03/10/2020    CALCIUM 9 2 08/15/2017     Lab Results   Component Value Date    WBC 6 30 03/05/2020    WBC 4 8 08/15/2017    HGB 14 4 03/05/2020    HGB 14 2 08/15/2017    HCT 44 2 03/05/2020    HCT 43 7 08/15/2017    MCV 88 03/05/2020    MCV 86 08/15/2017     03/05/2020     08/15/2017     Lab Results   Component Value Date    CHOL 164 08/15/2017    TRIG 62 02/10/2020    HDL 62 02/10/2020     Imaging: No results found  Discussion/Summary:  1  Typical atrial flutter ([facility] [address] )    2  Coronary artery disease involving native coronary artery of native heart without angina pectoris    3  Essential hypertension    4  Dyslipidemia      - She remains in sinus rhythm  Could not tolerate sotalol due to multiple side effects  Was previously on amiodarone which also caused multiple side effects primarily gastrointestinal and abnormal TSH  Could not tolerate beta blocker either     - Patient is on Xarelto    Aspirin was discontinued and she was continued on Xarelto alone because of excessive bruising    - she is scheduled to see Dr Queenie Wolf next week for potential radiofrequency ablation of atrial flutter    - she is tolerating amlodipine - monitor for LE edema

## 2020-03-18 NOTE — ANESTHESIA PREPROCEDURE EVALUATION
Review of Systems/Medical History  Patient summary reviewed        Cardiovascular  EKG reviewed, Exercise tolerance (METS): good,  Hyperlipidemia, Hypertension well controlled, CAD , Cardiac stents (cardiac catheterization in 2012 with stent placed)  Dysrhythmias (RBBB, aflutter) , atrial flutter, CHF ,   Comment: ECHO 1/25/19   LEFT VENTRICLE:  Systolic function was normal by visual assessment  Ejection fraction was estimated to be 55 %  There were no regional wall motion abnormalities  There was mild concentric hypertrophy      LEFT ATRIUM:  The atrium was massively dilated      MITRAL VALVE:  There was moderate annular calcification  There was moderate regurgitation      AORTIC VALVE:  The valve was trileaflet  Leaflets exhibited normal thickness, calcification, and mildly reduced cuspal separation  Transaortic velocity was increased due to valvular stenosis  There was mild stenosis  There was no regurgitation      TRICUSPID VALVE:  There was moderate regurgitation  Pulmonary artery systolic pressure was mildly increased  ,  Pulmonary  Negative pulmonary ROS        GI/Hepatic    GERD well controlled,        Negative  ROS        Endo/Other  History of thyroid disease , hypothyroidism,   Obesity (BMI 36)    GYN       Hematology  Negative hematology ROS      Musculoskeletal  Negative musculoskeletal ROS        Neurology      Comment: Deaf, sign language Psychology   Negative psychology ROS              Physical Exam    Airway    Mallampati score: II  TM Distance: >3 FB  Neck ROM: full     Dental   No notable dental hx     Cardiovascular      Pulmonary      Other Findings        Anesthesia Plan  ASA Score- 3     Anesthesia Type- IV sedation with anesthesia with ASA Monitors  Additional Monitors:   Airway Plan:     Comment: On diltiazen drip  Off heparin gtt  Consent obtained by patient through   Plan Factors-    Induction- intravenous      Postoperative Plan-     Informed Consent- Anesthetic plan and risks discussed with patient  I personally reviewed this patient with the CRNA  Discussed and agreed on the Anesthesia Plan with the CRNA  Bob Lopez Nostril Rim Text: The closure involved the nostril rim.

## 2020-03-23 ENCOUNTER — TELEMEDICINE (OUTPATIENT)
Dept: CARDIOLOGY CLINIC | Facility: CLINIC | Age: 80
End: 2020-03-23
Payer: COMMERCIAL

## 2020-03-23 VITALS
WEIGHT: 139.3 LBS | HEART RATE: 88 BPM | BODY MASS INDEX: 32.24 KG/M2 | HEIGHT: 55 IN | DIASTOLIC BLOOD PRESSURE: 78 MMHG | SYSTOLIC BLOOD PRESSURE: 182 MMHG

## 2020-03-23 DIAGNOSIS — I48.3 TYPICAL ATRIAL FLUTTER (HCC): ICD-10-CM

## 2020-03-23 DIAGNOSIS — I25.10 CORONARY ARTERY DISEASE INVOLVING NATIVE CORONARY ARTERY OF NATIVE HEART WITHOUT ANGINA PECTORIS: ICD-10-CM

## 2020-03-23 DIAGNOSIS — I10 ESSENTIAL HYPERTENSION: ICD-10-CM

## 2020-03-23 DIAGNOSIS — E78.5 DYSLIPIDEMIA: Primary | ICD-10-CM

## 2020-03-23 PROCEDURE — 1111F DSCHRG MED/CURRENT MED MERGE: CPT | Performed by: INTERNAL MEDICINE

## 2020-03-23 PROCEDURE — 1160F RVW MEDS BY RX/DR IN RCRD: CPT | Performed by: INTERNAL MEDICINE

## 2020-03-23 PROCEDURE — 3078F DIAST BP <80 MM HG: CPT | Performed by: INTERNAL MEDICINE

## 2020-03-23 PROCEDURE — 3008F BODY MASS INDEX DOCD: CPT | Performed by: INTERNAL MEDICINE

## 2020-03-23 PROCEDURE — 4040F PNEUMOC VAC/ADMIN/RCVD: CPT | Performed by: INTERNAL MEDICINE

## 2020-03-23 PROCEDURE — 3077F SYST BP >= 140 MM HG: CPT | Performed by: INTERNAL MEDICINE

## 2020-03-23 PROCEDURE — 1036F TOBACCO NON-USER: CPT | Performed by: INTERNAL MEDICINE

## 2020-03-23 PROCEDURE — 99205 OFFICE O/P NEW HI 60 MIN: CPT | Performed by: INTERNAL MEDICINE

## 2020-03-23 RX ORDER — ROSUVASTATIN CALCIUM 10 MG/1
10 TABLET, COATED ORAL DAILY
COMMUNITY
End: 2020-05-18

## 2020-03-23 NOTE — PROGRESS NOTES
EPS Consultation/New Patient Evaluation Yessenia Brown [de-identified] y o  female MRN: [id number]           ASSESSMENT:  1  Dyslipidemia     2  Typical atrial flutter [facility])  Ambulatory referral to Cardiac Electrophysiology   3  Coronary artery disease involving native coronary artery of native heart without angina pectoris     4  Essential hypertension             PLAN:  She has tachycardia/bradcyardia syndrome  I recommended a dual chamber pacemaker  She has LVH and significant LA enlargement  With a pacemaker she could be started on beta blocker therapy  I showed her xrays of pacemaker patient and explained how it is performed  I explained that she is at risk for a tachycardia induced cardiomyopathy if she does not have one placed  Her bp is high likely secondary to being in a new doctors office and thus I made no changes today    She is appropriately anticoagulated    She has a right bundle branch block that is asymptomatic    The patient explained that she does not want a pacemaker  She stated she will leave her health in God's hands  I will follow-up with her on a prn basis  CC/HPI:   She has tachycardia/bradycardia syndrome  She was admitted to the hospital on March 4 th for SANTI/Cardioversion  I personally reviewed her echo and all of her old ECGs  She claims she was asymptomatic and told to go to hospital based on her heart rate  She has not tolerated amiodarone and sotalol made her very sick  She currently denies symptoms of chest pain or SOB  She drops         ROS:  12 point ROS negative for complaints today except occasional SOB with stairs  Objective:     Vitals: Blood pressure (!) 182/78, pulse 88, height 4' 7 25" (1 403 m), weight 63 2 kg (139 lb 4 8 oz)  , Body mass index is 32 08 kg/m²  ,        Physical Exam:    GEN: Gavin Mora appears well, alert and oriented x 3, pleasant and cooperative   HEENT:  extraocular muscles intact  NECK: supple,     EXTREMITIES no clubbing, cyanosis, or edema  NEURO: no focal findings   SKIN: normal without suspicious lesions on exposed skin    She refused further physical exam    Medications:      Current Outpatient Medications:     amLODIPine (NORVASC) 5 mg tablet, Take 1 tablet (5 mg total) by mouth daily, Disp: 30 tablet, Rfl: 0    Cholecalciferol (VITAMIN D) 2000 units CAPS, Take 1 capsule by mouth daily, Disp: , Rfl:     furosemide (LASIX) 40 mg tablet, TAKE ONE TABLET BY MOUTH EVERY DAY, Disp: 90 tablet, Rfl: 3    halobetasol (ULTRAVATE) 0 05 % cream, , Disp: , Rfl: 0    levothyroxine 75 mcg tablet, TAKE ONE TABLET BY MOUTH EVERY DAY, Disp: 90 tablet, Rfl: 3    Multiple Vitamins-Minerals (CENTRUM SILVER ULTRA WOMENS) TABS, Take by mouth, Disp: , Rfl:     rivaroxaban (XARELTO) 15 mg tablet, Take 1 tablet (15 mg total) by mouth daily with dinner, Disp: 30 tablet, Rfl: 0    rosuvastatin (CRESTOR) 10 MG tablet, Take 10 mg by mouth daily, Disp: , Rfl:      Family History   Problem Relation Age of Onset    Hypertension Mother         essential    Breast cancer Mother     No Known Problems Sister     No Known Problems Daughter     No Known Problems Sister     No Known Problems Sister      Social History     Socioeconomic History    Marital status: /Civil Union     Spouse name: Not on file    Number of children: Not on file    Years of education: Not on file    Highest education level: Not on file   Occupational History    Not on file   Social Needs    Financial resource strain: Not on file    Food insecurity:     Worry: Not on file     Inability: Not on file    Transportation needs:     Medical: Not on file     Non-medical: Not on file   Tobacco Use    Smoking status: Never Smoker    Smokeless tobacco: Never Used   Substance and Sexual Activity    Alcohol use: Not Currently     Alcohol/week: 0 0 standard drinks     Frequency: Never     Drinks per session: Patient refused     Binge frequency: Patient refused    Drug use: No    Sexual activity: Not on file   Lifestyle    Physical activity:     Days per week: Not on file     Minutes per session: Not on file    Stress: Not on file   Relationships    Social connections:     Talks on phone: Not on file     Gets together: Not on file     Attends Quaker service: Not on file     Active member of club or organization: Not on file     Attends meetings of clubs or organizations: Not on file     Relationship status: Not on file    Intimate partner violence:     Fear of current or ex partner: Not on file     Emotionally abused: Not on file     Physically abused: Not on file     Forced sexual activity: Not on file   Other Topics Concern    Not on file   Social History Narrative    Not on file     Social History     Tobacco Use   Smoking Status Never Smoker   Smokeless Tobacco Never Used     Social History     Substance and Sexual Activity   Alcohol Use Not Currently    Alcohol/week: 0 0 standard drinks    Frequency: Never    Drinks per session: Patient refused    Binge frequency: Patient refused       Labs & Results:  Below is the patient's most recent value for Albumin, ALT, AST, BUN, Calcium, Chloride, Cholesterol, CO2, Creatinine, GFR, Glucose, HDL, Hematocrit, Hemoglobin, Hemoglobin A1C, LDL, Magnesium, Phosphorus, Platelets, Potassium, PSA, Sodium, Triglycerides, and WBC  Lab Results   Component Value Date    ALT 21 03/04/2020    AST 24 03/04/2020    BUN 17 03/10/2020    CALCIUM 8 7 03/10/2020    CL 97 (L) 03/10/2020    CHOL 164 08/15/2017    CO2 30 03/10/2020    CREATININE 0 94 03/10/2020    HDL 62 02/10/2020    HCT 44 2 03/05/2020    HGB 14 4 03/05/2020    MG 2 3 03/05/2020    PHOS 3 2 03/05/2020     03/05/2020    K 3 7 03/10/2020     08/15/2017    TRIG 62 02/10/2020    WBC 6 30 03/05/2020     Note: for a comprehensive list of the patient's lab results, access the Results Review activity              Cardiac testing:   Results for orders placed during the hospital encounter of 20   Echo complete with contrast if indicated    Narrative [address]  [address] [facility]  [phone]    Transthoracic Echocardiogram  2D, M-mode, Doppler, and Color Doppler    Study date:  05-Mar-2020    Patient: Rob Fernandes  MR number: [id number]  Account number: [de-identified]  : 1940  Age: [de-identified] years  Gender: Female  Status: Inpatient  Location: Bedside  Height: 56 in  Weight: 137 7 lb  BP: 126/ 74 mmHg    Indications: Atrial Fibrillation    Diagnoses: 427 31 - ATRIAL FIBRILLATION    Sonographer:  MARISA Norman  Primary Physician:  Margi Aaron MD  Referring Physician:  Harleen Bah MD  [facility]  [facility]  Interpreting Physician:  Harleen Bah MD    SUMMARY    LEFT VENTRICLE:  Systolic function was mildly reduced  Ejection fraction was estimated to be 45 %  There was mild diffuse hypokinesis  Wall thickness was mildly increased  There was mild concentric hypertrophy  LEFT ATRIUM:  The atrium was moderately to markedly dilated  Area 26 cm2    MITRAL VALVE:  There was moderate annular calcification  There was moderate regurgitation  AORTIC VALVE:  The valve was trileaflet  Leaflets exhibited moderately increased thickness, mild calcification, and mildly reduced cuspal separation  Transaortic velocity was increased due to valvular stenosis  There was very mild stenosis  There was trace regurgitation  TRICUSPID VALVE:  There was moderate regurgitation  Estimated peak PA pressure was 40 mmHg  PULMONIC VALVE:  There was mild regurgitation  HISTORY: PRIOR HISTORY: HTN, CAD, Atrial Flutter, CHF, Dyslipidemia, GERD, Dermatitis    PROCEDURE: The procedure was performed at the bedside  This was a routine study  The transthoracic approach was used  The study included complete 2D imaging, M-mode, complete spectral Doppler, and color Doppler   The heart rate was 96 bpm,  at the start of the study  Image quality was adequate  LEFT VENTRICLE: Size was normal  Systolic function was mildly reduced  Ejection fraction was estimated to be 45 %  There was mild diffuse hypokinesis  Wall thickness was mildly increased  There was mild concentric hypertrophy  DOPPLER: The  study was not technically sufficient to allow evaluation of LV diastolic function  RIGHT VENTRICLE: The size was normal  Systolic function was normal     LEFT ATRIUM: The atrium was moderately to markedly dilated  Area 26 cm2 No mass was present  RIGHT ATRIUM: Size was normal     MITRAL VALVE: There was moderate annular calcification  There was normal leaflet separation  DOPPLER: There was no evidence for stenosis  There was moderate regurgitation  AORTIC VALVE: The valve was trileaflet  Leaflets exhibited moderately increased thickness, mild calcification, and mildly reduced cuspal separation  DOPPLER: Transaortic velocity was increased due to valvular stenosis  There was very mild  stenosis  There was trace regurgitation  TRICUSPID VALVE: DOPPLER: There was moderate regurgitation  Estimated peak PA pressure was 40 mmHg  PULMONIC VALVE: DOPPLER: There was mild regurgitation  PERICARDIUM: There was no thickening or calcification  There was no pericardial effusion  AORTA: The root exhibited normal size      SYSTEM MEASUREMENT TABLES    2D mode  AoR Diam 2D: 3 1 cm  LA Diam (2D): 4 6 cm  LA/Ao (2D): 1 48  FS (2D Teich): 23 6 %  IVSd (2D): 1 12 cm  LVDEV: 85 8 cmï¾³  LVESV: 45 1 cmï¾³  LVIDd(2D): 4 36 cm  LVISd (2D): 3 33 cm  LVOT Area 2D: 2 84 cmï¾²  LVPWd (2D): 1 17 cm  SV (Teich): 40 7 cmï¾³    Apical four chamber  LVEF A4C: 44 %    Unspecified Scan Mode  LAILA Cont Eq (Peak Kushal): 1 32 cmï¾²  LAILA Cont Eq (VTI): 1 11 cmï¾²  LVOT (VTI): 14 2 cm  LVOT Diam : 1 9 cm  LVOT Vmax: 867 mm/s  LVOT Vmax; Mean: 867 mm/s  Peak Grad ; Mean: 3 mm[Hg]  SV (LVOT): 40 cmï¾³  VTI;Mean: 2 mm[Hg]  LAILA Cont Eq (VTI): 1 4 cmï¾²  MV Peak E Kushal  Mean: 1330 mm/s  MVA (PHT): 4 cmï¾²  PHT: 55 ms  Max P mm[Hg]  V Max: 2810 mm/s  Vmax: 2670 mm/s  RA Area: 18 4 cmï¾²  RA Volume: 52 cmï¾³  TAPSE: 1 5 cm    [facility] Accredited Echocardiography Laboratory    Prepared and electronically signed by    Collins Jaramillo MD  Signed 05-Mar-2020 17:21:02       Results for orders placed during the hospital encounter of 20   SANTI    Narrative [address]  [address] [facility]  [phone]    Transesophageal Echocardiogram    Study date:  05-Mar-2020    Patient: Nawaf Cortes  MR number: [id number]  Account number: [de-identified]  : 1940  Age: [de-identified] years  Gender: Female  Status: Inpatient  Location: Cath lab  Height: 56 in  Weight: 138 lb  BP: 159/ 86 mmHg    Indications: Atrial Fibrillation    Diagnoses: 427 31 - ATRIAL FIBRILLATION    Sonographer:  MARISA Chapman  Primary Physician:  Jason Milton MD  Referring Physician:  Collins Jaramillo MD  Group:  [facility]  RN:  Shravan Guan RN  Interpreting Physician:  Collins Jaramillo MD    SUMMARY    LEFT VENTRICLE:  Systolic function was mildly to moderately reduced  Ejection fraction was estimated in the range of 40 % to 45 % to be 45 %  There was moderate diffuse hypokinesis  Wall thickness was mildly increased  There was mild concentric hypertrophy  LEFT ATRIUM:  The atrium was moderately to markedly dilated  ATRIAL SEPTUM:  No defect or patent foramen ovale was identified  RIGHT ATRIUM:  The atrium was mildly dilated  MITRAL VALVE:  There was mild to moderate annular calcification  There was moderate regurgitation  AORTIC VALVE:  There was very mild stenosis  TRICUSPID VALVE:  There was mild to moderate regurgitation  PULMONIC VALVE:  There was mild regurgitation      HISTORY: PRIOR HISTORY: HTN, CAD, Atrial Flutter, CHF, GERD    PROCEDURE: The procedure was performed in the catheterization laboratory  This was a routine study  The transesophageal approach was used  The heart rate was 104 bpm, at the start of the study  An adult omniplane probe was inserted by the  attending cardiologist  Intubated with ease  Two intubation attempt(s)  There was no blood detected on the probe  Intravenous contrast (agitated saline, 10 ml) was administered to evaluate shunting  LEFT VENTRICLE: Size was normal  Systolic function was mildly to moderately reduced  Ejection fraction was estimated in the range of 40 % to 45 % to be 45 %  There was moderate diffuse hypokinesis  Wall thickness was mildly increased  There was mild concentric hypertrophy  DOPPLER: The study was not technically sufficient to allow evaluation of LV diastolic function  RIGHT VENTRICLE: The size was normal  Systolic function was normal     LEFT ATRIUM: The atrium was moderately to markedly dilated  APPENDAGE: The size was normal  No foreign bodies were observed  No mass was identified  There was no spontaneous echo contrast ("smoke") in the appendage  DOPPLER: The function  was normal (normal emptying velocity)  ATRIAL SEPTUM: No defect or patent foramen ovale was identified  RIGHT ATRIUM: The atrium was mildly dilated  MITRAL VALVE: There was mild to moderate annular calcification  DOPPLER: There was no evidence for stenosis  There was moderate regurgitation  AORTIC VALVE: The valve was trileaflet  Leaflets exhibited mildly to moderately increased thickness, mild to moderate calcification, and mildly reduced cuspal separation  There was no echocardiographic evidence of vegetation  DOPPLER:  There was very mild stenosis  There was no regurgitation  TRICUSPID VALVE: DOPPLER: There was mild to moderate regurgitation  PULMONIC VALVE: DOPPLER: There was mild regurgitation  PERICARDIUM: There was no thickening or calcification  There was no pericardial effusion  AORTA: The root exhibited normal size      [employer] Accredited Echocardiography Laboratory    Prepared and electronically signed by    Carol Tracy MD  Signed 05-Mar-2020 17:23:45       No results found for this or any previous visit  No results found for this or any previous visit

## 2020-03-30 ENCOUNTER — TELEPHONE (OUTPATIENT)
Dept: CARDIOLOGY CLINIC | Facility: CLINIC | Age: 80
End: 2020-03-30

## 2020-03-30 NOTE — TELEPHONE ENCOUNTER
called, patient will not take medication  He states that she will not take either medication   reports that she feels better not taking amlodipine or furosemide

## 2020-04-29 ENCOUNTER — TELEPHONE (OUTPATIENT)
Dept: FAMILY MEDICINE CLINIC | Facility: CLINIC | Age: 80
End: 2020-04-29

## 2020-05-17 DIAGNOSIS — E78.5 DYSLIPIDEMIA: Primary | ICD-10-CM

## 2020-05-18 RX ORDER — ROSUVASTATIN CALCIUM 10 MG/1
TABLET, COATED ORAL
Qty: 90 TABLET | Refills: 1 | Status: SHIPPED | OUTPATIENT
Start: 2020-05-18 | End: 2021-01-23

## 2020-06-29 ENCOUNTER — OFFICE VISIT (OUTPATIENT)
Dept: FAMILY MEDICINE CLINIC | Facility: CLINIC | Age: 80
End: 2020-06-29
Payer: COMMERCIAL

## 2020-06-29 VITALS
DIASTOLIC BLOOD PRESSURE: 70 MMHG | SYSTOLIC BLOOD PRESSURE: 130 MMHG | TEMPERATURE: 98.3 F | OXYGEN SATURATION: 97 % | HEIGHT: 55 IN | RESPIRATION RATE: 16 BRPM | WEIGHT: 136.4 LBS | BODY MASS INDEX: 31.57 KG/M2 | HEART RATE: 78 BPM

## 2020-06-29 DIAGNOSIS — H66.91 ACUTE RIGHT OTITIS MEDIA: Primary | ICD-10-CM

## 2020-06-29 PROCEDURE — 3078F DIAST BP <80 MM HG: CPT | Performed by: NURSE PRACTITIONER

## 2020-06-29 PROCEDURE — 99213 OFFICE O/P EST LOW 20 MIN: CPT | Performed by: NURSE PRACTITIONER

## 2020-06-29 PROCEDURE — 1160F RVW MEDS BY RX/DR IN RCRD: CPT | Performed by: NURSE PRACTITIONER

## 2020-06-29 PROCEDURE — 1036F TOBACCO NON-USER: CPT | Performed by: NURSE PRACTITIONER

## 2020-06-29 PROCEDURE — 4040F PNEUMOC VAC/ADMIN/RCVD: CPT | Performed by: NURSE PRACTITIONER

## 2020-06-29 PROCEDURE — 3008F BODY MASS INDEX DOCD: CPT | Performed by: NURSE PRACTITIONER

## 2020-06-29 PROCEDURE — 3075F SYST BP GE 130 - 139MM HG: CPT | Performed by: NURSE PRACTITIONER

## 2020-06-29 RX ORDER — AMOXICILLIN 875 MG/1
875 TABLET, COATED ORAL 2 TIMES DAILY
Qty: 20 TABLET | Refills: 0 | Status: SHIPPED | OUTPATIENT
Start: 2020-06-29 | End: 2020-07-09

## 2020-06-29 RX ORDER — RIVAROXABAN 20 MG/1
20 TABLET, FILM COATED ORAL
COMMUNITY
Start: 2020-06-23 | End: 2021-01-15 | Stop reason: SDUPTHER

## 2020-07-20 ENCOUNTER — OFFICE VISIT (OUTPATIENT)
Dept: CARDIOLOGY CLINIC | Facility: CLINIC | Age: 80
End: 2020-07-20
Payer: COMMERCIAL

## 2020-07-20 VITALS
TEMPERATURE: 99.1 F | HEIGHT: 55 IN | HEART RATE: 76 BPM | SYSTOLIC BLOOD PRESSURE: 164 MMHG | OXYGEN SATURATION: 96 % | DIASTOLIC BLOOD PRESSURE: 70 MMHG | WEIGHT: 138 LBS | BODY MASS INDEX: 31.94 KG/M2

## 2020-07-20 DIAGNOSIS — I10 ESSENTIAL HYPERTENSION: ICD-10-CM

## 2020-07-20 DIAGNOSIS — I48.3 TYPICAL ATRIAL FLUTTER (HCC): ICD-10-CM

## 2020-07-20 DIAGNOSIS — E78.5 DYSLIPIDEMIA: ICD-10-CM

## 2020-07-20 DIAGNOSIS — I25.10 CORONARY ARTERY DISEASE INVOLVING NATIVE CORONARY ARTERY OF NATIVE HEART WITHOUT ANGINA PECTORIS: Primary | ICD-10-CM

## 2020-07-20 PROCEDURE — 1160F RVW MEDS BY RX/DR IN RCRD: CPT | Performed by: INTERNAL MEDICINE

## 2020-07-20 PROCEDURE — 4040F PNEUMOC VAC/ADMIN/RCVD: CPT | Performed by: INTERNAL MEDICINE

## 2020-07-20 PROCEDURE — 99214 OFFICE O/P EST MOD 30 MIN: CPT | Performed by: INTERNAL MEDICINE

## 2020-07-20 PROCEDURE — 1036F TOBACCO NON-USER: CPT | Performed by: INTERNAL MEDICINE

## 2020-07-20 PROCEDURE — 3077F SYST BP >= 140 MM HG: CPT | Performed by: INTERNAL MEDICINE

## 2020-07-20 PROCEDURE — 3008F BODY MASS INDEX DOCD: CPT | Performed by: INTERNAL MEDICINE

## 2020-07-20 PROCEDURE — 93000 ELECTROCARDIOGRAM COMPLETE: CPT | Performed by: INTERNAL MEDICINE

## 2020-07-20 PROCEDURE — 3078F DIAST BP <80 MM HG: CPT | Performed by: INTERNAL MEDICINE

## 2020-07-20 NOTE — PROGRESS NOTES
Cardiology Follow Up    Loi Lux  1940  [phone]      Interval History: Loi Lux is here for followup of atrial fibrillation and edema  Since her last visit, she has stopped taking furosemide because she felt it was causing her developed lower back pain and frequent urination  She has not had any increase in edema since then  She is in sinus rhythm today with frequent PVCs  She has a history of atrial fibrillation and had a cardioversion earlier this year  She was previously taking sotalol but stopped it independently because of side effects  She denies any chest pain or shortness of breath  No palpitations  Remains in sinus rhythm  Previously,  she underwent cardioversion on 8/27/2019  Her last episode prior to that occurred in January 2019  She was successfully cardioverted to sinus rhythm and was started on amiodarone afterwards  Amiodarone was stopped because of GI side effects  Prior to her last visit, she was started on Bystolic and stopped due to nausea  Previously, she felt metoprolol was causing her to feel bad as well  Amlodipine was stopped in the past because of LE edema  She has been taking Xarelto regularly      The following portions of the patient's history were reviewed and updated as appropriate: allergies, current medications, past family history, past medical history, past social history, past surgical history and problem list     Current Outpatient Medications on File Prior to Visit   Medication Sig Dispense Refill    Cholecalciferol (VITAMIN D) 2000 units CAPS Take 1 capsule by mouth daily      levothyroxine 75 mcg tablet TAKE ONE TABLET BY MOUTH EVERY DAY 90 tablet 3    Multiple Vitamins-Minerals (CENTRUM SILVER ULTRA WOMENS) TABS Take by mouth      rivaroxaban (XARELTO) 15 mg tablet Take 1 tablet (15 mg total) by mouth daily with dinner 30 tablet 0    rosuvastatin (CRESTOR) 10 MG tablet TAKE ONE TABLET BY MOUTH EVERY DAY 90 tablet 1    XARELTO 20 MG tablet       amLODIPine (NORVASC) 5 mg tablet Take 1 tablet (5 mg total) by mouth daily (Patient not taking: Reported on 7/20/2020) 30 tablet 0    furosemide (LASIX) 40 mg tablet TAKE ONE TABLET BY MOUTH EVERY DAY (Patient not taking: Reported on 7/20/2020) 90 tablet 3    halobetasol (ULTRAVATE) 0 05 % cream   0    neomycin-polymyxin-hydrocortisone (CORTISPORIN) otic solution Administer 4 drops to the right ear 4 (four) times a day for 7 days 10 mL 0     No current facility-administered medications on file prior to visit  Review of Systems:  Review of Systems   Constitutional: Negative for chills, fatigue and fever  HENT: Positive for hearing loss  Negative for congestion, nosebleeds and postnasal drip  Respiratory: Negative for cough, chest tightness and shortness of breath  Cardiovascular: Negative for chest pain, palpitations and leg swelling  Gastrointestinal: Negative for abdominal distention, abdominal pain, diarrhea, nausea and vomiting  Endocrine: Negative for polydipsia, polyphagia and polyuria  Musculoskeletal: Negative for gait problem and myalgias  Skin: Negative for color change, pallor and rash  Allergic/Immunologic: Negative for environmental allergies, food allergies and immunocompromised state  Neurological: Negative for dizziness, seizures, syncope and light-headedness  Hematological: Negative for adenopathy  Does not bruise/bleed easily  Psychiatric/Behavioral: Negative for dysphoric mood  The patient is not nervous/anxious  Physical Exam:  /70 (BP Location: Left arm, Patient Position: Sitting, Cuff Size: Standard)   Pulse 76   Temp 99 1 °F (37 3 °C) (Temporal)   Ht 4' 7" (1 397 m)   Wt 62 6 kg (138 lb)   SpO2 96% Comment: RA  BMI 32 07 kg/m²     Physical Exam   Constitutional: She is oriented to person, place, and time  She appears well-developed  No distress     HENT:   Head: Normocephalic and atraumatic  Neck: Neck supple  No JVD present  No thyromegaly present  Cardiovascular: Normal rate and regular rhythm  Exam reveals no gallop and no friction rub  Murmur heard  Pulmonary/Chest: Effort normal and breath sounds normal    Abdominal: Soft  She exhibits no distension  There is no tenderness  Musculoskeletal: She exhibits no edema  Neurological: She is alert and oriented to person, place, and time  No cranial nerve deficit  Skin: Skin is warm and dry  No rash noted  She is not diaphoretic  No erythema  Psychiatric: She has a normal mood and affect  Her behavior is normal  Judgment and thought content normal        Cardiographics  ECG:  Sinus rhythm with frequent PVCs and RBBB    Labs:  Lab Results   Component Value Date     08/15/2017    K 3 7 03/10/2020    K 4 6 02/10/2020    CL 97 (L) 03/10/2020     02/10/2020    CO2 30 03/10/2020    CO2 25 02/10/2020    BUN 17 03/10/2020    BUN 21 02/10/2020    CREATININE 0 94 03/10/2020    CREATININE 0 88 08/15/2017    GLUCOSE 94 08/15/2017    CALCIUM 8 7 03/10/2020    CALCIUM 9 2 08/15/2017     Lab Results   Component Value Date    WBC 6 30 03/05/2020    WBC 4 8 08/15/2017    HGB 14 4 03/05/2020    HGB 14 2 08/15/2017    HCT 44 2 03/05/2020    HCT 43 7 08/15/2017    MCV 88 03/05/2020    MCV 86 08/15/2017     03/05/2020     08/15/2017     Lab Results   Component Value Date    CHOL 164 08/15/2017    TRIG 62 02/10/2020    HDL 62 02/10/2020     Imaging: No results found  Discussion/Summary:  1  Coronary artery disease involving native coronary artery of native heart without angina pectoris    2  Typical atrial flutter ([address] )    3  Essential hypertension    4  Dyslipidemia      - She remains in sinus rhythm  Could not tolerate sotalol due to multiple side effects  Was previously on amiodarone which also caused multiple side effects primarily gastrointestinal and abnormal TSH    Could not tolerate beta blocker either     - Patient is on Xarelto  Aspirin was discontinued and she was continued on Xarelto alone because of excessive bruising    - She has stopped lasix without any edema at this time  Will continue to monitor    - No longer taking any blood pressure medications - amlodipine was most recently discontinued

## 2020-10-06 ENCOUNTER — TELEPHONE (OUTPATIENT)
Dept: FAMILY MEDICINE CLINIC | Facility: CLINIC | Age: 80
End: 2020-10-06

## 2020-10-06 DIAGNOSIS — E78.5 DYSLIPIDEMIA: ICD-10-CM

## 2020-10-06 DIAGNOSIS — I10 ESSENTIAL HYPERTENSION: ICD-10-CM

## 2020-10-06 DIAGNOSIS — E03.9 HYPOTHYROIDISM, UNSPECIFIED TYPE: Primary | ICD-10-CM

## 2020-10-11 ENCOUNTER — APPOINTMENT (EMERGENCY)
Dept: RADIOLOGY | Facility: HOSPITAL | Age: 80
DRG: 308 | End: 2020-10-11
Payer: COMMERCIAL

## 2020-10-11 ENCOUNTER — HOSPITAL ENCOUNTER (INPATIENT)
Facility: HOSPITAL | Age: 80
LOS: 4 days | Discharge: HOME | DRG: 308 | End: 2020-10-15
Attending: EMERGENCY MEDICINE | Admitting: FAMILY MEDICINE
Payer: COMMERCIAL

## 2020-10-11 DIAGNOSIS — E87.1 HYPONATREMIA: ICD-10-CM

## 2020-10-11 DIAGNOSIS — I48.91 ATRIAL FIBRILLATION WITH RVR (HCC): Primary | ICD-10-CM

## 2020-10-11 DIAGNOSIS — R07.9 CHEST PAIN: ICD-10-CM

## 2020-10-11 LAB
ALBUMIN SERPL BCP-MCNC: 3.6 G/DL (ref 3.5–5)
ALP SERPL-CCNC: 75 U/L (ref 46–116)
ALT SERPL W P-5'-P-CCNC: 18 U/L (ref 12–78)
ANION GAP SERPL CALCULATED.3IONS-SCNC: 12 MMOL/L (ref 4–13)
APTT PPP: 55 SECONDS (ref 23–37)
AST SERPL W P-5'-P-CCNC: 28 U/L (ref 5–45)
BACTERIA UR QL AUTO: NORMAL /HPF
BASOPHILS # BLD AUTO: 0.05 THOUSANDS/ΜL (ref 0–0.1)
BASOPHILS NFR BLD AUTO: 1 % (ref 0–1)
BILIRUB SERPL-MCNC: 1 MG/DL (ref 0.2–1)
BILIRUB UR QL STRIP: NEGATIVE
BUN SERPL-MCNC: 19 MG/DL (ref 5–25)
CALCIUM SERPL-MCNC: 8.4 MG/DL (ref 8.3–10.1)
CHLORIDE SERPL-SCNC: 98 MMOL/L (ref 100–108)
CLARITY UR: CLEAR
CO2 SERPL-SCNC: 22 MMOL/L (ref 21–32)
COLOR UR: ABNORMAL
CREAT SERPL-MCNC: 1.01 MG/DL (ref 0.6–1.3)
EOSINOPHIL # BLD AUTO: 0.08 THOUSAND/ΜL (ref 0–0.61)
EOSINOPHIL NFR BLD AUTO: 1 % (ref 0–6)
ERYTHROCYTE [DISTWIDTH] IN BLOOD BY AUTOMATED COUNT: 13.1 % (ref 11.6–15.1)
GFR SERPL CREATININE-BSD FRML MDRD: 52 ML/MIN/1.73SQ M
GLUCOSE SERPL-MCNC: 93 MG/DL (ref 65–140)
GLUCOSE UR STRIP-MCNC: NEGATIVE MG/DL
HCT VFR BLD AUTO: 43.5 % (ref 34.8–46.1)
HGB BLD-MCNC: 14.2 G/DL (ref 11.5–15.4)
HGB UR QL STRIP.AUTO: ABNORMAL
IMM GRANULOCYTES # BLD AUTO: 0.01 THOUSAND/UL (ref 0–0.2)
IMM GRANULOCYTES NFR BLD AUTO: 0 % (ref 0–2)
INR PPP: 3.2 (ref 0.84–1.19)
KETONES UR STRIP-MCNC: NEGATIVE MG/DL
LACTATE SERPL-SCNC: 0.7 MMOL/L (ref 0.5–2)
LEUKOCYTE ESTERASE UR QL STRIP: NEGATIVE
LIPASE SERPL-CCNC: 93 U/L (ref 73–393)
LYMPHOCYTES # BLD AUTO: 1.51 THOUSANDS/ΜL (ref 0.6–4.47)
LYMPHOCYTES NFR BLD AUTO: 23 % (ref 14–44)
MAGNESIUM SERPL-MCNC: 2.2 MG/DL (ref 1.6–2.6)
MCH RBC QN AUTO: 28.6 PG (ref 26.8–34.3)
MCHC RBC AUTO-ENTMCNC: 32.6 G/DL (ref 31.4–37.4)
MCV RBC AUTO: 88 FL (ref 82–98)
MONOCYTES # BLD AUTO: 0.7 THOUSAND/ΜL (ref 0.17–1.22)
MONOCYTES NFR BLD AUTO: 11 % (ref 4–12)
NEUTROPHILS # BLD AUTO: 4.1 THOUSANDS/ΜL (ref 1.85–7.62)
NEUTS SEG NFR BLD AUTO: 64 % (ref 43–75)
NITRITE UR QL STRIP: NEGATIVE
NON-SQ EPI CELLS URNS QL MICRO: NORMAL /HPF
NRBC BLD AUTO-RTO: 0 /100 WBCS
NT-PROBNP SERPL-MCNC: 1764 PG/ML
PH UR STRIP.AUTO: 7 [PH]
PLATELET # BLD AUTO: 187 THOUSANDS/UL (ref 149–390)
PMV BLD AUTO: 9.8 FL (ref 8.9–12.7)
POTASSIUM SERPL-SCNC: 4.3 MMOL/L (ref 3.5–5.3)
PROT SERPL-MCNC: 7.2 G/DL (ref 6.4–8.2)
PROT UR STRIP-MCNC: NEGATIVE MG/DL
PROTHROMBIN TIME: 32.3 SECONDS (ref 11.6–14.5)
RBC # BLD AUTO: 4.96 MILLION/UL (ref 3.81–5.12)
RBC #/AREA URNS AUTO: NORMAL /HPF
SODIUM SERPL-SCNC: 132 MMOL/L (ref 136–145)
SP GR UR STRIP.AUTO: 1.01 (ref 1–1.03)
TROPONIN I SERPL-MCNC: <0.02 NG/ML
UROBILINOGEN UR QL STRIP.AUTO: 0.2 E.U./DL
WBC # BLD AUTO: 6.45 THOUSAND/UL (ref 4.31–10.16)
WBC #/AREA URNS AUTO: NORMAL /HPF

## 2020-10-11 PROCEDURE — 85610 PROTHROMBIN TIME: CPT | Performed by: EMERGENCY MEDICINE

## 2020-10-11 PROCEDURE — 99222 1ST HOSP IP/OBS MODERATE 55: CPT | Performed by: PHYSICIAN ASSISTANT

## 2020-10-11 PROCEDURE — 83690 ASSAY OF LIPASE: CPT | Performed by: EMERGENCY MEDICINE

## 2020-10-11 PROCEDURE — 71045 X-RAY EXAM CHEST 1 VIEW: CPT

## 2020-10-11 PROCEDURE — 99285 EMERGENCY DEPT VISIT HI MDM: CPT

## 2020-10-11 PROCEDURE — 96374 THER/PROPH/DIAG INJ IV PUSH: CPT

## 2020-10-11 PROCEDURE — 80053 COMPREHEN METABOLIC PANEL: CPT | Performed by: EMERGENCY MEDICINE

## 2020-10-11 PROCEDURE — G0008 ADMIN INFLUENZA VIRUS VAC: HCPCS | Performed by: FAMILY MEDICINE

## 2020-10-11 PROCEDURE — 83880 ASSAY OF NATRIURETIC PEPTIDE: CPT | Performed by: EMERGENCY MEDICINE

## 2020-10-11 PROCEDURE — 84484 ASSAY OF TROPONIN QUANT: CPT | Performed by: PHYSICIAN ASSISTANT

## 2020-10-11 PROCEDURE — 84484 ASSAY OF TROPONIN QUANT: CPT | Performed by: EMERGENCY MEDICINE

## 2020-10-11 PROCEDURE — 96361 HYDRATE IV INFUSION ADD-ON: CPT

## 2020-10-11 PROCEDURE — 85730 THROMBOPLASTIN TIME PARTIAL: CPT | Performed by: EMERGENCY MEDICINE

## 2020-10-11 PROCEDURE — 87040 BLOOD CULTURE FOR BACTERIA: CPT | Performed by: EMERGENCY MEDICINE

## 2020-10-11 PROCEDURE — 83605 ASSAY OF LACTIC ACID: CPT | Performed by: EMERGENCY MEDICINE

## 2020-10-11 PROCEDURE — 85025 COMPLETE CBC W/AUTO DIFF WBC: CPT | Performed by: EMERGENCY MEDICINE

## 2020-10-11 PROCEDURE — 96365 THER/PROPH/DIAG IV INF INIT: CPT

## 2020-10-11 PROCEDURE — 90662 IIV NO PRSV INCREASED AG IM: CPT | Performed by: FAMILY MEDICINE

## 2020-10-11 PROCEDURE — 93005 ELECTROCARDIOGRAM TRACING: CPT

## 2020-10-11 PROCEDURE — 81001 URINALYSIS AUTO W/SCOPE: CPT | Performed by: EMERGENCY MEDICINE

## 2020-10-11 PROCEDURE — 83735 ASSAY OF MAGNESIUM: CPT | Performed by: EMERGENCY MEDICINE

## 2020-10-11 PROCEDURE — 36415 COLL VENOUS BLD VENIPUNCTURE: CPT | Performed by: EMERGENCY MEDICINE

## 2020-10-11 PROCEDURE — 99291 CRITICAL CARE FIRST HOUR: CPT | Performed by: EMERGENCY MEDICINE

## 2020-10-11 RX ORDER — LEVOTHYROXINE SODIUM 0.07 MG/1
75 TABLET ORAL DAILY
Status: DISCONTINUED | OUTPATIENT
Start: 2020-10-12 | End: 2020-10-15 | Stop reason: HOSPADM

## 2020-10-11 RX ORDER — NITROGLYCERIN 0.4 MG/1
0.4 TABLET SUBLINGUAL
Status: DISCONTINUED | OUTPATIENT
Start: 2020-10-11 | End: 2020-10-15 | Stop reason: HOSPADM

## 2020-10-11 RX ORDER — DILTIAZEM HYDROCHLORIDE 5 MG/ML
15 INJECTION INTRAVENOUS ONCE
Status: COMPLETED | OUTPATIENT
Start: 2020-10-11 | End: 2020-10-11

## 2020-10-11 RX ORDER — FUROSEMIDE 10 MG/ML
40 INJECTION INTRAMUSCULAR; INTRAVENOUS ONCE
Status: COMPLETED | OUTPATIENT
Start: 2020-10-11 | End: 2020-10-11

## 2020-10-11 RX ORDER — MELATONIN
2000 DAILY
Status: DISCONTINUED | OUTPATIENT
Start: 2020-10-12 | End: 2020-10-15 | Stop reason: HOSPADM

## 2020-10-11 RX ORDER — PRAVASTATIN SODIUM 80 MG/1
80 TABLET ORAL
Status: DISCONTINUED | OUTPATIENT
Start: 2020-10-12 | End: 2020-10-15 | Stop reason: HOSPADM

## 2020-10-11 RX ORDER — FUROSEMIDE 10 MG/ML
40 INJECTION INTRAMUSCULAR; INTRAVENOUS DAILY
Status: DISCONTINUED | OUTPATIENT
Start: 2020-10-12 | End: 2020-10-13

## 2020-10-11 RX ADMIN — Medication 5 MG/HR: at 21:40

## 2020-10-11 RX ADMIN — SODIUM CHLORIDE 1000 ML: 0.9 INJECTION, SOLUTION INTRAVENOUS at 17:06

## 2020-10-11 RX ADMIN — DILTIAZEM HYDROCHLORIDE 15 MG: 5 INJECTION INTRAVENOUS at 17:12

## 2020-10-11 RX ADMIN — FUROSEMIDE 40 MG: 10 INJECTION, SOLUTION INTRAMUSCULAR; INTRAVENOUS at 19:04

## 2020-10-11 RX ADMIN — INFLUENZA A VIRUS A/MICHIGAN/45/2015 X-275 (H1N1) ANTIGEN (FORMALDEHYDE INACTIVATED), INFLUENZA A VIRUS A/SINGAPORE/INFIMH-16-0019/2016 IVR-186 (H3N2) ANTIGEN (FORMALDEHYDE INACTIVATED), INFLUENZA B VIRUS B/PHUKET/3073/2013 ANTIGEN (FORMALDEHYDE INACTIVATED), AND INFLUENZA B VIRUS B/MARYLAND/15/2016 BX-69A ANTIGEN (FORMALDEHYDE INACTIVATED) 0.7 ML: 60; 60; 60; 60 INJECTION, SUSPENSION INTRAMUSCULAR at 21:37

## 2020-10-11 RX ADMIN — DILTIAZEM HYDROCHLORIDE 5 MG/HR: 5 INJECTION INTRAVENOUS at 17:26

## 2020-10-12 LAB
ANION GAP SERPL CALCULATED.3IONS-SCNC: 9 MMOL/L (ref 4–13)
BASOPHILS # BLD AUTO: 0.05 THOUSANDS/ΜL (ref 0–0.1)
BASOPHILS NFR BLD AUTO: 1 % (ref 0–1)
BUN SERPL-MCNC: 13 MG/DL (ref 5–25)
CALCIUM SERPL-MCNC: 8.5 MG/DL (ref 8.3–10.1)
CHLORIDE SERPL-SCNC: 97 MMOL/L (ref 100–108)
CO2 SERPL-SCNC: 26 MMOL/L (ref 21–32)
CREAT SERPL-MCNC: 0.89 MG/DL (ref 0.6–1.3)
EOSINOPHIL # BLD AUTO: 0.09 THOUSAND/ΜL (ref 0–0.61)
EOSINOPHIL NFR BLD AUTO: 1 % (ref 0–6)
ERYTHROCYTE [DISTWIDTH] IN BLOOD BY AUTOMATED COUNT: 13.2 % (ref 11.6–15.1)
GFR SERPL CREATININE-BSD FRML MDRD: 61 ML/MIN/1.73SQ M
GLUCOSE SERPL-MCNC: 99 MG/DL (ref 65–140)
HCT VFR BLD AUTO: 45.6 % (ref 34.8–46.1)
HGB BLD-MCNC: 14.7 G/DL (ref 11.5–15.4)
IMM GRANULOCYTES # BLD AUTO: 0.01 THOUSAND/UL (ref 0–0.2)
IMM GRANULOCYTES NFR BLD AUTO: 0 % (ref 0–2)
LYMPHOCYTES # BLD AUTO: 1.41 THOUSANDS/ΜL (ref 0.6–4.47)
LYMPHOCYTES NFR BLD AUTO: 21 % (ref 14–44)
MAGNESIUM SERPL-MCNC: 2.1 MG/DL (ref 1.6–2.6)
MCH RBC QN AUTO: 28.4 PG (ref 26.8–34.3)
MCHC RBC AUTO-ENTMCNC: 32.2 G/DL (ref 31.4–37.4)
MCV RBC AUTO: 88 FL (ref 82–98)
MONOCYTES # BLD AUTO: 0.77 THOUSAND/ΜL (ref 0.17–1.22)
MONOCYTES NFR BLD AUTO: 12 % (ref 4–12)
NEUTROPHILS # BLD AUTO: 4.36 THOUSANDS/ΜL (ref 1.85–7.62)
NEUTS SEG NFR BLD AUTO: 65 % (ref 43–75)
NRBC BLD AUTO-RTO: 0 /100 WBCS
PLATELET # BLD AUTO: 190 THOUSANDS/UL (ref 149–390)
PMV BLD AUTO: 9.9 FL (ref 8.9–12.7)
POTASSIUM SERPL-SCNC: 3.6 MMOL/L (ref 3.5–5.3)
RBC # BLD AUTO: 5.18 MILLION/UL (ref 3.81–5.12)
SARS-COV-2 RNA RESP QL NAA+PROBE: NEGATIVE
SODIUM SERPL-SCNC: 132 MMOL/L (ref 136–145)
WBC # BLD AUTO: 6.69 THOUSAND/UL (ref 4.31–10.16)

## 2020-10-12 PROCEDURE — 87635 SARS-COV-2 COVID-19 AMP PRB: CPT | Performed by: NURSE PRACTITIONER

## 2020-10-12 PROCEDURE — 83735 ASSAY OF MAGNESIUM: CPT | Performed by: PHYSICIAN ASSISTANT

## 2020-10-12 PROCEDURE — 97162 PT EVAL MOD COMPLEX 30 MIN: CPT

## 2020-10-12 PROCEDURE — 97530 THERAPEUTIC ACTIVITIES: CPT

## 2020-10-12 PROCEDURE — 85025 COMPLETE CBC W/AUTO DIFF WBC: CPT | Performed by: PHYSICIAN ASSISTANT

## 2020-10-12 PROCEDURE — 99232 SBSQ HOSP IP/OBS MODERATE 35: CPT | Performed by: FAMILY MEDICINE

## 2020-10-12 PROCEDURE — 80048 BASIC METABOLIC PNL TOTAL CA: CPT | Performed by: PHYSICIAN ASSISTANT

## 2020-10-12 PROCEDURE — 99222 1ST HOSP IP/OBS MODERATE 55: CPT | Performed by: INTERNAL MEDICINE

## 2020-10-12 PROCEDURE — 93005 ELECTROCARDIOGRAM TRACING: CPT

## 2020-10-12 RX ORDER — POTASSIUM CHLORIDE 20 MEQ/1
40 TABLET, EXTENDED RELEASE ORAL ONCE
Status: COMPLETED | OUTPATIENT
Start: 2020-10-12 | End: 2020-10-12

## 2020-10-12 RX ADMIN — NITROGLYCERIN 0.4 MG: 0.4 TABLET SUBLINGUAL at 20:04

## 2020-10-12 RX ADMIN — LEVOTHYROXINE SODIUM 75 MCG: 75 TABLET ORAL at 05:11

## 2020-10-12 RX ADMIN — RIVAROXABAN 20 MG: 10 TABLET, FILM COATED ORAL at 16:09

## 2020-10-12 RX ADMIN — POTASSIUM CHLORIDE 40 MEQ: 1500 TABLET, EXTENDED RELEASE ORAL at 11:17

## 2020-10-12 RX ADMIN — DILTIAZEM HYDROCHLORIDE 30 MG: 30 TABLET, FILM COATED ORAL at 11:17

## 2020-10-12 RX ADMIN — PRAVASTATIN SODIUM 80 MG: 80 TABLET ORAL at 16:09

## 2020-10-12 RX ADMIN — VITAMIN D, TAB 1000IU (100/BT) 2000 UNITS: 25 TAB at 11:13

## 2020-10-12 RX ADMIN — FUROSEMIDE 40 MG: 10 INJECTION, SOLUTION INTRAMUSCULAR; INTRAVENOUS at 11:13

## 2020-10-12 RX ADMIN — DILTIAZEM HYDROCHLORIDE 30 MG: 30 TABLET, FILM COATED ORAL at 17:14

## 2020-10-13 LAB
ANION GAP SERPL CALCULATED.3IONS-SCNC: 7 MMOL/L (ref 4–13)
ATRIAL RATE: 125 BPM
ATRIAL RATE: 150 BPM
ATRIAL RATE: 178 BPM
ATRIAL RATE: 90 BPM
ATRIAL RATE: 97 BPM
BUN SERPL-MCNC: 9 MG/DL (ref 5–25)
CALCIUM SERPL-MCNC: 8.4 MG/DL (ref 8.3–10.1)
CHLORIDE SERPL-SCNC: 96 MMOL/L (ref 100–108)
CO2 SERPL-SCNC: 27 MMOL/L (ref 21–32)
CREAT SERPL-MCNC: 0.89 MG/DL (ref 0.6–1.3)
GFR SERPL CREATININE-BSD FRML MDRD: 61 ML/MIN/1.73SQ M
GLUCOSE SERPL-MCNC: 94 MG/DL (ref 65–140)
POTASSIUM SERPL-SCNC: 4.2 MMOL/L (ref 3.5–5.3)
QRS AXIS: -42 DEGREES
QRS AXIS: -43 DEGREES
QRS AXIS: -43 DEGREES
QRS AXIS: -52 DEGREES
QRS AXIS: -52 DEGREES
QRSD INTERVAL: 116 MS
QRSD INTERVAL: 118 MS
QRSD INTERVAL: 120 MS
QRSD INTERVAL: 126 MS
QRSD INTERVAL: 126 MS
QT INTERVAL: 296 MS
QT INTERVAL: 354 MS
QT INTERVAL: 376 MS
QT INTERVAL: 378 MS
QT INTERVAL: 394 MS
QTC INTERVAL: 449 MS
QTC INTERVAL: 457 MS
QTC INTERVAL: 479 MS
QTC INTERVAL: 487 MS
QTC INTERVAL: 499 MS
SODIUM SERPL-SCNC: 130 MMOL/L (ref 136–145)
T WAVE AXIS: -12 DEGREES
T WAVE AXIS: -23 DEGREES
T WAVE AXIS: -25 DEGREES
T WAVE AXIS: -29 DEGREES
T WAVE AXIS: 28 DEGREES
VENTRICULAR RATE: 100 BPM
VENTRICULAR RATE: 171 BPM
VENTRICULAR RATE: 89 BPM
VENTRICULAR RATE: 89 BPM
VENTRICULAR RATE: 97 BPM

## 2020-10-13 PROCEDURE — 93010 ELECTROCARDIOGRAM REPORT: CPT | Performed by: INTERNAL MEDICINE

## 2020-10-13 PROCEDURE — 99232 SBSQ HOSP IP/OBS MODERATE 35: CPT | Performed by: INTERNAL MEDICINE

## 2020-10-13 PROCEDURE — 97165 OT EVAL LOW COMPLEX 30 MIN: CPT

## 2020-10-13 PROCEDURE — 80048 BASIC METABOLIC PNL TOTAL CA: CPT | Performed by: FAMILY MEDICINE

## 2020-10-13 RX ORDER — DILTIAZEM HYDROCHLORIDE 60 MG/1
60 TABLET, FILM COATED ORAL EVERY 6 HOURS SCHEDULED
Status: DISCONTINUED | OUTPATIENT
Start: 2020-10-13 | End: 2020-10-14

## 2020-10-13 RX ADMIN — DILTIAZEM HYDROCHLORIDE 60 MG: 60 TABLET, FILM COATED ORAL at 18:06

## 2020-10-13 RX ADMIN — VITAMIN D, TAB 1000IU (100/BT) 2000 UNITS: 25 TAB at 09:47

## 2020-10-13 RX ADMIN — FUROSEMIDE 40 MG: 10 INJECTION, SOLUTION INTRAMUSCULAR; INTRAVENOUS at 09:48

## 2020-10-13 RX ADMIN — DILTIAZEM HYDROCHLORIDE 30 MG: 30 TABLET, FILM COATED ORAL at 06:02

## 2020-10-13 RX ADMIN — DILTIAZEM HYDROCHLORIDE 60 MG: 60 TABLET, FILM COATED ORAL at 13:04

## 2020-10-13 RX ADMIN — LEVOTHYROXINE SODIUM 75 MCG: 75 TABLET ORAL at 06:00

## 2020-10-13 RX ADMIN — RIVAROXABAN 20 MG: 10 TABLET, FILM COATED ORAL at 09:47

## 2020-10-13 RX ADMIN — DILTIAZEM HYDROCHLORIDE 60 MG: 60 TABLET, FILM COATED ORAL at 23:52

## 2020-10-13 RX ADMIN — DILTIAZEM HYDROCHLORIDE 30 MG: 30 TABLET, FILM COATED ORAL at 00:05

## 2020-10-13 RX ADMIN — PRAVASTATIN SODIUM 80 MG: 80 TABLET ORAL at 17:29

## 2020-10-14 LAB
ANION GAP SERPL CALCULATED.3IONS-SCNC: 6 MMOL/L (ref 4–13)
ANION GAP SERPL CALCULATED.3IONS-SCNC: 7 MMOL/L (ref 4–13)
BUN SERPL-MCNC: 16 MG/DL (ref 5–25)
BUN SERPL-MCNC: 19 MG/DL (ref 5–25)
CALCIUM SERPL-MCNC: 8.9 MG/DL (ref 8.3–10.1)
CALCIUM SERPL-MCNC: 8.9 MG/DL (ref 8.3–10.1)
CHLORIDE SERPL-SCNC: 94 MMOL/L (ref 100–108)
CHLORIDE SERPL-SCNC: 94 MMOL/L (ref 100–108)
CO2 SERPL-SCNC: 25 MMOL/L (ref 21–32)
CO2 SERPL-SCNC: 29 MMOL/L (ref 21–32)
CREAT SERPL-MCNC: 0.85 MG/DL (ref 0.6–1.3)
CREAT SERPL-MCNC: 0.98 MG/DL (ref 0.6–1.3)
GFR SERPL CREATININE-BSD FRML MDRD: 54 ML/MIN/1.73SQ M
GFR SERPL CREATININE-BSD FRML MDRD: 64 ML/MIN/1.73SQ M
GLUCOSE SERPL-MCNC: 116 MG/DL (ref 65–140)
GLUCOSE SERPL-MCNC: 99 MG/DL (ref 65–140)
OSMOLALITY UR: 224 MMOL/KG
POTASSIUM SERPL-SCNC: 4.2 MMOL/L (ref 3.5–5.3)
POTASSIUM SERPL-SCNC: 4.9 MMOL/L (ref 3.5–5.3)
SODIUM 24H UR-SCNC: 19 MOL/L
SODIUM SERPL-SCNC: 125 MMOL/L (ref 136–145)
SODIUM SERPL-SCNC: 130 MMOL/L (ref 136–145)

## 2020-10-14 PROCEDURE — 99232 SBSQ HOSP IP/OBS MODERATE 35: CPT | Performed by: INTERNAL MEDICINE

## 2020-10-14 PROCEDURE — 80048 BASIC METABOLIC PNL TOTAL CA: CPT | Performed by: NURSE PRACTITIONER

## 2020-10-14 PROCEDURE — 97530 THERAPEUTIC ACTIVITIES: CPT

## 2020-10-14 PROCEDURE — 80048 BASIC METABOLIC PNL TOTAL CA: CPT | Performed by: INTERNAL MEDICINE

## 2020-10-14 PROCEDURE — 99222 1ST HOSP IP/OBS MODERATE 55: CPT | Performed by: INTERNAL MEDICINE

## 2020-10-14 PROCEDURE — 83935 ASSAY OF URINE OSMOLALITY: CPT | Performed by: NURSE PRACTITIONER

## 2020-10-14 PROCEDURE — 84300 ASSAY OF URINE SODIUM: CPT | Performed by: NURSE PRACTITIONER

## 2020-10-14 RX ORDER — SODIUM CHLORIDE 1000 MG
2 TABLET, SOLUBLE MISCELLANEOUS ONCE
Status: COMPLETED | OUTPATIENT
Start: 2020-10-14 | End: 2020-10-14

## 2020-10-14 RX ADMIN — LEVOTHYROXINE SODIUM 75 MCG: 75 TABLET ORAL at 05:18

## 2020-10-14 RX ADMIN — PRAVASTATIN SODIUM 80 MG: 80 TABLET ORAL at 17:32

## 2020-10-14 RX ADMIN — Medication 2 G: at 14:03

## 2020-10-14 RX ADMIN — VITAMIN D, TAB 1000IU (100/BT) 2000 UNITS: 25 TAB at 09:09

## 2020-10-14 RX ADMIN — RIVAROXABAN 20 MG: 10 TABLET, FILM COATED ORAL at 09:09

## 2020-10-14 RX ADMIN — DILTIAZEM HYDROCHLORIDE 60 MG: 60 TABLET, FILM COATED ORAL at 05:18

## 2020-10-14 RX ADMIN — DILTIAZEM HYDROCHLORIDE 300 MG: 180 CAPSULE, COATED, EXTENDED RELEASE ORAL at 10:32

## 2020-10-15 VITALS
WEIGHT: 132.9 LBS | HEART RATE: 88 BPM | BODY MASS INDEX: 30.76 KG/M2 | TEMPERATURE: 97.7 F | RESPIRATION RATE: 18 BRPM | HEIGHT: 55 IN | OXYGEN SATURATION: 95 % | SYSTOLIC BLOOD PRESSURE: 152 MMHG | DIASTOLIC BLOOD PRESSURE: 81 MMHG

## 2020-10-15 DIAGNOSIS — E03.9 HYPOTHYROIDISM, UNSPECIFIED TYPE: ICD-10-CM

## 2020-10-15 LAB
ANION GAP SERPL CALCULATED.3IONS-SCNC: 7 MMOL/L (ref 4–13)
BUN SERPL-MCNC: 14 MG/DL (ref 5–25)
CALCIUM SERPL-MCNC: 8.6 MG/DL (ref 8.3–10.1)
CHLORIDE SERPL-SCNC: 99 MMOL/L (ref 100–108)
CO2 SERPL-SCNC: 27 MMOL/L (ref 21–32)
CORTIS AM PEAK SERPL-MCNC: 8.5 UG/DL (ref 4.2–22.4)
CREAT SERPL-MCNC: 0.85 MG/DL (ref 0.6–1.3)
GFR SERPL CREATININE-BSD FRML MDRD: 64 ML/MIN/1.73SQ M
GLUCOSE SERPL-MCNC: 90 MG/DL (ref 65–140)
OSMOLALITY UR/SERPL-RTO: 285 MMOL/KG (ref 282–298)
POTASSIUM SERPL-SCNC: 3.9 MMOL/L (ref 3.5–5.3)
SODIUM SERPL-SCNC: 133 MMOL/L (ref 136–145)
T4 FREE SERPL-MCNC: 1.27 NG/DL (ref 0.76–1.46)
TSH SERPL DL<=0.05 MIU/L-ACNC: 6.95 UIU/ML (ref 0.36–3.74)
URATE SERPL-MCNC: 3.5 MG/DL (ref 2–6.8)

## 2020-10-15 PROCEDURE — 99232 SBSQ HOSP IP/OBS MODERATE 35: CPT | Performed by: INTERNAL MEDICINE

## 2020-10-15 PROCEDURE — 80048 BASIC METABOLIC PNL TOTAL CA: CPT | Performed by: NURSE PRACTITIONER

## 2020-10-15 PROCEDURE — 84443 ASSAY THYROID STIM HORMONE: CPT | Performed by: NURSE PRACTITIONER

## 2020-10-15 PROCEDURE — 99239 HOSP IP/OBS DSCHRG MGMT >30: CPT | Performed by: INTERNAL MEDICINE

## 2020-10-15 PROCEDURE — 84439 ASSAY OF FREE THYROXINE: CPT | Performed by: NURSE PRACTITIONER

## 2020-10-15 PROCEDURE — 83930 ASSAY OF BLOOD OSMOLALITY: CPT | Performed by: NURSE PRACTITIONER

## 2020-10-15 PROCEDURE — 82533 TOTAL CORTISOL: CPT | Performed by: INTERNAL MEDICINE

## 2020-10-15 PROCEDURE — 84550 ASSAY OF BLOOD/URIC ACID: CPT | Performed by: INTERNAL MEDICINE

## 2020-10-15 RX ORDER — LEVOTHYROXINE SODIUM 0.07 MG/1
TABLET ORAL
Qty: 90 TABLET | Refills: 3 | Status: SHIPPED | OUTPATIENT
Start: 2020-10-15 | End: 2021-10-13

## 2020-10-15 RX ORDER — DILTIAZEM HYDROCHLORIDE 300 MG/1
300 CAPSULE, COATED, EXTENDED RELEASE ORAL DAILY
Qty: 30 CAPSULE | Refills: 0 | Status: SHIPPED | OUTPATIENT
Start: 2020-10-16 | End: 2020-11-24 | Stop reason: HOSPADM

## 2020-10-15 RX ADMIN — DILTIAZEM HYDROCHLORIDE 300 MG: 180 CAPSULE, COATED, EXTENDED RELEASE ORAL at 09:30

## 2020-10-15 RX ADMIN — LEVOTHYROXINE SODIUM 75 MCG: 75 TABLET ORAL at 06:24

## 2020-10-15 RX ADMIN — RIVAROXABAN 20 MG: 10 TABLET, FILM COATED ORAL at 09:30

## 2020-10-15 RX ADMIN — VITAMIN D, TAB 1000IU (100/BT) 2000 UNITS: 25 TAB at 09:30

## 2020-10-16 ENCOUNTER — TELEPHONE (OUTPATIENT)
Dept: NEPHROLOGY | Facility: CLINIC | Age: 80
End: 2020-10-16

## 2020-10-16 ENCOUNTER — TRANSITIONAL CARE MANAGEMENT (OUTPATIENT)
Dept: FAMILY MEDICINE CLINIC | Facility: CLINIC | Age: 80
End: 2020-10-16

## 2020-10-16 ENCOUNTER — TELEPHONE (OUTPATIENT)
Dept: FAMILY MEDICINE CLINIC | Facility: CLINIC | Age: 80
End: 2020-10-16

## 2020-10-17 LAB
BACTERIA BLD CULT: NORMAL
BACTERIA BLD CULT: NORMAL

## 2020-10-19 ENCOUNTER — TRANSCRIBE ORDERS (OUTPATIENT)
Dept: ADMINISTRATIVE | Facility: HOSPITAL | Age: 80
End: 2020-10-19

## 2020-10-19 ENCOUNTER — APPOINTMENT (OUTPATIENT)
Dept: LAB | Facility: HOSPITAL | Age: 80
End: 2020-10-19
Attending: INTERNAL MEDICINE
Payer: COMMERCIAL

## 2020-10-19 DIAGNOSIS — E87.1 HYPONATREMIA: ICD-10-CM

## 2020-10-19 LAB
ANION GAP SERPL CALCULATED.3IONS-SCNC: 5 MMOL/L (ref 4–13)
BUN SERPL-MCNC: 18 MG/DL (ref 5–25)
CALCIUM SERPL-MCNC: 8.6 MG/DL (ref 8.3–10.1)
CHLORIDE SERPL-SCNC: 102 MMOL/L (ref 100–108)
CO2 SERPL-SCNC: 30 MMOL/L (ref 21–32)
CREAT SERPL-MCNC: 0.98 MG/DL (ref 0.6–1.3)
GFR SERPL CREATININE-BSD FRML MDRD: 54 ML/MIN/1.73SQ M
GLUCOSE SERPL-MCNC: 82 MG/DL (ref 65–140)
POTASSIUM SERPL-SCNC: 4.4 MMOL/L (ref 3.5–5.3)
SODIUM SERPL-SCNC: 137 MMOL/L (ref 136–145)

## 2020-10-19 PROCEDURE — 36415 COLL VENOUS BLD VENIPUNCTURE: CPT

## 2020-10-19 PROCEDURE — 80048 BASIC METABOLIC PNL TOTAL CA: CPT

## 2020-10-26 ENCOUNTER — OFFICE VISIT (OUTPATIENT)
Dept: FAMILY MEDICINE CLINIC | Facility: CLINIC | Age: 80
End: 2020-10-26
Payer: COMMERCIAL

## 2020-10-26 VITALS
BODY MASS INDEX: 31.24 KG/M2 | HEART RATE: 84 BPM | DIASTOLIC BLOOD PRESSURE: 90 MMHG | WEIGHT: 135 LBS | TEMPERATURE: 97.8 F | HEIGHT: 55 IN | SYSTOLIC BLOOD PRESSURE: 166 MMHG | RESPIRATION RATE: 18 BRPM

## 2020-10-26 DIAGNOSIS — F41.9 ANXIETY: ICD-10-CM

## 2020-10-26 DIAGNOSIS — I48.91 ATRIAL FIBRILLATION, UNSPECIFIED TYPE (HCC): Primary | ICD-10-CM

## 2020-10-26 DIAGNOSIS — E03.9 HYPOTHYROIDISM, UNSPECIFIED TYPE: ICD-10-CM

## 2020-10-26 DIAGNOSIS — I10 ESSENTIAL HYPERTENSION: ICD-10-CM

## 2020-10-26 DIAGNOSIS — E78.5 DYSLIPIDEMIA: ICD-10-CM

## 2020-10-26 PROCEDURE — 1111F DSCHRG MED/CURRENT MED MERGE: CPT | Performed by: NURSE PRACTITIONER

## 2020-10-26 PROCEDURE — 99495 TRANSJ CARE MGMT MOD F2F 14D: CPT | Performed by: NURSE PRACTITIONER

## 2020-10-27 ENCOUNTER — OFFICE VISIT (OUTPATIENT)
Dept: CARDIOLOGY CLINIC | Facility: CLINIC | Age: 80
End: 2020-10-27
Payer: COMMERCIAL

## 2020-10-27 VITALS
HEIGHT: 55 IN | WEIGHT: 133 LBS | DIASTOLIC BLOOD PRESSURE: 78 MMHG | TEMPERATURE: 98 F | OXYGEN SATURATION: 99 % | HEART RATE: 78 BPM | SYSTOLIC BLOOD PRESSURE: 120 MMHG | BODY MASS INDEX: 30.78 KG/M2

## 2020-10-27 DIAGNOSIS — I48.3 TYPICAL ATRIAL FLUTTER (HCC): Primary | ICD-10-CM

## 2020-10-27 DIAGNOSIS — I10 ESSENTIAL HYPERTENSION: ICD-10-CM

## 2020-10-27 DIAGNOSIS — I50.33 ACUTE ON CHRONIC DIASTOLIC CHF (CONGESTIVE HEART FAILURE) (HCC): ICD-10-CM

## 2020-10-27 PROCEDURE — 93000 ELECTROCARDIOGRAM COMPLETE: CPT | Performed by: INTERNAL MEDICINE

## 2020-10-27 PROCEDURE — 99215 OFFICE O/P EST HI 40 MIN: CPT | Performed by: INTERNAL MEDICINE

## 2020-10-27 PROCEDURE — 1111F DSCHRG MED/CURRENT MED MERGE: CPT | Performed by: INTERNAL MEDICINE

## 2020-10-27 PROCEDURE — 3078F DIAST BP <80 MM HG: CPT | Performed by: INTERNAL MEDICINE

## 2020-10-27 PROCEDURE — 3074F SYST BP LT 130 MM HG: CPT | Performed by: INTERNAL MEDICINE

## 2020-11-02 ENCOUNTER — OFFICE VISIT (OUTPATIENT)
Dept: NEPHROLOGY | Facility: CLINIC | Age: 80
End: 2020-11-02
Payer: COMMERCIAL

## 2020-11-02 ENCOUNTER — TELEPHONE (OUTPATIENT)
Dept: CARDIOLOGY CLINIC | Facility: CLINIC | Age: 80
End: 2020-11-02

## 2020-11-02 VITALS — BODY MASS INDEX: 31.7 KG/M2 | HEIGHT: 55 IN | WEIGHT: 137 LBS

## 2020-11-02 DIAGNOSIS — R60.0 LOCALIZED EDEMA: ICD-10-CM

## 2020-11-02 DIAGNOSIS — E78.5 DYSLIPIDEMIA: ICD-10-CM

## 2020-11-02 DIAGNOSIS — E87.1 HYPONATREMIA: Primary | ICD-10-CM

## 2020-11-02 DIAGNOSIS — K22.4 ESOPHAGEAL DYSMOTILITY: ICD-10-CM

## 2020-11-02 DIAGNOSIS — I10 ESSENTIAL HYPERTENSION: ICD-10-CM

## 2020-11-02 DIAGNOSIS — I50.33 ACUTE ON CHRONIC DIASTOLIC CHF (CONGESTIVE HEART FAILURE) (HCC): ICD-10-CM

## 2020-11-02 PROCEDURE — 99214 OFFICE O/P EST MOD 30 MIN: CPT | Performed by: NURSE PRACTITIONER

## 2020-11-02 PROCEDURE — 1111F DSCHRG MED/CURRENT MED MERGE: CPT | Performed by: NURSE PRACTITIONER

## 2020-11-04 ENCOUNTER — TELEPHONE (OUTPATIENT)
Dept: FAMILY MEDICINE CLINIC | Facility: CLINIC | Age: 80
End: 2020-11-04

## 2020-11-21 ENCOUNTER — APPOINTMENT (EMERGENCY)
Dept: RADIOLOGY | Facility: HOSPITAL | Age: 80
End: 2020-11-21
Payer: COMMERCIAL

## 2020-11-21 ENCOUNTER — HOSPITAL ENCOUNTER (OUTPATIENT)
Facility: HOSPITAL | Age: 80
Setting detail: OBSERVATION
Discharge: HOME | End: 2020-11-24
Attending: EMERGENCY MEDICINE | Admitting: EMERGENCY MEDICINE
Payer: COMMERCIAL

## 2020-11-21 DIAGNOSIS — R09.89 PULMONARY VASCULAR CONGESTION: ICD-10-CM

## 2020-11-21 DIAGNOSIS — I10 ESSENTIAL HYPERTENSION: ICD-10-CM

## 2020-11-21 DIAGNOSIS — R07.9 CHEST PAIN: ICD-10-CM

## 2020-11-21 DIAGNOSIS — I16.0 HYPERTENSIVE URGENCY: ICD-10-CM

## 2020-11-21 DIAGNOSIS — I65.21 STENOSIS OF RIGHT CAROTID ARTERY: ICD-10-CM

## 2020-11-21 DIAGNOSIS — I48.91 ATRIAL FIBRILLATION WITH RAPID VENTRICULAR RESPONSE (HCC): Primary | ICD-10-CM

## 2020-11-21 DIAGNOSIS — Z91.14 NON COMPLIANCE W MEDICATION REGIMEN: ICD-10-CM

## 2020-11-21 LAB
ALBUMIN SERPL BCP-MCNC: 3.5 G/DL (ref 3.5–5)
ALP SERPL-CCNC: 81 U/L (ref 46–116)
ALT SERPL W P-5'-P-CCNC: 30 U/L (ref 12–78)
ANION GAP SERPL CALCULATED.3IONS-SCNC: 5 MMOL/L (ref 4–13)
APTT PPP: 49 SECONDS (ref 23–37)
AST SERPL W P-5'-P-CCNC: 41 U/L (ref 5–45)
BASOPHILS # BLD AUTO: 0.05 THOUSANDS/ΜL (ref 0–0.1)
BASOPHILS NFR BLD AUTO: 1 % (ref 0–1)
BILIRUB SERPL-MCNC: 0.8 MG/DL (ref 0.2–1)
BUN SERPL-MCNC: 20 MG/DL (ref 5–25)
CALCIUM SERPL-MCNC: 8.9 MG/DL (ref 8.3–10.1)
CHLORIDE SERPL-SCNC: 101 MMOL/L (ref 100–108)
CO2 SERPL-SCNC: 27 MMOL/L (ref 21–32)
CREAT SERPL-MCNC: 1.11 MG/DL (ref 0.6–1.3)
EOSINOPHIL # BLD AUTO: 0.12 THOUSAND/ΜL (ref 0–0.61)
EOSINOPHIL NFR BLD AUTO: 2 % (ref 0–6)
ERYTHROCYTE [DISTWIDTH] IN BLOOD BY AUTOMATED COUNT: 13.3 % (ref 11.6–15.1)
FLUAV RNA RESP QL NAA+PROBE: NEGATIVE
FLUBV RNA RESP QL NAA+PROBE: NEGATIVE
GFR SERPL CREATININE-BSD FRML MDRD: 47 ML/MIN/1.73SQ M
GLUCOSE SERPL-MCNC: 127 MG/DL (ref 65–140)
HCT VFR BLD AUTO: 46 % (ref 34.8–46.1)
HGB BLD-MCNC: 14.7 G/DL (ref 11.5–15.4)
IMM GRANULOCYTES # BLD AUTO: 0.02 THOUSAND/UL (ref 0–0.2)
IMM GRANULOCYTES NFR BLD AUTO: 0 % (ref 0–2)
INR PPP: 2.79 (ref 0.84–1.19)
LACTATE SERPL-SCNC: 1.3 MMOL/L (ref 0.5–2)
LYMPHOCYTES # BLD AUTO: 1.86 THOUSANDS/ΜL (ref 0.6–4.47)
LYMPHOCYTES NFR BLD AUTO: 29 % (ref 14–44)
MAGNESIUM SERPL-MCNC: 2.1 MG/DL (ref 1.6–2.6)
MCH RBC QN AUTO: 27.9 PG (ref 26.8–34.3)
MCHC RBC AUTO-ENTMCNC: 32 G/DL (ref 31.4–37.4)
MCV RBC AUTO: 88 FL (ref 82–98)
MONOCYTES # BLD AUTO: 0.69 THOUSAND/ΜL (ref 0.17–1.22)
MONOCYTES NFR BLD AUTO: 11 % (ref 4–12)
NEUTROPHILS # BLD AUTO: 3.68 THOUSANDS/ΜL (ref 1.85–7.62)
NEUTS SEG NFR BLD AUTO: 57 % (ref 43–75)
NRBC BLD AUTO-RTO: 0 /100 WBCS
NT-PROBNP SERPL-MCNC: 1361 PG/ML
PLATELET # BLD AUTO: 208 THOUSANDS/UL (ref 149–390)
PMV BLD AUTO: 10 FL (ref 8.9–12.7)
POTASSIUM SERPL-SCNC: 5.4 MMOL/L (ref 3.5–5.3)
PROT SERPL-MCNC: 7.7 G/DL (ref 6.4–8.2)
PROTHROMBIN TIME: 29.1 SECONDS (ref 11.6–14.5)
RBC # BLD AUTO: 5.26 MILLION/UL (ref 3.81–5.12)
RSV RNA RESP QL NAA+PROBE: NEGATIVE
SARS-COV-2 RNA RESP QL NAA+PROBE: NEGATIVE
SODIUM SERPL-SCNC: 133 MMOL/L (ref 136–145)
TROPONIN I SERPL-MCNC: <0.02 NG/ML
TSH SERPL DL<=0.05 MIU/L-ACNC: 3 UIU/ML (ref 0.36–3.74)
WBC # BLD AUTO: 6.42 THOUSAND/UL (ref 4.31–10.16)

## 2020-11-21 PROCEDURE — 70496 CT ANGIOGRAPHY HEAD: CPT

## 2020-11-21 PROCEDURE — 99284 EMERGENCY DEPT VISIT MOD MDM: CPT | Performed by: EMERGENCY MEDICINE

## 2020-11-21 PROCEDURE — 93005 ELECTROCARDIOGRAM TRACING: CPT

## 2020-11-21 PROCEDURE — 99285 EMERGENCY DEPT VISIT HI MDM: CPT

## 2020-11-21 PROCEDURE — 96374 THER/PROPH/DIAG INJ IV PUSH: CPT

## 2020-11-21 PROCEDURE — 96361 HYDRATE IV INFUSION ADD-ON: CPT

## 2020-11-21 PROCEDURE — 80053 COMPREHEN METABOLIC PANEL: CPT | Performed by: EMERGENCY MEDICINE

## 2020-11-21 PROCEDURE — 85730 THROMBOPLASTIN TIME PARTIAL: CPT | Performed by: EMERGENCY MEDICINE

## 2020-11-21 PROCEDURE — 87040 BLOOD CULTURE FOR BACTERIA: CPT | Performed by: EMERGENCY MEDICINE

## 2020-11-21 PROCEDURE — 85610 PROTHROMBIN TIME: CPT | Performed by: EMERGENCY MEDICINE

## 2020-11-21 PROCEDURE — 84443 ASSAY THYROID STIM HORMONE: CPT | Performed by: EMERGENCY MEDICINE

## 2020-11-21 PROCEDURE — 83735 ASSAY OF MAGNESIUM: CPT | Performed by: EMERGENCY MEDICINE

## 2020-11-21 PROCEDURE — 85025 COMPLETE CBC W/AUTO DIFF WBC: CPT | Performed by: EMERGENCY MEDICINE

## 2020-11-21 PROCEDURE — G1004 CDSM NDSC: HCPCS

## 2020-11-21 PROCEDURE — 0241U HB NFCT DS VIR RESP RNA 4 TRGT: CPT | Performed by: EMERGENCY MEDICINE

## 2020-11-21 PROCEDURE — 84145 PROCALCITONIN (PCT): CPT | Performed by: EMERGENCY MEDICINE

## 2020-11-21 PROCEDURE — 83880 ASSAY OF NATRIURETIC PEPTIDE: CPT | Performed by: EMERGENCY MEDICINE

## 2020-11-21 PROCEDURE — 36415 COLL VENOUS BLD VENIPUNCTURE: CPT | Performed by: EMERGENCY MEDICINE

## 2020-11-21 PROCEDURE — 83605 ASSAY OF LACTIC ACID: CPT | Performed by: EMERGENCY MEDICINE

## 2020-11-21 PROCEDURE — 71045 X-RAY EXAM CHEST 1 VIEW: CPT

## 2020-11-21 PROCEDURE — 84484 ASSAY OF TROPONIN QUANT: CPT | Performed by: EMERGENCY MEDICINE

## 2020-11-21 PROCEDURE — 70498 CT ANGIOGRAPHY NECK: CPT

## 2020-11-21 RX ORDER — METOPROLOL TARTRATE 50 MG/1
50 TABLET, FILM COATED ORAL ONCE
Status: DISCONTINUED | OUTPATIENT
Start: 2020-11-21 | End: 2020-11-22

## 2020-11-21 RX ORDER — METOPROLOL TARTRATE 5 MG/5ML
5 INJECTION INTRAVENOUS ONCE
Status: COMPLETED | OUTPATIENT
Start: 2020-11-21 | End: 2020-11-21

## 2020-11-21 RX ADMIN — METOROPROLOL TARTRATE 5 MG: 5 INJECTION, SOLUTION INTRAVENOUS at 22:40

## 2020-11-21 RX ADMIN — SODIUM CHLORIDE 1000 ML: 0.9 INJECTION, SOLUTION INTRAVENOUS at 22:30

## 2020-11-21 RX ADMIN — IOHEXOL 85 ML: 350 INJECTION, SOLUTION INTRAVENOUS at 23:58

## 2020-11-22 ENCOUNTER — APPOINTMENT (OUTPATIENT)
Dept: RADIOLOGY | Facility: HOSPITAL | Age: 80
End: 2020-11-22
Payer: COMMERCIAL

## 2020-11-22 PROBLEM — I16.0 HYPERTENSIVE URGENCY: Status: ACTIVE | Noted: 2017-02-17

## 2020-11-22 LAB
ANION GAP SERPL CALCULATED.3IONS-SCNC: 9 MMOL/L (ref 4–13)
BACTERIA UR QL AUTO: NORMAL /HPF
BASOPHILS # BLD AUTO: 0.06 THOUSANDS/ΜL (ref 0–0.1)
BASOPHILS NFR BLD AUTO: 1 % (ref 0–1)
BILIRUB UR QL STRIP: NEGATIVE
BUN SERPL-MCNC: 16 MG/DL (ref 5–25)
CALCIUM SERPL-MCNC: 8.7 MG/DL (ref 8.3–10.1)
CHLORIDE SERPL-SCNC: 103 MMOL/L (ref 100–108)
CLARITY UR: CLEAR
CO2 SERPL-SCNC: 24 MMOL/L (ref 21–32)
COLOR UR: ABNORMAL
CREAT SERPL-MCNC: 0.9 MG/DL (ref 0.6–1.3)
EOSINOPHIL # BLD AUTO: 0.07 THOUSAND/ΜL (ref 0–0.61)
EOSINOPHIL NFR BLD AUTO: 1 % (ref 0–6)
ERYTHROCYTE [DISTWIDTH] IN BLOOD BY AUTOMATED COUNT: 13.3 % (ref 11.6–15.1)
GFR SERPL CREATININE-BSD FRML MDRD: 60 ML/MIN/1.73SQ M
GLUCOSE SERPL-MCNC: 101 MG/DL (ref 65–140)
GLUCOSE UR STRIP-MCNC: NEGATIVE MG/DL
HCT VFR BLD AUTO: 47.7 % (ref 34.8–46.1)
HGB BLD-MCNC: 15.3 G/DL (ref 11.5–15.4)
HGB UR QL STRIP.AUTO: ABNORMAL
IMM GRANULOCYTES # BLD AUTO: 0.02 THOUSAND/UL (ref 0–0.2)
IMM GRANULOCYTES NFR BLD AUTO: 0 % (ref 0–2)
KETONES UR STRIP-MCNC: NEGATIVE MG/DL
LEUKOCYTE ESTERASE UR QL STRIP: NEGATIVE
LYMPHOCYTES # BLD AUTO: 1.49 THOUSANDS/ΜL (ref 0.6–4.47)
LYMPHOCYTES NFR BLD AUTO: 20 % (ref 14–44)
MAGNESIUM SERPL-MCNC: 2.2 MG/DL (ref 1.6–2.6)
MCH RBC QN AUTO: 28.3 PG (ref 26.8–34.3)
MCHC RBC AUTO-ENTMCNC: 32.1 G/DL (ref 31.4–37.4)
MCV RBC AUTO: 88 FL (ref 82–98)
MONOCYTES # BLD AUTO: 0.66 THOUSAND/ΜL (ref 0.17–1.22)
MONOCYTES NFR BLD AUTO: 9 % (ref 4–12)
NEUTROPHILS # BLD AUTO: 5.25 THOUSANDS/ΜL (ref 1.85–7.62)
NEUTS SEG NFR BLD AUTO: 69 % (ref 43–75)
NITRITE UR QL STRIP: NEGATIVE
NON-SQ EPI CELLS URNS QL MICRO: NORMAL /HPF
NRBC BLD AUTO-RTO: 0 /100 WBCS
PH UR STRIP.AUTO: 6.5 [PH]
PHOSPHATE SERPL-MCNC: 3.3 MG/DL (ref 2.3–4.1)
PLATELET # BLD AUTO: 213 THOUSANDS/UL (ref 149–390)
PMV BLD AUTO: 9.7 FL (ref 8.9–12.7)
POTASSIUM SERPL-SCNC: 4.3 MMOL/L (ref 3.5–5.3)
PROCALCITONIN SERPL-MCNC: <0.05 NG/ML
PROT UR STRIP-MCNC: NEGATIVE MG/DL
RBC # BLD AUTO: 5.4 MILLION/UL (ref 3.81–5.12)
RBC #/AREA URNS AUTO: NORMAL /HPF
SODIUM SERPL-SCNC: 136 MMOL/L (ref 136–145)
SP GR UR STRIP.AUTO: <=1.005 (ref 1–1.03)
TROPONIN I SERPL-MCNC: <0.02 NG/ML
TROPONIN I SERPL-MCNC: <0.02 NG/ML
UROBILINOGEN UR QL STRIP.AUTO: 0.2 E.U./DL
WBC # BLD AUTO: 7.55 THOUSAND/UL (ref 4.31–10.16)
WBC #/AREA URNS AUTO: NORMAL /HPF

## 2020-11-22 PROCEDURE — 93005 ELECTROCARDIOGRAM TRACING: CPT

## 2020-11-22 PROCEDURE — 97530 THERAPEUTIC ACTIVITIES: CPT

## 2020-11-22 PROCEDURE — 85025 COMPLETE CBC W/AUTO DIFF WBC: CPT | Performed by: PHYSICIAN ASSISTANT

## 2020-11-22 PROCEDURE — 84484 ASSAY OF TROPONIN QUANT: CPT | Performed by: FAMILY MEDICINE

## 2020-11-22 PROCEDURE — 84100 ASSAY OF PHOSPHORUS: CPT | Performed by: PHYSICIAN ASSISTANT

## 2020-11-22 PROCEDURE — 97162 PT EVAL MOD COMPLEX 30 MIN: CPT

## 2020-11-22 PROCEDURE — 71045 X-RAY EXAM CHEST 1 VIEW: CPT

## 2020-11-22 PROCEDURE — 74174 CTA ABD&PLVS W/CONTRAST: CPT

## 2020-11-22 PROCEDURE — 99215 OFFICE O/P EST HI 40 MIN: CPT | Performed by: INTERNAL MEDICINE

## 2020-11-22 PROCEDURE — 83735 ASSAY OF MAGNESIUM: CPT | Performed by: PHYSICIAN ASSISTANT

## 2020-11-22 PROCEDURE — 94640 AIRWAY INHALATION TREATMENT: CPT

## 2020-11-22 PROCEDURE — 94760 N-INVAS EAR/PLS OXIMETRY 1: CPT

## 2020-11-22 PROCEDURE — 81001 URINALYSIS AUTO W/SCOPE: CPT | Performed by: EMERGENCY MEDICINE

## 2020-11-22 PROCEDURE — G1004 CDSM NDSC: HCPCS

## 2020-11-22 PROCEDURE — 71275 CT ANGIOGRAPHY CHEST: CPT

## 2020-11-22 PROCEDURE — 94664 DEMO&/EVAL PT USE INHALER: CPT

## 2020-11-22 PROCEDURE — 80048 BASIC METABOLIC PNL TOTAL CA: CPT | Performed by: PHYSICIAN ASSISTANT

## 2020-11-22 PROCEDURE — 96361 HYDRATE IV INFUSION ADD-ON: CPT

## 2020-11-22 PROCEDURE — 99219 PR INITIAL OBSERVATION CARE/DAY 50 MINUTES: CPT | Performed by: FAMILY MEDICINE

## 2020-11-22 RX ORDER — METOPROLOL TARTRATE 5 MG/5ML
5 INJECTION INTRAVENOUS ONCE
Status: DISCONTINUED | OUTPATIENT
Start: 2020-11-22 | End: 2020-11-22

## 2020-11-22 RX ORDER — FUROSEMIDE 20 MG/1
20 TABLET ORAL 3 TIMES WEEKLY
COMMUNITY
End: 2020-12-23 | Stop reason: SDUPTHER

## 2020-11-22 RX ORDER — LEVALBUTEROL 1.25 MG/.5ML
1.25 SOLUTION, CONCENTRATE RESPIRATORY (INHALATION)
Status: DISCONTINUED | OUTPATIENT
Start: 2020-11-22 | End: 2020-11-22

## 2020-11-22 RX ORDER — MAGNESIUM HYDROXIDE/ALUMINUM HYDROXICE/SIMETHICONE 120; 1200; 1200 MG/30ML; MG/30ML; MG/30ML
30 SUSPENSION ORAL EVERY 4 HOURS PRN
Status: DISCONTINUED | OUTPATIENT
Start: 2020-11-22 | End: 2020-11-24 | Stop reason: HOSPADM

## 2020-11-22 RX ORDER — FUROSEMIDE 10 MG/ML
20 INJECTION INTRAMUSCULAR; INTRAVENOUS ONCE
Status: COMPLETED | OUTPATIENT
Start: 2020-11-22 | End: 2020-11-22

## 2020-11-22 RX ORDER — METOPROLOL TARTRATE 50 MG/1
50 TABLET, FILM COATED ORAL EVERY 12 HOURS SCHEDULED
Status: DISCONTINUED | OUTPATIENT
Start: 2020-11-22 | End: 2020-11-22

## 2020-11-22 RX ORDER — PRAVASTATIN SODIUM 80 MG/1
80 TABLET ORAL
Status: DISCONTINUED | OUTPATIENT
Start: 2020-11-22 | End: 2020-11-24 | Stop reason: HOSPADM

## 2020-11-22 RX ORDER — METOPROLOL TARTRATE 50 MG/1
50 TABLET, FILM COATED ORAL EVERY 12 HOURS SCHEDULED
COMMUNITY
End: 2020-11-24 | Stop reason: HOSPADM

## 2020-11-22 RX ORDER — FLUTICASONE PROPIONATE 50 MCG
2 SPRAY, SUSPENSION (ML) NASAL DAILY
Status: DISCONTINUED | OUTPATIENT
Start: 2020-11-22 | End: 2020-11-23

## 2020-11-22 RX ORDER — ACETAMINOPHEN 325 MG/1
650 TABLET ORAL EVERY 6 HOURS PRN
Status: DISCONTINUED | OUTPATIENT
Start: 2020-11-22 | End: 2020-11-24 | Stop reason: HOSPADM

## 2020-11-22 RX ORDER — LEVOTHYROXINE SODIUM 0.07 MG/1
75 TABLET ORAL
Status: DISCONTINUED | OUTPATIENT
Start: 2020-11-22 | End: 2020-11-24 | Stop reason: HOSPADM

## 2020-11-22 RX ORDER — ALPRAZOLAM 0.25 MG/1
0.25 TABLET ORAL ONCE
Status: COMPLETED | OUTPATIENT
Start: 2020-11-22 | End: 2020-11-22

## 2020-11-22 RX ORDER — METOPROLOL TARTRATE 50 MG/1
50 TABLET, FILM COATED ORAL EVERY 12 HOURS SCHEDULED
Status: DISCONTINUED | OUTPATIENT
Start: 2020-11-22 | End: 2020-11-22 | Stop reason: SDUPTHER

## 2020-11-22 RX ORDER — METOPROLOL TARTRATE 100 MG/1
100 TABLET ORAL EVERY 12 HOURS SCHEDULED
Status: DISCONTINUED | OUTPATIENT
Start: 2020-11-22 | End: 2020-11-23

## 2020-11-22 RX ORDER — ASPIRIN 81 MG/1
324 TABLET, CHEWABLE ORAL ONCE
Status: COMPLETED | OUTPATIENT
Start: 2020-11-22 | End: 2020-11-22

## 2020-11-22 RX ORDER — LEVALBUTEROL 1.25 MG/.5ML
1.25 SOLUTION, CONCENTRATE RESPIRATORY (INHALATION) EVERY 6 HOURS PRN
Status: DISCONTINUED | OUTPATIENT
Start: 2020-11-22 | End: 2020-11-24 | Stop reason: HOSPADM

## 2020-11-22 RX ORDER — MELATONIN
2000 DAILY
Status: DISCONTINUED | OUTPATIENT
Start: 2020-11-22 | End: 2020-11-24 | Stop reason: HOSPADM

## 2020-11-22 RX ADMIN — LEVALBUTEROL HYDROCHLORIDE 1.25 MG: 1.25 SOLUTION, CONCENTRATE RESPIRATORY (INHALATION) at 15:41

## 2020-11-22 RX ADMIN — METOPROLOL TARTRATE 100 MG: 100 TABLET, FILM COATED ORAL at 21:23

## 2020-11-22 RX ADMIN — METOPROLOL TARTRATE 50 MG: 50 TABLET, FILM COATED ORAL at 02:19

## 2020-11-22 RX ADMIN — IOHEXOL 100 ML: 350 INJECTION, SOLUTION INTRAVENOUS at 18:30

## 2020-11-22 RX ADMIN — ALPRAZOLAM 0.25 MG: 0.25 TABLET ORAL at 17:28

## 2020-11-22 RX ADMIN — ASPIRIN 81 MG CHEWABLE TABLET 324 MG: 81 TABLET CHEWABLE at 01:45

## 2020-11-22 RX ADMIN — LEVOTHYROXINE SODIUM 75 MCG: 75 TABLET ORAL at 06:09

## 2020-11-22 RX ADMIN — FUROSEMIDE 20 MG: 10 INJECTION, SOLUTION INTRAMUSCULAR; INTRAVENOUS at 17:21

## 2020-11-22 RX ADMIN — ALUMINUM HYDROXIDE, MAGNESIUM HYDROXIDE, AND SIMETHICONE 30 ML: 200; 200; 20 SUSPENSION ORAL at 11:32

## 2020-11-22 RX ADMIN — FLUTICASONE PROPIONATE 2 SPRAY: 50 SPRAY, METERED NASAL at 08:42

## 2020-11-22 RX ADMIN — METOPROLOL TARTRATE 50 MG: 50 TABLET, FILM COATED ORAL at 08:42

## 2020-11-22 RX ADMIN — RIVAROXABAN 20 MG: 10 TABLET, FILM COATED ORAL at 08:41

## 2020-11-22 RX ADMIN — PRAVASTATIN SODIUM 80 MG: 80 TABLET ORAL at 16:05

## 2020-11-22 RX ADMIN — Medication 2000 UNITS: at 08:42

## 2020-11-23 LAB
ANION GAP SERPL CALCULATED.3IONS-SCNC: 6 MMOL/L (ref 4–13)
ATRIAL RATE: 159 BPM
ATRIAL RATE: 178 BPM
ATRIAL RATE: 80 BPM
BUN SERPL-MCNC: 15 MG/DL (ref 5–25)
CALCIUM SERPL-MCNC: 8.9 MG/DL (ref 8.3–10.1)
CHLORIDE SERPL-SCNC: 97 MMOL/L (ref 100–108)
CO2 SERPL-SCNC: 29 MMOL/L (ref 21–32)
CREAT SERPL-MCNC: 0.93 MG/DL (ref 0.6–1.3)
GFR SERPL CREATININE-BSD FRML MDRD: 58 ML/MIN/1.73SQ M
GLUCOSE P FAST SERPL-MCNC: 92 MG/DL (ref 65–99)
GLUCOSE SERPL-MCNC: 92 MG/DL (ref 65–140)
MAGNESIUM SERPL-MCNC: 2.3 MG/DL (ref 1.6–2.6)
POTASSIUM SERPL-SCNC: 4.3 MMOL/L (ref 3.5–5.3)
PROCALCITONIN SERPL-MCNC: <0.05 NG/ML
QRS AXIS: -40 DEGREES
QRS AXIS: -41 DEGREES
QRS AXIS: -45 DEGREES
QRSD INTERVAL: 118 MS
QRSD INTERVAL: 122 MS
QRSD INTERVAL: 124 MS
QT INTERVAL: 334 MS
QT INTERVAL: 372 MS
QT INTERVAL: 404 MS
QTC INTERVAL: 429 MS
QTC INTERVAL: 494 MS
QTC INTERVAL: 501 MS
SODIUM SERPL-SCNC: 132 MMOL/L (ref 136–145)
T WAVE AXIS: -1 DEGREES
T WAVE AXIS: -20 DEGREES
T WAVE AXIS: -25 DEGREES
VENTRICULAR RATE: 135 BPM
VENTRICULAR RATE: 80 BPM
VENTRICULAR RATE: 90 BPM

## 2020-11-23 PROCEDURE — 99215 OFFICE O/P EST HI 40 MIN: CPT | Performed by: INTERNAL MEDICINE

## 2020-11-23 PROCEDURE — 93010 ELECTROCARDIOGRAM REPORT: CPT | Performed by: INTERNAL MEDICINE

## 2020-11-23 PROCEDURE — 80048 BASIC METABOLIC PNL TOTAL CA: CPT | Performed by: FAMILY MEDICINE

## 2020-11-23 PROCEDURE — 84145 PROCALCITONIN (PCT): CPT | Performed by: EMERGENCY MEDICINE

## 2020-11-23 PROCEDURE — 94760 N-INVAS EAR/PLS OXIMETRY 1: CPT

## 2020-11-23 PROCEDURE — 83735 ASSAY OF MAGNESIUM: CPT | Performed by: FAMILY MEDICINE

## 2020-11-23 PROCEDURE — 99232 SBSQ HOSP IP/OBS MODERATE 35: CPT | Performed by: NURSE PRACTITIONER

## 2020-11-23 RX ORDER — BISOPROLOL FUMARATE 5 MG/1
10 TABLET ORAL
Status: DISCONTINUED | OUTPATIENT
Start: 2020-11-23 | End: 2020-11-24 | Stop reason: HOSPADM

## 2020-11-23 RX ORDER — FUROSEMIDE 20 MG/1
20 TABLET ORAL DAILY
Status: DISCONTINUED | OUTPATIENT
Start: 2020-11-23 | End: 2020-11-24 | Stop reason: HOSPADM

## 2020-11-23 RX ADMIN — Medication 2000 UNITS: at 10:22

## 2020-11-23 RX ADMIN — LEVOTHYROXINE SODIUM 75 MCG: 75 TABLET ORAL at 05:43

## 2020-11-23 RX ADMIN — FUROSEMIDE 20 MG: 20 TABLET ORAL at 11:30

## 2020-11-23 RX ADMIN — PRAVASTATIN SODIUM 80 MG: 80 TABLET ORAL at 16:51

## 2020-11-23 RX ADMIN — RIVAROXABAN 20 MG: 10 TABLET, FILM COATED ORAL at 08:30

## 2020-11-23 RX ADMIN — METOPROLOL TARTRATE 100 MG: 100 TABLET, FILM COATED ORAL at 10:22

## 2020-11-23 RX ADMIN — BISOPROLOL FUMARATE 10 MG: 5 TABLET ORAL at 23:14

## 2020-11-24 VITALS
WEIGHT: 137.13 LBS | SYSTOLIC BLOOD PRESSURE: 136 MMHG | HEART RATE: 90 BPM | TEMPERATURE: 97.7 F | BODY MASS INDEX: 31.73 KG/M2 | RESPIRATION RATE: 18 BRPM | HEIGHT: 55 IN | OXYGEN SATURATION: 96 % | DIASTOLIC BLOOD PRESSURE: 91 MMHG

## 2020-11-24 LAB
ANION GAP SERPL CALCULATED.3IONS-SCNC: 7 MMOL/L (ref 4–13)
BUN SERPL-MCNC: 16 MG/DL (ref 5–25)
CALCIUM SERPL-MCNC: 8.8 MG/DL (ref 8.3–10.1)
CHLORIDE SERPL-SCNC: 97 MMOL/L (ref 100–108)
CO2 SERPL-SCNC: 28 MMOL/L (ref 21–32)
CREAT SERPL-MCNC: 0.94 MG/DL (ref 0.6–1.3)
ERYTHROCYTE [DISTWIDTH] IN BLOOD BY AUTOMATED COUNT: 12.9 % (ref 11.6–15.1)
GFR SERPL CREATININE-BSD FRML MDRD: 57 ML/MIN/1.73SQ M
GLUCOSE P FAST SERPL-MCNC: 91 MG/DL (ref 65–99)
GLUCOSE SERPL-MCNC: 91 MG/DL (ref 65–140)
HCT VFR BLD AUTO: 47.3 % (ref 34.8–46.1)
HGB BLD-MCNC: 14.9 G/DL (ref 11.5–15.4)
MAGNESIUM SERPL-MCNC: 2.1 MG/DL (ref 1.6–2.6)
MCH RBC QN AUTO: 27.8 PG (ref 26.8–34.3)
MCHC RBC AUTO-ENTMCNC: 31.5 G/DL (ref 31.4–37.4)
MCV RBC AUTO: 88 FL (ref 82–98)
PLATELET # BLD AUTO: 194 THOUSANDS/UL (ref 149–390)
PMV BLD AUTO: 9.7 FL (ref 8.9–12.7)
POTASSIUM SERPL-SCNC: 4.1 MMOL/L (ref 3.5–5.3)
RBC # BLD AUTO: 5.36 MILLION/UL (ref 3.81–5.12)
SODIUM SERPL-SCNC: 132 MMOL/L (ref 136–145)
WBC # BLD AUTO: 5.52 THOUSAND/UL (ref 4.31–10.16)

## 2020-11-24 PROCEDURE — 80048 BASIC METABOLIC PNL TOTAL CA: CPT | Performed by: NURSE PRACTITIONER

## 2020-11-24 PROCEDURE — 85027 COMPLETE CBC AUTOMATED: CPT | Performed by: NURSE PRACTITIONER

## 2020-11-24 PROCEDURE — 83735 ASSAY OF MAGNESIUM: CPT | Performed by: NURSE PRACTITIONER

## 2020-11-24 PROCEDURE — 99217 PR OBSERVATION CARE DISCHARGE MANAGEMENT: CPT | Performed by: NURSE PRACTITIONER

## 2020-11-24 PROCEDURE — 99214 OFFICE O/P EST MOD 30 MIN: CPT | Performed by: INTERNAL MEDICINE

## 2020-11-24 RX ORDER — BISOPROLOL FUMARATE 10 MG/1
10 TABLET ORAL
Qty: 30 TABLET | Refills: 0 | Status: SHIPPED | OUTPATIENT
Start: 2020-11-24 | End: 2020-12-23 | Stop reason: SDUPTHER

## 2020-11-24 RX ADMIN — RIVAROXABAN 20 MG: 10 TABLET, FILM COATED ORAL at 08:05

## 2020-11-24 RX ADMIN — LEVOTHYROXINE SODIUM 75 MCG: 75 TABLET ORAL at 06:37

## 2020-11-24 RX ADMIN — Medication 2000 UNITS: at 08:05

## 2020-11-24 RX ADMIN — FUROSEMIDE 20 MG: 20 TABLET ORAL at 08:05

## 2020-11-27 LAB
BACTERIA BLD CULT: NORMAL
BACTERIA BLD CULT: NORMAL

## 2020-11-30 ENCOUNTER — TELEPHONE (OUTPATIENT)
Dept: FAMILY MEDICINE CLINIC | Facility: CLINIC | Age: 80
End: 2020-11-30

## 2020-12-23 ENCOUNTER — OFFICE VISIT (OUTPATIENT)
Dept: FAMILY MEDICINE CLINIC | Facility: CLINIC | Age: 80
End: 2020-12-23
Payer: COMMERCIAL

## 2020-12-23 VITALS
TEMPERATURE: 97.7 F | HEART RATE: 66 BPM | WEIGHT: 135 LBS | DIASTOLIC BLOOD PRESSURE: 90 MMHG | SYSTOLIC BLOOD PRESSURE: 150 MMHG | RESPIRATION RATE: 24 BRPM | OXYGEN SATURATION: 94 % | BODY MASS INDEX: 31.24 KG/M2 | HEIGHT: 55 IN

## 2020-12-23 DIAGNOSIS — I10 ESSENTIAL HYPERTENSION: Primary | ICD-10-CM

## 2020-12-23 DIAGNOSIS — I50.32 CHRONIC DIASTOLIC CHF (CONGESTIVE HEART FAILURE) (HCC): ICD-10-CM

## 2020-12-23 DIAGNOSIS — I48.91 ATRIAL FIBRILLATION WITH RAPID VENTRICULAR RESPONSE (HCC): ICD-10-CM

## 2020-12-23 PROCEDURE — 3725F SCREEN DEPRESSION PERFORMED: CPT | Performed by: INTERNAL MEDICINE

## 2020-12-23 PROCEDURE — 1111F DSCHRG MED/CURRENT MED MERGE: CPT | Performed by: INTERNAL MEDICINE

## 2020-12-23 PROCEDURE — 1170F FXNL STATUS ASSESSED: CPT | Performed by: INTERNAL MEDICINE

## 2020-12-23 PROCEDURE — G0439 PPPS, SUBSEQ VISIT: HCPCS | Performed by: INTERNAL MEDICINE

## 2020-12-23 PROCEDURE — 99213 OFFICE O/P EST LOW 20 MIN: CPT | Performed by: INTERNAL MEDICINE

## 2020-12-23 PROCEDURE — 1125F AMNT PAIN NOTED PAIN PRSNT: CPT | Performed by: INTERNAL MEDICINE

## 2020-12-23 RX ORDER — FUROSEMIDE 20 MG/1
20 TABLET ORAL 3 TIMES WEEKLY
Qty: 30 TABLET | Refills: 3 | Status: SHIPPED | OUTPATIENT
Start: 2020-12-23 | End: 2022-02-17

## 2020-12-23 RX ORDER — BISOPROLOL FUMARATE 10 MG/1
10 TABLET ORAL
Qty: 30 TABLET | Refills: 5 | Status: SHIPPED | OUTPATIENT
Start: 2020-12-23 | End: 2020-12-30 | Stop reason: SDUPTHER

## 2020-12-23 RX ORDER — POTASSIUM CHLORIDE 750 MG/1
10 TABLET, EXTENDED RELEASE ORAL DAILY
Qty: 7 TABLET | Refills: 0 | Status: SHIPPED | OUTPATIENT
Start: 2020-12-23 | End: 2020-12-30

## 2020-12-30 ENCOUNTER — OFFICE VISIT (OUTPATIENT)
Dept: FAMILY MEDICINE CLINIC | Facility: CLINIC | Age: 80
End: 2020-12-30
Payer: COMMERCIAL

## 2020-12-30 VITALS
SYSTOLIC BLOOD PRESSURE: 160 MMHG | OXYGEN SATURATION: 98 % | TEMPERATURE: 97.6 F | HEIGHT: 55 IN | WEIGHT: 135 LBS | RESPIRATION RATE: 22 BRPM | DIASTOLIC BLOOD PRESSURE: 80 MMHG | HEART RATE: 120 BPM | BODY MASS INDEX: 31.24 KG/M2

## 2020-12-30 DIAGNOSIS — I48.91 ATRIAL FIBRILLATION WITH RAPID VENTRICULAR RESPONSE (HCC): ICD-10-CM

## 2020-12-30 PROCEDURE — 3077F SYST BP >= 140 MM HG: CPT | Performed by: INTERNAL MEDICINE

## 2020-12-30 PROCEDURE — 1036F TOBACCO NON-USER: CPT | Performed by: INTERNAL MEDICINE

## 2020-12-30 PROCEDURE — 99213 OFFICE O/P EST LOW 20 MIN: CPT | Performed by: INTERNAL MEDICINE

## 2020-12-30 PROCEDURE — 1160F RVW MEDS BY RX/DR IN RCRD: CPT | Performed by: INTERNAL MEDICINE

## 2020-12-30 PROCEDURE — 3079F DIAST BP 80-89 MM HG: CPT | Performed by: INTERNAL MEDICINE

## 2020-12-30 RX ORDER — EZETIMIBE 10 MG/1
TABLET ORAL
COMMUNITY
Start: 2020-12-23 | End: 2021-07-12

## 2020-12-30 RX ORDER — BISOPROLOL FUMARATE 10 MG/1
10 TABLET ORAL
Qty: 30 TABLET | Refills: 5 | Status: SHIPPED | OUTPATIENT
Start: 2020-12-30 | End: 2021-03-31

## 2021-01-07 ENCOUNTER — TELEPHONE (OUTPATIENT)
Dept: FAMILY MEDICINE CLINIC | Facility: CLINIC | Age: 81
End: 2021-01-07

## 2021-01-07 ENCOUNTER — TELEPHONE (OUTPATIENT)
Dept: OTHER | Facility: OTHER | Age: 81
End: 2021-01-07

## 2021-01-07 ENCOUNTER — TELEPHONE (OUTPATIENT)
Dept: NEPHROLOGY | Facility: CLINIC | Age: 81
End: 2021-01-07

## 2021-01-07 NOTE — TELEPHONE ENCOUNTER
Pt has Roxy at the Pharmacy, [facility] and it is $500, too much, they are trying to straighten out her insurance but in the mean time is there a cheaper alternative?  Please call Pt's sister Ortiz Chaves this evening please

## 2021-01-07 NOTE — TELEPHONE ENCOUNTER
I called and left message on daughter Tiny's phone, asking to call back so we can schedule her March follow up appt  David Cain does not have any March [place] days available but Rogelio Merritt does have openings  Patient also needs  scheduled for appt

## 2021-01-08 ENCOUNTER — TELEPHONE (OUTPATIENT)
Dept: OTHER | Facility: OTHER | Age: 81
End: 2021-01-08

## 2021-01-08 NOTE — TELEPHONE ENCOUNTER
[place] [facility]:  [phone] / [facility] [place] - Sister / Patient: Irwin Gowers / 0- / Provider: Miles Veronica / Patient's sister called on behalf of the patient because she is deaf  [place] state the patient is out of XARELTO 20 MG tablet and cannot have this medication filled until the end of the month  The patient would like to have an alternative medication called into their pharmacy  Please call patient's sister back to discuss

## 2021-01-08 NOTE — TELEPHONE ENCOUNTER
[employer] called on behalf of Renay Ch  She was wondering if there is another option other than the Xarelto  She mentioned that the pharmacy is out of the med until the end of the month and that it is also too expensive  She was wondering if Warfarin would be a good alternative instead? Please call Claudia back in the morning

## 2021-01-08 NOTE — TELEPHONE ENCOUNTER
I spoke with marisa samples given until determination figured out  Lot [id number]    She will call ins

## 2021-01-08 NOTE — TELEPHONE ENCOUNTER
She takes this for afib and she would need to call her cardiologist  Unfortunately this is something I can't change

## 2021-01-08 NOTE — TELEPHONE ENCOUNTER
Bonnita Schlatter advised and cardiologist contact information given  No further action needed    Ike Kocher, MA

## 2021-01-11 ENCOUNTER — TELEPHONE (OUTPATIENT)
Dept: CARDIOLOGY CLINIC | Facility: CLINIC | Age: 81
End: 2021-01-11

## 2021-01-11 ENCOUNTER — TELEPHONE (OUTPATIENT)
Dept: FAMILY MEDICINE CLINIC | Facility: CLINIC | Age: 81
End: 2021-01-11

## 2021-01-11 NOTE — TELEPHONE ENCOUNTER
The rx is too expensive (xarelto)  Pls see if another rx could be a cheaper option  5 pills left and needs a refill soon   Pls advise  [phone]

## 2021-01-11 NOTE — TELEPHONE ENCOUNTER
L/M for pt to advise her that we have samples waiting at  
Patient ordered refill but pharm is out of the medication  I am holding 2 bottles for the patient to be picked up till the pharm gets in the order 
[facility]

## 2021-01-11 NOTE — TELEPHONE ENCOUNTER
Any changes would need to come from her cardiologist, she is on this for afib and they monitor this

## 2021-01-15 ENCOUNTER — TELEPHONE (OUTPATIENT)
Dept: OTHER | Facility: OTHER | Age: 81
End: 2021-01-15

## 2021-01-15 DIAGNOSIS — I48.91 A-FIB (HCC): ICD-10-CM

## 2021-01-15 DIAGNOSIS — I48.3 TYPICAL ATRIAL FLUTTER (HCC): Primary | ICD-10-CM

## 2021-01-15 RX ORDER — RIVAROXABAN 20 MG/1
20 TABLET, FILM COATED ORAL
Status: CANCELLED | OUTPATIENT
Start: 2021-01-15

## 2021-01-15 RX ORDER — RIVAROXABAN 20 MG/1
20 TABLET, FILM COATED ORAL
Qty: 90 TABLET | Refills: 1 | Status: SHIPPED | OUTPATIENT
Start: 2021-01-15 | End: 2021-07-20 | Stop reason: SDUPTHER

## 2021-01-15 NOTE — TELEPHONE ENCOUNTER
Pt's  is calling about wife prescription of Xarelto 20mg  He called earlier in the week and hasn't heard anything back  Looking to get it refilled   Paged on call provider- Dr Gurvinder Sen

## 2021-01-18 NOTE — TELEPHONE ENCOUNTER
Script was sent on 1/15/21  S/W [facility] who states that the 90-day supply was rec'd and is ready  However, the cost is $550 due to her deductible  Attempted to call pt to explain  They can do 30 days at a time  They can also apply for the patient assistance program through [employer]  Left message for pt to call back

## 2021-01-23 DIAGNOSIS — E78.5 DYSLIPIDEMIA: ICD-10-CM

## 2021-01-23 RX ORDER — ROSUVASTATIN CALCIUM 10 MG/1
TABLET, COATED ORAL
Qty: 90 TABLET | Refills: 1 | Status: SHIPPED | OUTPATIENT
Start: 2021-01-23 | End: 2021-07-19

## 2021-02-04 NOTE — ASSESSMENT & PLAN NOTE
Continue rosuvastatin and will continue to follow LFT's and lipid profile  Advised on weight loss and low fat diet  Detail Level: Simple Detail Level: Zone

## 2021-03-25 ENCOUNTER — RA CDI HCC (OUTPATIENT)
Dept: OTHER | Facility: HOSPITAL | Age: 81
End: 2021-03-25

## 2021-03-25 NOTE — PROGRESS NOTES
[place] [address]  coding oppertunities             Chart reviewed, (number of) suggestions sent to provider: 3     Problem listed updated   Provider Accepted, (number of) suggestions accepted: 3              [place] [address]  coding oppertunities             Chart reviewed, (number of) suggestions sent to provider: 3  I48 19  Other persistent atrial fibrillation  G50 24  Chronic diastolic (congestive) heart failure  I11 0  Hypertensive heart disease with heart failure

## 2021-03-26 PROBLEM — I11.0 HYPERTENSIVE HEART DISEASE WITH HEART FAILURE (HCC): Status: ACTIVE | Noted: 2021-03-26

## 2021-03-27 NOTE — PROGRESS NOTES
Assessment/Plan:    1  Essential hypertension  Assessment & Plan:  Well controlled and will continue current medications  2  Atrial fibrillation with rapid ventricular response [facility])  Assessment & Plan:  She is currently regular on exam s/p pacemaker placement  Continue cardiology follow up and xarelto  3  Cardiomyopathy, unspecified type ([facility] [address] )    4  Atrioventricular block, complete ([facility] [address] )    5  Chronic diastolic CHF (congestive heart failure) ([facility])  Assessment & Plan:  Wt Readings from Last 3 Encounters:   03/31/21 60 3 kg (133 lb)   12/30/20 61 2 kg (135 lb)   12/23/20 61 2 kg (135 lb)     She is clinically euvolemic  There are no signs/symptoms of decompensated heart failure  Continue diuretics and daily weights  6  Dry skin  -     ammonium lactate (LAC-HYDRIN) 12 % cream; Apply topically 2 (two) times a day as needed for dry skin    7  BMI 30 0-30 9,adult        BMI Counseling: Body mass index is 30 91 kg/m²  The BMI is above normal  Nutrition recommendations include reducing portion sizes and decreasing overall calorie intake  Exercise recommendations include exercising 3-5 times per week  There are no Patient Instructions on file for this visit  Return in about 4 months (around 7/31/2021)  Subjective:      Patient ID: Obdulio Salgado is a 79 y o  female  Chief Complaint   Patient presents with    Follow-up     follow up on [facility] [email or web address]        Here for follow up  She feels "great "  She had an ablation with pacemaker and she no longer has shortness of breath or fatigue  She has most of her energy back  Is eating and sleeping well  Continues to follow with [facility]ology at [facility]  There is no chest pain, dyspnea, cough, fever  She has a lot of dry skin on her back that she would like a cream for        The following portions of the patient's history were reviewed and updated as appropriate: allergies, current medications, past family history, past medical history, past social history, past surgical history and problem list     Review of Systems   Constitutional: Negative  Respiratory: Negative  Cardiovascular: Negative  Endocrine: Negative  Skin:        As per HPI  Current Outpatient Medications   Medication Sig Dispense Refill    Cholecalciferol (VITAMIN D) 2000 units CAPS Take 1 capsule by mouth daily      fluticasone (VERAMYST) 27 5 MCG/SPRAY nasal spray 2 sprays into each nostril daily      furosemide (LASIX) 20 mg tablet Take 1 tablet (20 mg total) by mouth 3 (three) times a week Take on M/W/F 30 tablet 3    levothyroxine 75 mcg tablet TAKE ONE TABLET BY MOUTH EVERY DAY 90 tablet 3    rosuvastatin (CRESTOR) 10 MG tablet TAKE ONE TABLET BY MOUTH EVERY DAY 90 tablet 1    Xarelto 20 MG tablet Take 1 tablet (20 mg total) by mouth daily with breakfast 90 tablet 1    ammonium lactate (LAC-HYDRIN) 12 % cream Apply topically 2 (two) times a day as needed for dry skin 385 g 2    ezetimibe (ZETIA) 10 mg tablet       halobetasol (ULTRAVATE) 0 05 % cream   0    Multiple Vitamins-Minerals (CENTRUM SILVER ULTRA WOMENS) TABS Take by mouth       No current facility-administered medications for this visit  Objective:    /80   Pulse 94   Temp 97 6 °F (36 4 °C)   Resp 20   Ht 4' 7" (1 397 m)   Wt 60 3 kg (133 lb)   SpO2 98%   BMI 30 91 kg/m²        Physical Exam  Constitutional:       Appearance: Normal appearance  She is well-developed  Eyes:      Conjunctiva/sclera: Conjunctivae normal    Neck:      Musculoskeletal: Neck supple  Thyroid: No thyromegaly  Vascular: No JVD  Cardiovascular:      Rate and Rhythm: Normal rate and regular rhythm  Heart sounds: Normal heart sounds  No murmur  No friction rub  No gallop  Pulmonary:      Effort: Pulmonary effort is normal       Breath sounds: Normal breath sounds  No wheezing or rales  Abdominal:      General: Bowel sounds are normal  There is no distension        Palpations: Abdomen is soft       Tenderness: There is no abdominal tenderness  Musculoskeletal:      Right lower leg: Edema present  Left lower leg: Edema present  Comments: Trace edema bilaterally   Neurological:      Mental Status: She is alert                  Severa Comes, MD

## 2021-03-30 ENCOUNTER — TELEPHONE (OUTPATIENT)
Dept: FAMILY MEDICINE CLINIC | Facility: CLINIC | Age: 81
End: 2021-03-30

## 2021-03-30 DIAGNOSIS — Z23 ENCOUNTER FOR IMMUNIZATION: ICD-10-CM

## 2021-03-30 NOTE — TELEPHONE ENCOUNTER
Balaji Granda has an apt with Dr Naz Lewis tomorrow   calling asking if there will be an  at apt  He stated this apt was made a long time ago?     Please call patient and let him know

## 2021-03-30 NOTE — TELEPHONE ENCOUNTER
Yes, Lizzie Cavazost will be at the appt tomorrow, Confirmed with Alta Ballard for pt informing them  [facility]

## 2021-03-31 ENCOUNTER — OFFICE VISIT (OUTPATIENT)
Dept: FAMILY MEDICINE CLINIC | Facility: CLINIC | Age: 81
End: 2021-03-31
Payer: COMMERCIAL

## 2021-03-31 VITALS
HEIGHT: 55 IN | WEIGHT: 133 LBS | HEART RATE: 94 BPM | TEMPERATURE: 97.6 F | OXYGEN SATURATION: 98 % | BODY MASS INDEX: 30.78 KG/M2 | RESPIRATION RATE: 20 BRPM | SYSTOLIC BLOOD PRESSURE: 132 MMHG | DIASTOLIC BLOOD PRESSURE: 80 MMHG

## 2021-03-31 DIAGNOSIS — I50.32 CHRONIC DIASTOLIC CHF (CONGESTIVE HEART FAILURE) (HCC): ICD-10-CM

## 2021-03-31 DIAGNOSIS — I44.2 ATRIOVENTRICULAR BLOCK, COMPLETE (HCC): ICD-10-CM

## 2021-03-31 DIAGNOSIS — L85.3 DRY SKIN: ICD-10-CM

## 2021-03-31 DIAGNOSIS — I48.91 ATRIAL FIBRILLATION WITH RAPID VENTRICULAR RESPONSE (HCC): ICD-10-CM

## 2021-03-31 DIAGNOSIS — I42.9 CARDIOMYOPATHY, UNSPECIFIED TYPE (HCC): ICD-10-CM

## 2021-03-31 DIAGNOSIS — I10 ESSENTIAL HYPERTENSION: Primary | ICD-10-CM

## 2021-03-31 PROBLEM — E66.01 MORBID (SEVERE) OBESITY DUE TO EXCESS CALORIES (HCC): Status: ACTIVE | Noted: 2021-03-31

## 2021-03-31 PROCEDURE — 3725F SCREEN DEPRESSION PERFORMED: CPT | Performed by: INTERNAL MEDICINE

## 2021-03-31 PROCEDURE — 99214 OFFICE O/P EST MOD 30 MIN: CPT | Performed by: INTERNAL MEDICINE

## 2021-03-31 PROCEDURE — 3079F DIAST BP 80-89 MM HG: CPT | Performed by: INTERNAL MEDICINE

## 2021-03-31 PROCEDURE — 1160F RVW MEDS BY RX/DR IN RCRD: CPT | Performed by: INTERNAL MEDICINE

## 2021-03-31 PROCEDURE — 1036F TOBACCO NON-USER: CPT | Performed by: INTERNAL MEDICINE

## 2021-03-31 PROCEDURE — 3075F SYST BP GE 130 - 139MM HG: CPT | Performed by: INTERNAL MEDICINE

## 2021-03-31 RX ORDER — AMMONIUM LACTATE 12 G/100G
CREAM TOPICAL 2 TIMES DAILY PRN
Qty: 385 G | Refills: 2 | Status: SHIPPED | OUTPATIENT
Start: 2021-03-31 | End: 2021-07-12

## 2021-03-31 NOTE — ASSESSMENT & PLAN NOTE
Wt Readings from Last 3 Encounters:   03/31/21 60 3 kg (133 lb)   12/30/20 61 2 kg (135 lb)   12/23/20 61 2 kg (135 lb)     She is clinically euvolemic  There are no signs/symptoms of decompensated heart failure  Continue diuretics and daily weights

## 2021-03-31 NOTE — ASSESSMENT & PLAN NOTE
She is currently regular on exam s/p pacemaker placement  Continue cardiology follow up and xarelto

## 2021-04-16 ENCOUNTER — HOSPITAL ENCOUNTER (EMERGENCY)
Facility: HOSPITAL | Age: 81
Discharge: HOME | End: 2021-04-17
Attending: EMERGENCY MEDICINE | Admitting: EMERGENCY MEDICINE
Payer: COMMERCIAL

## 2021-04-16 ENCOUNTER — APPOINTMENT (EMERGENCY)
Dept: RADIOLOGY | Facility: HOSPITAL | Age: 81
End: 2021-04-16
Payer: COMMERCIAL

## 2021-04-16 DIAGNOSIS — R42 DIZZINESS: Primary | ICD-10-CM

## 2021-04-16 DIAGNOSIS — I10 HYPERTENSION: ICD-10-CM

## 2021-04-16 DIAGNOSIS — F41.9 ANXIETY: ICD-10-CM

## 2021-04-16 LAB
ALBUMIN SERPL BCP-MCNC: 3.6 G/DL (ref 3.5–5)
ALP SERPL-CCNC: 80 U/L (ref 46–116)
ALT SERPL W P-5'-P-CCNC: 24 U/L (ref 12–78)
ANION GAP SERPL CALCULATED.3IONS-SCNC: 7 MMOL/L (ref 4–13)
APTT PPP: 35 SECONDS (ref 23–37)
AST SERPL W P-5'-P-CCNC: 23 U/L (ref 5–45)
BASOPHILS # BLD AUTO: 0.06 THOUSANDS/ΜL (ref 0–0.1)
BASOPHILS NFR BLD AUTO: 1 % (ref 0–1)
BILIRUB SERPL-MCNC: 0.7 MG/DL (ref 0.2–1)
BILIRUB UR QL STRIP: NEGATIVE
BUN SERPL-MCNC: 21 MG/DL (ref 5–25)
CALCIUM SERPL-MCNC: 9 MG/DL (ref 8.3–10.1)
CHLORIDE SERPL-SCNC: 101 MMOL/L (ref 100–108)
CLARITY UR: CLEAR
CO2 SERPL-SCNC: 27 MMOL/L (ref 21–32)
COLOR UR: NORMAL
CREAT SERPL-MCNC: 0.92 MG/DL (ref 0.6–1.3)
EOSINOPHIL # BLD AUTO: 0.16 THOUSAND/ΜL (ref 0–0.61)
EOSINOPHIL NFR BLD AUTO: 3 % (ref 0–6)
ERYTHROCYTE [DISTWIDTH] IN BLOOD BY AUTOMATED COUNT: 14.1 % (ref 11.6–15.1)
GFR SERPL CREATININE-BSD FRML MDRD: 59 ML/MIN/1.73SQ M
GLUCOSE SERPL-MCNC: 83 MG/DL (ref 65–140)
GLUCOSE SERPL-MCNC: 83 MG/DL (ref 65–140)
GLUCOSE UR STRIP-MCNC: NEGATIVE MG/DL
HCT VFR BLD AUTO: 47 % (ref 34.8–46.1)
HGB BLD-MCNC: 15.1 G/DL (ref 11.5–15.4)
HGB UR QL STRIP.AUTO: NEGATIVE
IMM GRANULOCYTES # BLD AUTO: 0.01 THOUSAND/UL (ref 0–0.2)
IMM GRANULOCYTES NFR BLD AUTO: 0 % (ref 0–2)
INR PPP: 1.19 (ref 0.84–1.19)
KETONES UR STRIP-MCNC: NEGATIVE MG/DL
LEUKOCYTE ESTERASE UR QL STRIP: NEGATIVE
LYMPHOCYTES # BLD AUTO: 2.4 THOUSANDS/ΜL (ref 0.6–4.47)
LYMPHOCYTES NFR BLD AUTO: 39 % (ref 14–44)
MCH RBC QN AUTO: 28.1 PG (ref 26.8–34.3)
MCHC RBC AUTO-ENTMCNC: 32.1 G/DL (ref 31.4–37.4)
MCV RBC AUTO: 88 FL (ref 82–98)
MONOCYTES # BLD AUTO: 0.73 THOUSAND/ΜL (ref 0.17–1.22)
MONOCYTES NFR BLD AUTO: 12 % (ref 4–12)
NEUTROPHILS # BLD AUTO: 2.83 THOUSANDS/ΜL (ref 1.85–7.62)
NEUTS SEG NFR BLD AUTO: 45 % (ref 43–75)
NITRITE UR QL STRIP: NEGATIVE
NRBC BLD AUTO-RTO: 0 /100 WBCS
PH UR STRIP.AUTO: 6.5 [PH]
PLATELET # BLD AUTO: 196 THOUSANDS/UL (ref 149–390)
PMV BLD AUTO: 10.5 FL (ref 8.9–12.7)
POTASSIUM SERPL-SCNC: 4 MMOL/L (ref 3.5–5.3)
PROT SERPL-MCNC: 7.7 G/DL (ref 6.4–8.2)
PROT UR STRIP-MCNC: NEGATIVE MG/DL
PROTHROMBIN TIME: 15 SECONDS (ref 11.6–14.5)
RBC # BLD AUTO: 5.37 MILLION/UL (ref 3.81–5.12)
SODIUM SERPL-SCNC: 135 MMOL/L (ref 136–145)
SP GR UR STRIP.AUTO: <=1.005 (ref 1–1.03)
TROPONIN I SERPL-MCNC: <0.02 NG/ML
TROPONIN I SERPL-MCNC: <0.02 NG/ML
UROBILINOGEN UR QL STRIP.AUTO: 0.2 E.U./DL
WBC # BLD AUTO: 6.19 THOUSAND/UL (ref 4.31–10.16)

## 2021-04-16 PROCEDURE — 99284 EMERGENCY DEPT VISIT MOD MDM: CPT

## 2021-04-16 PROCEDURE — G1004 CDSM NDSC: HCPCS

## 2021-04-16 PROCEDURE — 85610 PROTHROMBIN TIME: CPT | Performed by: EMERGENCY MEDICINE

## 2021-04-16 PROCEDURE — 85025 COMPLETE CBC W/AUTO DIFF WBC: CPT | Performed by: EMERGENCY MEDICINE

## 2021-04-16 PROCEDURE — 80053 COMPREHEN METABOLIC PANEL: CPT | Performed by: EMERGENCY MEDICINE

## 2021-04-16 PROCEDURE — 82948 REAGENT STRIP/BLOOD GLUCOSE: CPT

## 2021-04-16 PROCEDURE — 70496 CT ANGIOGRAPHY HEAD: CPT

## 2021-04-16 PROCEDURE — 93005 ELECTROCARDIOGRAM TRACING: CPT

## 2021-04-16 PROCEDURE — 81003 URINALYSIS AUTO W/O SCOPE: CPT | Performed by: EMERGENCY MEDICINE

## 2021-04-16 PROCEDURE — 70498 CT ANGIOGRAPHY NECK: CPT

## 2021-04-16 PROCEDURE — 36415 COLL VENOUS BLD VENIPUNCTURE: CPT | Performed by: EMERGENCY MEDICINE

## 2021-04-16 PROCEDURE — 85730 THROMBOPLASTIN TIME PARTIAL: CPT | Performed by: EMERGENCY MEDICINE

## 2021-04-16 PROCEDURE — 99285 EMERGENCY DEPT VISIT HI MDM: CPT | Performed by: EMERGENCY MEDICINE

## 2021-04-16 PROCEDURE — 84484 ASSAY OF TROPONIN QUANT: CPT | Performed by: EMERGENCY MEDICINE

## 2021-04-16 RX ADMIN — IOHEXOL 100 ML: 350 INJECTION, SOLUTION INTRAVENOUS at 20:54

## 2021-04-16 NOTE — ED PROVIDER NOTES
History  Chief Complaint   Patient presents with    Dizziness     Using the american sign language krishan, pt states she has been dizzy for a few weeks  BS 82 and no sick contacts  Patient has a history of a recent pacemaker placed 1 month ago  She states for about 2 weeks she has been feeling lightheaded and dizzy, associated with disequilibrium  Patient feels like she might lose her balance  She has not had loss of consciousness  Denies headache visual changes chest pain or shortness of breath  The dizziness is episodic  She has had no nausea or vomiting  No fever  No sick contacts  Patient was interviewed with use of a   Prior to Admission Medications   Prescriptions Last Dose Informant Patient Reported? Taking?    Cholecalciferol (VITAMIN D) 2000 units CAPS  Self Yes No   Sig: Take 1 capsule by mouth daily   Multiple Vitamins-Minerals (CENTRUM SILVER ULTRA WOMENS) TABS  Self Yes No   Sig: Take by mouth   Xarelto 20 MG tablet   No No   Sig: Take 1 tablet (20 mg total) by mouth daily with breakfast   ammonium lactate (LAC-HYDRIN) 12 % cream   No No   Sig: Apply topically 2 (two) times a day as needed for dry skin   ezetimibe (ZETIA) 10 mg tablet   Yes No   fluticasone (VERAMYST) 27 5 MCG/SPRAY nasal spray   Yes No   Si sprays into each nostril daily   furosemide (LASIX) 20 mg tablet   No No   Sig: Take 1 tablet (20 mg total) by mouth 3 (three) times a week Take on M/W/F   halobetasol (ULTRAVATE) 0 05 % cream  Self Yes No   levothyroxine 75 mcg tablet  Self No No   Sig: TAKE ONE TABLET BY MOUTH EVERY DAY   rosuvastatin (CRESTOR) 10 MG tablet   No No   Sig: TAKE ONE TABLET BY MOUTH EVERY DAY      Facility-Administered Medications: None       Past Medical History:   Diagnosis Date    Atrial flutter (HCC)     CAD (coronary artery disease)     s/p PCIx1 -2012    Coronary artery disease     Deafness     Disease of thyroid gland     Hyperlipidemia     Hypertension Past Surgical History:   Procedure Laterality Date    BREAST BIOPSY Left     negative    CARDIAC SURGERY      stents    ESOPHAGOGASTRODUODENOSCOPY N/A 2/20/2017    Procedure: ESOPHAGOGASTRODUODENOSCOPY (EGD); Surgeon: India Gilmore MD;  Location: [facility]; Service:        Family History   Problem Relation Age of Onset    Hypertension Mother         essential    Breast cancer Mother     No Known Problems Sister     No Known Problems Daughter     No Known Problems Sister     No Known Problems Sister      I have reviewed and agree with the history as documented  E-Cigarette/Vaping    E-Cigarette Use Never User      E-Cigarette/Vaping Substances    Nicotine No     THC No     CBD No     Flavoring No     Other No     Unknown No      Social History     Tobacco Use    Smoking status: Never Smoker    Smokeless tobacco: Never Used   Substance Use Topics    Alcohol use: Not Currently     Alcohol/week: 0 0 standard drinks     Frequency: Never     Drinks per session: Patient refused     Binge frequency: Patient refused    Drug use: No       Review of Systems   Constitutional: Negative for chills and fever  HENT: Negative for congestion and sore throat  Eyes: Negative for visual disturbance  Respiratory: Negative for cough and shortness of breath  Cardiovascular: Negative for chest pain and palpitations  Gastrointestinal: Negative for abdominal pain  Genitourinary: Positive for frequency  Negative for decreased urine volume, dysuria and pelvic pain  Musculoskeletal: Negative for arthralgias and back pain  Neurological: Positive for dizziness, weakness and light-headedness  Negative for seizures, syncope and headaches  Hematological: Does not bruise/bleed easily  Psychiatric/Behavioral: Negative for confusion  All other systems reviewed and are negative  Physical Exam  Physical Exam  Vitals signs and nursing note reviewed     Constitutional:       Appearance: Normal appearance  HENT:      Head: Normocephalic  Right Ear: External ear normal       Left Ear: External ear normal       Nose: Nose normal       Mouth/Throat:      Pharynx: Oropharynx is clear  Eyes:      Conjunctiva/sclera: Conjunctivae normal    Neck:      Musculoskeletal: Normal range of motion and neck supple  Cardiovascular:      Rate and Rhythm: Normal rate and regular rhythm  Pulmonary:      Effort: Pulmonary effort is normal       Breath sounds: Normal breath sounds  Abdominal:      Palpations: Abdomen is soft  Tenderness: There is no abdominal tenderness  Musculoskeletal: Normal range of motion  General: No swelling or tenderness  Right lower leg: No edema  Left lower leg: No edema  Skin:     General: Skin is warm and dry  Capillary Refill: Capillary refill takes less than 2 seconds  Neurological:      General: No focal deficit present  Mental Status: She is alert     Psychiatric:         Mood and Affect: Mood normal          Behavior: Behavior normal          Vital Signs  ED Triage Vitals [04/16/21 1948]   Temperature Pulse Respirations Blood Pressure SpO2   98 4 °F (36 9 °C) 72 22 (!) 188/89 95 %      Temp Source Heart Rate Source Patient Position - Orthostatic VS BP Location FiO2 (%)   Tympanic Monitor Sitting Left arm --      Pain Score       No Pain           Vitals:    04/16/21 2230 04/16/21 2330 04/17/21 0100 04/17/21 0130   BP: (!) 199/88 (!) 203/86 (!) 187/72 130/60   Pulse: 72 68 72 70   Patient Position - Orthostatic VS:   Sitting Sitting         Visual Acuity      ED Medications  Medications   iohexol (OMNIPAQUE) 350 MG/ML injection (SINGLE-DOSE) 100 mL (100 mL Intravenous Given 4/16/21 2054)   hydrALAZINE (APRESOLINE) injection 20 mg (20 mg Intravenous Given 4/17/21 0029)   meclizine (ANTIVERT) tablet 50 mg (50 mg Oral Given 4/17/21 0028)   ondansetron (ZOFRAN) injection 4 mg (4 mg Intravenous Given 4/17/21 0229)       Diagnostic Studies  Results Reviewed     Procedure Component Value Units Date/Time    Troponin I repeat in 3hrs [[id number]]  (Normal) Collected: 04/16/21 2253    Lab Status: Final result Specimen: Blood from Arm, Right Updated: 04/16/21 2320     Troponin I <0 02 ng/mL     UA (URINE) with reflex to Scope [[id number]] Collected: 04/16/21 2159    Lab Status: Final result Specimen: Urine, Clean Catch Updated: 04/16/21 2206     Color, UA Light Yellow     Clarity, UA Clear     Specific Gravity, UA <=1 005     pH, UA 6 5     Leukocytes, UA Negative     Nitrite, UA Negative     Protein, UA Negative mg/dl      Glucose, UA Negative mg/dl      Ketones, UA Negative mg/dl      Urobilinogen, UA 0 2 E U /dl      Bilirubin, UA Negative     Blood, UA Negative    Troponin I [[id number]]  (Normal) Collected: 04/16/21 2005    Lab Status: Final result Specimen: Blood from Arm, Right Updated: 04/16/21 2032     Troponin I <0 02 ng/mL     Comprehensive metabolic panel [[id number]]  (Abnormal) Collected: 04/16/21 2005    Lab Status: Final result Specimen: Blood from Arm, Right Updated: 04/16/21 2028     Sodium 135 mmol/L      Potassium 4 0 mmol/L      Chloride 101 mmol/L      CO2 27 mmol/L      ANION GAP 7 mmol/L      BUN 21 mg/dL      Creatinine 0 92 mg/dL      Glucose 83 mg/dL      Calcium 9 0 mg/dL      AST 23 U/L      ALT 24 U/L      Alkaline Phosphatase 80 U/L      Total Protein 7 7 g/dL      Albumin 3 6 g/dL      Total Bilirubin 0 70 mg/dL      eGFR 59 ml/min/1 73sq m     Narrative:      Madeline guidelines for Chronic Kidney Disease (CKD):     Stage 1 with normal or high GFR (GFR > 90 mL/min/1 73 square meters)    Stage 2 Mild CKD (GFR = 60-89 mL/min/1 73 square meters)    Stage 3A Moderate CKD (GFR = 45-59 mL/min/1 73 square meters)    Stage 3B Moderate CKD (GFR = 30-44 mL/min/1 73 square meters)    Stage 4 Severe CKD (GFR = 15-29 mL/min/1 73 square meters)    Stage 5 End Stage CKD (GFR <15 mL/min/1 73 square meters)  Note: GFR calculation is accurate only with a steady state creatinine    Protime-INR [[id number]]  (Abnormal) Collected: 04/16/21 2005    Lab Status: Final result Specimen: Blood from Arm, Right Updated: 04/16/21 2024     Protime 15 0 seconds      INR 1 19    APTT [[id number]]  (Normal) Collected: 04/16/21 2005    Lab Status: Final result Specimen: Blood from Arm, Right Updated: 04/16/21 2024     PTT 35 seconds     CBC and differential [[id number]]  (Abnormal) Collected: 04/16/21 2005    Lab Status: Final result Specimen: Blood from Arm, Right Updated: 04/16/21 2010     WBC 6 19 Thousand/uL      RBC 5 37 Million/uL      Hemoglobin 15 1 g/dL      Hematocrit 47 0 %      MCV 88 fL      MCH 28 1 pg      MCHC 32 1 g/dL      RDW 14 1 %      MPV 10 5 fL      Platelets 775 Thousands/uL      nRBC 0 /100 WBCs      Neutrophils Relative 45 %      Immat GRANS % 0 %      Lymphocytes Relative 39 %      Monocytes Relative 12 %      Eosinophils Relative 3 %      Basophils Relative 1 %      Neutrophils Absolute 2 83 Thousands/µL      Immature Grans Absolute 0 01 Thousand/uL      Lymphocytes Absolute 2 40 Thousands/µL      Monocytes Absolute 0 73 Thousand/µL      Eosinophils Absolute 0 16 Thousand/µL      Basophils Absolute 0 06 Thousands/µL     Fingerstick Glucose (POCT) [[id number]]  (Normal) Collected: 04/16/21 1948    Lab Status: Final result Updated: 04/16/21 1949     POC Glucose 83 mg/dl                  CTA head and neck with and without contrast   Final Result by Miguel Gaspar MD (04/16 2152)      1  No acute intracranial hemorrhage, midline shift, or mass effect  2   Chronic small vessel ischemic changes  3   No hemodynamically significant stenosis of the arteries of the neck  4   No high-grade proximal stenosis of the visualized [place]  5   No significant intracranial arteriovenous malformation or aneurysm  6   Cardiomegaly and groundglass opacities in the lungs likely due to pulmonary edema    Correlate for heart failure  Workstation performed: [id number]                    Procedures  ECG 12 Lead Documentation Only    Date/Time: 4/16/2021 7:43 PM  Performed by: Gavin Espinal MD  Authorized by: Gavin Espinal MD     Indications / Diagnosis:  Dizziness  ECG reviewed by me, the ED Provider: yes    Patient location:  ED  Interpretation:     Interpretation: abnormal    Rate:     ECG rate:  79    ECG rate assessment: normal    Rhythm:     Rhythm: paced    Pacing:     Capture:  Complete    Type of pacing:  Ventricular  Ectopy:     Ectopy: PVCs      PVCs:  Frequent             ED Course                                           MDM  Number of Diagnoses or Management Options  Diagnosis management comments: Non vertiginous lightheadedness and dizziness in elderly female with recent pacemaker  She is on Xarelto  Will check CTA, cardiac profile      Disposition  Final diagnoses:   Dizziness   Hypertension   Anxiety     Time reflects when diagnosis was documented in both MDM as applicable and the Disposition within this note     Time User Action Codes Description Comment    4/17/2021  1:39 AM Kirkland Einstein Add [R42] Dizziness     4/17/2021  1:40 AM Kirkland Einstein Add [I10] Hypertension     4/17/2021  1:40 AM Rachid [email or web address] Add [F41 9] Anxiety       ED Disposition     ED Disposition Condition Date/Time Comment    Discharge Stable Sat Apr 17, 2021  1:39 AM Charles Redding discharge to home/self care  Follow-up Information     Follow up With Specialties Details Why Contact Info    Jerrell Watt MD Internal Medicine, Family Medicine Schedule an appointment as soon as possible for a visit in 3 days For re-evauation, recheck your blood pressure, and further management   [address] [facility]  [phone]            Discharge Medication List as of 4/17/2021  1:42 AM      START taking these medications    Details   meclizine (ANTIVERT) 25 mg tablet Take 1 tablet (25 mg total) by mouth 3 (three) times a day as needed for dizziness, Starting Sat 4/17/2021, Normal         CONTINUE these medications which have NOT CHANGED    Details   ammonium lactate (LAC-HYDRIN) 12 % cream Apply topically 2 (two) times a day as needed for dry skin, Starting Wed 3/31/2021, Normal      Cholecalciferol (VITAMIN D) 2000 units CAPS Take 1 capsule by mouth daily, Historical Med      ezetimibe (ZETIA) 10 mg tablet Starting Wed 12/23/2020, Historical Med      fluticasone (VERAMYST) 27 5 MCG/SPRAY nasal spray 2 sprays into each nostril daily, Historical Med      furosemide (LASIX) 20 mg tablet Take 1 tablet (20 mg total) by mouth 3 (three) times a week Take on M/W/F, Starting Wed 12/23/2020, Normal      halobetasol (ULTRAVATE) 0 05 % cream Starting Thu 5/30/2019, Historical Med      levothyroxine 75 mcg tablet TAKE ONE TABLET BY MOUTH EVERY DAY, Normal      Multiple Vitamins-Minerals (CENTRUM SILVER ULTRA WOMENS) TABS Take by mouth, Historical Med      rosuvastatin (CRESTOR) 10 MG tablet TAKE ONE TABLET BY MOUTH EVERY DAY, Normal      Xarelto 20 MG tablet Take 1 tablet (20 mg total) by mouth daily with breakfast, Starting Fri 1/15/2021, Normal           No discharge procedures on file      PDMP Review     None          ED Provider  Electronically Signed by           Misty Francis MD  04/17/21 2418

## 2021-04-17 VITALS
HEART RATE: 70 BPM | TEMPERATURE: 98.4 F | DIASTOLIC BLOOD PRESSURE: 60 MMHG | OXYGEN SATURATION: 94 % | RESPIRATION RATE: 20 BRPM | SYSTOLIC BLOOD PRESSURE: 130 MMHG

## 2021-04-17 PROCEDURE — 96374 THER/PROPH/DIAG INJ IV PUSH: CPT

## 2021-04-17 RX ORDER — MECLIZINE HYDROCHLORIDE 25 MG/1
50 TABLET ORAL ONCE
Status: COMPLETED | OUTPATIENT
Start: 2021-04-17 | End: 2021-04-17

## 2021-04-17 RX ORDER — ONDANSETRON 2 MG/ML
INJECTION INTRAMUSCULAR; INTRAVENOUS
Status: COMPLETED
Start: 2021-04-17 | End: 2021-04-17

## 2021-04-17 RX ORDER — ONDANSETRON 2 MG/ML
4 INJECTION INTRAMUSCULAR; INTRAVENOUS ONCE
Status: COMPLETED | OUTPATIENT
Start: 2021-04-17 | End: 2021-04-17

## 2021-04-17 RX ORDER — MECLIZINE HYDROCHLORIDE 25 MG/1
25 TABLET ORAL 3 TIMES DAILY PRN
Qty: 30 TABLET | Refills: 0 | Status: SHIPPED | OUTPATIENT
Start: 2021-04-17 | End: 2021-07-12

## 2021-04-17 RX ORDER — HYDRALAZINE HYDROCHLORIDE 20 MG/ML
20 INJECTION INTRAMUSCULAR; INTRAVENOUS ONCE
Status: COMPLETED | OUTPATIENT
Start: 2021-04-17 | End: 2021-04-17

## 2021-04-17 RX ADMIN — ONDANSETRON 4 MG: 2 INJECTION INTRAMUSCULAR; INTRAVENOUS at 02:29

## 2021-04-17 RX ADMIN — MECLIZINE HYDROCHLORIDE 50 MG: 25 TABLET ORAL at 00:28

## 2021-04-17 RX ADMIN — HYDRALAZINE HYDROCHLORIDE 20 MG: 20 INJECTION INTRAMUSCULAR; INTRAVENOUS at 00:29

## 2021-04-17 NOTE — ED CARE HANDOFF
Emergency Department Sign Out Note        Sign out and transfer of care from Dr Liliam Núñez  See Separate Emergency Department note  The patient, Vira Blackmon, was evaluated by the previous provider for dizziness  Workup Completed:  EKG, labs,CXR    ED Course / Workup Pending (followup):  CTA, 2nd trop                                  ED Course as of Apr 17 0444   Sat Apr 17, 2021   0136 Pt s/o by Dr Liliam Núñez a 80F PMH as sig for heart disease, hypothyroid, anxiety, deaf p/w 2-3 wk of nonspecific vertiginous type dizziness, hypertensive on exam, non-focal neurologically, highly anxious pending 2nd trop and CTA head at s/o  CTA neg and 2nd trop neg  PT re-eval, still slightly dizzy, and htn, no meds given prior; offered antihypertensives and meclizine which pt agreed to  Pt with mild panic attack after getting meds stating she was very dry in her mouth, shaky and had nausea w/o vomiting  Denies headache, cp, back or abd pain  No sob  No rashes  No difficulty breathing, no wheezing no stridor no drooling, just kept asking for water; able to control shaking hands on command  Pt given option to be observed in hospital, but refused  Pt given zofran and then water which she tolerated well  B/P improved and pt now much more calm and asking to be discharged  Procedures  MDM    Disposition  Final diagnoses:   Dizziness   Hypertension   Anxiety     Time reflects when diagnosis was documented in both MDM as applicable and the Disposition within this note     Time User Action Codes Description Comment    4/17/2021  1:39 AM Brooksie Skiff Add [R42] Dizziness     4/17/2021  1:40 AM Brooksie Skiff Add [I10] Hypertension     4/17/2021  1:40 AM Brooksie Skiff Add [F41 9] Anxiety       ED Disposition     ED Disposition Condition Date/Time Comment    Discharge Stable Sat Apr 17, 2021  1:39 AM Vira Blackmon discharge to home/self care              Follow-up Information     Follow up With Specialties Details Why Αμαλίας MD Shannan Internal Medicine, Family Medicine Schedule an appointment as soon as possible for a visit in 3 days For re-evauation, recheck your blood pressure, and further management  [address] [facility]  [phone]          Discharge Medication List as of 4/17/2021  1:42 AM      START taking these medications    Details   meclizine (ANTIVERT) 25 mg tablet Take 1 tablet (25 mg total) by mouth 3 (three) times a day as needed for dizziness, Starting Sat 4/17/2021, Normal         CONTINUE these medications which have NOT CHANGED    Details   ammonium lactate (LAC-HYDRIN) 12 % cream Apply topically 2 (two) times a day as needed for dry skin, Starting Wed 3/31/2021, Normal      Cholecalciferol (VITAMIN D) 2000 units CAPS Take 1 capsule by mouth daily, Historical Med      ezetimibe (ZETIA) 10 mg tablet Starting Wed 12/23/2020, Historical Med      fluticasone (VERAMYST) 27 5 MCG/SPRAY nasal spray 2 sprays into each nostril daily, Historical Med      furosemide (LASIX) 20 mg tablet Take 1 tablet (20 mg total) by mouth 3 (three) times a week Take on M/W/F, Starting Wed 12/23/2020, Normal      halobetasol (ULTRAVATE) 0 05 % cream Starting u 5/30/2019, Historical Med      levothyroxine 75 mcg tablet TAKE ONE TABLET BY MOUTH EVERY DAY, Normal      Multiple Vitamins-Minerals (CENTRUM SILVER ULTRA WOMENS) TABS Take by mouth, Historical Med      rosuvastatin (CRESTOR) 10 MG tablet TAKE ONE TABLET BY MOUTH EVERY DAY, Normal      Xarelto 20 MG tablet Take 1 tablet (20 mg total) by mouth daily with breakfast, Starting Fri 1/15/2021, Normal           No discharge procedures on file         ED Provider  Electronically Signed by     Abhi Estrada MD  04/17/21 0837

## 2021-04-20 ENCOUNTER — OFFICE VISIT (OUTPATIENT)
Dept: CARDIOLOGY CLINIC | Facility: CLINIC | Age: 81
End: 2021-04-20
Payer: COMMERCIAL

## 2021-04-20 VITALS
TEMPERATURE: 99.5 F | OXYGEN SATURATION: 98 % | HEIGHT: 55 IN | DIASTOLIC BLOOD PRESSURE: 90 MMHG | BODY MASS INDEX: 31.19 KG/M2 | WEIGHT: 134.8 LBS | SYSTOLIC BLOOD PRESSURE: 178 MMHG | HEART RATE: 79 BPM

## 2021-04-20 DIAGNOSIS — I25.10 CORONARY ARTERY DISEASE INVOLVING NATIVE CORONARY ARTERY OF NATIVE HEART WITHOUT ANGINA PECTORIS: ICD-10-CM

## 2021-04-20 DIAGNOSIS — I10 ESSENTIAL HYPERTENSION: ICD-10-CM

## 2021-04-20 DIAGNOSIS — I50.32 CHRONIC DIASTOLIC CHF (CONGESTIVE HEART FAILURE) (HCC): Primary | ICD-10-CM

## 2021-04-20 DIAGNOSIS — I48.3 TYPICAL ATRIAL FLUTTER (HCC): ICD-10-CM

## 2021-04-20 DIAGNOSIS — R42 DIZZINESS: ICD-10-CM

## 2021-04-20 PROCEDURE — 1160F RVW MEDS BY RX/DR IN RCRD: CPT | Performed by: INTERNAL MEDICINE

## 2021-04-20 PROCEDURE — 1036F TOBACCO NON-USER: CPT | Performed by: INTERNAL MEDICINE

## 2021-04-20 PROCEDURE — 3080F DIAST BP >= 90 MM HG: CPT | Performed by: INTERNAL MEDICINE

## 2021-04-20 PROCEDURE — 3077F SYST BP >= 140 MM HG: CPT | Performed by: INTERNAL MEDICINE

## 2021-04-20 PROCEDURE — 99214 OFFICE O/P EST MOD 30 MIN: CPT | Performed by: INTERNAL MEDICINE

## 2021-04-20 RX ORDER — LISINOPRIL 2.5 MG/1
2.5 TABLET ORAL DAILY
Qty: 30 TABLET | Refills: 1 | Status: SHIPPED | OUTPATIENT
Start: 2021-04-20 | End: 2021-05-07 | Stop reason: SDUPTHER

## 2021-04-20 NOTE — PROGRESS NOTES
Cardiology Follow Up    Starla Peterson  1940  [id number]      Interval History: Starla Peterson is here for followup of atrial fibrillation and edema  Since her last visit, she had AV node ablation along with permanent pacemaker insertion  Procedure was done by Dr Greyson Luna at [facility]  Since then, she was feeling well until recently when she developed dizziness and fatigue  She was seen in the ER of [facility]  Workup was negative including CTA of head  BP was markedly elevated  She is not on any BP medication at this time  She has a history of multiple medication intolerances and previously she has stopped taking furosemide because she felt it was causing her lower back pain and frequent urination  Could also not tolerate beta blocker, sotalol, amiodarone either  She has been taking Xarelto regularly  She has a history of atrial fibrillation with multiple failed cardioversions in the past  She was previously taking sotalol but stopped it independently because of side effects  Amiodarone was stopped because of GI side effects  Bystolic was stopped due to nausea  Previously, she felt metoprolol was causing her to feel bad as well  Amlodipine was stopped in the past because of LE edema  Echocardiogram in March 2020 showed EF of 45% with moderate to marked LA dilation         The following portions of the patient's history were reviewed and updated as appropriate: allergies, current medications, past family history, past medical history, past social history, past surgical history and problem list     Current Outpatient Medications on File Prior to Visit   Medication Sig Dispense Refill    ammonium lactate (LAC-HYDRIN) 12 % cream Apply topically 2 (two) times a day as needed for dry skin 385 g 2    Cholecalciferol (VITAMIN D) 2000 units CAPS Take 1 capsule by mouth daily      ezetimibe (ZETIA) 10 mg tablet  fluticasone (VERAMYST) 27 5 MCG/SPRAY nasal spray 2 sprays into each nostril daily      furosemide (LASIX) 20 mg tablet Take 1 tablet (20 mg total) by mouth 3 (three) times a week Take on M/W/F 30 tablet 3    halobetasol (ULTRAVATE) 0 05 % cream   0    levothyroxine 75 mcg tablet TAKE ONE TABLET BY MOUTH EVERY DAY 90 tablet 3    Multiple Vitamins-Minerals (CENTRUM SILVER ULTRA WOMENS) TABS Take by mouth      rosuvastatin (CRESTOR) 10 MG tablet TAKE ONE TABLET BY MOUTH EVERY DAY 90 tablet 1    Xarelto 20 MG tablet Take 1 tablet (20 mg total) by mouth daily with breakfast 90 tablet 1    meclizine (ANTIVERT) 25 mg tablet Take 1 tablet (25 mg total) by mouth 3 (three) times a day as needed for dizziness (Patient not taking: Reported on 4/20/2021) 30 tablet 0     No current facility-administered medications on file prior to visit  Review of Systems:  Review of Systems   Constitutional: Positive for fatigue  HENT: Positive for hearing loss  Musculoskeletal: Positive for arthralgias and myalgias  Neurological: Positive for dizziness and light-headedness  All other systems reviewed and are negative  Physical Exam:  BP (!) 178/90 (BP Location: Right arm, Patient Position: Sitting, Cuff Size: Standard)   Pulse 79   Temp 99 5 °F (37 5 °C) (Temporal)   Ht 4' 7" (1 397 m)   Wt 61 1 kg (134 lb 12 8 oz)   SpO2 98%   BMI 31 33 kg/m²     Physical Exam  Constitutional:       General: She is not in acute distress  Appearance: She is well-developed  She is not diaphoretic  HENT:      Head: Normocephalic and atraumatic  Eyes:      Conjunctiva/sclera: Conjunctivae normal       Pupils: Pupils are equal, round, and reactive to light  Neck:      Musculoskeletal: Neck supple  Thyroid: No thyromegaly  Vascular: No JVD  Cardiovascular:      Rate and Rhythm: Normal rate  Rhythm irregularly irregular  Heart sounds: Normal heart sounds  No murmur  No friction rub  No gallop  Pulmonary:      Effort: Pulmonary effort is normal       Breath sounds: Normal breath sounds  Skin:     General: Skin is warm and dry  Findings: No erythema or rash  Neurological:      General: No focal deficit present  Mental Status: She is alert and oriented to person, place, and time  Mental status is at baseline  Cranial Nerves: No cranial nerve deficit  Psychiatric:         Mood and Affect: Mood normal          Behavior: Behavior normal          Thought Content: Thought content normal          Judgment: Judgment normal          Cardiographics  ECG:  Atrial flutter with Ventricular pacing and PVCs    Labs:  Lab Results   Component Value Date     08/15/2017    K 4 0 04/16/2021    K 4 6 02/10/2020     04/16/2021     02/10/2020    CO2 27 04/16/2021    CO2 25 02/10/2020    BUN 21 04/16/2021    BUN 21 02/10/2020    CREATININE 0 92 04/16/2021    CREATININE 0 88 08/15/2017    GLUCOSE 94 08/15/2017    CALCIUM 9 0 04/16/2021    CALCIUM 9 2 08/15/2017     Lab Results   Component Value Date    WBC 6 19 04/16/2021    WBC 4 8 08/15/2017    HGB 15 1 04/16/2021    HGB 14 2 08/15/2017    HCT 47 0 (H) 04/16/2021    HCT 43 7 08/15/2017    MCV 88 04/16/2021    MCV 86 08/15/2017     04/16/2021     08/15/2017     Lab Results   Component Value Date    CHOL 164 08/15/2017    TRIG 62 02/10/2020    HDL 62 02/10/2020     Imaging: No results found  Discussion/Summary:  1  Chronic diastolic CHF (congestive heart failure) ([facility] )    2  Essential hypertension    3  Dizziness    4  Coronary artery disease involving native coronary artery of native heart without angina pectoris    5  Typical atrial flutter ([facility] )      - Patient had AV node ablation along with pacemaker insertion  She has had significant improvement in her symptoms   - BP markedly elevated recently  Will add lisinopril 2 5 mg daily and increase next visit  She will follow up in 2 weeks for BP check      - She has multiple medication intolerances including beta blockers and calcium channel blocker  - Patient is on Xarelto  Aspirin was discontinued and she was continued on Xarelto alone because of excessive bruising    - She has stopped lasix without any edema at this time  Will continue to monitor

## 2021-04-21 LAB
ATRIAL RATE: 74 BPM
QRS AXIS: -46 DEGREES
QRSD INTERVAL: 154 MS
QT INTERVAL: 412 MS
QTC INTERVAL: 472 MS
T WAVE AXIS: 81 DEGREES
VENTRICULAR RATE: 79 BPM

## 2021-04-21 PROCEDURE — 93010 ELECTROCARDIOGRAM REPORT: CPT | Performed by: INTERNAL MEDICINE

## 2021-04-21 PROCEDURE — 93000 ELECTROCARDIOGRAM COMPLETE: CPT | Performed by: INTERNAL MEDICINE

## 2021-05-07 ENCOUNTER — OFFICE VISIT (OUTPATIENT)
Dept: CARDIOLOGY CLINIC | Facility: CLINIC | Age: 81
End: 2021-05-07
Payer: COMMERCIAL

## 2021-05-07 VITALS
BODY MASS INDEX: 31.24 KG/M2 | HEIGHT: 55 IN | OXYGEN SATURATION: 96 % | HEART RATE: 74 BPM | SYSTOLIC BLOOD PRESSURE: 132 MMHG | TEMPERATURE: 99.2 F | WEIGHT: 135 LBS | DIASTOLIC BLOOD PRESSURE: 82 MMHG

## 2021-05-07 DIAGNOSIS — I48.3 TYPICAL ATRIAL FLUTTER (HCC): ICD-10-CM

## 2021-05-07 DIAGNOSIS — I25.10 CORONARY ARTERY DISEASE INVOLVING NATIVE CORONARY ARTERY OF NATIVE HEART WITHOUT ANGINA PECTORIS: ICD-10-CM

## 2021-05-07 DIAGNOSIS — E78.5 DYSLIPIDEMIA: ICD-10-CM

## 2021-05-07 DIAGNOSIS — I50.32 CHRONIC DIASTOLIC CHF (CONGESTIVE HEART FAILURE) (HCC): ICD-10-CM

## 2021-05-07 DIAGNOSIS — I10 ESSENTIAL HYPERTENSION: Primary | ICD-10-CM

## 2021-05-07 PROCEDURE — 99214 OFFICE O/P EST MOD 30 MIN: CPT | Performed by: INTERNAL MEDICINE

## 2021-05-07 RX ORDER — LISINOPRIL 2.5 MG/1
2.5 TABLET ORAL DAILY
Qty: 90 TABLET | Refills: 3 | Status: SHIPPED | OUTPATIENT
Start: 2021-05-07 | End: 2022-05-23

## 2021-05-07 NOTE — PROGRESS NOTES
Cardiology Follow Up    Deborah Tarango  1940  [id number]      Interval History: Deborah Tarango is here for followup of  Hypertension  During her last visit, she complained of lightheadedness, fatigue and dizziness  Blood pressure was markedly elevated and she was started on lisinopril 2 5 mg daily  She has been feeling well since then  Blood pressure has significantly improved  Current blood pressure is 132/82  She has no complaints at this time  She denies any chest pain, lightheadedness, lower extremity edema, orthopnea or paroxysmal nocturnal dyspnea  Since her last visit, she had AV node ablation along with permanent pacemaker insertion  Procedure was done by Dr Sirisha Louis at [facility]  Since then, she was feeling well until recently when she developed dizziness and fatigue  She was seen in the ER of [facility]  Workup was negative including CTA of head  BP was markedly elevated  She is not on any BP medication at this time  She has a history of multiple medication intolerances and previously she has stopped taking furosemide because she felt it was causing her lower back pain and frequent urination  Could also not tolerate beta blocker, sotalol, amiodarone either  She has been taking Xarelto regularly  She has a history of atrial fibrillation with multiple failed cardioversions in the past  She eventually had AV node ablation along with permanent pacemaker insertion by Dr Sirisha Louis at [facility]  She was previously taking sotalol but stopped it independently because of side effects  Amiodarone was stopped because of GI side effects  Bystolic was stopped due to nausea  Previously, she felt metoprolol was causing her to feel bad as well  Amlodipine was stopped in the past because of LE edema  Echocardiogram in March 2020 showed EF of 45% with moderate to marked LA dilation  The following portions of the patient's history were reviewed and updated as appropriate: allergies, current medications, past family history, past medical history, past social history, past surgical history and problem list     Current Outpatient Medications on File Prior to Visit   Medication Sig Dispense Refill    ammonium lactate (LAC-HYDRIN) 12 % cream Apply topically 2 (two) times a day as needed for dry skin 385 g 2    Cholecalciferol (VITAMIN D) 2000 units CAPS Take 1 capsule by mouth daily      ezetimibe (ZETIA) 10 mg tablet       fluticasone (VERAMYST) 27 5 MCG/SPRAY nasal spray 2 sprays into each nostril daily      furosemide (LASIX) 20 mg tablet Take 1 tablet (20 mg total) by mouth 3 (three) times a week Take on M/W/F 30 tablet 3    halobetasol (ULTRAVATE) 0 05 % cream   0    levothyroxine 75 mcg tablet TAKE ONE TABLET BY MOUTH EVERY DAY 90 tablet 3    meclizine (ANTIVERT) 25 mg tablet Take 1 tablet (25 mg total) by mouth 3 (three) times a day as needed for dizziness (Patient not taking: Reported on 4/20/2021) 30 tablet 0    Multiple Vitamins-Minerals (CENTRUM SILVER ULTRA WOMENS) TABS Take by mouth      rosuvastatin (CRESTOR) 10 MG tablet TAKE ONE TABLET BY MOUTH EVERY DAY 90 tablet 1    Xarelto 20 MG tablet Take 1 tablet (20 mg total) by mouth daily with breakfast 90 tablet 1    [DISCONTINUED] lisinopril (ZESTRIL) 2 5 mg tablet Take 1 tablet (2 5 mg total) by mouth daily 30 tablet 1     No current facility-administered medications on file prior to visit  Review of Systems:  Review of Systems   HENT: Positive for hearing loss  All other systems reviewed and are negative        Physical Exam:  /82 (BP Location: Left arm, Patient Position: Sitting, Cuff Size: Standard)   Pulse 74   Temp 99 2 °F (37 3 °C) (Temporal)   Ht 4' 7" (1 397 m)   Wt 61 2 kg (135 lb)   SpO2 96%   BMI 31 38 kg/m²     Physical Exam  Constitutional:       General: She is not in acute distress  Appearance: She is well-developed  She is not diaphoretic  HENT:      Head: Normocephalic and atraumatic  Eyes:      Conjunctiva/sclera: Conjunctivae normal       Pupils: Pupils are equal, round, and reactive to light  Neck:      Musculoskeletal: Neck supple  Thyroid: No thyromegaly  Vascular: No JVD  Cardiovascular:      Rate and Rhythm: Normal rate and regular rhythm  Heart sounds: Normal heart sounds  No murmur  No friction rub  No gallop  Pulmonary:      Effort: Pulmonary effort is normal       Breath sounds: Normal breath sounds  Skin:     General: Skin is warm and dry  Findings: No erythema or rash  Neurological:      General: No focal deficit present  Mental Status: She is alert and oriented to person, place, and time  Mental status is at baseline  Psychiatric:         Mood and Affect: Mood normal          Behavior: Behavior normal          Thought Content: Thought content normal          Judgment: Judgment normal          Cardiographics  ECG:  Atrial flutter with Ventricular pacing and PVCs    Labs:  Lab Results   Component Value Date     08/15/2017    K 4 0 04/16/2021    K 4 6 02/10/2020     04/16/2021     02/10/2020    CO2 27 04/16/2021    CO2 25 02/10/2020    BUN 21 04/16/2021    BUN 21 02/10/2020    CREATININE 0 92 04/16/2021    CREATININE 0 88 08/15/2017    GLUCOSE 94 08/15/2017    CALCIUM 9 0 04/16/2021    CALCIUM 9 2 08/15/2017     Lab Results   Component Value Date    WBC 6 19 04/16/2021    WBC 4 8 08/15/2017    HGB 15 1 04/16/2021    HGB 14 2 08/15/2017    HCT 47 0 (H) 04/16/2021    HCT 43 7 08/15/2017    MCV 88 04/16/2021    MCV 86 08/15/2017     04/16/2021     08/15/2017     Lab Results   Component Value Date    CHOL 164 08/15/2017    TRIG 62 02/10/2020    HDL 62 02/10/2020     Imaging: No results found  Discussion/Summary:  1  Essential hypertension    2   Coronary artery disease involving native coronary artery of native heart without angina pectoris    3  Chronic diastolic CHF (congestive heart failure) ([facility] )    4  Typical atrial flutter ([facility] )    5  Dyslipidemia      - BP significantly improved with addition of low dose of lisinopril 2 5 mg daily  Continue at current dose and follow up in 3 months for recheck  - Patient had AV node ablation along with pacemaker insertion  She has had significant improvement in her symptoms    - She has multiple medication intolerances including beta blockers and calcium channel blocker  - Patient is on Xarelto  Aspirin was discontinued and she was continued on Xarelto alone because of excessive bruising    - She has stopped lasix without any edema at this time  Will continue to monitor

## 2021-06-15 ENCOUNTER — OFFICE VISIT (OUTPATIENT)
Dept: FAMILY MEDICINE CLINIC | Facility: CLINIC | Age: 81
End: 2021-06-15
Payer: COMMERCIAL

## 2021-06-15 ENCOUNTER — TELEPHONE (OUTPATIENT)
Dept: CARDIOLOGY CLINIC | Facility: CLINIC | Age: 81
End: 2021-06-15

## 2021-06-15 VITALS
BODY MASS INDEX: 30.78 KG/M2 | TEMPERATURE: 96.4 F | WEIGHT: 133 LBS | HEIGHT: 55 IN | DIASTOLIC BLOOD PRESSURE: 84 MMHG | HEART RATE: 62 BPM | RESPIRATION RATE: 20 BRPM | SYSTOLIC BLOOD PRESSURE: 134 MMHG | OXYGEN SATURATION: 98 %

## 2021-06-15 DIAGNOSIS — R07.0 THROAT DISCOMFORT: ICD-10-CM

## 2021-06-15 DIAGNOSIS — R05.9 COUGH: Primary | ICD-10-CM

## 2021-06-15 PROCEDURE — 3075F SYST BP GE 130 - 139MM HG: CPT | Performed by: FAMILY MEDICINE

## 2021-06-15 PROCEDURE — 1160F RVW MEDS BY RX/DR IN RCRD: CPT | Performed by: FAMILY MEDICINE

## 2021-06-15 PROCEDURE — 99213 OFFICE O/P EST LOW 20 MIN: CPT | Performed by: FAMILY MEDICINE

## 2021-06-15 PROCEDURE — 3079F DIAST BP 80-89 MM HG: CPT | Performed by: FAMILY MEDICINE

## 2021-06-15 PROCEDURE — 1036F TOBACCO NON-USER: CPT | Performed by: FAMILY MEDICINE

## 2021-06-15 PROCEDURE — 3725F SCREEN DEPRESSION PERFORMED: CPT | Performed by: FAMILY MEDICINE

## 2021-06-15 RX ORDER — GUAIFENESIN 600 MG
1200 TABLET, EXTENDED RELEASE 12 HR ORAL EVERY 12 HOURS SCHEDULED
Status: CANCELLED | OUTPATIENT
Start: 2021-06-15

## 2021-06-15 NOTE — TELEPHONE ENCOUNTER
Patient's  called regarding lisinopril and cough  PCP recommended a call be placed to our office   wants an appointment for tomorrow?     [phone]

## 2021-06-15 NOTE — PROGRESS NOTES
Assessment/Plan:     Diagnoses and all orders for this visit:    Cough  Throat discomfort  Rhinorrhea     Symptoms may be related to seasonal allergies vs side effect of lisinopril  Discussed over the counter medication such as decongestant or Flonase nasal spray  Along with an allergy medication such as Claritin, Allegra, Xyzal for allergy symptoms such as itchy throat, rhinorrhea, congestion  Due to timing of symptoms, her cough may also be related to lisinopril use which was started at the same time her symptoms began  She states she will call her cardiologist and discuss alternative medication  Return if symptoms worsen or fail to improve  Subjective:      Patient ID: Starla Peterson is a 79 y o  female  Loida Stokes presents today for c/o 2 month history of cough and throat itching  Symptoms began gradually and are progressively worsening  She also reports runny nose  She denies fevers, chills, shortness of breath, chest pain, sinus pain/pressure, ear pain, nasal congestion  She was started on lisinopril by her cardiologist around the same time her cough began  The following portions of the patient's history were reviewed and updated as appropriate: allergies, current medications, past family history, past medical history, past social history, past surgical history and problem list     Review of Systems   Constitutional: Negative  HENT: Positive for postnasal drip and rhinorrhea  Eyes: Negative  Respiratory: Positive for cough  Cardiovascular: Negative  Gastrointestinal: Negative  Endocrine: Negative  Genitourinary: Negative  Musculoskeletal: Negative  Skin: Negative  Allergic/Immunologic: Negative  Neurological: Negative  Hematological: Negative  Psychiatric/Behavioral: Negative            Objective:      /84   Pulse 62   Temp (!) 96 4 °F (35 8 °C)   Resp 20   Ht 4' 7" (1 397 m)   Wt 60 3 kg (133 lb)   SpO2 98%   BMI 30 91 kg/m² Physical Exam  Constitutional:       General: She is not in acute distress  Appearance: Normal appearance  She is well-developed  She is not diaphoretic  HENT:      Head: Normocephalic and atraumatic  Right Ear: Tympanic membrane, ear canal and external ear normal  There is no impacted cerumen  Left Ear: Tympanic membrane, ear canal and external ear normal  There is no impacted cerumen  Eyes:      General: No scleral icterus  Right eye: No discharge  Left eye: No discharge  Extraocular Movements: Extraocular movements intact  Conjunctiva/sclera: Conjunctivae normal       Pupils: Pupils are equal, round, and reactive to light  Cardiovascular:      Rate and Rhythm: Normal rate and regular rhythm  Heart sounds: Normal heart sounds  No murmur heard  No friction rub  No gallop  Pulmonary:      Effort: Pulmonary effort is normal  No respiratory distress  Breath sounds: Normal breath sounds  No wheezing or rales  Chest:      Chest wall: No tenderness  Musculoskeletal:         General: No deformity  Normal range of motion  Cervical back: Normal range of motion and neck supple  Skin:     General: Skin is warm and dry  Findings: No erythema or rash  Neurological:      Mental Status: She is alert and oriented to person, place, and time  Psychiatric:         Behavior: Behavior normal          Thought Content:  Thought content normal          Judgment: Judgment normal

## 2021-06-15 NOTE — PATIENT INSTRUCTIONS
I think her symptoms are du LISINOPRIL use because they started around the same time the medicine was started and it is a common side effect of this medicine  If it is not because of the medication, it can be because of allergies  Usually the treatment of allergies is to take an over the counter medication such as Mucinex DM or Flonase nasal spray   Along with an allergy medication such as Claritin, Allegra, Xyzal

## 2021-06-21 ENCOUNTER — TELEPHONE (OUTPATIENT)
Dept: FAMILY MEDICINE CLINIC | Facility: CLINIC | Age: 81
End: 2021-06-21

## 2021-06-22 NOTE — TELEPHONE ENCOUNTER
Called  Mary Quevedo at [phone]  [facility]  Please let her know we need her for 07/12/2021 and 8/02/2021     Carlin Hair MA

## 2021-06-22 NOTE — TELEPHONE ENCOUNTER
Charmian Maroon called back and verified that she can come to both visits for patient  She will be there 7/12 and 8/2 appt      I will notate this on the appt notes

## 2021-07-08 ENCOUNTER — RA CDI HCC (OUTPATIENT)
Dept: OTHER | Facility: HOSPITAL | Age: 81
End: 2021-07-08

## 2021-07-08 NOTE — PROGRESS NOTES
[facility]  coding opportunities          Chart reviewed, no opportunity found: CHART REVIEWED, NO OPPORTUNITY FOUND                     Patients insurance company: [employer] (Medicare and Commercial for [employer] and [employer])

## 2021-07-08 NOTE — PROGRESS NOTES
Assessment/Plan:    1  Essential hypertension  Assessment & Plan:  Well controlled and will continue lisinopril at current dose  Orders:  -     CBC and differential; Future; Expected date: 10/12/2021  -     Comprehensive metabolic panel; Future; Expected date: 10/12/2021  -     Lipid panel; Future; Expected date: 10/12/2021    2  Anxiety  Assessment & Plan:  Worsening and we discussed how this is not likely due to the monitor but anxiety over the monitor itself  She is not willing to take another daily medication but is willing to try something as needed  We discussed xanax and how it can be potentially addictive  She will use this sparingly and only as needed  Orders:  -     ALPRAZolam (XANAX) 0 25 mg tablet; Take 1 tablet (0 25 mg total) by mouth 2 (two) times a day as needed for anxiety or sleep    3  Thyroid nodule  -     TSH, 3rd generation with Free T4 reflex; Future; Expected date: 10/12/2021    4  Hypothyroidism, unspecified type  Assessment & Plan:  Up to date with labwork and clinically euthyroid  Will continue levothyroxine at current dose  Orders:  -     TSH, 3rd generation with Free T4 reflex; Future; Expected date: 10/12/2021          There are no Patient Instructions on file for this visit  Return in about 3 months (around 10/12/2021) for cancel 8/2 visit  Subjective:      Patient ID: Emilee Selby is a 79 y o  female  Chief Complaint   Patient presents with    heart monitor     when she is in bed it sends an alert  ac/lpn    Dizziness     brain fog   Cough     scratchy throat       Here to discuss her pacemaker  She had this placed for complete heart block  She feels the monitor is causing her to feel foggy, anxious, and not be able to sleep  Her thoughts keep spinning at nighttime  She wants to sleep in a room away from the phone monitor  She feels lightheaded when she lays down to go to sleep    She discussed this with her cardiologist, who told her to come here, he did not think this was a problem with the monitor  She denies SI/HI  Does not want to take any more chronic medications  The following portions of the patient's history were reviewed and updated as appropriate: allergies, current medications, past family history, past medical history, past social history, past surgical history and problem list     Review of Systems   Constitutional: Negative  Respiratory: Negative  Cardiovascular: Negative  Psychiatric/Behavioral: Positive for sleep disturbance  Negative for dysphoric mood, self-injury and suicidal ideas  The patient is nervous/anxious  Current Outpatient Medications   Medication Sig Dispense Refill    fluticasone (VERAMYST) 27 5 MCG/SPRAY nasal spray 2 sprays into each nostril daily      furosemide (LASIX) 20 mg tablet Take 1 tablet (20 mg total) by mouth 3 (three) times a week Take on M/W/F 30 tablet 3    levothyroxine 75 mcg tablet TAKE ONE TABLET BY MOUTH EVERY DAY 90 tablet 3    lisinopril (ZESTRIL) 2 5 mg tablet Take 1 tablet (2 5 mg total) by mouth daily 90 tablet 3    rosuvastatin (CRESTOR) 10 MG tablet TAKE ONE TABLET BY MOUTH EVERY DAY 90 tablet 1    Xarelto 20 MG tablet Take 1 tablet (20 mg total) by mouth daily with breakfast 90 tablet 1    ALPRAZolam (XANAX) 0 25 mg tablet Take 1 tablet (0 25 mg total) by mouth 2 (two) times a day as needed for anxiety or sleep 30 tablet 0     No current facility-administered medications for this visit  Objective:    /80   Pulse 56   Temp 98 5 °F (36 9 °C)   Resp 16   Ht 4' 7" (1 397 m)   Wt 59 9 kg (132 lb)   BMI 30 68 kg/m²        Physical Exam  Constitutional:       Appearance: She is well-developed  Eyes:      Conjunctiva/sclera: Conjunctivae normal    Neck:      Thyroid: No thyromegaly  Vascular: No JVD  Cardiovascular:      Rate and Rhythm: Normal rate and regular rhythm  Heart sounds: Normal heart sounds  No murmur heard  No friction rub   No gallop  Pulmonary:      Effort: Pulmonary effort is normal       Breath sounds: Normal breath sounds  No wheezing or rales  Musculoskeletal:      Cervical back: Neck supple  Right lower leg: No edema  Left lower leg: No edema  Psychiatric:         Mood and Affect: Mood is anxious  Behavior: Behavior normal          Thought Content: Thought content normal  Thought content does not include homicidal or suicidal ideation  Thought content does not include homicidal or suicidal plan           Judgment: Judgment normal                 Jesse Herbert MD

## 2021-07-12 ENCOUNTER — OFFICE VISIT (OUTPATIENT)
Dept: FAMILY MEDICINE CLINIC | Facility: CLINIC | Age: 81
End: 2021-07-12
Payer: COMMERCIAL

## 2021-07-12 VITALS
SYSTOLIC BLOOD PRESSURE: 160 MMHG | DIASTOLIC BLOOD PRESSURE: 80 MMHG | HEART RATE: 56 BPM | BODY MASS INDEX: 30.55 KG/M2 | WEIGHT: 132 LBS | HEIGHT: 55 IN | TEMPERATURE: 98.5 F | RESPIRATION RATE: 16 BRPM

## 2021-07-12 DIAGNOSIS — I10 ESSENTIAL HYPERTENSION: Primary | ICD-10-CM

## 2021-07-12 DIAGNOSIS — F41.9 ANXIETY: ICD-10-CM

## 2021-07-12 DIAGNOSIS — E04.1 THYROID NODULE: ICD-10-CM

## 2021-07-12 DIAGNOSIS — E03.9 HYPOTHYROIDISM, UNSPECIFIED TYPE: ICD-10-CM

## 2021-07-12 PROBLEM — I42.9 CARDIOMYOPATHY (HCC): Status: ACTIVE | Noted: 2020-11-25

## 2021-07-12 PROBLEM — Z79.01 LONG TERM CURRENT USE OF ANTICOAGULANT: Status: ACTIVE | Noted: 2020-11-25

## 2021-07-12 PROBLEM — I44.2 COMPLETE HEART BLOCK (HCC): Status: ACTIVE | Noted: 2021-07-12

## 2021-07-12 PROBLEM — I07.1 TRICUSPID VALVE INSUFFICIENCY: Status: ACTIVE | Noted: 2020-11-25

## 2021-07-12 PROCEDURE — 1036F TOBACCO NON-USER: CPT | Performed by: INTERNAL MEDICINE

## 2021-07-12 PROCEDURE — 3079F DIAST BP 80-89 MM HG: CPT | Performed by: INTERNAL MEDICINE

## 2021-07-12 PROCEDURE — 1160F RVW MEDS BY RX/DR IN RCRD: CPT | Performed by: INTERNAL MEDICINE

## 2021-07-12 PROCEDURE — 99214 OFFICE O/P EST MOD 30 MIN: CPT | Performed by: INTERNAL MEDICINE

## 2021-07-12 PROCEDURE — 3077F SYST BP >= 140 MM HG: CPT | Performed by: INTERNAL MEDICINE

## 2021-07-12 RX ORDER — ALPRAZOLAM 0.25 MG/1
0.25 TABLET ORAL 2 TIMES DAILY PRN
Qty: 30 TABLET | Refills: 0 | Status: SHIPPED | OUTPATIENT
Start: 2021-07-12 | End: 2021-10-13

## 2021-07-12 NOTE — ASSESSMENT & PLAN NOTE
Worsening and we discussed how this is not likely due to the monitor but anxiety over the monitor itself  She is not willing to take another daily medication but is willing to try something as needed  We discussed xanax and how it can be potentially addictive  She will use this sparingly and only as needed

## 2021-07-20 DIAGNOSIS — I48.91 A-FIB (HCC): ICD-10-CM

## 2021-07-20 DIAGNOSIS — I48.3 TYPICAL ATRIAL FLUTTER (HCC): ICD-10-CM

## 2021-07-20 RX ORDER — RIVAROXABAN 20 MG/1
20 TABLET, FILM COATED ORAL
Qty: 90 TABLET | Refills: 1 | Status: SHIPPED | OUTPATIENT
Start: 2021-07-20 | End: 2022-01-03

## 2021-07-20 NOTE — TELEPHONE ENCOUNTER
Addended by: Wesley Barragan on: 9/4/2020 01:03 PM     Modules accepted: Orders Requested medication(s) are due for refill today: Yes  Patient has already received a courtesy refill: No  Other reason request has been forwarded to provider: Failed protocol

## 2021-08-11 ENCOUNTER — OFFICE VISIT (OUTPATIENT)
Dept: CARDIOLOGY CLINIC | Facility: CLINIC | Age: 81
End: 2021-08-11
Payer: COMMERCIAL

## 2021-08-11 VITALS
SYSTOLIC BLOOD PRESSURE: 146 MMHG | BODY MASS INDEX: 31.24 KG/M2 | WEIGHT: 135 LBS | DIASTOLIC BLOOD PRESSURE: 86 MMHG | HEIGHT: 55 IN | OXYGEN SATURATION: 97 % | HEART RATE: 79 BPM | TEMPERATURE: 98.3 F

## 2021-08-11 DIAGNOSIS — I49.3 PVC'S (PREMATURE VENTRICULAR CONTRACTIONS): ICD-10-CM

## 2021-08-11 DIAGNOSIS — I10 ESSENTIAL HYPERTENSION: ICD-10-CM

## 2021-08-11 DIAGNOSIS — I25.10 CORONARY ARTERY DISEASE INVOLVING NATIVE CORONARY ARTERY OF NATIVE HEART WITHOUT ANGINA PECTORIS: Primary | ICD-10-CM

## 2021-08-11 PROCEDURE — 99214 OFFICE O/P EST MOD 30 MIN: CPT | Performed by: INTERNAL MEDICINE

## 2021-08-11 PROCEDURE — 1036F TOBACCO NON-USER: CPT | Performed by: INTERNAL MEDICINE

## 2021-08-11 PROCEDURE — 1160F RVW MEDS BY RX/DR IN RCRD: CPT | Performed by: INTERNAL MEDICINE

## 2021-08-11 PROCEDURE — 93000 ELECTROCARDIOGRAM COMPLETE: CPT | Performed by: INTERNAL MEDICINE

## 2021-08-11 PROCEDURE — 3077F SYST BP >= 140 MM HG: CPT | Performed by: INTERNAL MEDICINE

## 2021-08-11 PROCEDURE — 3079F DIAST BP 80-89 MM HG: CPT | Performed by: INTERNAL MEDICINE

## 2021-08-11 RX ORDER — METOPROLOL SUCCINATE 25 MG/1
25 TABLET, EXTENDED RELEASE ORAL DAILY
Qty: 30 TABLET | Refills: 2 | Status: SHIPPED | OUTPATIENT
Start: 2021-08-11 | End: 2021-11-04

## 2021-08-11 NOTE — PROGRESS NOTES
Cardiology Follow Up    Serge Ramirez  1940  [id number]      Interval History: Serge Ramirez is here for followup of  Hypertension  During her previous visit, she complained of lightheadedness, fatigue and dizziness  Blood pressure was markedly elevated and she was started on lisinopril 2 5 mg daily  BP had significantly improved during follow up visit  Current blood pressure is 146/86  For the past 3 months, she feels fluttering sensation in her neck and head that has been        She has a history of multiple medication intolerances and previously she has stopped taking furosemide because she felt it was causing her lower back pain and frequent urination  Could also not tolerate beta blocker, sotalol, amiodarone either  She has been taking Xarelto regularly  She has a history of atrial fibrillation with multiple failed cardioversions in the past  She eventually had AV node ablation along with permanent pacemaker insertion by Dr Delbert Workman at [facility]  She was previously taking sotalol but stopped it independently because of side effects  Amiodarone was stopped because of GI side effects  Bystolic was stopped due to nausea  Previously, she felt metoprolol was causing her to feel bad as well  Amlodipine was stopped in the past because of LE edema  Echocardiogram in March 2020 showed EF of 45% with moderate to marked LA dilation           The following portions of the patient's history were reviewed and updated as appropriate: allergies, current medications, past family history, past medical history, past social history, past surgical history and problem list     Current Outpatient Medications on File Prior to Visit   Medication Sig Dispense Refill    ALPRAZolam (XANAX) 0 25 mg tablet Take 1 tablet (0 25 mg total) by mouth 2 (two) times a day as needed for anxiety or sleep 30 tablet 0    fluticasone (VERAMYST) 27 5 MCG/SPRAY nasal spray 2 sprays into each nostril daily      furosemide (LASIX) 20 mg tablet Take 1 tablet (20 mg total) by mouth 3 (three) times a week Take on M/W/F 30 tablet 3    levothyroxine 75 mcg tablet TAKE ONE TABLET BY MOUTH EVERY DAY 90 tablet 3    lisinopril (ZESTRIL) 2 5 mg tablet Take 1 tablet (2 5 mg total) by mouth daily 90 tablet 3    rosuvastatin (CRESTOR) 10 MG tablet TAKE ONE TABLET BY MOUTH EVERY DAY 90 tablet 0    Xarelto 20 MG tablet Take 1 tablet (20 mg total) by mouth daily with breakfast 90 tablet 1     No current facility-administered medications on file prior to visit  Review of Systems:  Review of Systems   HENT: Positive for hearing loss  Cardiovascular: Positive for palpitations  All other systems reviewed and are negative  Physical Exam:  /86 (BP Location: Left arm, Patient Position: Sitting, Cuff Size: Standard)   Pulse 79   Temp 98 3 °F (36 8 °C) (Temporal)   Ht 4' 7" (1 397 m)   Wt 61 2 kg (135 lb)   SpO2 97%   BMI 31 38 kg/m²     Physical Exam  Constitutional:       General: She is not in acute distress  Appearance: She is well-developed  She is not diaphoretic  HENT:      Head: Normocephalic and atraumatic  Eyes:      Conjunctiva/sclera: Conjunctivae normal       Pupils: Pupils are equal, round, and reactive to light  Neck:      Thyroid: No thyromegaly  Vascular: No JVD  Cardiovascular:      Rate and Rhythm: Normal rate and regular rhythm  Frequent extrasystoles are present  Heart sounds: Normal heart sounds  No murmur heard  No friction rub  No gallop  Pulmonary:      Effort: Pulmonary effort is normal       Breath sounds: Normal breath sounds  Musculoskeletal:      Cervical back: Neck supple  Right lower leg: No edema  Left lower leg: No edema  Skin:     General: Skin is warm and dry  Findings: No erythema or rash     Neurological:      Mental Status: She is alert and oriented to person, place, and time       Cranial Nerves: No cranial nerve deficit  Psychiatric:         Mood and Affect: Mood normal          Behavior: Behavior normal          Thought Content: Thought content normal          Judgment: Judgment normal          Cardiographics  ECG:  Atrial flutter with Ventricular pacing and PVCs    Labs:  Lab Results   Component Value Date     08/15/2017    K 4 0 04/16/2021    K 4 6 02/10/2020     04/16/2021     02/10/2020    CO2 27 04/16/2021    CO2 25 02/10/2020    BUN 21 04/16/2021    BUN 21 02/10/2020    CREATININE 0 92 04/16/2021    CREATININE 0 88 08/15/2017    GLUCOSE 94 08/15/2017    CALCIUM 9 0 04/16/2021    CALCIUM 9 2 08/15/2017     Lab Results   Component Value Date    WBC 6 19 04/16/2021    WBC 4 8 08/15/2017    HGB 15 1 04/16/2021    HGB 14 2 08/15/2017    HCT 47 0 (H) 04/16/2021    HCT 43 7 08/15/2017    MCV 88 04/16/2021    MCV 86 08/15/2017     04/16/2021     08/15/2017     Lab Results   Component Value Date    CHOL 164 08/15/2017    TRIG 62 02/10/2020    HDL 62 02/10/2020     Imaging: No results found  Discussion/Summary:  1  Coronary artery disease involving native coronary artery of native heart without angina pectoris    2  Essential hypertension    3  PVC's (premature ventricular contractions)      - BP significantly improved with addition of low dose of lisinopril 2 5 mg daily  Continue at current dose and follow up in 3 months for recheck  - Patient had AV node ablation along with pacemaker insertion  She has had significant improvement in her symptoms  She has a new sensation of palpitations with neck/head discomfort that may due to frequent ventricular ectopy - will trial Toprol XL 25 mg daily     - She has multiple medication intolerances including beta blockers and calcium channel blocker  - Patient is on Xarelto    Aspirin was discontinued and she was continued on Xarelto alone because of excessive bruising    - She has stopped lasix without any edema at this time  Will continue to monitor

## 2021-09-29 ENCOUNTER — RA CDI HCC (OUTPATIENT)
Dept: OTHER | Facility: HOSPITAL | Age: 81
End: 2021-09-29

## 2021-10-08 LAB
ALBUMIN SERPL-MCNC: 3.9 G/DL (ref 3.6–4.6)
ALBUMIN/GLOB SERPL: 1.4 {RATIO} (ref 1.2–2.2)
ALP SERPL-CCNC: 75 IU/L (ref 44–121)
ALT SERPL-CCNC: 12 IU/L (ref 0–32)
AST SERPL-CCNC: 22 IU/L (ref 0–40)
BASOPHILS # BLD AUTO: 0.1 X10E3/UL (ref 0–0.2)
BASOPHILS NFR BLD AUTO: 1 %
BILIRUB SERPL-MCNC: 1.1 MG/DL (ref 0–1.2)
BUN SERPL-MCNC: 16 MG/DL (ref 8–27)
BUN/CREAT SERPL: 17 (ref 12–28)
CALCIUM SERPL-MCNC: 9.3 MG/DL (ref 8.7–10.3)
CHLORIDE SERPL-SCNC: 103 MMOL/L (ref 96–106)
CHOLEST SERPL-MCNC: 152 MG/DL (ref 100–199)
CO2 SERPL-SCNC: 23 MMOL/L (ref 20–29)
CREAT SERPL-MCNC: 0.94 MG/DL (ref 0.57–1)
EOSINOPHIL # BLD AUTO: 0.1 X10E3/UL (ref 0–0.4)
EOSINOPHIL NFR BLD AUTO: 2 %
ERYTHROCYTE [DISTWIDTH] IN BLOOD BY AUTOMATED COUNT: 13.3 % (ref 11.7–15.4)
GLOBULIN SER-MCNC: 2.8 G/DL (ref 1.5–4.5)
GLUCOSE SERPL-MCNC: 83 MG/DL (ref 65–99)
HCT VFR BLD AUTO: 45.1 % (ref 34–46.6)
HDLC SERPL-MCNC: 54 MG/DL
HGB BLD-MCNC: 15 G/DL (ref 11.1–15.9)
IMM GRANULOCYTES # BLD: 0 X10E3/UL (ref 0–0.1)
IMM GRANULOCYTES NFR BLD: 0 %
LDLC SERPL CALC-MCNC: 83 MG/DL (ref 0–99)
LYMPHOCYTES # BLD AUTO: 1.5 X10E3/UL (ref 0.7–3.1)
LYMPHOCYTES NFR BLD AUTO: 30 %
MCH RBC QN AUTO: 28.6 PG (ref 26.6–33)
MCHC RBC AUTO-ENTMCNC: 33.3 G/DL (ref 31.5–35.7)
MCV RBC AUTO: 86 FL (ref 79–97)
MICRODELETION SYND BLD/T FISH: NORMAL
MICRODELETION SYND BLD/T FISH: NORMAL
MONOCYTES # BLD AUTO: 0.7 X10E3/UL (ref 0.1–0.9)
MONOCYTES NFR BLD AUTO: 14 %
NEUTROPHILS # BLD AUTO: 2.6 X10E3/UL (ref 1.4–7)
NEUTROPHILS NFR BLD AUTO: 53 %
PLATELET # BLD AUTO: 199 X10E3/UL (ref 150–450)
POTASSIUM SERPL-SCNC: 4.9 MMOL/L (ref 3.5–5.2)
PROT SERPL-MCNC: 6.7 G/DL (ref 6–8.5)
RBC # BLD AUTO: 5.24 X10E6/UL (ref 3.77–5.28)
SL AMB EGFR AFRICAN AMERICAN: 65 ML/MIN/1.73
SL AMB EGFR NON AFRICAN AMERICAN: 57 ML/MIN/1.73
SL AMB VLDL CHOLESTEROL CALC: 15 MG/DL (ref 5–40)
SODIUM SERPL-SCNC: 138 MMOL/L (ref 134–144)
TRIGL SERPL-MCNC: 77 MG/DL (ref 0–149)
TSH SERPL DL<=0.005 MIU/L-ACNC: 1.27 UIU/ML (ref 0.45–4.5)
WBC # BLD AUTO: 4.9 X10E3/UL (ref 3.4–10.8)

## 2021-10-13 ENCOUNTER — OFFICE VISIT (OUTPATIENT)
Dept: FAMILY MEDICINE CLINIC | Facility: CLINIC | Age: 81
End: 2021-10-13
Payer: COMMERCIAL

## 2021-10-13 VITALS
WEIGHT: 132.2 LBS | OXYGEN SATURATION: 97 % | RESPIRATION RATE: 18 BRPM | HEIGHT: 55 IN | TEMPERATURE: 98.6 F | BODY MASS INDEX: 30.59 KG/M2 | DIASTOLIC BLOOD PRESSURE: 82 MMHG | SYSTOLIC BLOOD PRESSURE: 142 MMHG | HEART RATE: 80 BPM

## 2021-10-13 DIAGNOSIS — I10 ESSENTIAL HYPERTENSION: Primary | ICD-10-CM

## 2021-10-13 DIAGNOSIS — Z23 NEED FOR VACCINATION: ICD-10-CM

## 2021-10-13 DIAGNOSIS — I48.3 TYPICAL ATRIAL FLUTTER (HCC): ICD-10-CM

## 2021-10-13 DIAGNOSIS — Z12.39 SCREENING BREAST EXAMINATION: ICD-10-CM

## 2021-10-13 DIAGNOSIS — E78.5 DYSLIPIDEMIA: ICD-10-CM

## 2021-10-13 DIAGNOSIS — I50.32 CHRONIC DIASTOLIC CHF (CONGESTIVE HEART FAILURE) (HCC): ICD-10-CM

## 2021-10-13 DIAGNOSIS — E03.9 HYPOTHYROIDISM, UNSPECIFIED TYPE: ICD-10-CM

## 2021-10-13 PROBLEM — E66.01 MORBID (SEVERE) OBESITY DUE TO EXCESS CALORIES (HCC): Status: RESOLVED | Noted: 2021-03-31 | Resolved: 2021-10-13

## 2021-10-13 PROCEDURE — 99214 OFFICE O/P EST MOD 30 MIN: CPT | Performed by: INTERNAL MEDICINE

## 2021-10-13 PROCEDURE — 3077F SYST BP >= 140 MM HG: CPT | Performed by: INTERNAL MEDICINE

## 2021-10-13 PROCEDURE — 90662 IIV NO PRSV INCREASED AG IM: CPT

## 2021-10-13 PROCEDURE — 3079F DIAST BP 80-89 MM HG: CPT | Performed by: INTERNAL MEDICINE

## 2021-10-13 PROCEDURE — 1036F TOBACCO NON-USER: CPT | Performed by: INTERNAL MEDICINE

## 2021-10-13 PROCEDURE — G0008 ADMIN INFLUENZA VIRUS VAC: HCPCS

## 2021-10-13 PROCEDURE — 1160F RVW MEDS BY RX/DR IN RCRD: CPT | Performed by: INTERNAL MEDICINE

## 2021-10-13 RX ORDER — LEVOTHYROXINE SODIUM 0.07 MG/1
75 TABLET ORAL
Qty: 90 TABLET | Refills: 3 | Status: SHIPPED | OUTPATIENT
Start: 2021-10-13 | End: 2021-11-13 | Stop reason: SDUPTHER

## 2021-11-04 DIAGNOSIS — I49.3 PVC'S (PREMATURE VENTRICULAR CONTRACTIONS): ICD-10-CM

## 2021-11-04 RX ORDER — METOPROLOL SUCCINATE 25 MG/1
TABLET, EXTENDED RELEASE ORAL
Qty: 30 TABLET | Refills: 2 | Status: SHIPPED | OUTPATIENT
Start: 2021-11-04 | End: 2021-11-18 | Stop reason: SDUPTHER

## 2021-11-11 ENCOUNTER — OFFICE VISIT (OUTPATIENT)
Dept: OTOLARYNGOLOGY | Facility: CLINIC | Age: 81
End: 2021-11-11
Payer: COMMERCIAL

## 2021-11-11 VITALS — BODY MASS INDEX: 30.5 KG/M2 | WEIGHT: 131.8 LBS | HEIGHT: 55 IN | TEMPERATURE: 98.1 F

## 2021-11-11 DIAGNOSIS — K21.9 LARYNGOPHARYNGEAL REFLUX (LPR): Primary | ICD-10-CM

## 2021-11-11 PROCEDURE — 99204 OFFICE O/P NEW MOD 45 MIN: CPT | Performed by: OTOLARYNGOLOGY

## 2021-11-11 PROCEDURE — 31575 DIAGNOSTIC LARYNGOSCOPY: CPT | Performed by: OTOLARYNGOLOGY

## 2021-11-11 RX ORDER — FAMOTIDINE 40 MG/1
40 TABLET, FILM COATED ORAL
Qty: 30 TABLET | Refills: 3 | Status: SHIPPED | OUTPATIENT
Start: 2021-11-11 | End: 2022-06-15

## 2021-11-11 RX ORDER — PANTOPRAZOLE SODIUM 40 MG/1
40 TABLET, DELAYED RELEASE ORAL DAILY
Qty: 30 TABLET | Refills: 3 | Status: SHIPPED | OUTPATIENT
Start: 2021-11-11 | End: 2022-02-17

## 2021-11-12 DIAGNOSIS — E78.5 DYSLIPIDEMIA: ICD-10-CM

## 2021-11-13 DIAGNOSIS — E03.9 HYPOTHYROIDISM, UNSPECIFIED TYPE: ICD-10-CM

## 2021-11-13 RX ORDER — ROSUVASTATIN CALCIUM 10 MG/1
TABLET, COATED ORAL
Qty: 90 TABLET | Refills: 0 | Status: SHIPPED | OUTPATIENT
Start: 2021-11-13 | End: 2022-02-11

## 2021-11-15 RX ORDER — LEVOTHYROXINE SODIUM 0.07 MG/1
75 TABLET ORAL
Qty: 90 TABLET | Refills: 3 | Status: SHIPPED | OUTPATIENT
Start: 2021-11-15

## 2021-11-18 ENCOUNTER — TELEPHONE (OUTPATIENT)
Dept: CARDIOLOGY CLINIC | Facility: CLINIC | Age: 81
End: 2021-11-18

## 2021-11-18 ENCOUNTER — OFFICE VISIT (OUTPATIENT)
Dept: CARDIOLOGY CLINIC | Facility: CLINIC | Age: 81
End: 2021-11-18
Payer: COMMERCIAL

## 2021-11-18 VITALS
OXYGEN SATURATION: 99 % | SYSTOLIC BLOOD PRESSURE: 142 MMHG | BODY MASS INDEX: 28.69 KG/M2 | DIASTOLIC BLOOD PRESSURE: 90 MMHG | TEMPERATURE: 97.8 F | WEIGHT: 133 LBS | HEIGHT: 57 IN | HEART RATE: 78 BPM

## 2021-11-18 DIAGNOSIS — I44.2 COMPLETE HEART BLOCK (HCC): ICD-10-CM

## 2021-11-18 DIAGNOSIS — I10 ESSENTIAL HYPERTENSION: ICD-10-CM

## 2021-11-18 DIAGNOSIS — I48.3 TYPICAL ATRIAL FLUTTER (HCC): ICD-10-CM

## 2021-11-18 DIAGNOSIS — I25.10 CORONARY ARTERY DISEASE INVOLVING NATIVE CORONARY ARTERY OF NATIVE HEART WITHOUT ANGINA PECTORIS: ICD-10-CM

## 2021-11-18 DIAGNOSIS — I50.32 CHRONIC DIASTOLIC CHF (CONGESTIVE HEART FAILURE) (HCC): ICD-10-CM

## 2021-11-18 DIAGNOSIS — I49.3 PVC'S (PREMATURE VENTRICULAR CONTRACTIONS): ICD-10-CM

## 2021-11-18 DIAGNOSIS — I48.91 ATRIAL FIBRILLATION WITH RAPID VENTRICULAR RESPONSE (HCC): Primary | ICD-10-CM

## 2021-11-18 DIAGNOSIS — I11.0 HYPERTENSIVE HEART DISEASE WITH HEART FAILURE (HCC): ICD-10-CM

## 2021-11-18 PROCEDURE — 99214 OFFICE O/P EST MOD 30 MIN: CPT | Performed by: INTERNAL MEDICINE

## 2021-11-18 PROCEDURE — 93000 ELECTROCARDIOGRAM COMPLETE: CPT | Performed by: INTERNAL MEDICINE

## 2021-11-18 PROCEDURE — 3077F SYST BP >= 140 MM HG: CPT | Performed by: INTERNAL MEDICINE

## 2021-11-18 PROCEDURE — 1036F TOBACCO NON-USER: CPT | Performed by: INTERNAL MEDICINE

## 2021-11-18 PROCEDURE — 3080F DIAST BP >= 90 MM HG: CPT | Performed by: INTERNAL MEDICINE

## 2021-11-18 PROCEDURE — 1160F RVW MEDS BY RX/DR IN RCRD: CPT | Performed by: INTERNAL MEDICINE

## 2021-11-18 RX ORDER — METOPROLOL SUCCINATE 25 MG/1
25 TABLET, EXTENDED RELEASE ORAL DAILY
Qty: 90 TABLET | Refills: 3 | Status: SHIPPED | OUTPATIENT
Start: 2021-11-18 | End: 2022-03-09 | Stop reason: SDUPTHER

## 2021-11-24 ENCOUNTER — TELEPHONE (OUTPATIENT)
Dept: OTOLARYNGOLOGY | Facility: CLINIC | Age: 81
End: 2021-11-24

## 2021-11-30 ENCOUNTER — IMMUNIZATIONS (OUTPATIENT)
Dept: FAMILY MEDICINE CLINIC | Facility: HOSPITAL | Age: 81
End: 2021-11-30

## 2021-11-30 DIAGNOSIS — Z23 ENCOUNTER FOR IMMUNIZATION: Primary | ICD-10-CM

## 2021-11-30 PROCEDURE — 91306 COVID-19 MODERNA VACC 0.25 ML BOOSTER: CPT

## 2021-11-30 PROCEDURE — 0064A COVID-19 MODERNA VACC 0.25 ML BOOSTER: CPT

## 2021-12-06 ENCOUNTER — RA CDI HCC (OUTPATIENT)
Dept: OTHER | Facility: HOSPITAL | Age: 81
End: 2021-12-06

## 2021-12-07 PROBLEM — N18.31 CHRONIC KIDNEY DISEASE, STAGE 3A (HCC): Status: ACTIVE | Noted: 2021-12-07

## 2021-12-13 ENCOUNTER — TELEPHONE (OUTPATIENT)
Dept: FAMILY MEDICINE CLINIC | Facility: CLINIC | Age: 81
End: 2021-12-13

## 2021-12-30 ENCOUNTER — OFFICE VISIT (OUTPATIENT)
Dept: OTOLARYNGOLOGY | Facility: CLINIC | Age: 81
End: 2021-12-30
Payer: COMMERCIAL

## 2021-12-30 VITALS — BODY MASS INDEX: 28.05 KG/M2 | TEMPERATURE: 98.4 F | WEIGHT: 130 LBS | HEIGHT: 57 IN

## 2021-12-30 DIAGNOSIS — K21.9 LARYNGOPHARYNGEAL REFLUX (LPR): Primary | ICD-10-CM

## 2021-12-30 PROCEDURE — 1036F TOBACCO NON-USER: CPT | Performed by: OTOLARYNGOLOGY

## 2021-12-30 PROCEDURE — 1160F RVW MEDS BY RX/DR IN RCRD: CPT | Performed by: OTOLARYNGOLOGY

## 2021-12-30 PROCEDURE — 99213 OFFICE O/P EST LOW 20 MIN: CPT | Performed by: OTOLARYNGOLOGY

## 2022-01-03 DIAGNOSIS — I48.91 A-FIB (HCC): ICD-10-CM

## 2022-01-03 DIAGNOSIS — I48.3 TYPICAL ATRIAL FLUTTER (HCC): ICD-10-CM

## 2022-01-03 RX ORDER — RIVAROXABAN 20 MG/1
TABLET, FILM COATED ORAL
Qty: 90 TABLET | Refills: 1 | Status: SHIPPED | OUTPATIENT
Start: 2022-01-03 | End: 2022-05-21 | Stop reason: SDUPTHER

## 2022-01-06 ENCOUNTER — TELEPHONE (OUTPATIENT)
Dept: CARDIOLOGY CLINIC | Facility: CLINIC | Age: 82
End: 2022-01-06

## 2022-01-06 NOTE — TELEPHONE ENCOUNTER
Patient has pacemaker appt on Monday [email or web address]    services just called me to state they can't come  They have no staff to cover  I called Jeni Snyder the daughter and she stated Mom really wants to come in  I asked Farahd Garcia the Device Tech and he is in agreement to facetime the daughter while seeing the Mom  I called the girls in [place] and they are good with this as well

## 2022-01-10 ENCOUNTER — IN-CLINIC DEVICE VISIT (OUTPATIENT)
Dept: CARDIOLOGY CLINIC | Facility: CLINIC | Age: 82
End: 2022-01-10
Payer: COMMERCIAL

## 2022-01-10 DIAGNOSIS — Z95.0 PRESENCE OF PERMANENT CARDIAC PACEMAKER: Primary | ICD-10-CM

## 2022-01-10 PROCEDURE — 93279 PRGRMG DEV EVAL PM/LDLS PM: CPT | Performed by: INTERNAL MEDICINE

## 2022-01-10 NOTE — PROGRESS NOTES
MDT MICRA VR/ACTIVE SYSTEM IS MRI CONDITIONAL   DEVICE INTERROGATED IN THE [facility] OFFICE:  BATTERY VOLTAGE ADEQUATE (>8 0 YR)    85 3% (>0%/VVIR 70 PPM)    ALL ELECTRODE PARAMETERS WITHIN NORMAL LIMITS   NO HIGH RATE EPISODES OR DEVICE OBSERVATIONS   NO PROGRAMMING CHANGES MADE TO DEVICE PARAMETERS   NORMAL DEVICE FUNCTION  Sierra Briscoe

## 2022-01-21 ENCOUNTER — RA CDI HCC (OUTPATIENT)
Dept: OTHER | Facility: HOSPITAL | Age: 82
End: 2022-01-21

## 2022-01-21 NOTE — PROGRESS NOTES
[facility]  coding opportunities       Chart reviewed, no opportunity found: CHART REVIEWED, NO OPPORTUNITY FOUND                        Patients insurance company: [employer] (Medicare and Commercial for [employer] and [employer])

## 2022-01-27 ENCOUNTER — TELEPHONE (OUTPATIENT)
Dept: FAMILY MEDICINE CLINIC | Facility: CLINIC | Age: 82
End: 2022-01-27

## 2022-01-27 NOTE — TELEPHONE ENCOUNTER
Patients do no use the the interpretor service that was listed in chart    I have updated the information to the interpretor Richard Almaraz  [phone]    [facility] for Bill Hill to call office so we can schedule her from her services for the patients next appt

## 2022-01-27 NOTE — TELEPHONE ENCOUNTER
Patient rescheduled AWV appt  I called the number for the interpretor and left a detailed message to schedule Randy for the new appt date      Will wait for return phone call, if not will try to call again

## 2022-01-28 NOTE — TELEPHONE ENCOUNTER
Spoke with Maral Senior and verified patients next appointment on 2/17  She is available on that date and confirmed      No further action needed

## 2022-02-11 DIAGNOSIS — E78.5 DYSLIPIDEMIA: ICD-10-CM

## 2022-02-11 RX ORDER — ROSUVASTATIN CALCIUM 10 MG/1
TABLET, COATED ORAL
Qty: 90 TABLET | Refills: 0 | Status: SHIPPED | OUTPATIENT
Start: 2022-02-11 | End: 2022-02-12 | Stop reason: SDUPTHER

## 2022-02-12 ENCOUNTER — TELEPHONE (OUTPATIENT)
Dept: FAMILY MEDICINE CLINIC | Facility: CLINIC | Age: 82
End: 2022-02-12

## 2022-02-12 DIAGNOSIS — E78.5 DYSLIPIDEMIA: ICD-10-CM

## 2022-02-12 RX ORDER — ROSUVASTATIN CALCIUM 10 MG/1
10 TABLET, COATED ORAL DAILY
Qty: 90 TABLET | Refills: 0 | Status: SHIPPED | OUTPATIENT
Start: 2022-02-12 | End: 2022-05-19 | Stop reason: SDUPTHER

## 2022-02-17 ENCOUNTER — OFFICE VISIT (OUTPATIENT)
Dept: FAMILY MEDICINE CLINIC | Facility: CLINIC | Age: 82
End: 2022-02-17
Payer: COMMERCIAL

## 2022-02-17 VITALS
HEIGHT: 57 IN | RESPIRATION RATE: 22 BRPM | TEMPERATURE: 97.9 F | HEART RATE: 82 BPM | WEIGHT: 134 LBS | BODY MASS INDEX: 28.91 KG/M2 | SYSTOLIC BLOOD PRESSURE: 146 MMHG | DIASTOLIC BLOOD PRESSURE: 90 MMHG

## 2022-02-17 DIAGNOSIS — E03.9 HYPOTHYROIDISM, UNSPECIFIED TYPE: ICD-10-CM

## 2022-02-17 DIAGNOSIS — E78.5 DYSLIPIDEMIA: ICD-10-CM

## 2022-02-17 DIAGNOSIS — Z00.00 MEDICARE ANNUAL WELLNESS VISIT, SUBSEQUENT: Primary | ICD-10-CM

## 2022-02-17 DIAGNOSIS — N18.31 CHRONIC KIDNEY DISEASE, STAGE 3A (HCC): ICD-10-CM

## 2022-02-17 DIAGNOSIS — I10 ESSENTIAL HYPERTENSION: ICD-10-CM

## 2022-02-17 DIAGNOSIS — I44.2 COMPLETE HEART BLOCK (HCC): ICD-10-CM

## 2022-02-17 DIAGNOSIS — I50.33 ACUTE ON CHRONIC DIASTOLIC CONGESTIVE HEART FAILURE (HCC): ICD-10-CM

## 2022-02-17 DIAGNOSIS — I50.32 CHRONIC DIASTOLIC CHF (CONGESTIVE HEART FAILURE) (HCC): ICD-10-CM

## 2022-02-17 DIAGNOSIS — J31.0 CHRONIC RHINITIS: ICD-10-CM

## 2022-02-17 PROBLEM — Z12.31 SCREENING MAMMOGRAM, ENCOUNTER FOR: Status: RESOLVED | Noted: 2018-03-29 | Resolved: 2022-02-17

## 2022-02-17 PROBLEM — R07.9 CHEST PAIN: Status: RESOLVED | Noted: 2020-10-11 | Resolved: 2022-02-17

## 2022-02-17 PROCEDURE — 3288F FALL RISK ASSESSMENT DOCD: CPT | Performed by: INTERNAL MEDICINE

## 2022-02-17 PROCEDURE — 1125F AMNT PAIN NOTED PAIN PRSNT: CPT | Performed by: INTERNAL MEDICINE

## 2022-02-17 PROCEDURE — 1036F TOBACCO NON-USER: CPT | Performed by: INTERNAL MEDICINE

## 2022-02-17 PROCEDURE — 1170F FXNL STATUS ASSESSED: CPT | Performed by: INTERNAL MEDICINE

## 2022-02-17 PROCEDURE — 3080F DIAST BP >= 90 MM HG: CPT | Performed by: INTERNAL MEDICINE

## 2022-02-17 PROCEDURE — 99214 OFFICE O/P EST MOD 30 MIN: CPT | Performed by: INTERNAL MEDICINE

## 2022-02-17 PROCEDURE — 3725F SCREEN DEPRESSION PERFORMED: CPT | Performed by: INTERNAL MEDICINE

## 2022-02-17 PROCEDURE — 3077F SYST BP >= 140 MM HG: CPT | Performed by: INTERNAL MEDICINE

## 2022-02-17 PROCEDURE — G0439 PPPS, SUBSEQ VISIT: HCPCS | Performed by: INTERNAL MEDICINE

## 2022-02-17 PROCEDURE — 1160F RVW MEDS BY RX/DR IN RCRD: CPT | Performed by: INTERNAL MEDICINE

## 2022-02-17 RX ORDER — TORSEMIDE 10 MG/1
10 TABLET ORAL 3 TIMES WEEKLY
Qty: 40 TABLET | Refills: 3 | Status: SHIPPED | OUTPATIENT
Start: 2022-02-18 | End: 2022-06-23

## 2022-02-17 RX ORDER — IPRATROPIUM BROMIDE 21 UG/1
2 SPRAY, METERED NASAL EVERY 12 HOURS
Qty: 30 ML | Refills: 5 | Status: SHIPPED | OUTPATIENT
Start: 2022-02-17 | End: 2022-06-23

## 2022-02-17 NOTE — PROGRESS NOTES
Assessment/Plan:    1  Medicare annual wellness visit, subsequent    2  Dyslipidemia  Assessment & Plan:  Continue rosuvastatin and will check labs for lipid control and LFT's  Orders:  -     CBC and differential; Future; Expected date: 06/17/2022  -     Comprehensive metabolic panel; Future; Expected date: 06/17/2022  -     Lipid panel; Future; Expected date: 06/17/2022  -     TSH, 3rd generation with Free T4 reflex; Future; Expected date: 06/17/2022  -     CBC and differential  -     Comprehensive metabolic panel  -     Lipid panel  -     TSH, 3rd generation with Free T4 reflex    3  Chronic diastolic CHF (congestive heart failure) (HCC)  Assessment & Plan:  Wt Readings from Last 3 Encounters:   02/17/22 60 8 kg (134 lb)   12/30/21 59 kg (130 lb)   11/18/21 60 3 kg (133 lb)       She has not been taking her furosemide because she is convinced it makes her nose drip  I assured her this is not the case  She would like to try to change and I will escribe torsemide to the pharmacy instead, to continue three times a week  Cautioned patient again to be compliant with her medications  Orders:  -     torsemide (DEMADEX) 10 mg tablet; Take 1 tablet (10 mg total) by mouth 3 (three) times a week    4  Chronic rhinitis  -     ipratropium (ATROVENT) 0 03 % nasal spray; 2 sprays into each nostril every 12 (twelve) hours    5  Complete heart block [facility])  Assessment & Plan:  She is s/p pacemaker  6  Chronic kidney disease, stage 3a [facility])  Assessment & Plan:  Lab Results   Component Value Date    EGFR 59 04/16/2021    EGFR 57 11/24/2020    EGFR 58 11/23/2020    CREATININE 0 94 10/08/2021    CREATININE 0 92 04/16/2021    CREATININE 0 94 11/24/2020     Stable and will continue to follow, labs were ordered  Advised to avoid potentially nephrotoxic agents  7  Hypothyroidism, unspecified type  -     TSH, 3rd generation with Free T4 reflex;  Future; Expected date: 06/17/2022  -     TSH, 3rd generation with Free T4 reflex    8  Essential hypertension  Assessment & Plan:  BP is up a bit but she has not been taking her furosemide  Will restart diuretic as ordered  Continue current doses of lisinopril and metoprolol  Orders:  -     CBC and differential; Future; Expected date: 06/17/2022  -     Comprehensive metabolic panel; Future; Expected date: 06/17/2022  -     Lipid panel; Future; Expected date: 06/17/2022  -     TSH, 3rd generation with Free T4 reflex; Future; Expected date: 06/17/2022  -     CBC and differential  -     Comprehensive metabolic panel  -     Lipid panel  -     TSH, 3rd generation with Free T4 reflex    9  Acute on chronic diastolic congestive heart failure (HCC)  Assessment & Plan:  Wt Readings from Last 3 Encounters:   02/17/22 60 8 kg (134 lb)   12/30/21 59 kg (130 lb)   11/18/21 60 3 kg (133 lb)       She has not been taking her furosemide because she is convinced it makes her nose drip  I assured her this is not the case  She would like to try to change and I will escribe torsemide to the pharmacy instead, to continue three times a week  Cautioned patient again to be compliant with her medications  Patient Instructions       Medicare Preventive Visit Patient Instructions  Thank you for completing your Welcome to Medicare Visit or Medicare Annual Wellness Visit today  Your next wellness visit will be due in one year (2/18/2023)  The screening/preventive services that you may require over the next 5-10 years are detailed below  Some tests may not apply to you based off risk factors and/or age  Screening tests ordered at today's visit but not completed yet may show as past due  Also, please note that scanned in results may not display below    Preventive Screenings:  Service Recommendations Previous Testing/Comments   Colorectal Cancer Screening  * Colonoscopy    * Fecal Occult Blood Test (FOBT)/Fecal Immunochemical Test (FIT)  * Fecal DNA/Cologuard Test  * Flexible Sigmoidoscopy Age: 54-65 years old   Colonoscopy: every 10 years (may be performed more frequently if at higher risk)  OR  FOBT/FIT: every 1 year  OR  Cologuard: every 3 years  OR  Sigmoidoscopy: every 5 years  Screening may be recommended earlier than age 48 if at higher risk for colorectal cancer  Also, an individualized decision between you and your healthcare provider will decide whether screening between the ages of 74-80 would be appropriate  Colonoscopy: Not on file  FOBT/FIT: Not on file  Cologuard: Not on file  Sigmoidoscopy: Not on file          Breast Cancer Screening Age: 36 years old  Frequency: every 1-2 years  Not required if history of left and right mastectomy Mammogram: 06/11/2019        Cervical Cancer Screening Between the ages of 21-29, pap smear recommended once every 3 years  Between the ages of 33-67, can perform pap smear with HPV co-testing every 5 years  Recommendations may differ for women with a history of total hysterectomy, cervical cancer, or abnormal pap smears in past  Pap Smear: Not on file    Screening Not Indicated   Hepatitis C Screening Once for adults born between 1945 and 1965  More frequently in patients at high risk for Hepatitis C Hep C Antibody: Not on file        Diabetes Screening 1-2 times per year if you're at risk for diabetes or have pre-diabetes Fasting glucose: 91 mg/dL   A1C: No results in last 5 years    Screening Current   Cholesterol Screening Once every 5 years if you don't have a lipid disorder  May order more often based on risk factors  Lipid panel: 10/08/2021    Screening Current     Other Preventive Screenings Covered by Medicare:  1  Abdominal Aortic Aneurysm (AAA) Screening: covered once if your at risk  You're considered to be at risk if you have a family history of AAA    2  Lung Cancer Screening: covers low dose CT scan once per year if you meet all of the following conditions: (1) Age 50-69; (2) No signs or symptoms of lung cancer; (3) Current smoker or have quit smoking within the last 15 years; (4) You have a tobacco smoking history of at least 30 pack years (packs per day multiplied by number of years you smoked); (5) You get a written order from a healthcare provider  3  Glaucoma Screening: covered annually if you're considered high risk: (1) You have diabetes OR (2) Family history of glaucoma OR (3)  aged 48 and older OR (3)  American aged 72 and older  3  Osteoporosis Screening: covered every 2 years if you meet one of the following conditions: (1) You're estrogen deficient and at risk for osteoporosis based off medical history and other findings; (2) Have a vertebral abnormality; (3) On glucocorticoid therapy for more than 3 months; (4) Have primary hyperparathyroidism; (5) On osteoporosis medications and need to assess response to drug therapy  · Last bone density test (DXA Scan): 08/04/2017  5  HIV Screening: covered annually if you're between the age of 12-76  Also covered annually if you are younger than 13 and older than 72 with risk factors for HIV infection  For pregnant patients, it is covered up to 3 times per pregnancy  Immunizations:  Immunization Recommendations   Influenza Vaccine Annual influenza vaccination during flu season is recommended for all persons aged >= 6 months who do not have contraindications   Pneumococcal Vaccine (Prevnar and Pneumovax)  * Prevnar = PCV13  * Pneumovax = PPSV23   Adults 25-60 years old: 1-3 doses may be recommended based on certain risk factors  Adults 72 years old: Prevnar (PCV13) vaccine recommended followed by Pneumovax (PPSV23) vaccine  If already received PPSV23 since turning 65, then PCV13 recommended at least one year after PPSV23 dose     Hepatitis B Vaccine 3 dose series if at intermediate or high risk (ex: diabetes, end stage renal disease, liver disease)   Tetanus (Td) Vaccine - COST NOT COVERED BY MEDICARE PART B Following completion of primary series, a booster dose should be given every 10 years to maintain immunity against tetanus  Td may also be given as tetanus wound prophylaxis  Tdap Vaccine - COST NOT COVERED BY MEDICARE PART B Recommended at least once for all adults  For pregnant patients, recommended with each pregnancy  Shingles Vaccine (Shingrix) - COST NOT COVERED BY MEDICARE PART B  2 shot series recommended in those aged 48 and above     Health Maintenance Due:  There are no preventive care reminders to display for this patient  Immunizations Due:  There are no preventive care reminders to display for this patient  Advance Directives   What are advance directives? Advance directives are legal documents that state your wishes and plans for medical care  These plans are made ahead of time in case you lose your ability to make decisions for yourself  Advance directives can apply to any medical decision, such as the treatments you want, and if you want to donate organs  What are the types of advance directives? There are many types of advance directives, and each state has rules about how to use them  You may choose a combination of any of the following:  · Living will: This is a written record of the treatment you want  You can also choose which treatments you do not want, which to limit, and which to stop at a certain time  This includes surgery, medicine, IV fluid, and tube feedings  · Durable power of  for healthcare [facility]): This is a written record that states who you want to make healthcare choices for you when you are unable to make them for yourself  This person, called a proxy, is usually a family member or a friend  You may choose more than 1 proxy  · Do not resuscitate (DNR) order:  A DNR order is used in case your heart stops beating or you stop breathing  It is a request not to have certain forms of treatment, such as CPR  A DNR order may be included in other types of advance directives  · Medical directive:   This covers the care that you want if you are in a coma, near death, or unable to make decisions for yourself  You can list the treatments you want for each condition  Treatment may include pain medicine, surgery, blood transfusions, dialysis, IV or tube feedings, and a ventilator (breathing machine)  · Values history: This document has questions about your views, beliefs, and how you feel and think about life  This information can help others choose the care that you would choose  Why are advance directives important? An advance directive helps you control your care  Although spoken wishes may be used, it is better to have your wishes written down  Spoken wishes can be misunderstood, or not followed  Treatments may be given even if you do not want them  An advance directive may make it easier for your family to make difficult choices about your care  Urinary Incontinence   Urinary incontinence (UI)  is when you lose control of your bladder  UI develops because your bladder cannot store or empty urine properly  The 3 most common types of UI are stress incontinence, urge incontinence, or both  Medicines:   · May be given to help strengthen your bladder control  Report any side effects of medication to your healthcare provider  Do pelvic muscle exercises often:  Your pelvic muscles help you stop urinating  Squeeze these muscles tight for 5 seconds, then relax for 5 seconds  Gradually work up to squeezing for 10 seconds  Do 3 sets of 15 repetitions a day, or as directed  This will help strengthen your pelvic muscles and improve bladder control  Train your bladder:  Go to the bathroom at set times, such as every 2 hours, even if you do not feel the urge to go  You can also try to hold your urine when you feel the urge to go  For example, hold your urine for 5 minutes when you feel the urge to go  As that becomes easier, hold your urine for 10 minutes  Self-care:   · Keep a UI record    Write down how often you leak urine and how much you leak  Make a note of what you were doing when you leaked urine  · Drink liquids as directed  You may need to limit the amount of liquid you drink to help control your urine leakage  Do not drink any liquid right before you go to bed  Limit or do not have drinks that contain caffeine or alcohol  · Prevent constipation  Eat a variety of high-fiber foods  Good examples are high-fiber cereals, beans, vegetables, and whole-grain breads  Walking is the best way to trigger your intestines to have a bowel movement  · Exercise regularly and maintain a healthy weight  Weight loss and exercise will decrease pressure on your bladder and help you control your leakage  · Use a catheter as directed  to help empty your bladder  A catheter is a tiny, plastic tube that is put into your bladder to drain your urine  · Go to behavior therapy as directed  Behavior therapy may be used to help you learn to control your urge to urinate  Weight Management   Why it is important to manage your weight:  Being overweight increases your risk of health conditions such as heart disease, high blood pressure, type 2 diabetes, and certain types of cancer  It can also increase your risk for osteoarthritis, sleep apnea, and other respiratory problems  Aim for a slow, steady weight loss  Even a small amount of weight loss can lower your risk of health problems  How to lose weight safely:  A safe and healthy way to lose weight is to eat fewer calories and get regular exercise  You can lose up about 1 pound a week by decreasing the number of calories you eat by 500 calories each day  Healthy meal plan for weight management:  A healthy meal plan includes a variety of foods, contains fewer calories, and helps you stay healthy  A healthy meal plan includes the following:  · Eat whole-grain foods more often  A healthy meal plan should contain fiber  Fiber is the part of grains, fruits, and vegetables that is not broken down by your body  Whole-grain foods are healthy and provide extra fiber in your diet  Some examples of whole-grain foods are whole-wheat breads and pastas, oatmeal, brown rice, and bulgur  · Eat a variety of vegetables every day  Include dark, leafy greens such as spinach, kale, thelma greens, and mustard greens  Eat yellow and orange vegetables such as carrots, sweet potatoes, and winter squash  · Eat a variety of fruits every day  Choose fresh or canned fruit (canned in its own juice or light syrup) instead of juice  Fruit juice has very little or no fiber  · Eat low-fat dairy foods  Drink fat-free (skim) milk or 1% milk  Eat fat-free yogurt and low-fat cottage cheese  Try low-fat cheeses such as mozzarella and other reduced-fat cheeses  · Choose meat and other protein foods that are low in fat  Choose beans or other legumes such as split peas or lentils  Choose fish, skinless poultry (chicken or turkey), or lean cuts of red meat (beef or pork)  Before you cook meat or poultry, cut off any visible fat  · Use less fat and oil  Try baking foods instead of frying them  Add less fat, such as margarine, sour cream, regular salad dressing and mayonnaise to foods  Eat fewer high-fat foods  Some examples of high-fat foods include french fries, doughnuts, ice cream, and cakes  · Eat fewer sweets  Limit foods and drinks that are high in sugar  This includes candy, cookies, regular soda, and sweetened drinks  Exercise:  Exercise at least 30 minutes per day on most days of the week  Some examples of exercise include walking, biking, dancing, and swimming  You can also fit in more physical activity by taking the stairs instead of the elevator or parking farther away from stores  Ask your healthcare provider about the best exercise plan for you  © Copyright [employer] 2018 Information is for End User's use only and may not be sold, redistributed or otherwise used for commercial purposes   All illustrations and images included in [facility] are the copyrighted property of [employer]  or [address]        Return in about 4 months (around 6/17/2022)  Subjective:      Patient ID: Sophie Pulido is a 79 y o  female  Chief Complaint   Patient presents with   [facility] [place] Wellness Visit     ac/lpn       Here with her  and an  for AWV and follow up  She has stopped her furosemide TIW because she is unhappy about taking so many medications and feels it is making her nose run  This is constant and she has post nasal drip and a scratchy throat as well  She had been taking flonase but only in one nostril and stopped this because she did not like the way it made her feel  She has some dyspnea and increased edema since stopping her furosemide  Her  is very frustrated because she won't take her meds as ordered  He tried to get her to use a pill box but she refuses  The following portions of the patient's history were reviewed and updated as appropriate: allergies, current medications, past family history, past medical history, past social history, past surgical history and problem list     Review of Systems   Constitutional: Negative  Respiratory: Positive for shortness of breath  Negative for cough and wheezing  Cardiovascular: Positive for leg swelling  Negative for chest pain and palpitations  Endocrine: Negative  Hematological: Negative            Current Outpatient Medications   Medication Sig Dispense Refill    famotidine (PEPCID) 40 MG tablet Take 1 tablet (40 mg total) by mouth daily at bedtime 30 tablet 3    levothyroxine (Synthroid) 75 mcg tablet Take 1 tablet (75 mcg total) by mouth daily in the early morning BRAND MEDICALLY NECESSARY 90 tablet 3    lisinopril (ZESTRIL) 2 5 mg tablet Take 1 tablet (2 5 mg total) by mouth daily 90 tablet 3    metoprolol succinate (TOPROL-XL) 25 mg 24 hr tablet Take 1 tablet (25 mg total) by mouth daily 90 tablet 3    rosuvastatin (CRESTOR) 10 MG tablet Take 1 tablet (10 mg total) by mouth daily 90 tablet 0    Xarelto 20 MG tablet TAKE ONE TABLET BY MOUTH EVERY DAY WITH BREAKFAST 90 tablet 1    ipratropium (ATROVENT) 0 03 % nasal spray 2 sprays into each nostril every 12 (twelve) hours 30 mL 5    [START ON 2022] torsemide (DEMADEX) 10 mg tablet Take 1 tablet (10 mg total) by mouth 3 (three) times a week 40 tablet 3     No current facility-administered medications for this visit  Objective:    /90   Pulse 82   Temp 97 9 °F (36 6 °C)   Resp 22   Ht 4' 9" (1 448 m)   Wt 60 8 kg (134 lb)   BMI 29 00 kg/m²        Physical Exam  Constitutional:       Appearance: She is well-developed  Eyes:      Conjunctiva/sclera: Conjunctivae normal    Neck:      Thyroid: No thyromegaly  Vascular: No JVD  Cardiovascular:      Rate and Rhythm: Normal rate and regular rhythm  Heart sounds: Normal heart sounds  No murmur heard  No friction rub  No gallop  Comments: Positive JVD to the angle of her jaw  Pulmonary:      Effort: Pulmonary effort is normal       Breath sounds: Examination of the right-lower field reveals rales  Examination of the left-lower field reveals rales  Rales present  No wheezing  Abdominal:      General: Bowel sounds are normal  There is no distension  Palpations: Abdomen is soft  Tenderness: There is no abdominal tenderness  Musculoskeletal:      Cervical back: Neck supple  Right lower le+ Pitting Edema present  Left lower le+ Pitting Edema present                  Adebayo Tolentino MD

## 2022-02-17 NOTE — ASSESSMENT & PLAN NOTE
Lab Results   Component Value Date    EGFR 59 04/16/2021    EGFR 57 11/24/2020    EGFR 58 11/23/2020    CREATININE 0 94 10/08/2021    CREATININE 0 92 04/16/2021    CREATININE 0 94 11/24/2020     Stable and will continue to follow, labs were ordered  Advised to avoid potentially nephrotoxic agents

## 2022-02-17 NOTE — PATIENT INSTRUCTIONS
Attempted to reach pt to relay provider result note below as written. The line is busy x 3. Please attempt to reach pt at another time.    ----- Message -----  From: Sweta Zarate APRN CNP  Sent: 9/17/2021   3:08 PM CDT  To: [facility]    Please call patient and let them know their lab result was abnormal.  Thyroid labs show hyperactive thyroid. I will place referral for endocrinology. He should call to schedule. Please reinforce importance of follow up to care for this condition.     TSH   Date Value Ref Range Status   09/16/2021 <0.01 (L) 0.40 - 4.00 mU/L Final   07/30/2019 <0.01 (L) 0.40 - 4.00 mU/L Final       T4 Free   Date Value Ref Range Status   07/30/2019 1.96 (H) 0.76 - 1.46 ng/dL Final     Free T4   Date Value Ref Range Status   09/16/2021 2.62 (H) 0.76 - 1.46 ng/dL Final     Angelique Ulrich, GRISELN, RN       Medicare Preventive Visit Patient Instructions  Thank you for completing your Welcome to Medicare Visit or Medicare Annual Wellness Visit today  Your next wellness visit will be due in one year (2/18/2023)  The screening/preventive services that you may require over the next 5-10 years are detailed below  Some tests may not apply to you based off risk factors and/or age  Screening tests ordered at today's visit but not completed yet may show as past due  Also, please note that scanned in results may not display below  Preventive Screenings:  Service Recommendations Previous Testing/Comments   Colorectal Cancer Screening  * Colonoscopy    * Fecal Occult Blood Test (FOBT)/Fecal Immunochemical Test (FIT)  * Fecal DNA/Cologuard Test  * Flexible Sigmoidoscopy Age: 54-65 years old   Colonoscopy: every 10 years (may be performed more frequently if at higher risk)  OR  FOBT/FIT: every 1 year  OR  Cologuard: every 3 years  OR  Sigmoidoscopy: every 5 years  Screening may be recommended earlier than age 48 if at higher risk for colorectal cancer  Also, an individualized decision between you and your healthcare provider will decide whether screening between the ages of 74-80 would be appropriate  Colonoscopy: Not on file  FOBT/FIT: Not on file  Cologuard: Not on file  Sigmoidoscopy: Not on file          Breast Cancer Screening Age: 36 years old  Frequency: every 1-2 years  Not required if history of left and right mastectomy Mammogram: 06/11/2019        Cervical Cancer Screening Between the ages of 21-29, pap smear recommended once every 3 years  Between the ages of 33-67, can perform pap smear with HPV co-testing every 5 years     Recommendations may differ for women with a history of total hysterectomy, cervical cancer, or abnormal pap smears in past  Pap Smear: Not on file    Screening Not Indicated   Hepatitis C Screening Once for adults born between [facility]  More frequently in patients at high risk for Hepatitis C Hep C Antibody: Not on file        Diabetes Screening 1-2 times per year if you're at risk for diabetes or have pre-diabetes Fasting glucose: 91 mg/dL   A1C: No results in last 5 years    Screening Current   Cholesterol Screening Once every 5 years if you don't have a lipid disorder  May order more often based on risk factors  Lipid panel: 10/08/2021    Screening Current     Other Preventive Screenings Covered by Medicare:  1  Abdominal Aortic Aneurysm (AAA) Screening: covered once if your at risk  You're considered to be at risk if you have a family history of AAA  2  Lung Cancer Screening: covers low dose CT scan once per year if you meet all of the following conditions: (1) Age 50-69; (2) No signs or symptoms of lung cancer; (3) Current smoker or have quit smoking within the last 15 years; (4) You have a tobacco smoking history of at least 30 pack years (packs per day multiplied by number of years you smoked); (5) You get a written order from a healthcare provider  3  Glaucoma Screening: covered annually if you're considered high risk: (1) You have diabetes OR (2) Family history of glaucoma OR (3)  aged 48 and older OR (3)  American aged 72 and older  3  Osteoporosis Screening: covered every 2 years if you meet one of the following conditions: (1) You're estrogen deficient and at risk for osteoporosis based off medical history and other findings; (2) Have a vertebral abnormality; (3) On glucocorticoid therapy for more than 3 months; (4) Have primary hyperparathyroidism; (5) On osteoporosis medications and need to assess response to drug therapy  · Last bone density test (DXA Scan): 08/04/2017  5  HIV Screening: covered annually if you're between the age of 12-76  Also covered annually if you are younger than 13 and older than 72 with risk factors for HIV infection  For pregnant patients, it is covered up to 3 times per pregnancy      Immunizations:  Immunization Recommendations   Influenza Vaccine Annual influenza vaccination during flu season is recommended for all persons aged >= 6 months who do not have contraindications   Pneumococcal Vaccine (Prevnar and Pneumovax)  * Prevnar = PCV13  * Pneumovax = PPSV23   Adults 25-60 years old: 1-3 doses may be recommended based on certain risk factors  Adults 72 years old: Prevnar (PCV13) vaccine recommended followed by Pneumovax (PPSV23) vaccine  If already received PPSV23 since turning 65, then PCV13 recommended at least one year after PPSV23 dose  Hepatitis B Vaccine 3 dose series if at intermediate or high risk (ex: diabetes, end stage renal disease, liver disease)   Tetanus (Td) Vaccine - COST NOT COVERED BY MEDICARE PART B Following completion of primary series, a booster dose should be given every 10 years to maintain immunity against tetanus  Td may also be given as tetanus wound prophylaxis  Tdap Vaccine - COST NOT COVERED BY MEDICARE PART B Recommended at least once for all adults  For pregnant patients, recommended with each pregnancy  Shingles Vaccine (Shingrix) - COST NOT COVERED BY MEDICARE PART B  2 shot series recommended in those aged 48 and above     Health Maintenance Due:  There are no preventive care reminders to display for this patient  Immunizations Due:  There are no preventive care reminders to display for this patient  Advance Directives   What are advance directives? Advance directives are legal documents that state your wishes and plans for medical care  These plans are made ahead of time in case you lose your ability to make decisions for yourself  Advance directives can apply to any medical decision, such as the treatments you want, and if you want to donate organs  What are the types of advance directives? There are many types of advance directives, and each state has rules about how to use them  You may choose a combination of any of the following:  · Living will:   This is a written record of the treatment you want  You can also choose which treatments you do not want, which to limit, and which to stop at a certain time  This includes surgery, medicine, IV fluid, and tube feedings  · Durable power of  for healthcare [facility]): This is a written record that states who you want to make healthcare choices for you when you are unable to make them for yourself  This person, called a proxy, is usually a family member or a friend  You may choose more than 1 proxy  · Do not resuscitate (DNR) order:  A DNR order is used in case your heart stops beating or you stop breathing  It is a request not to have certain forms of treatment, such as CPR  A DNR order may be included in other types of advance directives  · Medical directive: This covers the care that you want if you are in a coma, near death, or unable to make decisions for yourself  You can list the treatments you want for each condition  Treatment may include pain medicine, surgery, blood transfusions, dialysis, IV or tube feedings, and a ventilator (breathing machine)  · Values history: This document has questions about your views, beliefs, and how you feel and think about life  This information can help others choose the care that you would choose  Why are advance directives important? An advance directive helps you control your care  Although spoken wishes may be used, it is better to have your wishes written down  Spoken wishes can be misunderstood, or not followed  Treatments may be given even if you do not want them  An advance directive may make it easier for your family to make difficult choices about your care  Urinary Incontinence   Urinary incontinence (UI)  is when you lose control of your bladder  UI develops because your bladder cannot store or empty urine properly  The 3 most common types of UI are stress incontinence, urge incontinence, or both  Medicines:   · May be given to help strengthen your bladder control  Report any side effects of medication to your healthcare provider  Do pelvic muscle exercises often:  Your pelvic muscles help you stop urinating  Squeeze these muscles tight for 5 seconds, then relax for 5 seconds  Gradually work up to squeezing for 10 seconds  Do 3 sets of 15 repetitions a day, or as directed  This will help strengthen your pelvic muscles and improve bladder control  Train your bladder:  Go to the bathroom at set times, such as every 2 hours, even if you do not feel the urge to go  You can also try to hold your urine when you feel the urge to go  For example, hold your urine for 5 minutes when you feel the urge to go  As that becomes easier, hold your urine for 10 minutes  Self-care:   · Keep a UI record  Write down how often you leak urine and how much you leak  Make a note of what you were doing when you leaked urine  · Drink liquids as directed  You may need to limit the amount of liquid you drink to help control your urine leakage  Do not drink any liquid right before you go to bed  Limit or do not have drinks that contain caffeine or alcohol  · Prevent constipation  Eat a variety of high-fiber foods  Good examples are high-fiber cereals, beans, vegetables, and whole-grain breads  Walking is the best way to trigger your intestines to have a bowel movement  · Exercise regularly and maintain a healthy weight  Weight loss and exercise will decrease pressure on your bladder and help you control your leakage  · Use a catheter as directed  to help empty your bladder  A catheter is a tiny, plastic tube that is put into your bladder to drain your urine  · Go to behavior therapy as directed  Behavior therapy may be used to help you learn to control your urge to urinate  Weight Management   Why it is important to manage your weight:  Being overweight increases your risk of health conditions such as heart disease, high blood pressure, type 2 diabetes, and certain types of cancer   It can also increase your risk for osteoarthritis, sleep apnea, and other respiratory problems  Aim for a slow, steady weight loss  Even a small amount of weight loss can lower your risk of health problems  How to lose weight safely:  A safe and healthy way to lose weight is to eat fewer calories and get regular exercise  You can lose up about 1 pound a week by decreasing the number of calories you eat by 500 calories each day  Healthy meal plan for weight management:  A healthy meal plan includes a variety of foods, contains fewer calories, and helps you stay healthy  A healthy meal plan includes the following:  · Eat whole-grain foods more often  A healthy meal plan should contain fiber  Fiber is the part of grains, fruits, and vegetables that is not broken down by your body  Whole-grain foods are healthy and provide extra fiber in your diet  Some examples of whole-grain foods are whole-wheat breads and pastas, oatmeal, brown rice, and bulgur  · Eat a variety of vegetables every day  Include dark, leafy greens such as spinach, kale, thelma greens, and mustard greens  Eat yellow and orange vegetables such as carrots, sweet potatoes, and winter squash  · Eat a variety of fruits every day  Choose fresh or canned fruit (canned in its own juice or light syrup) instead of juice  Fruit juice has very little or no fiber  · Eat low-fat dairy foods  Drink fat-free (skim) milk or 1% milk  Eat fat-free yogurt and low-fat cottage cheese  Try low-fat cheeses such as mozzarella and other reduced-fat cheeses  · Choose meat and other protein foods that are low in fat  Choose beans or other legumes such as split peas or lentils  Choose fish, skinless poultry (chicken or turkey), or lean cuts of red meat (beef or pork)  Before you cook meat or poultry, cut off any visible fat  · Use less fat and oil  Try baking foods instead of frying them   Add less fat, such as margarine, sour cream, regular salad dressing and mayonnaise to foods  Eat fewer high-fat foods  Some examples of high-fat foods include french fries, doughnuts, ice cream, and cakes  · Eat fewer sweets  Limit foods and drinks that are high in sugar  This includes candy, cookies, regular soda, and sweetened drinks  Exercise:  Exercise at least 30 minutes per day on most days of the week  Some examples of exercise include walking, biking, dancing, and swimming  You can also fit in more physical activity by taking the stairs instead of the elevator or parking farther away from stores  Ask your healthcare provider about the best exercise plan for you  © Copyright [employer] 2018 Information is for End User's use only and may not be sold, redistributed or otherwise used for commercial purposes   All illustrations and images included in CareNotes® are the copyrighted property of [employer]  or [address] [place] [address]

## 2022-02-17 NOTE — PROGRESS NOTES
Assessment and Plan:     Problem List Items Addressed This Visit        Endocrine    Hypothyroidism    Relevant Orders    TSH, 3rd generation with Free T4 reflex       Cardiovascular and Mediastinum    Essential hypertension     BP is up a bit but she has not been taking her furosemide  Will restart diuretic as ordered  Continue current doses of lisinopril and metoprolol  Relevant Medications    torsemide (DEMADEX) 10 mg tablet (Start on 2/18/2022)    Other Relevant Orders    CBC and differential    Comprehensive metabolic panel    Lipid panel    TSH, 3rd generation with Free T4 reflex    Acute on chronic diastolic congestive heart failure (HCC)     Wt Readings from Last 3 Encounters:   02/17/22 60 8 kg (134 lb)   12/30/21 59 kg (130 lb)   11/18/21 60 3 kg (133 lb)       She has not been taking her furosemide because she is convinced it makes her nose drip  I assured her this is not the case  She would like to try to change and I will escribe torsemide to the pharmacy instead, to continue three times a week  Cautioned patient again to be compliant with her medications  Relevant Medications    torsemide (DEMADEX) 10 mg tablet (Start on 2/18/2022)    Complete heart block [facility])     She is s/p pacemaker  Genitourinary    Chronic kidney disease, stage 3a [facility])     Lab Results   Component Value Date    EGFR 59 04/16/2021    EGFR 57 11/24/2020    EGFR 58 11/23/2020    CREATININE 0 94 10/08/2021    CREATININE 0 92 04/16/2021    CREATININE 0 94 11/24/2020     Stable and will continue to follow, labs were ordered  Advised to avoid potentially nephrotoxic agents  Relevant Medications    torsemide (DEMADEX) 10 mg tablet (Start on 2/18/2022)       Other    Dyslipidemia     Continue rosuvastatin and will check labs for lipid control and LFT's            Relevant Orders    CBC and differential    Comprehensive metabolic panel    Lipid panel    TSH, 3rd generation with Free T4 reflex Other Visit Diagnoses     Medicare annual wellness visit, subsequent    -  Primary    Chronic rhinitis        Relevant Medications    ipratropium (ATROVENT) 0 03 % nasal spray           Preventive health issues were discussed with patient, and age appropriate screening tests were ordered as noted in patient's After Visit Summary  Personalized health advice and appropriate referrals for health education or preventive services given if needed, as noted in patient's After Visit Summary       History of Present Illness:     Patient presents for Medicare Annual Wellness visit    Patient Care Team:  Petra Clark MD as PCP - MD Abdulaziz De Souza MD Clyda Naval, MD Berdine Robes, MD     Problem List:     Patient Active Problem List   Diagnosis    Dyslipidemia    Essential hypertension    Hypothyroidism    Allergic rhinitis    CAD (coronary artery disease)    Atrial flutter ([address] )    Esophageal dysmotility    Esophageal reflux    Osteoporosis    Thyroid nodule    Acute on chronic diastolic congestive heart failure ([address] )    BMI 29 0-29 9,adult    Stasis dermatitis of both legs    Localized edema    History of atrial flutter    Atrial fibrillation with rapid ventricular response ([address] )    Hyponatremia    Hypertensive heart disease with heart failure ([address] )    Cardiomyopathy ([address] )    Complete heart block ([address] )    Long term current use of anticoagulant    Tricuspid valve insufficiency    Anxiety    Chronic kidney disease, stage 3a ([address] )      Past Medical and Surgical History:     Past Medical History:   Diagnosis Date    Atrial flutter ([address] )     CAD (coronary artery disease)     s/p PCIx1 -2012    Coronary artery disease     Deafness     Disease of thyroid gland     Hyperlipidemia     Hypertension      Past Surgical History:   Procedure Laterality Date    BREAST BIOPSY Left     negative    CARDIAC SURGERY      stents    ESOPHAGOGASTRODUODENOSCOPY N/A 2/20/2017 Procedure: ESOPHAGOGASTRODUODENOSCOPY (EGD); Surgeon: Marelyn Hammans, MD;  Location: [facility];   Service:       Family History:     Family History   Problem Relation Age of Onset   Shaunna Lopez Hypertension Mother         essential    Breast cancer Mother     No Known Problems Sister     No Known Problems Daughter     No Known Problems Sister     No Known Problems Sister       Social History:     Social History     Socioeconomic History    Marital status: /Civil Union     Spouse name: None    Number of children: None    Years of education: None    Highest education level: None   Occupational History    None   Tobacco Use    Smoking status: Never Smoker    Smokeless tobacco: Never Used   Vaping Use    Vaping Use: Never used   Substance and Sexual Activity    Alcohol use: Not Currently     Alcohol/week: 0 0 standard drinks    Drug use: No    Sexual activity: None   Other Topics Concern    None   Social History Narrative    None     Social Determinants of Health     Financial Resource Strain: Not on file   Food Insecurity: Not on file   Transportation Needs: Not on file   Physical Activity: Not on file   Stress: Not on file   Social Connections: Not on file   Intimate Partner Violence: Not on file   Housing Stability: Not on file      Medications and Allergies:     Current Outpatient Medications   Medication Sig Dispense Refill    famotidine (PEPCID) 40 MG tablet Take 1 tablet (40 mg total) by mouth daily at bedtime 30 tablet 3    levothyroxine (Synthroid) 75 mcg tablet Take 1 tablet (75 mcg total) by mouth daily in the early morning BRAND MEDICALLY NECESSARY 90 tablet 3    lisinopril (ZESTRIL) 2 5 mg tablet Take 1 tablet (2 5 mg total) by mouth daily 90 tablet 3    metoprolol succinate (TOPROL-XL) 25 mg 24 hr tablet Take 1 tablet (25 mg total) by mouth daily 90 tablet 3    rosuvastatin (CRESTOR) 10 MG tablet Take 1 tablet (10 mg total) by mouth daily 90 tablet 0    Xarelto 20 MG tablet TAKE ONE TABLET BY MOUTH EVERY DAY WITH BREAKFAST 90 tablet 1    ipratropium (ATROVENT) 0 03 % nasal spray 2 sprays into each nostril every 12 (twelve) hours 30 mL 5    [START ON 2/18/2022] torsemide (DEMADEX) 10 mg tablet Take 1 tablet (10 mg total) by mouth 3 (three) times a week 40 tablet 3     No current facility-administered medications for this visit  Allergies   Allergen Reactions    Sotalol      Vomiting, convulsing, rash, cough      Immunizations:     Immunization History   Administered Date(s) Administered    COVID-19 MODERNA VACC 0 25 ML IM BOOSTER 11/30/2021    COVID-19 MODERNA VACC 0 5 ML IM 04/01/2021, 04/29/2021    Influenza Split High Dose Preservative Free IM 10/22/2012, 11/07/2013, 11/07/2013, 10/05/2015, 11/03/2016, 11/02/2017    Influenza, high dose seasonal 0 7 mL 12/03/2018, 01/06/2020, 10/11/2020, 10/13/2021    Influenza, seasonal, injectable 12/21/2011, 11/03/2014    Pneumococcal Conjugate 13-Valent 11/03/2016    Pneumococcal Conjugate PCV 7 12/21/2011    Pneumococcal Polysaccharide PPV23 11/04/2020    Tdap 07/02/2013      Health Maintenance: There are no preventive care reminders to display for this patient  There are no preventive care reminders to display for this patient  Medicare Health Risk Assessment:     /90   Pulse 82   Temp 97 9 °F (36 6 °C)   Resp 22   Ht 4' 9" (1 448 m)   Wt 60 8 kg (134 lb)   BMI 29 00 kg/m²      Manuel Christina is here for her Subsequent Wellness visit  Health Risk Assessment:   Patient rates overall health as fair  Patient feels that their physical health rating is same  Patient is satisfied with their life  Eyesight was rated as same  Hearing was rated as same  Patient feels that their emotional and mental health rating is same  Patients states they are sometimes angry  Patient states they are always unusually tired/fatigued  Pain experienced in the last 7 days has been some   Patient states that she has experienced no weight loss or gain in last 6 months  Depression Screening:   PHQ-2 Score: 1      Fall Risk Screening: In the past year, patient has experienced: no history of falling in past year      Urinary Incontinence Screening:   Patient has not leaked urine accidently in the last six months  Home Safety:  Patient does not have trouble with stairs inside or outside of their home  Patient has working smoke alarms and has working carbon monoxide detector  Home safety hazards include: none  Nutrition:   Current diet is Regular  Medications:   Patient is currently taking over-the-counter supplements  OTC medications include: see medication list  Patient is able to manage medications  Activities of Daily Living (ADLs)/Instrumental Activities of Daily Living (IADLs):   Walk and transfer into and out of bed and chair?: Yes  Dress and groom yourself?: Yes    Bathe or shower yourself?: Yes    Feed yourself? Yes  Do your laundry/housekeeping?: Yes  Manage your money, pay your bills and track your expenses?: Yes  Make your own meals?: Yes    Do your own shopping?: No    Previous Hospitalizations:   Any hospitalizations or ED visits within the last 12 months?: No      Advance Care Planning:   Living will: Yes    Durable POA for healthcare:  Yes    Advanced directive: Yes    End of Life Decisions reviewed with patient: Yes      Cognitive Screening:   Provider or family/friend/caregiver concerned regarding cognition?: No    PREVENTIVE SCREENINGS      Cardiovascular Screening:    General: Screening Current      Diabetes Screening:     General: Screening Current      Colorectal Cancer Screening:     General: Screening Not Indicated      Breast Cancer Screening:     General: Screening Not Indicated      Cervical Cancer Screening:    General: Screening Not Indicated      Osteoporosis Screening:    General: Screening Not Indicated and History Osteoporosis      Abdominal Aortic Aneurysm (AAA) Screening:        General: Screening Not Indicated      Lung Cancer Screening:     General: Screening Not Indicated      Hepatitis C Screening:    General: Screening Not Indicated and Risks and Benefits Discussed    Screening, Brief Intervention, and Referral to Treatment (SBIRT)    Screening  Typical number of drinks in a day: 0    AUDIT-C Screenin) How often did you have a drink containing alcohol in the past year? never  2) How many drinks did you have on a typical day when you were drinking in the past year? 0  3) How often did you have 6 or more drinks on one occasion in the past year? never    AUDIT-C Score: 0  Interpretation: Score 0-2 (female): Negative screen for alcohol misuse    Single Item Drug Screening:  How often have you used an illegal drug (including marijuana) or a prescription medication for non-medical reasons in the past year? never    Single Item Drug Screen Score: 0  Interpretation: Negative screen for possible drug use disorder    Brief Intervention  Alcohol & drug use screenings were reviewed  No concerns regarding substance use disorder identified  Other Counseling Topics:   Car/seat belt/driving safety, skin self-exam, sunscreen and regular weightbearing exercise and calcium and vitamin D intake         Gorge Perry MD

## 2022-02-17 NOTE — ASSESSMENT & PLAN NOTE
Wt Readings from Last 3 Encounters:   02/17/22 60 8 kg (134 lb)   12/30/21 59 kg (130 lb)   11/18/21 60 3 kg (133 lb)       She has not been taking her furosemide because she is convinced it makes her nose drip  I assured her this is not the case  She would like to try to change and I will escribe torsemide to the pharmacy instead, to continue three times a week  Cautioned patient again to be compliant with her medications

## 2022-02-17 NOTE — ASSESSMENT & PLAN NOTE
BP is up a bit but she has not been taking her furosemide  Will restart diuretic as ordered  Continue current doses of lisinopril and metoprolol

## 2022-03-09 DIAGNOSIS — I49.3 PVC'S (PREMATURE VENTRICULAR CONTRACTIONS): ICD-10-CM

## 2022-03-09 RX ORDER — METOPROLOL SUCCINATE 25 MG/1
25 TABLET, EXTENDED RELEASE ORAL DAILY
Qty: 90 TABLET | Refills: 3 | Status: SHIPPED | OUTPATIENT
Start: 2022-03-09 | End: 2022-03-25

## 2022-03-16 ENCOUNTER — VBI (OUTPATIENT)
Dept: ADMINISTRATIVE | Facility: OTHER | Age: 82
End: 2022-03-16

## 2022-03-25 ENCOUNTER — OFFICE VISIT (OUTPATIENT)
Dept: CARDIOLOGY CLINIC | Facility: CLINIC | Age: 82
End: 2022-03-25
Payer: COMMERCIAL

## 2022-03-25 VITALS
DIASTOLIC BLOOD PRESSURE: 88 MMHG | WEIGHT: 132 LBS | TEMPERATURE: 99 F | OXYGEN SATURATION: 97 % | SYSTOLIC BLOOD PRESSURE: 164 MMHG | BODY MASS INDEX: 28.48 KG/M2 | HEART RATE: 77 BPM | HEIGHT: 57 IN

## 2022-03-25 DIAGNOSIS — I50.32 CHRONIC DIASTOLIC CHF (CONGESTIVE HEART FAILURE) (HCC): ICD-10-CM

## 2022-03-25 DIAGNOSIS — Z95.0 PRESENCE OF PERMANENT CARDIAC PACEMAKER: ICD-10-CM

## 2022-03-25 DIAGNOSIS — I48.3 TYPICAL ATRIAL FLUTTER (HCC): Primary | ICD-10-CM

## 2022-03-25 DIAGNOSIS — I10 ESSENTIAL HYPERTENSION: ICD-10-CM

## 2022-03-25 DIAGNOSIS — I44.2 COMPLETE HEART BLOCK (HCC): ICD-10-CM

## 2022-03-25 DIAGNOSIS — E78.5 DYSLIPIDEMIA: ICD-10-CM

## 2022-03-25 DIAGNOSIS — I48.91 ATRIAL FIBRILLATION WITH RAPID VENTRICULAR RESPONSE (HCC): ICD-10-CM

## 2022-03-25 DIAGNOSIS — I25.10 CORONARY ARTERY DISEASE INVOLVING NATIVE CORONARY ARTERY OF NATIVE HEART WITHOUT ANGINA PECTORIS: ICD-10-CM

## 2022-03-25 DIAGNOSIS — I49.3 PVC'S (PREMATURE VENTRICULAR CONTRACTIONS): ICD-10-CM

## 2022-03-25 DIAGNOSIS — K21.9 GASTROESOPHAGEAL REFLUX DISEASE, UNSPECIFIED WHETHER ESOPHAGITIS PRESENT: ICD-10-CM

## 2022-03-25 PROCEDURE — 99214 OFFICE O/P EST MOD 30 MIN: CPT | Performed by: INTERNAL MEDICINE

## 2022-03-25 PROCEDURE — 1160F RVW MEDS BY RX/DR IN RCRD: CPT | Performed by: INTERNAL MEDICINE

## 2022-03-25 PROCEDURE — 93000 ELECTROCARDIOGRAM COMPLETE: CPT | Performed by: INTERNAL MEDICINE

## 2022-03-25 PROCEDURE — 3077F SYST BP >= 140 MM HG: CPT | Performed by: INTERNAL MEDICINE

## 2022-03-25 PROCEDURE — 1036F TOBACCO NON-USER: CPT | Performed by: INTERNAL MEDICINE

## 2022-03-25 PROCEDURE — 3079F DIAST BP 80-89 MM HG: CPT | Performed by: INTERNAL MEDICINE

## 2022-03-25 RX ORDER — METOPROLOL SUCCINATE 25 MG
25 TABLET, EXTENDED RELEASE 24 HR ORAL DAILY
Qty: 30 TABLET | Refills: 5 | Status: SHIPPED | OUTPATIENT
Start: 2022-03-25 | End: 2022-06-15

## 2022-03-25 NOTE — PROGRESS NOTES
Cardiology Follow Up    Naima Tomlinson  1940  [id number]      Interval History: Naima Tomlinson is here for followup of  Hypertension and atrial fibrillation  She feels tightness in her throat and difficulty swallowing  Previously saw ENT who gave her PPI but she felt it made symptoms worse  She stopped medication  She feels dizziness which she believes started with change in generic  of Toprol XL  She spoke with pharmacy but they were not able to get previous medication  She has a history of multiple medication intolerances and previously she has stopped taking furosemide because she felt it was causing her lower back pain and frequent urination  Could also not tolerate beta blocker, sotalol, amiodarone either  She has been taking Xarelto regularly  She has a history of atrial fibrillation with multiple failed cardioversions in the past  She eventually had AV node ablation along with permanent pacemaker insertion by Dr Ersato Loving at [facility]  She was previously taking sotalol but stopped it independently because of side effects  Amiodarone was stopped because of GI side effects  Bystolic was stopped due to nausea  Previously, she felt metoprolol was causing her to feel bad as well  Amlodipine was stopped in the past because of LE edema  Echocardiogram in March 2020 showed EF of 45% with moderate to marked LA dilation           The following portions of the patient's history were reviewed and updated as appropriate: allergies, current medications, past family history, past medical history, past social history, past surgical history and problem list     Current Outpatient Medications on File Prior to Visit   Medication Sig Dispense Refill    famotidine (PEPCID) 40 MG tablet Take 1 tablet (40 mg total) by mouth daily at bedtime 30 tablet 3    ipratropium (ATROVENT) 0 03 % nasal spray 2 sprays into each nostril every 12 (twelve) hours 30 mL 5    levothyroxine (Synthroid) 75 mcg tablet Take 1 tablet (75 mcg total) by mouth daily in the early morning BRAND MEDICALLY NECESSARY 90 tablet 3    lisinopril (ZESTRIL) 2 5 mg tablet Take 1 tablet (2 5 mg total) by mouth daily 90 tablet 3    metoprolol succinate (TOPROL-XL) 25 mg 24 hr tablet Take 1 tablet (25 mg total) by mouth daily 90 tablet 3    rosuvastatin (CRESTOR) 10 MG tablet Take 1 tablet (10 mg total) by mouth daily 90 tablet 0    torsemide (DEMADEX) 10 mg tablet Take 1 tablet (10 mg total) by mouth 3 (three) times a week 40 tablet 3    Xarelto 20 MG tablet TAKE ONE TABLET BY MOUTH EVERY DAY WITH BREAKFAST 90 tablet 1     No current facility-administered medications on file prior to visit  Review of Systems:  Review of Systems   Constitutional: Positive for fatigue  HENT: Positive for hearing loss and trouble swallowing  Respiratory: Positive for shortness of breath  Cardiovascular: Negative for chest pain, palpitations and leg swelling  Musculoskeletal: Positive for arthralgias and myalgias  Neurological: Positive for dizziness  All other systems reviewed and are negative  Physical Exam:  /88 (BP Location: Right arm, Patient Position: Sitting, Cuff Size: Standard)   Pulse 77   Temp 99 °F (37 2 °C)   Ht 4' 9" (1 448 m)   Wt 59 9 kg (132 lb)   SpO2 97%   BMI 28 56 kg/m²     Physical Exam  Constitutional:       General: She is not in acute distress  Appearance: She is well-developed  She is not diaphoretic  HENT:      Head: Normocephalic and atraumatic  Eyes:      Conjunctiva/sclera: Conjunctivae normal       Pupils: Pupils are equal, round, and reactive to light  Neck:      Thyroid: No thyromegaly  Vascular: No JVD  Cardiovascular:      Rate and Rhythm: Normal rate and regular rhythm  Heart sounds: Normal heart sounds  No murmur heard  No friction rub  No gallop      Pulmonary:      Effort: Pulmonary effort is normal       Breath sounds: Normal breath sounds  Musculoskeletal:      Cervical back: Neck supple  Skin:     General: Skin is warm and dry  Findings: No erythema or rash  Neurological:      General: No focal deficit present  Mental Status: She is alert and oriented to person, place, and time  Cranial Nerves: No cranial nerve deficit  Psychiatric:         Behavior: Behavior normal          Thought Content: Thought content normal          Judgment: Judgment normal          Cardiographics  ECG:  Atrial flutter with Ventricular pacing and PVCs    Labs:  Lab Results   Component Value Date     08/15/2017    K 4 9 10/08/2021     10/08/2021    CO2 23 10/08/2021    BUN 16 10/08/2021    CREATININE 0 94 10/08/2021    CREATININE 0 92 04/16/2021    CREATININE 0 88 08/15/2017    GLUCOSE 94 08/15/2017    CALCIUM 9 0 04/16/2021    CALCIUM 9 2 08/15/2017     Lab Results   Component Value Date    WBC 4 9 10/08/2021    WBC 6 19 04/16/2021    WBC 4 8 08/15/2017    HGB 15 0 10/08/2021    HGB 15 1 04/16/2021    HGB 14 2 08/15/2017    HCT 45 1 10/08/2021    HCT 47 0 (H) 04/16/2021    HCT 43 7 08/15/2017    MCV 86 10/08/2021    MCV 88 04/16/2021    MCV 86 08/15/2017     10/08/2021     04/16/2021     08/15/2017     Lab Results   Component Value Date    CHOL 164 08/15/2017    TRIG 77 10/08/2021    HDL 54 10/08/2021     Imaging: No results found  Discussion/Summary:  1  Typical atrial flutter ([facility] [address] )    2  Presence of permanent cardiac pacemaker    3  Essential hypertension    4  Coronary artery disease involving native coronary artery of native heart without angina pectoris    5  Chronic diastolic CHF (congestive heart failure) ([facility] [address] )    6  Atrial fibrillation with rapid ventricular response (HCC)    7  Complete heart block ([facility] [address] )    8  Dyslipidemia      - BP significantly improved with addition of low dose of lisinopril 2 5 mg daily  Elevated today    Continue at current dose and follow up in 3 months for recheck  - Will try to get brand name Toprol  - Patient had AV node ablation along with pacemaker insertion  She has had significant improvement in her symptoms  She was feeling palpitations with neck/head discomfort that may due to frequent ventricular ectopy - will continue Toprol XL 25 mg daily     - She has multiple medication intolerances including beta blockers and calcium channel blocker  - Patient is on Xarelto    Aspirin was discontinued and she was continued on Xarelto alone because of excessive bruising    - She should continue routine pacemaker checks

## 2022-04-12 ENCOUNTER — OFFICE VISIT (OUTPATIENT)
Dept: GASTROENTEROLOGY | Facility: CLINIC | Age: 82
End: 2022-04-12
Payer: COMMERCIAL

## 2022-04-12 ENCOUNTER — TELEPHONE (OUTPATIENT)
Dept: GASTROENTEROLOGY | Facility: CLINIC | Age: 82
End: 2022-04-12

## 2022-04-12 VITALS
TEMPERATURE: 97.7 F | DIASTOLIC BLOOD PRESSURE: 94 MMHG | OXYGEN SATURATION: 96 % | HEIGHT: 57 IN | HEART RATE: 74 BPM | WEIGHT: 131 LBS | BODY MASS INDEX: 28.26 KG/M2 | SYSTOLIC BLOOD PRESSURE: 143 MMHG

## 2022-04-12 DIAGNOSIS — R13.10 DYSPHAGIA, UNSPECIFIED TYPE: Primary | ICD-10-CM

## 2022-04-12 DIAGNOSIS — R07.89 ATYPICAL CHEST PAIN: ICD-10-CM

## 2022-04-12 DIAGNOSIS — K21.9 GASTROESOPHAGEAL REFLUX DISEASE, UNSPECIFIED WHETHER ESOPHAGITIS PRESENT: ICD-10-CM

## 2022-04-12 PROCEDURE — 3077F SYST BP >= 140 MM HG: CPT | Performed by: PHYSICIAN ASSISTANT

## 2022-04-12 PROCEDURE — 1160F RVW MEDS BY RX/DR IN RCRD: CPT | Performed by: PHYSICIAN ASSISTANT

## 2022-04-12 PROCEDURE — 1036F TOBACCO NON-USER: CPT | Performed by: PHYSICIAN ASSISTANT

## 2022-04-12 PROCEDURE — 3080F DIAST BP >= 90 MM HG: CPT | Performed by: PHYSICIAN ASSISTANT

## 2022-04-12 PROCEDURE — 99204 OFFICE O/P NEW MOD 45 MIN: CPT | Performed by: PHYSICIAN ASSISTANT

## 2022-04-12 RX ORDER — METOPROLOL SUCCINATE 25 MG/1
TABLET, EXTENDED RELEASE ORAL
COMMUNITY

## 2022-04-12 NOTE — PROGRESS NOTES
[facility] - Outpatient Consultation  Allan Mark 79 y o  female MRN: [id number]  Encounter: [id number]          ASSESSMENT AND PLAN:      1  Atypical chest pain/discomfort  2  Dysphagia  3  GERD  Previously followed with our office 5 years ago for symptoms with relatively unremarkable barium swallow, modified swallow and EGD  Reports worsening symptoms over the past 4 months  Followed with ENT who suspected LPR and placed on PPI/ Pepcid however patient had chest palpitations and throat swelling therefore this was stopped  Symptoms appear most likely secondary to GERD with possible component of dysmotility verses esophageal spasms, esophageal diverticula, esophagitis  -recommended that patient start PPI daily however she is not interested in starting any medications at this time  -patient is agreeable and would like to pursue further testing and would be agreeable to possibly starting medications at that time if anything remarkable was found       -will obtain repeat barium swallow at this time     -will also plan for EGD  -discussed the risks of procedure including bleeding, infection and perforation       -in the meantime, recommend patient chew food very slowly, take time well chewing, alternate solids and liquids and avoid foods that can make reflux worse including spicy food, fatty food, tomato based products, citrus     -if symptoms persistent with unremarkable workup, could also consider esophageal manometry    -gave handout on anti-reflux measures    -also, if throat itching is persistent after eating could consider referral to allergist        Follow-up after procedures   ______________________________________________________________________    HPI:      Allan Mark is a 79year old female with past medical history of AFib on Xarelto, CAD, hypertension, osteoporosis, CKD, deaf who presents as a new patient for chest discomfort, heartburn      Sign language  was used  Patient was previously seen by our practice several years ago in 2017 due to dysphagia  EGD was performed at that time showing slightly torturous esophagus without esophagitis, no stricture  She then had barium swallow with speech performed showing prominent cricopharyngeal muscle however swallowing function appeared within normal limits  Barium esophagram was performed showing subtle dysmotility of the lower esophagus however no evidence of diverticula, strictures  More recently, patient followed up with ENT due to chest discomfort, reflux, throat itching after eating  At that time she was placed on famotidine  She reports this caused significant chest discomfort, worsening symptoms therefore she stopped this  She is not interested in starting any new medications at this time  She describes her symptoms as chest discomfort, feeling as though there is a pocket in the back of her esophagus, scratch in throat and itchiness which started over the past 4 months  She does report having the symptoms in the past however reports this was 20 years ago  Thankfully appetite and weight are normal     Patient had Jonna Lazaro just scoped by ENT which showed moderate mucosal erythema and edema of the arytenoids, interaryntenoid, postcricoid therefore seemed most likely due to LPR at that time is when she started Protonix and famotidine however due to reporting this made her symptoms worse and caused chest palpitations  Last EGD in 2017 with normal appearing esophagus and biopsies negative for EOE  REVIEW OF SYSTEMS:    CONSTITUTIONAL: Denies any fever, chills, rigors, and weight loss  HEENT: No earache or tinnitus  Denies hearing loss or visual disturbances  CARDIOVASCULAR: No chest pain or palpitations  RESPIRATORY: Denies any cough, hemoptysis, shortness of breath or dyspnea on exertion  GASTROINTESTINAL: As noted in the History of Present Illness     GENITOURINARY: No problems with urination  Denies any hematuria or dysuria  NEUROLOGIC: No dizziness or vertigo, denies headaches  MUSCULOSKELETAL: Denies any muscle or joint pain  SKIN: Denies skin rashes or itching  ENDOCRINE: Denies excessive thirst  Denies intolerance to heat or cold  PSYCHOSOCIAL: Denies depression or anxiety  Denies any recent memory loss  Historical Information   Past Medical History:   Diagnosis Date    Atrial flutter ([address] )     CAD (coronary artery disease)     s/p PCIx1 -2012    Coronary artery disease     Deafness     Disease of thyroid gland     Hyperlipidemia     Hypertension     Pacemaker      Past Surgical History:   Procedure Laterality Date    BREAST BIOPSY Left     negative    CARDIAC PACEMAKER PLACEMENT      CARDIAC SURGERY      stents    ESOPHAGOGASTRODUODENOSCOPY N/A 2/20/2017    Procedure: ESOPHAGOGASTRODUODENOSCOPY (EGD); Surgeon: Kerry Zepeda MD;  Location: [facility];   Service:      Social History   Social History     Substance and Sexual Activity   Alcohol Use Not Currently    Alcohol/week: 0 0 standard drinks     Social History     Substance and Sexual Activity   Drug Use No     Social History     Tobacco Use   Smoking Status Never Smoker   Smokeless Tobacco Never Used     Family History   Problem Relation Age of Onset    Hypertension Mother         essential    Breast cancer Mother     No Known Problems Sister     No Known Problems Daughter     No Known Problems Sister     No Known Problems Sister        Meds/Allergies       Current Outpatient Medications:     ipratropium (ATROVENT) 0 03 % nasal spray    levothyroxine (Synthroid) 75 mcg tablet    lisinopril (ZESTRIL) 2 5 mg tablet    metoprolol succinate (TOPROL-XL) 25 mg 24 hr tablet    rosuvastatin (CRESTOR) 10 MG tablet    torsemide (DEMADEX) 10 mg tablet    Xarelto 20 MG tablet    famotidine (PEPCID) 40 MG tablet    Toprol XL 25 MG 24 hr tablet    Allergies   Allergen Reactions    Sotalol      Vomiting, convulsing, rash, cough    Famotidine Throat Swelling           Objective     Blood pressure 143/94, pulse 74, temperature 97 7 °F (36 5 °C), height 4' 9" (1 448 m), weight 59 4 kg (131 lb), SpO2 96 %  Body mass index is 28 35 kg/m²  PHYSICAL EXAM:      General Appearance:   Alert, cooperative, no distress   HEENT:   Normocephalic, atraumatic, anicteric      Neck:  Supple, symmetrical, trachea midline   Lungs:   Clear to auscultation bilaterally; no rales, rhonchi or wheezing; respirations unlabored    Heart[de-identified]   Regular rate and rhythm; no murmur, rub, or gallop  Abdomen:   Soft, non-tender, non-distended; normal bowel sounds; no masses, no organomegaly    Genitalia:   Deferred    Rectal:   Deferred    Extremities:  No cyanosis, clubbing or edema    Pulses:  2+ and symmetric    Skin:  No jaundice, rashes, or lesions    Lymph nodes:  No palpable cervical lymphadenopathy        Lab Results:   No visits with results within 1 Day(s) from this visit     Latest known visit with results is:   Orders Only on 10/08/2021   Component Date Value    White Blood Cell Count 10/08/2021 4 9     Red Blood Cell Count 10/08/2021 5 24     Hemoglobin 10/08/2021 15 0     HCT 10/08/2021 45 1     MCV 10/08/2021 86     MCH 10/08/2021 28 6     MCHC 10/08/2021 33 3     RDW 10/08/2021 13 3     Platelet Count 87/54/2681 199     Neutrophils 10/08/2021 53     Lymphocytes 10/08/2021 30     Monocytes 10/08/2021 14     Eosinophils 10/08/2021 2     Basophils PCT 10/08/2021 1     Neutrophils (Absolute) 10/08/2021 2 6     Lymphocytes (Absolute) 10/08/2021 1 5     Monocytes (Absolute) 10/08/2021 0 7     Eosinophils (Absolute) 10/08/2021 0 1     Basophils ABS 10/08/2021 0 1     Immature Granulocytes 10/08/2021 0     Immature Granulocytes (A* 10/08/2021 0 0     Glucose, Random 10/08/2021 83     BUN 10/08/2021 16     Creatinine 10/08/2021 0 94     eGFR Non  10/08/2021 57*    eGFR  10/08/2021 65     SL AMB BUN/CREATININE RA* 10/08/2021 17     Sodium 10/08/2021 138     Potassium 10/08/2021 4 9     Chloride 10/08/2021 103     CO2 10/08/2021 23     CALCIUM 10/08/2021 9 3     Protein, Total 10/08/2021 6 7     Albumin 10/08/2021 3 9     Globulin, Total 10/08/2021 2 8     Albumin/Globulin Ratio 10/08/2021 1 4     TOTAL BILIRUBIN 10/08/2021 1 1     Alk Phos Isoenzymes 10/08/2021 75     AST 10/08/2021 22     ALT 10/08/2021 12     Cholesterol, Total 10/08/2021 152     Triglycerides 10/08/2021 77     HDL 10/08/2021 54     VLDL Cholesterol Calcula* 10/08/2021 15     LDL Calculated 10/08/2021 83     Interpretation 10/08/2021 Note     Interpretation 10/08/2021 Note     TSH 10/08/2021 1 270          Radiology Results:   No results found

## 2022-04-12 NOTE — PATIENT INSTRUCTIONS
Scheduled date of EGD(as of today): 06/16/22  Physician performing EGD: Kerri Valdes  Location of EGD: [facility]  Instructions reviewed with patient by: Michael Plata   Clearances: Cooper Morris

## 2022-04-12 NOTE — TELEPHONE ENCOUNTER
Our mutual patient is scheduled for procedure: EGD    On: June 16 , 2022     With: Dr Chilango Cardenas MD    He/She is taking the following blood thinner: Xarelto (Rivaroxaban)    Can this be stopped  2 days prior to the procedure    Physician Approving clearance:

## 2022-04-18 ENCOUNTER — HOSPITAL ENCOUNTER (OUTPATIENT)
Dept: RADIOLOGY | Facility: HOSPITAL | Age: 82
Discharge: HOME | End: 2022-04-18
Payer: COMMERCIAL

## 2022-04-18 DIAGNOSIS — K21.9 GASTROESOPHAGEAL REFLUX DISEASE, UNSPECIFIED WHETHER ESOPHAGITIS PRESENT: ICD-10-CM

## 2022-04-18 DIAGNOSIS — R13.10 DYSPHAGIA, UNSPECIFIED TYPE: ICD-10-CM

## 2022-04-18 PROCEDURE — 74220 X-RAY XM ESOPHAGUS 1CNTRST: CPT

## 2022-05-19 DIAGNOSIS — E78.5 DYSLIPIDEMIA: ICD-10-CM

## 2022-05-19 RX ORDER — ROSUVASTATIN CALCIUM 10 MG/1
10 TABLET, COATED ORAL DAILY
Qty: 90 TABLET | Refills: 0 | Status: SHIPPED | OUTPATIENT
Start: 2022-05-19

## 2022-05-21 DIAGNOSIS — I48.91 A-FIB (HCC): ICD-10-CM

## 2022-05-21 DIAGNOSIS — I10 ESSENTIAL HYPERTENSION: ICD-10-CM

## 2022-05-21 DIAGNOSIS — I48.3 TYPICAL ATRIAL FLUTTER (HCC): ICD-10-CM

## 2022-05-21 RX ORDER — RIVAROXABAN 20 MG/1
TABLET, FILM COATED ORAL
Qty: 90 TABLET | Refills: 1 | Status: SHIPPED | OUTPATIENT
Start: 2022-05-21 | End: 2022-07-13

## 2022-05-23 RX ORDER — LISINOPRIL 2.5 MG/1
TABLET ORAL
Qty: 90 TABLET | Refills: 3 | Status: SHIPPED | OUTPATIENT
Start: 2022-05-23 | End: 2022-07-27

## 2022-06-10 ENCOUNTER — TELEPHONE (OUTPATIENT)
Dept: GASTROENTEROLOGY | Facility: CLINIC | Age: 82
End: 2022-06-10

## 2022-06-14 ENCOUNTER — TELEPHONE (OUTPATIENT)
Dept: PREADMISSION TESTING | Facility: HOSPITAL | Age: 82
End: 2022-06-14

## 2022-06-15 VITALS — WEIGHT: 131 LBS | HEIGHT: 57 IN | BODY MASS INDEX: 28.26 KG/M2

## 2022-06-15 RX ORDER — SODIUM CHLORIDE, SODIUM LACTATE, POTASSIUM CHLORIDE, CALCIUM CHLORIDE 600; 310; 30; 20 MG/100ML; MG/100ML; MG/100ML; MG/100ML
75 INJECTION, SOLUTION INTRAVENOUS CONTINUOUS
Status: CANCELLED | OUTPATIENT
Start: 2022-06-15

## 2022-06-15 NOTE — PRE-PROCEDURE INSTRUCTIONS
Pre-Surgery Instructions:   Medication Instructions    ipratropium (ATROVENT) 0 03 % nasal spray not using    levothyroxine (Synthroid) 75 mcg tablet Take day of surgery   lisinopril (ZESTRIL) 2 5 mg tablet Take night before surgery    metoprolol succinate (TOPROL-XL) 25 mg 24 hr tablet Take night before surgery    rosuvastatin (CRESTOR) 10 MG tablet Hold day of surgery   torsemide (DEMADEX) 10 mg tablet not taking    Xarelto 20 MG tablet Last dose 6/14-awaiting Dr Kari Orozco instructions-if one day hold is enough    [DISCONTINUED] famotidine (PEPCID) 40 MG tablet discontinued-pt allergic    Pt to follow Dr Kari Orozco instructions  Interview and instructions obtained/given via [employer] language service  Pt NPO at 12 mn, to take synthroid the am of the EGD at least 2 hours prior to arrival time, with up to 6 oz of water  [place] from Dr Kari Orozco office to call pt re: xarelto instructions

## 2022-06-15 NOTE — TELEPHONE ENCOUNTER
Left message with  service stating that it was ok to be off xarelto just today and tomorrow per Dr Lorelei Sebastian  Left my direct phone number in the event the patient had any further questions

## 2022-06-16 ENCOUNTER — ANESTHESIA EVENT (OUTPATIENT)
Dept: GASTROENTEROLOGY | Facility: AMBULARY SURGERY CENTER | Age: 82
End: 2022-06-16

## 2022-06-16 ENCOUNTER — ANESTHESIA (OUTPATIENT)
Dept: GASTROENTEROLOGY | Facility: AMBULARY SURGERY CENTER | Age: 82
End: 2022-06-16

## 2022-06-16 ENCOUNTER — HOSPITAL ENCOUNTER (OUTPATIENT)
Dept: GASTROENTEROLOGY | Facility: AMBULARY SURGERY CENTER | Age: 82
Setting detail: OUTPATIENT SURGERY
Discharge: HOME | End: 2022-06-16
Attending: INTERNAL MEDICINE | Admitting: INTERNAL MEDICINE
Payer: COMMERCIAL

## 2022-06-16 VITALS
DIASTOLIC BLOOD PRESSURE: 72 MMHG | HEART RATE: 69 BPM | SYSTOLIC BLOOD PRESSURE: 126 MMHG | OXYGEN SATURATION: 94 % | TEMPERATURE: 98.1 F | WEIGHT: 134 LBS | RESPIRATION RATE: 18 BRPM | BODY MASS INDEX: 29 KG/M2

## 2022-06-16 DIAGNOSIS — R13.10 DYSPHAGIA, UNSPECIFIED TYPE: ICD-10-CM

## 2022-06-16 DIAGNOSIS — K21.9 GASTROESOPHAGEAL REFLUX DISEASE, UNSPECIFIED WHETHER ESOPHAGITIS PRESENT: ICD-10-CM

## 2022-06-16 DIAGNOSIS — R07.89 ATYPICAL CHEST PAIN: ICD-10-CM

## 2022-06-16 PROBLEM — Z95.0 PACEMAKER: Status: ACTIVE | Noted: 2021-03-01

## 2022-06-16 PROBLEM — H91.90 DEAFNESS: Status: ACTIVE | Noted: 2021-03-01

## 2022-06-16 PROCEDURE — C1726 CATH, BAL DIL, NON-VASCULAR: HCPCS

## 2022-06-16 PROCEDURE — 88305 TISSUE EXAM BY PATHOLOGIST: CPT | Performed by: PATHOLOGY

## 2022-06-16 PROCEDURE — 43239 EGD BIOPSY SINGLE/MULTIPLE: CPT | Performed by: INTERNAL MEDICINE

## 2022-06-16 PROCEDURE — 43249 ESOPH EGD DILATION <30 MM: CPT | Performed by: INTERNAL MEDICINE

## 2022-06-16 RX ORDER — SODIUM CHLORIDE, SODIUM LACTATE, POTASSIUM CHLORIDE, CALCIUM CHLORIDE 600; 310; 30; 20 MG/100ML; MG/100ML; MG/100ML; MG/100ML
75 INJECTION, SOLUTION INTRAVENOUS CONTINUOUS
Status: DISCONTINUED | OUTPATIENT
Start: 2022-06-16 | End: 2022-06-20 | Stop reason: HOSPADM

## 2022-06-16 RX ORDER — LIDOCAINE HYDROCHLORIDE 10 MG/ML
INJECTION, SOLUTION EPIDURAL; INFILTRATION; INTRACAUDAL; PERINEURAL AS NEEDED
Status: DISCONTINUED | OUTPATIENT
Start: 2022-06-16 | End: 2022-06-16

## 2022-06-16 RX ORDER — PANTOPRAZOLE SODIUM 40 MG/1
40 TABLET, DELAYED RELEASE ORAL DAILY
Qty: 30 TABLET | Refills: 3 | Status: SHIPPED | OUTPATIENT
Start: 2022-06-16

## 2022-06-16 RX ORDER — PROPOFOL 10 MG/ML
INJECTION, EMULSION INTRAVENOUS AS NEEDED
Status: DISCONTINUED | OUTPATIENT
Start: 2022-06-16 | End: 2022-06-16

## 2022-06-16 RX ADMIN — PROPOFOL 20 MG: 10 INJECTION, EMULSION INTRAVENOUS at 13:32

## 2022-06-16 RX ADMIN — LIDOCAINE HYDROCHLORIDE 50 MG: 10 INJECTION, SOLUTION EPIDURAL; INFILTRATION; INTRACAUDAL; PERINEURAL at 13:26

## 2022-06-16 RX ADMIN — PROPOFOL 20 MG: 10 INJECTION, EMULSION INTRAVENOUS at 13:30

## 2022-06-16 RX ADMIN — PROPOFOL 20 MG: 10 INJECTION, EMULSION INTRAVENOUS at 13:28

## 2022-06-16 RX ADMIN — SODIUM CHLORIDE, SODIUM LACTATE, POTASSIUM CHLORIDE, AND CALCIUM CHLORIDE: .6; .31; .03; .02 INJECTION, SOLUTION INTRAVENOUS at 13:15

## 2022-06-16 RX ADMIN — PROPOFOL 100 MG: 10 INJECTION, EMULSION INTRAVENOUS at 13:26

## 2022-06-16 NOTE — ANESTHESIA POSTPROCEDURE EVALUATION
Post-Op Assessment Note    CV Status:  Stable    Pain management: adequate     Mental Status:  Sleepy   Hydration Status:  Euvolemic   PONV Controlled:  Controlled   Airway Patency:  Patent      Post Op Vitals Reviewed: Yes      Staff: CRNA         No complications documented      BP  112/66   Temp   98 3   Pulse  70   Resp   20   SpO2   98

## 2022-06-16 NOTE — H&P
History and Physical -  [facility]  Angelita Kumar 79 y o  female MRN: [id number]    HPI: Angelita Kumar is a 79y o  year old female who presents with GERD and dysphagia  Review of Systems    Historical Information   Past Medical History:   Diagnosis Date    Atrial flutter ([address] )     CAD (coronary artery disease)     s/p PCIx1 -2012    Coronary artery disease     Deafness     Disease of thyroid gland     GERD (gastroesophageal reflux disease)     Hyperlipidemia     Hypertension     Itching     of the face, throat    Pacemaker 03/2021    medtronic-left chest    Wears glasses      Past Surgical History:   Procedure Laterality Date    BREAST BIOPSY Left     negative    CARDIAC PACEMAKER PLACEMENT  03/2021    medtronic    CARDIAC SURGERY      stents    COLONOSCOPY      ESOPHAGOGASTRODUODENOSCOPY N/A 02/20/2017    Procedure: ESOPHAGOGASTRODUODENOSCOPY (EGD); Surgeon: Ernestina Magallon MD;  Location: [facility] GI LAB;   Service:      Social History   Social History     Substance and Sexual Activity   Alcohol Use Not Currently    Alcohol/week: 0 0 standard drinks     Social History     Substance and Sexual Activity   Drug Use No     Social History     Tobacco Use   Smoking Status Never Smoker   Smokeless Tobacco Never Used     Family History   Problem Relation Age of Onset    Cancer Mother         breast    Hypertension Mother         essential    Breast cancer Mother     Other Father         smoker    Tuberculosis Father     No Known Problems Sister     No Known Problems Sister     No Known Problems Sister     No Known Problems Daughter        Meds/Allergies     (Not in a hospital admission)      Allergies   Allergen Reactions    Sotalol      Vomiting, convulsing, rash, cough    Famotidine Throat Swelling       Objective     BP (!) 179/88   Pulse 87   Temp 98 1 °F (36 7 °C) (Temporal)   Resp 16   Wt 60 8 kg (134 lb)   SpO2 96%   BMI 29 00 kg/m²       PHYSICAL EXAM    Gen: NAD  CV: RRR  CHEST: Clear  ABD: soft, NT/ND  EXT: no edema  Neuro: AAO      ASSESSMENT/PLAN:  This is a 79y o  year old female here for GERD and dysphagia         PLAN:   Procedure: Lexie Sidhu

## 2022-06-16 NOTE — ANESTHESIA PREPROCEDURE EVALUATION
Procedure:  EGD    Relevant Problems   CARDIO   (+) Acute on chronic diastolic congestive heart failure (HCC)   (+) Atrial fibrillation with rapid ventricular response (HCC)   (+) Atrial flutter (HCC)   (+) CAD (coronary artery disease)   (+) Complete heart block (HCC)   (+) Essential hypertension   (+) Hyperlipidemia   (+) Pacemaker      ENDO   (+) Hypothyroidism      GI/HEPATIC   (+) Esophageal reflux      /RENAL   (+) Chronic kidney disease, stage 3a (HCC)      NEURO/PSYCH   (+) Anxiety   (+) History of atrial flutter      Nervous and Auditory   (+) Deafness        Physical Exam    Airway    Mallampati score: II  TM Distance: >3 FB  Neck ROM: full     Dental       Cardiovascular  Rhythm: regular, Rate: normal,     Pulmonary  Breath sounds clear to auscultation,     Other Findings        Anesthesia Plan  ASA Score- 3     Anesthesia Type- IV sedation with anesthesia with ASA Monitors  Additional Monitors:   Airway Plan:           Plan Factors-    Chart reviewed  Patient is not a current smoker  Induction- intravenous  Postoperative Plan-     Informed Consent- Anesthetic plan and risks discussed with patient  I personally reviewed this patient with the CRNA  Discussed and agreed on the Anesthesia Plan with the CRNA  Cindy Weller

## 2022-06-20 ENCOUNTER — OFFICE VISIT (OUTPATIENT)
Dept: CARDIOLOGY CLINIC | Facility: CLINIC | Age: 82
End: 2022-06-20
Payer: COMMERCIAL

## 2022-06-20 VITALS
TEMPERATURE: 97 F | DIASTOLIC BLOOD PRESSURE: 86 MMHG | OXYGEN SATURATION: 97 % | BODY MASS INDEX: 27.83 KG/M2 | SYSTOLIC BLOOD PRESSURE: 140 MMHG | HEIGHT: 57 IN | WEIGHT: 129 LBS | HEART RATE: 73 BPM

## 2022-06-20 DIAGNOSIS — I25.10 CORONARY ARTERY DISEASE INVOLVING NATIVE CORONARY ARTERY OF NATIVE HEART WITHOUT ANGINA PECTORIS: ICD-10-CM

## 2022-06-20 DIAGNOSIS — I48.3 TYPICAL ATRIAL FLUTTER (HCC): ICD-10-CM

## 2022-06-20 DIAGNOSIS — I10 ESSENTIAL HYPERTENSION: Primary | ICD-10-CM

## 2022-06-20 DIAGNOSIS — I44.2 COMPLETE HEART BLOCK (HCC): ICD-10-CM

## 2022-06-20 DIAGNOSIS — I48.91 ATRIAL FIBRILLATION WITH RAPID VENTRICULAR RESPONSE (HCC): ICD-10-CM

## 2022-06-20 PROCEDURE — 3079F DIAST BP 80-89 MM HG: CPT | Performed by: INTERNAL MEDICINE

## 2022-06-20 PROCEDURE — 3077F SYST BP >= 140 MM HG: CPT | Performed by: INTERNAL MEDICINE

## 2022-06-20 PROCEDURE — 93000 ELECTROCARDIOGRAM COMPLETE: CPT | Performed by: INTERNAL MEDICINE

## 2022-06-20 PROCEDURE — 99214 OFFICE O/P EST MOD 30 MIN: CPT | Performed by: INTERNAL MEDICINE

## 2022-06-20 NOTE — PROGRESS NOTES
Cardiology Follow Up    Lety Orosco  1940  [id number]      Interval History: Lety Orosco is here for followup of  Hypertension and atrial fibrillation  Since her last visit, she underwent EGD with dilation of esophagus  She feels improvement in her difficulty swallowing  She denies any chest pain, shortness of breath, LE edema, orthopnea or PND  She has a history of multiple medication intolerances and previously she has stopped taking furosemide because she felt it was causing her lower back pain and frequent urination  Could also not tolerate beta blocker, sotalol, amiodarone either  She has been taking Xarelto regularly  She has a history of atrial fibrillation with multiple failed cardioversions in the past  She eventually had AV node ablation along with permanent pacemaker insertion by Dr Andrew Zhou at [facility]  She was previously taking sotalol but stopped it independently because of side effects  Amiodarone was stopped because of GI side effects  Bystolic was stopped due to nausea  Previously, she felt metoprolol was causing her to feel bad as well  Amlodipine was stopped in the past because of LE edema  Echocardiogram in March 2020 showed EF of 45% with moderate to marked LA dilation           The following portions of the patient's history were reviewed and updated as appropriate: allergies, current medications, past family history, past medical history, past social history, past surgical history and problem list     Current Outpatient Medications on File Prior to Visit   Medication Sig Dispense Refill    levothyroxine (Synthroid) 75 mcg tablet Take 1 tablet (75 mcg total) by mouth daily in the early morning BRAND MEDICALLY NECESSARY 90 tablet 3    lisinopril (ZESTRIL) 2 5 mg tablet TAKE ONE TABLET BY MOUTH EVERY DAY (Patient taking differently: 2 5 mg daily at bedtime) 90 tablet 3    metoprolol succinate (TOPROL-XL) 25 mg 24 hr tablet Take by mouth daily at bedtime      pantoprazole (PROTONIX) 40 mg tablet Take 1 tablet (40 mg total) by mouth daily 30 tablet 3    rosuvastatin (CRESTOR) 10 MG tablet Take 1 tablet (10 mg total) by mouth in the morning  90 tablet 0    Xarelto 20 MG tablet TAKE ONE TABLET BY MOUTH EVERY DAY WITH BREAKFAST (Patient taking differently: 20 mg every morning Pt takes at 1100 daily- told not to take on 6/15) 90 tablet 1    ipratropium (ATROVENT) 0 03 % nasal spray 2 sprays into each nostril every 12 (twelve) hours (Patient not taking: Reported on 6/20/2022) 30 mL 5    torsemide (DEMADEX) 10 mg tablet Take 1 tablet (10 mg total) by mouth 3 (three) times a week (Patient not taking: Reported on 6/20/2022) 40 tablet 3     Current Facility-Administered Medications on File Prior to Visit   Medication Dose Route Frequency Provider Last Rate Last Admin    [DISCONTINUED] lactated ringers infusion  75 mL/hr Intravenous Continuous Danielle Hernandez MD   Stopped at 06/16/22 1402          Review of Systems:  Review of Systems   Respiratory: Negative for shortness of breath  Cardiovascular: Negative for chest pain, palpitations and leg swelling  Musculoskeletal: Positive for arthralgias  All other systems reviewed and are negative  Physical Exam:  /86 (BP Location: Right arm, Patient Position: Sitting, Cuff Size: Standard)   Pulse 73   Temp (!) 97 °F (36 1 °C)   Ht 4' 9" (1 448 m)   Wt 58 5 kg (129 lb)   SpO2 97%   BMI 27 92 kg/m²     Physical Exam  Constitutional:       General: She is not in acute distress  Appearance: She is well-developed  She is not diaphoretic  HENT:      Head: Normocephalic and atraumatic  Eyes:      Conjunctiva/sclera: Conjunctivae normal       Pupils: Pupils are equal, round, and reactive to light  Neck:      Thyroid: No thyromegaly  Vascular: No JVD     Cardiovascular:      Rate and Rhythm: Normal rate and regular rhythm  Heart sounds: Normal heart sounds  No murmur heard  No friction rub  No gallop  Pulmonary:      Effort: Pulmonary effort is normal       Breath sounds: Normal breath sounds  Musculoskeletal:      Cervical back: Neck supple  Right lower leg: No edema  Left lower leg: No edema  Skin:     General: Skin is warm and dry  Findings: No erythema or rash  Neurological:      General: No focal deficit present  Mental Status: She is alert and oriented to person, place, and time  Mental status is at baseline  Cranial Nerves: No cranial nerve deficit  Psychiatric:         Mood and Affect: Mood normal          Behavior: Behavior normal          Thought Content: Thought content normal          Judgment: Judgment normal          Cardiographics  ECG:  Atrial flutter with Ventricular pacing     Labs:  Lab Results   Component Value Date     08/15/2017    K 4 9 10/08/2021     10/08/2021    CO2 23 10/08/2021    BUN 16 10/08/2021    CREATININE 0 94 10/08/2021    CREATININE 0 92 04/16/2021    CREATININE 0 88 08/15/2017    GLUCOSE 94 08/15/2017    CALCIUM 9 0 04/16/2021    CALCIUM 9 2 08/15/2017     Lab Results   Component Value Date    WBC 4 9 10/08/2021    WBC 6 19 04/16/2021    WBC 4 8 08/15/2017    HGB 15 0 10/08/2021    HGB 15 1 04/16/2021    HGB 14 2 08/15/2017    HCT 45 1 10/08/2021    HCT 47 0 (H) 04/16/2021    HCT 43 7 08/15/2017    MCV 86 10/08/2021    MCV 88 04/16/2021    MCV 86 08/15/2017     10/08/2021     04/16/2021     08/15/2017     Lab Results   Component Value Date    CHOL 164 08/15/2017    TRIG 77 10/08/2021    HDL 54 10/08/2021     Imaging: No results found  Discussion/Summary:  1  Essential hypertension    2  Coronary artery disease involving native coronary artery of native heart without angina pectoris    3  Typical atrial flutter ([address] )    4  Complete heart block ([address] )    5   Atrial fibrillation with rapid ventricular response ([address] ) - BP mildly elevated today  Continue low dose of lisinopril 2 5 mg daily      - She discontinued Toprol  Does not wish to restart  - Patient had AV node ablation along with pacemaker insertion  She has had significant improvement in her symptoms  She was feeling palpitations with neck/head discomfort that may due to frequent ventricular ectopy - will continue Toprol XL 25 mg daily     - She has multiple medication intolerances including beta blockers and calcium channel blocker  - Patient is on Xarelto    Aspirin was discontinued and she was continued on Xarelto alone because of excessive bruising    - She should continue routine pacemaker checks

## 2022-06-20 NOTE — PROGRESS NOTES
Progress Note - Cardiology Office  [facility]    [facility] 79 y o  female MRN: [id number]  : 1940  Encounter: [id number]      ASSESSMENT:   Chronic atrial fibrillation  Has failed multiple cardioversions in the past  Stop Bystolic, metoprolol, sotalol and amiodarone because of side effects    Eventually had AV jay ablation with ppm at [facility]    TTE:  2020:  EF 45% with moderate LAE    Hypertension            RECOMMENDATIONS:        Please call [phone] if any questions  HPI :     [facility] is a 79y o  year old female who came for follow up  Patient has***    REVIEW OF SYSTEMS:  Review of Systems      Historical Information   Past Medical History:   Diagnosis Date    Atrial flutter ([address] )     CAD (coronary artery disease)     s/p PCIx1 -2012    Coronary artery disease     Deafness     Disease of thyroid gland     GERD (gastroesophageal reflux disease)     Hyperlipidemia     Hypertension     Itching     of the face, throat    Pacemaker 2021    medtronic-left chest    Wears glasses      Past Surgical History:   Procedure Laterality Date    BREAST BIOPSY Left     negative    CARDIAC PACEMAKER PLACEMENT  2021    medtronic    CARDIAC SURGERY      stents    COLONOSCOPY      ESOPHAGOGASTRODUODENOSCOPY N/A 2017    Procedure: ESOPHAGOGASTRODUODENOSCOPY (EGD); Surgeon: Rodney Alvarado MD;  Location: [facility];   Service:      Social History     Substance and Sexual Activity   Alcohol Use Not Currently    Alcohol/week: 0 0 standard drinks     Social History     Substance and Sexual Activity   Drug Use No     Social History     Tobacco Use   Smoking Status Never Smoker   Smokeless Tobacco Never Used     Family History:   Family History   Problem Relation Age of Onset    Cancer Mother         breast    Hypertension Mother         essential    Breast cancer Mother     Other Father         smoker    Tuberculosis Father     No Known Problems Sister     No Known Problems Sister     No Known Problems Sister     No Known Problems Daughter        Meds/Allergies     Allergies   Allergen Reactions    Sotalol      Vomiting, convulsing, rash, cough    Famotidine Throat Swelling       Current Outpatient Medications:     ipratropium (ATROVENT) 0 03 % nasal spray, 2 sprays into each nostril every 12 (twelve) hours, Disp: 30 mL, Rfl: 5    levothyroxine (Synthroid) 75 mcg tablet, Take 1 tablet (75 mcg total) by mouth daily in the early morning BRAND MEDICALLY NECESSARY, Disp: 90 tablet, Rfl: 3    lisinopril (ZESTRIL) 2 5 mg tablet, TAKE ONE TABLET BY MOUTH EVERY DAY (Patient taking differently: 2 5 mg daily at bedtime), Disp: 90 tablet, Rfl: 3    metoprolol succinate (TOPROL-XL) 25 mg 24 hr tablet, Take by mouth daily at bedtime, Disp: , Rfl:     pantoprazole (PROTONIX) 40 mg tablet, Take 1 tablet (40 mg total) by mouth daily, Disp: 30 tablet, Rfl: 3    rosuvastatin (CRESTOR) 10 MG tablet, Take 1 tablet (10 mg total) by mouth in the morning , Disp: 90 tablet, Rfl: 0    torsemide (DEMADEX) 10 mg tablet, Take 1 tablet (10 mg total) by mouth 3 (three) times a week, Disp: 40 tablet, Rfl: 3    Xarelto 20 MG tablet, TAKE ONE TABLET BY MOUTH EVERY DAY WITH BREAKFAST (Patient taking differently: 20 mg every morning Pt takes at 1100 daily- told not to take on 6/15), Disp: 90 tablet, Rfl: 1  No current facility-administered medications for this visit  Vitals: There were no vitals taken for this visit  There is no height or weight on file to calculate BMI  There were no vitals filed for this visit    BP Readings from Last 3 Encounters:   06/16/22 126/72   04/12/22 143/94   03/25/22 164/88       Physical Exam:  Physical Exam    Neurologic:  Alert & oriented x 3, no new focal deficits, Not in any acute distress,  Constitutional:  Well developed, well nourished, non-toxic appearance   Eyes:  Pupil equal and reacting to light, conjunctiva normal, HENT:  Atraumatic, oropharynx moist, Neck- normal range of motion, no tenderness,  Neck supple, No JVP, No LNP   Respiratory:  Bilateral air entry, mostly clear to auscultation  Cardiovascular: S1-S2 regular with a I/VI ejection systolic murmur   GI:  Soft, nondistended, normal bowel sounds, nontender, no hepatosplenomegaly appreciated  Musculoskeletal:  No edema, no tenderness, no deformities  Skin:  Well hydrated, no rash   Lymphatic:  No lymphadenopathy noted   Extremities:  No edema and distal pulses are present        Diagnostic Studies Review Cardio:      EKG:***    Cardiac testing:   Results for orders placed during the hospital encounter of 20    Echo complete with contrast if indicated    Narrative  [address]  [address] [address]  [phone]    Transthoracic Echocardiogram  2D, M-mode, Doppler, and Color Doppler    Study date:  05-Mar-2020    Patient: Nevaeh Sifuentes  MR number: [id number]  Account number: [de-identified]  : 1940  Age: [de-identified] years  Gender: Female  Status: Inpatient  Location: Bedside  Height: 56 in  Weight: 137 7 lb  BP: 126/ 74 mmHg    Indications: Atrial Fibrillation    Diagnoses: 427 31 - ATRIAL FIBRILLATION    Sonographer:  MARISA Hester  Primary Physician:  Sri Rod MD  Referring Physician:  Severiano May MD  Group:  [place] [address] [facility]  Interpreting Physician:  Severiano May MD    SUMMARY    LEFT VENTRICLE:  Systolic function was mildly reduced  Ejection fraction was estimated to be 45 %  There was mild diffuse hypokinesis  Wall thickness was mildly increased  There was mild concentric hypertrophy  LEFT ATRIUM:  The atrium was moderately to markedly dilated  Area 26 cm2    MITRAL VALVE:  There was moderate annular calcification  There was moderate regurgitation  AORTIC VALVE:  The valve was trileaflet   Leaflets exhibited moderately increased thickness, mild calcification, and mildly reduced cuspal separation  Transaortic velocity was increased due to valvular stenosis  There was very mild stenosis  There was trace regurgitation  TRICUSPID VALVE:  There was moderate regurgitation  Estimated peak PA pressure was 40 mmHg  PULMONIC VALVE:  There was mild regurgitation  HISTORY: PRIOR HISTORY: HTN, CAD, Atrial Flutter, CHF, Dyslipidemia, GERD, Dermatitis    PROCEDURE: The procedure was performed at the bedside  This was a routine study  The transthoracic approach was used  The study included complete 2D imaging, M-mode, complete spectral Doppler, and color Doppler  The heart rate was 96 bpm,  at the start of the study  Image quality was adequate  LEFT VENTRICLE: Size was normal  Systolic function was mildly reduced  Ejection fraction was estimated to be 45 %  There was mild diffuse hypokinesis  Wall thickness was mildly increased  There was mild concentric hypertrophy  DOPPLER: The  study was not technically sufficient to allow evaluation of LV diastolic function  RIGHT VENTRICLE: The size was normal  Systolic function was normal     LEFT ATRIUM: The atrium was moderately to markedly dilated  Area 26 cm2 No mass was present  RIGHT ATRIUM: Size was normal     MITRAL VALVE: There was moderate annular calcification  There was normal leaflet separation  DOPPLER: There was no evidence for stenosis  There was moderate regurgitation  AORTIC VALVE: The valve was trileaflet  Leaflets exhibited moderately increased thickness, mild calcification, and mildly reduced cuspal separation  DOPPLER: Transaortic velocity was increased due to valvular stenosis  There was very mild  stenosis  There was trace regurgitation  TRICUSPID VALVE: DOPPLER: There was moderate regurgitation  Estimated peak PA pressure was 40 mmHg  PULMONIC VALVE: DOPPLER: There was mild regurgitation  PERICARDIUM: There was no thickening or calcification  There was no pericardial effusion      AORTA: The root exhibited normal size  SYSTEM MEASUREMENT TABLES    2D mode  AoR Diam 2D: 3 1 cm  LA Diam (2D): 4 6 cm  LA/Ao (2D): 1 48  FS (2D Teich): 23 6 %  IVSd (2D): 1 12 cm  LVDEV: 85 8 cmï¾³  LVESV: 45 1 cmï¾³  LVIDd(2D): 4 36 cm  LVISd (2D): 3 33 cm  LVOT Area 2D: 2 84 cmï¾²  LVPWd (2D): 1 17 cm  SV (Teich): 40 7 cmï¾³    Apical four chamber  LVEF A4C: 44 %    Unspecified Scan Mode  LAILA Cont Eq (Peak Kushal): 1 32 cmï¾²  LAILA Cont Eq (VTI): 1 11 cmï¾²  LVOT (VTI): 14 2 cm  LVOT Diam : 1 9 cm  LVOT Vmax: 867 mm/s  LVOT Vmax; Mean: 867 mm/s  Peak Grad ; Mean: 3 mm[Hg]  SV (LVOT): 40 cmï¾³  VTI;Mean: 2 mm[Hg]  LAILA Cont Eq (VTI): 1 4 cmï¾²  MV Peak E Kushal  Mean: 1330 mm/s  MVA (PHT): 4 cmï¾²  PHT: 55 ms  Max P mm[Hg]  V Max: 2810 mm/s  Vmax: 2670 mm/s  RA Area: 18 4 cmï¾²  RA Volume: 52 cmï¾³  TAPSE: 1 5 cm    [facility]    Prepared and electronically signed by    Eh Brasher MD  Signed 05-Mar-2020 17:21:02    Results for orders placed during the hospital encounter of 20    SANTI    Narrative  [address]  [address] [facility] [address]  [phone]    Transesophageal Echocardiogram    Study date:  05-Mar-2020    Patient: Shekhar Thomas  MR number: [id number]  Account number: [de-identified]  : 1940  Age: [de-identified] years  Gender: Female  Status: Inpatient  Location: Cath lab  Height: 56 in  Weight: 138 lb  BP: 159/ 86 mmHg    Indications: Atrial Fibrillation    Diagnoses: 427 31 - ATRIAL FIBRILLATION    Sonographer:  MARISA Toledo  Primary Physician:  Jose A Sahu MD  Referring Physician:  Eh Brasher MD  Group:  [facility]  RN:  Swapna Arguelles RN  Interpreting Physician:  Eh Brasher MD    SUMMARY    LEFT VENTRICLE:  Systolic function was mildly to moderately reduced  Ejection fraction was estimated in the range of 40 % to 45 % to be 45 %  There was moderate diffuse hypokinesis    Oz Valverde thickness was mildly increased  There was mild concentric hypertrophy  LEFT ATRIUM:  The atrium was moderately to markedly dilated  ATRIAL SEPTUM:  No defect or patent foramen ovale was identified  RIGHT ATRIUM:  The atrium was mildly dilated  MITRAL VALVE:  There was mild to moderate annular calcification  There was moderate regurgitation  AORTIC VALVE:  There was very mild stenosis  TRICUSPID VALVE:  There was mild to moderate regurgitation  PULMONIC VALVE:  There was mild regurgitation  HISTORY: PRIOR HISTORY: HTN, CAD, Atrial Flutter, CHF, GERD    PROCEDURE: The procedure was performed in the catheterization laboratory  This was a routine study  The transesophageal approach was used  The heart rate was 104 bpm, at the start of the study  An adult omniplane probe was inserted by the  attending cardiologist  Intubated with ease  Two intubation attempt(s)  There was no blood detected on the probe  Intravenous contrast (agitated saline, 10 ml) was administered to evaluate shunting  LEFT VENTRICLE: Size was normal  Systolic function was mildly to moderately reduced  Ejection fraction was estimated in the range of 40 % to 45 % to be 45 %  There was moderate diffuse hypokinesis  Wall thickness was mildly increased  There was mild concentric hypertrophy  DOPPLER: The study was not technically sufficient to allow evaluation of LV diastolic function  RIGHT VENTRICLE: The size was normal  Systolic function was normal     LEFT ATRIUM: The atrium was moderately to markedly dilated  APPENDAGE: The size was normal  No foreign bodies were observed  No mass was identified  There was no spontaneous echo contrast ("smoke") in the appendage  DOPPLER: The function  was normal (normal emptying velocity)  ATRIAL SEPTUM: No defect or patent foramen ovale was identified  RIGHT ATRIUM: The atrium was mildly dilated  MITRAL VALVE: There was mild to moderate annular calcification   DOPPLER: There was no evidence for stenosis  There was moderate regurgitation  AORTIC VALVE: The valve was trileaflet  Leaflets exhibited mildly to moderately increased thickness, mild to moderate calcification, and mildly reduced cuspal separation  There was no echocardiographic evidence of vegetation  DOPPLER:  There was very mild stenosis  There was no regurgitation  TRICUSPID VALVE: DOPPLER: There was mild to moderate regurgitation  PULMONIC VALVE: DOPPLER: There was mild regurgitation  PERICARDIUM: There was no thickening or calcification  There was no pericardial effusion  AORTA: The root exhibited normal size  [address] Accredited Echocardiography Laboratory    Prepared and electronically signed by    Brittany Loya MD  Signed 05-Mar-2020 17:23:45    No results found for this or any previous visit  No results found for this or any previous visit  No results found for this or any previous visit  No results found for this or any previous visit  No results found for this or any previous visit  Results for orders placed during the hospital encounter of 17    NM myocardial perfusion spect (rx stress and/or rest)    [facility]  [address]  [address] [address]  [phone]    Rest/Stress Gated SPECT Myocardial Perfusion Imaging After Exercise    Patient: Griselda Mckeon  MR number: [id number]  Account number: [de-identified]  : 1940  Age: 67 years  Gender: Female  Status: Outpatient  Location: Stress lab  Height: 58 in  Weight: 133 lb  BP: 136/ 80 mmHg    Allergies: NO KNOWN ALLERGIES    Diagnosis: R07 9 - Chest pain, unspecified    Primary Physician:  Walter Christiansen MD  RN:  GISELE Bowden  Referring Physician:  Lucio Landaverde MD  Group:  [facility]  Report Prepared By[de-identified]  GISELE Bowden  Interpreting Physician:  Jesse Mata DO    INDICATIONS: Detection of coronary artery disease      HISTORY: Patient accompanied by her , both deaf and mute, Communication achieved through [employer]   The patient is a 79year old  female  Chest pain status: chest pain  Coronary artery disease risk factors:  dyslipidemia, hypertension, and family history of premature coronary artery disease  Cardiovascular history: none significant  Medications: aspirin, a lipid lowering agent, and thyroid medications  PHYSICAL EXAM: Baseline physical exam screening: normal and no wheezes audible  REST ECG: Normal sinus rhythm  Normal baseline ECG  PROCEDURE: The study was performed in the stress lab  Treadmill exercise testing was performed, using the Shanti protocol  Gated SPECT myocardial perfusion imaging was performed after stress and at rest  Systolic blood pressure was 136  mmHg, at the start of the study  Diastolic blood pressure was 80 mmHg, at the start of the study  The heart rate was 63 bpm, at the start of the study  Patient was not experiencing chest pain at the time of the injection of the  radiopharmaceutical  IV double checked  SHANTI PROTOCOL:  HR bpm SBP mmHg DBP mmHg Symptoms ST change Rhythm/conduct  Baseline 65 136 80 none none NSR, no ectopy, NSR  Stage 1 123 136 80 mild dyspnea, leg pain -- occasional PAC's, occasional PVC's  Immediate 125 176 80 same as above -- --  Recovery 1 88 168 80 chest discomfort -- --  Recovery 2 77 140 80 subsiding -- --  Patient held on Stage 1 due to leg pain  No medications or fluids given  STRESS SUMMARY: Duration of exercise was 4 min and 9 sec  The patient exercised to protocol stage 2  Maximal work rate was 5 2 METs  Functional capacity was normal  Maximal heart rate during stress was 144 bpm ( 104 % of maximal predicted  heart rate)  The heart rate response to stress was normal  There was normal resting blood pressure with an appropriate response to stress  The rate-pressure product for the peak heart rate and blood pressure was 15986   There was no chest  pain during stress  The patient experienced chest pain during stress; pain resolved spontaneously  The stress test was terminated due to achievement of target heart rate and leg pain  Pre oxygen saturation: 97 %  Peak oxygen saturation: 98  %  The stress ECG was negative for ischemia and normal  There were no stress arrhythmias or conduction abnormalities  ISOTOPE ADMINISTRATION:  Resting isotope administration Stress isotope administration  Agent Sestamibi Sestamibi  Dose 10 9 mCi 30 mCi  Date 03/30/2017 03/30/2017    The radiopharmaceutical was injected at the peak effect of pharmacologic stress  Radiopharmaceutical was administered 2 min, 37 sec into the stress protocol  There was 1 min of exercise after the injection  MYOCARDIAL PERFUSION IMAGING:  The image quality was good  Left ventricular size was normal     PERFUSION DEFECTS:  -  There were no perfusion defects  GATED SPECT:  The calculated left ventricular ejection fraction was 78 %  Left ventricular ejection fraction was within normal limits by visual estimate  There was no diagnostic evidence for left ventricular regional abnormality  SUMMARY:  -  Stress results: Duration of exercise was 4 min and 9 sec  Target heart rate was achieved  There was no chest pain during stress  The patient experienced chest pain during stress; pain resolved spontaneously  -  ECG conclusions: The stress ECG was negative for ischemia and normal   -  Perfusion imaging: There were no perfusion defects   -  Gated SPECT: The calculated left ventricular ejection fraction was 78 %  Left ventricular ejection fraction was within normal limits by visual estimate  There was no diagnostic evidence for left ventricular regional abnormality  IMPRESSIONS: Normal study after maximal exercise  Myocardial perfusion imaging was normal at rest and with stress   Left ventricular systolic function was normal     Prepared and signed by    Mathew Koroma DO  Signed 03/30/2017 16:03:11    No results found for this or any previous visit  Imaging:  Chest X-Ray:   No Chest XR results available for this patient  CT-scan of the chest:     No CTA results available for this patient  Lab Review   Lab Results   Component Value Date    WBC 4 9 10/08/2021    HGB 15 0 10/08/2021    HCT 45 1 10/08/2021    MCV 86 10/08/2021    RDW 13 3 10/08/2021     10/08/2021     BMP:  Lab Results   Component Value Date    SODIUM 138 10/08/2021    K 4 9 10/08/2021     10/08/2021    CO2 23 10/08/2021    BUN 16 10/08/2021    CREATININE 0 94 10/08/2021    GLUC 83 10/08/2021    GLUF 91 11/24/2020    CALCIUM 9 0 04/16/2021    EGFR 59 04/16/2021    MG 2 1 11/24/2020     LFT:  Lab Results   Component Value Date    AST 22 10/08/2021    ALT 12 10/08/2021    ALKPHOS 80 04/16/2021    TP 6 7 10/08/2021    ALB 3 9 10/08/2021      No components found for: [facility] [facility]  Lab Results   Component Value Date    QBL9DKCJAGNJ 3 003 11/21/2020     No results found for: HGBA1C  Lipid Profile:   Lab Results   Component Value Date    CHOLESTEROL 152 10/08/2021    HDL 54 10/08/2021    LDLCALC 83 10/08/2021    TRIG 77 10/08/2021     Lab Results   Component Value Date    CHOLESTEROL 152 10/08/2021    CHOLESTEROL 158 02/10/2020     Lab Results   Component Value Date    TROPONINI <0 02 04/16/2021     Lab Results   Component Value Date    NTBNP 1,361 (H) 11/21/2020      No results found for this or any previous visit (from the past 672 hour(s))  Dr Ton Desir MD, [facility]      "This note has been constructed using a voice recognition system  Therefore there may be syntax, spelling, and/or grammatical errors   Please call if you have any questions  "

## 2022-06-22 LAB
ALBUMIN SERPL-MCNC: 4.2 G/DL (ref 3.6–4.6)
ALBUMIN/GLOB SERPL: 1.7 {RATIO} (ref 1.2–2.2)
ALP SERPL-CCNC: 69 IU/L (ref 44–121)
ALT SERPL-CCNC: 11 IU/L (ref 0–32)
AST SERPL-CCNC: 22 IU/L (ref 0–40)
BASOPHILS # BLD AUTO: 0.1 X10E3/UL (ref 0–0.2)
BASOPHILS NFR BLD AUTO: 1 %
BILIRUB SERPL-MCNC: 1 MG/DL (ref 0–1.2)
BUN SERPL-MCNC: 18 MG/DL (ref 8–27)
BUN/CREAT SERPL: 17 (ref 12–28)
CALCIUM SERPL-MCNC: 9.3 MG/DL (ref 8.7–10.3)
CHLORIDE SERPL-SCNC: 104 MMOL/L (ref 96–106)
CHOLEST SERPL-MCNC: 153 MG/DL (ref 100–199)
CHOLEST/HDLC SERPL: 2.6 RATIO (ref 0–4.4)
CO2 SERPL-SCNC: 24 MMOL/L (ref 20–29)
CREAT SERPL-MCNC: 1.08 MG/DL (ref 0.57–1)
EGFR: 51 ML/MIN/1.73
EOSINOPHIL # BLD AUTO: 0.1 X10E3/UL (ref 0–0.4)
EOSINOPHIL NFR BLD AUTO: 2 %
ERYTHROCYTE [DISTWIDTH] IN BLOOD BY AUTOMATED COUNT: 13.1 % (ref 11.7–15.4)
GLOBULIN SER-MCNC: 2.5 G/DL (ref 1.5–4.5)
GLUCOSE SERPL-MCNC: 86 MG/DL (ref 65–99)
HCT VFR BLD AUTO: 47.2 % (ref 34–46.6)
HDLC SERPL-MCNC: 59 MG/DL
HGB BLD-MCNC: 15.6 G/DL (ref 11.1–15.9)
IMM GRANULOCYTES # BLD: 0 X10E3/UL (ref 0–0.1)
IMM GRANULOCYTES NFR BLD: 0 %
LDLC SERPL CALC-MCNC: 81 MG/DL (ref 0–99)
LYMPHOCYTES # BLD AUTO: 1.6 X10E3/UL (ref 0.7–3.1)
LYMPHOCYTES NFR BLD AUTO: 32 %
MCH RBC QN AUTO: 28.3 PG (ref 26.6–33)
MCHC RBC AUTO-ENTMCNC: 33.1 G/DL (ref 31.5–35.7)
MCV RBC AUTO: 86 FL (ref 79–97)
MICRODELETION SYND BLD/T FISH: NORMAL
MICRODELETION SYND BLD/T FISH: NORMAL
MONOCYTES # BLD AUTO: 0.6 X10E3/UL (ref 0.1–0.9)
MONOCYTES NFR BLD AUTO: 13 %
NEUTROPHILS # BLD AUTO: 2.6 X10E3/UL (ref 1.4–7)
NEUTROPHILS NFR BLD AUTO: 52 %
PLATELET # BLD AUTO: 193 X10E3/UL (ref 150–450)
POTASSIUM SERPL-SCNC: 5.1 MMOL/L (ref 3.5–5.2)
PROT SERPL-MCNC: 6.7 G/DL (ref 6–8.5)
RBC # BLD AUTO: 5.51 X10E6/UL (ref 3.77–5.28)
SL AMB VLDL CHOLESTEROL CALC: 13 MG/DL (ref 5–40)
SODIUM SERPL-SCNC: 140 MMOL/L (ref 134–144)
TRIGL SERPL-MCNC: 65 MG/DL (ref 0–149)
TSH SERPL DL<=0.005 MIU/L-ACNC: 1.9 UIU/ML (ref 0.45–4.5)
WBC # BLD AUTO: 5 X10E3/UL (ref 3.4–10.8)

## 2022-06-23 ENCOUNTER — OFFICE VISIT (OUTPATIENT)
Dept: FAMILY MEDICINE CLINIC | Facility: CLINIC | Age: 82
End: 2022-06-23
Payer: COMMERCIAL

## 2022-06-23 VITALS
SYSTOLIC BLOOD PRESSURE: 138 MMHG | TEMPERATURE: 97.5 F | DIASTOLIC BLOOD PRESSURE: 86 MMHG | OXYGEN SATURATION: 98 % | HEIGHT: 55 IN | RESPIRATION RATE: 20 BRPM | HEART RATE: 84 BPM | BODY MASS INDEX: 30.32 KG/M2 | WEIGHT: 131 LBS

## 2022-06-23 DIAGNOSIS — N18.31 CHRONIC KIDNEY DISEASE, STAGE 3A (HCC): ICD-10-CM

## 2022-06-23 DIAGNOSIS — E78.5 HYPERLIPIDEMIA, UNSPECIFIED HYPERLIPIDEMIA TYPE: ICD-10-CM

## 2022-06-23 DIAGNOSIS — I50.32 CHRONIC DIASTOLIC CONGESTIVE HEART FAILURE (HCC): ICD-10-CM

## 2022-06-23 DIAGNOSIS — E03.9 HYPOTHYROIDISM, UNSPECIFIED TYPE: ICD-10-CM

## 2022-06-23 DIAGNOSIS — I10 ESSENTIAL HYPERTENSION: Primary | ICD-10-CM

## 2022-06-23 PROCEDURE — 1036F TOBACCO NON-USER: CPT | Performed by: INTERNAL MEDICINE

## 2022-06-23 PROCEDURE — 99214 OFFICE O/P EST MOD 30 MIN: CPT | Performed by: INTERNAL MEDICINE

## 2022-06-23 PROCEDURE — 1160F RVW MEDS BY RX/DR IN RCRD: CPT | Performed by: INTERNAL MEDICINE

## 2022-06-23 NOTE — PROGRESS NOTES
Assessment/Plan:    1  Essential hypertension  Assessment & Plan:  Well controlled and will continue medications as ordered  2  Hypothyroidism, unspecified type  Assessment & Plan:  Reviewed labs and TSH is normal  Continue levothyroxine as ordered  3  Chronic diastolic congestive heart failure [facility])  Assessment & Plan:  Wt Readings from Last 3 Encounters:   06/23/22 59 4 kg (131 lb)   06/20/22 58 5 kg (129 lb)   06/16/22 60 8 kg (134 lb)     She is now off torsemide  She states her cardiologist is aware  Advised patient to continue daily weights and call with any sudden increases; also will monitor for increasing edema or dyspnea  Call the office sooner than next scheduled appointment for any concerns or issues  4  Chronic kidney disease, stage 3a [facility])  Assessment & Plan:  Lab Results   Component Value Date    EGFR 51 (L) 06/21/2022    EGFR 59 04/16/2021    EGFR 57 11/24/2020    CREATININE 1 08 (H) 06/21/2022    CREATININE 0 94 10/08/2021    CREATININE 0 92 04/16/2021    Stable and will continue to monitor  5  Hyperlipidemia, unspecified hyperlipidemia type  Assessment & Plan:  Reviewed labs with patient and this is well controlled on rosuvastatin, will continue as ordered  There are no Patient Instructions on file for this visit  Return in about 4 months (around 10/23/2022)  Subjective:      Patient ID: Mack Topete is a 79 y o  female  Chief Complaint   Patient presents with    Follow-up     On medsAg MA        Here for follow up visit  She is feeling well  She stopped her torsemide because she felt it made her nose run; she states her cardiologist is aware  She has not had weight gain, edema, dyspnea  Had recent EGD with dilatation and feels her dysphagia has improved since then  There is no cold or heat intolerance, diarrhea or constipation, hair or skin changes, unanticipated weight gain or loss          The following portions of the patient's history were reviewed and updated as appropriate: allergies, current medications, past family history, past medical history, past social history, past surgical history and problem list     Review of Systems   Constitutional: Negative  Respiratory: Negative  Cardiovascular: Negative  Endocrine: Negative  Current Outpatient Medications   Medication Sig Dispense Refill    levothyroxine (Synthroid) 75 mcg tablet Take 1 tablet (75 mcg total) by mouth daily in the early morning BRAND MEDICALLY NECESSARY 90 tablet 3    lisinopril (ZESTRIL) 2 5 mg tablet TAKE ONE TABLET BY MOUTH EVERY DAY (Patient taking differently: 2 5 mg daily at bedtime) 90 tablet 3    metoprolol succinate (TOPROL-XL) 25 mg 24 hr tablet Take by mouth daily at bedtime      pantoprazole (PROTONIX) 40 mg tablet Take 1 tablet (40 mg total) by mouth daily 30 tablet 3    rosuvastatin (CRESTOR) 10 MG tablet Take 1 tablet (10 mg total) by mouth in the morning  90 tablet 0    Xarelto 20 MG tablet TAKE ONE TABLET BY MOUTH EVERY DAY WITH BREAKFAST (Patient taking differently: 20 mg every morning Pt takes at 1100 daily- told not to take on 6/15) 90 tablet 1     No current facility-administered medications for this visit         Objective:    /86   Pulse 84   Temp 97 5 °F (36 4 °C)   Resp 20   Ht 4' 7" (1 397 m)   Wt 59 4 kg (131 lb)   SpO2 98%   BMI 30 45 kg/m²        Physical Exam           Ronald Abdi MD

## 2022-06-23 NOTE — ASSESSMENT & PLAN NOTE
Wt Readings from Last 3 Encounters:   06/23/22 59 4 kg (131 lb)   06/20/22 58 5 kg (129 lb)   06/16/22 60 8 kg (134 lb)     She is now off torsemide  She states her cardiologist is aware  Advised patient to continue daily weights and call with any sudden increases; also will monitor for increasing edema or dyspnea  Call the office sooner than next scheduled appointment for any concerns or issues

## 2022-06-23 NOTE — ASSESSMENT & PLAN NOTE
Lab Results   Component Value Date    EGFR 51 (L) 06/21/2022    EGFR 59 04/16/2021    EGFR 57 11/24/2020    CREATININE 1 08 (H) 06/21/2022    CREATININE 0 94 10/08/2021    CREATININE 0 92 04/16/2021    Stable and will continue to monitor

## 2022-07-13 DIAGNOSIS — I48.91 A-FIB (HCC): ICD-10-CM

## 2022-07-13 DIAGNOSIS — I48.3 TYPICAL ATRIAL FLUTTER (HCC): ICD-10-CM

## 2022-07-13 RX ORDER — RIVAROXABAN 20 MG/1
TABLET, FILM COATED ORAL
Qty: 90 TABLET | Refills: 1 | Status: SHIPPED | OUTPATIENT
Start: 2022-07-13

## 2022-07-24 DIAGNOSIS — I10 ESSENTIAL HYPERTENSION: ICD-10-CM

## 2022-07-27 RX ORDER — LISINOPRIL 2.5 MG/1
TABLET ORAL
Qty: 90 TABLET | Refills: 3 | Status: SHIPPED | OUTPATIENT
Start: 2022-07-27 | End: 2022-07-28 | Stop reason: SDUPTHER

## 2022-07-28 DIAGNOSIS — I10 ESSENTIAL HYPERTENSION: ICD-10-CM

## 2022-07-28 RX ORDER — LISINOPRIL 2.5 MG/1
2.5 TABLET ORAL DAILY
Qty: 90 TABLET | Refills: 3 | Status: SHIPPED | OUTPATIENT
Start: 2022-07-28

## 2022-09-07 DIAGNOSIS — I25.10 CORONARY ARTERY DISEASE INVOLVING NATIVE CORONARY ARTERY OF NATIVE HEART WITHOUT ANGINA PECTORIS: ICD-10-CM

## 2022-09-07 DIAGNOSIS — I10 ESSENTIAL HYPERTENSION: Primary | ICD-10-CM

## 2022-09-07 RX ORDER — METOPROLOL SUCCINATE 25 MG/1
TABLET, EXTENDED RELEASE ORAL
Qty: 30 TABLET | Refills: 5 | Status: SHIPPED | OUTPATIENT
Start: 2022-09-07

## 2022-10-18 ENCOUNTER — TELEPHONE (OUTPATIENT)
Dept: FAMILY MEDICINE CLINIC | Facility: CLINIC | Age: 82
End: 2022-10-18

## 2022-10-18 NOTE — TELEPHONE ENCOUNTER
DR BEEBE [facility]   Patient needs an interpretor for an appt rosamaria and thurs  Please advise

## 2022-10-19 NOTE — TELEPHONE ENCOUNTER
I called the , Marisela Garvey at [phone]  She is unavailable to come in that day  Maybe we can reschedule the patient for the 2nd week of November     [facility] for a call back   Meng Dexter [place]

## 2022-10-21 NOTE — TELEPHONE ENCOUNTER
by the name of [facility] will be coming in to translate on 10/26  She may be reached at [phone]  I left a msg for the patient letting her know she is all set for her upcoming appt   NFA required   Jovany Chan MA

## 2022-10-26 ENCOUNTER — OFFICE VISIT (OUTPATIENT)
Dept: FAMILY MEDICINE CLINIC | Facility: CLINIC | Age: 82
End: 2022-10-26
Payer: COMMERCIAL

## 2022-10-26 VITALS
HEART RATE: 90 BPM | BODY MASS INDEX: 30.09 KG/M2 | DIASTOLIC BLOOD PRESSURE: 80 MMHG | TEMPERATURE: 97.7 F | WEIGHT: 130 LBS | RESPIRATION RATE: 17 BRPM | HEIGHT: 55 IN | SYSTOLIC BLOOD PRESSURE: 138 MMHG | OXYGEN SATURATION: 98 %

## 2022-10-26 DIAGNOSIS — I10 ESSENTIAL HYPERTENSION: ICD-10-CM

## 2022-10-26 DIAGNOSIS — I48.91 ATRIAL FIBRILLATION WITH RAPID VENTRICULAR RESPONSE (HCC): ICD-10-CM

## 2022-10-26 DIAGNOSIS — Z23 NEED FOR VACCINATION: ICD-10-CM

## 2022-10-26 DIAGNOSIS — I25.10 CORONARY ARTERY DISEASE INVOLVING NATIVE CORONARY ARTERY OF NATIVE HEART WITHOUT ANGINA PECTORIS: Primary | ICD-10-CM

## 2022-10-26 PROCEDURE — G0008 ADMIN INFLUENZA VIRUS VAC: HCPCS

## 2022-10-26 PROCEDURE — 90662 IIV NO PRSV INCREASED AG IM: CPT

## 2022-10-26 PROCEDURE — 99214 OFFICE O/P EST MOD 30 MIN: CPT | Performed by: INTERNAL MEDICINE

## 2022-10-26 NOTE — PROGRESS NOTES
Name: Faviola Knowles      : 1940      MRN: [id number]  Encounter Provider: Alpesh Myers MD  Encounter Date: 10/26/2022   Encounter department: [address] [place] [address]     1  Coronary artery disease involving native coronary artery of native heart without angina pectoris  Assessment & Plan:  Denies cardiac symptoms and continues to follow with cardiology  Continue medications as ordered  2  Essential hypertension  Assessment & Plan:  Well controlled and will continue current medications as ordered  3  Atrial fibrillation with rapid ventricular response (HCC)  Assessment & Plan:  Rate controlled, continue xarelto and metoprolol per cardiology  4  Need for vaccination  -     influenza vaccine, high-dose, PF 0 7 mL (FLUZONE HIGH-DOSE)           Subjective      Here for follow up appointment  She is doing okay with all of her cardiac medications but stopped her PPI because she felt it was making her dizzy  She feels better since she stopped  She has some pain in her upper back with bending over to wash her floors  It is positional, not exertional   There are no associated cardiac symptoms  Denies chest pain, dyspnea, palpitations  Trying to stay active but feels tired and has to take a nap daily  Review of Systems   Constitutional: Negative  Respiratory: Negative  Cardiovascular: Negative  Neurological: Positive for light-headedness  Current Outpatient Medications on File Prior to Visit   Medication Sig   • levothyroxine (Synthroid) 75 mcg tablet Take 1 tablet (75 mcg total) by mouth daily in the early morning BRAND MEDICALLY NECESSARY   • lisinopril (ZESTRIL) 2 5 mg tablet Take 1 tablet (2 5 mg total) by mouth daily   • metoprolol succinate (TOPROL-XL) 25 mg 24 hr tablet TAKE ONE TABLET BY MOUTH EVERY DAY   • rosuvastatin (CRESTOR) 10 MG tablet Take 1 tablet (10 mg total) by mouth in the morning     • Xarelto 20 MG tablet TAKE ONE TABLET BY MOUTH EVERY DAY WITH BREAKFAST   • [DISCONTINUED] pantoprazole (PROTONIX) 40 mg tablet Take 1 tablet (40 mg total) by mouth daily (Patient not taking: Reported on 10/26/2022)       Objective     /80   Pulse 90   Temp 97 7 °F (36 5 °C)   Resp 17   Ht 4' 7" (1 397 m)   Wt 59 kg (130 lb)   SpO2 98%   BMI 30 21 kg/m²     Physical Exam  Constitutional:       Appearance: Normal appearance  She is well-developed  Eyes:      Conjunctiva/sclera: Conjunctivae normal    Neck:      Thyroid: No thyromegaly  Vascular: No JVD  Cardiovascular:      Rate and Rhythm: Normal rate and regular rhythm  Heart sounds: Normal heart sounds  No murmur heard  No friction rub  No gallop  Pulmonary:      Effort: Pulmonary effort is normal       Breath sounds: Normal breath sounds  No wheezing or rales  Abdominal:      General: Bowel sounds are normal  There is no distension  Palpations: Abdomen is soft  Tenderness: There is no abdominal tenderness  Musculoskeletal:      Cervical back: Neck supple  Neurological:      Mental Status: She is alert         Rayshawn Whittington MD

## 2022-11-01 DIAGNOSIS — K21.9 GASTROESOPHAGEAL REFLUX DISEASE, UNSPECIFIED WHETHER ESOPHAGITIS PRESENT: Primary | ICD-10-CM

## 2022-11-01 RX ORDER — PANTOPRAZOLE SODIUM 40 MG/1
TABLET, DELAYED RELEASE ORAL
Qty: 30 TABLET | Refills: 3 | Status: SHIPPED | OUTPATIENT
Start: 2022-11-01

## 2022-11-03 ENCOUNTER — REMOTE DEVICE CLINIC VISIT (OUTPATIENT)
Dept: CARDIOLOGY CLINIC | Facility: CLINIC | Age: 82
End: 2022-11-03

## 2022-11-03 DIAGNOSIS — Z95.0 PRESENCE OF PERMANENT CARDIAC PACEMAKER: Primary | ICD-10-CM

## 2022-11-03 NOTE — PROGRESS NOTES
Results for orders placed or performed in visit on 11/03/22   Cardiac EP device report    Narrative    MD[address]N [address] IS MRI CONDITIONAL  CARELINK TRANSMISSION: BATTERY STATUS "OK " (>8 YRS)   95%  ALL LEAD PARAMETERS WITHIN NORMAL LIMITS  NO SIGNIFICANT HIGH RATE EPISODES  NORMAL DEVICE FUNCTION  ---YATES

## 2022-11-11 DIAGNOSIS — E78.5 DYSLIPIDEMIA: ICD-10-CM

## 2022-11-12 RX ORDER — ROSUVASTATIN CALCIUM 10 MG/1
10 TABLET, COATED ORAL DAILY
Qty: 90 TABLET | Refills: 3 | Status: SHIPPED | OUTPATIENT
Start: 2022-11-12

## 2022-11-22 DIAGNOSIS — E03.9 HYPOTHYROIDISM, UNSPECIFIED TYPE: ICD-10-CM

## 2022-11-22 RX ORDER — LEVOTHYROXINE SODIUM 0.07 MG/1
75 TABLET ORAL
Qty: 90 TABLET | Refills: 3 | Status: SHIPPED | OUTPATIENT
Start: 2022-11-22

## 2022-12-16 ENCOUNTER — OFFICE VISIT (OUTPATIENT)
Dept: CARDIOLOGY CLINIC | Facility: CLINIC | Age: 82
End: 2022-12-16

## 2022-12-16 VITALS
TEMPERATURE: 98.2 F | DIASTOLIC BLOOD PRESSURE: 82 MMHG | HEART RATE: 78 BPM | OXYGEN SATURATION: 96 % | BODY MASS INDEX: 30.55 KG/M2 | SYSTOLIC BLOOD PRESSURE: 144 MMHG | WEIGHT: 132 LBS | HEIGHT: 55 IN

## 2022-12-16 DIAGNOSIS — I44.2 COMPLETE HEART BLOCK (HCC): ICD-10-CM

## 2022-12-16 DIAGNOSIS — I49.3 PVC'S (PREMATURE VENTRICULAR CONTRACTIONS): ICD-10-CM

## 2022-12-16 DIAGNOSIS — I50.32 CHRONIC DIASTOLIC CHF (CONGESTIVE HEART FAILURE) (HCC): ICD-10-CM

## 2022-12-16 DIAGNOSIS — Z95.0 PRESENCE OF PERMANENT CARDIAC PACEMAKER: ICD-10-CM

## 2022-12-16 DIAGNOSIS — I48.91 ATRIAL FIBRILLATION WITH RAPID VENTRICULAR RESPONSE (HCC): ICD-10-CM

## 2022-12-16 DIAGNOSIS — I48.3 TYPICAL ATRIAL FLUTTER (HCC): Primary | ICD-10-CM

## 2022-12-16 DIAGNOSIS — I10 ESSENTIAL HYPERTENSION: ICD-10-CM

## 2022-12-16 DIAGNOSIS — E78.5 DYSLIPIDEMIA: ICD-10-CM

## 2022-12-16 DIAGNOSIS — I25.10 CORONARY ARTERY DISEASE INVOLVING NATIVE CORONARY ARTERY OF NATIVE HEART WITHOUT ANGINA PECTORIS: ICD-10-CM

## 2022-12-16 NOTE — PROGRESS NOTES
Cardiology Follow Up    Deborah Tarango  1940  [id number]      Interval History: Deborah Tarango is here for followup of  Hypertension and atrial fibrillation  Since her last visit, she feels some lightheadedness and congestion  She has had rhinorrhea  She denies any shortness of breath or chest pain  She is concerned her symptoms are caused by medications  She also has varicose veins  Does not use compression stockings  She has a history of multiple medication intolerances and previously she has stopped taking furosemide because she felt it was causing her lower back pain and frequent urination  Could also not tolerate beta blocker, sotalol, amiodarone either  She has been taking Xarelto regularly  She has a history of atrial fibrillation with multiple failed cardioversions in the past  She eventually had AV node ablation along with permanent pacemaker insertion by Dr Sirisha Louis at [facility]  She was previously taking sotalol but stopped it independently because of side effects  Amiodarone was stopped because of GI side effects  Bystolic was stopped due to nausea  Previously, she felt metoprolol was causing her to feel bad as well  Amlodipine was stopped in the past because of LE edema  Echocardiogram in March 2020 showed EF of 45% with moderate to marked LA dilation           The following portions of the patient's history were reviewed and updated as appropriate: allergies, current medications, past family history, past medical history, past social history, past surgical history and problem list     Current Outpatient Medications on File Prior to Visit   Medication Sig Dispense Refill   • levothyroxine (Synthroid) 75 mcg tablet Take 1 tablet (75 mcg total) by mouth daily in the early morning BRAND MEDICALLY NECESSARY 90 tablet 3   • lisinopril (ZESTRIL) 2 5 mg tablet Take 1 tablet (2 5 mg total) by mouth daily 90 tablet 3 • metoprolol succinate (TOPROL-XL) 25 mg 24 hr tablet TAKE ONE TABLET BY MOUTH EVERY DAY 30 tablet 5   • pantoprazole (PROTONIX) 40 mg tablet TAKE ONE TABLET BY MOUTH EVERY DAY 30 tablet 3   • rosuvastatin (CRESTOR) 10 MG tablet Take 1 tablet (10 mg total) by mouth daily 90 tablet 3   • Xarelto 20 MG tablet TAKE ONE TABLET BY MOUTH EVERY DAY WITH BREAKFAST 90 tablet 1     No current facility-administered medications on file prior to visit  Review of Systems:  Review of Systems   Constitutional: Positive for fatigue  HENT: Positive for postnasal drip and rhinorrhea  Negative for hearing loss  Respiratory: Negative for shortness of breath  Cardiovascular: Negative for chest pain, palpitations and leg swelling  Musculoskeletal: Positive for arthralgias and myalgias  All other systems reviewed and are negative  Physical Exam:  /82 (BP Location: Right arm, Patient Position: Standing, Cuff Size: Standard)   Pulse 78   Temp 98 2 °F (36 8 °C)   Ht 4' 7" (1 397 m)   Wt 59 9 kg (132 lb)   SpO2 96%   BMI 30 68 kg/m²     Physical Exam  Constitutional:       General: She is not in acute distress  Appearance: She is well-developed  She is not diaphoretic  HENT:      Head: Normocephalic and atraumatic  Eyes:      Conjunctiva/sclera: Conjunctivae normal       Pupils: Pupils are equal, round, and reactive to light  Neck:      Thyroid: No thyromegaly  Vascular: No JVD  Cardiovascular:      Rate and Rhythm: Normal rate and regular rhythm  Heart sounds: Normal heart sounds  No murmur heard  No friction rub  No gallop  Pulmonary:      Effort: Pulmonary effort is normal       Breath sounds: Normal breath sounds  Abdominal:      General: There is no distension  Palpations: Abdomen is soft  Tenderness: There is no abdominal tenderness  Musculoskeletal:      Cervical back: Neck supple  Right lower leg: No edema  Left lower leg: No edema     Skin: General: Skin is warm and dry  Findings: No erythema or rash  Neurological:      Mental Status: She is alert and oriented to person, place, and time  Cranial Nerves: No cranial nerve deficit  Psychiatric:         Behavior: Behavior normal          Thought Content: Thought content normal          Judgment: Judgment normal          Cardiographics  ECG:  Atrial flutter with Ventricular pacing and occasional intrinsic PVCs    Labs:  Lab Results   Component Value Date     08/15/2017    K 5 1 06/21/2022     06/21/2022    CO2 24 06/21/2022    BUN 18 06/21/2022    CREATININE 1 08 (H) 06/21/2022    CREATININE 0 92 04/16/2021    CREATININE 0 88 08/15/2017    GLUCOSE 94 08/15/2017    CALCIUM 9 0 04/16/2021    CALCIUM 9 2 08/15/2017     Lab Results   Component Value Date    WBC 5 0 06/21/2022    WBC 6 19 04/16/2021    WBC 4 8 08/15/2017    HGB 15 6 06/21/2022    HGB 15 1 04/16/2021    HGB 14 2 08/15/2017    HCT 47 2 (H) 06/21/2022    HCT 47 0 (H) 04/16/2021    HCT 43 7 08/15/2017    MCV 86 06/21/2022    MCV 88 04/16/2021    MCV 86 08/15/2017     06/21/2022     04/16/2021     08/15/2017     Lab Results   Component Value Date    CHOL 164 08/15/2017    TRIG 65 06/21/2022    HDL 59 06/21/2022     Imaging: No results found  Discussion/Summary:  1  Typical atrial flutter ([facility] [address] )    2  Presence of permanent cardiac pacemaker    3  Essential hypertension    4  Coronary artery disease involving native coronary artery of native heart without angina pectoris    5  Complete heart block ([address] )    6  Atrial fibrillation with rapid ventricular response (HCC)    7  Chronic diastolic CHF (congestive heart failure) ([facility] [address] )    8  Dyslipidemia    9  PVC's (premature ventricular contractions)      - BP mildly elevated today  She has multiple medication intolerances  We will continue lisinopril 2 5 mg daily  She is also using Toprol-XL 25 mg daily      - She discontinued Toprol    Does not wish to restart  - Patient had AV node ablation along with pacemaker insertion  She has had significant improvement in her symptoms  She was feeling palpitations with neck/head discomfort that may due to frequent ventricular ectopy - will continue Toprol XL 25 mg daily     - She has multiple medication intolerances including beta blockers and calcium channel blocker  - Patient is on Xarelto    Aspirin was discontinued and she was continued on Xarelto alone because of excessive bruising    - She should continue routine pacemaker checks

## 2023-02-10 ENCOUNTER — REMOTE DEVICE CLINIC VISIT (OUTPATIENT)
Dept: CARDIOLOGY CLINIC | Facility: CLINIC | Age: 83
End: 2023-02-10

## 2023-02-10 DIAGNOSIS — Z95.0 PRESENCE OF PERMANENT CARDIAC PACEMAKER: Primary | ICD-10-CM

## 2023-02-10 NOTE — PROGRESS NOTES
Results for orders placed or performed in visit on 02/10/23   Cardiac EP device report    Narrative    [address] IS MRI CONDITIONAL  CARELINK TRANSMISSION: BATTERY STATUS "OK " (>8 YRS)   94%  ALL LEAD PARAMETERS WITHIN NORMAL LIMITS  NO SIGNIFICANT HIGH RATE EPISODES  NORMAL DEVICE FUNCTION  ---YATES

## 2023-02-11 DIAGNOSIS — I25.10 CORONARY ARTERY DISEASE INVOLVING NATIVE CORONARY ARTERY OF NATIVE HEART WITHOUT ANGINA PECTORIS: ICD-10-CM

## 2023-02-11 DIAGNOSIS — I10 ESSENTIAL HYPERTENSION: ICD-10-CM

## 2023-02-13 RX ORDER — METOPROLOL SUCCINATE 25 MG/1
TABLET, EXTENDED RELEASE ORAL
Qty: 90 TABLET | Refills: 3 | Status: SHIPPED | OUTPATIENT
Start: 2023-02-13

## 2023-02-27 ENCOUNTER — OFFICE VISIT (OUTPATIENT)
Dept: FAMILY MEDICINE CLINIC | Facility: CLINIC | Age: 83
End: 2023-02-27

## 2023-02-27 VITALS
DIASTOLIC BLOOD PRESSURE: 70 MMHG | RESPIRATION RATE: 16 BRPM | SYSTOLIC BLOOD PRESSURE: 144 MMHG | OXYGEN SATURATION: 97 % | TEMPERATURE: 97.6 F | BODY MASS INDEX: 30.09 KG/M2 | HEART RATE: 71 BPM | WEIGHT: 130 LBS | HEIGHT: 55 IN

## 2023-02-27 DIAGNOSIS — I50.32 CHRONIC DIASTOLIC CHF (CONGESTIVE HEART FAILURE) (HCC): ICD-10-CM

## 2023-02-27 DIAGNOSIS — E03.9 HYPOTHYROIDISM, UNSPECIFIED TYPE: Primary | ICD-10-CM

## 2023-02-27 DIAGNOSIS — N18.31 CHRONIC KIDNEY DISEASE, STAGE 3A (HCC): ICD-10-CM

## 2023-02-27 DIAGNOSIS — R42 DIZZINESS: ICD-10-CM

## 2023-02-27 DIAGNOSIS — I10 ESSENTIAL HYPERTENSION: ICD-10-CM

## 2023-02-27 DIAGNOSIS — I44.2 COMPLETE HEART BLOCK (HCC): ICD-10-CM

## 2023-02-27 DIAGNOSIS — E78.5 HYPERLIPIDEMIA, UNSPECIFIED HYPERLIPIDEMIA TYPE: ICD-10-CM

## 2023-02-27 NOTE — PROGRESS NOTES
Name: Bhaskar Martínez      : 1940      MRN: [id number]  Encounter Provider: Jono Gifford MD  Encounter Date: 2023   Encounter department: [address] [place] [address]     1  Hypothyroidism, unspecified type  Assessment & Plan:  Due for labwork and will check TSH for levels  Continue levothyroxine for now  Orders:  -     CBC and differential; Future  -     Comprehensive metabolic panel; Future  -     Lipid panel; Future  -     TSH, 3rd generation with Free T4 reflex; Future  -     CBC and differential  -     Comprehensive metabolic panel  -     Lipid panel  -     TSH, 3rd generation with Free T4 reflex    2  Essential hypertension  Assessment & Plan:  Well controlled and will continue current medications as ordered  Orders:  -     CBC and differential; Future  -     Comprehensive metabolic panel; Future  -     Lipid panel; Future  -     TSH, 3rd generation with Free T4 reflex; Future  -     CBC and differential  -     Comprehensive metabolic panel  -     Lipid panel  -     TSH, 3rd generation with Free T4 reflex    3  Hyperlipidemia, unspecified hyperlipidemia type  Assessment & Plan:  Well controlled and will continue current medications and will order  Orders:  -     CBC and differential; Future  -     Comprehensive metabolic panel; Future  -     Lipid panel; Future  -     TSH, 3rd generation with Free T4 reflex; Future  -     CBC and differential  -     Comprehensive metabolic panel  -     Lipid panel  -     TSH, 3rd generation with Free T4 reflex    4  Dizziness  Comments:  Ordered PT for gait training and stability  She would like to do this  Orders:  -     Ambulatory Referral to Physical Therapy; Future    5  Chronic diastolic CHF (congestive heart failure) ([facility] [address] )    6  Complete heart block [facility])  Assessment & Plan:  Managed by cardiology and is s/p pacemaker        7  Chronic kidney disease, stage 3a [facility])  Assessment & Plan:  Lab Results   Component Value Date EGFR 51 (L) 06/21/2022    EGFR 59 04/16/2021    EGFR 57 11/24/2020    CREATININE 1 08 (H) 06/21/2022    CREATININE 0 94 10/08/2021    CREATININE 0 92 04/16/2021     Stable and will continue to monitor; continue to avoid potentially nephrotoxic agents  Subjective      Here for a follow up visit  She feels tired and has difficulty swallowing, sore throat for 2 years  Feels sometimes it is her medications but is taking them  Has some dizziness and feels difficulty ambulating  She is unsteady  Just saw cardiologist and no changes were made  There is no chest pain or dyspnea  No palpitations  Review of Systems   Constitutional: Positive for fatigue  Respiratory: Negative  Negative for shortness of breath and wheezing  Cardiovascular: Negative  Negative for chest pain and palpitations  Musculoskeletal: Positive for gait problem  Neurological: Positive for dizziness  Current Outpatient Medications on File Prior to Visit   Medication Sig   • levothyroxine (Synthroid) 75 mcg tablet Take 1 tablet (75 mcg total) by mouth daily in the early morning BRAND MEDICALLY NECESSARY   • lisinopril (ZESTRIL) 2 5 mg tablet Take 1 tablet (2 5 mg total) by mouth daily   • metoprolol succinate (TOPROL-XL) 25 mg 24 hr tablet TAKE ONE TABLET BY MOUTH EVERY DAY   • pantoprazole (PROTONIX) 40 mg tablet TAKE ONE TABLET BY MOUTH EVERY DAY   • rosuvastatin (CRESTOR) 10 MG tablet Take 1 tablet (10 mg total) by mouth daily   • Xarelto 20 MG tablet TAKE ONE TABLET BY MOUTH EVERY DAY WITH BREAKFAST       Objective     /70   Pulse 71   Temp 97 6 °F (36 4 °C)   Resp 16   Ht 4' 7" (1 397 m)   Wt 59 kg (130 lb)   SpO2 97%   BMI 30 21 kg/m²     Physical Exam  Constitutional:       Appearance: Normal appearance  She is well-developed  Eyes:      Conjunctiva/sclera: Conjunctivae normal    Neck:      Thyroid: No thyromegaly  Vascular: No JVD     Cardiovascular:      Rate and Rhythm: Normal rate and regular rhythm  Heart sounds: Normal heart sounds  No murmur heard  No friction rub  No gallop  Pulmonary:      Effort: Pulmonary effort is normal       Breath sounds: Normal breath sounds  No wheezing or rales  Abdominal:      General: Bowel sounds are normal  There is no distension  Palpations: Abdomen is soft  Tenderness: There is no abdominal tenderness  Musculoskeletal:      Cervical back: Neck supple  Skin:     General: Skin is warm and dry  Findings: No rash  Neurological:      Mental Status: She is alert  Cranial Nerves: No cranial nerve deficit  Motor: No weakness        Gait: Gait normal        Rashad Swenson MD

## 2023-02-27 NOTE — ASSESSMENT & PLAN NOTE
Lab Results   Component Value Date    EGFR 51 (L) 06/21/2022    EGFR 59 04/16/2021    EGFR 57 11/24/2020    CREATININE 1 08 (H) 06/21/2022    CREATININE 0 94 10/08/2021    CREATININE 0 92 04/16/2021     Stable and will continue to monitor; continue to avoid potentially nephrotoxic agents

## 2023-03-07 DIAGNOSIS — J31.0 CHRONIC RHINITIS: Primary | ICD-10-CM

## 2023-03-08 RX ORDER — IPRATROPIUM BROMIDE 21 UG/1
SPRAY, METERED NASAL
Qty: 30 ML | Refills: 5 | Status: SHIPPED | OUTPATIENT
Start: 2023-03-08

## 2023-05-09 ENCOUNTER — TELEPHONE (OUTPATIENT)
Dept: FAMILY MEDICINE CLINIC | Facility: CLINIC | Age: 83
End: 2023-05-09

## 2023-05-09 DIAGNOSIS — E87.5 SERUM POTASSIUM ELEVATED: Primary | ICD-10-CM

## 2023-05-09 LAB
ALBUMIN SERPL-MCNC: 3.9 G/DL (ref 3.6–4.6)
ALBUMIN/GLOB SERPL: 1.5 {RATIO} (ref 1.2–2.2)
ALP SERPL-CCNC: 68 IU/L (ref 44–121)
ALT SERPL-CCNC: 11 IU/L (ref 0–32)
AST SERPL-CCNC: 23 IU/L (ref 0–40)
BASOPHILS # BLD AUTO: 0.1 X10E3/UL (ref 0–0.2)
BASOPHILS NFR BLD AUTO: 1 %
BILIRUB SERPL-MCNC: 1 MG/DL (ref 0–1.2)
BUN SERPL-MCNC: 19 MG/DL (ref 8–27)
BUN/CREAT SERPL: 19 (ref 12–28)
CALCIUM SERPL-MCNC: 9.3 MG/DL (ref 8.7–10.3)
CHLORIDE SERPL-SCNC: 104 MMOL/L (ref 96–106)
CHOLEST SERPL-MCNC: 143 MG/DL (ref 100–199)
CHOLEST/HDLC SERPL: 2.6 RATIO (ref 0–4.4)
CO2 SERPL-SCNC: 22 MMOL/L (ref 20–29)
CREAT SERPL-MCNC: 1 MG/DL (ref 0.57–1)
EGFR: 56 ML/MIN/1.73
EOSINOPHIL # BLD AUTO: 0.1 X10E3/UL (ref 0–0.4)
EOSINOPHIL NFR BLD AUTO: 2 %
ERYTHROCYTE [DISTWIDTH] IN BLOOD BY AUTOMATED COUNT: 13.4 % (ref 11.7–15.4)
GLOBULIN SER-MCNC: 2.6 G/DL (ref 1.5–4.5)
GLUCOSE SERPL-MCNC: 82 MG/DL (ref 70–99)
HCT VFR BLD AUTO: 45.4 % (ref 34–46.6)
HDLC SERPL-MCNC: 54 MG/DL
HGB BLD-MCNC: 15.1 G/DL (ref 11.1–15.9)
IMM GRANULOCYTES # BLD: 0 X10E3/UL (ref 0–0.1)
IMM GRANULOCYTES NFR BLD: 0 %
LDLC SERPL CALC-MCNC: 74 MG/DL (ref 0–99)
LYMPHOCYTES # BLD AUTO: 1.5 X10E3/UL (ref 0.7–3.1)
LYMPHOCYTES NFR BLD AUTO: 32 %
MCH RBC QN AUTO: 28.8 PG (ref 26.6–33)
MCHC RBC AUTO-ENTMCNC: 33.3 G/DL (ref 31.5–35.7)
MCV RBC AUTO: 87 FL (ref 79–97)
MICRODELETION SYND BLD/T FISH: NORMAL
MICRODELETION SYND BLD/T FISH: NORMAL
MONOCYTES # BLD AUTO: 0.7 X10E3/UL (ref 0.1–0.9)
MONOCYTES NFR BLD AUTO: 14 %
NEUTROPHILS # BLD AUTO: 2.5 X10E3/UL (ref 1.4–7)
NEUTROPHILS NFR BLD AUTO: 51 %
PLATELET # BLD AUTO: 207 X10E3/UL (ref 150–450)
POTASSIUM SERPL-SCNC: 5.3 MMOL/L (ref 3.5–5.2)
PROT SERPL-MCNC: 6.5 G/DL (ref 6–8.5)
RBC # BLD AUTO: 5.24 X10E6/UL (ref 3.77–5.28)
SL AMB VLDL CHOLESTEROL CALC: 15 MG/DL (ref 5–40)
SODIUM SERPL-SCNC: 140 MMOL/L (ref 134–144)
TRIGL SERPL-MCNC: 79 MG/DL (ref 0–149)
TSH SERPL DL<=0.005 MIU/L-ACNC: 1.47 UIU/ML (ref 0.45–4.5)
WBC # BLD AUTO: 4.8 X10E3/UL (ref 3.4–10.8)

## 2023-05-09 NOTE — TELEPHONE ENCOUNTER
----- Message from Pikk [address] sent at 5/9/2023  9:06 AM EDT -----  Please let patient know that her blood work for fasting glucose looks good  Her kidney function is stable  Her liver function, blood count, cholesterol levels looks good and continue current medications  Her potassium is every mildly elevated and advised to avoid high potassium diet intake like avoid excessive use of Dried fruits (raisins, apricots)  Beans, lentils  Potatoes  Winter squash (acorn, butternut)  Spinach, broccoli  Beet greens  Avocado  Bananas  I ordered potassium blood work to repeat in a week  Print script and she can pick it up   CHA Mathew

## 2023-05-11 ENCOUNTER — TELEPHONE (OUTPATIENT)
Dept: FAMILY MEDICINE CLINIC | Facility: CLINIC | Age: 83
End: 2023-05-11

## 2023-05-11 NOTE — TELEPHONE ENCOUNTER
Appt was set   Left message on machine requesting a call back  Kaykay Veloz       Can we also let patient aware of mitch note:    ----- Message from Pikk [address] sent at 5/9/2023  9:06 AM EDT -----  Please let patient know that her blood work for fasting glucose looks good  Her kidney function is stable  Her liver function, blood count, cholesterol levels looks good and continue current medications  Her potassium is every mildly elevated and advised to avoid high potassium diet intake like avoid excessive use of Dried fruits (raisins, apricots)  Beans, lentils  Potatoes  Winter squash (acorn, butternut)  Spinach, broccoli  Beet greens  Avocado  Bananas  I ordered potassium blood work to repeat in a week  Print script and she can pick it up   CHA Meyer

## 2023-05-18 ENCOUNTER — REMOTE DEVICE CLINIC VISIT (OUTPATIENT)
Dept: CARDIOLOGY CLINIC | Facility: CLINIC | Age: 83
End: 2023-05-18

## 2023-05-18 DIAGNOSIS — Z95.0 CARDIAC PACEMAKER IN SITU: Primary | ICD-10-CM

## 2023-05-19 NOTE — PROGRESS NOTES
"Results for orders placed or performed in visit on 05/18/23   Cardiac EP device report    Narrative    MD[address]: BATTERY STATUS \">8 YRS  \"  95%  ALL AVAILABLE ELECTRODE PARAMETERS WITHIN NORMAL LIMITS  NORMAL DEVICE FUNCTION   NC         "

## 2023-06-14 ENCOUNTER — OFFICE VISIT (OUTPATIENT)
Dept: CARDIOLOGY CLINIC | Facility: CLINIC | Age: 83
End: 2023-06-14
Payer: COMMERCIAL

## 2023-06-14 VITALS
BODY MASS INDEX: 30.09 KG/M2 | OXYGEN SATURATION: 94 % | WEIGHT: 130 LBS | HEART RATE: 84 BPM | SYSTOLIC BLOOD PRESSURE: 142 MMHG | HEIGHT: 55 IN | DIASTOLIC BLOOD PRESSURE: 80 MMHG

## 2023-06-14 DIAGNOSIS — Z95.0 CARDIAC PACEMAKER IN SITU: Primary | ICD-10-CM

## 2023-06-14 DIAGNOSIS — I50.32 CHRONIC DIASTOLIC CHF (CONGESTIVE HEART FAILURE) (HCC): ICD-10-CM

## 2023-06-14 DIAGNOSIS — I48.91 ATRIAL FIBRILLATION WITH RAPID VENTRICULAR RESPONSE (HCC): ICD-10-CM

## 2023-06-14 DIAGNOSIS — I10 ESSENTIAL HYPERTENSION: ICD-10-CM

## 2023-06-14 DIAGNOSIS — I44.2 COMPLETE HEART BLOCK (HCC): ICD-10-CM

## 2023-06-14 DIAGNOSIS — I48.3 TYPICAL ATRIAL FLUTTER (HCC): ICD-10-CM

## 2023-06-14 DIAGNOSIS — I25.10 CORONARY ARTERY DISEASE INVOLVING NATIVE CORONARY ARTERY OF NATIVE HEART WITHOUT ANGINA PECTORIS: ICD-10-CM

## 2023-06-14 PROCEDURE — 99214 OFFICE O/P EST MOD 30 MIN: CPT | Performed by: INTERNAL MEDICINE

## 2023-06-14 PROCEDURE — 93000 ELECTROCARDIOGRAM COMPLETE: CPT | Performed by: INTERNAL MEDICINE

## 2023-06-14 NOTE — PROGRESS NOTES
Cardiology Follow Up    Ela Samuels  1940  [id number]      Interval History: Ela Samuels is here for followup of  Hypertension and atrial fibrillation  Since her last visit, she feels some lightheadedness and dizziness  She denies any chest pain  She feels fatigued but denies any shortness of breath  She is concerned her symptoms are caused by medications  She also has varicose veins  Does not use compression stockings  She has a history of multiple medication intolerances and previously she has stopped taking furosemide because she felt it was causing her lower back pain and frequent urination  Could also not tolerate beta blocker, sotalol, amiodarone either  She has been taking Xarelto regularly  She has a history of atrial fibrillation with multiple failed cardioversions in the past  She eventually had AV node ablation along with permanent pacemaker insertion by Dr Raimundo Roy at [facility]  She was previously taking sotalol but stopped it independently because of side effects  Amiodarone was stopped because of GI side effects  Bystolic was stopped due to nausea  Previously, she felt metoprolol was causing her to feel bad as well  Amlodipine was stopped in the past because of LE edema  Echocardiogram in March 2020 showed EF of 45% with moderate to marked LA dilation           The following portions of the patient's history were reviewed and updated as appropriate: allergies, current medications, past family history, past medical history, past social history, past surgical history and problem list     Current Outpatient Medications on File Prior to Visit   Medication Sig Dispense Refill   • levothyroxine (Synthroid) 75 mcg tablet Take 1 tablet (75 mcg total) by mouth daily in the early morning BRAND MEDICALLY NECESSARY 90 tablet 3   • lisinopril (ZESTRIL) 2 5 mg tablet Take 1 tablet (2 5 mg total) by mouth daily 90 "tablet 3   • metoprolol succinate (TOPROL-XL) 25 mg 24 hr tablet TAKE ONE TABLET BY MOUTH EVERY DAY 90 tablet 3   • rosuvastatin (CRESTOR) 10 MG tablet Take 1 tablet (10 mg total) by mouth daily 90 tablet 3   • Xarelto 20 MG tablet TAKE ONE TABLET BY MOUTH EVERY DAY WITH BREAKFAST 90 tablet 1   • ipratropium (ATROVENT) 0 03 % nasal spray USE 2 SPRAYS IN EACH NOSTRIL EVERY 12 HOURS (GENERIC FOR ATROVENT) (Patient not taking: Reported on 6/14/2023) 30 mL 5   • pantoprazole (PROTONIX) 40 mg tablet TAKE ONE TABLET BY MOUTH EVERY DAY (Patient not taking: Reported on 6/14/2023) 30 tablet 3     No current facility-administered medications on file prior to visit  Review of Systems:  Review of Systems   Constitutional: Positive for fatigue  HENT: Positive for congestion, hearing loss and postnasal drip  Cardiovascular: Positive for palpitations  Negative for chest pain and leg swelling  Musculoskeletal: Positive for arthralgias and myalgias  Neurological: Positive for dizziness and light-headedness  All other systems reviewed and are negative  Physical Exam:  /80 (BP Location: Right arm, Patient Position: Sitting, Cuff Size: Standard)   Pulse 84   Ht 4' 7\" (1 397 m)   Wt 59 kg (130 lb)   SpO2 94%   BMI 30 21 kg/m²     Physical Exam  Constitutional:       General: She is not in acute distress  Appearance: She is well-developed  She is not diaphoretic  HENT:      Head: Normocephalic and atraumatic  Eyes:      Conjunctiva/sclera: Conjunctivae normal       Pupils: Pupils are equal, round, and reactive to light  Neck:      Thyroid: No thyromegaly  Vascular: No JVD  Cardiovascular:      Rate and Rhythm: Normal rate and regular rhythm  Heart sounds: Normal heart sounds  No murmur heard  No friction rub  No gallop  Pulmonary:      Effort: Pulmonary effort is normal       Breath sounds: Normal breath sounds  Abdominal:      General: There is no distension   " Musculoskeletal:      Cervical back: Neck supple  Right lower leg: No edema  Left lower leg: No edema  Skin:     General: Skin is warm and dry  Findings: No erythema or rash  Neurological:      Mental Status: She is alert and oriented to person, place, and time  Cranial Nerves: No cranial nerve deficit  Psychiatric:         Behavior: Behavior normal          Thought Content: Thought content normal          Judgment: Judgment normal          Cardiographics  ECG:  Atrial flutter with Ventricular pacing and occasional intrinsic PVCs    Labs:  Lab Results   Component Value Date    BUN 19 05/08/2023    CALCIUM 9 0 04/16/2021    CALCIUM 9 2 08/15/2017     05/08/2023    CO2 22 05/08/2023    CREATININE 1 00 05/08/2023    CREATININE 0 92 04/16/2021    CREATININE 0 88 08/15/2017    GLUCOSE 94 08/15/2017    K 5 3 (H) 05/08/2023     08/15/2017     Lab Results   Component Value Date    HCT 45 4 05/08/2023    HCT 47 0 (H) 04/16/2021    HCT 43 7 08/15/2017    HGB 15 1 05/08/2023    HGB 15 1 04/16/2021    HGB 14 2 08/15/2017    MCV 87 05/08/2023    MCV 88 04/16/2021    MCV 86 08/15/2017     05/08/2023     04/16/2021     08/15/2017    WBC 4 8 05/08/2023    WBC 6 19 04/16/2021    WBC 4 8 08/15/2017     Lab Results   Component Value Date    CHOL 164 08/15/2017    HDL 54 05/08/2023    TRIG 79 05/08/2023     Imaging: No results found  Discussion/Summary:  1  Cardiac pacemaker in situ    2  Essential hypertension    3  Coronary artery disease involving native coronary artery of native heart without angina pectoris    4  Complete heart block ([address] )    5  Typical atrial flutter ([address] )    6  Atrial fibrillation with rapid ventricular response (HCC)    7  Chronic diastolic CHF (congestive heart failure) ([facility])      - BP mildly elevated today  She has multiple medication intolerances  We will continue lisinopril 2 5 mg daily   She is also using Toprol-XL 25 mg daily      - Patient had AV node ablation along with pacemaker insertion  She has had significant improvement in her symptoms  She was feeling palpitations with neck/head discomfort that may due to frequent ventricular ectopy - will continue Toprol XL 25 mg daily  She has multiple medication intolerances including beta blockers, sotalol and calcium channel blocker  - Patient is on Xarelto    Aspirin was discontinued and she was continued on Xarelto alone because of excessive bruising    - She should continue routine pacemaker checks  - Echocardiogram ordered

## 2023-06-27 ENCOUNTER — HOSPITAL ENCOUNTER (OUTPATIENT)
Dept: NON INVASIVE DIAGNOSTICS | Facility: HOSPITAL | Age: 83
Discharge: HOME | End: 2023-06-27
Attending: INTERNAL MEDICINE
Payer: COMMERCIAL

## 2023-06-27 VITALS
SYSTOLIC BLOOD PRESSURE: 142 MMHG | BODY MASS INDEX: 30.09 KG/M2 | HEIGHT: 55 IN | WEIGHT: 130 LBS | HEART RATE: 83 BPM | DIASTOLIC BLOOD PRESSURE: 80 MMHG

## 2023-06-27 DIAGNOSIS — I25.10 CORONARY ARTERY DISEASE INVOLVING NATIVE CORONARY ARTERY OF NATIVE HEART WITHOUT ANGINA PECTORIS: ICD-10-CM

## 2023-06-27 LAB
AORTIC ROOT: 2.8 CM
AORTIC VALVE MEAN VELOCITY: 11.6 M/S
APICAL FOUR CHAMBER EJECTION FRACTION: 61 %
AV AREA BY CONTINUOUS VTI: 1.3 CM2
AV AREA PEAK VELOCITY: 1.1 CM2
AV LVOT MEAN GRADIENT: 1 MMHG
AV LVOT PEAK GRADIENT: 2 MMHG
AV MEAN GRADIENT: 6 MMHG
AV PEAK GRADIENT: 11 MMHG
AV VALVE AREA: 1.26 CM2
AV VELOCITY RATIO: 0.4
DOP CALC AO PEAK VEL: 1.66 M/S
DOP CALC AO VTI: 32.38 CM
DOP CALC LVOT AREA: 2.83 CM2
DOP CALC LVOT CARDIAC INDEX: 2.28 L/MIN/M2
DOP CALC LVOT CARDIAC OUTPUT: 3.33 L/MIN
DOP CALC LVOT DIAMETER: 1.9 CM
DOP CALC LVOT PEAK VEL VTI: 14.45 CM
DOP CALC LVOT PEAK VEL: 0.66 M/S
DOP CALC LVOT STROKE INDEX: 28.1 ML/M2
DOP CALC LVOT STROKE VOLUME: 40.95 CM3
E WAVE DECELERATION TIME: 245 MS
FRACTIONAL SHORTENING: 31 % (ref 28–44)
INTERVENTRICULAR SEPTUM IN DIASTOLE (PARASTERNAL SHORT AXIS VIEW): 1.2 CM
INTERVENTRICULAR SEPTUM: 1.2 CM (ref 0.6–1.1)
LAAS-AP2: 29.6 CM2
LAAS-AP4: 31.3 CM2
LEFT ATRIUM SIZE: 4.4 CM
LEFT ATRIUM VOLUME (MOD BIPLANE): 119 ML
LEFT INTERNAL DIMENSION IN SYSTOLE: 2.9 CM (ref 2.1–4)
LEFT VENTRICULAR INTERNAL DIMENSION IN DIASTOLE: 4.2 CM (ref 3.5–6)
LEFT VENTRICULAR POSTERIOR WALL IN END DIASTOLE: 1.2 CM
LEFT VENTRICULAR STROKE VOLUME: 45 ML
LVSV (TEICH): 45 ML
MV E'TISSUE VEL-SEP: 7 CM/S
MV EROA: 0.1 CM2
MV PEAK A VEL: 0.31 M/S
MV PEAK E VEL: 120 CM/S
MV STENOSIS PRESSURE HALF TIME: 71 MS
MV VALVE AREA P 1/2 METHOD: 3.1 CM2
PISA MRMAX VEL: 0.48 M/S
PISA RADIUS: 0.5 CM
PISA TR MAX VEL: 0.48 M/S
RIGHT ATRIUM AREA SYSTOLE A4C: 22.2 CM2
RIGHT VENTRICLE ID DIMENSION: 3.6 CM
SL CV LEFT ATRIUM LENGTH A2C: 6.3 CM
SL CV PED ECHO LEFT VENTRICLE DIASTOLIC VOLUME (MOD BIPLANE) 2D: 78 ML
SL CV PED ECHO LEFT VENTRICLE SYSTOLIC VOLUME (MOD BIPLANE) 2D: 33 ML
SL CV TV EROA: 0.5 CM2
SL CV TV REGURGITANT FLOW RATE: 174 ML/S
TR MAX PG: 55 MMHG
TR PEAK VELOCITY: 3.7 M/S
TR VTI: 108.9 CM
TRICUSPID ANNULAR PLANE SYSTOLIC EXCURSION: 1.2 CM
TRICUSPID VALVE PEAK REGURGITATION VELOCITY: 3.7 M/S

## 2023-06-27 PROCEDURE — 93306 TTE W/DOPPLER COMPLETE: CPT | Performed by: INTERNAL MEDICINE

## 2023-06-27 PROCEDURE — 93306 TTE W/DOPPLER COMPLETE: CPT

## 2023-07-03 ENCOUNTER — TELEPHONE (OUTPATIENT)
Dept: CARDIOLOGY CLINIC | Facility: CLINIC | Age: 83
End: 2023-07-03

## 2023-07-03 NOTE — TELEPHONE ENCOUNTER
----- Message from Rachel Gonzalez MD sent at 6/27/2023  4:16 PM EDT -----  His echo Doppler shows that patient has now moderate to severe tricuspid regurgitation and PA pressures elevated. She has mild aortic stenosis. As compared to previous study valve regurgitation and tricuspid has increased patient should follow-up with Dr. Alba Zhang sooner. If she has any symptoms she should give us a call.

## 2023-07-24 DIAGNOSIS — I48.3 TYPICAL ATRIAL FLUTTER (HCC): ICD-10-CM

## 2023-07-24 DIAGNOSIS — I48.91 A-FIB (HCC): ICD-10-CM

## 2023-07-24 RX ORDER — RIVAROXABAN 20 MG/1
TABLET, FILM COATED ORAL
Qty: 90 TABLET | Refills: 3 | Status: SHIPPED | OUTPATIENT
Start: 2023-07-24

## 2023-08-28 ENCOUNTER — TELEPHONE (OUTPATIENT)
Dept: FAMILY MEDICINE CLINIC | Facility: CLINIC | Age: 83
End: 2023-08-28

## 2023-08-28 NOTE — TELEPHONE ENCOUNTER
I believe the nurse's call to set up the interpretors for the [employer], I am not sure what number to call or who? Or if they do?

## 2023-08-29 NOTE — TELEPHONE ENCOUNTER
Tried to call another person to come in. Confirm and then cancel with other company.     Prema Lam  CMA

## 2023-08-29 NOTE — TELEPHONE ENCOUNTER
Michelle Chand no longer does translating. I spoke to another service that [address] [facility] uses. [phone].  Spoke to them today and they will fax a confirmation that she deff has someone coming Thursday, we need to follow up to confirm ourselves later today or tomorrow    Michael Saez

## 2023-08-29 NOTE — TELEPHONE ENCOUNTER
Called nataliia at [phone] to see if she is available for Thursday- left message. Also left lance a message as well.  [phone]    We will need to cancel the 800 number     Mala Ramirez  CMA

## 2023-08-30 LAB — POTASSIUM SERPL-SCNC: 4.7 MMOL/L (ref 3.5–5.2)

## 2023-08-31 ENCOUNTER — OFFICE VISIT (OUTPATIENT)
Dept: FAMILY MEDICINE CLINIC | Facility: CLINIC | Age: 83
End: 2023-08-31
Payer: COMMERCIAL

## 2023-08-31 VITALS
BODY MASS INDEX: 29.62 KG/M2 | TEMPERATURE: 98.6 F | RESPIRATION RATE: 16 BRPM | HEIGHT: 55 IN | HEART RATE: 92 BPM | WEIGHT: 128 LBS | DIASTOLIC BLOOD PRESSURE: 80 MMHG | SYSTOLIC BLOOD PRESSURE: 130 MMHG

## 2023-08-31 DIAGNOSIS — E03.9 HYPOTHYROIDISM, UNSPECIFIED TYPE: ICD-10-CM

## 2023-08-31 DIAGNOSIS — Z00.00 MEDICARE ANNUAL WELLNESS VISIT, SUBSEQUENT: Primary | ICD-10-CM

## 2023-08-31 DIAGNOSIS — E78.5 HYPERLIPIDEMIA, UNSPECIFIED HYPERLIPIDEMIA TYPE: ICD-10-CM

## 2023-08-31 DIAGNOSIS — I10 ESSENTIAL HYPERTENSION: ICD-10-CM

## 2023-08-31 DIAGNOSIS — Z23 NEED FOR VACCINATION: ICD-10-CM

## 2023-08-31 PROBLEM — R60.0 LOCALIZED EDEMA: Status: RESOLVED | Noted: 2019-06-13 | Resolved: 2023-08-31

## 2023-08-31 PROCEDURE — G0008 ADMIN INFLUENZA VIRUS VAC: HCPCS

## 2023-08-31 PROCEDURE — 90662 IIV NO PRSV INCREASED AG IM: CPT

## 2023-08-31 PROCEDURE — G0439 PPPS, SUBSEQ VISIT: HCPCS | Performed by: INTERNAL MEDICINE

## 2023-08-31 PROCEDURE — 99214 OFFICE O/P EST MOD 30 MIN: CPT | Performed by: INTERNAL MEDICINE

## 2023-08-31 NOTE — ASSESSMENT & PLAN NOTE
Reviewed recent labwork with patient, TSH is within normal limits. Continue levothyroxine as ordered.

## 2023-08-31 NOTE — PATIENT INSTRUCTIONS
Medicare Preventive Visit Patient Instructions  Thank you for completing your Welcome to Medicare Visit or Medicare Annual Wellness Visit today. Your next wellness visit will be due in one year (8/31/2024). The screening/preventive services that you may require over the next 5-10 years are detailed below. Some tests may not apply to you based off risk factors and/or age. Screening tests ordered at today's visit but not completed yet may show as past due. Also, please note that scanned in results may not display below. Preventive Screenings:  Service Recommendations Previous Testing/Comments   Colorectal Cancer Screening  * Colonoscopy    * Fecal Occult Blood Test (FOBT)/Fecal Immunochemical Test (FIT)  * Fecal DNA/Cologuard Test  * Flexible Sigmoidoscopy Age: 43-73 years old   Colonoscopy: every 10 years (may be performed more frequently if at higher risk)  OR  FOBT/FIT: every 1 year  OR  Cologuard: every 3 years  OR  Sigmoidoscopy: every 5 years  Screening may be recommended earlier than age 39 if at higher risk for colorectal cancer. Also, an individualized decision between you and your healthcare provider will decide whether screening between the ages of 77-80 would be appropriate. Colonoscopy: Not on file  FOBT/FIT: Not on file  Cologuard: Not on file  Sigmoidoscopy: Not on file          Breast Cancer Screening Age: 36 years old  Frequency: every 1-2 years  Not required if history of left and right mastectomy Mammogram: 06/11/2019        Cervical Cancer Screening Between the ages of 21-29, pap smear recommended once every 3 years. Between the ages of 32-69, can perform pap smear with HPV co-testing every 5 years.    Recommendations may differ for women with a history of total hysterectomy, cervical cancer, or abnormal pap smears in past. Pap Smear: Not on file    Screening Not Indicated   Hepatitis C Screening Once for adults born between [address] [place] [address]  More frequently in patients at high risk for Hepatitis C Hep C Antibody: Not on file        Diabetes Screening 1-2 times per year if you're at risk for diabetes or have pre-diabetes Fasting glucose: 91 mg/dL (11/24/2020)  A1C: No results in last 5 years (No results in last 5 years)  Screening Current   Cholesterol Screening Once every 5 years if you don't have a lipid disorder. May order more often based on risk factors. Lipid panel: 05/08/2023    Screening Not Indicated  History Lipid Disorder     Other Preventive Screenings Covered by Medicare:  Abdominal Aortic Aneurysm (AAA) Screening: covered once if your at risk. You're considered to be at risk if you have a family history of AAA. Lung Cancer Screening: covers low dose CT scan once per year if you meet all of the following conditions: (1) Age 48-67; (2) No signs or symptoms of lung cancer; (3) Current smoker or have quit smoking within the last 15 years; (4) You have a tobacco smoking history of at least 20 pack years (packs per day multiplied by number of years you smoked); (5) You get a written order from a healthcare provider. Glaucoma Screening: covered annually if you're considered high risk: (1) You have diabetes OR (2) Family history of glaucoma OR (3)  aged 48 and older OR (3)  American aged 72 and older  Osteoporosis Screening: covered every 2 years if you meet one of the following conditions: (1) You're estrogen deficient and at risk for osteoporosis based off medical history and other findings; (2) Have a vertebral abnormality; (3) On glucocorticoid therapy for more than 3 months; (4) Have primary hyperparathyroidism; (5) On osteoporosis medications and need to assess response to drug therapy. Last bone density test (DXA Scan): 08/04/2017. HIV Screening: covered annually if you're between the age of 14-79. Also covered annually if you are younger than 13 and older than 72 with risk factors for HIV infection.  For pregnant patients, it is covered up to 3 times per pregnancy. Immunizations:  Immunization Recommendations   Influenza Vaccine Annual influenza vaccination during flu season is recommended for all persons aged >= 6 months who do not have contraindications   Pneumococcal Vaccine   * Pneumococcal conjugate vaccine = PCV13 (Prevnar 13), PCV15 (Vaxneuvance), PCV20 (Prevnar 20)  * Pneumococcal polysaccharide vaccine = PPSV23 (Pneumovax) Adults 20-63 years old: 1-3 doses may be recommended based on certain risk factors  Adults 72 years old: 1-2 doses may be recommended based off what pneumonia vaccine you previously received   Hepatitis B Vaccine 3 dose series if at intermediate or high risk (ex: diabetes, end stage renal disease, liver disease)   Tetanus (Td) Vaccine - COST NOT COVERED BY MEDICARE PART B Following completion of primary series, a booster dose should be given every 10 years to maintain immunity against tetanus. Td may also be given as tetanus wound prophylaxis. Tdap Vaccine - COST NOT COVERED BY MEDICARE PART B Recommended at least once for all adults. For pregnant patients, recommended with each pregnancy. Shingles Vaccine (Shingrix) - COST NOT COVERED BY MEDICARE PART B  2 shot series recommended in those aged 48 and above     Health Maintenance Due:  There are no preventive care reminders to display for this patient. Immunizations Due:      Topic Date Due    COVID-19 Vaccine (5 - Moderna series) 01/25/2022    Influenza Vaccine (1) 09/01/2023     Advance Directives   What are advance directives? Advance directives are legal documents that state your wishes and plans for medical care. These plans are made ahead of time in case you lose your ability to make decisions for yourself. Advance directives can apply to any medical decision, such as the treatments you want, and if you want to donate organs. What are the types of advance directives? There are many types of advance directives, and each state has rules about how to use them.  You may choose a combination of any of the following:  Living will: This is a written record of the treatment you want. You can also choose which treatments you do not want, which to limit, and which to stop at a certain time. This includes surgery, medicine, IV fluid, and tube feedings. Durable power of  for healthcare [facility]): This is a written record that states who you want to make healthcare choices for you when you are unable to make them for yourself. This person, called a proxy, is usually a family member or a friend. You may choose more than 1 proxy. Do not resuscitate (DNR) order:  A DNR order is used in case your heart stops beating or you stop breathing. It is a request not to have certain forms of treatment, such as CPR. A DNR order may be included in other types of advance directives. Medical directive: This covers the care that you want if you are in a coma, near death, or unable to make decisions for yourself. You can list the treatments you want for each condition. Treatment may include pain medicine, surgery, blood transfusions, dialysis, IV or tube feedings, and a ventilator (breathing machine). Values history: This document has questions about your views, beliefs, and how you feel and think about life. This information can help others choose the care that you would choose. Why are advance directives important? An advance directive helps you control your care. Although spoken wishes may be used, it is better to have your wishes written down. Spoken wishes can be misunderstood, or not followed. Treatments may be given even if you do not want them. An advance directive may make it easier for your family to make difficult choices about your care. Weight Management   Why it is important to manage your weight:  Being overweight increases your risk of health conditions such as heart disease, high blood pressure, type 2 diabetes, and certain types of cancer.  It can also increase your risk for osteoarthritis, sleep apnea, and other respiratory problems. Aim for a slow, steady weight loss. Even a small amount of weight loss can lower your risk of health problems. How to lose weight safely:  A safe and healthy way to lose weight is to eat fewer calories and get regular exercise. You can lose up about 1 pound a week by decreasing the number of calories you eat by 500 calories each day. Healthy meal plan for weight management:  A healthy meal plan includes a variety of foods, contains fewer calories, and helps you stay healthy. A healthy meal plan includes the following:  Eat whole-grain foods more often. A healthy meal plan should contain fiber. Fiber is the part of grains, fruits, and vegetables that is not broken down by your body. Whole-grain foods are healthy and provide extra fiber in your diet. Some examples of whole-grain foods are whole-wheat breads and pastas, oatmeal, brown rice, and bulgur. Eat a variety of vegetables every day. Include dark, leafy greens such as spinach, kale, thelma greens, and mustard greens. Eat yellow and orange vegetables such as carrots, sweet potatoes, and winter squash. Eat a variety of fruits every day. Choose fresh or canned fruit (canned in its own juice or light syrup) instead of juice. Fruit juice has very little or no fiber. Eat low-fat dairy foods. Drink fat-free (skim) milk or 1% milk. Eat fat-free yogurt and low-fat cottage cheese. Try low-fat cheeses such as mozzarella and other reduced-fat cheeses. Choose meat and other protein foods that are low in fat. Choose beans or other legumes such as split peas or lentils. Choose fish, skinless poultry (chicken or turkey), or lean cuts of red meat (beef or pork). Before you cook meat or poultry, cut off any visible fat. Use less fat and oil. Try baking foods instead of frying them. Add less fat, such as margarine, sour cream, regular salad dressing and mayonnaise to foods. Eat fewer high-fat foods. Some examples of high-fat foods include french fries, doughnuts, ice cream, and cakes. Eat fewer sweets. Limit foods and drinks that are high in sugar. This includes candy, cookies, regular soda, and sweetened drinks. Exercise:  Exercise at least 30 minutes per day on most days of the week. Some examples of exercise include walking, biking, dancing, and swimming. You can also fit in more physical activity by taking the stairs instead of the elevator or parking farther away from stores. Ask your healthcare provider about the best exercise plan for you. © Copyright [employer] 2018 Information is for End User's use only and may not be sold, redistributed or otherwise used for commercial purposes. All illustrations and images included in CareNotes® are the copyrighted property of [employer] or [address] [facility] DECEM[employer].

## 2023-08-31 NOTE — ASSESSMENT & PLAN NOTE
Labs were ordered for later in the fall. Continue rosuvastatin as ordered. Encouraged low fat diet and exercise.

## 2023-08-31 NOTE — PROGRESS NOTES
Assessment and Plan:     Problem List Items Addressed This Visit        Endocrine    Hypothyroidism     Reviewed recent labwork with patient, TSH is within normal limits. Continue levothyroxine as ordered. Relevant Orders    TSH, 3rd generation with Free T4 reflex       Cardiovascular and Mediastinum    Essential hypertension     Well controlled and will continue metoprolol and lisinopril as ordered. Relevant Orders    CBC and differential    Comprehensive metabolic panel    Lipid panel       Other    Hyperlipidemia     Labs were ordered for later in the fall. Continue rosuvastatin as ordered. Encouraged low fat diet and exercise. Relevant Orders    CBC and differential    Comprehensive metabolic panel    Lipid panel   Other Visit Diagnoses     Medicare annual wellness visit, subsequent    -  Primary    Need for vaccination        Relevant Orders    influenza vaccine, high-dose, PF 0.7 mL (FLUZONE HIGH-DOSE) (Completed)           Preventive health issues were discussed with patient, and age appropriate screening tests were ordered as noted in patient's After Visit Summary. Personalized health advice and appropriate referrals for health education or preventive services given if needed, as noted in patient's After Visit Summary. History of Present Illness:     Patient presents for a Medicare Wellness Visit    Here for follow up and AWV. She feels nothing is changed. Feels her pacemaker is interfering with her sleep at night. She feels a buzzing in her head. Then she is tired and takes at least 2 naps during the day, and then can't sleep at night. Does complain of some fatigue and dyspnea with going up stairs. Sees her cardiologist next week. Denies chest pain or palpitations. She has occasional dizziness which is unchanged from previous. Does not want to go to PT.   There is no cold or heat intolerance, diarrhea or constipation, hair or skin changes, unanticipated weight gain or loss.  Has varicose veins which are distressing to her but not painful. There is no swelling. She can't tolerate support hose. Patient Care Team:  Mia Brown MD as PCP - MD Eugenie Austin MD Lawrance Fitch, MD Merlyn Ha, MD     Review of Systems:     Review of Systems   Constitutional: Negative. HENT: Negative. Eyes: Negative. Respiratory: Negative. Cardiovascular: Negative. Gastrointestinal:        Reflux symptoms. Endocrine: Negative. Genitourinary: Negative. Musculoskeletal: Negative. Skin: Negative. Allergic/Immunologic: Negative. Neurological: Positive for dizziness. Hematological: Negative. Psychiatric/Behavioral: Negative.          Problem List:     Patient Active Problem List   Diagnosis   • Hyperlipidemia   • Essential hypertension   • Hypothyroidism   • Allergic rhinitis   • CAD (coronary artery disease)   • Atrial flutter (HCC)   • Esophageal dysmotility   • Esophageal reflux   • Osteoporosis   • Thyroid nodule   • Chronic diastolic congestive heart failure (HCC)   • BMI 29.0-29.9,adult   • Stasis dermatitis of both legs   • History of atrial flutter   • Atrial fibrillation with rapid ventricular response (HCC)   • Hyponatremia   • Hypertensive heart disease with heart failure (HCC)   • Cardiomyopathy ([address])   • Complete heart block ([address])   • Long term current use of anticoagulant   • Tricuspid valve insufficiency   • Anxiety   • Chronic kidney disease, stage 3a ([address])   • Pacemaker   • Deafness      Past Medical and Surgical History:     Past Medical History:   Diagnosis Date   • Atrial flutter ([address])    • CAD (coronary artery disease)     s/p PCIx1 -2012   • Coronary artery disease    • Deafness    • Disease of thyroid gland    • GERD (gastroesophageal reflux disease)    • Hyperlipidemia    • Hypertension    • Itching     of the face, throat   • Pacemaker 03/2021    medtronic-left chest   • Wears glasses      Past Surgical History: Procedure Laterality Date   • BREAST BIOPSY Left     negative   • CARDIAC PACEMAKER PLACEMENT  03/2021    medtronic   • CARDIAC SURGERY      stents   • COLONOSCOPY     • ESOPHAGOGASTRODUODENOSCOPY N/A 02/20/2017    Procedure: ESOPHAGOGASTRODUODENOSCOPY (EGD); Surgeon: Leticia Savage MD;  Location: [facility]; Service:       Family History:     Family History   Problem Relation Age of Onset   • Cancer Mother         breast   • Hypertension Mother         essential   • Breast cancer Mother    • Other Father         smoker   • Tuberculosis Father    • No Known Problems Sister    • No Known Problems Sister    • No Known Problems Sister    • No Known Problems Daughter       Social History:     Social History     Socioeconomic History   • Marital status: /Civil Union     Spouse name: None   • Number of children: None   • Years of education: None   • Highest education level: None   Occupational History   • None   Tobacco Use   • Smoking status: Never   • Smokeless tobacco: Never   Vaping Use   • Vaping Use: Never used   Substance and Sexual Activity   • Alcohol use: Not Currently     Alcohol/week: 0.0 standard drinks of alcohol   • Drug use: No   • Sexual activity: None   Other Topics Concern   • None   Social History Narrative   • None     Social Determinants of Health     Financial Resource Strain: Low Risk  (8/31/2023)    Overall Financial Resource Strain (CARDIA)    • Difficulty of Paying Living Expenses: Not hard at all   Food Insecurity: Not on file   Transportation Needs: No Transportation Needs (8/31/2023)    PRAPARE - Transportation    • Lack of Transportation (Medical): No    • Lack of Transportation (Non-Medical):  No   Physical Activity: Not on file   Stress: Not on file   Social Connections: Not on file   Intimate Partner Violence: Not on file   Housing Stability: Not on file      Medications and Allergies:     Current Outpatient Medications   Medication Sig Dispense Refill   • levothyroxine (Synthroid) 75 mcg tablet Take 1 tablet (75 mcg total) by mouth daily in the early morning BRAND MEDICALLY NECESSARY 90 tablet 3   • lisinopril (ZESTRIL) 2.5 mg tablet Take 1 tablet (2.5 mg total) by mouth daily 90 tablet 3   • metoprolol succinate (TOPROL-XL) 25 mg 24 hr tablet TAKE ONE TABLET BY MOUTH EVERY DAY 90 tablet 3   • rosuvastatin (CRESTOR) 10 MG tablet Take 1 tablet (10 mg total) by mouth daily 90 tablet 3   • Xarelto 20 MG tablet TAKE ONE TABLET BY MOUTH EVERY DAY WITH BREAKFAST 90 tablet 3     No current facility-administered medications for this visit. Allergies   Allergen Reactions   • Sotalol      Vomiting, convulsing, rash, cough   • Famotidine Throat Swelling      Immunizations:     Immunization History   Administered Date(s) Administered   • COVID-19 MODERNA VACC 0.25 ML IM BOOSTER 11/30/2021   • COVID-19 MODERNA VACC 0.5 ML IM 04/01/2021, 04/29/2021, 11/30/2021   • Influenza Split High Dose Preservative Free IM 10/22/2012, 11/07/2013, 11/07/2013, 10/05/2015, 11/03/2016, 11/02/2017   • Influenza, high dose seasonal 0.7 mL 12/03/2018, 01/06/2020, 10/11/2020, 10/13/2021, 10/26/2022, 08/31/2023   • Influenza, seasonal, injectable 12/21/2011, 11/03/2014   • Pneumococcal Conjugate 13-Valent 11/03/2016   • Pneumococcal Conjugate PCV 7 12/21/2011   • Pneumococcal Polysaccharide PPV23 11/04/2020   • Tdap 07/02/2013      Health Maintenance: There are no preventive care reminders to display for this patient. Topic Date Due   • COVID-19 Vaccine (5 - Moderna series) 01/25/2022      Medicare Screening Tests and Risk Assessments:     Noreen Judd is here for her Subsequent Wellness visit. Health Risk Assessment:   Patient rates overall health as poor. Patient feels that their physical health rating is slightly worse. Patient is satisfied with their life. Eyesight was rated as same. Hearing was rated as same. Patient feels that their emotional and mental health rating is slightly worse.  Patients states they are never, rarely angry. Patient states they are always unusually tired/fatigued. Pain experienced in the last 7 days has been a lot. Patient's pain rating has been 7/10. Patient states that she has experienced no weight loss or gain in last 6 months. Depression Screening:   PHQ-2 Score: 2      Fall Risk Screening: In the past year, patient has experienced: no history of falling in past year      Urinary Incontinence Screening:   Patient has not leaked urine accidently in the last six months. Home Safety:  Patient has trouble with stairs inside or outside of their home. Patient has working smoke alarms and has working carbon monoxide detector. Home safety hazards include: none. Nutrition:   Current diet is Regular and Limited junk food. Medications:   Patient is not currently taking any over-the-counter supplements. Patient is able to manage medications. Activities of Daily Living (ADLs)/Instrumental Activities of Daily Living (IADLs):   Walk and transfer into and out of bed and chair?: Yes  Dress and groom yourself?: Yes    Bathe or shower yourself?: Yes    Feed yourself? Yes  Do your laundry/housekeeping?: Yes  Manage your money, pay your bills and track your expenses?: Yes  Make your own meals?: Yes    Do your own shopping?: No    Previous Hospitalizations:   Any hospitalizations or ED visits within the last 12 months?: No      Advance Care Planning:   Living will: Yes    Durable POA for healthcare:  Yes    Advanced directive: Yes    End of Life Decisions reviewed with patient: Yes      PREVENTIVE SCREENINGS      Cardiovascular Screening:    General: Screening Not Indicated and History Lipid Disorder      Diabetes Screening:     General: Screening Current      Colorectal Cancer Screening:     General: Screening Not Indicated      Breast Cancer Screening:     General: Screening Not Indicated      Cervical Cancer Screening:    General: Screening Not Indicated      Osteoporosis Screening:    General: Screening Not Indicated and History Osteoporosis      Abdominal Aortic Aneurysm (AAA) Screening:        General: Screening Not Indicated      Lung Cancer Screening:     General: Screening Not Indicated      Hepatitis C Screening:    General: Screening Not Indicated    Screening, Brief Intervention, and Referral to Treatment (SBIRT)    Screening  Typical number of drinks in a day: 0  Typical number of drinks in a week: 0  Interpretation: Low risk drinking behavior. AUDIT-C Screenin) How often did you have a drink containing alcohol in the past year? never  2) How many drinks did you have on a typical day when you were drinking in the past year? 0  3) How often did you have 6 or more drinks on one occasion in the past year? never    AUDIT-C Score: 0  Interpretation: Score 0-2 (female): Negative screen for alcohol misuse    Single Item Drug Screening:  How often have you used an illegal drug (including marijuana) or a prescription medication for non-medical reasons in the past year? never    Single Item Drug Screen Score: 0  Interpretation: Negative screen for possible drug use disorder    Brief Intervention  Alcohol & drug use screenings were reviewed. No concerns regarding substance use disorder identified. Other Counseling Topics:   Car/seat belt/driving safety, skin self-exam, sunscreen and calcium and vitamin D intake and regular weightbearing exercise. No results found. Physical Exam:     /80   Pulse 92   Temp 98.6 °F (37 °C) (Tympanic)   Resp 16   Ht 4' 7" (1.397 m)   Wt 58.1 kg (128 lb)   BMI 29.75 kg/m²     Physical Exam  Constitutional:       General: She is not in acute distress. Appearance: She is well-developed. She is not diaphoretic. HENT:      Head: Normocephalic and atraumatic. Right Ear: External ear normal.      Left Ear: External ear normal.      Nose: Nose normal.   Eyes:      Pupils: Pupils are equal, round, and reactive to light. Neck:      Thyroid: No thyromegaly. Vascular: No JVD. Cardiovascular:      Rate and Rhythm: Regular rhythm. Heart sounds: No murmur heard. No friction rub. No gallop. Pulmonary:      Effort: Pulmonary effort is normal.      Breath sounds: Normal breath sounds. No wheezing or rales. Abdominal:      General: Bowel sounds are normal. There is no distension. Palpations: Abdomen is soft. Tenderness: There is no abdominal tenderness. Musculoskeletal:         General: Normal range of motion. Cervical back: Normal range of motion and neck supple. Comments: No edema, multiple varicosities. Skin:     General: Skin is warm and dry. Findings: No rash. Neurological:      Mental Status: She is alert and oriented to person, place, and time. Cranial Nerves: No cranial nerve deficit. Sensory: No sensory deficit. Motor: No abnormal muscle tone. Coordination: Coordination normal.      Deep Tendon Reflexes: Reflexes normal.   Psychiatric:         Behavior: Behavior normal.         Thought Content:  Thought content normal.         Judgment: Judgment normal.          Kathleen Calle MD

## 2023-09-05 ENCOUNTER — OFFICE VISIT (OUTPATIENT)
Dept: CARDIOLOGY CLINIC | Facility: CLINIC | Age: 83
End: 2023-09-05
Payer: COMMERCIAL

## 2023-09-05 VITALS
DIASTOLIC BLOOD PRESSURE: 80 MMHG | HEIGHT: 55 IN | OXYGEN SATURATION: 98 % | BODY MASS INDEX: 29.39 KG/M2 | HEART RATE: 83 BPM | SYSTOLIC BLOOD PRESSURE: 160 MMHG | WEIGHT: 127 LBS

## 2023-09-05 DIAGNOSIS — I10 ESSENTIAL HYPERTENSION: Primary | ICD-10-CM

## 2023-09-05 DIAGNOSIS — I50.32 CHRONIC DIASTOLIC CHF (CONGESTIVE HEART FAILURE) (HCC): ICD-10-CM

## 2023-09-05 DIAGNOSIS — I25.10 CORONARY ARTERY DISEASE INVOLVING NATIVE CORONARY ARTERY OF NATIVE HEART WITHOUT ANGINA PECTORIS: ICD-10-CM

## 2023-09-05 DIAGNOSIS — I48.3 TYPICAL ATRIAL FLUTTER (HCC): ICD-10-CM

## 2023-09-05 DIAGNOSIS — E78.5 DYSLIPIDEMIA: ICD-10-CM

## 2023-09-05 DIAGNOSIS — Z95.0 CARDIAC PACEMAKER IN SITU: ICD-10-CM

## 2023-09-05 PROCEDURE — 99214 OFFICE O/P EST MOD 30 MIN: CPT | Performed by: INTERNAL MEDICINE

## 2023-09-05 PROCEDURE — 93000 ELECTROCARDIOGRAM COMPLETE: CPT | Performed by: INTERNAL MEDICINE

## 2023-09-05 NOTE — PROGRESS NOTES
Cardiology Follow Up    Carmelita Sparks  1940  [id number]      Interval History: Carmelita Sparks is here for followup of  Hypertension and atrial fibrillation. Since her last visit, she feels lightheaded and dizzy. She also feels a buzzing sound in her head that is worse at night. It wakens her up from sleep. She believes it is due to the pacemaker. She denies any chest pain or shortness of breath. She has difficulty swallowing. In the past, she was seen by GI and underwent EGD with esophageal dilation. She did not return for follow-up with them because she believes she felt worse after procedure was done. She has a history of atrial fibrillation with multiple failed cardioversions in the past. She eventually had AV node ablation along with permanent pacemaker insertion by Dr. Kurt Rodriguez at [facility]. She was previously taking sotalol but stopped it independently because of side effects. Amiodarone was stopped because of GI side effects. Bystolic was stopped due to nausea. Amlodipine was stopped in the past because of LE edema. Echocardiogram in March 2020 showed EF of 45% with moderate to marked LA dilation. Echocardiogram in June 2023 showed ejection fraction of 45% with severe LA dilation and moderate pulmonary hypertension.       The following portions of the patient's history were reviewed and updated as appropriate: allergies, current medications, past family history, past medical history, past social history, past surgical history and problem list.    Current Outpatient Medications on File Prior to Visit   Medication Sig Dispense Refill   • levothyroxine (Synthroid) 75 mcg tablet Take 1 tablet (75 mcg total) by mouth daily in the early morning BRAND MEDICALLY NECESSARY 90 tablet 3   • lisinopril (ZESTRIL) 2.5 mg tablet Take 1 tablet (2.5 mg total) by mouth daily 90 tablet 3   • metoprolol succinate (TOPROL-XL) 25 mg 24 hr tablet TAKE ONE TABLET BY MOUTH EVERY DAY 90 tablet 3   • rosuvastatin (CRESTOR) 10 MG tablet Take 1 tablet (10 mg total) by mouth daily 90 tablet 3   • Xarelto 20 MG tablet TAKE ONE TABLET BY MOUTH EVERY DAY WITH BREAKFAST 90 tablet 3     No current facility-administered medications on file prior to visit. Review of Systems:  Review of Systems   Constitutional: Positive for fatigue. Negative for fever. HENT: Positive for hearing loss, sore throat and trouble swallowing. Respiratory: Negative for chest tightness and shortness of breath. Cardiovascular: Negative for chest pain, palpitations and leg swelling. Musculoskeletal: Positive for arthralgias and myalgias. All other systems reviewed and are negative. Physical Exam:  /80 (BP Location: Right arm, Patient Position: Sitting, Cuff Size: Standard)   Pulse 83   Ht 4' 7" (1.397 m)   Wt 57.6 kg (127 lb)   SpO2 98%   BMI 29.52 kg/m²     Physical Exam  Constitutional:       General: She is not in acute distress. Appearance: She is well-developed. She is not diaphoretic. HENT:      Head: Normocephalic and atraumatic. Eyes:      Conjunctiva/sclera: Conjunctivae normal.      Pupils: Pupils are equal, round, and reactive to light. Neck:      Thyroid: No thyromegaly. Vascular: No JVD. Cardiovascular:      Rate and Rhythm: Normal rate and regular rhythm. Heart sounds: Normal heart sounds. No murmur heard. No friction rub. No gallop. Pulmonary:      Effort: Pulmonary effort is normal.      Breath sounds: Normal breath sounds. Abdominal:      General: There is no distension. Musculoskeletal:      Cervical back: Neck supple. Right lower leg: No tenderness. No edema. Left lower leg: No tenderness. No edema. Skin:     General: Skin is warm and dry. Findings: No erythema or rash. Neurological:      Mental Status: She is alert and oriented to person, place, and time.       Cranial Nerves: No cranial nerve deficit. Psychiatric:         Behavior: Behavior normal.         Thought Content: Thought content normal.         Judgment: Judgment normal.         Cardiographics  ECG:  Atrial flutter with Ventricular pacing and frequent PVCs    Labs:  Lab Results   Component Value Date     08/15/2017    K 4.7 08/29/2023     05/08/2023    CO2 22 05/08/2023    BUN 19 05/08/2023    CREATININE 1.00 05/08/2023    CREATININE 0.92 04/16/2021    CREATININE 0.88 08/15/2017    GLUCOSE 94 08/15/2017    CALCIUM 9.0 04/16/2021    CALCIUM 9.2 08/15/2017     Lab Results   Component Value Date    WBC 4.8 05/08/2023    WBC 6.19 04/16/2021    WBC 4.8 08/15/2017    HGB 15.1 05/08/2023    HGB 15.1 04/16/2021    HGB 14.2 08/15/2017    HCT 45.4 05/08/2023    HCT 47.0 (H) 04/16/2021    HCT 43.7 08/15/2017    MCV 87 05/08/2023    MCV 88 04/16/2021    MCV 86 08/15/2017     05/08/2023     04/16/2021     08/15/2017     Lab Results   Component Value Date    CHOL 164 08/15/2017    TRIG 79 05/08/2023    HDL 54 05/08/2023     Imaging: No results found. Discussion/Summary:  1. Essential hypertension    2. Coronary artery disease involving native coronary artery of native heart without angina pectoris    3. Cardiac pacemaker in situ    4. Typical atrial flutter ([address])    5. Chronic diastolic CHF (congestive heart failure) ([address])    6. Dyslipidemia      - BP is elevated today. She has multiple medication intolerances. We will continue lisinopril 2.5 mg daily. She is also using Toprol-XL 25 mg daily. -A large amount of ventricular ectopy was present today and during previous exams. Will increase Toprol-XL to 50 mg daily. She has had difficulties with beta-blockers in the past.  - Patient had AV node ablation along with pacemaker insertion.    - Patient is on Xarelto.   Aspirin was discontinued and she was continued on Xarelto alone because of excessive bruising.   - She should continue routine pacemaker checks

## 2023-09-18 ENCOUNTER — HOSPITAL ENCOUNTER (INPATIENT)
Facility: HOSPITAL | Age: 83
LOS: 2 days | Discharge: HOME | End: 2023-09-20
Attending: EMERGENCY MEDICINE | Admitting: INTERNAL MEDICINE
Payer: COMMERCIAL

## 2023-09-18 ENCOUNTER — APPOINTMENT (EMERGENCY)
Dept: RADIOLOGY | Facility: HOSPITAL | Age: 83
End: 2023-09-18
Payer: COMMERCIAL

## 2023-09-18 DIAGNOSIS — U07.1 COVID: ICD-10-CM

## 2023-09-18 DIAGNOSIS — R42 DIZZY: ICD-10-CM

## 2023-09-18 DIAGNOSIS — E87.1 HYPONATREMIA: Primary | ICD-10-CM

## 2023-09-18 DIAGNOSIS — U07.1 COVID-19: ICD-10-CM

## 2023-09-18 DIAGNOSIS — R05.1 ACUTE COUGH: ICD-10-CM

## 2023-09-18 PROBLEM — E83.42 HYPOMAGNESEMIA: Status: ACTIVE | Noted: 2023-09-18

## 2023-09-18 LAB
ALBUMIN SERPL BCP-MCNC: 3.7 G/DL (ref 3.5–5)
ALP SERPL-CCNC: 54 U/L (ref 34–104)
ALT SERPL W P-5'-P-CCNC: 11 U/L (ref 7–52)
ANION GAP SERPL CALCULATED.3IONS-SCNC: 6 MMOL/L
APTT PPP: 39 SECONDS (ref 23–37)
AST SERPL W P-5'-P-CCNC: 21 U/L (ref 13–39)
ATRIAL RATE: 0 BPM
BASOPHILS # BLD AUTO: 0.02 THOUSANDS/ÂΜL (ref 0–0.1)
BASOPHILS NFR BLD AUTO: 0 % (ref 0–1)
BILIRUB SERPL-MCNC: 1.18 MG/DL (ref 0.2–1)
BNP SERPL-MCNC: 351 PG/ML (ref 0–100)
BUN SERPL-MCNC: 14 MG/DL (ref 5–25)
CALCIUM SERPL-MCNC: 8.7 MG/DL (ref 8.4–10.2)
CARDIAC TROPONIN I PNL SERPL HS: 15 NG/L
CHLORIDE SERPL-SCNC: 95 MMOL/L (ref 96–108)
CO2 SERPL-SCNC: 25 MMOL/L (ref 21–32)
CREAT SERPL-MCNC: 0.91 MG/DL (ref 0.6–1.3)
D DIMER PPP FEU-MCNC: 0.36 UG/ML FEU
EOSINOPHIL # BLD AUTO: 0 THOUSAND/ÂΜL (ref 0–0.61)
EOSINOPHIL NFR BLD AUTO: 0 % (ref 0–6)
ERYTHROCYTE [DISTWIDTH] IN BLOOD BY AUTOMATED COUNT: 13.1 % (ref 11.6–15.1)
FLUAV RNA RESP QL NAA+PROBE: NEGATIVE
FLUBV RNA RESP QL NAA+PROBE: NEGATIVE
GFR SERPL CREATININE-BSD FRML MDRD: 58 ML/MIN/1.73SQ M
GLUCOSE SERPL-MCNC: 94 MG/DL (ref 65–140)
HCT VFR BLD AUTO: 42.8 % (ref 34.8–46.1)
HGB BLD-MCNC: 14.6 G/DL (ref 11.5–15.4)
IMM GRANULOCYTES # BLD AUTO: 0.02 THOUSAND/UL (ref 0–0.2)
IMM GRANULOCYTES NFR BLD AUTO: 0 % (ref 0–2)
INR PPP: 2.13 (ref 0.84–1.19)
LACTATE SERPL-SCNC: 0.9 MMOL/L (ref 0.5–2)
LIPASE SERPL-CCNC: 40 U/L (ref 11–82)
LYMPHOCYTES # BLD AUTO: 0.85 THOUSANDS/ÂΜL (ref 0.6–4.47)
LYMPHOCYTES NFR BLD AUTO: 12 % (ref 14–44)
MAGNESIUM SERPL-MCNC: 1.7 MG/DL (ref 1.9–2.7)
MCH RBC QN AUTO: 28.9 PG (ref 26.8–34.3)
MCHC RBC AUTO-ENTMCNC: 34.1 G/DL (ref 31.4–37.4)
MCV RBC AUTO: 85 FL (ref 82–98)
MONOCYTES # BLD AUTO: 0.97 THOUSAND/ÂΜL (ref 0.17–1.22)
MONOCYTES NFR BLD AUTO: 14 % (ref 4–12)
NEUTROPHILS # BLD AUTO: 5.17 THOUSANDS/ÂΜL (ref 1.85–7.62)
NEUTS SEG NFR BLD AUTO: 74 % (ref 43–75)
NRBC BLD AUTO-RTO: 0 /100 WBCS
P AXIS: 0 DEGREES
PLATELET # BLD AUTO: 167 THOUSANDS/UL (ref 149–390)
PMV BLD AUTO: 9.7 FL (ref 8.9–12.7)
POTASSIUM SERPL-SCNC: 4 MMOL/L (ref 3.5–5.3)
PROCALCITONIN SERPL-MCNC: 0.3 NG/ML
PROT SERPL-MCNC: 6.7 G/DL (ref 6.4–8.4)
PROTHROMBIN TIME: 23.9 SECONDS (ref 11.6–14.5)
QRS AXIS: -76 DEGREES
QRS AXIS: 0 DEGREES
QRSD INTERVAL: 0 MS
QRSD INTERVAL: 162 MS
QT INTERVAL: 0 MS
QT INTERVAL: 442 MS
QTC INTERVAL: 0 MS
QTC INTERVAL: 503 MS
RBC # BLD AUTO: 5.05 MILLION/UL (ref 3.81–5.12)
RSV RNA RESP QL NAA+PROBE: NEGATIVE
SARS-COV-2 RNA RESP QL NAA+PROBE: POSITIVE
SODIUM SERPL-SCNC: 126 MMOL/L (ref 135–147)
T WAVE AXIS: 0 DEGREES
T WAVE AXIS: 62 DEGREES
VENTRICULAR RATE: 0 BPM
VENTRICULAR RATE: 78 BPM
WBC # BLD AUTO: 7.03 THOUSAND/UL (ref 4.31–10.16)

## 2023-09-18 PROCEDURE — 87205 SMEAR GRAM STAIN: CPT | Performed by: PHYSICIAN ASSISTANT

## 2023-09-18 PROCEDURE — 96365 THER/PROPH/DIAG IV INF INIT: CPT

## 2023-09-18 PROCEDURE — 99222 1ST HOSP IP/OBS MODERATE 55: CPT | Performed by: INTERNAL MEDICINE

## 2023-09-18 PROCEDURE — 80053 COMPREHEN METABOLIC PANEL: CPT | Performed by: PHYSICIAN ASSISTANT

## 2023-09-18 PROCEDURE — 84484 ASSAY OF TROPONIN QUANT: CPT | Performed by: PHYSICIAN ASSISTANT

## 2023-09-18 PROCEDURE — 96367 TX/PROPH/DG ADDL SEQ IV INF: CPT

## 2023-09-18 PROCEDURE — 85610 PROTHROMBIN TIME: CPT | Performed by: PHYSICIAN ASSISTANT

## 2023-09-18 PROCEDURE — 36415 COLL VENOUS BLD VENIPUNCTURE: CPT | Performed by: PHYSICIAN ASSISTANT

## 2023-09-18 PROCEDURE — 83605 ASSAY OF LACTIC ACID: CPT | Performed by: PHYSICIAN ASSISTANT

## 2023-09-18 PROCEDURE — 71045 X-RAY EXAM CHEST 1 VIEW: CPT

## 2023-09-18 PROCEDURE — 96368 THER/DIAG CONCURRENT INF: CPT

## 2023-09-18 PROCEDURE — 99285 EMERGENCY DEPT VISIT HI MDM: CPT | Performed by: PHYSICIAN ASSISTANT

## 2023-09-18 PROCEDURE — 99285 EMERGENCY DEPT VISIT HI MDM: CPT

## 2023-09-18 PROCEDURE — 83690 ASSAY OF LIPASE: CPT | Performed by: PHYSICIAN ASSISTANT

## 2023-09-18 PROCEDURE — 0241U HB NFCT DS VIR RESP RNA 4 TRGT: CPT | Performed by: PHYSICIAN ASSISTANT

## 2023-09-18 PROCEDURE — 85025 COMPLETE CBC W/AUTO DIFF WBC: CPT | Performed by: PHYSICIAN ASSISTANT

## 2023-09-18 PROCEDURE — 83880 ASSAY OF NATRIURETIC PEPTIDE: CPT | Performed by: PHYSICIAN ASSISTANT

## 2023-09-18 PROCEDURE — 87070 CULTURE OTHR SPECIMN AEROBIC: CPT | Performed by: PHYSICIAN ASSISTANT

## 2023-09-18 PROCEDURE — 85379 FIBRIN DEGRADATION QUANT: CPT | Performed by: PHYSICIAN ASSISTANT

## 2023-09-18 PROCEDURE — 87040 BLOOD CULTURE FOR BACTERIA: CPT | Performed by: PHYSICIAN ASSISTANT

## 2023-09-18 PROCEDURE — 83735 ASSAY OF MAGNESIUM: CPT | Performed by: PHYSICIAN ASSISTANT

## 2023-09-18 PROCEDURE — 93010 ELECTROCARDIOGRAM REPORT: CPT | Performed by: INTERNAL MEDICINE

## 2023-09-18 PROCEDURE — 85730 THROMBOPLASTIN TIME PARTIAL: CPT | Performed by: PHYSICIAN ASSISTANT

## 2023-09-18 PROCEDURE — 84145 PROCALCITONIN (PCT): CPT | Performed by: PHYSICIAN ASSISTANT

## 2023-09-18 PROCEDURE — 94760 N-INVAS EAR/PLS OXIMETRY 1: CPT

## 2023-09-18 PROCEDURE — 93005 ELECTROCARDIOGRAM TRACING: CPT

## 2023-09-18 RX ORDER — ATORVASTATIN CALCIUM 20 MG/1
20 TABLET, FILM COATED ORAL
Status: DISCONTINUED | OUTPATIENT
Start: 2023-09-18 | End: 2023-09-20 | Stop reason: HOSPADM

## 2023-09-18 RX ORDER — CEFTRIAXONE 2 G/50ML
2000 INJECTION, SOLUTION INTRAVENOUS ONCE
Status: COMPLETED | OUTPATIENT
Start: 2023-09-18 | End: 2023-09-18

## 2023-09-18 RX ORDER — ONDANSETRON 2 MG/ML
4 INJECTION INTRAMUSCULAR; INTRAVENOUS EVERY 6 HOURS PRN
Status: DISCONTINUED | OUTPATIENT
Start: 2023-09-18 | End: 2023-09-20 | Stop reason: HOSPADM

## 2023-09-18 RX ORDER — ACETAMINOPHEN 325 MG/1
650 TABLET ORAL EVERY 6 HOURS PRN
Status: DISCONTINUED | OUTPATIENT
Start: 2023-09-18 | End: 2023-09-20 | Stop reason: HOSPADM

## 2023-09-18 RX ORDER — LISINOPRIL 2.5 MG/1
2.5 TABLET ORAL DAILY
Status: DISCONTINUED | OUTPATIENT
Start: 2023-09-18 | End: 2023-09-20

## 2023-09-18 RX ORDER — GUAIFENESIN 600 MG/1
600 TABLET, EXTENDED RELEASE ORAL EVERY 12 HOURS SCHEDULED
Status: DISCONTINUED | OUTPATIENT
Start: 2023-09-18 | End: 2023-09-20 | Stop reason: HOSPADM

## 2023-09-18 RX ORDER — SODIUM CHLORIDE 9 MG/ML
50 INJECTION, SOLUTION INTRAVENOUS CONTINUOUS
Status: DISPENSED | OUTPATIENT
Start: 2023-09-18 | End: 2023-09-19

## 2023-09-18 RX ORDER — METOPROLOL SUCCINATE 25 MG/1
25 TABLET, EXTENDED RELEASE ORAL DAILY
Status: DISCONTINUED | OUTPATIENT
Start: 2023-09-18 | End: 2023-09-20 | Stop reason: HOSPADM

## 2023-09-18 RX ORDER — MAGNESIUM SULFATE HEPTAHYDRATE 40 MG/ML
2 INJECTION, SOLUTION INTRAVENOUS ONCE
Status: COMPLETED | OUTPATIENT
Start: 2023-09-18 | End: 2023-09-18

## 2023-09-18 RX ORDER — ALBUTEROL SULFATE 2.5 MG/3ML
2.5 SOLUTION RESPIRATORY (INHALATION) EVERY 6 HOURS PRN
Status: DISCONTINUED | OUTPATIENT
Start: 2023-09-18 | End: 2023-09-20 | Stop reason: HOSPADM

## 2023-09-18 RX ORDER — SODIUM CHLORIDE 9 MG/ML
50 INJECTION, SOLUTION INTRAVENOUS CONTINUOUS
Status: DISCONTINUED | OUTPATIENT
Start: 2023-09-18 | End: 2023-09-18

## 2023-09-18 RX ORDER — LEVOTHYROXINE SODIUM 0.07 MG/1
75 TABLET ORAL
Status: DISCONTINUED | OUTPATIENT
Start: 2023-09-19 | End: 2023-09-20 | Stop reason: HOSPADM

## 2023-09-18 RX ADMIN — CEFTRIAXONE 2000 MG: 2 INJECTION, SOLUTION INTRAVENOUS at 10:03

## 2023-09-18 RX ADMIN — GUAIFENESIN 600 MG: 600 TABLET, EXTENDED RELEASE ORAL at 20:08

## 2023-09-18 RX ADMIN — LISINOPRIL 2.5 MG: 2.5 TABLET ORAL at 14:21

## 2023-09-18 RX ADMIN — METOPROLOL SUCCINATE 25 MG: 25 TABLET, FILM COATED, EXTENDED RELEASE ORAL at 14:21

## 2023-09-18 RX ADMIN — SODIUM CHLORIDE, SODIUM LACTATE, POTASSIUM CHLORIDE, AND CALCIUM CHLORIDE 500 ML: .6; .31; .03; .02 INJECTION, SOLUTION INTRAVENOUS at 10:05

## 2023-09-18 RX ADMIN — SODIUM CHLORIDE 50 ML/HR: 0.9 INJECTION, SOLUTION INTRAVENOUS at 18:30

## 2023-09-18 RX ADMIN — MAGNESIUM SULFATE HEPTAHYDRATE 2 G: 40 INJECTION, SOLUTION INTRAVENOUS at 10:57

## 2023-09-18 RX ADMIN — ACETAMINOPHEN 650 MG: 325 TABLET ORAL at 20:13

## 2023-09-18 RX ADMIN — ATORVASTATIN CALCIUM 20 MG: 20 TABLET, FILM COATED ORAL at 16:59

## 2023-09-18 NOTE — ASSESSMENT & PLAN NOTE
· Reported feeling generalized weakness for 1 week with associated cough, back pain, nausea and vomiting. Patient described the cough as incessant and productive. · ED COVID PCR positive  · D-dimer normal  ·   · Procalcitonin elevated at 0.30. Will repeat in a.m. · Chest x-ray showed cardiomegaly and mild pulmonary edema  · O2 saturation on room air upper 90s  · ED administered ceftriaxone x1 dose  · We will hold off on antibiotic at this time  · Follow-up on UA, urine and blood cultures.   · Mucinex for cough  · Albuterol as needed  · PT/OT eval and treat  · Incentive spirometer  · Supportive care

## 2023-09-18 NOTE — ED PROVIDER NOTES
History  Chief Complaint   Patient presents with   • Weakness - Generalized     Pt reports feeling generally unwell for a week, with some coughing and L sided chest pain from the cough, Pt denies fever, N/V/D     Pt is an 78 yo deaf F with PMH of CAD s/p PCI, Aflutter anticoagulated with xarelto, CHB s/p PPM placement, HTN, HLD, hypothyroidism, presents for evaluation of generalized weakness, productive cough, left-sided pleuritic chest pain, for the past week. Patient describes the cough is productive of white sputum. The cough is moderate and worsens at night. Is been ongoing for the past 5 days. It is worsening. She has never smoked cigarettes. She denies sick contacts, exposure to allergens, upper respiratory infection, or smoke exposure. She has not noted any relapsing or remitting factors. She has not tried any over-the-counter relief. She describes the chest pain is located on her left side. It is a burning sensation. It worsens with deep breathing. It is moderate. It started 2 days ago and is worsened. She also endorses chills, dizziness, and shortness of breath. She states she is short of breath with short distances. She specifically denies headache, fever, rash, sore throat, wheezing, unintentional weight loss, or sinus congestion.  used: Yes (-Mushtaq- [id number])    Cough  Associated symptoms: chest pain    Associated symptoms: no chills, no ear pain, no fever, no rash, no shortness of breath and no sore throat        Prior to Admission Medications   Prescriptions Last Dose Informant Patient Reported? Taking?    Xarelto 20 MG tablet  Self No No   Sig: TAKE ONE TABLET BY MOUTH EVERY DAY WITH BREAKFAST   levothyroxine (Synthroid) 75 mcg tablet  Self No No   Sig: Take 1 tablet (75 mcg total) by mouth daily in the early morning BRAND MEDICALLY NECESSARY   lisinopril (ZESTRIL) 2.5 mg tablet  Self No No   Sig: Take 1 tablet (2.5 mg total) by mouth daily metoprolol succinate (TOPROL-XL) 25 mg 24 hr tablet  Self No No   Sig: TAKE ONE TABLET BY MOUTH EVERY DAY   rosuvastatin (CRESTOR) 10 MG tablet  Self No No   Sig: Take 1 tablet (10 mg total) by mouth daily      Facility-Administered Medications: None       Past Medical History:   Diagnosis Date   • Atrial flutter ([address])    • CAD (coronary artery disease)     s/p PCIx1 -2012   • Coronary artery disease    • Deafness    • Disease of thyroid gland    • GERD (gastroesophageal reflux disease)    • Hyperlipidemia    • Hypertension    • Itching     of the face, throat   • Pacemaker 03/2021    medtronic-left chest   • Wears glasses        Past Surgical History:   Procedure Laterality Date   • BREAST BIOPSY Left     negative   • CARDIAC PACEMAKER PLACEMENT  03/2021    medtronic   • CARDIAC SURGERY      stents   • COLONOSCOPY     • ESOPHAGOGASTRODUODENOSCOPY N/A 02/20/2017    Procedure: ESOPHAGOGASTRODUODENOSCOPY (EGD); Surgeon: Dea Reina MD;  Location: [facility] GI LAB; Service:        Family History   Problem Relation Age of Onset   • Cancer Mother         breast   • Hypertension Mother         essential   • Breast cancer Mother    • Other Father         smoker   • Tuberculosis Father    • No Known Problems Sister    • No Known Problems Sister    • No Known Problems Sister    • No Known Problems Daughter      I have reviewed and agree with the history as documented. E-Cigarette/Vaping   • E-Cigarette Use Never User      E-Cigarette/Vaping Substances   • Nicotine No    • THC No    • CBD No    • Flavoring No    • Other No    • Unknown No      Social History     Tobacco Use   • Smoking status: Never   • Smokeless tobacco: Never   Vaping Use   • Vaping Use: Never used   Substance Use Topics   • Alcohol use: Not Currently     Alcohol/week: 0.0 standard drinks of alcohol   • Drug use: No       Review of Systems   Constitutional: Negative. Negative for chills and fever. HENT: Negative.   Negative for ear pain and sore throat. Eyes: Negative. Negative for pain and visual disturbance. Respiratory: Positive for cough. Negative for shortness of breath. Cardiovascular: Positive for chest pain. Negative for palpitations. Gastrointestinal: Negative for abdominal pain and vomiting. Endocrine: Negative. Genitourinary: Negative. Negative for dysuria and hematuria. Musculoskeletal: Negative. Negative for arthralgias and back pain. Skin: Negative. Negative for color change and rash. Allergic/Immunologic: Negative. Neurological: Positive for dizziness. Negative for seizures and syncope. Hematological: Negative. Psychiatric/Behavioral: Negative. All other systems reviewed and are negative. Physical Exam  Physical Exam  Vitals and nursing note reviewed. Constitutional:       General: She is not in acute distress. Appearance: Normal appearance. She is well-developed. HENT:      Head: Normocephalic and atraumatic. Right Ear: Tympanic membrane, ear canal and external ear normal.      Left Ear: Tympanic membrane, ear canal and external ear normal.      Nose: Nose normal.      Mouth/Throat:      Mouth: Mucous membranes are moist.   Eyes:      Conjunctiva/sclera: Conjunctivae normal.      Pupils: Pupils are equal, round, and reactive to light. Cardiovascular:      Rate and Rhythm: Normal rate and regular rhythm. Pulses: Normal pulses. Heart sounds: Normal heart sounds. No murmur heard. Pulmonary:      Effort: Pulmonary effort is normal. No respiratory distress. Breath sounds: Normal breath sounds. No stridor. No wheezing or rhonchi. Chest:      Chest wall: No tenderness. Abdominal:      General: Abdomen is flat. Bowel sounds are normal.      Palpations: Abdomen is soft. Tenderness: There is no abdominal tenderness. There is no right CVA tenderness or left CVA tenderness. Musculoskeletal:         General: No swelling. Normal range of motion.       Cervical back: Normal range of motion and neck supple. Right lower leg: Edema present. Left lower leg: Edema present. Comments: +2 bilateral lower extremity edema   Skin:     General: Skin is warm and dry. Capillary Refill: Capillary refill takes less than 2 seconds. Neurological:      General: No focal deficit present. Mental Status: She is alert and oriented to person, place, and time. Cranial Nerves: No cranial nerve deficit. Sensory: No sensory deficit. Motor: No weakness.    Psychiatric:         Mood and Affect: Mood normal.         Behavior: Behavior normal.         Vital Signs  ED Triage Vitals   Temperature Pulse Respirations Blood Pressure SpO2   09/18/23 0839 09/18/23 0839 09/18/23 0839 09/18/23 0839 09/18/23 0839   98 °F (36.7 °C) 70 18 (!) 173/74 96 %      Temp Source Heart Rate Source Patient Position - Orthostatic VS BP Location FiO2 (%)   09/18/23 0839 09/18/23 0839 09/18/23 0839 09/18/23 0839 --   Oral Monitor Lying Right arm       Pain Score       09/18/23 1300       No Pain           Vitals:    09/18/23 1215 09/18/23 1230 09/18/23 1300 09/18/23 1500   BP:  156/67 165/78 142/69   Pulse: 69 70 71 78   Patient Position - Orthostatic VS:    Lying         Visual Acuity      ED Medications  Medications   levothyroxine tablet 75 mcg (has no administration in time range)   lisinopril (ZESTRIL) tablet 2.5 mg (2.5 mg Oral Given 9/18/23 1421)   atorvastatin (LIPITOR) tablet 20 mg (has no administration in time range)   rivaroxaban (XARELTO) tablet 20 mg (has no administration in time range)   metoprolol succinate (TOPROL-XL) 24 hr tablet 25 mg (25 mg Oral Given 9/18/23 1421)   acetaminophen (TYLENOL) tablet 650 mg (has no administration in time range)   ondansetron (ZOFRAN) injection 4 mg (has no administration in time range)   cefTRIAXone (ROCEPHIN) IVPB (premix in dextrose) 2,000 mg 50 mL (0 mg Intravenous Stopped 9/18/23 1057)   lactated ringers bolus 500 mL (0 mL Intravenous Stopped 9/18/23 1105)   magnesium sulfate 2 g/50 mL IVPB (premix) 2 g (2 g Intravenous New Bag 9/18/23 1057)       Diagnostic Studies  Results Reviewed     Procedure Component Value Units Date/Time    Blood culture #1 [[id number]] Collected: 09/18/23 0943    Lab Status: Preliminary result Specimen: Blood from Arm, Left Updated: 09/18/23 1501     Blood Culture Received in Microbiology Lab. Culture in Progress. Blood culture #2 [[id number]] Collected: 09/18/23 0950    Lab Status: Preliminary result Specimen: Blood from Arm, Right Updated: 09/18/23 1501     Blood Culture Received in Microbiology Lab. Culture in Progress. HS Troponin I 4hr [[id number]]     Lab Status: No result Specimen: Blood     FLU/RSV/COVID - if FLU/RSV clinically relevant [[id number]]  (Abnormal) Collected: 09/18/23 0943    Lab Status: Final result Specimen: Nares from Nose Updated: 09/18/23 1046     SARS-CoV-2 Positive     INFLUENZA A PCR Negative     INFLUENZA B PCR Negative     RSV PCR Negative    Narrative:      FOR PEDIATRIC PATIENTS - copy/paste COVID Guidelines URL to browser: [email or web address]    SARS-CoV-2 assay is a Nucleic Acid Amplification assay intended for the  qualitative detection of nucleic acid from SARS-CoV-2 in nasopharyngeal  swabs. Results are for the presumptive identification of SARS-CoV-2 RNA. Positive results are indicative of infection with SARS-CoV-2, the virus  causing COVID-19, but do not rule out bacterial infection or co-infection  with other viruses. Laboratories within the [facility] and its  territories are required to report all positive results to the appropriate  public health authorities. Negative results do not preclude SARS-CoV-2  infection and should not be used as the sole basis for treatment or other  patient management decisions. Negative results must be combined with  clinical observations, patient history, and epidemiological information.   This test has not been FDA cleared or approved. This test has been authorized by FDA under an Emergency Use Authorization  (EUA). This test is only authorized for the duration of time the  declaration that circumstances exist justifying the authorization of the  emergency use of an in vitro diagnostic tests for detection of SARS-CoV-2  virus and/or diagnosis of COVID-19 infection under section 564(b)(1) of  the Act, 21 U. S.C. 978LIN-2(X)(2), unless the authorization is terminated  or revoked sooner. The test has been validated but independent review by FDA  and CLIA is pending. Test performed using [employer] Gene[employer]pert: This RT-PCR assay targets N2,  a region unique to SARS-CoV-2. A conserved region in the E-gene was chosen  for pan-Sarbecovirus detection which includes SARS-CoV-2. According to CMS-2020-01-R, this platform meets the definition of high-throughput technology.     Procalcitonin [[id number]]  (Abnormal) Collected: 09/18/23 0943    Lab Status: Final result Specimen: Blood from Arm, Left Updated: 09/18/23 1038     Procalcitonin 0.30 ng/ml     B-Type Natriuretic Peptide(BNP) [[id number]]  (Abnormal) Collected: 09/18/23 0943    Lab Status: Final result Specimen: Blood from Arm, Left Updated: 09/18/23 1036      pg/mL     HS Troponin I 2hr [[id number]]     Lab Status: No result Specimen: Blood     HS Troponin 0hr (reflex protocol) [[id number]]  (Normal) Collected: 09/18/23 0943    Lab Status: Final result Specimen: Blood from Arm, Left Updated: 09/18/23 1035     hs TnI 0hr 15 ng/L     Comprehensive metabolic panel [[id number]]  (Abnormal) Collected: 09/18/23 0943    Lab Status: Final result Specimen: Blood from Arm, Left Updated: 09/18/23 1030     Sodium 126 mmol/L      Potassium 4.0 mmol/L      Chloride 95 mmol/L      CO2 25 mmol/L      ANION GAP 6 mmol/L      BUN 14 mg/dL      Creatinine 0.91 mg/dL      Glucose 94 mg/dL      Calcium 8.7 mg/dL      AST 21 U/L      ALT 11 U/L      Alkaline Phosphatase 54 U/L      Total Protein 6.7 g/dL      Albumin 3.7 g/dL      Total Bilirubin 1.18 mg/dL      eGFR 58 ml/min/1.73sq m     Narrative:      [place] guidelines for Chronic Kidney Disease (CKD):   •  Stage 1 with normal or high GFR (GFR > 90 mL/min/1.73 square meters)  •  Stage 2 Mild CKD (GFR = 60-89 mL/min/1.73 square meters)  •  Stage 3A Moderate CKD (GFR = 45-59 mL/min/1.73 square meters)  •  Stage 3B Moderate CKD (GFR = 30-44 mL/min/1.73 square meters)  •  Stage 4 Severe CKD (GFR = 15-29 mL/min/1.73 square meters)  •  Stage 5 End Stage CKD (GFR <15 mL/min/1.73 square meters)  Note: GFR calculation is accurate only with a steady state creatinine    Lactic acid [[id number]]  (Normal) Collected: 09/18/23 0943    Lab Status: Final result Specimen: Blood from Arm, Left Updated: 09/18/23 1030     LACTIC ACID 0.9 mmol/L     Narrative:      Result may be elevated if tourniquet was used during collection. Magnesium [[id number]]  (Abnormal) Collected: 09/18/23 0943    Lab Status: Final result Specimen: Blood from Arm, Left Updated: 09/18/23 1030     Magnesium 1.7 mg/dL     Lipase [[id number]]  (Normal) Collected: 09/18/23 0943    Lab Status: Final result Specimen: Blood from Arm, Left Updated: 09/18/23 1030     Lipase 40 u/L     Protime-INR [[id number]]  (Abnormal) Collected: 09/18/23 0943    Lab Status: Final result Specimen: Blood from Arm, Left Updated: 09/18/23 1024     Protime 23.9 seconds      INR 2.13    APTT [[id number]]  (Abnormal) Collected: 09/18/23 0943    Lab Status: Final result Specimen: Blood from Arm, Left Updated: 09/18/23 1024     PTT 39 seconds     D-Dimer [[id number]]  (Normal) Collected: 09/18/23 0943    Lab Status: Final result Specimen: Blood from Arm, Left Updated: 09/18/23 1023     D-Dimer, Quant 0.36 ug/ml FEU     Narrative:       In the evaluation for possible pulmonary embolism, in the appropriate (Well's Score of 4 or less) patient, the age adjusted d-dimer cutoff for this patient can be calculated as:    Age x 0.01 (in ug/mL) for Age-adjusted D-dimer exclusion threshold for a patient over 50 years. CBC and differential [[id number]]  (Abnormal) Collected: 09/18/23 0943    Lab Status: Final result Specimen: Blood from Arm, Left Updated: 09/18/23 1005     WBC 7.03 Thousand/uL      RBC 5.05 Million/uL      Hemoglobin 14.6 g/dL      Hematocrit 42.8 %      MCV 85 fL      MCH 28.9 pg      MCHC 34.1 g/dL      RDW 13.1 %      MPV 9.7 fL      Platelets 660 Thousands/uL      nRBC 0 /100 WBCs      Neutrophils Relative 74 %      Immat GRANS % 0 %      Lymphocytes Relative 12 %      Monocytes Relative 14 %      Eosinophils Relative 0 %      Basophils Relative 0 %      Neutrophils Absolute 5.17 Thousands/µL      Immature Grans Absolute 0.02 Thousand/uL      Lymphocytes Absolute 0.85 Thousands/µL      Monocytes Absolute 0.97 Thousand/µL      Eosinophils Absolute 0.00 Thousand/µL      Basophils Absolute 0.02 Thousands/µL     UA w Reflex to Microscopic w Reflex to Culture [[id number]]     Lab Status: No result Specimen: Urine     Sputum culture and Gram stain [[id number]]     Lab Status: No result Specimen: Sputum                  XR chest 1 view portable   Final Result by Lesly Mariee MD (09/18 1152)   Cardiomegaly and mild pulmonary edema.             Workstation performed: [id number]                    Procedures  ECG 12 Lead Documentation Only    Date/Time: 9/18/2023 9:30 AM    Performed by: Chaparrita Gama PA-C  Authorized by: Chaparrita Gama PA-C    ECG reviewed by me, the ED Provider: yes    Patient location:  ED  Previous ECG:     Previous ECG:  Compared to current    Similarity:  No change  Interpretation:     Interpretation: abnormal    Rate:     ECG rate:  78    ECG rate assessment: normal    Rhythm:     Rhythm: atrial flutter and paced    Pacing:     Capture:  Intermittent    Type of pacing:  Ventricular  Ectopy:     Ectopy: PAC    Conduction:     Conduction: abnormal      Abnormal conduction: complete RBBB    Other findings:     Other findings: LVH               ED Course  ED Course as of 09/18/23 1553   Mon Sep 18, 2023   0859 Attempted to secure a  to interview patient - currently on call list.   1013 WBC: 7.03   1031 Sodium(!): 126   1031 New hyponatremia - sodium in May 2023 140   1032 Sodium(!): 126   1108 SARS-COV-2(!): Positive                               SBIRT 22yo+    Flowsheet Row Most Recent Value   Initial Alcohol Screen: US AUDIT-C     1. How often do you have a drink containing alcohol? 0 Filed at: 09/18/2023 0842   2. How many drinks containing alcohol do you have on a typical day you are drinking? 0 Filed at: 09/18/2023 0842   3b. FEMALE Any Age, or MALE 65+: How often do you have 4 or more drinks on one occassion? 0 Filed at: 09/18/2023 0842   Audit-C Score 0 Filed at: 09/18/2023 0718   JESSENIA: How many times in the past year have you. .. Used an illegal drug or used a prescription medication for non-medical reasons? Never Filed at: 09/18/2023 9045                    Medical Decision Making  Patient presents for evaluation of generalized weakness and cough  Patient positive for COVID-19. Hemodynamics are stable, patient not requiring oxygen at this time  X-ray with GGO which may be related to COVID versus heart failure  Patient also hyponatremic at 126-previous BMP reviewed which reveals sodium of 140-this may be related to poor p.o. intake versus viral infection  Patient to be admitted for further evaluation and treatment    Acute cough: acute illness or injury  COVID-19: acute illness or injury  Dizzy: acute illness or injury  Hyponatremia: acute illness or injury  Amount and/or Complexity of Data Reviewed  External Data Reviewed: labs and ECG. Details: Reviewed previous BMP  Labs: ordered. Decision-making details documented in ED Course. Radiology: ordered. ECG/medicine tests: ordered and independent interpretation performed. Decision-making details documented in ED Course. Risk  Prescription drug management. Decision regarding hospitalization.           Disposition  Final diagnoses:   COVID-19   Acute cough   Dizzy   Hyponatremia     Time reflects when diagnosis was documented in both MDM as applicable and the Disposition within this note     Time User Action Codes Description Comment    9/18/2023 12:07 PM Rozanna Shaver Add [U07.1] COVID-19     9/18/2023 12:07 PM Susavage, Selma Marrow Add [R05.1] Acute cough     9/18/2023 12:07 PM Rozanna Shaver Add [R42] [facility]     9/18/2023 12:08 PM Rozanna Shaver Add [E87.1] Hyponatremia     9/18/2023 12:08 PM Rozanna Shaver Modify [U07.1] COVID-19     9/18/2023 12:08 PM Rozanna Shaver Modify [E87.1] Hyponatremia       ED Disposition     ED Disposition   Admit    Condition   Stable    Date/Time   Mon Sep 18, 2023 12:07 PM    Comment   Case was discussed with JOJO and the patient's admission status was agreed to be Admission Status: inpatient status to the service of Dr. Jimi Martel    None         Current Discharge Medication List      CONTINUE these medications which have NOT CHANGED    Details   levothyroxine (Synthroid) 75 mcg tablet Take 1 tablet (75 mcg total) by mouth daily in the early morning BRAND MEDICALLY NECESSARY  Qty: 90 tablet, Refills: 3    Associated Diagnoses: Hypothyroidism, unspecified type      lisinopril (ZESTRIL) 2.5 mg tablet Take 1 tablet (2.5 mg total) by mouth daily  Qty: 90 tablet, Refills: 3    Associated Diagnoses: Essential hypertension      metoprolol succinate (TOPROL-XL) 25 mg 24 hr tablet TAKE ONE TABLET BY MOUTH EVERY DAY  Qty: 90 tablet, Refills: 3    Associated Diagnoses: Essential hypertension; Coronary artery disease involving native coronary artery of native heart without angina pectoris      rosuvastatin (CRESTOR) 10 MG tablet Take 1 tablet (10 mg total) by mouth daily  Qty: 90 tablet, Refills: 3 Associated Diagnoses: Dyslipidemia      Xarelto 20 MG tablet TAKE ONE TABLET BY MOUTH EVERY DAY WITH BREAKFAST  Qty: 90 tablet, Refills: 3    Associated Diagnoses: Typical atrial flutter ([address]); A-fib ([address])             No discharge procedures on file.     PDMP Review     None          ED Provider  Electronically Signed by           Kay Richard PA-C  09/18/23 0962

## 2023-09-18 NOTE — PLAN OF CARE

## 2023-09-18 NOTE — PLAN OF CARE
Problem: Prexisting or High Potential for Compromised Skin Integrity  Goal: Skin integrity is maintained or improved  Description: INTERVENTIONS:  - Identify patients at risk for skin breakdown  - Assess and monitor skin integrity  - Assess and monitor nutrition and hydration status  - Monitor labs   - Assess for incontinence   - Turn and reposition patient  - Assist with mobility/ambulation  - Relieve pressure over bony prominences  - Avoid friction and shearing  - Provide appropriate hygiene as needed including keeping skin clean and dry  - Evaluate need for skin moisturizer/barrier cream  - Collaborate with interdisciplinary team   - Patient/family teaching  - Consider wound care consult   Outcome: Progressing     Problem: SAFETY ADULT  Goal: Patient will remain free of falls  Description: INTERVENTIONS:  - Educate patient/family on patient safety including physical limitations  - Instruct patient to call for assistance with activity   - Consult OT/PT to assist with strengthening/mobility   - Keep Call bell within reach  - Keep bed low and locked with side rails adjusted as appropriate  - Keep care items and personal belongings within reach  - Initiate and maintain comfort rounds  - Make Fall Risk Sign visible to staff  - Offer Toileting every  Hours, in advance of need  - Initiate/Maintain alarm  - Obtain necessary fall risk management equipment:  - Apply yellow socks and bracelet for high fall risk patients  - Consider moving patient to room near nurses station  Outcome: Progressing     Problem: DISCHARGE PLANNING  Goal: Discharge to home or other facility with appropriate resources  Description: INTERVENTIONS:  - Identify barriers to discharge w/patient and caregiver  - Arrange for needed discharge resources and transportation as appropriate  - Identify discharge learning needs (meds, wound care, etc.)  - Arrange for interpretive services to assist at discharge as needed  - Refer to Case Management Department for coordinating discharge planning if the patient needs post-hospital services based on physician/advanced practitioner order or complex needs related to functional status, cognitive ability, or social support system  Outcome: Progressing     Problem: RESPIRATORY - ADULT  Goal: Achieves optimal ventilation and oxygenation  Description: INTERVENTIONS:  - Assess for changes in respiratory status  - Assess for changes in mentation and behavior  - Position to facilitate oxygenation and minimize respiratory effort  - Oxygen administered by appropriate delivery if ordered  - Initiate smoking cessation education as indicated  - Encourage broncho-pulmonary hygiene including cough, deep breathe, Incentive Spirometry  - Assess the need for suctioning and aspirate as needed  - Assess and instruct to report SOB or any respiratory difficulty  - Respiratory Therapy support as indicated  Outcome: Progressing

## 2023-09-18 NOTE — ASSESSMENT & PLAN NOTE
· Sodium of 126 on admission  · ED gave IVF  500 mL bolus  · Patient noted with   · Will hold off further IVF  · Repeat sodium in a.m.

## 2023-09-18 NOTE — H&P
[address] [facility]  H&P  Name: Ellie Coyne 79 y.o. female I MRN: [id number]  Unit/Bed#: [address] [place] [address] Date of Admission: 9/18/2023   Date of Service: 9/18/2023 I Hospital Day: 0      Assessment/Plan   * COVID  Assessment & Plan  · Reported feeling generalized weakness for 1 week with associated cough, back pain, nausea and vomiting. Patient described the cough as incessant and productive. · ED COVID PCR positive  · D-dimer normal  ·   · Procalcitonin elevated at 0.30. Will repeat in a.m. · Chest x-ray showed cardiomegaly and mild pulmonary edema  · O2 saturation on room air upper 90s  · ED administered ceftriaxone x1 dose  · We will hold off on antibiotic at this time  · Follow-up on UA, urine and blood cultures. · Mucinex for cough  · Albuterol as needed  · PT/OT eval and treat  · Incentive spirometer  · Supportive care    Deafness  Assessment & Plan  · Communicate with sign language and writing in English    Hyponatremia  Assessment & Plan  · Sodium of 126 on admission  · ED gave IVF  500 mL bolus  · Patient noted with   · Will hold off further IVF  · Repeat sodium in a.m. Hypomagnesemia  Assessment & Plan  · Magnesium 1.7 at admission  · Repleted  · Repeat in a.m. Atrial flutter (HCC)  Assessment & Plan  · Continuing Xarelto    Hypothyroidism  Assessment & Plan  Continue levothyroxine    Essential hypertension  Assessment & Plan  Continue lisinopril and metoprolol    Hyperlipidemia  Assessment & Plan  · Crestor substituted with Lipitor       VTE Pharmacologic Prophylaxis:   Moderate Risk (Score 3-4) - Pharmacological DVT Prophylaxis Ordered: rivaroxaban (Xarelto). Code Status: Level 1 - Full Code     Discussion with family: Attempted to update  () via phone. Left voicemail.      Anticipated Length of Stay: Patient will be admitted on an inpatient basis with an anticipated length of stay of greater than 2 midnights secondary to COVID-positive. Total Time Spent on Date of Encounter in care of patient: 40 mins. This time was spent on one or more of the following: performing physical exam; counseling and coordination of care; obtaining or reviewing history; documenting in the medical record; reviewing/ordering tests, medications or procedures; communicating with other healthcare professionals and discussing with patient's family/caregivers. Chief Complaint: Generalized weakness    History of Present Illness:  Orlando Fonseca is a 79 y.o. female with a PMH of CAD s/p PCI, a flutter on Xarelto, HTN, HLD, hypothyroidism, pacemaker who presents with complaints of generalized weakness which she reported as ongoing for a week. Seen with RN who understands sign language, patient reported productive cough, left-sided pleuritic chest pain, nausea and vomiting. Denies fever, chills, diarrhea, abdominal pain. Patient tested  positive for COVID. But noted to be satting on room air in the upper 90s. No complaints of distress. Will admit to medicine for further evaluation    Review of Systems:  Review of Systems   Constitutional: Negative for chills and fever. Respiratory: Positive for cough. Negative for shortness of breath. Cardiovascular: Negative for chest pain. Gastrointestinal: Negative for abdominal pain. Neurological: Positive for weakness.        Past Medical and Surgical History:   Past Medical History:   Diagnosis Date   • Atrial flutter ([address])    • CAD (coronary artery disease)     s/p PCIx1 -2012   • Coronary artery disease    • Deafness    • Disease of thyroid gland    • GERD (gastroesophageal reflux disease)    • Hyperlipidemia    • Hypertension    • Itching     of the face, throat   • Pacemaker 03/2021    medtronic-left chest   • Wears glasses        Past Surgical History:   Procedure Laterality Date   • BREAST BIOPSY Left     negative   • CARDIAC PACEMAKER PLACEMENT  03/2021    medtronic   • CARDIAC SURGERY      stents • COLONOSCOPY     • ESOPHAGOGASTRODUODENOSCOPY N/A 02/20/2017    Procedure: ESOPHAGOGASTRODUODENOSCOPY (EGD); Surgeon: Carri Oconnor MD;  Location: [facility] GI LAB; Service:        Meds/Allergies:  Prior to Admission medications    Medication Sig Start Date End Date Taking? Authorizing Provider   levothyroxine (Synthroid) 75 mcg tablet Take 1 tablet (75 mcg total) by mouth daily in the early morning BRAND MEDICALLY NECESSARY 11/22/22   Ayad Rodríguez MD   lisinopril (ZESTRIL) 2.5 mg tablet Take 1 tablet (2.5 mg total) by mouth daily 7/28/22   Ayad Rodríguez MD   metoprolol succinate (TOPROL-XL) 25 mg 24 hr tablet TAKE ONE TABLET BY MOUTH EVERY DAY 2/13/23   Rashawn Fine DO   rosuvastatin (CRESTOR) 10 MG tablet Take 1 tablet (10 mg total) by mouth daily 11/12/22   Ayad Rodríguez MD   Xarelto 20 MG tablet TAKE ONE TABLET BY MOUTH EVERY DAY WITH BREAKFAST 7/24/23   Ayad Rodríguez MD     I have reviewed home medications with patient personally. Allergies:    Allergies   Allergen Reactions   • Sotalol      Vomiting, convulsing, rash, cough   • Famotidine Throat Swelling       Social History:  Marital Status: /Civil Union   Occupation:   Patient Pre-hospital Living Situation:   Patient Pre-hospital Level of Mobility:   Patient Pre-hospital Diet Restrictions:   Substance Use History:   Social History     Substance and Sexual Activity   Alcohol Use Not Currently   • Alcohol/week: 0.0 standard drinks of alcohol     Social History     Tobacco Use   Smoking Status Never   Smokeless Tobacco Never     Social History     Substance and Sexual Activity   Drug Use No       Family History:  Family History   Problem Relation Age of Onset   • Cancer Mother         breast   • Hypertension Mother         essential   • Breast cancer Mother    • Other Father         smoker   • Tuberculosis Father    • No Known Problems Sister    • No Known Problems Sister    • No Known Problems Sister    • No Known Problems Daughter Physical Exam:     Vitals:   Blood Pressure: 142/69 (09/18/23 1500)  Pulse: 78 (09/18/23 1500)  Temperature: 98.1 °F (36.7 °C) (09/18/23 1500)  Temp Source: Oral (09/18/23 1500)  Respirations: 20 (09/18/23 1500)  Height: 4' 7" (139.7 cm) (09/18/23 1300)  Weight - Scale: 60.6 kg (133 lb 8 oz) (09/18/23 1300)  SpO2: 98 % (09/18/23 1500)    Physical Exam  Constitutional:       Appearance: She is not ill-appearing. HENT:      Head: Normocephalic. Ears:      Comments: Deaf ( Sign language)     Nose: Nose normal.      Mouth/Throat:      Mouth: Mucous membranes are moist.   Eyes:      General: No scleral icterus. Cardiovascular:      Rate and Rhythm: Normal rate. Heart sounds: Normal heart sounds. Pulmonary:      Breath sounds: Rales present. Abdominal:      General: Bowel sounds are normal.      Palpations: Abdomen is soft. Musculoskeletal:         General: Normal range of motion. Skin:     General: Skin is warm. Capillary Refill: Capillary refill takes less than 2 seconds. Neurological:      Mental Status: She is alert and oriented to person, place, and time. Motor: Weakness present.    Psychiatric:         Behavior: Behavior normal.         Judgment: Judgment normal.          Additional Data:     Lab Results:  Results from last 7 days   Lab Units 09/18/23  0943   WBC Thousand/uL 7.03   HEMOGLOBIN g/dL 14.6   HEMATOCRIT % 42.8   PLATELETS Thousands/uL 167   NEUTROS PCT % 74   LYMPHS PCT % 12*   MONOS PCT % 14*   EOS PCT % 0     Results from last 7 days   Lab Units 09/18/23  0943   SODIUM mmol/L 126*   POTASSIUM mmol/L 4.0   CHLORIDE mmol/L 95*   CO2 mmol/L 25   BUN mg/dL 14   CREATININE mg/dL 0.91   ANION GAP mmol/L 6   CALCIUM mg/dL 8.7   ALBUMIN g/dL 3.7   TOTAL BILIRUBIN mg/dL 1.18*   ALK PHOS U/L 54   ALT U/L 11   AST U/L 21   GLUCOSE RANDOM mg/dL 94     Results from last 7 days   Lab Units 09/18/23  0943   INR  2.13*             Results from last 7 days   Lab Units 09/18/23  0943   LACTIC ACID mmol/L 0.9   PROCALCITONIN ng/ml 0.30*       Lines/Drains:  Invasive Devices     Peripheral Intravenous Line  Duration           Peripheral IV 04/16/21 Right Antecubital 884 days    Peripheral IV 09/18/23 Left Antecubital <1 day    Peripheral IV 09/18/23 Right Antecubital <1 day                    Imaging: Reviewed radiology reports from this admission including: chest xray  XR chest 1 view portable   Final Result by Azalea Fletcher MD (09/18 1152)   Cardiomegaly and mild pulmonary edema. Workstation performed: [id number]             EKG and Other Studies Reviewed on Admission:   · EKG: NSR. HR 70's. ** Please Note: This note has been constructed using a voice recognition system.  **

## 2023-09-19 PROBLEM — E83.42 HYPOMAGNESEMIA: Status: RESOLVED | Noted: 2023-09-18 | Resolved: 2023-09-19

## 2023-09-19 LAB
ALBUMIN SERPL BCP-MCNC: 3.2 G/DL (ref 3.5–5)
ALP SERPL-CCNC: 49 U/L (ref 34–104)
ALT SERPL W P-5'-P-CCNC: 12 U/L (ref 7–52)
ANION GAP SERPL CALCULATED.3IONS-SCNC: 7 MMOL/L
AST SERPL W P-5'-P-CCNC: 24 U/L (ref 13–39)
BACTERIA UR QL AUTO: NORMAL /HPF
BASOPHILS # BLD AUTO: 0.03 THOUSANDS/ÂΜL (ref 0–0.1)
BASOPHILS NFR BLD AUTO: 1 % (ref 0–1)
BILIRUB SERPL-MCNC: 0.66 MG/DL (ref 0.2–1)
BILIRUB UR QL STRIP: NEGATIVE
BUN SERPL-MCNC: 13 MG/DL (ref 5–25)
CALCIUM ALBUM COR SERPL-MCNC: 8.8 MG/DL (ref 8.3–10.1)
CALCIUM SERPL-MCNC: 8.2 MG/DL (ref 8.4–10.2)
CHLORIDE SERPL-SCNC: 99 MMOL/L (ref 96–108)
CLARITY UR: CLEAR
CO2 SERPL-SCNC: 24 MMOL/L (ref 21–32)
COLOR UR: YELLOW
CREAT SERPL-MCNC: 0.73 MG/DL (ref 0.6–1.3)
CRP SERPL QL: 80.7 MG/L
EOSINOPHIL # BLD AUTO: 0.07 THOUSAND/ÂΜL (ref 0–0.61)
EOSINOPHIL NFR BLD AUTO: 1 % (ref 0–6)
ERYTHROCYTE [DISTWIDTH] IN BLOOD BY AUTOMATED COUNT: 13 % (ref 11.6–15.1)
ERYTHROCYTE [SEDIMENTATION RATE] IN BLOOD: 34 MM/HOUR (ref 0–29)
GFR SERPL CREATININE-BSD FRML MDRD: 75 ML/MIN/1.73SQ M
GLUCOSE SERPL-MCNC: 78 MG/DL (ref 65–140)
GLUCOSE UR STRIP-MCNC: NEGATIVE MG/DL
HCT VFR BLD AUTO: 40.3 % (ref 34.8–46.1)
HGB BLD-MCNC: 14.1 G/DL (ref 11.5–15.4)
HGB UR QL STRIP.AUTO: ABNORMAL
IMM GRANULOCYTES # BLD AUTO: 0.01 THOUSAND/UL (ref 0–0.2)
IMM GRANULOCYTES NFR BLD AUTO: 0 % (ref 0–2)
KETONES UR STRIP-MCNC: NEGATIVE MG/DL
LEUKOCYTE ESTERASE UR QL STRIP: NEGATIVE
LYMPHOCYTES # BLD AUTO: 0.82 THOUSANDS/ÂΜL (ref 0.6–4.47)
LYMPHOCYTES NFR BLD AUTO: 16 % (ref 14–44)
MAGNESIUM SERPL-MCNC: 2.1 MG/DL (ref 1.9–2.7)
MCH RBC QN AUTO: 29.9 PG (ref 26.8–34.3)
MCHC RBC AUTO-ENTMCNC: 35 G/DL (ref 31.4–37.4)
MCV RBC AUTO: 86 FL (ref 82–98)
MONOCYTES # BLD AUTO: 0.81 THOUSAND/ÂΜL (ref 0.17–1.22)
MONOCYTES NFR BLD AUTO: 16 % (ref 4–12)
NEUTROPHILS # BLD AUTO: 3.28 THOUSANDS/ÂΜL (ref 1.85–7.62)
NEUTS SEG NFR BLD AUTO: 66 % (ref 43–75)
NITRITE UR QL STRIP: NEGATIVE
NON-SQ EPI CELLS URNS QL MICRO: NORMAL /HPF
NRBC BLD AUTO-RTO: 0 /100 WBCS
PH UR STRIP.AUTO: 5 [PH]
PHOSPHATE SERPL-MCNC: 2.9 MG/DL (ref 2.3–4.1)
PLATELET # BLD AUTO: 149 THOUSANDS/UL (ref 149–390)
PMV BLD AUTO: 9.9 FL (ref 8.9–12.7)
POTASSIUM SERPL-SCNC: 3.7 MMOL/L (ref 3.5–5.3)
PROCALCITONIN SERPL-MCNC: 0.28 NG/ML
PROT SERPL-MCNC: 5.9 G/DL (ref 6.4–8.4)
PROT UR STRIP-MCNC: NEGATIVE MG/DL
RBC # BLD AUTO: 4.71 MILLION/UL (ref 3.81–5.12)
RBC #/AREA URNS AUTO: NORMAL /HPF
SODIUM SERPL-SCNC: 130 MMOL/L (ref 135–147)
SP GR UR STRIP.AUTO: 1.01 (ref 1–1.03)
UROBILINOGEN UR QL STRIP.AUTO: 0.2 E.U./DL
WBC # BLD AUTO: 5.02 THOUSAND/UL (ref 4.31–10.16)
WBC #/AREA URNS AUTO: NORMAL /HPF

## 2023-09-19 PROCEDURE — 84100 ASSAY OF PHOSPHORUS: CPT

## 2023-09-19 PROCEDURE — 84145 PROCALCITONIN (PCT): CPT

## 2023-09-19 PROCEDURE — 86140 C-REACTIVE PROTEIN: CPT

## 2023-09-19 PROCEDURE — 97167 OT EVAL HIGH COMPLEX 60 MIN: CPT

## 2023-09-19 PROCEDURE — 85652 RBC SED RATE AUTOMATED: CPT

## 2023-09-19 PROCEDURE — 80053 COMPREHEN METABOLIC PANEL: CPT

## 2023-09-19 PROCEDURE — 83735 ASSAY OF MAGNESIUM: CPT

## 2023-09-19 PROCEDURE — 99232 SBSQ HOSP IP/OBS MODERATE 35: CPT

## 2023-09-19 PROCEDURE — 81001 URINALYSIS AUTO W/SCOPE: CPT | Performed by: PHYSICIAN ASSISTANT

## 2023-09-19 PROCEDURE — 85025 COMPLETE CBC W/AUTO DIFF WBC: CPT

## 2023-09-19 RX ORDER — METHYLPREDNISOLONE SODIUM SUCCINATE 40 MG/ML
20 INJECTION, POWDER, LYOPHILIZED, FOR SOLUTION INTRAMUSCULAR; INTRAVENOUS ONCE
Status: COMPLETED | OUTPATIENT
Start: 2023-09-19 | End: 2023-09-19

## 2023-09-19 RX ORDER — HYDROCODONE POLISTIREX AND CHLORPHENIRAMINE POLISTIREX 10; 8 MG/5ML; MG/5ML
5 SUSPENSION, EXTENDED RELEASE ORAL EVERY 12 HOURS PRN
Status: DISCONTINUED | OUTPATIENT
Start: 2023-09-19 | End: 2023-09-20

## 2023-09-19 RX ORDER — SODIUM CHLORIDE 9 MG/ML
50 INJECTION, SOLUTION INTRAVENOUS CONTINUOUS
Status: DISCONTINUED | OUTPATIENT
Start: 2023-09-19 | End: 2023-09-20

## 2023-09-19 RX ADMIN — LEVOTHYROXINE SODIUM 75 MCG: 75 TABLET ORAL at 06:24

## 2023-09-19 RX ADMIN — RIVAROXABAN 20 MG: 20 TABLET, FILM COATED ORAL at 08:39

## 2023-09-19 RX ADMIN — SODIUM CHLORIDE 50 ML/HR: 0.9 INJECTION, SOLUTION INTRAVENOUS at 14:21

## 2023-09-19 RX ADMIN — GUAIFENESIN 600 MG: 600 TABLET, EXTENDED RELEASE ORAL at 08:39

## 2023-09-19 RX ADMIN — LISINOPRIL 2.5 MG: 2.5 TABLET ORAL at 08:39

## 2023-09-19 RX ADMIN — METHYLPREDNISOLONE SODIUM SUCCINATE 20 MG: 40 INJECTION, POWDER, FOR SOLUTION INTRAMUSCULAR; INTRAVENOUS at 14:09

## 2023-09-19 RX ADMIN — ATORVASTATIN CALCIUM 20 MG: 20 TABLET, FILM COATED ORAL at 16:44

## 2023-09-19 RX ADMIN — METOPROLOL SUCCINATE 25 MG: 25 TABLET, FILM COATED, EXTENDED RELEASE ORAL at 08:39

## 2023-09-19 RX ADMIN — GUAIFENESIN 600 MG: 600 TABLET, EXTENDED RELEASE ORAL at 21:59

## 2023-09-19 RX ADMIN — ACETAMINOPHEN 650 MG: 325 TABLET ORAL at 21:59

## 2023-09-19 NOTE — PHYSICAL THERAPY NOTE
PHYSICAL THERAPY     09/19/23 0010   Note Type   Note type Screen   Additional Comments Patient independent with transfers and gait as per Occupational Therapy evaluation.   No skilled PT needs at this time we will therefore discharge PT orders   Licensure   [place] License Number  Alfonzo Duran, PT 4 [phone] [id number]

## 2023-09-19 NOTE — PLAN OF CARE
Problem: Prexisting or High Potential for Compromised Skin Integrity  Goal: Skin integrity is maintained or improved  Description: INTERVENTIONS:  - Identify patients at risk for skin breakdown  - Assess and monitor skin integrity  - Assess and monitor nutrition and hydration status  - Monitor labs   - Assess for incontinence   - Turn and reposition patient  - Assist with mobility/ambulation  - Relieve pressure over bony prominences  - Avoid friction and shearing  - Provide appropriate hygiene as needed including keeping skin clean and dry  - Evaluate need for skin moisturizer/barrier cream  - Collaborate with interdisciplinary team   - Patient/family teaching  - Consider wound care consult   Outcome: Progressing     Problem: SAFETY ADULT  Goal: Patient will remain free of falls  Description: INTERVENTIONS:  - Educate patient/family on patient safety including physical limitations  - Instruct patient to call for assistance with activity   - Consult OT/PT to assist with strengthening/mobility   - Keep Call bell within reach  - Keep bed low and locked with side rails adjusted as appropriate  - Keep care items and personal belongings within reach  - Initiate and maintain comfort rounds  - Make Fall Risk Sign visible to staff  - Offer Toileting every 2 Hours, in advance of need  - Initiate/Maintain bed alarm  - Apply yellow socks and bracelet for high fall risk patients  - Consider moving patient to room near nurses station  Outcome: Progressing     Problem: DISCHARGE PLANNING  Goal: Discharge to home or other facility with appropriate resources  Description: INTERVENTIONS:  - Identify barriers to discharge w/patient and caregiver  - Arrange for needed discharge resources and transportation as appropriate  - Identify discharge learning needs (meds, wound care, etc.)  - Arrange for interpretive services to assist at discharge as needed  - Refer to Case Management Department for coordinating discharge planning if the patient needs post-hospital services based on physician/advanced practitioner order or complex needs related to functional status, cognitive ability, or social support system  Outcome: Progressing     Problem: RESPIRATORY - ADULT  Goal: Achieves optimal ventilation and oxygenation  Description: INTERVENTIONS:  - Assess for changes in respiratory status  - Assess for changes in mentation and behavior  - Position to facilitate oxygenation and minimize respiratory effort  - Oxygen administered by appropriate delivery if ordered  - Initiate smoking cessation education as indicated  - Encourage broncho-pulmonary hygiene including cough, deep breathe, Incentive Spirometry  - Assess the need for suctioning and aspirate as needed  - Assess and instruct to report SOB or any respiratory difficulty  - Respiratory Therapy support as indicated  Outcome: Progressing

## 2023-09-19 NOTE — PROGRESS NOTES
[address]  Progress Note  Name: Gómez Gonzalez  MRN: [id number]  Unit/Bed#: 7 [address] I Date of Admission: 9/18/2023   Date of Service: 9/19/2023 I Hospital Day: 1    Assessment/Plan   * COVID  Assessment & Plan  · Reported feeling generalized weakness for 1 week with associated cough, back pain, nausea and vomiting. Patient described the cough as incessant and productive. · ED COVID PCR positive  · D-dimer normal  ·   · Procalcitonin elevated at 0.30. Repeat 0.28  · Chest x-ray showed cardiomegaly and mild pulmonary edema  · O2 saturation on room air upper 90s  · ED administered ceftriaxone x1 dose  · We will hold off on antibiotic at this time  · Follow-up on UA, urine and blood cultures. · Mucinex for cough. · Will give Solumedrol 20 mg IV x 1 dose  · Added Tussionex q 12 hr PRN  · Albuterol as needed  · PT/OT eval and treat  · Incentive spirometer  · Supportive care    Deafness  Assessment & Plan  · Communicate with sign language and writing in English    Hyponatremia  Assessment & Plan  · Sodium of 126 on admission. · ED gave IVF  500 mL bolus  · Sodium today at 130  · Gentle IV hydration x10 hours  · Repeat sodium in a.m. Atrial flutter (HCC)  Assessment & Plan  · Continuing Xarelto    Hypothyroidism  Assessment & Plan  Continue levothyroxine    Essential hypertension  Assessment & Plan  Continue lisinopril and metoprolol    Hyperlipidemia  Assessment & Plan  · Crestor substituted with Lipitor             VTE Pharmacologic Prophylaxis:   Moderate Risk (Score 3-4) - Pharmacological DVT Prophylaxis Ordered: rivaroxaban (Xarelto). Patient Centered Rounds: I performed bedside rounds with nursing staff today. Discussions with Specialists or Other Care Team Provider: Yes    Education and Discussions with Family / Patient: Attempted to update  () via phone. Unable to contact.     Total Time Spent on Date of Encounter in care of patient: 28 mins. This time was spent on one or more of the following: performing physical exam; counseling and coordination of care; obtaining or reviewing history; documenting in the medical record; reviewing/ordering tests, medications or procedures; communicating with other healthcare professionals and discussing with patient's family/caregivers. Current Length of Stay: 1 day(s)  Current Patient Status: Inpatient   Certification Statement: The patient will continue to require additional inpatient hospital stay due to COVID-19, incessant coughing and complaints of shortness of breath  Discharge Plan: Anticipate discharge tomorrow to home. Code Status: Level 1 - Full Code    Subjective:   Patient seen and examined at bedside. Communication via writing. Patient reported incessant coughing, shortness of breath and rib pain. Will add trial of Solu-Medrol IV 20 mg x 1 dose and Tussionex as needed for cough. Denies chest pain, lightheadedness, dizziness, headaches, nausea, vomiting. Educated on the use of incentive spirometer. Objective:     Vitals:   Temp (24hrs), Av.9 °F (36.6 °C), Min:97.4 °F (36.3 °C), Max:98.2 °F (36.8 °C)    Temp:  [97.4 °F (36.3 °C)-98.2 °F (36.8 °C)] 97.7 °F (36.5 °C)  HR:  [70-82] 70  Resp:  [16-20] 18  BP: (142-161)/(69-77) 152/73  SpO2:  [92 %-98 %] 94 %  Body mass index is 31.03 kg/m². Input and Output Summary (last 24 hours): Intake/Output Summary (Last 24 hours) at 2023 1313  Last data filed at 2023 0800  Gross per 24 hour   Intake 1215.83 ml   Output 600 ml   Net 615.83 ml     Physical Exam  Constitutional:       Appearance: She is not ill-appearing. HENT:      Head: Normocephalic. Ears:      Comments: Deaf ( Sign language and writing)     Nose: Nose normal.      Mouth/Throat:      Mouth: Mucous membranes are moist.   Eyes:      General: No scleral icterus. Cardiovascular:      Rate and Rhythm: Normal rate. Heart sounds: Normal heart sounds. Pulmonary:      Breath sounds: decrease breath sound  Abdominal:      General: Bowel sounds are normal.      Palpations: Abdomen is soft. Musculoskeletal:         General: Normal range of motion. Skin:     General: Skin is warm. Capillary Refill: Capillary refill takes less than 2 seconds. Neurological:      Mental Status: She is alert and oriented to person, place, and time. Motor: Weakness present. Psychiatric:         Behavior: Behavior normal.         Judgment: Judgment normal.     Additional Data:     Labs:  Results from last 7 days   Lab Units 09/19/23  0454   WBC Thousand/uL 5.02   HEMOGLOBIN g/dL 14.1   HEMATOCRIT % 40.3   PLATELETS Thousands/uL 149   NEUTROS PCT % 66   LYMPHS PCT % 16   MONOS PCT % 16*   EOS PCT % 1     Results from last 7 days   Lab Units 09/19/23  0454   SODIUM mmol/L 130*   POTASSIUM mmol/L 3.7   CHLORIDE mmol/L 99   CO2 mmol/L 24   BUN mg/dL 13   CREATININE mg/dL 0.73   ANION GAP mmol/L 7   CALCIUM mg/dL 8.2*   ALBUMIN g/dL 3.2*   TOTAL BILIRUBIN mg/dL 0.66   ALK PHOS U/L 49   ALT U/L 12   AST U/L 24   GLUCOSE RANDOM mg/dL 78     Results from last 7 days   Lab Units 09/18/23  0943   INR  2.13*             Results from last 7 days   Lab Units 09/19/23  0454 09/18/23  0943   LACTIC ACID mmol/L  --  0.9   PROCALCITONIN ng/ml 0.28* 0.30*       Lines/Drains:  Invasive Devices     Peripheral Intravenous Line  Duration           Peripheral IV 09/18/23 Right Antecubital 1 day                      Imaging: No pertinent imaging reviewed. Recent Cultures (last 7 days):   Results from last 7 days   Lab Units 09/18/23 2057 09/18/23  0950 09/18/23  0943   BLOOD CULTURE   --  Received in Microbiology Lab. Culture in Progress. Received in Microbiology Lab. Culture in Progress.    GRAM STAIN RESULT  1+ Epithelial cells per low power field  No Polys or Bacteria seen  --   --        Last 24 Hours Medication List:   Current Facility-Administered Medications   Medication Dose Route Frequency Provider Last Rate   • acetaminophen  650 mg Oral Q6H PRN CHA Calhoun     • albuterol  2.5 mg Nebulization Q6H PRN CHA Calhoun     • atorvastatin  20 mg Oral Daily With Dinner CHA Calhoun     • guaiFENesin  600 mg Oral Q12H [address] CHA Calhoun     • Hydrocod Haroon-Chlorphe Haroon ER  5 mL Oral Q12H PRN CHA Calhoun     • levothyroxine  75 mcg Oral Early Morning Olayide D CHA Amador     • lisinopril  2.5 mg Oral Daily Olayide D CHA Amador     • methylPREDNISolone sodium succinate  20 mg Intravenous Once Olayide D CHA Amador     • metoprolol succinate  25 mg Oral Daily Olayide D CHA Amador     • ondansetron  4 mg Intravenous Q6H PRN CHA Calhoun     • rivaroxaban  20 mg Oral Daily With Breakfast CHA Calhoun     • sodium chloride  50 mL/hr Intravenous Continuous CHA Calhoun          Today, Patient Was Seen By: CHA Calhoun    **Please Note: This note may have been constructed using a voice recognition system. **

## 2023-09-19 NOTE — ASSESSMENT & PLAN NOTE
· Reported feeling generalized weakness for 1 week with associated cough, back pain, nausea and vomiting. Patient described the cough as incessant and productive. · ED COVID PCR positive  · D-dimer normal  ·   · Procalcitonin elevated at 0.30.  Repeat 0.28  · Chest x-ray showed cardiomegaly and mild pulmonary edema  · O2 saturation on room air upper 90s  · ED administered ceftriaxone x1 dose  · Provided supportive care with Mucinex, Flonase, and Tussionex  · Patient remains on room air and is ambulating without difficulty  · She desires discharge home today  · We will continue Mucinex and Flonase on discharge with PCP follow-up

## 2023-09-19 NOTE — UTILIZATION REVIEW
Initial Clinical Review    Admission: Date/Time/Statement:   Admission Orders (From admission, onward)     Ordered        09/18/23 1213  [address]  Once                      Orders Placed This Encounter   Procedures   • INPATIENT ADMISSION     Standing Status:   Standing     Number of Occurrences:   1     Order Specific Question:   Level of Care     Answer:   Med Surg [16]     Order Specific Question:   Estimated length of stay     Answer:   More than 2 Midnights     Order Specific Question:   Certification     Answer:   I certify that inpatient services are medically necessary for this patient for a duration of greater than two midnights. See H&P and MD Progress Notes for additional information about the patient's course of treatment. ED Arrival Information     Expected   -    Arrival   9/18/2023 08:07    Acuity   Urgent            Means of arrival   Walk-In    Escorted by   Family Member    Service   Hospitalist    Admission type   Emergency            Arrival complaint   back pain/n/v/ha           Chief Complaint   Patient presents with   • Weakness - Generalized     Pt reports feeling generally unwell for a week, with some coughing and L sided chest pain from the cough, Pt denies fever, N/V/D       Initial Presentation:  79 yof to ER from home c/o generalized weakness, productive cough for white sputum, left-sided pleuritic chest pain worsens with deep breath, SOB short distances, for the past week. Hx CAD s/p PCI, a flutter on Xarelto, HTN, HLD, hypothyroidism, pacemaker. Presents hypertensive with s/s as above, 2+BLE edema. Admission work-up showing cardiomegaly & pulmonary edema on imaging, COVID +, hyponatremia, hypomagnesemia, elevated procalcitonin, BNP, CRP, sed rate. Admitted to inpatient status for COVID. Date: 9/19/23   Day 2:  Patient reported incessant coughing, shortness of breath and rib pain. lungs with decreased breath sounds.  Give Solu-Medrol IV 20 mg x 1 dose and Tussionex as needed for cough. Na @ 130 today, IVF hydration x 10 hrs ordered. Date: 9/20/23    Day 3: Has surpassed a 2nd midnight with active treatments and services, which include supportive care for COVID, monitor hyponatremia/labs, VS, maintain isolation. ED Triage Vitals   Temperature Pulse Respirations Blood Pressure SpO2   09/18/23 0839 09/18/23 0839 09/18/23 0839 09/18/23 0839 09/18/23 0839   98 °F (36.7 °C) 70 18 (!) 173/74 96 %      Temp Source Heart Rate Source Patient Position - Orthostatic VS BP Location FiO2 (%)   09/18/23 0839 09/18/23 0839 09/18/23 0839 09/18/23 0839 --   Oral Monitor Lying Right arm       Pain Score       09/18/23 1300       No Pain          Wt Readings from Last 1 Encounters:   09/18/23 60.6 kg (133 lb 8 oz)     Additional Vital Signs:   Date/Time Temp Pulse Resp BP MAP (mmHg) SpO2 Calculated FIO2 (%) - Nasal Cannula Nasal Cannula O2 Flow Rate (L/min) O2 Device Patient Position - Orthostatic VS   09/19/23 0629 97.7 °F (36.5 °C) 70 18 152/73 105 94 % -- -- None (Room air) Lying   09/19/23 0244 97.4 °F (36.3 °C) Abnormal  70 16 157/72 104 93 % -- -- None (Room air) Lying   09/18/23 2330 97.9 °F (36.6 °C) 71 18 160/77 110 92 % -- -- None (Room air) Lying   09/18/23 1945 98.2 °F (36.8 °C) 71 20 161/70 110 97 % 24 1 L/min Nasal cannula Lying   09/18/23 1757 -- 82 20 -- -- 98 % 24 1 L/min Nasal cannula --   09/18/23 1500 98.1 °F (36.7 °C) 78 20 142/69 97 98 % -- -- -- Lying   09/18/23 1300 98.4 °F (36.9 °C) 71 20 165/78 112 98 % -- -- -- --   09/18/23 1230 -- 70 20 156/67 96 95 % -- -- -- --   09/18/23 1215 -- 69 20 -- -- 96 % -- -- -- --   09/18/23 1100 -- 69 19 152/71 102 96 % -- -- -- --   09/18/23 0839 98 °F (36.7 °C) 70 18 173/74 Abnormal  -- 96 % -- -- None (Room air) Lying       Pertinent Labs/Diagnostic Test Results:   XR chest 1 view portable   Final Result  (09/18 1152)   Cardiomegaly and mild pulmonary edema.      Results from last 7 days   Lab Units 09/18/23  0943   SARS-COV-2 Positive*     Results from last 7 days   Lab Units 09/19/23  0454 09/18/23  0943   WBC Thousand/uL 5.02 7.03   HEMOGLOBIN g/dL 14.1 14.6   HEMATOCRIT % 40.3 42.8   PLATELETS Thousands/uL 149 167   NEUTROS ABS Thousands/µL 3.28 5.17     Results from last 7 days   Lab Units 09/20/23  0456 09/19/23  0454 09/18/23  0943   SODIUM mmol/L 132* 130* 126*   POTASSIUM mmol/L 4.4 3.7 4.0   CHLORIDE mmol/L 100 99 95*   CO2 mmol/L 26 24 25   ANION GAP mmol/L 6 7 6   BUN mg/dL 17 13 14   CREATININE mg/dL 0.75 0.73 0.91   EGFR ml/min/1.73sq m 73 75 58   CALCIUM mg/dL 9.0 8.2* 8.7   MAGNESIUM mg/dL  --  2.1 1.7*   PHOSPHORUS mg/dL  --  2.9  --      Results from last 7 days   Lab Units 09/19/23 0454 09/18/23  0943   AST U/L 24 21   ALT U/L 12 11   ALK PHOS U/L 49 54   TOTAL PROTEIN g/dL 5.9* 6.7   ALBUMIN g/dL 3.2* 3.7   TOTAL BILIRUBIN mg/dL 0.66 1.18*     Results from last 7 days   Lab Units 09/20/23  0456 09/19/23  0454 09/18/23  0943   GLUCOSE RANDOM mg/dL 107 78 94     Results from last 7 days   Lab Units 09/18/23  0943   HS TNI 0HR ng/L 15     Results from last 7 days   Lab Units 09/18/23  0943   D-DIMER QUANTITATIVE ug/ml FEU 0.36     Results from last 7 days   Lab Units 09/18/23  0943   PROTIME seconds 23.9*   INR  2.13*   PTT seconds 39*     Results from last 7 days   Lab Units 09/19/23  0454 09/18/23  0943   PROCALCITONIN ng/ml 0.28* 0.30*     Results from last 7 days   Lab Units 09/18/23  0943   LACTIC ACID mmol/L 0.9     Results from last 7 days   Lab Units 09/18/23  0943   BNP pg/mL 351*     Results from last 7 days   Lab Units 09/18/23  0943   LIPASE u/L 40     Results from last 7 days   Lab Units 09/19/23  0454   CRP mg/L 80.7*   SED RATE mm/hour 34*     Results from last 7 days   Lab Units 09/19/23  0116   CLARITY UA  Clear   COLOR UA  Yellow   SPEC GRAV UA  1.010   PH UA  5.0   GLUCOSE UA mg/dl Negative   KETONES UA mg/dl Negative   BLOOD UA  Small*   PROTEIN UA mg/dl Negative   NITRITE UA  Negative   BILIRUBIN UA Negative   UROBILINOGEN UA E.U./dl 0.2   LEUKOCYTES UA  Negative   WBC UA /hpf 0-1   RBC UA /hpf 2-4   BACTERIA UA /hpf Occasional   EPITHELIAL CELLS WET PREP /hpf Occasional     Results from last 7 days   Lab Units 09/18/23  0943   INFLUENZA A PCR  Negative   INFLUENZA B PCR  Negative   RSV PCR  Negative     Results from last 7 days   Lab Units 09/18/23  2057 09/18/23  0950 09/18/23  0943   BLOOD CULTURE   --  No Growth at 24 hrs. No Growth at 24 hrs.    GRAM STAIN RESULT  1+ Epithelial cells per low power field  No Polys or Bacteria seen  --   --        ED Treatment:   Medication Administration from 09/18/2023 0806 to 09/18/2023 1309       Date/Time Order Dose Route Action     09/18/2023 1003 EDT cefTRIAXone (ROCEPHIN) IVPB (premix in dextrose) 2,000 mg 50 mL 2,000 mg Intravenous New Bag     09/18/2023 1005 EDT lactated ringers bolus 500 mL 500 mL Intravenous New Bag     09/18/2023 1057 EDT magnesium sulfate 2 g/50 mL IVPB (premix) 2 g 2 g Intravenous New Bag        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   • Wears glasses      Present on Admission:  • Hyperlipidemia  • Hypothyroidism  • Essential hypertension  • Atrial flutter (HCC)  • Deafness  • COVID    Admitting Diagnosis: Hyponatremia [E87.1]  Weakness [R53.1]  Dizzy [R42]  Acute cough [R05.1]  COVID-19 [U07.1]  Age/Sex: 79 y.o. female  Admission Orders:  Contact & airborne isolation  Pt/ot eval & tx  Scd/foot pumps  Incentive spirometry    Scheduled Medications:  atorvastatin, 20 mg, Oral, Daily With Dinner  guaiFENesin, 600 mg, Oral, Q12H [address]  levothyroxine, 75 mcg, Oral, Early Morning  lisinopril, 2.5 mg, Oral, Daily  metoprolol succinate, 25 mg, Oral, Daily  rivaroxaban, 20 mg, Oral, Daily With Breakfast    PRN Meds:  acetaminophen, 650 mg, Oral, Q6H PRN  albuterol, 2.5 mg, Nebulization, Q6H PRN  ondansetron, 4 mg, Intravenous, Q6H PRN    Network Utilization Review Department  ATTENTION: Please call with any questions or concerns to [phone] and carefully listen to the prompts so that you are directed to the right person. All voicemails are confidential.  Randall Finder all requests for admission clinical reviews, approved or denied determinations and any other requests to dedicated fax number below belonging to the campus where the patient is receiving treatment.  List of dedicated fax numbers for the Facilities:  [place] DENIALS (Administrative/Medical Necessity) [phone] [address] [facility] (Maternity/NICU/Pediatrics) [phone]   [address] [place] [address] Drive [phone]   [facility] [address] [facility] [phone]   [address] [place] [address] [phone]   [address] [facility] [address] [phone]

## 2023-09-19 NOTE — OCCUPATIONAL THERAPY NOTE
Occupational Therapy Evaluation     Patient Name: Arcelia STEVENS Date: 9/19/2023  Problem List  Principal Problem:    COVID  Active Problems:    Hyperlipidemia    Essential hypertension    Hypothyroidism    Atrial flutter ([address])    Deafness    Past Medical History  Past Medical History:   Diagnosis Date    Atrial flutter ([address])     CAD (coronary artery disease)     s/p PCIx1 -2012    Coronary artery disease     Deafness     Disease of thyroid gland     GERD (gastroesophageal reflux disease)     Hyperlipidemia     Hypertension     Hypomagnesemia 9/18/2023    Itching     of the face, throat    Pacemaker 03/2021    medtronic-left chest    Wears glasses      Past Surgical History  Past Surgical History:   Procedure Laterality Date    BREAST BIOPSY Left     negative    CARDIAC PACEMAKER PLACEMENT  03/2021    medtronic    CARDIAC SURGERY      stents    COLONOSCOPY      ESOPHAGOGASTRODUODENOSCOPY N/A 02/20/2017    Procedure: ESOPHAGOGASTRODUODENOSCOPY (EGD); Surgeon: Ledy Linares MD;  Location: [facility]; Service:          09/19/23 1350   OT Last Visit   OT Visit Date 09/19/23  (Tuesday)   Note Type   Note type Evaluation   Pain Assessment   Pain Assessment Tool 0-10   Pain Score No Pain   Restrictions/Precautions   Weight Bearing Precautions Per Order No   Other Precautions Contact/isolation; Airborne/isolation;Droplet precautions  (COVID+ on [place], room air; deaf (communicates using sign language vs writing))   Home Living   Type of [address] [place] [address]  Two level   Bathroom Shower/Tub Tub/shower unit   [address] [place] [address] Other (Comment)  (no AD)   Additional Comments Pt reports living w/ her  in [address] [place] [address]   Prior Function   Level of [place] Independent with ADLs; Independent with functional mobility; Needs assistance with IADLS  (pt does not drive; reports  manages meds / finanances)   Lives With Spouse   Receives Help From Family   IADLs Independent with meal prep; Family/Friend/Other provides transportation; Family/Friend/Other provides medication management   Falls in the last 6 months 0  ("I am careful and take my time")   Vocational Retired   Comments Pt reports I w/ ADLs w/ out use of AD or DME. Pt reports that he  is working on [employer], DME in bathroom. Lifestyle   Autonomy Pt reports I w/ ADLs w/ out use of AD   Reciprocal Relationships Pt reports living w/ her . 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 Additional General Comments Significant PMH impacting her occupational performance includes CAD s/p PCI (2012), deaf, HTN, pacemaker (2021). Personal and environmental factors impacting performance includes advanced age, limited insight into deficits, difficulty completing IADLs, multi level home; supports/ strengths include independent w/ ADLs at baseline,supportive spouse / family. Subjective   Subjective Pt communicated using [employer] interpreter   ADL   Where Assessed   (standing in room vs EOB)   Eating Assistance 7  Independent   Eating Deficit Setup   Grooming Assistance 6  Modified Independent   Grooming Deficit Setup; Increased time to complete   UB Bathing Assistance Unable to assess   LB Bathing Assistance Unable to assess   UB Dressing Assistance 6  Modified independent   UB Dressing Deficit Setup; Increased time to complete   LB Dressing Assistance 6  Modified independent   LB Dressing Deficit Setup; Increased time to complete   Toileting Assistance  Unable to assess   Additional Comments on room air O2 sats >90% throughout   Bed Mobility   Supine to Sit Unable to assess   Sit to Supine Unable to assess Additional Comments Pt standing in room upon therapist arrival bending over to pick something up off the floor and seated at EOB post eval w/ needs met, call bell in reach   Transfers   Sit to Stand 6  Modified independent   Additional items Increased time required   Stand to Sit 6  Modified independent   Additional items Increased time required   Functional Mobility   Functional Mobility 6  Modified independent   Additional Comments engaged in functional mobility w/ in room w/ mod I for + time   Additional items   (no AD or LOB)   Balance   Static Sitting Normal   Dynamic Sitting Fair +   Static Standing Fair +   Ambulatory Fair   Activity Tolerance   Activity Tolerance Patient tolerated treatment well   Medical Staff Made Aware spoke w/ PT, Janki Negrete   Nurse Made Aware spoke w/ RNBety Assessment   RUE Assessment WFL   RUE Strength   RUE Overall Strength Within Functional Limits - able to perform ADL tasks with strength   LUE Assessment   LUE Assessment WFL   LUE Strength   LUE Overall Strength Within Functional Limits - able to perform ADL tasks with strength   Hand Function   Gross Motor Coordination Functional   Fine Motor Coordination Functional   Sensation   Light Touch Not tested   Cognition   Overall Cognitive Status (S)  Impaired   Arousal/Participation Alert; Cooperative   Attention Attends with cues to redirect   Orientation Level Oriented to person;Oriented to place  (unable to report date; pt reports using her phone at baseline and stated "I am getting old")   Memory Decreased recall of recent events   Following Commands Follows multistep commands with increased time or repetition  (using [email or web address] interpreter)   Comments Identified pt by full name and wristband. Pt alert and able to provide home set- up, follow directions during ADLs using [email or web address] interpreter. Disoriented to time but pleasant and cooperative. Pt reports her  manages meds/ finances at baseline. Assessment   Assessment Pt is an 80yo female admitted to [facility] on 9/18/23 w/ generalized weakness. Diagnosed w/ COVID in ED. Pt reports living w/ her  at baseline in [address] [place] [address] and I w/ ADLs w/ out use of AD. Pt required use of [employer] video  as she is deaf. Upon eval, pt alert and oriented to person, place on room air. Pt required mod I for + time to complete grooming, UBD/LBD; stand to sit w/ mod I and functional mobility in room w/ mod I for + time w/ out LOB or use of AD. Pt is completing ADLs at / near baseline level of I w/ + time. Recommend active participation in ADLs w/ RN staff and DC home w/ family support when medically stable. No OT needs. DC OT. Please re-consult if acute needs arise.    Goals   Patient Goals Pt stated that she would like to go home   Plan   OT Frequency Eval only   Recommendation   OT Discharge Recommendation No rehabilitation needs  (DC home w/ family support)   Equipment Recommended Shower/Tub chair with back ($)  (grab bars in bathroom)   AM-PAC Daily Activity Inpatient   Lower Body Dressing 4   Bathing 3   Toileting 4   Upper Body Dressing 4   Grooming 4   Eating 4   Daily Activity Raw Score 23   Daily Activity Standardized Score (Calc for Raw Score >=11) 51.12   AM-PAC Applied Cognition Inpatient   Following a Speech/Presentation 3   Understanding Ordinary Conversation 3   Taking Medications 3   Remembering Where Things Are Placed or Put Away 3   Remembering List of 4-5 Errands 2   Taking Care of Complicated Tasks 2   Applied Cognition Raw Score 16   Applied Cognition Standardized Score 35.03   Barthel Index   Feeding 10   Bathing 0   Grooming Score 5   Dressing Score 10   Bladder Score 10   Bowels Score 10   Toilet Use Score 10   Transfers (Bed/Chair) Score 0   Mobility (Level Surface) Score 0   Stairs Score 5   Barthel Index Score 60   End of Consult   Patient Position at End of Consult All needs within reach   Nurse Communication Nurse aware of consult Licensure   [place] License Number  Colletta Pagan, OTR/L [id number]       The patient's raw score on the AM-PAC Daily Activity Inpatient Short Form is 23. A raw score of greater than or equal to 19 suggests the patient may benefit from discharge to home. Please refer to the recommendation of the Occupational Therapist for safe discharge planning.       Colletta Pagan, OTR/L  [id number]  [id number]

## 2023-09-19 NOTE — ASSESSMENT & PLAN NOTE
· Sodium of 126 on admission.   · ED gave IVF  500 mL bolus  · Sodium improved 130 -> 132  · Encourage oral intake  · Follow-up with PCP

## 2023-09-19 NOTE — ASSESSMENT & PLAN NOTE
· Reported feeling generalized weakness for 1 week with associated cough, back pain, nausea and vomiting. Patient described the cough as incessant and productive. · ED COVID PCR positive  · D-dimer normal  ·   · Procalcitonin elevated at 0.30. Repeat 0.28  · Chest x-ray showed cardiomegaly and mild pulmonary edema  · O2 saturation on room air upper 90s  · ED administered ceftriaxone x1 dose  · We will hold off on antibiotic at this time  · Follow-up on UA, urine and blood cultures. · Mucinex for cough.    · Will give Solumedrol 20 mg IV x 1 dose  · Added Tussionex q 12 hr PRN  · Albuterol as needed  · PT/OT eval and treat  · Incentive spirometer  · Supportive care

## 2023-09-20 VITALS
SYSTOLIC BLOOD PRESSURE: 158 MMHG | RESPIRATION RATE: 22 BRPM | OXYGEN SATURATION: 96 % | BODY MASS INDEX: 30.89 KG/M2 | WEIGHT: 133.5 LBS | HEART RATE: 77 BPM | DIASTOLIC BLOOD PRESSURE: 78 MMHG | HEIGHT: 55 IN | TEMPERATURE: 97.7 F

## 2023-09-20 LAB
ANION GAP SERPL CALCULATED.3IONS-SCNC: 6 MMOL/L
BUN SERPL-MCNC: 17 MG/DL (ref 5–25)
CALCIUM SERPL-MCNC: 9 MG/DL (ref 8.4–10.2)
CHLORIDE SERPL-SCNC: 100 MMOL/L (ref 96–108)
CO2 SERPL-SCNC: 26 MMOL/L (ref 21–32)
CREAT SERPL-MCNC: 0.75 MG/DL (ref 0.6–1.3)
GFR SERPL CREATININE-BSD FRML MDRD: 73 ML/MIN/1.73SQ M
GLUCOSE SERPL-MCNC: 107 MG/DL (ref 65–140)
POTASSIUM SERPL-SCNC: 4.4 MMOL/L (ref 3.5–5.3)
SODIUM SERPL-SCNC: 132 MMOL/L (ref 135–147)

## 2023-09-20 PROCEDURE — 80048 BASIC METABOLIC PNL TOTAL CA: CPT

## 2023-09-20 PROCEDURE — 99239 HOSP IP/OBS DSCHRG MGMT >30: CPT | Performed by: NURSE PRACTITIONER

## 2023-09-20 RX ORDER — LISINOPRIL 5 MG/1
5 TABLET ORAL DAILY
Status: DISCONTINUED | OUTPATIENT
Start: 2023-09-21 | End: 2023-09-20 | Stop reason: HOSPADM

## 2023-09-20 RX ORDER — GUAIFENESIN 600 MG/1
600 TABLET, EXTENDED RELEASE ORAL EVERY 12 HOURS SCHEDULED
Refills: 0
Start: 2023-09-20 | End: 2023-09-25

## 2023-09-20 RX ORDER — FLUTICASONE PROPIONATE 50 MCG
1 SPRAY, SUSPENSION (ML) NASAL DAILY
Refills: 0
Start: 2023-09-20

## 2023-09-20 RX ORDER — HYDRALAZINE HYDROCHLORIDE 20 MG/ML
5 INJECTION INTRAMUSCULAR; INTRAVENOUS EVERY 6 HOURS PRN
Status: DISCONTINUED | OUTPATIENT
Start: 2023-09-20 | End: 2023-09-20 | Stop reason: HOSPADM

## 2023-09-20 RX ORDER — LISINOPRIL 2.5 MG/1
2.5 TABLET ORAL ONCE
Status: COMPLETED | OUTPATIENT
Start: 2023-09-20 | End: 2023-09-20

## 2023-09-20 RX ORDER — LORATADINE 10 MG/1
10 TABLET ORAL DAILY
Status: DISCONTINUED | OUTPATIENT
Start: 2023-09-20 | End: 2023-09-20 | Stop reason: HOSPADM

## 2023-09-20 RX ORDER — HYDROCODONE POLISTIREX AND CHLORPHENIRAMINE POLISTIREX 10; 8 MG/5ML; MG/5ML
2.5 SUSPENSION, EXTENDED RELEASE ORAL EVERY 12 HOURS PRN
Status: DISCONTINUED | OUTPATIENT
Start: 2023-09-20 | End: 2023-09-20 | Stop reason: HOSPADM

## 2023-09-20 RX ORDER — FLUTICASONE PROPIONATE 50 MCG
1 SPRAY, SUSPENSION (ML) NASAL DAILY
Status: DISCONTINUED | OUTPATIENT
Start: 2023-09-20 | End: 2023-09-20 | Stop reason: HOSPADM

## 2023-09-20 RX ADMIN — LISINOPRIL 2.5 MG: 2.5 TABLET ORAL at 09:17

## 2023-09-20 RX ADMIN — GUAIFENESIN 600 MG: 600 TABLET, EXTENDED RELEASE ORAL at 08:10

## 2023-09-20 RX ADMIN — RIVAROXABAN 20 MG: 20 TABLET, FILM COATED ORAL at 08:10

## 2023-09-20 RX ADMIN — LISINOPRIL 2.5 MG: 2.5 TABLET ORAL at 08:10

## 2023-09-20 RX ADMIN — METOPROLOL SUCCINATE 25 MG: 25 TABLET, FILM COATED, EXTENDED RELEASE ORAL at 08:10

## 2023-09-20 RX ADMIN — LEVOTHYROXINE SODIUM 75 MCG: 75 TABLET ORAL at 05:00

## 2023-09-20 NOTE — DISCHARGE SUMMARY
[address]  Discharge- Carmelita Bridger 1940, 79 y.o. female MRN: [id number]  Unit/Bed#: [address] [place] [address] Encounter: [id number]  Primary Care Provider: Tal Salguero MD   Date and time admitted to hospital: 9/18/2023  8:28 AM    * COVID  Assessment & Plan  · Reported feeling generalized weakness for 1 week with associated cough, back pain, nausea and vomiting. Patient described the cough as incessant and productive. · ED COVID PCR positive  · D-dimer normal  ·   · Procalcitonin elevated at 0.30. Repeat 0.28  · Chest x-ray showed cardiomegaly and mild pulmonary edema  · O2 saturation on room air upper 90s  · ED administered ceftriaxone x1 dose  · Provided supportive care with Mucinex, Flonase, and Tussionex  · Patient remains on room air and is ambulating without difficulty  · She desires discharge home today  · We will continue Mucinex and Flonase on discharge with PCP follow-up    Hyponatremia  Assessment & Plan  · Sodium of 126 on admission. · ED gave IVF  500 mL bolus  · Sodium improved 130 -> 132  · Encourage oral intake  · Follow-up with PCP    Chronic diastolic congestive heart failure (HCC)  Assessment & Plan  Wt Readings from Last 3 Encounters:   09/18/23 60.6 kg (133 lb 8 oz)   09/05/23 57.6 kg (127 lb)   08/31/23 58.1 kg (128 lb)     No signs of exacerbation. No lower extremity edema. .   Chest x-ray did show some cardiomegaly and mild pulmonary edema, however, patient was not hypoxic  Patient was provided gentle IV hydration for hyponatremia  · Continue beta-blocker  · Not on diuretic  · Follow-up with cardiology in 1 month  · Monitor weights    Atrial flutter ([address])  Assessment & Plan  · Continue Xarelto    Essential hypertension  Assessment & Plan  · Continue lisinopril and Toprol-XL  · Follow-up with cardiology for blood pressure check    Deafness  Assessment & Plan  · Communicates with sign language and writing in English    Hypothyroidism  Assessment & Plan  · Continue levothyroxine    Hyperlipidemia  Assessment & Plan  · Crestor substituted with Lipitor      Medical Problems     Resolved Problems  Date Reviewed: 9/20/2023   None       Discharging Physician / Practitioner: Tara Urbano, [facility]  PCP: Alex Jama MD  Admission Date:   Admission Orders (From admission, onward)     Ordered        09/18/23 [address]  Once                      Discharge Date: 09/20/23    Consultations During Hospital Stay:  · None    Procedures Performed:   · CXR: Cardiomegaly and mild pulmonary edema    Significant Findings / Test Results:   · COVID 19   · Hyponatremia    Incidental Findings:   · COVID-19, hyponatremia  · I reviewed the above mentioned incidental findings with the patient and/or family and they expressed understanding. Test Results Pending at Discharge (will require follow up): · None     Outpatient Tests Requested:  · None    Complications: None    Reason for Admission: COVID-19, hyponatremia    Hospital Course:   Anish Banks is a 79 y.o. female patient with a past medical history including coronary artery disease status post stenting, atrial flutter on Xarelto, chronic diastolic heart failure, hypertension, hyperlipidemia, hypothyroidism, pacemaker who originally presented to the hospital on 9/18/2023 due to weakness. Patient reported cough for 1 week. She was found to have COVID-19. She also was found to have hyponatremia. She was admitted for further evaluation and treatment. She was given gentle IV hydration. Her sodium quickly improved. She was eating and drinking well. She remained on room air. She strongly desired discharge home. She was seen ambulating around her room throughout the day without episodes of hypoxia. She is to discharge home on her home medications. She is to follow-up with cardiology. Please see above list of diagnoses and related plan for additional information.      Condition at Discharge: stable    Discharge Day Visit / Exam:   Subjective: Patient seen and examined at bedside. She is resting at edge of bed eating lunch. Her  is at the bedside. We communicated via writing. She offers no acute complaints aside from a runny nose and moist cough. She feels improved and desires discharge home today. She denies chest pain, shortness of breath, nausea, diarrhea. Vitals: Blood Pressure: 158/78 (09/20/23 1030)  Pulse: 77 (09/20/23 1030)  Temperature: 97.7 °F (36.5 °C) (09/20/23 0817)  Temp Source: Oral (09/20/23 0817)  Respirations: 22 (09/20/23 0817)  Height: 4' 7" (139.7 cm) (09/18/23 1300)  Weight - Scale: 60.6 kg (133 lb 8 oz) (09/18/23 1300)  SpO2: 96 % (09/20/23 0817)  Exam:   Physical Exam  Vitals and nursing note reviewed. Constitutional:       General: She is not in acute distress. Appearance: She is not toxic-appearing or diaphoretic. Comments: Pleasant, deaf female resting at edge of bed, communicating with written language   HENT:      Head: Normocephalic. Mouth/Throat:      Mouth: Mucous membranes are moist.   Eyes:      Conjunctiva/sclera: Conjunctivae normal.   Cardiovascular:      Rate and Rhythm: Normal rate. Pulmonary:      Effort: Pulmonary effort is normal.      Breath sounds: Normal breath sounds. No wheezing, rhonchi or rales. Abdominal:      General: Bowel sounds are normal.      Palpations: Abdomen is soft. Musculoskeletal:         General: Normal range of motion. Cervical back: Normal range of motion. Right lower leg: No edema. Left lower leg: No edema. Skin:     General: Skin is warm and dry. Capillary Refill: Capillary refill takes less than 2 seconds. Neurological:      Mental Status: She is alert and oriented to person, place, and time. Mental status is at baseline. Psychiatric:         Mood and Affect: Mood normal.         Behavior: Behavior normal.         Thought Content:  Thought content normal.          Discussion with Family: Updated  () at bedside. Discharge instructions/Information to patient and family:   See after visit summary for information provided to patient and family. Provisions for Follow-Up Care:  See after visit summary for information related to follow-up care and any pertinent home health orders. Disposition:   Home    Planned Readmission: None     Discharge Statement:  I spent > 30 minutes discharging the patient. This time was spent on the day of discharge. I had direct contact with the patient on the day of discharge. Greater than 50% of the total time was spent examining patient, answering all patient questions, arranging and discussing plan of care with patient as well as directly providing post-discharge instructions. Additional time then spent on discharge activities. Discharge Medications:  See after visit summary for reconciled discharge medications provided to patient and/or family.       **Please Note: This note may have been constructed using a voice recognition system**

## 2023-09-20 NOTE — ASSESSMENT & PLAN NOTE
Wt Readings from Last 3 Encounters:   09/18/23 60.6 kg (133 lb 8 oz)   09/05/23 57.6 kg (127 lb)   08/31/23 58.1 kg (128 lb)     No signs of exacerbation. No lower extremity edema. .   Chest x-ray did show some cardiomegaly and mild pulmonary edema, however, patient was not hypoxic  Patient was provided gentle IV hydration for hyponatremia  · Continue beta-blocker  · Not on diuretic  · Follow-up with cardiology in 1 month  · Monitor weights

## 2023-09-20 NOTE — PLAN OF CARE
Problem: Prexisting or High Potential for Compromised Skin Integrity  Goal: Skin integrity is maintained or improved  Description: INTERVENTIONS:  - Identify patients at risk for skin breakdown  - Assess and monitor skin integrity  - Assess and monitor nutrition and hydration status  - Monitor labs   - Assess for incontinence   - Turn and reposition patient  - Assist with mobility/ambulation  - Relieve pressure over bony prominences  - Avoid friction and shearing  - Provide appropriate hygiene as needed including keeping skin clean and dry  - Evaluate need for skin moisturizer/barrier cream  - Collaborate with interdisciplinary team   - Patient/family teaching  - Consider wound care consult   Outcome: Completed     Problem: SAFETY ADULT  Goal: Patient will remain free of falls  Description: INTERVENTIONS:  - Educate patient/family on patient safety including physical limitations  - Instruct patient to call for assistance with activity   - Consult OT/PT to assist with strengthening/mobility   - Keep Call bell within reach  - Keep bed low and locked with side rails adjusted as appropriate  - Keep care items and personal belongings within reach  - Initiate and maintain comfort rounds  - Make Fall Risk Sign visible to staff  - Offer Toileting every 2 Hours, in advance of need  - Initiate/Maintain bed alarm  - Apply yellow socks and bracelet for high fall risk patients  - Consider moving patient to room near nurses station  Outcome: Completed     Problem: DISCHARGE PLANNING  Goal: Discharge to home or other facility with appropriate resources  Description: INTERVENTIONS:  - Identify barriers to discharge w/patient and caregiver  - Arrange for needed discharge resources and transportation as appropriate  - Identify discharge learning needs (meds, wound care, etc.)  - Arrange for interpretive services to assist at discharge as needed  - Refer to Case Management Department for coordinating discharge planning if the patient needs post-hospital services based on physician/advanced practitioner order or complex needs related to functional status, cognitive ability, or social support system  Outcome: Completed     Problem: RESPIRATORY - ADULT  Goal: Achieves optimal ventilation and oxygenation  Description: INTERVENTIONS:  - Assess for changes in respiratory status  - Assess for changes in mentation and behavior  - Position to facilitate oxygenation and minimize respiratory effort  - Oxygen administered by appropriate delivery if ordered  - Initiate smoking cessation education as indicated  - Encourage broncho-pulmonary hygiene including cough, deep breathe, Incentive Spirometry  - Assess the need for suctioning and aspirate as needed  - Assess and instruct to report SOB or any respiratory difficulty  - Respiratory Therapy support as indicated  Outcome: Completed

## 2023-09-20 NOTE — NURSING NOTE
This nurse went over the AVS with the patient. IV was removed. Patient had no questions or concerns about being discharged. Patient was eager to go home.

## 2023-09-21 ENCOUNTER — TELEPHONE (OUTPATIENT)
Dept: FAMILY MEDICINE CLINIC | Facility: CLINIC | Age: 83
End: 2023-09-21

## 2023-09-21 ENCOUNTER — TRANSITIONAL CARE MANAGEMENT (OUTPATIENT)
Dept: FAMILY MEDICINE CLINIC | Facility: CLINIC | Age: 83
End: 2023-09-21

## 2023-09-21 LAB
BACTERIA SPT RESP CULT: NORMAL
GRAM STN SPEC: NORMAL
GRAM STN SPEC: NORMAL

## 2023-09-21 NOTE — TELEPHONE ENCOUNTER
First attempt to reach [facility]. I left a message with her daughter Mike Neff to call back on 9/21/23.  Tiny's number is listed as the primary number so I called her first. I want to schedule a TCM visit for her Pamela Brad  I will then have to contact an interpretor to accompany her at that appt Pamela Salinas

## 2023-09-21 NOTE — UTILIZATION REVIEW
NOTIFICATION OF ADMISSION DISCHARGE   This is a Notification of Discharge from [facility]. Please be advised that this patient has been discharge from our facility. Below you will find the admission and discharge date and time including the patient’s disposition. UTILIZATION REVIEW CONTACT:  Salina Vazquez  Utilization   Network Utilization Review Department  Phone: [phone] x carefully listen to the prompts. All voicemails are confidential.  Email: [email or web address]     ADMISSION INFORMATION  PRESENTATION DATE: 9/18/2023  8:28 AM  OBERVATION ADMISSION DATE:   INPATIENT ADMISSION DATE: 9/18/23 12:13 PM   DISCHARGE DATE: 9/20/2023  1:59 PM   DISPOSITION:Home/Self Care    IMPORTANT INFORMATION:  Send all requests for admission clinical reviews, approved or denied determinations and any other requests to dedicated fax number below belonging to the campus where the patient is receiving treatment.  List of dedicated fax numbers:  [place] DENIALS (Administrative/Medical Necessity) [phone] [facility] (Maternity/NICU/Pediatrics) [phone]   [facility] [phone]   [facility] [phone] [address] [phone]   [address] [place] [address] [phone]   [facility] [phone]   [address] [place] [address] [address] [place] [address] [phone]   [address] [place] [address] [phone] [address] [facility] [phone] [address] [facility] [address] [facility] [phone]

## 2023-09-22 NOTE — TELEPHONE ENCOUNTER
I left a second message for Corey to call back on 9/22/23  (message left on the number provided in this message) [facility]

## 2023-09-22 NOTE — TELEPHONE ENCOUNTER
Patient called to schedule her follow up since being released from the hospital. Best contact number is [phone]. Patient does need . Pt read [employer] message.    Ronna Frederick CMA

## 2023-09-22 NOTE — TELEPHONE ENCOUNTER
Patient called to schedule LORIN. I was told by the office staff she needs to speak with Geremias Llanos  and I communicated this thru the . She will await a call from the office.

## 2023-09-23 DIAGNOSIS — E78.5 DYSLIPIDEMIA: ICD-10-CM

## 2023-09-23 LAB
BACTERIA BLD CULT: NORMAL
BACTERIA BLD CULT: NORMAL

## 2023-09-25 RX ORDER — ROSUVASTATIN CALCIUM 10 MG/1
10 TABLET, COATED ORAL DAILY
Qty: 90 TABLET | Refills: 1 | Status: SHIPPED | OUTPATIENT
Start: 2023-09-25

## 2023-09-27 DIAGNOSIS — I10 ESSENTIAL HYPERTENSION: ICD-10-CM

## 2023-09-27 RX ORDER — LISINOPRIL 2.5 MG/1
2.5 TABLET ORAL DAILY
Qty: 90 TABLET | Refills: 3 | Status: SHIPPED | OUTPATIENT
Start: 2023-09-27

## 2023-10-04 ENCOUNTER — TELEPHONE (OUTPATIENT)
Age: 83
End: 2023-10-04

## 2023-10-04 ENCOUNTER — TRANSITIONAL CARE MANAGEMENT (OUTPATIENT)
Dept: FAMILY MEDICINE CLINIC | Facility: CLINIC | Age: 83
End: 2023-10-04

## 2023-10-18 ENCOUNTER — TELEPHONE (OUTPATIENT)
Dept: FAMILY MEDICINE CLINIC | Facility: CLINIC | Age: 83
End: 2023-10-18

## 2023-10-18 NOTE — TELEPHONE ENCOUNTER
Please call patient. She was just here for an appointment and has another scheduled for 10/25. I am happy to see her if she needs, but she does not need another follow up if she does not want to come in at this time.

## 2023-10-24 LAB
ALBUMIN SERPL-MCNC: 4.1 G/DL (ref 3.7–4.7)
ALBUMIN/GLOB SERPL: 1.4 {RATIO} (ref 1.2–2.2)
ALP SERPL-CCNC: 70 IU/L (ref 44–121)
ALT SERPL-CCNC: 9 IU/L (ref 0–32)
AST SERPL-CCNC: 23 IU/L (ref 0–40)
BASOPHILS # BLD AUTO: 0.1 X10E3/UL (ref 0–0.2)
BASOPHILS NFR BLD AUTO: 1 %
BILIRUB SERPL-MCNC: 1 MG/DL (ref 0–1.2)
BUN SERPL-MCNC: 16 MG/DL (ref 8–27)
BUN/CREAT SERPL: 16 (ref 12–28)
CALCIUM SERPL-MCNC: 9.8 MG/DL (ref 8.7–10.3)
CHLORIDE SERPL-SCNC: 101 MMOL/L (ref 96–106)
CHOLEST SERPL-MCNC: 150 MG/DL (ref 100–199)
CHOLEST/HDLC SERPL: 2.5 RATIO (ref 0–4.4)
CO2 SERPL-SCNC: 23 MMOL/L (ref 20–29)
CREAT SERPL-MCNC: 1.02 MG/DL (ref 0.57–1)
EGFR: 54 ML/MIN/1.73
EOSINOPHIL # BLD AUTO: 0.1 X10E3/UL (ref 0–0.4)
EOSINOPHIL NFR BLD AUTO: 2 %
ERYTHROCYTE [DISTWIDTH] IN BLOOD BY AUTOMATED COUNT: 13.1 % (ref 11.7–15.4)
GLOBULIN SER-MCNC: 2.9 G/DL (ref 1.5–4.5)
GLUCOSE SERPL-MCNC: 93 MG/DL (ref 70–99)
HCT VFR BLD AUTO: 45.7 % (ref 34–46.6)
HDLC SERPL-MCNC: 61 MG/DL
HGB BLD-MCNC: 14.8 G/DL (ref 11.1–15.9)
IMM GRANULOCYTES # BLD: 0 X10E3/UL (ref 0–0.1)
IMM GRANULOCYTES NFR BLD: 0 %
LDLC SERPL CALC-MCNC: 73 MG/DL (ref 0–99)
LYMPHOCYTES # BLD AUTO: 1.6 X10E3/UL (ref 0.7–3.1)
LYMPHOCYTES NFR BLD AUTO: 26 %
MCH RBC QN AUTO: 28.9 PG (ref 26.6–33)
MCHC RBC AUTO-ENTMCNC: 32.4 G/DL (ref 31.5–35.7)
MCV RBC AUTO: 89 FL (ref 79–97)
MICRODELETION SYND BLD/T FISH: NORMAL
MICRODELETION SYND BLD/T FISH: NORMAL
MONOCYTES # BLD AUTO: 0.8 X10E3/UL (ref 0.1–0.9)
MONOCYTES NFR BLD AUTO: 14 %
NEUTROPHILS # BLD AUTO: 3.6 X10E3/UL (ref 1.4–7)
NEUTROPHILS NFR BLD AUTO: 57 %
PLATELET # BLD AUTO: 214 X10E3/UL (ref 150–450)
POTASSIUM SERPL-SCNC: 5 MMOL/L (ref 3.5–5.2)
PROT SERPL-MCNC: 7 G/DL (ref 6–8.5)
RBC # BLD AUTO: 5.12 X10E6/UL (ref 3.77–5.28)
SL AMB VLDL CHOLESTEROL CALC: 16 MG/DL (ref 5–40)
SODIUM SERPL-SCNC: 137 MMOL/L (ref 134–144)
TRIGL SERPL-MCNC: 82 MG/DL (ref 0–149)
TSH SERPL DL<=0.005 MIU/L-ACNC: 2.29 UIU/ML (ref 0.45–4.5)
WBC # BLD AUTO: 6.2 X10E3/UL (ref 3.4–10.8)

## 2023-11-13 ENCOUNTER — OFFICE VISIT (OUTPATIENT)
Dept: CARDIOLOGY CLINIC | Facility: CLINIC | Age: 83
End: 2023-11-13
Payer: COMMERCIAL

## 2023-11-13 VITALS
BODY MASS INDEX: 29.62 KG/M2 | DIASTOLIC BLOOD PRESSURE: 80 MMHG | HEART RATE: 84 BPM | HEIGHT: 55 IN | OXYGEN SATURATION: 98 % | WEIGHT: 128 LBS | SYSTOLIC BLOOD PRESSURE: 138 MMHG

## 2023-11-13 DIAGNOSIS — Z95.0 CARDIAC PACEMAKER IN SITU: ICD-10-CM

## 2023-11-13 DIAGNOSIS — E78.5 DYSLIPIDEMIA: ICD-10-CM

## 2023-11-13 DIAGNOSIS — I25.10 CORONARY ARTERY DISEASE INVOLVING NATIVE CORONARY ARTERY OF NATIVE HEART WITHOUT ANGINA PECTORIS: ICD-10-CM

## 2023-11-13 DIAGNOSIS — E03.9 HYPOTHYROIDISM, UNSPECIFIED TYPE: ICD-10-CM

## 2023-11-13 DIAGNOSIS — I48.3 TYPICAL ATRIAL FLUTTER (HCC): Primary | ICD-10-CM

## 2023-11-13 PROCEDURE — 99214 OFFICE O/P EST MOD 30 MIN: CPT | Performed by: INTERNAL MEDICINE

## 2023-11-13 PROCEDURE — 93000 ELECTROCARDIOGRAM COMPLETE: CPT | Performed by: INTERNAL MEDICINE

## 2023-11-13 RX ORDER — ROSUVASTATIN CALCIUM 10 MG/1
10 TABLET, COATED ORAL DAILY
Qty: 90 TABLET | Refills: 1 | Status: SHIPPED | OUTPATIENT
Start: 2023-11-13

## 2023-11-13 RX ORDER — LEVOTHYROXINE SODIUM 0.07 MG/1
75 TABLET ORAL
Qty: 90 TABLET | Refills: 3 | Status: SHIPPED | OUTPATIENT
Start: 2023-11-13

## 2023-11-13 NOTE — PROGRESS NOTES
Cardiology Follow Up    Gloria Anderson  1940  [id number]      Interval History: Gloria Anderson is here for followup of  CHF, hypertension and atrial fibrillation. Since her last visit, she was admitted with COVID infection. During hospitalization, she was noted to have elevated BNP and pulmonary congestion. She received IV lasix. She felt good initially then started feeling weak and fatigued. She has chronic lightheadedness and dizziness. She also feels a buzzing sound in her head that is worse at night. It wakens her up from sleep. She denies any chest pain or shortness of breath. She has difficulty swallowing. In the past, she was seen by GI and underwent EGD with esophageal dilation. She did not return for follow-up with them because she believes she felt worse after procedure was done. She has a history of atrial fibrillation with multiple failed cardioversions in the past. She eventually had AV node ablation along with permanent pacemaker insertion by Dr. Marry Garza at [facility]. She was previously taking sotalol but stopped it independently because of side effects. Amiodarone was stopped because of GI side effects. Bystolic was stopped due to nausea. Amlodipine was stopped in the past because of LE edema. Echocardiogram in March 2020 showed EF of 45% with moderate to marked LA dilation. Echocardiogram in June 2023 showed ejection fraction of 45% with severe LA dilation and moderate pulmonary hypertension.       The following portions of the patient's history were reviewed and updated as appropriate: allergies, current medications, past family history, past medical history, past social history, past surgical history and problem list.    Current Outpatient Medications on File Prior to Visit   Medication Sig Dispense Refill    fluticasone (FLONASE) 50 mcg/act nasal spray 1 spray into each nostril daily  0    lisinopril (ZESTRIL) 2.5 mg tablet TAKE ONE TABLET BY MOUTH EVERY DAY 90 tablet 3    metoprolol succinate (TOPROL-XL) 25 mg 24 hr tablet TAKE ONE TABLET BY MOUTH EVERY DAY 90 tablet 3    Xarelto 20 MG tablet TAKE ONE TABLET BY MOUTH EVERY DAY WITH BREAKFAST 90 tablet 3    [DISCONTINUED] levothyroxine (Synthroid) 75 mcg tablet Take 1 tablet (75 mcg total) by mouth daily in the early morning BRAND MEDICALLY NECESSARY 90 tablet 3    [DISCONTINUED] rosuvastatin (CRESTOR) 10 MG tablet TAKE ONE TABLET BY MOUTH EVERY DAY 90 tablet 1     No current facility-administered medications on file prior to visit. Review of Systems:  Review of Systems   Constitutional:  Positive for fatigue. Respiratory:  Negative for shortness of breath. Cardiovascular:  Negative for chest pain, palpitations and leg swelling. Gastrointestinal:  Positive for abdominal distention and nausea. Musculoskeletal:  Positive for arthralgias. Neurological:  Positive for weakness, light-headedness and headaches. Physical Exam:  /80 (BP Location: Left arm, Patient Position: Sitting, Cuff Size: Standard)   Pulse 84   Ht 4' 7" (1.397 m)   Wt 58.1 kg (128 lb)   SpO2 98%   BMI 29.75 kg/m²     Physical Exam  Constitutional:       General: She is not in acute distress. Appearance: Normal appearance. She is well-developed. She is not diaphoretic. HENT:      Head: Normocephalic and atraumatic. Eyes:      General: No scleral icterus. Conjunctiva/sclera: Conjunctivae normal.      Pupils: Pupils are equal, round, and reactive to light. Neck:      Thyroid: No thyromegaly. Vascular: No JVD. Cardiovascular:      Rate and Rhythm: Normal rate. Rhythm irregular. Heart sounds: Normal heart sounds. No murmur heard. No friction rub. No gallop. Pulmonary:      Effort: Pulmonary effort is normal.      Breath sounds: Normal breath sounds. Musculoskeletal:      Cervical back: Neck supple.    Skin:     General: Skin is warm and dry. Findings: No erythema or rash. Neurological:      General: No focal deficit present. Mental Status: She is alert and oriented to person, place, and time. Mental status is at baseline. Psychiatric:         Mood and Affect: Mood normal.         Behavior: Behavior normal.         Thought Content: Thought content normal.         Judgment: Judgment normal.         Cardiographics  ECG:  Atrial flutter with Ventricular pacing and frequent PVCs    Labs:  Lab Results   Component Value Date     08/15/2017    K 5.0 10/23/2023     10/23/2023    CO2 23 10/23/2023    BUN 16 10/23/2023    CREATININE 1.02 (H) 10/23/2023    CREATININE 0.75 09/20/2023    CREATININE 0.88 08/15/2017    GLUCOSE 94 08/15/2017    CALCIUM 9.0 09/20/2023    CALCIUM 9.2 08/15/2017     Lab Results   Component Value Date    WBC 6.2 10/23/2023    WBC 5.02 09/19/2023    WBC 4.8 08/15/2017    HGB 14.8 10/23/2023    HGB 14.1 09/19/2023    HGB 14.2 08/15/2017    HCT 45.7 10/23/2023    HCT 40.3 09/19/2023    HCT 43.7 08/15/2017    MCV 89 10/23/2023    MCV 86 09/19/2023    MCV 86 08/15/2017     10/23/2023     09/19/2023     08/15/2017     Lab Results   Component Value Date    CHOL 164 08/15/2017    TRIG 82 10/23/2023    HDL 61 10/23/2023     Imaging: No results found. Discussion/Summary:  1. Typical atrial flutter ([address])    2. Hypothyroidism, unspecified type    3. Dyslipidemia    4. Coronary artery disease involving native coronary artery of native heart without angina pectoris    5. Cardiac pacemaker in situ      - BP is controlled today. She has multiple medication intolerances. We will continue lisinopril 2.5 mg daily. She is also using Toprol-XL 25 mg daily.   - She does not have any shortness of breath or LE edema suggestive of CHF at this time. - Will obtain pacemaker interrogation later this week. -A large amount of ventricular ectopy was present today and during previous exams.   Continue Toprol-XL 50 mg daily. She has had difficulties with beta-blockers in the past.  - Patient had AV node ablation along with pacemaker insertion.    - Patient is on Xarelto. Aspirin was discontinued and she was continued on Xarelto alone because of excessive bruising.

## 2023-11-14 ENCOUNTER — TELEPHONE (OUTPATIENT)
Dept: CARDIOLOGY CLINIC | Facility: CLINIC | Age: 83
End: 2023-11-14

## 2023-11-14 NOTE — TELEPHONE ENCOUNTER
PATIENT CALLED TO ASK IF SHE CAN TAKE TYLENOL FOR A HEAD ACHE.  IF SO HOW MANY MG    PATIENT CAN BE REACHED [phone]

## 2023-11-17 DIAGNOSIS — I10 ESSENTIAL HYPERTENSION: ICD-10-CM

## 2023-11-17 DIAGNOSIS — I25.10 CORONARY ARTERY DISEASE INVOLVING NATIVE CORONARY ARTERY OF NATIVE HEART WITHOUT ANGINA PECTORIS: ICD-10-CM

## 2023-11-17 RX ORDER — METOPROLOL SUCCINATE 25 MG/1
25 TABLET, EXTENDED RELEASE ORAL DAILY
Qty: 90 TABLET | Refills: 0 | Status: SHIPPED | OUTPATIENT
Start: 2023-11-17

## 2023-11-17 NOTE — TELEPHONE ENCOUNTER
Reason for call:   [x] Refill   [] Prior Auth  [] Other:     Office:   [] PCP/Provider -   [x] Specialty/Provider -     metoprolol succinate (TOPROL-XL) 25 mg 24 hr tablet [[id number]]    Order Details    Dose, Route, Frequency: As Directed   Dispense Quantity: 90 tablet Refills: 3          Sig: TAKE ONE TABLET BY MOUTH EVERY DAY               Pharmacy: [facility]    Does the patient have enough for 3 days?    [x] Yes   [] No - Send as HP to POD

## 2023-12-01 ENCOUNTER — TELEPHONE (OUTPATIENT)
Dept: CARDIOLOGY CLINIC | Facility: CLINIC | Age: 83
End: 2023-12-01

## 2023-12-01 NOTE — TELEPHONE ENCOUNTER
I received a call from pt (via [employer]), concerned that device is to be checked every 3 months and she did not receive a call. In appt desk, it appears that Remote Device Check on Nov 18 pt no showed. I told them to call back on Mon 12/4 when device person is here. I called Mikala Tamayo at [facility] who explained to me that her particular device needs to be manually set up for a remote check. I will discuss on Monday 12/4.

## 2023-12-21 ENCOUNTER — TELEPHONE (OUTPATIENT)
Dept: CARDIOLOGY CLINIC | Facility: CLINIC | Age: 83
End: 2023-12-21

## 2023-12-21 NOTE — TELEPHONE ENCOUNTER
Patient called with questions around the device and device box.   I provided device clinic phone number for better assistance.

## 2024-01-15 ENCOUNTER — IN-CLINIC DEVICE VISIT (OUTPATIENT)
Dept: CARDIOLOGY CLINIC | Facility: CLINIC | Age: 84
End: 2024-01-15
Payer: COMMERCIAL

## 2024-01-15 DIAGNOSIS — Z95.0 CARDIAC PACEMAKER IN SITU: Primary | ICD-10-CM

## 2024-01-15 PROCEDURE — 93279 PRGRMG DEV EVAL PM/LDLS PM: CPT | Performed by: INTERNAL MEDICINE

## 2024-01-15 NOTE — PROGRESS NOTES
Results for orders placed or performed in visit on 01/15/24   Cardiac EP device report    Narrative    MDT MICRA VR/ACTIVE SYSTEM IS MRI CONDITIONAL/AVN ABL  DEVICE INTERROGATED IN THE [place] OFFICE: BATTERY VOLTAGE ADEQUATE>8 YRS.  92%. ALL AVAILABLE ELECTRODE PARAMETERS & TRENDS WITHIN NORMAL LIMITS. NO PROGRAMMING CHANGES MADE TO DEVICE PARAMETERS. NORMAL DEVICE FUNCTION. NC

## 2024-01-15 NOTE — Clinical Note
Pt states she has been having sob(during activity & not), weakness, & dizziness for some time. Unsure if its the medicine, aftermath of COVID, or something new. Also has stress from taking care of sister. Finds herself sleeping a lot. She knows she is not herself lately and would like to know how or what to do to get better/stable. Her rate response is on.

## 2024-02-11 DIAGNOSIS — I10 ESSENTIAL HYPERTENSION: ICD-10-CM

## 2024-02-11 DIAGNOSIS — I25.10 CORONARY ARTERY DISEASE INVOLVING NATIVE CORONARY ARTERY OF NATIVE HEART WITHOUT ANGINA PECTORIS: ICD-10-CM

## 2024-02-12 RX ORDER — METOPROLOL SUCCINATE 25 MG/1
25 TABLET, EXTENDED RELEASE ORAL DAILY
Qty: 90 TABLET | Refills: 0 | Status: SHIPPED | OUTPATIENT
Start: 2024-02-12

## 2024-02-29 ENCOUNTER — OFFICE VISIT (OUTPATIENT)
Dept: FAMILY MEDICINE CLINIC | Facility: CLINIC | Age: 84
End: 2024-02-29
Payer: COMMERCIAL

## 2024-02-29 VITALS
RESPIRATION RATE: 18 BRPM | WEIGHT: 127.2 LBS | TEMPERATURE: 97 F | BODY MASS INDEX: 29.44 KG/M2 | DIASTOLIC BLOOD PRESSURE: 82 MMHG | HEIGHT: 55 IN | HEART RATE: 78 BPM | SYSTOLIC BLOOD PRESSURE: 150 MMHG

## 2024-02-29 DIAGNOSIS — I10 ESSENTIAL HYPERTENSION: Primary | ICD-10-CM

## 2024-02-29 DIAGNOSIS — N18.31 CHRONIC KIDNEY DISEASE, STAGE 3A (HCC): ICD-10-CM

## 2024-02-29 DIAGNOSIS — I50.32 CHRONIC DIASTOLIC CONGESTIVE HEART FAILURE (HCC): ICD-10-CM

## 2024-02-29 DIAGNOSIS — H91.93 BILATERAL DEAFNESS: ICD-10-CM

## 2024-02-29 DIAGNOSIS — I25.10 CORONARY ARTERY DISEASE INVOLVING NATIVE CORONARY ARTERY OF NATIVE HEART WITHOUT ANGINA PECTORIS: ICD-10-CM

## 2024-02-29 DIAGNOSIS — I48.3 TYPICAL ATRIAL FLUTTER (HCC): ICD-10-CM

## 2024-02-29 DIAGNOSIS — E03.9 HYPOTHYROIDISM, UNSPECIFIED TYPE: ICD-10-CM

## 2024-02-29 PROBLEM — Z86.79 HISTORY OF ATRIAL FLUTTER: Status: RESOLVED | Noted: 2019-08-29 | Resolved: 2024-02-29

## 2024-02-29 PROBLEM — U07.1 COVID: Status: RESOLVED | Noted: 2023-09-18 | Resolved: 2024-02-29

## 2024-02-29 PROCEDURE — 99214 OFFICE O/P EST MOD 30 MIN: CPT | Performed by: INTERNAL MEDICINE

## 2024-02-29 RX ORDER — METOPROLOL SUCCINATE 25 MG/1
12.5 TABLET, EXTENDED RELEASE ORAL DAILY
Qty: 90 TABLET | Refills: 0 | Status: SHIPPED | OUTPATIENT
Start: 2024-02-29

## 2024-02-29 NOTE — ASSESSMENT & PLAN NOTE
Wt Readings from Last 3 Encounters:   02/29/24 57.7 kg (127 lb 3.2 oz)   11/13/23 58.1 kg (128 lb)   09/18/23 60.6 kg (133 lb 8 oz)     Weight is stable and there are no signs or symptoms of fluid overload on today's exam.  Continue cardiology follow up.

## 2024-02-29 NOTE — ASSESSMENT & PLAN NOTE
Lab Results   Component Value Date    EGFR 54 (L) 10/23/2023    EGFR 73 09/20/2023    EGFR 75 09/19/2023    CREATININE 1.02 (H) 10/23/2023    CREATININE 0.75 09/20/2023    CREATININE 0.73 09/19/2023     Stable and will continue to monitor.

## 2024-02-29 NOTE — ASSESSMENT & PLAN NOTE
I have asked patient to call cardiology, and sent them a message, to make a follow up visit and to be evaluated in case throat pain is an anginal equivalent.  She has been having this symptom for a couple of years, and things have not worsened. Recommend ER for worsening symptoms.

## 2024-02-29 NOTE — ASSESSMENT & PLAN NOTE
This has been well controlled t home.  Due to her symptoms, she is asking to cut back on her medications.  I advised we can try to decrease the metoprolol to 1/2 tablet to see if it improves her symptoms, as she would like to stop this altogether but agrees to a compromise.  Continue lisinopril as ordered.

## 2024-02-29 NOTE — PROGRESS NOTES
Name: Corey Pulido      : 1940      MRN: [id number]  Encounter Provider: Irene Buck MD  Encounter Date: 2024   Encounter department: [facility]    Assessment & Plan     1. Essential hypertension  Assessment & Plan:  This has been well controlled t home.  Due to her symptoms, she is asking to cut back on her medications.  I advised we can try to decrease the metoprolol to 1/2 tablet to see if it improves her symptoms, as she would like to stop this altogether but agrees to a compromise.  Continue lisinopril as ordered.     Orders:  -     metoprolol succinate (TOPROL-XL) 25 mg 24 hr tablet; Take 0.5 tablets (12.5 mg total) by mouth daily    2. Coronary artery disease involving native coronary artery of native heart without angina pectoris  Assessment & Plan:  I have asked patient to call cardiology, and sent them a message, to make a follow up visit and to be evaluated in case throat pain is an anginal equivalent.  She has been having this symptom for a couple of years, and things have not worsened. Recommend ER for worsening symptoms.      Orders:  -     metoprolol succinate (TOPROL-XL) 25 mg 24 hr tablet; Take 0.5 tablets (12.5 mg total) by mouth daily    3. Chronic diastolic congestive heart failure (HCC)  Assessment & Plan:  Wt Readings from Last 3 Encounters:   24 57.7 kg (127 lb 3.2 oz)   23 58.1 kg (128 lb)   23 60.6 kg (133 lb 8 oz)     Weight is stable and there are no signs or symptoms of fluid overload on today's exam.  Continue cardiology follow up.            4. Typical atrial flutter (HCC)  Assessment & Plan:  Heart rate is regular currently, continue xarelto.      5. Chronic kidney disease, stage 3a (HCC)  Assessment & Plan:  Lab Results   Component Value Date    EGFR 54 (L) 10/23/2023    EGFR 73 2023    EGFR 75 2023    CREATININE 1.02 (H) 10/23/2023    CREATININE 0.75 2023    CREATININE 0.73 2023     Stable and will continue  to monitor.      6. Bilateral deafness  Assessment & Plan:  Visit was performed through an .      7. Hypothyroidism, unspecified type  Assessment & Plan:  Well controlled and will continue current dose of levothyroxine.             Subjective     Here with her  for a follow up.  Visit was conducted through an .  She is not feeling well.  She feels anxious and has been having throat pain with exertion, particularly with stairs or with hills.  Rest and water help. There is no chest pain but she does feel dyspneic with this.  She feels her pills are making her sick and feel weak and tired.  Her legs feel like jelly. She does not want PT for this.  She does not want to take any more medication, and does not feel she needs something for her anxiety.   Is eating and drinking well.  Has been following with cardiology as well.       Review of Systems   Constitutional:  Positive for fatigue.   Respiratory:  Positive for shortness of breath.    Cardiovascular: Negative.  Negative for leg swelling.   Endocrine: Negative.        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   • Wears glasses      Past Surgical History:   Procedure Laterality Date   • BREAST BIOPSY Left     negative   • CARDIAC PACEMAKER PLACEMENT  03/2021    medtronic   • CARDIAC SURGERY      stents   • COLONOSCOPY     • ESOPHAGOGASTRODUODENOSCOPY N/A 02/20/2017    Procedure: ESOPHAGOGASTRODUODENOSCOPY (EGD);  Surgeon: Edouard Rojas MD;  Location: [facility] GI LAB;  Service:      Family History   Problem Relation Age of Onset   • Cancer Mother         breast   • Hypertension Mother         essential    • Breast cancer Mother    • Other Father         smoker   • Tuberculosis Father    • No Known Problems Sister    • No Known Problems Sister    • No Known Problems Sister    • No Known Problems Daughter      Social History     Socioeconomic History   • Marital status: /Civil Union     Spouse name: None   • Number of children: None   • Years of education: None   • Highest education level: None   Occupational History   • None   Tobacco Use   • Smoking status: Never     Passive exposure: Past   • Smokeless tobacco: Never   Vaping Use   • Vaping status: Never Used   Substance and Sexual Activity   • Alcohol use: Not Currently     Alcohol/week: 0.0 standard drinks of alcohol   • Drug use: No   • Sexual activity: None   Other Topics Concern   • None   Social History Narrative   • None     Social Determinants of Health     Financial Resource Strain: Low Risk  (8/31/2023)    Overall Financial Resource Strain (CARDIA)    • Difficulty of Paying Living Expenses: Not hard at all   Food Insecurity: Not on file   Transportation Needs: No Transportation Needs (8/31/2023)    PRAPARE - Transportation    • Lack of Transportation (Medical): No    • Lack of Transportation (Non-Medical): No   Physical Activity: Not on file   Stress: Not on file   Social Connections: Not on file   Intimate Partner Violence: Not on file   Housing Stability: Not on file     Current Outpatient Medications on File Prior to Visit   Medication Sig   • fluticasone (FLONASE) 50 mcg/act nasal spray 1 spray into each nostril daily (Patient taking differently: 1 spray into each nostril if needed)   • levothyroxine (Synthroid) 75 mcg tablet Take 1 tablet (75 mcg total) by mouth daily in the early morning BRAND MEDICALLY NECESSARY   • lisinopril (ZESTRIL) 2.5 mg tablet TAKE ONE TABLET BY MOUTH EVERY DAY   • rosuvastatin (CRESTOR) 10 MG tablet Take 1 tablet (10 mg total) by mouth daily   • Xarelto 20 MG tablet TAKE ONE TABLET BY MOUTH EVERY DAY WITH BREAKFAST  "  • [DISCONTINUED] metoprolol succinate (TOPROL-XL) 25 mg 24 hr tablet TAKE ONE TABLET BY MOUTH EVERY DAY     Allergies   Allergen Reactions   • Sotalol      Vomiting, convulsing, rash, cough   • Famotidine Throat Swelling     Immunization History   Administered Date(s) Administered   • COVID-19 MODERNA VACC 0.25 ML IM BOOSTER 11/30/2021   • COVID-19 MODERNA VACC 0.5 ML IM 04/01/2021, 04/29/2021, 11/30/2021   • Influenza Split High Dose Preservative Free IM 10/22/2012, 11/07/2013, 11/07/2013, 10/05/2015, 11/03/2016, 11/02/2017   • Influenza, high dose seasonal 0.7 mL 12/03/2018, 01/06/2020, 10/11/2020, 10/13/2021, 10/26/2022, 08/31/2023   • Influenza, seasonal, injectable 12/21/2011, 11/03/2014   • Pneumococcal Conjugate 13-Valent 11/03/2016   • Pneumococcal Conjugate PCV 7 12/21/2011   • Pneumococcal Polysaccharide PPV23 11/04/2020   • Tdap 07/02/2013       Objective     /82   Pulse 78   Temp (!) 97 °F (36.1 °C)   Resp 18   Ht 4' 7\" (1.397 m)   Wt 57.7 kg (127 lb 3.2 oz)   BMI 29.56 kg/m²     Physical Exam  Constitutional:       Appearance: She is well-developed.   Eyes:      Conjunctiva/sclera: Conjunctivae normal.   Neck:      Thyroid: No thyromegaly.      Vascular: No JVD.   Cardiovascular:      Rate and Rhythm: Normal rate and regular rhythm.      Heart sounds: Normal heart sounds. No murmur heard.     No friction rub. No gallop.   Pulmonary:      Effort: Pulmonary effort is normal.      Breath sounds: Normal breath sounds. No wheezing or rales.   Abdominal:      General: Bowel sounds are normal. There is no distension.      Palpations: Abdomen is soft.      Tenderness: There is no abdominal tenderness.   Musculoskeletal:      Cervical back: Neck supple.       Irene Buck MD    "

## 2024-03-02 DIAGNOSIS — I25.10 CORONARY ARTERY DISEASE INVOLVING NATIVE CORONARY ARTERY OF NATIVE HEART WITHOUT ANGINA PECTORIS: Primary | ICD-10-CM

## 2024-03-08 ENCOUNTER — HOSPITAL ENCOUNTER (OUTPATIENT)
Dept: RADIOLOGY | Facility: HOSPITAL | Age: 84
Discharge: HOME | End: 2024-03-08
Attending: INTERNAL MEDICINE
Payer: COMMERCIAL

## 2024-03-08 ENCOUNTER — HOSPITAL ENCOUNTER (OUTPATIENT)
Dept: NON INVASIVE DIAGNOSTICS | Facility: HOSPITAL | Age: 84
Discharge: HOME | End: 2024-03-08
Attending: INTERNAL MEDICINE
Payer: COMMERCIAL

## 2024-03-08 VITALS
OXYGEN SATURATION: 94 % | RESPIRATION RATE: 20 BRPM | SYSTOLIC BLOOD PRESSURE: 180 MMHG | HEART RATE: 70 BPM | DIASTOLIC BLOOD PRESSURE: 84 MMHG

## 2024-03-08 DIAGNOSIS — I25.10 CORONARY ARTERY DISEASE INVOLVING NATIVE CORONARY ARTERY OF NATIVE HEART WITHOUT ANGINA PECTORIS: ICD-10-CM

## 2024-03-08 LAB
CHEST PAIN STATEMENT: NORMAL
MAX DIASTOLIC BP: 84 MMHG
MAX HR PERCENT: 56 %
MAX HR: 77 BPM
MAX PREDICTED HEART RATE: 136 BPM
PROTOCOL NAME: NORMAL
RATE PRESSURE PRODUCT: NORMAL
REASON FOR TERMINATION: NORMAL
SL CV REST NUCLEAR ISOTOPE DOSE: 10.72 MCI
SL CV STRESS NUCLEAR ISOTOPE DOSE: 32.1 MCI
SL CV STRESS RECOVERY BP: NORMAL MMHG
SL CV STRESS RECOVERY HR: 72 BPM
SL CV STRESS RECOVERY O2 SAT: 97 %
STRESS ANGINA INDEX: 1
STRESS BASELINE BP: NORMAL MMHG
STRESS BASELINE HR: 70 BPM
STRESS O2 SAT REST: 94 %
STRESS PEAK HR: 69 BPM
STRESS POST ESTIMATED WORKLOAD: 1 METS
STRESS POST EXERCISE DUR MIN: 1 MIN
STRESS POST EXERCISE DUR MIN: 1 MIN
STRESS POST EXERCISE DUR SEC: 0 SEC
STRESS POST O2 SAT PEAK: 98 %
STRESS POST PEAK BP: 170 MMHG
STRESS POST PEAK HR: 77 BPM
STRESS POST PEAK SYSTOLIC BP: 180 MMHG
TARGET HR FORMULA: NORMAL
TEST INDICATION: NORMAL

## 2024-03-08 PROCEDURE — 93016 CV STRESS TEST SUPVJ ONLY: CPT | Performed by: INTERNAL MEDICINE

## 2024-03-08 PROCEDURE — A9502 TC99M TETROFOSMIN: HCPCS

## 2024-03-08 PROCEDURE — 78452 HT MUSCLE IMAGE SPECT MULT: CPT | Performed by: INTERNAL MEDICINE

## 2024-03-08 PROCEDURE — 93017 CV STRESS TEST TRACING ONLY: CPT

## 2024-03-08 PROCEDURE — 93018 CV STRESS TEST I&R ONLY: CPT | Performed by: INTERNAL MEDICINE

## 2024-03-08 PROCEDURE — 78452 HT MUSCLE IMAGE SPECT MULT: CPT

## 2024-03-08 RX ORDER — REGADENOSON 0.08 MG/ML
0.4 INJECTION, SOLUTION INTRAVENOUS ONCE
Status: COMPLETED | OUTPATIENT
Start: 2024-03-08 | End: 2024-03-08

## 2024-03-08 RX ADMIN — REGADENOSON 0.4 MG: 0.08 INJECTION, SOLUTION INTRAVENOUS at 11:02

## 2024-03-11 ENCOUNTER — TELEPHONE (OUTPATIENT)
Dept: CARDIOLOGY CLINIC | Facility: CLINIC | Age: 84
End: 2024-03-11

## 2024-03-11 NOTE — TELEPHONE ENCOUNTER
----- Message from Rashawn Fine DO sent at 3/8/2024  5:51 PM EST -----  Can you please let the patient know stress test was a little abnormal.   Set up appointment to go over

## 2024-03-11 NOTE — TELEPHONE ENCOUNTER
Dr Fine's first available is April 22 to come in to discuss tests & we must know if pt must be must be double-booked to come in sooner.Please advise.

## 2024-03-19 ENCOUNTER — TELEPHONE (OUTPATIENT)
Dept: CARDIOLOGY CLINIC | Facility: CLINIC | Age: 84
End: 2024-03-19

## 2024-03-19 NOTE — TELEPHONE ENCOUNTER
Pt  called via sign . States that when she went for nm stress test and had to lay down and get in the machine she started shaking and feels she hurt her back. States she is in extreme pain and can barely walk. Pls advise. Thank you

## 2024-03-19 NOTE — TELEPHONE ENCOUNTER
Patient called back via ASL intepreter.  She stated that she has been waiting a while for someone to get back to her from the message she left this morning.  I explained that Dr. Fine has been seeing patients all day and we will reach out to patient when we hear anything.

## 2024-03-20 ENCOUNTER — APPOINTMENT (EMERGENCY)
Dept: RADIOLOGY | Facility: HOSPITAL | Age: 84
DRG: 543 | End: 2024-03-20
Payer: COMMERCIAL

## 2024-03-20 ENCOUNTER — HOSPITAL ENCOUNTER (INPATIENT)
Facility: HOSPITAL | Age: 84
LOS: 2 days | Discharge: HOME HEALTH | DRG: 543 | End: 2024-03-23
Attending: EMERGENCY MEDICINE | Admitting: FAMILY MEDICINE
Payer: COMMERCIAL

## 2024-03-20 ENCOUNTER — APPOINTMENT (OUTPATIENT)
Dept: RADIOLOGY | Facility: HOSPITAL | Age: 84
DRG: 543 | End: 2024-03-20
Payer: COMMERCIAL

## 2024-03-20 DIAGNOSIS — S32.010A CLOSED COMPRESSION FRACTURE OF BODY OF L1 VERTEBRA (HCC): Primary | ICD-10-CM

## 2024-03-20 DIAGNOSIS — I25.10 CORONARY ARTERY DISEASE INVOLVING NATIVE CORONARY ARTERY OF NATIVE HEART WITHOUT ANGINA PECTORIS: ICD-10-CM

## 2024-03-20 DIAGNOSIS — E87.1 HYPONATREMIA: ICD-10-CM

## 2024-03-20 DIAGNOSIS — K21.9 GERD (GASTROESOPHAGEAL REFLUX DISEASE): ICD-10-CM

## 2024-03-20 DIAGNOSIS — I10 ESSENTIAL HYPERTENSION: ICD-10-CM

## 2024-03-20 DIAGNOSIS — S32.010A COMPRESSION FRACTURE OF L1 LUMBAR VERTEBRA (HCC): ICD-10-CM

## 2024-03-20 DIAGNOSIS — K59.00 CONSTIPATION: ICD-10-CM

## 2024-03-20 DIAGNOSIS — R33.9 URINARY RETENTION: ICD-10-CM

## 2024-03-20 DIAGNOSIS — R52 INTRACTABLE PAIN: ICD-10-CM

## 2024-03-20 PROBLEM — I50.22 CHRONIC SYSTOLIC CONGESTIVE HEART FAILURE (HCC): Status: ACTIVE | Noted: 2019-01-24

## 2024-03-20 LAB
2HR DELTA HS TROPONIN: -2 NG/L
ALBUMIN SERPL BCP-MCNC: 3.7 G/DL (ref 3.5–5)
ALP SERPL-CCNC: 51 U/L (ref 34–104)
ALT SERPL W P-5'-P-CCNC: 10 U/L (ref 7–52)
AMORPH URATE CRY URNS QL MICRO: NORMAL /HPF
ANION GAP SERPL CALCULATED.3IONS-SCNC: 6 MMOL/L (ref 4–13)
AST SERPL W P-5'-P-CCNC: 18 U/L (ref 13–39)
BACTERIA UR QL AUTO: NORMAL /HPF
BASOPHILS # BLD AUTO: 0.04 THOUSANDS/ÂΜL (ref 0–0.1)
BASOPHILS NFR BLD AUTO: 1 % (ref 0–1)
BILIRUB SERPL-MCNC: 0.9 MG/DL (ref 0.2–1)
BILIRUB UR QL STRIP: NEGATIVE
BNP SERPL-MCNC: 481 PG/ML (ref 0–100)
BUN SERPL-MCNC: 16 MG/DL (ref 5–25)
CALCIUM SERPL-MCNC: 9.1 MG/DL (ref 8.4–10.2)
CARDIAC TROPONIN I PNL SERPL HS: 10 NG/L
CARDIAC TROPONIN I PNL SERPL HS: 12 NG/L
CHLORIDE SERPL-SCNC: 101 MMOL/L (ref 96–108)
CLARITY UR: CLEAR
CO2 SERPL-SCNC: 25 MMOL/L (ref 21–32)
COLOR UR: ABNORMAL
CREAT SERPL-MCNC: 0.77 MG/DL (ref 0.6–1.3)
EOSINOPHIL # BLD AUTO: 0.07 THOUSAND/ÂΜL (ref 0–0.61)
EOSINOPHIL NFR BLD AUTO: 1 % (ref 0–6)
ERYTHROCYTE [DISTWIDTH] IN BLOOD BY AUTOMATED COUNT: 13.7 % (ref 11.6–15.1)
GFR SERPL CREATININE-BSD FRML MDRD: 71 ML/MIN/1.73SQ M
GLUCOSE SERPL-MCNC: 95 MG/DL (ref 65–140)
GLUCOSE UR STRIP-MCNC: NEGATIVE MG/DL
HCT VFR BLD AUTO: 44.2 % (ref 34.8–46.1)
HGB BLD-MCNC: 14.8 G/DL (ref 11.5–15.4)
HGB UR QL STRIP.AUTO: ABNORMAL
IMM GRANULOCYTES # BLD AUTO: 0.03 THOUSAND/UL (ref 0–0.2)
IMM GRANULOCYTES NFR BLD AUTO: 0 % (ref 0–2)
KETONES UR STRIP-MCNC: NEGATIVE MG/DL
LEUKOCYTE ESTERASE UR QL STRIP: NEGATIVE
LYMPHOCYTES # BLD AUTO: 1.11 THOUSANDS/ÂΜL (ref 0.6–4.47)
LYMPHOCYTES NFR BLD AUTO: 15 % (ref 14–44)
MCH RBC QN AUTO: 28.7 PG (ref 26.8–34.3)
MCHC RBC AUTO-ENTMCNC: 33.5 G/DL (ref 31.4–37.4)
MCV RBC AUTO: 86 FL (ref 82–98)
MONOCYTES # BLD AUTO: 0.79 THOUSAND/ÂΜL (ref 0.17–1.22)
MONOCYTES NFR BLD AUTO: 11 % (ref 4–12)
NEUTROPHILS # BLD AUTO: 5.18 THOUSANDS/ÂΜL (ref 1.85–7.62)
NEUTS SEG NFR BLD AUTO: 72 % (ref 43–75)
NITRITE UR QL STRIP: NEGATIVE
NON-SQ EPI CELLS URNS QL MICRO: NORMAL /HPF
NRBC BLD AUTO-RTO: 0 /100 WBCS
PH UR STRIP.AUTO: 6.5 [PH]
PLATELET # BLD AUTO: 188 THOUSANDS/UL (ref 149–390)
PMV BLD AUTO: 9.2 FL (ref 8.9–12.7)
POTASSIUM SERPL-SCNC: 4.7 MMOL/L (ref 3.5–5.3)
PROT SERPL-MCNC: 6.9 G/DL (ref 6.4–8.4)
PROT UR STRIP-MCNC: NEGATIVE MG/DL
RBC # BLD AUTO: 5.16 MILLION/UL (ref 3.81–5.12)
RBC #/AREA URNS AUTO: NORMAL /HPF
SODIUM SERPL-SCNC: 132 MMOL/L (ref 135–147)
SP GR UR STRIP.AUTO: <=1.005 (ref 1–1.03)
UROBILINOGEN UR QL STRIP.AUTO: 0.2 E.U./DL
WBC # BLD AUTO: 7.22 THOUSAND/UL (ref 4.31–10.16)
WBC #/AREA URNS AUTO: NORMAL /HPF

## 2024-03-20 PROCEDURE — 96374 THER/PROPH/DIAG INJ IV PUSH: CPT

## 2024-03-20 PROCEDURE — 99285 EMERGENCY DEPT VISIT HI MDM: CPT

## 2024-03-20 PROCEDURE — 71045 X-RAY EXAM CHEST 1 VIEW: CPT

## 2024-03-20 PROCEDURE — 96375 TX/PRO/DX INJ NEW DRUG ADDON: CPT

## 2024-03-20 PROCEDURE — 80053 COMPREHEN METABOLIC PANEL: CPT | Performed by: EMERGENCY MEDICINE

## 2024-03-20 PROCEDURE — 81001 URINALYSIS AUTO W/SCOPE: CPT | Performed by: EMERGENCY MEDICINE

## 2024-03-20 PROCEDURE — 83880 ASSAY OF NATRIURETIC PEPTIDE: CPT | Performed by: FAMILY MEDICINE

## 2024-03-20 PROCEDURE — 36415 COLL VENOUS BLD VENIPUNCTURE: CPT | Performed by: EMERGENCY MEDICINE

## 2024-03-20 PROCEDURE — 96376 TX/PRO/DX INJ SAME DRUG ADON: CPT

## 2024-03-20 PROCEDURE — 74177 CT ABD & PELVIS W/CONTRAST: CPT

## 2024-03-20 PROCEDURE — 85025 COMPLETE CBC W/AUTO DIFF WBC: CPT | Performed by: EMERGENCY MEDICINE

## 2024-03-20 PROCEDURE — 99285 EMERGENCY DEPT VISIT HI MDM: CPT | Performed by: EMERGENCY MEDICINE

## 2024-03-20 PROCEDURE — 84484 ASSAY OF TROPONIN QUANT: CPT | Performed by: FAMILY MEDICINE

## 2024-03-20 PROCEDURE — 99223 1ST HOSP IP/OBS HIGH 75: CPT | Performed by: FAMILY MEDICINE

## 2024-03-20 PROCEDURE — 93005 ELECTROCARDIOGRAM TRACING: CPT

## 2024-03-20 RX ORDER — LEVOTHYROXINE SODIUM 0.07 MG/1
75 TABLET ORAL
Status: DISCONTINUED | OUTPATIENT
Start: 2024-03-21 | End: 2024-03-23 | Stop reason: HOSPADM

## 2024-03-20 RX ORDER — ACETAMINOPHEN 325 MG/1
975 TABLET ORAL EVERY 8 HOURS SCHEDULED
Status: DISCONTINUED | OUTPATIENT
Start: 2024-03-20 | End: 2024-03-23 | Stop reason: HOSPADM

## 2024-03-20 RX ORDER — PRAVASTATIN SODIUM 80 MG/1
80 TABLET ORAL
Status: DISCONTINUED | OUTPATIENT
Start: 2024-03-21 | End: 2024-03-23 | Stop reason: HOSPADM

## 2024-03-20 RX ORDER — HYDROMORPHONE HCL/PF 1 MG/ML
0.5 SYRINGE (ML) INJECTION ONCE
Status: COMPLETED | OUTPATIENT
Start: 2024-03-20 | End: 2024-03-20

## 2024-03-20 RX ORDER — ACETAMINOPHEN 10 MG/ML
1000 INJECTION, SOLUTION INTRAVENOUS ONCE
Status: COMPLETED | OUTPATIENT
Start: 2024-03-20 | End: 2024-03-20

## 2024-03-20 RX ORDER — MORPHINE SULFATE 4 MG/ML
4 INJECTION, SOLUTION INTRAMUSCULAR; INTRAVENOUS ONCE
Status: COMPLETED | OUTPATIENT
Start: 2024-03-20 | End: 2024-03-20

## 2024-03-20 RX ORDER — LIDOCAINE 50 MG/G
1 PATCH TOPICAL DAILY
Status: DISCONTINUED | OUTPATIENT
Start: 2024-03-21 | End: 2024-03-23 | Stop reason: HOSPADM

## 2024-03-20 RX ORDER — ONDANSETRON 2 MG/ML
4 INJECTION INTRAMUSCULAR; INTRAVENOUS ONCE
Status: COMPLETED | OUTPATIENT
Start: 2024-03-20 | End: 2024-03-20

## 2024-03-20 RX ORDER — METHOCARBAMOL 500 MG/1
500 TABLET, FILM COATED ORAL EVERY 8 HOURS SCHEDULED
Status: DISCONTINUED | OUTPATIENT
Start: 2024-03-20 | End: 2024-03-23 | Stop reason: HOSPADM

## 2024-03-20 RX ORDER — METOPROLOL SUCCINATE 25 MG/1
25 TABLET, EXTENDED RELEASE ORAL DAILY
Status: DISCONTINUED | OUTPATIENT
Start: 2024-03-21 | End: 2024-03-23 | Stop reason: HOSPADM

## 2024-03-20 RX ORDER — LISINOPRIL 2.5 MG/1
2.5 TABLET ORAL DAILY
Status: DISCONTINUED | OUTPATIENT
Start: 2024-03-21 | End: 2024-03-22

## 2024-03-20 RX ORDER — ONDANSETRON 2 MG/ML
4 INJECTION INTRAMUSCULAR; INTRAVENOUS ONCE
Status: DISCONTINUED | OUTPATIENT
Start: 2024-03-20 | End: 2024-03-20

## 2024-03-20 RX ORDER — FLUTICASONE PROPIONATE 50 MCG
1 SPRAY, SUSPENSION (ML) NASAL DAILY
Status: DISCONTINUED | OUTPATIENT
Start: 2024-03-21 | End: 2024-03-23 | Stop reason: HOSPADM

## 2024-03-20 RX ADMIN — ACETAMINOPHEN 1000 MG: 10 INJECTION INTRAVENOUS at 13:36

## 2024-03-20 RX ADMIN — MORPHINE SULFATE 2 MG: 2 INJECTION, SOLUTION INTRAMUSCULAR; INTRAVENOUS at 13:36

## 2024-03-20 RX ADMIN — HYDROMORPHONE HYDROCHLORIDE 0.5 MG: 1 INJECTION, SOLUTION INTRAMUSCULAR; INTRAVENOUS; SUBCUTANEOUS at 18:54

## 2024-03-20 RX ADMIN — ONDANSETRON 4 MG: 2 INJECTION INTRAMUSCULAR; INTRAVENOUS at 19:47

## 2024-03-20 RX ADMIN — METHOCARBAMOL TABLETS 500 MG: 500 TABLET, COATED ORAL at 22:51

## 2024-03-20 RX ADMIN — MORPHINE SULFATE 4 MG: 4 INJECTION INTRAVENOUS at 16:16

## 2024-03-20 RX ADMIN — ACETAMINOPHEN 975 MG: 325 TABLET ORAL at 22:51

## 2024-03-20 RX ADMIN — IOHEXOL 100 ML: 350 INJECTION, SOLUTION INTRAVENOUS at 14:21

## 2024-03-20 NOTE — ED NOTES
utilized for communication with patient.   ID #[id number].  Pt c/o lots of pain in her back.  Informed that IV tylenol has been infused.  Pt informed, will let provider know of her continued pain. Took off bedpan, urine specimen collected, pt cleaned and repositioned in bed.  Understands and is ok with plan.     Yesy Varner RN  03/20/24 3417

## 2024-03-20 NOTE — ED PROVIDER NOTES
History  Chief Complaint   Patient presents with    Back Pain     Pt deaf and communicating with other deaf relative, c/o back pain, wearing otc patch on area. No trauma, no urinary issue     Patient presents for evaluation of back pain.  Getting progressively workups over several days after she went for nuclear stress test.  Denies any falls or trauma.  No leg weakness or numbness.  However is difficult to walk secondary to pain feels like it is locking up and unable to sleep at home.      History provided by:  Patient and relative   used: Yes ([employer] ipad)    Back Pain      Prior to Admission Medications   Prescriptions Last Dose Informant Patient Reported? Taking?   Xarelto 20 MG tablet  Self No No   Sig: TAKE ONE TABLET BY MOUTH EVERY DAY WITH BREAKFAST   fluticasone (FLONASE) 50 mcg/act nasal spray   No No   Si spray into each nostril daily   Patient taking differently: 1 spray into each nostril if needed   levothyroxine (Synthroid) 75 mcg tablet   No No   Sig: Take 1 tablet (75 mcg total) by mouth daily in the early morning BRAND MEDICALLY NECESSARY   lisinopril (ZESTRIL) 2.5 mg tablet   No No   Sig: TAKE ONE TABLET BY MOUTH EVERY DAY   metoprolol succinate (TOPROL-XL) 25 mg 24 hr tablet   No No   Sig: Take 0.5 tablets (12.5 mg total) by mouth daily   Patient taking differently: Take 25 mg by mouth daily   rosuvastatin (CRESTOR) 10 MG tablet   No No   Sig: Take 1 tablet (10 mg total) by mouth daily      Facility-Administered Medications: None       Past Medical History:   Diagnosis Date    Atrial flutter (HCC)     CAD (coronary artery disease)     s/p PCIx1 -2012    Coronary artery disease     Deafness     Disease of thyroid gland     GERD (gastroesophageal reflux disease)     Hyperlipidemia     Hypertension     Hypomagnesemia 2023    Itching     of the face, throat    Pacemaker 2021    medtronic-left chest    Wears glasses        Past Surgical History:   Procedure Laterality  Date    BREAST BIOPSY Left     negative    CARDIAC PACEMAKER PLACEMENT  03/2021    medtronic    CARDIAC SURGERY      stents    COLONOSCOPY      ESOPHAGOGASTRODUODENOSCOPY N/A 02/20/2017    Procedure: ESOPHAGOGASTRODUODENOSCOPY (EGD);  Surgeon: Edouard Rojas MD;  Location: [facility] GI LAB;  Service:        Family History   Problem Relation Age of Onset    Cancer Mother         breast    Hypertension Mother         essential    Breast cancer Mother     Other Father         smoker    Tuberculosis Father     No Known Problems Sister     No Known Problems Sister     No Known Problems Sister     No Known Problems Daughter      I have reviewed and agree with the history as documented.    E-Cigarette/Vaping    E-Cigarette Use Never User      E-Cigarette/Vaping Substances    Nicotine No     THC No     CBD No     Flavoring No     Other No     Unknown No      Social History     Tobacco Use    Smoking status: Never     Passive exposure: Past    Smokeless tobacco: Never   Vaping Use    Vaping status: Never Used   Substance Use Topics    Alcohol use: Not Currently     Alcohol/week: 0.0 standard drinks of alcohol    Drug use: No       Review of Systems   Musculoskeletal:  Positive for back pain.   All other systems reviewed and are negative.      Physical Exam  Physical Exam  Vitals and nursing note reviewed.   Constitutional:       General: She is not in acute distress.  Cardiovascular:      Rate and Rhythm: Normal rate and regular rhythm.   Pulmonary:      Effort: Pulmonary effort is normal. No respiratory distress.      Breath sounds: Normal breath sounds.   Abdominal:      Tenderness: There is no abdominal tenderness.   Musculoskeletal:         General: Tenderness present. Normal range of motion.        Arms:    Skin:     Capillary Refill: Capillary refill takes less than 2 seconds.      Findings: No rash.   Neurological:      General: No focal deficit present.      Mental Status: She is alert and oriented to person, place, and  time.         Vital Signs  ED Triage Vitals [03/20/24 1246]   Temperature Pulse Respirations Blood Pressure SpO2   98.2 °F (36.8 °C) 82 20 (!) 186/86 98 %      Temp Source Heart Rate Source Patient Position - Orthostatic VS BP Location FiO2 (%)   Tympanic Monitor Sitting Right arm --      Pain Score       10 - Worst Possible Pain           Vitals:    03/20/24 1246 03/20/24 1619 03/20/24 1700   BP: (!) 186/86 (!) 173/82 162/77   Pulse: 82 71 70   Patient Position - Orthostatic VS: Sitting Lying          Visual Acuity      ED Medications  Medications   HYDROmorphone (DILAUDID) injection 0.5 mg (has no administration in time range)   acetaminophen (Ofirmev) injection 1,000 mg (0 mg Intravenous Stopped 3/20/24 1351)   morphine injection 2 mg (2 mg Intravenous Given 3/20/24 1336)   iohexol (OMNIPAQUE) 350 MG/ML injection (MULTI-DOSE) 100 mL (100 mL Intravenous Given 3/20/24 1421)   morphine injection 4 mg (4 mg Intravenous Given 3/20/24 1616)       Diagnostic Studies  Results Reviewed       Procedure Component Value Units Date/Time    Urine Microscopic [[id number]] Collected: 03/20/24 1639    Lab Status: Final result Specimen: Urine, Other Updated: 03/20/24 1709     RBC, UA 0-1 /hpf      WBC, UA 0-1 /hpf      Epithelial Cells Occasional /hpf      Bacteria, UA Occasional /hpf      AMORPH URATES Occasional /hpf     UA (URINE) with reflex to Scope [[id number]]  (Abnormal) Collected: 03/20/24 1639    Lab Status: Final result Specimen: Urine, Other Updated: 03/20/24 1646     Color, UA Light Yellow     Clarity, UA Clear     Specific Gravity, UA <=1.005     pH, UA 6.5     Leukocytes, UA Negative     Nitrite, UA Negative     Protein, UA Negative mg/dl      Glucose, UA Negative mg/dl      Ketones, UA Negative mg/dl      Urobilinogen, UA 0.2 E.U./dl      Bilirubin, UA Negative     Occult Blood, UA Trace-Intact    Comprehensive metabolic panel [[id number]]  (Abnormal) Collected: 03/20/24 1335    Lab Status: Final result Specimen:  Blood from Arm, Left Updated: 03/20/24 1408     Sodium 132 mmol/L      Potassium 4.7 mmol/L      Chloride 101 mmol/L      CO2 25 mmol/L      ANION GAP 6 mmol/L      BUN 16 mg/dL      Creatinine 0.77 mg/dL      Glucose 95 mg/dL      Calcium 9.1 mg/dL      AST 18 U/L      ALT 10 U/L      Alkaline Phosphatase 51 U/L      Total Protein 6.9 g/dL      Albumin 3.7 g/dL      Total Bilirubin 0.90 mg/dL      eGFR 71 ml/min/1.73sq m     Narrative:      National Kidney Disease Foundation guidelines for Chronic Kidney Disease (CKD):     Stage 1 with normal or high GFR (GFR > 90 mL/min/1.73 square meters)    Stage 2 Mild CKD (GFR = 60-89 mL/min/1.73 square meters)    Stage 3A Moderate CKD (GFR = 45-59 mL/min/1.73 square meters)    Stage 3B Moderate CKD (GFR = 30-44 mL/min/1.73 square meters)    Stage 4 Severe CKD (GFR = 15-29 mL/min/1.73 square meters)    Stage 5 End Stage CKD (GFR <15 mL/min/1.73 square meters)  Note: GFR calculation is accurate only with a steady state creatinine    CBC and differential [[id number]]  (Abnormal) Collected: 03/20/24 1335    Lab Status: Final result Specimen: Blood from Arm, Left Updated: 03/20/24 1346     WBC 7.22 Thousand/uL      RBC 5.16 Million/uL      Hemoglobin 14.8 g/dL      Hematocrit 44.2 %      MCV 86 fL      MCH 28.7 pg      MCHC 33.5 g/dL      RDW 13.7 %      MPV 9.2 fL      Platelets 188 Thousands/uL      nRBC 0 /100 WBCs      Neutrophils Relative 72 %      Immature Grans % 0 %      Lymphocytes Relative 15 %      Monocytes Relative 11 %      Eosinophils Relative 1 %      Basophils Relative 1 %      Neutrophils Absolute 5.18 Thousands/µL      Absolute Immature Grans 0.03 Thousand/uL      Absolute Lymphocytes 1.11 Thousands/µL      Absolute Monocytes 0.79 Thousand/µL      Eosinophils Absolute 0.07 Thousand/µL      Basophils Absolute 0.04 Thousands/µL                    CT abdomen pelvis with contrast   Final Result by Susna Nick MD (03/20 1650)      Acute fracture of the inferior  endplate of the L1 vertebral body with mild central depression      Marked bladder distention, evaluate for urinary retention or chronic bladder outlet obstruction      Cardiomegaly      Incidental 5 mm pancreatic head cyst. For simple cyst(s) less than 1.5 cm, recommend follow-up every 2 years for 2 times. After 4 years, no more follow-ups. Recommend next follow-up in 2 years. Preferred imaging modality: abdomen MRI and MRCP with and    without IV contrast, or triple phase abdomen CT with IV contrast, or abdomen MRI and MRCP without IV contrast.         The study was marked in EPIC for immediate notification.         Workstation performed: [id number]         CT recon only lumbar spine   Final Result by E. Alec Schoenberger, MD (03/20 1539)      Acute mild inferior endplate compression fracture at L1. No bony retropulsion.   No significant canal or foraminal stenosis.         I personally discussed this study with TORSTEN CURRAN on 3/20/2024 3:37 PM.                  Workstation performed: [id number]                    Procedures  Procedures         ED Course                                         TLICS Criteria      Flowsheet Row Most Recent Value   Thoracolumbar Injury Classification & Severity Scale    Morphology 1 Filed at: 03/20/2024 1542   Neurologic involvement 0 Filed at: 03/20/2024 1542   Posterior ligament complex 0 Filed at: 03/20/2024 1542   TLICS Score 1 Filed at: 03/20/2024 1542            Medical Decision Making  Pulse ox 94% on room air indicating adequate oxygenation.        CT scan revealed L1 compression fracture and the location of patient's pain.  After multiple narcotic doses in the ER patient was still reporting significant pain.  Discussed typical treatment is bracing and pain medication with follow-up with spine.  Patient was uncomfortable going home secondary to pain will admit for further pain control.  Case discussed with hospitalist on-call Dr. Mustafa for admission to medical  service.    Amount and/or Complexity of Data Reviewed  Labs: ordered.  Radiology: ordered.    Risk  Prescription drug management.  Decision regarding hospitalization.             Disposition  Final diagnoses:   Closed compression fracture of body of L1 vertebra (HCC)   Intractable pain     Time reflects when diagnosis was documented in both MDM as applicable and the Disposition within this note       Time User Action Codes Description Comment    3/20/2024  4:52 PM Alex Carrasco [S32.010A] Closed compression fracture of body of L1 vertebra (HCC)     3/20/2024  4:52 PM Alex Carrasco [R52] Intractable pain           ED Disposition       ED Disposition   Admit    Condition   Stable    Date/Time   Wed Mar 20, 2024 1652    Comment   Case was discussed with Dr. Mustafa and the patient's admission status was agreed to be Admission Status: observation status to the service of Dr. Mustafa .               Follow-up Information    None         Patient's Medications   Discharge Prescriptions    No medications on file       No discharge procedures on file.    PDMP Review       None            ED Provider  Electronically Signed by             Alex Carrasco DO  03/20/24 1842       Alex Carrasco DO  03/20/24 1842

## 2024-03-20 NOTE — H&P
Formerly [facility]  H&P  Name: Corey Pulido 83 y.o. female I MRN: [id number]  Unit/Bed#: ED 04 I Date of Admission: 3/20/2024   Date of Service: 3/20/2024 I Hospital Day: 0      Assessment/Plan   No new Assessment & Plan notes have been filed under this hospital service since the last note was generated.  Service: Hospitalist  VTE Pharmacologic Prophylaxis:   {VTE Risk:25067}  Code Status: Prior ***  Discussion with family: {Family Communication:43820}    Anticipated Length of Stay: {Inpt Obs:93347}    Total Time Spent on Date of Encounter in care of patient: *** mins. This time was spent on one or more of the following: performing physical exam; counseling and coordination of care; obtaining or reviewing history; documenting in the medical record; reviewing/ordering tests, medications or procedures; communicating with other healthcare professionals and discussing with patient's family/caregivers.    Chief Complaint: ***    History of Present Illness:  Corey Pulido is a 83 y.o. female with a PMH of *** who presents with ***.    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 Authorizing Provider   fluticasone (FLONASE) 50 mcg/act nasal spray 1 spray into each nostril daily  Patient taking differently: 1 spray into each nostril if needed 9/20/23   CHA Dennis   levothyroxine (Synthroid) 75 mcg tablet Take 1 tablet (75 mcg total) by mouth daily in the early morning BRAND MEDICALLY NECESSARY 11/13/23   Rashawn Fine DO   lisinopril (ZESTRIL) 2.5 mg tablet TAKE ONE TABLET BY MOUTH EVERY DAY 9/27/23   Irene Buck MD   metoprolol succinate (TOPROL-XL) 25 mg 24 hr tablet Take 0.5 tablets (12.5 mg total) by mouth daily  Patient taking differently: Take 25 mg by mouth daily 2/29/24   Irene Buck MD   rosuvastatin (CRESTOR) 10 MG tablet Take 1 tablet (10 mg total) by mouth daily 11/13/23   Rashawn Fine DO   Xarelto 20 MG tablet TAKE ONE TABLET BY MOUTH EVERY DAY WITH BREAKFAST 7/24/23   Irene Buck MD     {Home Meds:11128}    Allergies:   Allergies   Allergen Reactions   • Sotalol      Vomiting, convulsing, rash, cough   • Famotidine Throat Swelling       Social History:  Marital Status: /Civil Union   Occupation: ***  Patient Pre-hospital Living Situation: {Living Situation:76351}  Patient Pre-hospital Level of Mobility: {Mobility:26808}  Patient Pre-hospital Diet Restrictions: ***  Substance Use History:   Social History     Substance and Sexual Activity   Alcohol Use Not Currently   • Alcohol/week: 0.0 standard drinks of alcohol     Social History     Tobacco Use   Smoking Status Never   • Passive exposure: Past   Smokeless Tobacco Never     Social History     Substance and Sexual Activity   Drug Use No       Family History:  {SL IP SLIM Notes FH:80115}    Physical Exam:     Vitals:   Blood Pressure: 156/74 (03/20/24 1900)  Pulse: 70 (03/20/24 1900)  Temperature: 98.2 °F (36.8 °C) (03/20/24 1246)  Temp Source: Tympanic (03/20/24 1246)  Respirations: 18 (03/20/24 1619)  SpO2: 97 % (03/20/24  1900)    Physical Exam ***    Additional Data:     Lab Results:  Results from last 7 days   Lab Units 03/20/24  1335   WBC Thousand/uL 7.22   HEMOGLOBIN g/dL 14.8   HEMATOCRIT % 44.2   PLATELETS Thousands/uL 188   NEUTROS PCT % 72   LYMPHS PCT % 15   MONOS PCT % 11   EOS PCT % 1     Results from last 7 days   Lab Units 03/20/24  1335   SODIUM mmol/L 132*   POTASSIUM mmol/L 4.7   CHLORIDE mmol/L 101   CO2 mmol/L 25   BUN mg/dL 16   CREATININE mg/dL 0.77   ANION GAP mmol/L 6   CALCIUM mg/dL 9.1   ALBUMIN g/dL 3.7   TOTAL BILIRUBIN mg/dL 0.90   ALK PHOS U/L 51   ALT U/L 10   AST U/L 18   GLUCOSE RANDOM mg/dL 95                       Lines/Drains:  Invasive Devices       Peripheral Intravenous Line  Duration             Peripheral IV 03/20/24 Left;Proximal;Ventral (anterior) Forearm <1 day                        Imaging: {Radiology Review:24301}  CT abdomen pelvis with contrast   Final Result by Susan Nick MD (03/20 4772)      Acute fracture of the inferior endplate of the L1 vertebral body with mild central depression      Marked bladder distention, evaluate for urinary retention or chronic bladder outlet obstruction      Cardiomegaly      Incidental 5 mm pancreatic head cyst. For simple cyst(s) less than 1.5 cm, recommend follow-up every 2 years for 2 times. After 4 years, no more follow-ups. Recommend next follow-up in 2 years. Preferred imaging modality: abdomen MRI and MRCP with and    without IV contrast, or triple phase abdomen CT with IV contrast, or abdomen MRI and MRCP without IV contrast.         The study was marked in EPIC for immediate notification.         Workstation performed: [id number]         CT recon only lumbar spine   Final Result by E. Alec Schoenberger, MD (03/20 1634)      Acute mild inferior endplate compression fracture at L1. No bony retropulsion.   No significant canal or foraminal stenosis.         I personally discussed this study with TORSTEN CURRAN on 3/20/2024 3:37 PM.                   Workstation performed: [id number]         XR chest portable    (Results Pending)       EKG and Other Studies Reviewed on Admission:   EKG: {Interpretation of EK}    ** Please Note: This note has been constructed using a voice recognition system. **

## 2024-03-21 ENCOUNTER — TELEPHONE (OUTPATIENT)
Dept: FAMILY MEDICINE CLINIC | Facility: CLINIC | Age: 84
End: 2024-03-21

## 2024-03-21 PROBLEM — N32.89 BLADDER DISTENTION: Status: ACTIVE | Noted: 2024-03-21

## 2024-03-21 LAB
ANION GAP SERPL CALCULATED.3IONS-SCNC: 7 MMOL/L (ref 4–13)
BUN SERPL-MCNC: 17 MG/DL (ref 5–25)
CALCIUM SERPL-MCNC: 8.7 MG/DL (ref 8.4–10.2)
CARDIAC TROPONIN I PNL SERPL HS: 10 NG/L (ref 8–18)
CHLORIDE SERPL-SCNC: 102 MMOL/L (ref 96–108)
CO2 SERPL-SCNC: 23 MMOL/L (ref 21–32)
CREAT SERPL-MCNC: 0.88 MG/DL (ref 0.6–1.3)
ERYTHROCYTE [DISTWIDTH] IN BLOOD BY AUTOMATED COUNT: 13.9 % (ref 11.6–15.1)
GFR SERPL CREATININE-BSD FRML MDRD: 60 ML/MIN/1.73SQ M
GLUCOSE P FAST SERPL-MCNC: 89 MG/DL (ref 65–99)
GLUCOSE SERPL-MCNC: 89 MG/DL (ref 65–140)
HCT VFR BLD AUTO: 44.8 % (ref 34.8–46.1)
HGB BLD-MCNC: 14.5 G/DL (ref 11.5–15.4)
MAGNESIUM SERPL-MCNC: 2.2 MG/DL (ref 1.9–2.7)
MCH RBC QN AUTO: 28.3 PG (ref 26.8–34.3)
MCHC RBC AUTO-ENTMCNC: 32.4 G/DL (ref 31.4–37.4)
MCV RBC AUTO: 88 FL (ref 82–98)
PLATELET # BLD AUTO: 192 THOUSANDS/UL (ref 149–390)
PMV BLD AUTO: 9.5 FL (ref 8.9–12.7)
POTASSIUM SERPL-SCNC: 5.1 MMOL/L (ref 3.5–5.3)
RBC # BLD AUTO: 5.12 MILLION/UL (ref 3.81–5.12)
SODIUM SERPL-SCNC: 132 MMOL/L (ref 135–147)
WBC # BLD AUTO: 5.63 THOUSAND/UL (ref 4.31–10.16)

## 2024-03-21 PROCEDURE — 85027 COMPLETE CBC AUTOMATED: CPT | Performed by: FAMILY MEDICINE

## 2024-03-21 PROCEDURE — 97760 ORTHOTIC MGMT&TRAING 1ST ENC: CPT

## 2024-03-21 PROCEDURE — 84484 ASSAY OF TROPONIN QUANT: CPT | Performed by: NURSE PRACTITIONER

## 2024-03-21 PROCEDURE — 97110 THERAPEUTIC EXERCISES: CPT

## 2024-03-21 PROCEDURE — 83735 ASSAY OF MAGNESIUM: CPT | Performed by: FAMILY MEDICINE

## 2024-03-21 PROCEDURE — 80048 BASIC METABOLIC PNL TOTAL CA: CPT | Performed by: FAMILY MEDICINE

## 2024-03-21 PROCEDURE — 99232 SBSQ HOSP IP/OBS MODERATE 35: CPT | Performed by: FAMILY MEDICINE

## 2024-03-21 PROCEDURE — 97535 SELF CARE MNGMENT TRAINING: CPT

## 2024-03-21 PROCEDURE — 97167 OT EVAL HIGH COMPLEX 60 MIN: CPT

## 2024-03-21 PROCEDURE — 97163 PT EVAL HIGH COMPLEX 45 MIN: CPT

## 2024-03-21 RX ORDER — ONDANSETRON 2 MG/ML
4 INJECTION INTRAMUSCULAR; INTRAVENOUS EVERY 6 HOURS PRN
Status: DISCONTINUED | OUTPATIENT
Start: 2024-03-21 | End: 2024-03-21

## 2024-03-21 RX ORDER — FUROSEMIDE 10 MG/ML
40 INJECTION INTRAMUSCULAR; INTRAVENOUS ONCE
Status: COMPLETED | OUTPATIENT
Start: 2024-03-21 | End: 2024-03-21

## 2024-03-21 RX ORDER — DOCUSATE SODIUM 100 MG/1
100 CAPSULE, LIQUID FILLED ORAL 2 TIMES DAILY
Status: DISCONTINUED | OUTPATIENT
Start: 2024-03-21 | End: 2024-03-23 | Stop reason: HOSPADM

## 2024-03-21 RX ORDER — BISACODYL 10 MG
10 SUPPOSITORY, RECTAL RECTAL DAILY PRN
Status: DISCONTINUED | OUTPATIENT
Start: 2024-03-21 | End: 2024-03-23 | Stop reason: HOSPADM

## 2024-03-21 RX ORDER — PANTOPRAZOLE SODIUM 40 MG/1
40 TABLET, DELAYED RELEASE ORAL
Status: DISCONTINUED | OUTPATIENT
Start: 2024-03-22 | End: 2024-03-23 | Stop reason: HOSPADM

## 2024-03-21 RX ORDER — ONDANSETRON 2 MG/ML
4 INJECTION INTRAMUSCULAR; INTRAVENOUS EVERY 4 HOURS PRN
Status: DISCONTINUED | OUTPATIENT
Start: 2024-03-21 | End: 2024-03-23 | Stop reason: HOSPADM

## 2024-03-21 RX ORDER — HYDROMORPHONE HCL IN WATER/PF 6 MG/30 ML
0.2 PATIENT CONTROLLED ANALGESIA SYRINGE INTRAVENOUS ONCE
Status: DISCONTINUED | OUTPATIENT
Start: 2024-03-22 | End: 2024-03-23 | Stop reason: HOSPADM

## 2024-03-21 RX ORDER — POLYETHYLENE GLYCOL 3350 17 G/17G
17 POWDER, FOR SOLUTION ORAL DAILY
Status: DISCONTINUED | OUTPATIENT
Start: 2024-03-21 | End: 2024-03-22

## 2024-03-21 RX ADMIN — LIDOCAINE 5% 1 PATCH: 700 PATCH TOPICAL at 09:22

## 2024-03-21 RX ADMIN — PRAVASTATIN SODIUM 80 MG: 80 TABLET ORAL at 15:43

## 2024-03-21 RX ADMIN — METOPROLOL SUCCINATE 25 MG: 25 TABLET, EXTENDED RELEASE ORAL at 20:15

## 2024-03-21 RX ADMIN — METHOCARBAMOL TABLETS 500 MG: 500 TABLET, COATED ORAL at 22:38

## 2024-03-21 RX ADMIN — Medication 2.5 MG: at 10:57

## 2024-03-21 RX ADMIN — LEVOTHYROXINE SODIUM 75 MCG: 75 TABLET ORAL at 05:19

## 2024-03-21 RX ADMIN — ACETAMINOPHEN 975 MG: 325 TABLET ORAL at 13:41

## 2024-03-21 RX ADMIN — DOCUSATE SODIUM 100 MG: 100 CAPSULE, LIQUID FILLED ORAL at 16:36

## 2024-03-21 RX ADMIN — ACETAMINOPHEN 975 MG: 325 TABLET ORAL at 22:37

## 2024-03-21 RX ADMIN — FUROSEMIDE 40 MG: 10 INJECTION, SOLUTION INTRAMUSCULAR; INTRAVENOUS at 10:57

## 2024-03-21 RX ADMIN — METHOCARBAMOL TABLETS 500 MG: 500 TABLET, COATED ORAL at 13:41

## 2024-03-21 RX ADMIN — ONDANSETRON 4 MG: 2 INJECTION INTRAMUSCULAR; INTRAVENOUS at 14:32

## 2024-03-21 RX ADMIN — LISINOPRIL 2.5 MG: 2.5 TABLET ORAL at 20:15

## 2024-03-21 RX ADMIN — ONDANSETRON 4 MG: 2 INJECTION INTRAMUSCULAR; INTRAVENOUS at 22:53

## 2024-03-21 RX ADMIN — POLYETHYLENE GLYCOL 3350 17 G: 17 POWDER, FOR SOLUTION ORAL at 16:35

## 2024-03-21 RX ADMIN — RIVAROXABAN 20 MG: 20 TABLET, FILM COATED ORAL at 09:22

## 2024-03-21 RX ADMIN — ACETAMINOPHEN 975 MG: 325 TABLET ORAL at 05:19

## 2024-03-21 RX ADMIN — METHOCARBAMOL TABLETS 500 MG: 500 TABLET, COATED ORAL at 05:19

## 2024-03-21 NOTE — ASSESSMENT & PLAN NOTE
Patient reports some chest pain, shortness of breath.  Per review of outpatient records, stress test was done and patient supposed to follow-up with cardiology  SARA score 2  Check troponins  EKG showed paced rhythm

## 2024-03-21 NOTE — UTILIZATION REVIEW
Initial Clinical Review    Admission: Date/Time/Statement:   Admission Orders (From admission, onward)       Ordered        03/20/24 1653  Place in Observation  Once                          Orders Placed This Encounter   Procedures    Place in Observation     Standing Status:   Standing     Number of Occurrences:   1     Order Specific Question:   Level of Care     Answer:   Med Surg [16]     ED Arrival Information       Expected   -    Arrival   3/20/2024 12:25    Acuity   Less Urgent              Means of arrival   Walk-In    Escorted by   Family Member    Service   Hospitalist    Admission type   Emergency              Arrival complaint   Back Pain             Chief Complaint   Patient presents with    Back Pain     Pt deaf and communicating with other deaf relative, c/o back pain, wearing otc patch on area. No trauma, no urinary issue       Initial Presentation: 83 y.o. female PMH CAD, Aflutter presents with worsening low back pain since 3/8 when she ad laid down for stress test. Over last 5 days pain significantly worsen, having difficulty moving due to pain. Pain level 10/10. Reports sob, chest pain reason for stress test. Reports nausea,vomiting ,abdominal pain. Imaging in ED showed L1 compression fracture, distended bladder to evaluate for urinary retention or chronic bladder outlet obstruction. /86 sat 98% ra  No midline spinal tenderness noted.  Paraspinal muscle tenderness noted along the lumbar region bilaterally      Date: 3/21/24   Day 2:   Compression fracture of L1 lumbar verterbra  Multimodal pain management with Tylenol scheduled, Lidoderm patch, Robaxin scheduled along with oxycodone as neede   Chronic systolic chf  2023 ef 45-50% continue lisinopril toprol xl  Patient received IV Lasix 40 mg today   Iv morphine , iv zofran oxycodone 2.5 mg today for pain   Per md pt appears to be retaining fluid, will evaluate after starts voiding        ED Triage Vitals [03/20/24 1246]   Temperature Pulse  Respirations Blood Pressure SpO2   98.2 °F (36.8 °C) 82 20 (!) 186/86 98 %      Temp Source Heart Rate Source Patient Position - Orthostatic VS BP Location FiO2 (%)   Tympanic Monitor Sitting Right arm --      Pain Score       10 - Worst Possible Pain          Wt Readings from Last 1 Encounters:   03/20/24 57.6 kg (127 lb)     Additional Vital Signs:   03/21/24 0705 97.7 °F (36.5 °C) 69 18 163/84 110 97 % 24 1 L/min Nasal cannula Lying   03/21/24 02:26:11 97.2 °F (36.2 °C) Abnormal  -- -- 152/66 95 -- -- -- -- --   03/20/24 2348 97.5 °F (36.4 °C) 72 18 151/92 -- -- -- -- -- --   03/20/24 2100 -- -- -- -- -- -- 28 2 L/min Nasal cannula --   03/20/24 20:59:53 97.5 °F (36.4 °C) -- -- 151/92 112 -- -- -- -- --   03/20/24 2030 -- 70 -- 165/80 114 95 %         03/20/24 1700 -- 70 -- 162/77 111 94 % -- -- -- --   03/20/24 1619 -- 71 18 173/82 Abnormal  -- 94 % -- -- None (Room air) Lying     Pertinent Labs/Diagnostic Test Results:   CT abdomen pelvis with contrast   Final Result by Susan Nick MD (03/20 1650)      Acute fracture of the inferior endplate of the L1 vertebral body with mild central depression      Marked bladder distention, evaluate for urinary retention or chronic bladder outlet obstruction      Cardiomegaly      Incidental 5 mm pancreatic head cyst. For simple cyst(s) less than 1.5 cm, recommend follow-up every 2 years for 2 times. After 4 years, no more follow-ups. Recommend next follow-up in 2 years. Preferred imaging modality: abdomen MRI and MRCP with and    without IV contrast, or triple phase abdomen CT with IV contrast, or abdomen MRI and MRCP without IV contrast.         The study was marked in EPIC for immediate notification.         Workstation performed: [id number]         CT recon only lumbar spine   Final Result by E. Alec Schoenberger, MD (03/20 1539)      Acute mild inferior endplate compression fracture at L1. No bony retropulsion.   No significant canal or foraminal stenosis.          I personally discussed this study with TORSTEN CURRAN on 3/20/2024 3:37 PM.                  Workstation performed: [id number]         XR chest portable    (Results Pending)         Results from last 7 days   Lab Units 03/21/24  0517 03/20/24  1335   WBC Thousand/uL 5.63 7.22   HEMOGLOBIN g/dL 14.5 14.8   HEMATOCRIT % 44.8 44.2   PLATELETS Thousands/uL 192 188   NEUTROS ABS Thousands/µL  --  5.18     Results from last 7 days   Lab Units 03/21/24  0517 03/20/24  1335   SODIUM mmol/L 132* 132*   POTASSIUM mmol/L 5.1 4.7   CHLORIDE mmol/L 102 101   CO2 mmol/L 23 25   ANION GAP mmol/L 7 6   BUN mg/dL 17 16   CREATININE mg/dL 0.88 0.77   EGFR ml/min/1.73sq m 60 71   CALCIUM mg/dL 8.7 9.1   MAGNESIUM mg/dL 2.2  --      Results from last 7 days   Lab Units 03/20/24  1335   AST U/L 18   ALT U/L 10   ALK PHOS U/L 51   TOTAL PROTEIN g/dL 6.9   ALBUMIN g/dL 3.7   TOTAL BILIRUBIN mg/dL 0.90     Results from last 7 days   Lab Units 03/21/24  0517 03/20/24  1335   GLUCOSE RANDOM mg/dL 89 95     Results from last 7 days   Lab Units 03/20/24  2233 03/20/24  1934   HS TNI 0HR ng/L  --  12   HS TNI 2HR ng/L 10  --    HSTNI D2 ng/L -2  --      Results from last 7 days   Lab Units 03/20/24  1335   BNP pg/mL 481*     Results from last 7 days   Lab Units 03/20/24  1639   CLARITY UA  Clear   COLOR UA  Light Yellow   SPEC GRAV UA  <=1.005   PH UA  6.5   GLUCOSE UA mg/dl Negative   KETONES UA mg/dl Negative   BLOOD UA  Trace-Intact*   PROTEIN UA mg/dl Negative   NITRITE UA  Negative   BILIRUBIN UA  Negative   UROBILINOGEN UA E.U./dl 0.2   LEUKOCYTES UA  Negative   WBC UA /hpf 0-1   RBC UA /hpf 0-1   BACTERIA UA /hpf Occasional   EPITHELIAL CELLS WET PREP /hpf Occasional       ED Treatment:   Medication Administration from 03/20/2024 1225 to 03/20/2024 2057         Date/Time Order Dose Route Action Action by Comments     03/20/2024 2506 EDT acetaminophen (Ofirmev) injection 1,000 mg 0 mg Intravenous Stopped Dimple Kahn RN --      03/20/2024 1336 EDT acetaminophen (Ofirmev) injection 1,000 mg 1,000 mg Intravenous New Bag Dimple Kahn, MIGUELINA --     03/20/2024 1336 EDT morphine injection 2 mg 2 mg Intravenous Given Dimple MONI Kahn, MIGUELINA --     03/20/2024 1421 EDT iohexol (OMNIPAQUE) 350 MG/ML injection (MULTI-DOSE) 100 mL 100 mL Intravenous Given Shelia Sipel V --     03/20/2024 1616 EDT morphine injection 4 mg 4 mg Intravenous Given Dimple Kahn RN --     03/20/2024 1854 EDT HYDROmorphone (DILAUDID) injection 0.5 mg 0.5 mg Intravenous Given Nash Sidhu RN --     03/20/2024 1947 EDT ondansetron (ZOFRAN) injection 4 mg 4 mg Intravenous Given Erum Rader RN --          Past Medical History:   Diagnosis Date    Atrial flutter (HCC)     CAD (coronary artery disease)     s/p PCIx1 -2012    Coronary artery disease     Deafness     Disease of thyroid gland     GERD (gastroesophageal reflux disease)     Hyperlipidemia     Hypertension     Hypomagnesemia 9/18/2023    Itching     of the face, throat    Pacemaker 03/2021    [facility]-left chest    Wears glasses      Present on Admission:   Compression fracture of L1 lumbar vertebra (HCC)   Coronary artery disease involving native coronary artery of native heart without angina pectoris   Atrial flutter (HCC)   Deafness   Hypothyroidism   Chronic systolic congestive heart failure (HCC)   Chest pain      Admitting Diagnosis: Back pain [M54.9]  Intractable pain [R52]  Closed compression fracture of body of L1 vertebra (HCC) [S32.010A]  Age/Sex: 83 y.o. female  Admission Orders:  Scheduled Medications:  acetaminophen, 975 mg, Oral, Q8H LOUISA  fluticasone, 1 spray, Nasal, Daily  levothyroxine, 75 mcg, Oral, Early Morning  lidocaine, 1 patch, Topical, Daily  lisinopril, 2.5 mg, Oral, Daily  methocarbamol, 500 mg, Oral, Q8H LOUISA  metoprolol succinate, 25 mg, Oral, Daily  pravastatin, 80 mg, Oral, Daily With Dinner  rivaroxaban, 20 mg, Oral, Daily With Breakfast      Continuous IV Infusions:     PRN  Meds:  oxyCODONE, 2.5 mg, Oral, Q6H PRN        None    Network Utilization Review Department  ATTENTION: Please call with any questions or concerns to [phone] and carefully listen to the prompts so that you are directed to the right person. All voicemails are confidential.   For Discharge needs, contact Care Management DC Support Team at [phone] opt. 2  Send all requests for admission clinical reviews, approved or denied determinations and any other requests to dedicated fax number below belonging to the [facility] where the patient is receiving treatment. List of dedicated fax numbers for the Facilities:  FACILITY NAME UR FAX NUMBER   ADMISSION DENIALS (Administrative/Medical Necessity) [phone]   DISCHARGE SUPPORT TEAM (NETWORK) [phone]   PARENT CHILD HEALTH (Maternity/NICU/Pediatrics) [phone]   General acute hospital [phone]   [facility] [phone]   [facility] [phone]   [facility] [phone]   [facility] [phone]   [facility] [phone]   [facility] [phone]   [facility] [phone]   [facility] [phone]   [facility] [phone]   [facility] [phone]   [facility] [phone]

## 2024-03-21 NOTE — ASSESSMENT & PLAN NOTE
Patient presented to the ER with worsening back pain over the last 5 days.  Started with the back pain when she had to lay down for nuclear stress test on 3/8.  Now with difficulty ambulating due to pain  imaging done in the ER showed acute compression fracture of L1  Multimodal pain management with Tylenol, Lidoderm patch, Robaxin prn along with oxycodone as needed  PT/OT - cleared for home  Neurosurgery referral made on discharge.  LSO brace.  Outpatient follow-up with PCP

## 2024-03-21 NOTE — TELEPHONE ENCOUNTER
Marisa from [facility] called, requesting a verbal okay for home PT. The patient has a compression fracture of L1 lumbar vertebrae. She is being D/C from hospital today. Spoke to Dr Buck and gave verbal okay for home PT. DAYANA.    Griselda Solomon LPN

## 2024-03-21 NOTE — DISCHARGE INSTR - AVS FIRST PAGE
On imaging you were noted to have 5 mm pancreatic head cyst. For simple cyst(s) less than 1.5 cm, recommend follow-up every 2 years for 2 times. After 4 years, no more follow-ups. Recommend next follow-up in 2 years. Preferred imaging modality: abdomen MRI and MRCP with and without IV contrast, or triple phase abdomen CT with IV contrast, or abdomen MRI and MRCP without IV contrast.  This can be set up by your primary care doctor    As discussed continue to use brace when out of bed onto a chair or with ambulation.  It can be removed when in bed    Keep your scheduled follow-up appointment with cardiology after discharge    If your insurance does not cover the lidoDerm patch, you can  over the counter Lidoderm patch instead

## 2024-03-21 NOTE — H&P
[facility]  H&P  Name: Corey Pulido 83 y.o. female I MRN: [id number]  Unit/Bed#: ED 04 I Date of Admission: 3/20/2024   Date of Service: 3/20/2024 I Hospital Day: 0      Assessment/Plan   * Compression fracture of L1 lumbar vertebra (HCC)  Assessment & Plan  Patient presented to the ER with patient presented to the ER with worsening back pain over the last 5 days.  Started with the back pain when she had to lay down for nuclear stress test on 3/8.  Now with difficulty ambulating due to pain  imaging done in the ER showed acute compression fracture of L1  Multimodal pain management with Tylenol scheduled, Lidoderm patch, Robaxin scheduled along with oxycodone as needed  PT/OT    Chronic systolic congestive heart failure (HCC)  Assessment & Plan  Wt Readings from Last 3 Encounters:   03/20/24 57.6 kg (127 lb)   02/29/24 57.7 kg (127 lb 3.2 oz)   11/13/23 58.1 kg (128 lb)      prior echo from 2023 showed EF of 45 to 55%   stress test from 3/8 showed EF of 37%.  Decreased perfusion noted in the LV apex and IV septum, inferior anterior walls with mild kay-infarct ischemia.  Occasional PVCs  Continue lisinopril, toprol XL          Chest pain  Assessment & Plan  Patient reports some chest pain, shortness of breath.  Per review of outpatient records, stress test was done and patient supposed to follow-up with cardiology  SARA score 2  Check troponins  EKG showed paced rhythm    Deafness  Assessment & Plan  Spoke with patient using     Atrial flutter (HCC)  Assessment & Plan  continue Toprol-XL, Xarelto    Coronary artery disease involving native coronary artery of native heart without angina pectoris  Assessment & Plan  continue Toprol-XL, statin    Hypothyroidism  Assessment & Plan  Continue levothyroxine       VTE Pharmacologic Prophylaxis:    xarelto  Code Status: full code    Anticipated Length of Stay: Patient will be admitted on an observation basis with an  anticipated length of stay of less than 2 midnights secondary to lumbar compression fracture.    Total Time Spent on Date of Encounter in care of patient:  This time was spent on one or more of the following: performing physical exam; counseling and coordination of care; obtaining or reviewing history; documenting in the medical record; reviewing/ordering tests, medications or procedures; communicating with other healthcare professionals and discussing with patient's family/caregivers.    Chief Complaint: back pain    History of Present Illness:  Corey Pulido is a 83 y.o. female with a PMH of CAD, atrial flutter who presents with Worsening low back pain since 3/8 when she had to lay down for a stress test.  Over the last 5 days the pain has significantly worsen and having difficulty moving due to pain.  States pain level is 10/10 in intensity with movements and at rest 8-9/10 in intensity.  Patient also reports some shortness of breath, chest pain for which she had the stress test ordered.  Reports some nausea, vomiting, abdominal pain.  Spoke with patient using  on the iPad.  Imaging done in the ER showed L1 compression fracture    Review of Systems:  Review of Systems   Constitutional:  Positive for chills. Negative for appetite change and fever.   HENT:  Positive for congestion.    Eyes:  Negative for photophobia.   Respiratory:  Positive for shortness of breath.    Cardiovascular:  Positive for chest pain. Negative for leg swelling.   Gastrointestinal:  Positive for abdominal pain, nausea and vomiting. Negative for blood in stool.   Genitourinary:  Negative for dysuria, frequency and hematuria.   Musculoskeletal:  Positive for back pain.   Skin:  Negative for wound.   Neurological:  Positive for light-headedness.   Hematological:  Does not bruise/bleed easily.   Psychiatric/Behavioral:  Negative for agitation.        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 Authorizing Provider   fluticasone (FLONASE) 50 mcg/act nasal spray 1 spray into each nostril daily  Patient taking differently: 1 spray into each nostril if needed 9/20/23   CHA Dennis   levothyroxine (Synthroid) 75 mcg tablet Take 1 tablet (75 mcg total) by mouth daily in the early morning BRAND MEDICALLY NECESSARY 11/13/23   Rashawn Fine DO   lisinopril (ZESTRIL) 2.5 mg tablet TAKE ONE TABLET BY MOUTH EVERY DAY 9/27/23   Irene Buck MD   metoprolol succinate (TOPROL-XL) 25 mg 24 hr tablet Take 0.5 tablets (12.5 mg total) by mouth daily  Patient taking differently: Take 25 mg by mouth daily 2/29/24   Irene Buck MD   rosuvastatin (CRESTOR) 10 MG tablet Take 1 tablet (10 mg total) by mouth daily 11/13/23   Rashawn Fine DO   Xarelto 20 MG tablet TAKE ONE TABLET BY MOUTH EVERY DAY WITH BREAKFAST 7/24/23   Irene Buck MD     I have reviewed home medications using recent Epic encounter.    Allergies:   Allergies   Allergen Reactions    Sotalol      Vomiting, convulsing, rash, cough    Famotidine Throat Swelling       Social History:  Marital Status: /Civil Union   Occupation:  none  Patient Pre-hospital Living Situation: With spouse  Patient Pre-hospital Level of Mobility: walks  Patient Pre-hospital Diet Restrictions: none  Substance Use History:   Social History     Substance and Sexual Activity   Alcohol Use Not Currently    Alcohol/week: 0.0 standard drinks of alcohol     Social History     Tobacco Use   Smoking Status Never    Passive exposure: Past   Smokeless Tobacco Never     Social History     Substance and Sexual Activity   Drug Use No       Family History:  Family History   Problem Relation Age of Onset    Cancer Mother         breast    Hypertension Mother         essential    Breast cancer Mother     Other Father         smoker    Tuberculosis Father     No Known Problems Sister     No Known Problems Sister     No Known Problems Sister     No Known Problems Daughter        Physical Exam:     Vitals:   Blood Pressure: 156/74 (03/20/24 1900)  Pulse: 70 (03/20/24 1900)  Temperature: 98.2 °F (36.8 °C) (03/20/24 1246)  Temp Source: Tympanic (03/20/24 1246)  Respirations: 18 (03/20/24 1619)  SpO2: 97 % (03/20/24 1900)    Physical Exam  Constitutional:       General: She is not in acute distress.     Appearance: She is not toxic-appearing.   HENT:      Head: Normocephalic and atraumatic.   Eyes:      General: No scleral icterus.  Cardiovascular:      Rate and Rhythm: Normal rate and regular rhythm.   Pulmonary:      Effort: Pulmonary effort is normal. No respiratory distress.      Breath sounds: Normal breath sounds. No wheezing or rales.   Abdominal:      General: Bowel sounds are normal. There is no distension.      Palpations: Abdomen is soft.      Tenderness: There is no abdominal tenderness.   Musculoskeletal:      Right lower leg: No edema.      Left lower leg: No edema.      Comments: No midline spinal tenderness noted.  Paraspinal muscle tenderness noted along the lumbar region bilaterally   Neurological:      Mental Status: She is alert.          Additional Data:     Lab  Results:  Results from last 7 days   Lab Units 03/20/24  1335   WBC Thousand/uL 7.22   HEMOGLOBIN g/dL 14.8   HEMATOCRIT % 44.2   PLATELETS Thousands/uL 188   NEUTROS PCT % 72   LYMPHS PCT % 15   MONOS PCT % 11   EOS PCT % 1     Results from last 7 days   Lab Units 03/20/24  1335   SODIUM mmol/L 132*   POTASSIUM mmol/L 4.7   CHLORIDE mmol/L 101   CO2 mmol/L 25   BUN mg/dL 16   CREATININE mg/dL 0.77   ANION GAP mmol/L 6   CALCIUM mg/dL 9.1   ALBUMIN g/dL 3.7   TOTAL BILIRUBIN mg/dL 0.90   ALK PHOS U/L 51   ALT U/L 10   AST U/L 18   GLUCOSE RANDOM mg/dL 95                       Lines/Drains:  Invasive Devices       Peripheral Intravenous Line  Duration             Peripheral IV 03/20/24 Left;Proximal;Ventral (anterior) Forearm <1 day                        Imaging: Reviewed radiology reports from this admission including: abdominal/pelvic CT and CT lumbar spine  CT abdomen pelvis with contrast   Final Result by Susan Nick MD (03/20 2290)      Acute fracture of the inferior endplate of the L1 vertebral body with mild central depression      Marked bladder distention, evaluate for urinary retention or chronic bladder outlet obstruction      Cardiomegaly      Incidental 5 mm pancreatic head cyst. For simple cyst(s) less than 1.5 cm, recommend follow-up every 2 years for 2 times. After 4 years, no more follow-ups. Recommend next follow-up in 2 years. Preferred imaging modality: abdomen MRI and MRCP with and    without IV contrast, or triple phase abdomen CT with IV contrast, or abdomen MRI and MRCP without IV contrast.         The study was marked in EPIC for immediate notification.         Workstation performed: [id number]         CT recon only lumbar spine   Final Result by E. Alec Schoenberger, MD (03/20 1253)      Acute mild inferior endplate compression fracture at L1. No bony retropulsion.   No significant canal or foraminal stenosis.         I personally discussed this study with TORSTEN CURRAN on 3/20/2024  3:37 PM.                  Workstation performed: [id number]         XR chest portable    (Results Pending)       EKG and Other Studies Reviewed on Admission:   EKG:  paced rhythm.    ** Please Note: This note has been constructed using a voice recognition system. **

## 2024-03-21 NOTE — PROGRESS NOTES
[facility]  Progress Note  Name: Corey Pulido I  MRN: [id number]  Unit/Bed#: 2 [address] Date of Admission: 3/20/2024   Date of Service: 3/21/2024 I Hospital Day: 0    Assessment/Plan   * Compression fracture of L1 lumbar vertebra (HCC)  Assessment & Plan  Patient presented to the ER with worsening back pain over the last 5 days.  Started with the back pain when she had to lay down for nuclear stress test on 3/8.  Now with difficulty ambulating due to pain  imaging done in the ER showed acute compression fracture of L1  Multimodal pain management with Tylenol, Lidoderm patch, Robaxin prn along with oxycodone as needed  PT/OT - cleared for home  Neurosurgery referral made on discharge.  LSO brace.  Outpatient follow-up with PCP    Chronic systolic congestive heart failure (HCC)  Assessment & Plan  Wt Readings from Last 3 Encounters:   03/20/24 57.6 kg (127 lb)   02/29/24 57.7 kg (127 lb 3.2 oz)   11/13/23 58.1 kg (128 lb)     prior echo from 2023 showed EF of 45 to 55%   stress test from 3/8 showed EF of 37%.  Decreased perfusion noted in the LV apex and IV septum, inferior anterior walls with mild kay-infarct ischemia.  Occasional PVCs  Patient reports some shortness of breath but this has been ongoing even on an outpatient basis.  Chest x-ray which pulmonary edema  40 mg of IV Lasix x 1 given today  Continue lisinopril, toprol XL  Outpatient follow-up with cardiology.  Has an appointment in April          Bladder distention  Assessment & Plan  Noted to have marked bladder distention on imaging.  Bladder scan done today showed 900 mL of urine and then patient voided 200 mL.  Started on urinary retention protocol  Last bowel movement was 2 days ago.  Started Colace, MiraLAX  May need urology if patient ends up with a Lang catheter    Chest pain  Assessment & Plan  Patient reports some chest pain, shortness of breath.  Per review of outpatient records, stress test was done and patient  supposed to follow-up with cardiology  SARA score 2  Troponins negative  EKG showed paced rhythm    Deafness  Assessment & Plan  Spoke with patient today using assistance from RN Baylee Urbina who communicated via sign language and d/c plan including follow up was discussed    Atrial flutter (HCC)  Assessment & Plan  continue Toprol-XL, Xarelto.    Coronary artery disease involving native coronary artery of native heart without angina pectoris  Assessment & Plan  continue Toprol-XL, statin.    Hypothyroidism  Assessment & Plan  Continue levothyroxine.               VTE Pharmacologic Prophylaxis: VTE Score: 4 Moderate Risk (Score 3-4) - Pharmacological DVT Prophylaxis Ordered: rivaroxaban (Xarelto).    Mobility:   Basic Mobility Inpatient Raw Score: 19  JH-HLM Goal: 6: Walk 10 steps or more  JH-HLM Achieved: 7: Walk 25 feet or more  JH-HLM Goal achieved. Continue to encourage appropriate mobility.    Patient Centered Rounds: I performed bedside rounds with nursing staff today.   Discussions with Specialists or Other Care Team Provider: yes     Education and Discussions with Family / Patient: Updated  ( and sister) at bedside.    Total Time Spent on Date of Encounter in care of patient: This time was spent on one or more of the following: performing physical exam; counseling and coordination of care; obtaining or reviewing history; documenting in the medical record; reviewing/ordering tests, medications or procedures; communicating with other healthcare professionals and discussing with patient's family/caregivers.    Current Length of Stay: 0 day(s)  Current Patient Status: Inpatient   Certification Statement: The patient will continue to require additional inpatient hospital stay due to urinary retention requiring monitoring overnight  Discharge Plan: Anticipate discharge tomorrow to home.    Code Status: Level 1 - Full Code    Subjective:     Patient states pain level is 7/10 in intensity  but was able to roll around in bed and ambulate without any significant difficulty with PT.  Reports acid reflux. communicated with patient/family by using the assistance of RN who can sign language    Objective:     Vitals:   Temp (24hrs), Av.6 °F (36.4 °C), Min:97.2 °F (36.2 °C), Max:97.9 °F (36.6 °C)    Temp:  [97.2 °F (36.2 °C)-97.9 °F (36.6 °C)] 97.9 °F (36.6 °C)  HR:  [69-75] 75  Resp:  [18] 18  BP: (145-167)/(66-92) 145/78  SpO2:  [91 %-97 %] 91 %  Body mass index is 29.52 kg/m².     Input and Output Summary (last 24 hours):     Intake/Output Summary (Last 24 hours) at 3/21/2024 1851  Last data filed at 3/21/2024 1541  Gross per 24 hour   Intake --   Output 200 ml   Net -200 ml       Physical Exam:   Physical Exam  Constitutional:       General: She is not in acute distress.     Appearance: She is well-developed.   HENT:      Head: Normocephalic and atraumatic.   Eyes:      General: No scleral icterus.  Cardiovascular:      Rate and Rhythm: Normal rate and regular rhythm.   Pulmonary:      Effort: Pulmonary effort is normal. No respiratory distress.      Breath sounds: Normal breath sounds. No wheezing or rales.   Abdominal:      General: Bowel sounds are normal. There is no distension.      Palpations: Abdomen is soft.      Tenderness: There is no abdominal tenderness.   Musculoskeletal:      Comments: No spinal or paraspinal muscle tenderness noted   Neurological:      Mental Status: She is alert.          Additional Data:     Labs:  Results from last 7 days   Lab Units 24  0517 24  1335   WBC Thousand/uL 5.63 7.22   HEMOGLOBIN g/dL 14.5 14.8   HEMATOCRIT % 44.8 44.2   PLATELETS Thousands/uL 192 188   NEUTROS PCT %  --  72   LYMPHS PCT %  --  15   MONOS PCT %  --  11   EOS PCT %  --  1     Results from last 7 days   Lab Units 24  0517 24  1335   SODIUM mmol/L 132* 132*   POTASSIUM mmol/L 5.1 4.7   CHLORIDE mmol/L 102 101   CO2 mmol/L 23 25   BUN mg/dL 17 16   CREATININE mg/dL  0.88 0.77   ANION GAP mmol/L 7 6   CALCIUM mg/dL 8.7 9.1   ALBUMIN g/dL  --  3.7   TOTAL BILIRUBIN mg/dL  --  0.90   ALK PHOS U/L  --  51   ALT U/L  --  10   AST U/L  --  18   GLUCOSE RANDOM mg/dL 89 95                       Lines/Drains:  Invasive Devices       Peripheral Intravenous Line  Duration             Peripheral IV 03/20/24 Left;Proximal;Ventral (anterior) Forearm 1 day                          Imaging: Reviewed radiology reports from this admission including: abdominal/pelvic CT    Recent Cultures (last 7 days):         Last 24 Hours Medication List:   Current Facility-Administered Medications   Medication Dose Route Frequency Provider Last Rate    acetaminophen  975 mg Oral Q8H [facility] Coty Mustafa, DO      docusate sodium  100 mg Oral BID Coty Mustafa, DO      fluticasone  1 spray Nasal Daily Coty Mustafa, DO      levothyroxine  75 mcg Oral Early Morning Coty Mustafa, DO      lidocaine  1 patch Topical Daily Coty Mustafa, DO      lisinopril  2.5 mg Oral Daily Coty Mustafa, DO      methocarbamol  500 mg Oral Q8H LOUISA Coty Mustafa, DO      metoprolol succinate  25 mg Oral Daily Coty Mustafa, DO      ondansetron  4 mg Intravenous Q6H PRN Coty Mustafa DO      [START ON 3/22/2024] pantoprazole  40 mg Oral Early Morning Coty Mustafa, DO      polyethylene glycol  17 g Oral Daily Coty Mustafa,       pravastatin  80 mg Oral Daily With Dinner Coty Mustafa, DO      rivaroxaban  20 mg Oral Daily With Breakfast Coty Mustafa DO          Today, Patient Was Seen By: Coty Mustafa DO    **Please Note: This note may have been constructed using a voice recognition system.**

## 2024-03-21 NOTE — DISCHARGE SUMMARY
[facility]  Discharge- Corey Pulido 1940, 83 y.o. female MRN: [id number]  Unit/Bed#: [address] [place] [address] Encounter: [id number]  Primary Care Provider: Irene Buck MD   Date and time admitted to hospital: 3/20/2024 12:45 PM    * Compression fracture of L1 lumbar vertebra (HCC)  Assessment & Plan  Patient presented to the ER with worsening back pain over the last 5 days.  Started with the back pain when she had to lay down for nuclear stress test on 3/8.  Now with difficulty ambulating due to pain  imaging done in the ER showed acute compression fracture of L1  Multimodal pain management with Tylenol, Lidoderm patch, Robaxin.  Oxycodone discontinued as  did report that patient has had some vomiting on it in the past  PT/OT - cleared for home  Neurosurgery referral on discharge.  LSO brace in place.  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  Patient with high bladder scan readings but is voiding with high PVRs at times  Possibly a chronic issue, possibly due to constipation, opioids, decreased mobility.   Continue Colace, MiraLAX on discharge.  Dulcolax suppository while here  Patient has Lang catheter in place.  Continue Flomax on discharge  Outpatient follow-up with urology    Chronic systolic congestive heart failure (HCC)  Assessment & Plan  Wt Readings from Last 3 Encounters:   03/20/24 57.6 kg (127 lb)   02/29/24 57.7 kg (127 lb 3.2 oz)   11/13/23 58.1 kg (128 lb)     prior echo from  2023 showed EF of 45 to 55%   stress test from 3/8 showed EF of 37%.  Decreased perfusion noted in the LV apex and IV septum, inferior anterior walls with mild kay-infarct ischemia.  Occasional PVCs  Patient reported some shortness of breath but this has been ongoing even on an outpatient basis.  Chest x-ray which pulmonary edema  40 mg of IV Lasix x 1 given on 3/21  Continue lisinopril, toprol XL.  Started on Demadex by nephrology  Outpatient follow-up with cardiology.  Has an appointment in April          Chest pain-resolved as of 3/23/2024  Assessment & Plan  Patient reported some chest pain, shortness of breath.  Per review of outpatient records, stress test was done and patient supposed to follow-up with cardiology  SARA score 2.  Troponins negative  EKG showed paced rhythm    Deafness  Assessment & Plan  Uses sign language to communicate.    Atrial flutter (HCC)  Assessment & Plan  continue Toprol-XL, Xarelto.    Coronary artery disease involving native coronary artery of native heart without angina pectoris  Assessment & Plan  continue Toprol-XL, statin.    Hypothyroidism  Assessment & Plan  Continue levothyroxine.      Medical Problems       Resolved Problems  Date Reviewed: 3/22/2024            Resolved    Chest pain 3/23/2024     Resolved by  Coty Mustafa DO        Discharging Physician / Practitioner: Coty Mustafa DO  PCP: Irene Buck MD  Admission Date:   Admission Orders (From admission, onward)       Ordered        03/21/24 1644  Inpatient Admission  Once            03/20/24 1653  Place in Observation  Once                          Discharge Date: 03/23/24    Consultations During Hospital Stay:  none    Procedures Performed:   none    Significant Findings / Test Results:   See above    Incidental Findings:   Pancreatic head cyst  I reviewed the above mentioned incidental findings with the patient and/or family and they expressed understanding.    Test Results Pending at Discharge (will  require follow up):   none     Outpatient Tests Requested:  none    Complications:  none    Reason for Admission: L1 compression fracture    Hospital Course:   Corey Pulido is a 83 y.o. female patient who originally presented to the hospital on 3/20/2024 due to Low back pain which started on 3/8 when she had to lay down for stress test.  Over the last 5 days prior to admission her pain significantly worsened and patient was having difficulty moving due to pain.  Pain level was 10/10 in intensity at home.  Reported some shortness of breath, chest pain for which she had stress test ordered.  Imaging done in the ER showed L1 compression fracture and patient was admitted.  Started on multimodal pain management with improvement in back pain.  During hospitalization sodium level started to trend down and was seen by nephrology and medications adjusted per nephrology.  Patient also noted to have urinary retention and was seen by urology and has a Lang catheter in place.  Cleared by all consultants involved in a care prior to discharge.  Discharge plan discussed in details with patient and her  at bedside    Please see above list of diagnoses and related plan for additional information.     Condition at Discharge: stable    Discharge Day Visit / Exam:   Subjective: Patient states she feels much better compared to before.  Had a bowel movement yesterday.  Back pain has improved as well.  Spoke with patient and  using  on iPad    Vitals: Blood Pressure: 132/65 (03/23/24 0813)  Pulse: 81 (03/23/24 0813)  Temperature: 98.1 °F (36.7 °C) (03/23/24 0813)  Temp Source: Oral (03/23/24 0813)  Respirations: 18 (03/23/24 0813)  Weight - Scale: 57.6 kg (127 lb) (03/20/24 2348)  SpO2: 93 % (03/23/24 0813)  Exam:   Physical Exam  Constitutional:       General: She is not in acute distress.     Appearance: She is not toxic-appearing.   HENT:      Head: Normocephalic and atraumatic.   Eyes:       General: No scleral icterus.  Cardiovascular:      Rate and Rhythm: Normal rate and regular rhythm.   Pulmonary:      Effort: Pulmonary effort is normal. No respiratory distress.      Breath sounds: Normal breath sounds. No wheezing or rales.   Abdominal:      General: Bowel sounds are normal. There is no distension.      Palpations: Abdomen is soft.      Tenderness: There is no abdominal tenderness.   Musculoskeletal:      Right lower leg: No edema.      Left lower leg: No edema.      Comments: No spinal tenderness noted. Able to roll onto her sides, sit up in bed without difficulty   Neurological:      Mental Status: She is alert.          Discussion with Family: Updated  () at bedside.also spoke with daughter over the phone    Discharge instructions/Information to patient and family:   See after visit summary for information provided to patient and family.      Provisions for Follow-Up Care:  See after visit summary for information related to follow-up care and any pertinent home health orders.      Mobility at time of Discharge:   Basic Mobility Inpatient Raw Score: 19  JH-HLM Goal: 6: Walk 10 steps or more  JH-HLM Achieved: 6: Walk 10 steps or more  HLM Goal achieved. Continue to encourage appropriate mobility.     Disposition:   Home with VNA Services (Reminder: Complete face to face encounter)    Planned Readmission: no     Discharge Statement:  I spent > 30 minutes discharging the patient. This time was spent on the day of discharge. I had direct contact with the patient on the day of discharge. Greater than 50% of the total time was spent examining patient, answering all patient questions, arranging and discussing plan of care with patient as well as directly providing post-discharge instructions.  Additional time then spent on discharge activities.    Discharge Medications:  See after visit summary for reconciled discharge medications provided to patient and/or family.      **Please  Note: This note may have been constructed using a voice recognition system**

## 2024-03-21 NOTE — ASSESSMENT & PLAN NOTE
Noted to have marked bladder distention on imaging.  Bladder scan done today showed 900 mL of urine and then patient voided 200 mL.  Started on urinary retention protocol  Last bowel movement was 2 days ago.  Started Colace, MiraLAX  May need urology if patient ends up with a Lang catheter

## 2024-03-21 NOTE — OCCUPATIONAL THERAPY NOTE
Occupational Therapy Evaluation/Treatment       03/21/24 1130   OT Last Visit   OT Visit Date 03/21/24   Note Type   Note type Evaluation   Pain Assessment   Pain Assessment Tool 0-10   Pain Score 7   Pain Location/Orientation Location: Back   Restrictions/Precautions   Braces or Orthoses LSO   Other Precautions Chair Alarm;Bed Alarm;Fall Risk;Hard of hearing  (uses ASL or written communication, L1 acute compression fracture)   Home Living   Type of Home House   Home Layout Two level  (bathroom is on 2nd floor)   Bathroom Shower/Tub Tub/shower unit   Prior Function   Level of [place] Independent with ADLs;Independent with functional mobility;Needs assistance with IADLS   Lives With Spouse   Receives Help From Family   IADLs Family/Friend/Other provides transportation   Comments Patient admitted with back pain, found to have a L1 compression fracture.   Lifestyle   Reciprocal Relationships patients  and sister present for session   ADL   Eating Assistance 7  Independent   Grooming Assistance 5  Supervision/Setup   UB Bathing Assistance 5  Supervision/Setup   LB Bathing Assistance 4  Minimal Assistance   UB Dressing Assistance 5  Supervision/Setup   LB Dressing Assistance 4  Minimal Assistance   Toileting Assistance  4  Minimal Assistance   Bed Mobility   Supine to Sit 4  Minimal assistance   Transfers   Sit to Stand 4  Minimal assistance   Stand to Sit 4  Minimal assistance   Functional Mobility   Functional Mobility 5  Supervision   Additional Comments to bathroom with RW   Balance   Static Sitting Fair +   Dynamic Sitting Fair   Static Standing Fair   Dynamic Standing Fair   Activity Tolerance   Activity Tolerance Patient tolerated treatment well   Medical Staff Made Aware Dr Mustafa present at beginning of session   Nurse Made Aware yes Heather WALKER Assessment   RUE Assessment WFL   LUE Assessment   LUE Assessment WFL   Cognition   Overall Cognitive Status Impaired   Arousal/Participation Cooperative    Attention Attends with cues to redirect   Orientation Level Oriented X4   Following Commands Follows one step commands with increased time or repetition   Comments pt repeats herself frequently   Assessment   Limitation Decreased ADL status;Decreased UE strength;Decreased Safe judgement during ADL;Decreased endurance;Decreased high-level ADLs;Decreased self-care trans  (decreased balance and mobility)   Prognosis Good   Assessment Patient evaluated by Occupational Therapy.  Patient admitted with Compression fracture of L1 lumbar vertebra (HCC).  The patients occupational profile, medical and therapy history includes a extensive additional review of physical, cognitive, or psychosocial history related to current functional performance.  Comorbidities affecting functional mobility and ADLS include: CAD, hypertension, and pacemaker.  Prior to admission, patient was independent with functional mobility without assistive device, independent with ADLS, and requiring assist for IADLS.  The evaluation identifies the following performance deficits: weakness, impaired balance, decreased endurance, increased fall risk, new onset of impairment of functional mobility, decreased ADLS, decreased IADLS, pain, decreased activity tolerance, decreased safety awareness, impaired judgement, and decreased strength, that result in activity limitations and/or participation restrictions. This evaluation requires clinical decision making of high complexity, because the patient presents with comorbidites that affect occupational performance and required significant modification of tasks or assistance with consideration of multiple treatment options.  The Barthel Index was used as a functional outcome tool presenting with a score of Barthel Index Score: 60, indicating moderate limitations of functional mobility and ADLS.  The patient's raw score on the AM-PAC Daily Activity Inpatient Short Form is 21. A raw score of greater than or equal to  19 suggests the patient may benefit from discharge to home. Please refer to the recommendation of the Occupational Therapist for safe discharge planning.  Patient will benefit from skilled Occupational Therapy services to address above deficits and facilitate a safe return to prior level of function.   Goals   Patient Goals did not state   STG Time Frame   (1-7 days)   Short Term Goal  Goals established to promote Patient Goals: did not state:   Grooming: independent standing at sink; Bathing: supervision; Upper Body Dressing independent; Lower Body Dressing: supervision; Toileting: independent; Patient will increase ambulatory standard toilet transfer to independent with rolling walker to increase performance and safety with ADLS and functional mobility; Patient will increase standing tolerance to 5 minutes during ADL task to decrease assistance level and decrease fall risk; Patient will increase bed mobility to independent in preparation for ADLS and transfers; Patient will increase functional mobility to and from bathroom with rolling walker independently to increase performance with ADLS and to use a toilet; Patient will tolerate 5 minutes of UE ROM/strengthening to increase general activity tolerance and performance in ADLS/IADLS; Patient will improve functional activity tolerance to 10 minutes of sustained functional tasks to increase participation in basic self-care and decrease assistance level;  Patient will increase dynamic standing balance to fair+ to improve postural stability and decrease fall risk during standing ADLS and transfers.   LTG Time Frame   (8-14 days)   Long Term Goal Bathing: independent; Lower Body Dressing: independent; Patient will increase standing tolerance to 10 minutes during ADL task to decrease assistance level and decrease fall risk; Patient will tolerate 10 minutes of UE ROM/strengthening to increase general activity tolerance and performance in ADLS/IADLS; Patient will improve  functional activity tolerance to 20 minutes of sustained functional tasks to increase participation in basic self-care and decrease assistance level;  Patient will increase dynamic standing balance to good to improve postural stability and decrease fall risk during standing ADLS and transfers.   Pt will score >/= 24/24 on AM-PAC Daily Activity Inpatient scale to promote safe independence with ADLs and functional mobility; Pt will score >/= 90/100 on Barthel Index in order to decrease caregiver assistance needed and increase ability to perform ADLs and functional mobility.   Plan   Treatment Interventions ADL retraining;UE strengthening/ROM;Functional transfer training;Endurance training;Equipment evaluation/education;Patient/family training;Activityengagement;Compensatory technique education   Goal Expiration Date 04/04/24   OT Frequency 3-5x/wk   Discharge Recommendation   Rehab Resource Intensity Level, OT III (Minimum Resource Intensity)   AM-PAC Daily Activity Inpatient   Lower Body Dressing 3   Bathing 3   Toileting 3   Upper Body Dressing 4   Grooming 4   Eating 4   Daily Activity Raw Score 21   Daily Activity Standardized Score (Calc for Raw Score >=11) 44.27   -PAC Applied Cognition Inpatient   Following a Speech/Presentation 3   Understanding Ordinary Conversation 4   Taking Medications 3   Remembering Where Things Are Placed or Put Away 3   Remembering List of 4-5 Errands 3   Taking Care of Complicated Tasks 2   Applied Cognition Raw Score 18   Applied Cognition Standardized Score 38.07   Barthel Index   Feeding 10   Bathing 0   Grooming Score 5   Dressing Score 5   Bladder Score 10   Bowels Score 10   Toilet Use Score 5   Transfers (Bed/Chair) Score 10   Mobility (Level Surface) Score 0   Stairs Score 5   Barthel Index Score 60   Additional Treatment Session   Start Time 1120   End Time 1130   Treatment Assessment S: 7/10 back pain O: Completed toilet transfer with supervision.  Hygiene in stance for  urination supervision.  Patient stood to wash hands at sink with supervision.  Functional mobility from bathroom without assistive device with supervision.  A: tolerated well.  Cooperative and pleasant.  Patient requires supervision/min assist for LB ADLS and mobility/transfers.  Patient tolerating LSO brace and doesnt appear in much discomfort.  P: III-minimum resource intensity.   Akosua Artis MS OTR/L [id number]

## 2024-03-21 NOTE — CASE MANAGEMENT
Case Management Assessment & Discharge Planning Note    Patient name Corey Pulido  [address] MRN [id number]  : 1940 Date 3/21/2024       Current Admission Date: 3/20/2024  Current Admission Diagnosis:Compression fracture of L1 lumbar vertebra (HCC)   Patient Active Problem List    Diagnosis Date Noted    Compression fracture of L1 lumbar vertebra (HCC) 2024    Complete heart block (HCC) 2021    Anxiety 2021    Hypertensive heart disease with heart failure (HCC) 2021    Pacemaker 2021    Deafness 2021    Cardiomyopathy (HCC) 2020    Long term current use of anticoagulant 2020    Tricuspid valve insufficiency 2020    Chest pain 10/11/2020    Hyponatremia 2020    Atrial fibrillation with rapid ventricular response (HCC) 2020    Stasis dermatitis of both legs 2019    Chronic systolic congestive heart failure (HCC) 2019    Atrial flutter (HCC) 2017    Esophageal dysmotility 2017    Hyperlipidemia 2017    Essential hypertension 2017    Hypothyroidism 2017    Thyroid nodule 2015    Coronary artery disease involving native coronary artery of native heart without angina pectoris 2013    Allergic rhinitis 2012    Esophageal reflux 2012    Osteoporosis 2012      LOS (days): 0  Geometric Mean LOS (GMLOS) (days):   Days to GMLOS:     OBJECTIVE:              Current admission status: Observation       Preferred Pharmacy:   [id number] - [address] [address]  Phone: [phone] Fax: [phone]    Primary Care Provider: Irene Buck MD    Primary Insurance: [employer]  Secondary Insurance:     ASSESSMENT:  Active Health Care Proxies    There are no active Health Care Proxies on file.       Readmission Root Cause  30 Day Readmission: No    Patient Information  Admitted  from:: Home  Mental Status: Alert  During Assessment patient was accompanied by: Spouse  Primary Caregiver: Family  Caregiver's Name:: Francesca Pulido  Caregiver's Relationship to Patient:: Family Member  Support Systems: Self, Family members, Daughter  County of Residence: [place]  What [facility] do you live in?: [place]    Activities of Daily Living Prior to Admission  Functional Status: Independent  Does patient have a history of Outpatient Therapy (PT/OT)?: No  Does patient have a history of HHC?: No  Does patient currently have HHC?: No     Patient Information Continued  Does patient have prescription coverage?: Yes     Means of Transportation  Means of Transport to Appts:: Family transport    DISCHARGE DETAILS:    Discharge planning discussed with:: Patient,   Freedom of Choice: Yes  Comments - Freedom of Choice: D/C home with home health  CM contacted family/caregiver?: Yes  Were Treatment Team discharge recommendations reviewed with patient/caregiver?: Yes  Did patient/caregiver verbalize understanding of patient care needs?: Yes  Were patient/caregiver advised of the risks associated with not following Treatment Team discharge recommendations?: Yes    Contacts  Patient Contacts: Tiny youngblood)  Relationship to Patient:: Family  Contact Method: Phone  Phone Number: [phone]  Reason/Outcome: Discharge Planning, Emergency Contact    Requested Home Health Care         Is the patient interested in HHC at discharge?: Yes  Home Health Discipline requested:: Occupational Therapy, Physical Therapy  Home Health Agency Name:: Other  HHA External Referral Reason (only applicable if external HHA name selected): Services not provided in network or near patient location  Home Health Follow-Up Provider:: PCP  Home Health Services Needed:: Evaluate Functional Status and Safety, Gait/ADL Training, Strengthening/Theraputic Exercises to Improve Function  Homebound Criteria Met:: Requires the Assistance of Another  Person for Safe Ambulation or to Leave the Home  Supporting Clincal Findings:: Limited Endurance, Fatigues Easliy in Short Distances    DME Referral Provided  Referral made for DME?: No    Other Referral/Resources/Interventions Provided:  Interventions: [facility]  Referral Comments: CM spoke with patient and  at bedside with the assistance of RN Baylee for sign language interpretation. CM introduced self and role and purpose to discuss discharge planning. Discussed home rehab per therapy recs, patient and  agreeable, denied having any agency preference and agreeable for CM to place referrral. CM sent referral in aidin, pending acceptance. CM called patient's dtr Tiny to inform the same.     Treatment Team Recommendation: Home with Home Health Care  Discharge Destination Plan:: Home with Home Health Care (Pending acceptance)  Transport at Discharge : Family

## 2024-03-21 NOTE — PHYSICAL THERAPY NOTE
PHYSICAL THERAPY EVALUATION/TREATMENT     03/21/24 1110   PT Last Visit   PT Visit Date 03/21/24   Note Type   Note type Evaluation;Orthotic Management/Training   Type of Brace   Brace Applied [place] LSO   Education   Education Provided Yes;Family or social support of family present for education by provider   End of Consult   Patient Position at End of Consult Bedside chair   Nurse Communication Nurse aware of consult, application of brace   Pain Assessment   Pain Assessment Tool Scruggs-Baker FACES   Scruggs-Baker FACES Pain Rating 2  (LS area although patient reports 7/10 pain)   Restrictions/Precautions   Braces or Orthoses LSO   Other Precautions Chair Alarm;Bed Alarm;Fall Risk;Pain  (Patient deaf requiring sign language or written communication. L1 compression fracture)   Home Living   Type of Home House   Home Layout Two level   Home Equipment   (None)   Additional Comments Patient ambulating independently without assistive device prior to admission   Prior Function   Level of [facility] Independent with ADLs;Independent with functional mobility   Lives With Spouse   Receives Help From Family   IADLs Family/Friend/Other provides transportation   Comments Patient reports independence in her home and ambulating on stairs without the use of an assistive device prior to admission   General   Additional Pertinent History Chart reviewed, patient admitted with compression fracture L1 vertebrae.  Now fitted for LSO and tolerating well patient reports 7/10 pain but only appears to be at a 2/10 level with activity   Family/Caregiver Present No   Cognition   Overall Cognitive Status Impaired   Arousal/Participation Cooperative   Attention Attends with cues to redirect   Orientation Level Oriented to person;Oriented to place;Oriented to situation   Following Commands Follows multistep commands with increased time or repetition   Comments Patient easily distracted   Subjective   Subjective Patient states comfort  with the brace although with continued pain   RLE Assessment   RLE Assessment   (Range of motion within functional limits, strength 3+/4 -)   LLE Assessment   LLE Assessment   (Range of motion within functional limits, strength 3+/4 -)   Coordination   Movements are Fluid and Coordinated 0   Coordination and Movement Description Decreased coordination with gait activity   Bed Mobility   Supine to Sit 4  Minimal assistance   Additional items Assist x 1;Verbal cues  (Cueing for logrolling)   Transfers   Sit to Stand 4  Minimal assistance   Additional items Assist x 1   Stand to Sit 5  Supervision   Additional items Verbal cues  (Cueing for safety)   Ambulation/Elevation   Gait Assistance 4  Minimal assist   Additional items Assist x 1;Verbal cues;Tactile cues   Assistive Device   (None)   Distance 15 feet with change in direction.  Narrow base of support slow gait patterning   Balance   Static Sitting Fair +   Dynamic Sitting Fair   Static Standing Fair   Dynamic Standing Fair -   Activity Tolerance   Activity Tolerance Patient tolerated treatment well   Nurse Made Aware Yes   Assessment   Prognosis Good   Problem List Decreased strength;Decreased range of motion;Decreased endurance;Impaired balance;Decreased mobility;Decreased coordination;Pain   Assessment Patient seen for Physical Therapy evaluation. Patient admitted with Compression fracture of L1 lumbar vertebra (HCC).  Comorbidities affecting patient's physical performance include: .  Personal factors affecting patient at time of initial evaluation include: inability to ambulate household distances, inability to navigate community distances, inability to navigate level surfaces without external assistance, inability to perform dynamic tasks in community, limited home support, inability to perform physical activity, inability to perform ADLS, and inability to perform IADLS . Prior to admission, patient was independent with functional mobility without assistive  device, independent with ADLS, requiring assist for IADLS, living in a multi-level home, ambulating household distance, ambulating community distances, and home with family assist.  Please find objective findings from Physical Therapy assessment regarding body systems outlined above with impairments and limitations including weakness, decreased ROM, impaired balance, decreased endurance, impaired coordination, gait deviations, pain, decreased activity tolerance, decreased functional mobility tolerance, and fall risk.  The Barthel Index was used as a functional outcome tool presenting with a score of Barthel Index Score: 60 today indicating marked limitations of functional mobility and ADLS.  Patient's clinical presentation is currently unstable/unpredictable as seen in patient's presentation of vital sign response, changing level of pain, increased fall risk, new onset of impairment of functional mobility, decreased endurance, and new onset of weakness. Pt would benefit from continued Physical Therapy treatment to address deficits as defined above and maximize level of functional mobility. As demonstrated by objective findings, the assigned level of complexity for this evaluation is high.The patient's -[facility] Basic Mobility Inpatient Short Form Raw Score is 19. A Raw score of greater than 16 suggests the patient may benefit from discharge to home. Please also refer to the recommendation of the Physical Therapist for safe discharge planning.   Goals   Patient Goals None stated, patient distracted difficult to keep focused on questions   STG Expiration Date 03/28/24   Short Term Goal #1 Transfers and gait independently with roller walker   Short Term Goal #2 Gait endurance to functional household distances   LTG Expiration Date 04/04/24   Long Term Goal #1 Strength bilateral lower extremities 4/5   Long Term Goal #2 Return to independent transfers and gait without assistive device for functional household distances as  prior to admission   Plan   Treatment/Interventions ADL retraining;Functional transfer training;LE strengthening/ROM;Therapeutic exercise;Elevations;Endurance training;Patient/family training;Equipment eval/education;Gait training;Compensatory technique education   PT Frequency Other (Comment)  (5 times per week)   Discharge Recommendation   Rehab Resource Intensity Level, PT III (Minimum Resource Intensity)   AM-PAC Basic Mobility Inpatient   Turning in Flat Bed Without Bedrails 4   Lying on Back to Sitting on Edge of Flat Bed Without Bedrails 3   Moving Bed to Chair 3   Standing Up From Chair Using Arms 3   Walk in Room 3   Climb 3-5 Stairs With Railing 3   Basic Mobility Inpatient Raw Score 19   Basic Mobility Standardized Score 42.48   [facility] Highest Level Of Mobility   -HLM Goal 6: Walk 10 steps or more   -HLM Achieved 7: Walk 25 feet or more   Barthel Index   Feeding 10   Bathing 0   Grooming Score 5   Dressing Score 5   Bladder Score 10   Bowels Score 10   Toilet Use Score 5   Transfers (Bed/Chair) Score 10   Mobility (Level Surface) Score 10   Stairs Score 5   Barthel Index Score 70   Additional Treatment Session   Start Time 1050   End Time 1110   Treatment Assessment s: Patient reports 7/10 pain although demonstrating 2/10 with functional mobility O: Gait training with roller walker with supervision to independent for 50 feet.  Bilateral lower extremity exercise completed as listed below as well as education for use of LSO brace A: Patient will benefit from continued physical therapy with progression as tolerated and increasing functional mobility with clinical staff as well   Exercises   Hip Flexion Sitting;10 reps;Bilateral   Hip Abduction Sitting;10 reps;Bilateral   Ankle Pumps Sitting;10 reps;Bilateral   Marching Standing;10 reps;Bilateral   Balance training  Sidestepping and backward stepping completed for balance and coordination   Licensure   [place] License Number  Becca Tapia, PT 4 0 [id number]

## 2024-03-21 NOTE — ASSESSMENT & PLAN NOTE
Wt Readings from Last 3 Encounters:   03/20/24 57.6 kg (127 lb)   02/29/24 57.7 kg (127 lb 3.2 oz)   11/13/23 58.1 kg (128 lb)      prior echo from 2023 showed EF of 45 to 55%   stress test from 3/8 showed EF of 37%.  Decreased perfusion noted in the LV apex and IV septum, inferior anterior walls with mild kay-infarct ischemia.  Occasional PVCs  Continue lisinopril, toprol XL

## 2024-03-21 NOTE — ASSESSMENT & PLAN NOTE
Wt Readings from Last 3 Encounters:   03/20/24 57.6 kg (127 lb)   02/29/24 57.7 kg (127 lb 3.2 oz)   11/13/23 58.1 kg (128 lb)     prior echo from 2023 showed EF of 45 to 55%   stress test from 3/8 showed EF of 37%.  Decreased perfusion noted in the LV apex and IV septum, inferior anterior walls with mild kay-infarct ischemia.  Occasional PVCs  Patient reports some shortness of breath but this has been ongoing even on an outpatient basis.  Chest x-ray which pulmonary edema  40 mg of IV Lasix x 1 given today  Continue lisinopril, toprol XL  Outpatient follow-up with cardiology.  Has an appointment in April

## 2024-03-21 NOTE — ASSESSMENT & PLAN NOTE
Patient presented to the ER with patient presented to the ER with worsening back pain over the last 5 days.  Started with the back pain when she had to lay down for nuclear stress test on 3/8.  Now with difficulty ambulating due to pain  imaging done in the ER showed acute compression fracture of L1  Multimodal pain management with Tylenol scheduled, Lidoderm patch, Robaxin scheduled along with oxycodone as needed  PT/OT

## 2024-03-21 NOTE — ASSESSMENT & PLAN NOTE
Patient reports some chest pain, shortness of breath.  Per review of outpatient records, stress test was done and patient supposed to follow-up with cardiology  SARA score 2  Troponins negative  EKG showed paced rhythm

## 2024-03-21 NOTE — ASSESSMENT & PLAN NOTE
Spoke with patient today using assistance from RN Baylee Urbina who communicated via sign language and d/c plan including follow up was discussed

## 2024-03-21 NOTE — PLAN OF CARE
Problem: Prexisting or High Potential for Compromised Skin Integrity  Goal: Skin integrity is maintained or improved  Description: INTERVENTIONS:  - Identify patients at risk for skin breakdown  - Assess and monitor skin integrity  - Assess and monitor nutrition and hydration status  - Monitor labs   - Assess for incontinence   - Turn and reposition patient  - Assist with mobility/ambulation  - Relieve pressure over bony prominences  - Avoid friction and shearing  - Provide appropriate hygiene as needed including keeping skin clean and dry  - Evaluate need for skin moisturizer/barrier cream  - Collaborate with interdisciplinary team   - Patient/family teaching  - Consider wound care consult   Outcome: Progressing     Problem: PAIN - ADULT  Goal: Verbalizes/displays adequate comfort level or baseline comfort level  Description: Interventions:  - Encourage patient to monitor pain and request assistance  - Assess pain using appropriate pain scale  - Administer analgesics based on type and severity of pain and evaluate response  - Implement non-pharmacological measures as appropriate and evaluate response  - Consider cultural and social influences on pain and pain management  - Notify physician/advanced practitioner if interventions unsuccessful or patient reports new pain  Outcome: Progressing     Problem: INFECTION - ADULT  Goal: Absence or prevention of progression during hospitalization  Description: INTERVENTIONS:  - Assess and monitor for signs and symptoms of infection  - Monitor lab/diagnostic results  - Monitor all insertion sites, i.e. indwelling lines, tubes, and drains  - Monitor endotracheal if appropriate and nasal secretions for changes in amount and color  - [place] appropriate cooling/warming therapies per order  - Administer medications as ordered  - Instruct and encourage patient and family to use good hand hygiene technique  - Identify and instruct in appropriate isolation precautions for  identified infection/condition  Outcome: Progressing  Goal: Absence of fever/infection during neutropenic period  Description: INTERVENTIONS:  - Monitor WBC    Outcome: Progressing     Problem: SAFETY ADULT  Goal: Patient will remain free of falls  Description: INTERVENTIONS:  - Educate patient/family on patient safety including physical limitations  - Instruct patient to call for assistance with activity   - Consult OT/PT to assist with strengthening/mobility   - Keep Call bell within reach  - Keep bed low and locked with side rails adjusted as appropriate  - Keep care items and personal belongings within reach  - Initiate and maintain comfort rounds  - Make Fall Risk Sign visible to staff  - Offer Toileting every 2 Hours, in advance of need  - Initiate/Maintain alarm  - Obtain necessary fall risk management equipment  - Apply yellow socks and bracelet for high fall risk patients  - Consider moving patient to room near nurses station  Outcome: Progressing  Goal: Maintain or return to baseline ADL function  Description: INTERVENTIONS:  -  Assess patient's ability to carry out ADLs; assess patient's baseline for ADL function and identify physical deficits which impact ability to perform ADLs (bathing, care of mouth/teeth, toileting, grooming, dressing, etc.)  - Assess/evaluate cause of self-care deficits   - Assess range of motion  - Assess patient's mobility; develop plan if impaired  - Assess patient's need for assistive devices and provide as appropriate  - Encourage maximum independence but intervene and supervise when necessary  - Involve family in performance of ADLs  - Assess for home care needs following discharge   - Consider OT consult to assist with ADL evaluation and planning for discharge  - Provide patient education as appropriate  Outcome: Progressing  Goal: Maintains/Returns to pre admission functional level  Description: INTERVENTIONS:  - Perform AM-PAC 6 Click Basic Mobility/ Daily Activity  assessment daily.  - Set and communicate daily mobility goal to care team and patient/family/caregiver.   - Collaborate with rehabilitation services on mobility goals if consulted  - Perform Range of Motion 3 times a day.  - Reposition patient every 2 hours.  - Dangle patient 3 times a day  - Stand patient 3 times a day  - Ambulate patient 3 times a day  - Out of bed to chair 3 times a day   - Out of bed for meals 3 times a day  - Out of bed for toileting  - Record patient progress and toleration of activity level   Outcome: Progressing     Problem: DISCHARGE PLANNING  Goal: Discharge to home or other facility with appropriate resources  Description: INTERVENTIONS:  - Identify barriers to discharge w/patient and caregiver  - Arrange for needed discharge resources and transportation as appropriate  - Identify discharge learning needs (meds, wound care, etc.)  - Arrange for interpretive services to assist at discharge as needed  - Refer to Case Management Department for coordinating discharge planning if the patient needs post-hospital services based on physician/advanced practitioner order or complex needs related to functional status, cognitive ability, or social support system  Outcome: Progressing     Problem: Knowledge Deficit  Goal: Patient/family/caregiver demonstrates understanding of disease process, treatment plan, medications, and discharge instructions  Description: Complete learning assessment and assess knowledge base.  Interventions:  - Provide teaching at level of understanding  - Provide teaching via preferred learning methods  Outcome: Progressing

## 2024-03-22 ENCOUNTER — TELEPHONE (OUTPATIENT)
Age: 84
End: 2024-03-22

## 2024-03-22 PROBLEM — R33.9 URINARY RETENTION: Status: ACTIVE | Noted: 2024-03-21

## 2024-03-22 LAB
ANION GAP SERPL CALCULATED.3IONS-SCNC: 5 MMOL/L (ref 4–13)
ANION GAP SERPL CALCULATED.3IONS-SCNC: 8 MMOL/L (ref 4–13)
ANION GAP SERPL CALCULATED.3IONS-SCNC: 9 MMOL/L (ref 4–13)
BUN SERPL-MCNC: 13 MG/DL (ref 5–25)
BUN SERPL-MCNC: 14 MG/DL (ref 5–25)
BUN SERPL-MCNC: 17 MG/DL (ref 5–25)
CALCIUM SERPL-MCNC: 9.4 MG/DL (ref 8.4–10.2)
CALCIUM SERPL-MCNC: 9.5 MG/DL (ref 8.4–10.2)
CALCIUM SERPL-MCNC: 9.6 MG/DL (ref 8.4–10.2)
CHLORIDE SERPL-SCNC: 91 MMOL/L (ref 96–108)
CHLORIDE SERPL-SCNC: 92 MMOL/L (ref 96–108)
CHLORIDE SERPL-SCNC: 94 MMOL/L (ref 96–108)
CO2 SERPL-SCNC: 24 MMOL/L (ref 21–32)
CO2 SERPL-SCNC: 25 MMOL/L (ref 21–32)
CO2 SERPL-SCNC: 31 MMOL/L (ref 21–32)
CREAT SERPL-MCNC: 0.87 MG/DL (ref 0.6–1.3)
CREAT SERPL-MCNC: 0.91 MG/DL (ref 0.6–1.3)
CREAT SERPL-MCNC: 1 MG/DL (ref 0.6–1.3)
GFR SERPL CREATININE-BSD FRML MDRD: 51 ML/MIN/1.73SQ M
GFR SERPL CREATININE-BSD FRML MDRD: 58 ML/MIN/1.73SQ M
GFR SERPL CREATININE-BSD FRML MDRD: 61 ML/MIN/1.73SQ M
GLUCOSE SERPL-MCNC: 101 MG/DL (ref 65–140)
GLUCOSE SERPL-MCNC: 145 MG/DL (ref 65–140)
GLUCOSE SERPL-MCNC: 93 MG/DL (ref 65–140)
POTASSIUM SERPL-SCNC: 4.4 MMOL/L (ref 3.5–5.3)
POTASSIUM SERPL-SCNC: 4.5 MMOL/L (ref 3.5–5.3)
POTASSIUM SERPL-SCNC: 5.1 MMOL/L (ref 3.5–5.3)
SODIUM 24H UR-SCNC: 24 MOL/L
SODIUM SERPL-SCNC: 125 MMOL/L (ref 135–147)
SODIUM SERPL-SCNC: 127 MMOL/L (ref 135–147)
SODIUM SERPL-SCNC: 127 MMOL/L (ref 135–147)

## 2024-03-22 PROCEDURE — 80048 BASIC METABOLIC PNL TOTAL CA: CPT | Performed by: FAMILY MEDICINE

## 2024-03-22 PROCEDURE — 99232 SBSQ HOSP IP/OBS MODERATE 35: CPT | Performed by: FAMILY MEDICINE

## 2024-03-22 PROCEDURE — 80048 BASIC METABOLIC PNL TOTAL CA: CPT | Performed by: STUDENT IN AN ORGANIZED HEALTH CARE EDUCATION/TRAINING PROGRAM

## 2024-03-22 PROCEDURE — 84300 ASSAY OF URINE SODIUM: CPT | Performed by: STUDENT IN AN ORGANIZED HEALTH CARE EDUCATION/TRAINING PROGRAM

## 2024-03-22 PROCEDURE — 99223 1ST HOSP IP/OBS HIGH 75: CPT | Performed by: STUDENT IN AN ORGANIZED HEALTH CARE EDUCATION/TRAINING PROGRAM

## 2024-03-22 RX ORDER — TAMSULOSIN HYDROCHLORIDE 0.4 MG/1
0.4 CAPSULE ORAL
Status: DISCONTINUED | OUTPATIENT
Start: 2024-03-23 | End: 2024-03-22

## 2024-03-22 RX ORDER — TORSEMIDE 10 MG/1
5 TABLET ORAL DAILY
Status: DISCONTINUED | OUTPATIENT
Start: 2024-03-23 | End: 2024-03-23 | Stop reason: HOSPADM

## 2024-03-22 RX ORDER — CALCIUM CARBONATE 500 MG/1
500 TABLET, CHEWABLE ORAL DAILY PRN
Status: DISCONTINUED | OUTPATIENT
Start: 2024-03-22 | End: 2024-03-23 | Stop reason: HOSPADM

## 2024-03-22 RX ORDER — TAMSULOSIN HYDROCHLORIDE 0.4 MG/1
0.4 CAPSULE ORAL
Status: DISCONTINUED | OUTPATIENT
Start: 2024-03-22 | End: 2024-03-23 | Stop reason: HOSPADM

## 2024-03-22 RX ORDER — SIMETHICONE 80 MG
80 TABLET,CHEWABLE ORAL EVERY 6 HOURS PRN
Status: DISCONTINUED | OUTPATIENT
Start: 2024-03-22 | End: 2024-03-23 | Stop reason: HOSPADM

## 2024-03-22 RX ORDER — LISINOPRIL 5 MG/1
5 TABLET ORAL DAILY
Status: DISCONTINUED | OUTPATIENT
Start: 2024-03-22 | End: 2024-03-23 | Stop reason: HOSPADM

## 2024-03-22 RX ORDER — POLYETHYLENE GLYCOL 3350 17 G/17G
17 POWDER, FOR SOLUTION ORAL 2 TIMES DAILY
Status: DISCONTINUED | OUTPATIENT
Start: 2024-03-23 | End: 2024-03-23 | Stop reason: HOSPADM

## 2024-03-22 RX ORDER — BISACODYL 10 MG
10 SUPPOSITORY, RECTAL RECTAL DAILY
Status: DISCONTINUED | OUTPATIENT
Start: 2024-03-22 | End: 2024-03-23 | Stop reason: HOSPADM

## 2024-03-22 RX ORDER — SODIUM CHLORIDE 1 G/1
1 TABLET ORAL 2 TIMES DAILY WITH MEALS
Status: DISCONTINUED | OUTPATIENT
Start: 2024-03-22 | End: 2024-03-23 | Stop reason: HOSPADM

## 2024-03-22 RX ADMIN — Medication 7.5 MG: at 14:42

## 2024-03-22 RX ADMIN — ANTACID TABLETS 500 MG: 500 TABLET, CHEWABLE ORAL at 02:28

## 2024-03-22 RX ADMIN — DOCUSATE SODIUM 100 MG: 100 CAPSULE, LIQUID FILLED ORAL at 10:22

## 2024-03-22 RX ADMIN — METOPROLOL SUCCINATE 25 MG: 25 TABLET, EXTENDED RELEASE ORAL at 20:41

## 2024-03-22 RX ADMIN — BISACODYL 10 MG: 10 SUPPOSITORY RECTAL at 17:29

## 2024-03-22 RX ADMIN — TAMSULOSIN HYDROCHLORIDE 0.4 MG: 0.4 CAPSULE ORAL at 20:41

## 2024-03-22 RX ADMIN — ACETAMINOPHEN 975 MG: 325 TABLET ORAL at 14:42

## 2024-03-22 RX ADMIN — METHOCARBAMOL TABLETS 500 MG: 500 TABLET, COATED ORAL at 05:24

## 2024-03-22 RX ADMIN — POLYETHYLENE GLYCOL 3350 17 G: 17 POWDER, FOR SOLUTION ORAL at 10:22

## 2024-03-22 RX ADMIN — PANTOPRAZOLE SODIUM 40 MG: 40 TABLET, DELAYED RELEASE ORAL at 05:24

## 2024-03-22 RX ADMIN — METHOCARBAMOL TABLETS 500 MG: 500 TABLET, COATED ORAL at 14:42

## 2024-03-22 RX ADMIN — ONDANSETRON 4 MG: 2 INJECTION INTRAMUSCULAR; INTRAVENOUS at 10:38

## 2024-03-22 RX ADMIN — LISINOPRIL 5 MG: 5 TABLET ORAL at 20:41

## 2024-03-22 RX ADMIN — PRAVASTATIN SODIUM 80 MG: 80 TABLET ORAL at 17:29

## 2024-03-22 RX ADMIN — ACETAMINOPHEN 975 MG: 325 TABLET ORAL at 05:24

## 2024-03-22 RX ADMIN — SIMETHICONE 80 MG: 80 TABLET, CHEWABLE ORAL at 10:38

## 2024-03-22 RX ADMIN — SODIUM CHLORIDE 1 G: 1 TABLET ORAL at 17:29

## 2024-03-22 RX ADMIN — DOCUSATE SODIUM 100 MG: 100 CAPSULE, LIQUID FILLED ORAL at 17:29

## 2024-03-22 NOTE — CONSULTS
H&P Exam - Urology       Patient: Corey Pulido   : 1940 Sex: female   MRN: [id number]     CSN: [id number]      History of Present Illness   HPI:  Corey Pulido is a 83 y.o. female past medical history for coronary disease a flutter and deaf who presents with lower back pain since  presenting to [facility] on  undergoing CAT scan confirming L1 fracture patient recently constipated and now found to be in urinary retention undergoing straight caths with 6 to 7 cc urine removed seen at the bedside  translated patient normally goes 4-5 times a day gets up no more than once at night has had no issues voiding has been constipated just recently today receiving a suppository having a bowel movement then urinating just prior to my bedside visit        Review of Systems:   Constitutional:  Negative for activity change, fever, chills and diaphoresis.   HENT: Negative for hearing loss and trouble swallowing.   Eyes: Negative for itching and visual disturbance.   Respiratory: Negative for chest tightness and shortness of breath.   Cardiovascular: Negative for chest pain, edema.   Gastrointestinal: Negative for abdominal distention, na abdominal pain, constipation, diarrhea, Nausea and vomiting.   Genitourinary: Negative for decreased urine volume, difficulty urinating, dysuria, enuresis, frequency, hematuria and urgency.   Musculoskeletal: Negative for gait problem and myalgias.   Neurological: Negative for dizziness and headaches.   Hematological: Does not bruise/bleed easily.       Historical Information   Past Medical History:   Diagnosis Date    Atrial flutter (HCC)     CAD (coronary artery disease)     s/p PCIx1 -2012    Coronary artery disease     Deafness     Disease of thyroid gland     GERD (gastroesophageal reflux disease)     Hyperlipidemia     Hypertension     Hypomagnesemia 2023    Itching     of the face, throat    Pacemaker 2021    medtronic-left chest    Wears  glasses      Past Surgical History:   Procedure Laterality Date    BREAST BIOPSY Left     negative    CARDIAC PACEMAKER PLACEMENT  03/2021    medtronic    CARDIAC SURGERY      stents    COLONOSCOPY      ESOPHAGOGASTRODUODENOSCOPY N/A 02/20/2017    Procedure: ESOPHAGOGASTRODUODENOSCOPY (EGD);  Surgeon: Edouard Rojas MD;  Location: [facility] GI LAB;  Service:      Social History   Social History     Substance and Sexual Activity   Alcohol Use Never     Social History     Substance and Sexual Activity   Drug Use Never     Social History     Tobacco Use   Smoking Status Never    Passive exposure: Past   Smokeless Tobacco Never     Family History:   Family History   Problem Relation Age of Onset    Cancer Mother         breast    Hypertension Mother         essential    Breast cancer Mother     Other Father         smoker    Tuberculosis Father     No Known Problems Sister     No Known Problems Sister     No Known Problems Sister     No Known Problems Daughter        Meds/Allergies   Medications Prior to Admission   Medication    fluticasone (FLONASE) 50 mcg/act nasal spray    levothyroxine (Synthroid) 75 mcg tablet    lisinopril (ZESTRIL) 2.5 mg tablet    metoprolol succinate (TOPROL-XL) 25 mg 24 hr tablet    rosuvastatin (CRESTOR) 10 MG tablet    Xarelto 20 MG tablet     Allergies   Allergen Reactions    Sotalol      Vomiting, convulsing, rash, cough    Famotidine Throat Swelling       Objective   Vitals: BP (!) 178/100   Pulse 70   Temp 97.7 °F (36.5 °C)   Resp 18   Wt 57.6 kg (127 lb)   SpO2 94%   BMI 29.52 kg/m²     Physical Exam:  General Alert awake   Normocephalic atraumatic PERRLA  Lungs clear bilaterally  Cardiac normal S1 normal S2  Abdomen soft, flank pain  Extremities no edema    I/O last 24 hours:  In: 60 [P.O.:60]  Out: 1075 [Urine:1075]    Invasive Devices       Peripheral Intravenous Line  Duration             Peripheral IV 03/20/24 Left;Proximal;Ventral (anterior) Forearm 2 days               "          Lab Results: CBC:   Lab Results   Component Value Date    WBC 5.63 03/21/2024    HGB 14.5 03/21/2024    HCT 44.8 03/21/2024    MCV 88 03/21/2024     03/21/2024    RBC 5.12 03/21/2024    MCH 28.3 03/21/2024    MCHC 32.4 03/21/2024    RDW 13.9 03/21/2024    MPV 9.5 03/21/2024    NRBC 0 03/20/2024     CMP:   Lab Results   Component Value Date     08/15/2017    CL 92 (L) 03/22/2024     10/23/2023    CO2 25 03/22/2024    CO2 23 10/23/2023    BUN 14 03/22/2024    BUN 16 10/23/2023    CREATININE 0.91 03/22/2024    CREATININE 0.88 08/15/2017    GLUCOSE 94 08/15/2017    CALCIUM 9.5 03/22/2024    CALCIUM 9.2 08/15/2017    AST 18 03/20/2024    AST 23 10/23/2023    ALT 10 03/20/2024    ALT 9 10/23/2023    ALKPHOS 51 03/20/2024    ALKPHOS 62 08/15/2017    PROT 6.7 08/15/2017    BILITOT 0.6 08/15/2017    EGFR 58 03/22/2024     Urinalysis:   Lab Results   Component Value Date    COLORU Light Yellow 03/20/2024    CLARITYU Clear 03/20/2024    SPECGRAV <=1.005 03/20/2024    PHUR 6.5 03/20/2024    PHUR 7.0 01/24/2019    LEUKOCYTESUR Negative 03/20/2024    NITRITE Negative 03/20/2024    GLUCOSEU Negative 03/20/2024    KETONESU Negative 03/20/2024    BILIRUBINUR Negative 03/20/2024    BLOODU Trace-Intact (A) 03/20/2024     Urine Culture:   Lab Results   Component Value Date    URINECX 70,000-79,000 cfu/ml Mixed Contaminants X4 02/17/2017     PSA: No results found for: \"PSA\"        Assessment/ Plan:  Urinary retention secondary to constipation pelvic fracture  Patient has voided  Postvoid residual of greater than 500 cc Place 16 [place] catheter  Tamsulosin 0.4 mg        Sukhdev Schultz MD    "

## 2024-03-22 NOTE — PLAN OF CARE
Problem: Prexisting or High Potential for Compromised Skin Integrity  Goal: Skin integrity is maintained or improved  Description: INTERVENTIONS:  - Identify patients at risk for skin breakdown  - Assess and monitor skin integrity  - Assess and monitor nutrition and hydration status  - Monitor labs   - Assess for incontinence   - Turn and reposition patient  - Assist with mobility/ambulation  - Relieve pressure over bony prominences  - Avoid friction and shearing  - Provide appropriate hygiene as needed including keeping skin clean and dry  - Evaluate need for skin moisturizer/barrier cream  - Collaborate with interdisciplinary team   - Patient/family teaching  - Consider wound care consult   Outcome: Progressing     Problem: PAIN - ADULT  Goal: Verbalizes/displays adequate comfort level or baseline comfort level  Description: Interventions:  - Encourage patient to monitor pain and request assistance  - Assess pain using appropriate pain scale  - Administer analgesics based on type and severity of pain and evaluate response  - Implement non-pharmacological measures as appropriate and evaluate response  - Consider cultural and social influences on pain and pain management  - Notify physician/advanced practitioner if interventions unsuccessful or patient reports new pain  Outcome: Progressing     Problem: INFECTION - ADULT  Goal: Absence or prevention of progression during hospitalization  Description: INTERVENTIONS:  - Assess and monitor for signs and symptoms of infection  - Monitor lab/diagnostic results  - Monitor all insertion sites, i.e. indwelling lines, tubes, and drains  - Monitor endotracheal if appropriate and nasal secretions for changes in amount and color  - [place] appropriate cooling/warming therapies per order  - Administer medications as ordered  - Instruct and encourage patient and family to use good hand hygiene technique  - Identify and instruct in appropriate isolation precautions for  identified infection/condition  Outcome: Progressing       Problem: SAFETY ADULT  Goal: Maintain or return to baseline ADL function  Description: INTERVENTIONS:  -  Assess patient's ability to carry out ADLs; assess patient's baseline for ADL function and identify physical deficits which impact ability to perform ADLs (bathing, care of mouth/teeth, toileting, grooming, dressing, etc.)  - Assess/evaluate cause of self-care deficits   - Assess range of motion  - Assess patient's mobility; develop plan if impaired  - Assess patient's need for assistive devices and provide as appropriate  - Encourage maximum independence but intervene and supervise when necessary  - Involve family in performance of ADLs  - Assess for home care needs following discharge   - Consider OT consult to assist with ADL evaluation and planning for discharge  - Provide patient education as appropriate  Outcome: Progressing     Problem: DISCHARGE PLANNING  Goal: Discharge to home or other facility with appropriate resources  Description: INTERVENTIONS:  - Identify barriers to discharge w/patient and caregiver  - Arrange for needed discharge resources and transportation as appropriate  - Identify discharge learning needs (meds, wound care, etc.)  - Arrange for interpretive services to assist at discharge as needed  - Refer to Case Management Department for coordinating discharge planning if the patient needs post-hospital services based on physician/advanced practitioner order or complex needs related to functional status, cognitive ability, or social support system  Outcome: Progressing     Problem: Knowledge Deficit  Goal: Patient/family/caregiver demonstrates understanding of disease process, treatment plan, medications, and discharge instructions  Description: Complete learning assessment and assess knowledge base.  Interventions:  - Provide teaching at level of understanding  - Provide teaching via preferred learning methods  Outcome:  Progressing

## 2024-03-22 NOTE — CASE MANAGEMENT
Case Management Discharge Planning Note    Patient name Corey Pulido  Location [facility] 218/2 [facility] 218 MRN [id number]  : 1940 Date 3/22/2024       Current Admission Date: 3/20/2024  Current Admission Diagnosis:Compression fracture of L1 lumbar vertebra (HCC)   Patient Active Problem List    Diagnosis Date Noted    Bladder distention 2024    Compression fracture of L1 lumbar vertebra (HCC) 2024    Complete heart block (HCC) 2021    Anxiety 2021    Hypertensive heart disease with heart failure (HCC) 2021    Pacemaker 2021    Deafness 2021    Cardiomyopathy (HCC) 2020    Long term current use of anticoagulant 2020    Tricuspid valve insufficiency 2020    Chest pain 10/11/2020    Hyponatremia 2020    Atrial fibrillation with rapid ventricular response (HCC) 2020    Stasis dermatitis of both legs 2019    Chronic systolic congestive heart failure (HCC) 2019    Atrial flutter (HCC) 2017    Esophageal dysmotility 2017    Hyperlipidemia 2017    Essential hypertension 2017    Hypothyroidism 2017    Thyroid nodule 2015    Coronary artery disease involving native coronary artery of native heart without angina pectoris 2013    Allergic rhinitis 2012    Esophageal reflux 2012    Osteoporosis 2012      LOS (days): 1  Geometric Mean LOS (GMLOS) (days): 2.9  Days to GMLOS:2.1     OBJECTIVE:  Risk of Unplanned Readmission Score: 11.32         Current admission status: Inpatient   Preferred Pharmacy:   [id number] - [place], [place] - [facility] [address] [address]  Phone: [phone] Fax: [phone]    Primary Care Provider: Irene Buck MD    Primary Insurance: [employer] [facility]  Secondary Insurance:     DISCHARGE DETAILS:    Requested Home Health Care         Home Health Discipline requested:: Nursing, Occupational Therapy,  Physical Therapy  Home Health Services Needed:: Urinary Incontinence Catheter Management     Other Referral/Resources/Interventions Provided:  Referral Comments: CM reserved [facility] in aidin and also added SN services due to patient likely being discharged with chatman in place.     Treatment Team Recommendation: Home with Home Health Care  Discharge Destination Plan:: Home with Home Health Care  Transport at Discharge : Family

## 2024-03-22 NOTE — CONSULTS
Consultation - Nephrology   Corey Pulido 83 y.o. female MRN: [id number]  Unit/Bed#: 2 [place] [address] Encounter: [id number]    ASSESSMENT AND PLAN:   83 y.o. woman  with PMH of CAD, a flutter, chronic hyponatremia p/w  back pain.  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 and component of fluid overload   Sodium drop likely secondary to opioids   Plan:  Give tolvaptan 7.5 mg  Start sodium chloride tablets 1 g 3 times daily  Start torsemide 5 mg  Relative fluid restriction 1.8 L while on tolvaptan    #Volume status/hypertension:  Volume: Slightly hypervolemic  Blood pressure: Hypertensive, /95  Recommend:  Torsemide 5  Increase lisinopril to 5  Metoprolol 25    #CHF  Calculated EF 37%      #back pain   On hydromorphone    # Hearing impairment   used    The highlighted and/or bolded points in my assessment, plan, and disposition were discussed with the primary team and they agree with those points and the plan.  Previous records were personally reviewed by me to obtain a baseline creatinine.   The images (CXR) were personally reviewed by me in PACS      HISTORY OF PRESENT ILLNESS:  Requesting Physician: Coty Mustafa DO  Reason for Consult: Hyponatremia    Corey Pulido is a 83 y.o. female with PMH of CAD, a flutter, chronic hyponatremia who was admitted to [facility] after presenting with back pain. A renal consultation is requested today for assistance in the management of hyponatremia.    PAST MEDICAL HISTORY:  Past Medical History:   Diagnosis Date    Atrial flutter (HCC)     CAD (coronary artery disease)     s/p PCIx1 -2012    Coronary artery disease     Deafness     Disease of thyroid gland     GERD (gastroesophageal  reflux disease)     Hyperlipidemia     Hypertension     Hypomagnesemia 9/18/2023    Itching     of the face, throat    Pacemaker 03/2021    medtronic-left chest    Wears glasses        PAST SURGICAL HISTORY:  Past Surgical History:   Procedure Laterality Date    BREAST BIOPSY Left     negative    CARDIAC PACEMAKER PLACEMENT  03/2021    medtronic    CARDIAC SURGERY      stents    COLONOSCOPY      ESOPHAGOGASTRODUODENOSCOPY N/A 02/20/2017    Procedure: ESOPHAGOGASTRODUODENOSCOPY (EGD);  Surgeon: Edouard Rojas MD;  Location: [facility];  Service:        ALLERGIES:  Allergies   Allergen Reactions    Sotalol      Vomiting, convulsing, rash, cough    Famotidine Throat Swelling       SOCIAL HISTORY:  Social History     Substance and Sexual Activity   Alcohol Use Never     Social History     Substance and Sexual Activity   Drug Use Never     Social History     Tobacco Use   Smoking Status Never    Passive exposure: Past   Smokeless Tobacco Never       FAMILY HISTORY:  Family History   Problem Relation Age of Onset    Cancer Mother         breast    Hypertension Mother         essential    Breast cancer Mother     Other Father         smoker    Tuberculosis Father     No Known Problems Sister     No Known Problems Sister     No Known Problems Sister     No Known Problems Daughter        MEDICATIONS:    Current Facility-Administered Medications:     acetaminophen (TYLENOL) tablet 975 mg, 975 mg, Oral, Q8H LOUISA, Coty Revankar, DO, 975 mg at 03/22/24 0524    bisacodyl (DULCOLAX) rectal suppository 10 mg, 10 mg, Rectal, Daily PRN, Eva A Carlos EduardovarSRIDHAR fayNP    calcium carbonate (TUMS) chewable tablet 500 mg, 500 mg, Oral, Daily PRN, Eva A Ujvary, CRNP, 500 mg at 03/22/24 0228    docusate sodium (COLACE) capsule 100 mg, 100 mg, Oral, BID, Coty Revankar, DO, 100 mg at 03/22/24 1022    fluticasone (FLONASE) 50 mcg/act nasal spray 1 spray, 1 spray, Nasal, Daily, Coty Revankar, DO    HYDROmorphone HCl (DILAUDID) injection  0.2 mg, 0.2 mg, Intravenous, Once, CHA De Leon    levothyroxine tablet 75 mcg, 75 mcg, Oral, Early Morning, Coty Revankar, DO, 75 mcg at 03/21/24 0519    lidocaine (LIDODERM) 5 % patch 1 patch, 1 patch, Topical, Daily, Coty Revankar, DO, 1 patch at 03/21/24 0922    lisinopril (ZESTRIL) tablet 5 mg, 5 mg, Oral, Daily, Joselyn Reyes Bahamonde, MD    methocarbamol (ROBAXIN) tablet 500 mg, 500 mg, Oral, Q8H LOUISA, Coty Revankar, DO, 500 mg at 03/22/24 0524    metoprolol succinate (TOPROL-XL) 24 hr tablet 25 mg, 25 mg, Oral, Daily, Coty Revankar, DO, 25 mg at 03/21/24 2015    ondansetron (ZOFRAN) injection 4 mg, 4 mg, Intravenous, Q4H PRN, CHA De Leon, 4 mg at 03/22/24 1038    pantoprazole (PROTONIX) EC tablet 40 mg, 40 mg, Oral, Early Morning, Coty Revankar, DO, 40 mg at 03/22/24 0524    polyethylene glycol (MIRALAX) packet 17 g, 17 g, Oral, Daily, Coty Revankar, DO, 17 g at 03/22/24 1022    pravastatin (PRAVACHOL) tablet 80 mg, 80 mg, Oral, Daily With Dinner, Coty Revankar, DO, 80 mg at 03/21/24 1543    rivaroxaban (XARELTO) tablet 20 mg, 20 mg, Oral, Daily With Breakfast, Coty Revankar, DO, 20 mg at 03/21/24 0922    simethicone (MYLICON) chewable tablet 80 mg, 80 mg, Oral, Q6H PRN, CHA De Leon, 80 mg at 03/22/24 1038    sodium chloride tablet 1 g, 1 g, Oral, BID With Meals, Joselyn Reyes Bahamonde, MD    tolvaptan (Samsca) split tablet 7.5 mg, 7.5 mg, Oral, Once, Joselyn Reyes Bahamonde, MD    [START ON 3/23/2024] torsemide (DEMADEX) tablet 5 mg, 5 mg, Oral, Daily, Joselyn Reyes Bahamonde, MD    REVIEW OF SYSTEMS:  Complete 10 point review of systems were obtained and discussed in length with the patient. Complete review of systems were negative / unremarkable except mentioned in the HPI section.     Review of Systems - Psychological ROS: negative  Ophthalmic ROS: negative  ENT ROS: negative  Hematological and Lymphatic ROS: positive for - fatigue  Endocrine ROS:  negative  Respiratory ROS: no cough, shortness of breath, or wheezing  Cardiovascular ROS: no chest pain or dyspnea on exertion  Gastrointestinal ROS: no abdominal pain, change in bowel habits, or black or bloody stools  Genito-Urinary ROS: no dysuria, trouble voiding, or hematuria  Musculoskeletal ROS: negative  Neurological ROS: no TIA or stroke symptoms  Dermatological ROS: negative     PHYSICAL EXAM:  Current Weight: Weight - Scale: 57.6 kg (127 lb)  First Weight: Weight - Scale: 57.6 kg (127 lb)  Vitals:    03/22/24 0857   BP: (!) 171/95   Pulse: 73   Resp:    Temp:    SpO2: 92%       Intake/Output Summary (Last 24 hours) at 3/22/2024 1333  Last data filed at 3/22/2024 1300  Gross per 24 hour   Intake 60 ml   Output 1675 ml   Net -1615 ml     Wt Readings from Last 3 Encounters:   03/20/24 57.6 kg (127 lb)   02/29/24 57.7 kg (127 lb 3.2 oz)   11/13/23 58.1 kg (128 lb)     Temp Readings from Last 3 Encounters:   03/21/24 97.7 °F (36.5 °C)   02/29/24 (!) 97 °F (36.1 °C)   09/20/23 97.7 °F (36.5 °C) (Oral)     BP Readings from Last 3 Encounters:   03/22/24 (!) 171/95   03/08/24 (!) 180/84   02/29/24 150/82     Pulse Readings from Last 3 Encounters:   03/22/24 73   03/08/24 70   02/29/24 78        General:  no acute distress at this time  Skin:  No acute rash  Eyes:  No scleral icterus and noninjected  ENT:  mucous membranes moist  Neck:  no carotid bruits  Chest:  Clear to auscultation percussion, good respiratory effort, no use of accessory respiratory muscles  CVS:  Regular rate and rhythm without rub   Abdomen:  soft and nontender   Extremities: no significant lower extremity edema  Neuro:  No gross focality  Psych:  Alert , cooperative       Invasive Devices:      Lab Results:   Results from last 7 days   Lab Units 03/22/24  0538 03/21/24  0517 03/20/24  1335   WBC Thousand/uL  --  5.63 7.22   HEMOGLOBIN g/dL  --  14.5 14.8   HEMATOCRIT %  --  44.8 44.2   PLATELETS Thousands/uL  --  192 188   POTASSIUM mmol/L  "5.1 5.1 4.7   CHLORIDE mmol/L 91* 102 101   CO2 mmol/L 31 23 25   BUN mg/dL 17 17 16   CREATININE mg/dL 1.00 0.88 0.77   CALCIUM mg/dL 9.6 8.7 9.1   MAGNESIUM mg/dL  --  2.2  --        Other Studies:   XR chest portable   Final Result by Finn Michel MD (03/21 0927)      Stable cardiomegaly. Mild pulmonary edema and trace right fissural fluid.            Workstation performed: [id number]         CT abdomen pelvis with contrast   Final Result by Susan Nick MD (03/20 8900)      Acute fracture of the inferior endplate of the L1 vertebral body with mild central depression      Marked bladder distention, evaluate for urinary retention or chronic bladder outlet obstruction      Cardiomegaly      Incidental 5 mm pancreatic head cyst. For simple cyst(s) less than 1.5 cm, recommend follow-up every 2 years for 2 times. After 4 years, no more follow-ups. Recommend next follow-up in 2 years. Preferred imaging modality: abdomen MRI and MRCP with and    without IV contrast, or triple phase abdomen CT with IV contrast, or abdomen MRI and MRCP without IV contrast.         The study was marked in EPIC for immediate notification.         Workstation performed: [id number]         CT recon only lumbar spine   Final Result by E. Alec Schoenberger, MD (03/20 7402)      Acute mild inferior endplate compression fracture at L1. No bony retropulsion.   No significant canal or foraminal stenosis.         I personally discussed this study with TORSTEN CURRAN on 3/20/2024 3:37 PM.                  Workstation performed: [id number]             Portions of the record may have been created with voice recognition software. Occasional wrong word or \"sound a like\" substitutions may have occurred due to the inherent limitations of voice recognition software. Read the chart carefully and recognize, using context, where substitutions have occurred.    "

## 2024-03-22 NOTE — UTILIZATION REVIEW
Continued Stay Review    Observation 3/20 @ 1653 and changed to Inpatient on 3/21 @ 1644. Pt requiring continued stay for Hyposomolar hyponatremia and treat. Compression fracture and Pain control.     Admission Orders (From admission, onward)       Ordered        03/21/24 1644  Inpatient Admission  Once            03/20/24 1653  Place in Observation  Once                          Orders Placed This Encounter   Procedures    Inpatient Admission     Standing Status:   Standing     Number of Occurrences:   1     Order Specific Question:   Level of Care     Answer:   Med Surg [16]     Order Specific Question:   Estimated length of stay     Answer:   More than 2 Midnights     Order Specific Question:   Certification     Answer:   I certify that inpatient services are medically necessary for this patient for a duration of greater than two midnights. See H&P and MD Progress Notes for additional information about the patient's course of treatment.       Date: 3/22                          Current Patient Class: Inpatient  Current Level of Care: Med Surg    HPI:83 y.o. female initially admitted on 3/21     Assessment/Plan:   3/22  Day 3: Has surpassed a 2nd midnight with active treatments and services.  Nephrology cons; Hyposomolar hyponatremia. Hypertension. Slightly hypervolemic. Na 127>125 today, trended down from 130 on admit, baseline 132-133. Serum osm/ Urine osm and urine sodium pending.   Given Tolvaptan 7.5 mg. Start t sodium chloride tablets 1 g 3 times daily. Start torsemide 5 mg. Fluid restriction 1.8L while on tolvaptan.   Increase lisinopril to 5 mg. Continue Metoprolol.       Vital Signs:   03/22/24 08:57:46 -- 73 -- 171/95 Abnormal  120 92 % -- -- -- --   03/21/24 22:27:41 97.7 °F (36.5 °C) 89 18 173/97 Abnormal  122 94 % -- -- -- --   03/21/24 20:02:26 -- 110 Abnormal  -- 186/90 Abnormal  122 92 % -- -- None (Room air)      Pertinent Labs/Diagnostic Results:       Results from last 7 days   Lab Units  03/21/24  0517 03/20/24  1335   WBC Thousand/uL 5.63 7.22   HEMOGLOBIN g/dL 14.5 14.8   HEMATOCRIT % 44.8 44.2   PLATELETS Thousands/uL 192 188   NEUTROS ABS Thousands/µL  --  5.18         Results from last 7 days   Lab Units 03/22/24  1306 03/22/24  0538 03/21/24  0517 03/20/24  1335   SODIUM mmol/L 125* 127* 132* 132*   POTASSIUM mmol/L 4.5 5.1 5.1 4.7   CHLORIDE mmol/L 92* 91* 102 101   CO2 mmol/L 25 31 23 25   ANION GAP mmol/L 8 5 7 6   BUN mg/dL 14 17 17 16   CREATININE mg/dL 0.91 1.00 0.88 0.77   EGFR ml/min/1.73sq m 58 51 60 71   CALCIUM mg/dL 9.5 9.6 8.7 9.1   MAGNESIUM mg/dL  --   --  2.2  --      Results from last 7 days   Lab Units 03/20/24  1335   AST U/L 18   ALT U/L 10   ALK PHOS U/L 51   TOTAL PROTEIN g/dL 6.9   ALBUMIN g/dL 3.7   TOTAL BILIRUBIN mg/dL 0.90         Results from last 7 days   Lab Units 03/22/24  1306 03/22/24  0538 03/21/24  0517 03/20/24  1335   GLUCOSE RANDOM mg/dL 145* 101 89 95       Results from last 7 days   Lab Units 03/20/24  2233 03/20/24  1934   HS TNI 0HR ng/L  --  12   HS TNI 2HR ng/L 10  --    HSTNI D2 ng/L -2  --            Results from last 7 days   Lab Units 03/20/24  1335   BNP pg/mL 481*       Results from last 7 days   Lab Units 03/20/24  1639   CLARITY UA  Clear   COLOR UA  Light Yellow   SPEC GRAV UA  <=1.005   PH UA  6.5   GLUCOSE UA mg/dl Negative   KETONES UA mg/dl Negative   BLOOD UA  Trace-Intact*   PROTEIN UA mg/dl Negative   NITRITE UA  Negative   BILIRUBIN UA  Negative   UROBILINOGEN UA E.U./dl 0.2   LEUKOCYTES UA  Negative   WBC UA /hpf 0-1   RBC UA /hpf 0-1   BACTERIA UA /hpf Occasional   EPITHELIAL CELLS WET PREP /hpf Occasional       Medications:   Scheduled Medications:  acetaminophen, 975 mg, Oral, Q8H LOUISA  docusate sodium, 100 mg, Oral, BID  fluticasone, 1 spray, Nasal, Daily  HYDROmorphone, 0.2 mg, Intravenous, Once  levothyroxine, 75 mcg, Oral, Early Morning  lidocaine, 1 patch, Topical, Daily  lisinopril, 5 mg, Oral, Daily  methocarbamol, 500 mg,  Oral, Q8H LOUISA  metoprolol succinate, 25 mg, Oral, Daily  pantoprazole, 40 mg, Oral, Early Morning  polyethylene glycol, 17 g, Oral, Daily  pravastatin, 80 mg, Oral, Daily With Dinner  rivaroxaban, 20 mg, Oral, Daily With Breakfast  sodium chloride, 1 g, Oral, BID With Meals  tolvaptan, 7.5 mg, Oral, Once  [START ON 3/23/2024] torsemide, 5 mg, Oral, Daily      Continuous IV Infusions: None     PRN Meds:  bisacodyl, 10 mg, Rectal, Daily PRN  calcium carbonate, 500 mg, Oral, Daily PRN  ondansetron, 4 mg, Intravenous, Q4H PRN 3/22 x1  simethicone, 80 mg, Oral, Q6H PRN        Discharge Plan: D    Network Utilization Review Department  ATTENTION: Please call with any questions or concerns to [phone] and carefully listen to the prompts so that you are directed to the right person. All voicemails are confidential.   For Discharge needs, contact Care Management DC Support Team at [phone] opt. [phone]  Send all requests for admission clinical reviews, approved or denied determinations and any other requests to dedicated fax number below belonging to the campus where the patient is receiving treatment. List of dedicated fax numbers for the Facilities:  FACILITY NAME UR FAX NUMBER   ADMISSION DENIALS (Administrative/Medical Necessity) [phone]   DISCHARGE SUPPORT TEAM (NETWORK) [phone]   PARENT CHILD HEALTH (Maternity/NICU/Pediatrics) [phone]   [facility] [phone]   [facility] [phone]   [facility] [phone]   [facility] [phone]   [facility] [phone]   [facility] [phone]   [facility] [phone]   [facility] [phone]   [facility] [phone]   [facility] [phone]   [facility]  [place] [phone]   [facility] [place] [phone]

## 2024-03-22 NOTE — TELEPHONE ENCOUNTER
Interpretor for Francesca () called and states patient is currently admitted and is having a hard time with interpretor states VRI screen is not working properly and needs a live person. Advised to speak to someone in hospital and let them know that VRI screen is not working and needs more assistance. States will try.

## 2024-03-23 VITALS
BODY MASS INDEX: 29.52 KG/M2 | DIASTOLIC BLOOD PRESSURE: 71 MMHG | SYSTOLIC BLOOD PRESSURE: 143 MMHG | RESPIRATION RATE: 18 BRPM | HEART RATE: 70 BPM | OXYGEN SATURATION: 91 % | WEIGHT: 127 LBS | TEMPERATURE: 98 F

## 2024-03-23 PROBLEM — R07.9 CHEST PAIN: Status: RESOLVED | Noted: 2020-10-11 | Resolved: 2024-03-23

## 2024-03-23 LAB
ANION GAP SERPL CALCULATED.3IONS-SCNC: 6 MMOL/L (ref 4–13)
ANION GAP SERPL CALCULATED.3IONS-SCNC: 7 MMOL/L (ref 4–13)
BUN SERPL-MCNC: 12 MG/DL (ref 5–25)
BUN SERPL-MCNC: 14 MG/DL (ref 5–25)
CALCIUM SERPL-MCNC: 8.8 MG/DL (ref 8.4–10.2)
CALCIUM SERPL-MCNC: 9.5 MG/DL (ref 8.4–10.2)
CHLORIDE SERPL-SCNC: 96 MMOL/L (ref 96–108)
CHLORIDE SERPL-SCNC: 99 MMOL/L (ref 96–108)
CO2 SERPL-SCNC: 27 MMOL/L (ref 21–32)
CO2 SERPL-SCNC: 27 MMOL/L (ref 21–32)
CREAT SERPL-MCNC: 0.91 MG/DL (ref 0.6–1.3)
CREAT SERPL-MCNC: 1.05 MG/DL (ref 0.6–1.3)
ERYTHROCYTE [DISTWIDTH] IN BLOOD BY AUTOMATED COUNT: 13.2 % (ref 11.6–15.1)
GFR SERPL CREATININE-BSD FRML MDRD: 48 ML/MIN/1.73SQ M
GFR SERPL CREATININE-BSD FRML MDRD: 58 ML/MIN/1.73SQ M
GLUCOSE SERPL-MCNC: 141 MG/DL (ref 65–140)
GLUCOSE SERPL-MCNC: 91 MG/DL (ref 65–140)
HCT VFR BLD AUTO: 45.4 % (ref 34.8–46.1)
HGB BLD-MCNC: 15.6 G/DL (ref 11.5–15.4)
MCH RBC QN AUTO: 28.9 PG (ref 26.8–34.3)
MCHC RBC AUTO-ENTMCNC: 34.4 G/DL (ref 31.4–37.4)
MCV RBC AUTO: 84 FL (ref 82–98)
OSMOLALITY UR/SERPL-RTO: 281 MMOL/KG (ref 282–298)
OSMOLALITY UR: 312 MMOL/KG
PLATELET # BLD AUTO: 213 THOUSANDS/UL (ref 149–390)
PMV BLD AUTO: 9.1 FL (ref 8.9–12.7)
POTASSIUM SERPL-SCNC: 4.3 MMOL/L (ref 3.5–5.3)
POTASSIUM SERPL-SCNC: 4.6 MMOL/L (ref 3.5–5.3)
RBC # BLD AUTO: 5.39 MILLION/UL (ref 3.81–5.12)
SODIUM SERPL-SCNC: 129 MMOL/L (ref 135–147)
SODIUM SERPL-SCNC: 133 MMOL/L (ref 135–147)
WBC # BLD AUTO: 10.6 THOUSAND/UL (ref 4.31–10.16)

## 2024-03-23 PROCEDURE — 83935 ASSAY OF URINE OSMOLALITY: CPT | Performed by: STUDENT IN AN ORGANIZED HEALTH CARE EDUCATION/TRAINING PROGRAM

## 2024-03-23 PROCEDURE — 99239 HOSP IP/OBS DSCHRG MGMT >30: CPT | Performed by: FAMILY MEDICINE

## 2024-03-23 PROCEDURE — 99233 SBSQ HOSP IP/OBS HIGH 50: CPT | Performed by: STUDENT IN AN ORGANIZED HEALTH CARE EDUCATION/TRAINING PROGRAM

## 2024-03-23 PROCEDURE — 83930 ASSAY OF BLOOD OSMOLALITY: CPT | Performed by: STUDENT IN AN ORGANIZED HEALTH CARE EDUCATION/TRAINING PROGRAM

## 2024-03-23 PROCEDURE — 80048 BASIC METABOLIC PNL TOTAL CA: CPT | Performed by: STUDENT IN AN ORGANIZED HEALTH CARE EDUCATION/TRAINING PROGRAM

## 2024-03-23 PROCEDURE — 85027 COMPLETE CBC AUTOMATED: CPT | Performed by: FAMILY MEDICINE

## 2024-03-23 RX ORDER — LIDOCAINE 50 MG/G
1 PATCH TOPICAL DAILY
Qty: 30 PATCH | Refills: 0 | Status: SHIPPED | OUTPATIENT
Start: 2024-03-24

## 2024-03-23 RX ORDER — ACETAMINOPHEN 325 MG/1
650 TABLET ORAL EVERY 6 HOURS PRN
Start: 2024-03-23

## 2024-03-23 RX ORDER — METHOCARBAMOL 500 MG/1
500 TABLET, FILM COATED ORAL EVERY 8 HOURS PRN
Qty: 20 TABLET | Refills: 0 | Status: SHIPPED | OUTPATIENT
Start: 2024-03-23

## 2024-03-23 RX ORDER — DOCUSATE SODIUM 100 MG/1
100 CAPSULE, LIQUID FILLED ORAL 2 TIMES DAILY
Qty: 60 CAPSULE | Refills: 0 | Status: SHIPPED | OUTPATIENT
Start: 2024-03-23

## 2024-03-23 RX ORDER — POLYETHYLENE GLYCOL 3350 17 G/17G
17 POWDER, FOR SOLUTION ORAL DAILY PRN
Start: 2024-03-23

## 2024-03-23 RX ORDER — LISINOPRIL 2.5 MG/1
5 TABLET ORAL DAILY
Start: 2024-03-23

## 2024-03-23 RX ORDER — METOPROLOL SUCCINATE 25 MG/1
25 TABLET, EXTENDED RELEASE ORAL DAILY
Start: 2024-03-23

## 2024-03-23 RX ORDER — TORSEMIDE 5 MG/1
5 TABLET ORAL DAILY
Qty: 30 TABLET | Refills: 0 | Status: SHIPPED | OUTPATIENT
Start: 2024-03-24

## 2024-03-23 RX ORDER — TOLVAPTAN 15 MG/1
15 TABLET ORAL ONCE
Status: DISCONTINUED | OUTPATIENT
Start: 2024-03-23 | End: 2024-03-23

## 2024-03-23 RX ORDER — PANTOPRAZOLE SODIUM 40 MG/1
40 TABLET, DELAYED RELEASE ORAL
Qty: 30 TABLET | Refills: 0 | Status: SHIPPED | OUTPATIENT
Start: 2024-03-24

## 2024-03-23 RX ORDER — TAMSULOSIN HYDROCHLORIDE 0.4 MG/1
0.4 CAPSULE ORAL
Qty: 30 CAPSULE | Refills: 0 | Status: SHIPPED | OUTPATIENT
Start: 2024-03-23

## 2024-03-23 RX ORDER — SODIUM CHLORIDE 1 G/1
1 TABLET ORAL 3 TIMES DAILY
Qty: 90 TABLET | Refills: 0 | Status: SHIPPED | OUTPATIENT
Start: 2024-03-23

## 2024-03-23 RX ADMIN — ACETAMINOPHEN 975 MG: 325 TABLET ORAL at 05:46

## 2024-03-23 RX ADMIN — PANTOPRAZOLE SODIUM 40 MG: 40 TABLET, DELAYED RELEASE ORAL at 05:46

## 2024-03-23 RX ADMIN — Medication 7.5 MG: at 08:18

## 2024-03-23 RX ADMIN — RIVAROXABAN 20 MG: 20 TABLET, FILM COATED ORAL at 08:14

## 2024-03-23 RX ADMIN — METHOCARBAMOL TABLETS 500 MG: 500 TABLET, COATED ORAL at 05:46

## 2024-03-23 RX ADMIN — ACETAMINOPHEN 975 MG: 325 TABLET ORAL at 13:13

## 2024-03-23 RX ADMIN — LIDOCAINE 5% 1 PATCH: 700 PATCH TOPICAL at 08:14

## 2024-03-23 RX ADMIN — METHOCARBAMOL TABLETS 500 MG: 500 TABLET, COATED ORAL at 13:13

## 2024-03-23 RX ADMIN — BISACODYL 10 MG: 10 SUPPOSITORY RECTAL at 11:48

## 2024-03-23 RX ADMIN — SODIUM CHLORIDE 1 G: 1 TABLET ORAL at 08:16

## 2024-03-23 RX ADMIN — LEVOTHYROXINE SODIUM 75 MCG: 75 TABLET ORAL at 05:46

## 2024-03-23 RX ADMIN — TORSEMIDE 5 MG: 10 TABLET ORAL at 08:14

## 2024-03-23 RX ADMIN — DOCUSATE SODIUM 100 MG: 100 CAPSULE, LIQUID FILLED ORAL at 08:14

## 2024-03-23 RX ADMIN — POLYETHYLENE GLYCOL 3350 17 G: 17 POWDER, FOR SOLUTION ORAL at 08:14

## 2024-03-23 NOTE — PLAN OF CARE
Problem: Prexisting or High Potential for Compromised Skin Integrity  Goal: Skin integrity is maintained or improved  Description: INTERVENTIONS:  - Identify patients at risk for skin breakdown  - Assess and monitor skin integrity  - Assess and monitor nutrition and hydration status  - Monitor labs   - Assess for incontinence   - Turn and reposition patient  - Assist with mobility/ambulation  - Relieve pressure over bony prominences  - Avoid friction and shearing  - Provide appropriate hygiene as needed including keeping skin clean and dry  - Evaluate need for skin moisturizer/barrier cream  - Collaborate with interdisciplinary team   - Patient/family teaching  - Consider wound care consult   Outcome: Progressing     Problem: PAIN - ADULT  Goal: Verbalizes/displays adequate comfort level or baseline comfort level  Description: Interventions:  - Encourage patient to monitor pain and request assistance  - Assess pain using appropriate pain scale  - Administer analgesics based on type and severity of pain and evaluate response  - Implement non-pharmacological measures as appropriate and evaluate response  - Consider cultural and social influences on pain and pain management  - Notify physician/advanced practitioner if interventions unsuccessful or patient reports new pain  Outcome: Progressing     Problem: SAFETY ADULT  Goal: Patient will remain free of falls  Description: INTERVENTIONS:  - Educate patient/family on patient safety including physical limitations  - Instruct patient to call for assistance with activity   - Consult OT/PT to assist with strengthening/mobility   - Keep Call bell within reach  - Keep bed low and locked with side rails adjusted as appropriate  - Keep care items and personal belongings within reach  - Initiate and maintain comfort rounds  - Make Fall Risk Sign visible to staff  - Offer Toileting every 2 Hours, in advance of need  - Initiate/Maintain bed alarm  - Obtain necessary fall risk  management equipment: socks  - Apply yellow socks and bracelet for high fall risk patients  - Consider moving patient to room near nurses station  Outcome: Progressing  Goal: Maintain or return to baseline ADL function  Description: INTERVENTIONS:  -  Assess patient's ability to carry out ADLs; assess patient's baseline for ADL function and identify physical deficits which impact ability to perform ADLs (bathing, care of mouth/teeth, toileting, grooming, dressing, etc.)  - Assess/evaluate cause of self-care deficits   - Assess range of motion  - Assess patient's mobility; develop plan if impaired  - Assess patient's need for assistive devices and provide as appropriate  - Encourage maximum independence but intervene and supervise when necessary  - Involve family in performance of ADLs  - Assess for home care needs following discharge   - Consider OT consult to assist with ADL evaluation and planning for discharge  - Provide patient education as appropriate  Outcome: Progressing  Goal: Maintains/Returns to pre admission functional level  Description: INTERVENTIONS:  - Perform AM-PAC 6 Click Basic Mobility/ Daily Activity assessment daily.  - Set and communicate daily mobility goal to care team and patient/family/caregiver.   - Collaborate with rehabilitation services on mobility goals if consulted  - Perform Range of Motion 3 times a day.  - Reposition patient every 2 hours.  - Dangle patient 3 times a day  - Stand patient 3 times a day  - Ambulate patient 3 times a day  - Out of bed to chair 3 times a day   - Out of bed for meals 3 times a day  - Out of bed for toileting  - Record patient progress and toleration of activity level   Outcome: Progressing     Problem: DISCHARGE PLANNING  Goal: Discharge to home or other facility with appropriate resources  Description: INTERVENTIONS:  - Identify barriers to discharge w/patient and caregiver  - Arrange for needed discharge resources and transportation as appropriate  -  Identify discharge learning needs (meds, wound care, etc.)  - Arrange for interpretive services to assist at discharge as needed  - Refer to Case Management Department for coordinating discharge planning if the patient needs post-hospital services based on physician/advanced practitioner order or complex needs related to functional status, cognitive ability, or social support system  Outcome: Progressing     Problem: Knowledge Deficit  Goal: Patient/family/caregiver demonstrates understanding of disease process, treatment plan, medications, and discharge instructions  Description: Complete learning assessment and assess knowledge base.  Interventions:  - Provide teaching at level of understanding  - Provide teaching via preferred learning methods  Outcome: Progressing

## 2024-03-23 NOTE — ASSESSMENT & PLAN NOTE
Patient presented to the ER with worsening back pain over the last 5 days.  Started with the back pain when she had to lay down for nuclear stress test on 3/8.  Now with difficulty ambulating due to pain  imaging done in the ER showed acute compression fracture of L1  Multimodal pain management with Tylenol, Lidoderm patch, Robaxin.  Oxycodone discontinued as  did report that patient has had some vomiting on it in the past  PT/OT - cleared for home  LSO brace in place.  Outpatient follow-up with PCP. If no improvement or any worsening can consider neurosurgery referral as outpt

## 2024-03-23 NOTE — ASSESSMENT & PLAN NOTE
Patient reports some chest pain, shortness of breath.  Per review of outpatient records, stress test was done and patient supposed to follow-up with cardiology  SARA score 2.  Troponins negative  EKG showed paced rhythm

## 2024-03-23 NOTE — ASSESSMENT & PLAN NOTE
Noted to have marked bladder distention on imaging.  Patient with high bladder scan readings but is voiding with high PVRs at times  Possibly due to constipation, opioids, decreased mobility.   Continue Colace, increase to twice daily MiraLAX.  Dulcolax suppository  Per urology if PVR is greater than 500, plan to place Lang catheter  Start Flomax

## 2024-03-23 NOTE — ASSESSMENT & PLAN NOTE
Wt Readings from Last 3 Encounters:   03/20/24 57.6 kg (127 lb)   02/29/24 57.7 kg (127 lb 3.2 oz)   11/13/23 58.1 kg (128 lb)     prior echo from 2023 showed EF of 45 to 55%   stress test from 3/8 showed EF of 37%.  Decreased perfusion noted in the LV apex and IV septum, inferior anterior walls with mild kay-infarct ischemia.  Occasional PVCs  Patient reported some shortness of breath but this has been ongoing even on an outpatient basis.  Chest x-ray which pulmonary edema  40 mg of IV Lasix x 1 given on 3/21  Continue lisinopril, toprol XL.  Started on Demadex by nephrology  Outpatient follow-up with cardiology.  Has an appointment in April

## 2024-03-23 NOTE — PROGRESS NOTES
NEPHROLOGY PROGRESS NOTE   Corey Pulido 83 y.o. female MRN: [id number]  Unit/Bed#: 2 [place] [address] Encounter: [id number]  Reason for Consult: Hyponatremia    ASSESSMENT AND PLAN:   83 y.o. woman  with PMH of CAD, a flutter, chronic hyponatremia p/w  back pain.  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 and component of fluid overload   Plan:  Received tolvaptan on 3/22, plan to repeat dose this morning 7.5 mg  Continue with sodium chloride tablets 1 g 3 times daily  Continue with torsemide 5 mg daily   Relative fluid restriction 1.8 L   Patient can follow-up with PCP or nephrology on discharge     #Volume status/hypertension:  Volume: Euvolemic  Blood pressure: Hypertensive, /84, goal less than 140/90  Recommend:  Torsemide 5 as above  Continue lisinopril 5 mg  Metoprolol 25     #CHF  Calculated EF 37%  Torsemide as above        #back pain   On hydromorphone     # Hearing impairment   used      Nephrology will sign off at this time. Thank you for involving us in this case. Please call us if any question.       The highlighted and/or bolded points in my assessment, plan, and disposition were discussed with the primary team and they agree with those points and the plan.  Previous records were personally reviewed by me to obtain a baseline creatinine.   The images (CXR) were personally reviewed by me in PACS      SUBJECTIVE:  Patient seen and examined at bedside. No chest pain, shortness of breath, nausea, vomiting, abdominal pain or diarrhea.     OBJECTIVE:  Current Weight: Weight - Scale: 57.6 kg (127 lb)  Vitals:    03/22/24 2211   BP: 151/84   Pulse: 78   Resp:    Temp: 97.9 °F (36.6 °C)   SpO2: 93%        Intake/Output Summary (Last 24 hours) at 3/23/2024 0641  Last data filed at 3/22/2024 2142  Gross per 24 hour   Intake 60 ml   Output 2450 ml   Net -2390 ml     Wt Readings from Last 3 Encounters:   03/20/24 57.6 kg (127 lb)   02/29/24 57.7 kg (127 lb 3.2 oz)   11/13/23 58.1 kg (128 lb)     Temp Readings from Last 3 Encounters:   03/22/24 97.9 °F (36.6 °C)   02/29/24 (!) 97 °F (36.1 °C)   09/20/23 97.7 °F (36.5 °C) (Oral)     BP Readings from Last 3 Encounters:   03/22/24 151/84   03/08/24 (!) 180/84   02/29/24 150/82     Pulse Readings from Last 3 Encounters:   03/22/24 78   03/08/24 70   02/29/24 78      General:  no acute distress at this time  Skin:  No acute rash  Eyes:  No scleral icterus and noninjected  ENT:  mucous membranes moist  Neck:  no carotid bruits  Chest:  Clear to auscultation percussion, good respiratory effort, no use of accessory respiratory muscles  CVS:  Regular rate and rhythm without rub   Abdomen:  soft and nontender   Extremities: no significant lower extremity edema  Neuro:  No gross focality  Psych:  Alert , cooperative       Medications:    Current Facility-Administered Medications:     acetaminophen (TYLENOL) tablet 975 mg, 975 mg, Oral, Q8H LOUISA, Coty Revankar, DO, 975 mg at 03/23/24 0546    bisacodyl (DULCOLAX) rectal suppository 10 mg, 10 mg, Rectal, Daily PRN, CHA De Leon    bisacodyl (DULCOLAX) rectal suppository 10 mg, 10 mg, Rectal, Daily, Coty Revankar, DO, 10 mg at 03/22/24 1729    calcium carbonate (TUMS) chewable tablet 500 mg, 500 mg, Oral, Daily PRN, CHA De Leon, 500 mg at 03/22/24 0228    docusate sodium (COLACE) capsule 100 mg, 100 mg, Oral, BID, Coty Revankar, DO, 100 mg at 03/22/24 1729    fluticasone (FLONASE) 50 mcg/act nasal spray 1 spray, 1 spray, Nasal, Daily, Coty Mustafa DO    HYDROmorphone HCl (DILAUDID) injection 0.2 mg, 0.2 mg, Intravenous, Once, CHA De Leon    levothyroxine tablet 75 mcg, 75 mcg, Oral,  Early Morning, Coty Revconsueloar, DO, 75 mcg at 03/23/24 0546    lidocaine (LIDODERM) 5 % patch 1 patch, 1 patch, Topical, Daily, Coty Porrasar DO, 1 patch at 03/21/24 0922    lisinopril (ZESTRIL) tablet 5 mg, 5 mg, Oral, Daily, Joselyn Reyes Bahamonde, MD, 5 mg at 03/22/24 2041    methocarbamol (ROBAXIN) tablet 500 mg, 500 mg, Oral, Q8H LOUISA, Coty Revankar, DO, 500 mg at 03/23/24 0546    metoprolol succinate (TOPROL-XL) 24 hr tablet 25 mg, 25 mg, Oral, Daily, Coty Porrasar, DO, 25 mg at 03/22/24 2041    ondansetron (ZOFRAN) injection 4 mg, 4 mg, Intravenous, Q4H PRN, CHA De Leon, 4 mg at 03/22/24 1038    pantoprazole (PROTONIX) EC tablet 40 mg, 40 mg, Oral, Early Morning, Coty Porrasar DO, 40 mg at 03/23/24 0546    polyethylene glycol (MIRALAX) packet 17 g, 17 g, Oral, BID, Coty consueloar, DO    pravastatin (PRAVACHOL) tablet 80 mg, 80 mg, Oral, Daily With Dinner, Coty Porrasar, DO, 80 mg at 03/22/24 1729    rivaroxaban (XARELTO) tablet 20 mg, 20 mg, Oral, Daily With Breakfast, Coty consueloar DO, 20 mg at 03/21/24 0922    simethicone (MYLICON) chewable tablet 80 mg, 80 mg, Oral, Q6H PRN, CHA De Leon, 80 mg at 03/22/24 1038    sodium chloride tablet 1 g, 1 g, Oral, BID With Meals, Joselyn Reyes Bahamonde, MD, 1 g at 03/22/24 1729    tamsulosin (FLOMAX) capsule 0.4 mg, 0.4 mg, Oral, Daily With Dinner, Coty Porrasar DO, 0.4 mg at 03/22/24 2041    tolvaptan (Samsca) tablet 15 mg, 15 mg, Oral, Once, Joselyn Reyes Bahamonde, MD    torsemide (DEMADEX) tablet 5 mg, 5 mg, Oral, Daily, Joselyn Reyes Bahamonde, MD    Laboratory Results:  Results from last 7 days   Lab Units 03/23/24  0602 03/22/24 2059 03/22/24  1306 03/22/24  0538 03/21/24  0517 03/20/24  1335   WBC Thousand/uL 10.60*  --   --   --  5.63 7.22   HEMOGLOBIN g/dL 15.6*  --   --   --  14.5 14.8   HEMATOCRIT % 45.4  --   --   --  44.8 44.2   PLATELETS Thousands/uL 213  --   --   --  192 188   SODIUM mmol/L 133* 127* 125*  "127* 132* 132*   POTASSIUM mmol/L 4.6 4.4 4.5 5.1 5.1 4.7   CHLORIDE mmol/L 99 94* 92* 91* 102 101   CO2 mmol/L 27 24 25 31 23 25   BUN mg/dL 12 13 14 17 17 16   CREATININE mg/dL 0.91 0.87 0.91 1.00 0.88 0.77   CALCIUM mg/dL 9.5 9.4 9.5 9.6 8.7 9.1   MAGNESIUM mg/dL  --   --   --   --  2.2  --        XR chest portable   Final Result by Finn Michel MD (03/21 0927)      Stable cardiomegaly. Mild pulmonary edema and trace right fissural fluid.            Workstation performed: [id number]         CT abdomen pelvis with contrast   Final Result by Susan Nick MD (03/20 7890)      Acute fracture of the inferior endplate of the L1 vertebral body with mild central depression      Marked bladder distention, evaluate for urinary retention or chronic bladder outlet obstruction      Cardiomegaly      Incidental 5 mm pancreatic head cyst. For simple cyst(s) less than 1.5 cm, recommend follow-up every 2 years for 2 times. After 4 years, no more follow-ups. Recommend next follow-up in 2 years. Preferred imaging modality: abdomen MRI and MRCP with and    without IV contrast, or triple phase abdomen CT with IV contrast, or abdomen MRI and MRCP without IV contrast.         The study was marked in EPIC for immediate notification.         Workstation performed: [id number]         CT recon only lumbar spine   Final Result by E. Alec Schoenberger, MD (03/20 0785)      Acute mild inferior endplate compression fracture at L1. No bony retropulsion.   No significant canal or foraminal stenosis.         I personally discussed this study with TORSTEN CURRAN on 3/20/2024 3:37 PM.                  Workstation performed: [id number]             Portions of the record may have been created with voice recognition software. Occasional wrong word or \"sound a like\" substitutions may have occurred due to the inherent limitations of voice recognition software. Read the chart carefully and recognize, using context, where substitutions have " occurred.

## 2024-03-23 NOTE — ASSESSMENT & PLAN NOTE
Patient reported some chest pain, shortness of breath.  Per review of outpatient records, stress test was done and patient supposed to follow-up with cardiology  SARA score 2.  Troponins negative  EKG showed paced rhythm

## 2024-03-23 NOTE — ASSESSMENT & PLAN NOTE
Wt Readings from Last 3 Encounters:   03/20/24 57.6 kg (127 lb)   02/29/24 57.7 kg (127 lb 3.2 oz)   11/13/23 58.1 kg (128 lb)     prior echo from 2023 showed EF of 45 to 55%   stress test from 3/8 showed EF of 37%.  Decreased perfusion noted in the LV apex and IV septum, inferior anterior walls with mild kay-infarct ischemia.  Occasional PVCs  Patient reported some shortness of breath but this has been ongoing even on an outpatient basis.  Chest x-ray which pulmonary edema  40 mg of IV Lasix x 1 given 3/21  Continue lisinopril, toprol XL  Outpatient follow-up with cardiology.  Has an appointment in April

## 2024-03-23 NOTE — PLAN OF CARE
Problem: Prexisting or High Potential for Compromised Skin Integrity  Goal: Skin integrity is maintained or improved  Description: INTERVENTIONS:  - Identify patients at risk for skin breakdown  - Assess and monitor skin integrity  - Assess and monitor nutrition and hydration status  - Monitor labs   - Assess for incontinence   - Turn and reposition patient  - Assist with mobility/ambulation  - Relieve pressure over bony prominences  - Avoid friction and shearing  - Provide appropriate hygiene as needed including keeping skin clean and dry  - Evaluate need for skin moisturizer/barrier cream  - Collaborate with interdisciplinary team   - Patient/family teaching  - Consider wound care consult   3/23/2024 1617 by Yamilex Gee RN  Outcome: Completed  3/23/2024 0757 by Yamilex Gee RN  Outcome: Progressing     Problem: PAIN - ADULT  Goal: Verbalizes/displays adequate comfort level or baseline comfort level  Description: Interventions:  - Encourage patient to monitor pain and request assistance  - Assess pain using appropriate pain scale  - Administer analgesics based on type and severity of pain and evaluate response  - Implement non-pharmacological measures as appropriate and evaluate response  - Consider cultural and social influences on pain and pain management  - Notify physician/advanced practitioner if interventions unsuccessful or patient reports new pain  3/23/2024 1617 by Yamilex Gee RN  Outcome: Completed  3/23/2024 0757 by Yamilex Gee RN  Outcome: Progressing     Problem: SAFETY ADULT  Goal: Patient will remain free of falls  Description: INTERVENTIONS:  - Educate patient/family on patient safety including physical limitations  - Instruct patient to call for assistance with activity   - Consult OT/PT to assist with strengthening/mobility   - Keep Call bell within reach  - Keep bed low and locked with side rails adjusted as appropriate  - Keep care items and personal belongings within  reach  - Initiate and maintain comfort rounds  - Make Fall Risk Sign visible to staff  - Offer Toileting every 2 Hours, in advance of need  - Initiate/Maintain bed alarm  - Obtain necessary fall risk management equipment: socks  - Apply yellow socks and bracelet for high fall risk patients  - Consider moving patient to room near nurses station  3/23/2024 1617 by Yamilex Gee RN  Outcome: Completed  3/23/2024 0757 by Yamilex Gee RN  Outcome: Progressing  Goal: Maintain or return to baseline ADL function  Description: INTERVENTIONS:  -  Assess patient's ability to carry out ADLs; assess patient's baseline for ADL function and identify physical deficits which impact ability to perform ADLs (bathing, care of mouth/teeth, toileting, grooming, dressing, etc.)  - Assess/evaluate cause of self-care deficits   - Assess range of motion  - Assess patient's mobility; develop plan if impaired  - Assess patient's need for assistive devices and provide as appropriate  - Encourage maximum independence but intervene and supervise when necessary  - Involve family in performance of ADLs  - Assess for home care needs following discharge   - Consider OT consult to assist with ADL evaluation and planning for discharge  - Provide patient education as appropriate  3/23/2024 1617 by Yamilex Gee RN  Outcome: Completed  3/23/2024 0757 by Yamilex Gee RN  Outcome: Progressing  Goal: Maintains/Returns to pre admission functional level  Description: INTERVENTIONS:  - Perform AM-PAC 6 Click Basic Mobility/ Daily Activity assessment daily.  - Set and communicate daily mobility goal to care team and patient/family/caregiver.   - Collaborate with rehabilitation services on mobility goals if consulted  - Perform Range of Motion 3 times a day.  - Reposition patient every 2 hours.  - Dangle patient 3 times a day  - Stand patient 3 times a day  - Ambulate patient 3 times a day  - Out of bed to chair 3 times a day   - Out of bed for  meals 3 times a day  - Out of bed for toileting  - Record patient progress and toleration of activity level   3/23/2024 1617 by Yamilex Gee RN  Outcome: Completed  3/23/2024 0757 by Yamilex Gee RN  Outcome: Progressing     Problem: DISCHARGE PLANNING  Goal: Discharge to home or other facility with appropriate resources  Description: INTERVENTIONS:  - Identify barriers to discharge w/patient and caregiver  - Arrange for needed discharge resources and transportation as appropriate  - Identify discharge learning needs (meds, wound care, etc.)  - Arrange for interpretive services to assist at discharge as needed  - Refer to Case Management Department for coordinating discharge planning if the patient needs post-hospital services based on physician/advanced practitioner order or complex needs related to functional status, cognitive ability, or social support system  3/23/2024 1617 by Yamilex Gee RN  Outcome: Completed  3/23/2024 0757 by Yamilex Gee RN  Outcome: Progressing     Problem: Knowledge Deficit  Goal: Patient/family/caregiver demonstrates understanding of disease process, treatment plan, medications, and discharge instructions  Description: Complete learning assessment and assess knowledge base.  Interventions:  - Provide teaching at level of understanding  - Provide teaching via preferred learning methods  3/23/2024 1617 by Yamilex Gee RN  Outcome: Completed  3/23/2024 0757 by Yamilex Gee RN  Outcome: Progressing

## 2024-03-23 NOTE — PROGRESS NOTES
Progress Note - Urology      Patient: Corey Pulido   : 1940 Sex: female   MRN: [id number]     CSN: [id number]  Unit/Bed#: [address] [place] [address]     SUBJECTIVE:   Seen on morning rounds  Wishes to take home  DC Lang catheter at home but would need office follow-up to check postvoid residual once patient more alert and awake      Objective   Vitals: /65 (BP Location: Right arm)   Pulse 81   Temp 98.1 °F (36.7 °C) (Oral)   Resp 18   Wt 57.6 kg (127 lb)   SpO2 93%   BMI 29.52 kg/m²     I/O last 24 hours:  In: 60 [P.O.:60]  Out: 2450 [Urine:2450]      Physical Exam:   General Alert awake   Normocephalic atraumatic PERRLA  Lungs clear bilaterally  Cardiac normal S1 normal S2  Abdomen soft, flank pain  Extremities no edema      Lab Results: CBC:   Lab Results   Component Value Date    WBC 10.60 (H) 2024    HGB 15.6 (H) 2024    HCT 45.4 2024    MCV 84 2024     2024    RBC 5.39 (H) 2024    MCH 28.9 2024    MCHC 34.4 2024    RDW 13.2 2024    MPV 9.1 2024    NRBC 0 2024     CMP:   Lab Results   Component Value Date     08/15/2017    CL 96 2024     10/23/2023    CO2 27 2024    CO2 23 10/23/2023    BUN 14 2024    BUN 16 10/23/2023    CREATININE 1.05 2024    CREATININE 0.88 08/15/2017    GLUCOSE 94 08/15/2017    CALCIUM 8.8 2024    CALCIUM 9.2 08/15/2017    AST 18 2024    AST 23 10/23/2023    ALT 10 2024    ALT 9 10/23/2023    ALKPHOS 51 2024    ALKPHOS 62 08/15/2017    PROT 6.7 08/15/2017    BILITOT 0.6 08/15/2017    EGFR 48 2024     Urinalysis:   Lab Results   Component Value Date    COLORU Light Yellow 2024    CLARITYU Clear 2024    SPECGRAV <=1.005 2024    PHUR 6.5 2024    PHUR 7.0 2019    LEUKOCYTESUR Negative 2024    NITRITE Negative 2024    GLUCOSEU Negative 2024    KETONESU Negative 2024    BILIRUBINUR Negative  "03/20/2024    BLOODU Trace-Intact (A) 03/20/2024     Urine Culture:   Lab Results   Component Value Date    URINECX 70,000-79,000 cfu/ml Mixed Contaminants X4 02/17/2017     PSA: No results found for: \"PSA\"      Assessment/ Plan:  Urinary retention  DC home with Precious Schultz MD  "

## 2024-03-23 NOTE — ASSESSMENT & PLAN NOTE
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

## 2024-03-23 NOTE — PROGRESS NOTES
[facility]  Progress Note  Name: Corey Pulido I  MRN: [id number]  Unit/Bed#: 2 [address] Date of Admission: 3/20/2024   Date of Service: 3/22/2024 I Hospital Day: 1    Assessment/Plan   * Compression fracture of L1 lumbar vertebra (HCC)  Assessment & Plan  Patient presented to the ER with worsening back pain over the last 5 days.  Started with the back pain when she had to lay down for nuclear stress test on 3/8.  Now with difficulty ambulating due to pain  imaging done in the ER showed acute compression fracture of L1  Multimodal pain management with Tylenol, Lidoderm patch, Robaxin.  Oxycodone discontinued as  did report that patient has had some vomiting on it in the past  PT/OT - cleared for home  Neurosurgery referral on discharge.  LSO brace.  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    Urinary retention  Assessment & Plan  Noted to have marked bladder distention on imaging.  Patient with high bladder scan readings but is voiding with high PVRs at times  Possibly due to constipation, opioids, decreased mobility.   Continue Colace, increase to twice daily MiraLAX.  Dulcolax suppository  Per urology if PVR is greater than 500, plan to place Lang catheter  Start Flomax    Chronic systolic congestive heart failure (HCC)  Assessment & Plan  Wt Readings from Last 3 Encounters:   03/20/24 57.6 kg (127 lb)   02/29/24 57.7 kg (127 lb 3.2 oz)   11/13/23 58.1 kg (128 lb)     prior echo from 2023 showed EF of 45 to 55%   stress test from 3/8 showed EF of 37%.  Decreased perfusion noted in the LV apex and IV septum, inferior anterior walls with mild  kay-infarct ischemia.  Occasional PVCs  Patient reported some shortness of breath but this has been ongoing even on an outpatient basis.  Chest x-ray which pulmonary edema  40 mg of IV Lasix x 1 given 3/21  Continue lisinopril, toprol XL  Outpatient follow-up with cardiology.  Has an appointment in April          Chest pain  Assessment & Plan  Patient reports some chest pain, shortness of breath.  Per review of outpatient records, stress test was done and patient supposed to follow-up with cardiology  SARA score 2.  Troponins negative  EKG showed paced rhythm    Deafness  Assessment & Plan  Uses sign language to communicate    Atrial flutter (HCC)  Assessment & Plan  continue Toprol-XL, Xarelto    Coronary artery disease involving native coronary artery of native heart without angina pectoris  Assessment & Plan  continue Toprol-XL, statin    Hypothyroidism  Assessment & Plan  Continue levothyroxine               VTE Pharmacologic Prophylaxis: VTE Score: 4 Moderate Risk (Score 3-4) - Pharmacological DVT Prophylaxis Ordered: rivaroxaban (Xarelto).    Mobility:   Basic Mobility Inpatient Raw Score: 19  JH-HLM Goal: 6: Walk 10 steps or more  JH-HLM Achieved: 6: Walk 10 steps or more  JH-HLM Goal achieved. Continue to encourage appropriate mobility.    Patient Centered Rounds: I performed bedside rounds with nursing staff today.   Discussions with Specialists or Other Care Team Provider: yes - renal, urology    Education and Discussions with Family / Patient: Updated  () at bedside.    Total Time Spent on Date of Encounter in care of patient:  This time was spent on one or more of the following: performing physical exam; counseling and coordination of care; obtaining or reviewing history; documenting in the medical record; reviewing/ordering tests, medications or procedures; communicating with other healthcare professionals and discussing with patient's family/caregivers.    Current Length of Stay:  1 day(s)  Current Patient Status: Inpatient   Certification Statement: The patient will continue to require additional inpatient hospital stay due to L1 compression fracture, hyponatremia, urinary retention  Discharge Plan: Anticipate discharge in 24-48 hrs to home.    Code Status: Level 1 - Full Code    Subjective:     Spoke with patient using  at bedside.  Patient reports back pain although improved from before.  Upset about not being able to urinate.  Only has had a small bowel movement.  Noted to have some blood in the urine.    Objective:     Vitals:   Temp (24hrs), Av.7 °F (36.5 °C), Min:97.7 °F (36.5 °C), Max:97.7 °F (36.5 °C)    Temp:  [97.7 °F (36.5 °C)] 97.7 °F (36.5 °C)  HR:  [70-89] 70  Resp:  [18] 18  BP: (171-178)/() 178/100  SpO2:  [92 %-94 %] 94 %  Body mass index is 29.52 kg/m².     Input and Output Summary (last 24 hours):     Intake/Output Summary (Last 24 hours) at 3/22/2024 2016  Last data filed at 3/22/2024 1949  Gross per 24 hour   Intake 60 ml   Output 425 ml   Net -365 ml       Physical Exam:   Physical Exam  Constitutional:       General: She is not in acute distress.     Appearance: She is not toxic-appearing.   HENT:      Head: Normocephalic and atraumatic.   Eyes:      General: No scleral icterus.  Cardiovascular:      Rate and Rhythm: Normal rate and regular rhythm.   Pulmonary:      Effort: Pulmonary effort is normal. No respiratory distress.      Breath sounds: Normal breath sounds. No wheezing or rales.   Abdominal:      General: Bowel sounds are normal. There is no distension.      Palpations: Abdomen is soft.      Tenderness: There is no abdominal tenderness.   Musculoskeletal:      Right lower leg: No edema.      Left lower leg: No edema.      Comments: Able to easily roll in bed   Neurological:      Mental Status: She is alert.          Additional Data:     Labs:  Results from last 7 days   Lab Units 24  0517 24  1335   WBC  Thousand/uL 5.63 7.22   HEMOGLOBIN g/dL 14.5 14.8   HEMATOCRIT % 44.8 44.2   PLATELETS Thousands/uL 192 188   NEUTROS PCT %  --  72   LYMPHS PCT %  --  15   MONOS PCT %  --  11   EOS PCT %  --  1     Results from last 7 days   Lab Units 03/22/24  1306 03/21/24  0517 03/20/24  1335   SODIUM mmol/L 125*   < > 132*   POTASSIUM mmol/L 4.5   < > 4.7   CHLORIDE mmol/L 92*   < > 101   CO2 mmol/L 25   < > 25   BUN mg/dL 14   < > 16   CREATININE mg/dL 0.91   < > 0.77   ANION GAP mmol/L 8   < > 6   CALCIUM mg/dL 9.5   < > 9.1   ALBUMIN g/dL  --   --  3.7   TOTAL BILIRUBIN mg/dL  --   --  0.90   ALK PHOS U/L  --   --  51   ALT U/L  --   --  10   AST U/L  --   --  18   GLUCOSE RANDOM mg/dL 145*   < > 95    < > = values in this interval not displayed.                       Lines/Drains:  Invasive Devices       Peripheral Intravenous Line  Duration             Peripheral IV 03/20/24 Left;Proximal;Ventral (anterior) Forearm 2 days                          Imaging: No pertinent imaging reviewed.    Recent Cultures (last 7 days):         Last 24 Hours Medication List:   Current Facility-Administered Medications   Medication Dose Route Frequency Provider Last Rate    acetaminophen  975 mg Oral Q8H [facility] Coty Revankar, DO      bisacodyl  10 mg Rectal Daily PRN CHA De Leon      bisacodyl  10 mg Rectal Daily Coty Revankar, DO      calcium carbonate  500 mg Oral Daily PRN CHA De Leon      docusate sodium  100 mg Oral BID Coty Revankar, DO      fluticasone  1 spray Nasal Daily Coty Revankar, DO      HYDROmorphone  0.2 mg Intravenous Once CHA De Leon      levothyroxine  75 mcg Oral Early Morning Coty Revankar, DO      lidocaine  1 patch Topical Daily Coty Revankar, DO      lisinopril  5 mg Oral Daily Joselyn Reyes Bahamonde, MD      methocarbamol  500 mg Oral Q8H [facility] Coty Revankar, DO      metoprolol succinate  25 mg Oral Daily Coty Revankar, DO      ondansetron  4 mg Intravenous Q4H PRN  CHA De Leon      pantoprazole  40 mg Oral Early Morning Coty Mustafa DO      [START ON 3/23/2024] polyethylene glycol  17 g Oral BID Coty Mustafa DO      pravastatin  80 mg Oral Daily With Dinner Coty Mustafa DO      rivaroxaban  20 mg Oral Daily With Breakfast Coty Mustafa DO      simethicone  80 mg Oral Q6H PRN CHA De Leon      sodium chloride  1 g Oral BID With Meals Joselyn Reyes Bahamonde, MD      tamsulosin  0.4 mg Oral Daily With Dinner Coty Mustafa DO      [START ON 3/23/2024] torsemide  5 mg Oral Daily Joselyn Reyes Bahamonde, MD          Today, Patient Was Seen By: Coty Mustafa DO    **Please Note: This note may have been constructed using a voice recognition system.**

## 2024-03-23 NOTE — ASSESSMENT & PLAN NOTE
Patient presented to the ER with worsening back pain over the last 5 days.  Started with the back pain when she had to lay down for nuclear stress test on 3/8.  Now with difficulty ambulating due to pain  imaging done in the ER showed acute compression fracture of L1  Multimodal pain management with Tylenol, Lidoderm patch, Robaxin.  Oxycodone discontinued as  did report that patient has had some vomiting on it in the past  PT/OT - cleared for home  Neurosurgery referral on discharge.  LSO brace.  Outpatient follow-up with PCP

## 2024-03-23 NOTE — ASSESSMENT & PLAN NOTE
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

## 2024-03-23 NOTE — UTILIZATION REVIEW
NOTIFICATION OF INPATIENT ADMISSION   AUTHORIZATION REQUEST   SERVICING FACILITY:   [place] [address]  Tax ID: [id number]  NPI: [id number] ATTENDING PROVIDER:  Attending Name and NPI#: Coty Mustafa Do [[id number]]  Address: [address] [place] [address]  Phone: [phone]   ADMISSION INFORMATION:  Place of Service: [facility]  Place of Service Code: 21  Inpatient Admission Date/Time: 3/21/24  4:44 PM  Discharge Date/Time: No discharge date for patient encounter.  Admitting Diagnosis Code/Description:  Back pain [M54.9]  Intractable pain [R52]  Closed compression fracture of body of L1 vertebra (HCC) [S32.010A]     UTILIZATION REVIEW CONTACT:  Carol Dunn Utilization   Network Utilization Review Department  Phone: [phone]  Fax [phone]  Email: [email or web address]  Contact for approvals/pending authorizations, clinical reviews, and discharge.     PHYSICIAN ADVISORY SERVICES:  Medical Necessity Denial & Ikjb-ea-Fkcg Review  Phone: [phone]  Fax: [phone]  Email: [email or web address]     DISCHARGE SUPPORT TEAM:  For Patients Discharge Needs & Updates  Phone: [phone] opt. 2 Fax: [phone]  Email: [email or web address]

## 2024-03-23 NOTE — ASSESSMENT & PLAN NOTE
Noted to have marked bladder distention on imaging.  Patient with high bladder scan readings but is voiding with high PVRs at times  Possibly a chronic issue, possibly due to constipation, opioids, decreased mobility.   Continue Colace, MiraLAX on discharge.  Dulcolax suppository while here  Patient has Lang catheter in place.  Continue Flomax on discharge  Outpatient follow-up with urology

## 2024-03-23 NOTE — PLAN OF CARE
Problem: Prexisting or High Potential for Compromised Skin Integrity  Goal: Skin integrity is maintained or improved  Description: INTERVENTIONS:  - Identify patients at risk for skin breakdown  - Assess and monitor skin integrity  - Assess and monitor nutrition and hydration status  - Monitor labs   - Assess for incontinence   - Turn and reposition patient  - Assist with mobility/ambulation  - Relieve pressure over bony prominences  - Avoid friction and shearing  - Provide appropriate hygiene as needed including keeping skin clean and dry  - Evaluate need for skin moisturizer/barrier cream  - Collaborate with interdisciplinary team   - Patient/family teaching  - Consider wound care consult   Outcome: Progressing     Problem: PAIN - ADULT  Goal: Verbalizes/displays adequate comfort level or baseline comfort level  Description: Interventions:  - Encourage patient to monitor pain and request assistance  - Assess pain using appropriate pain scale  - Administer analgesics based on type and severity of pain and evaluate response  - Implement non-pharmacological measures as appropriate and evaluate response  - Consider cultural and social influences on pain and pain management  - Notify physician/advanced practitioner if interventions unsuccessful or patient reports new pain  Outcome: Progressing     Problem: SAFETY ADULT  Goal: Patient will remain free of falls  Description: INTERVENTIONS:  - Educate patient/family on patient safety including physical limitations  - Instruct patient to call for assistance with activity   - Consult OT/PT to assist with strengthening/mobility   - Keep Call bell within reach  - Keep bed low and locked with side rails adjusted as appropriate  - Keep care items and personal belongings within reach  - Initiate and maintain comfort rounds  - Make Fall Risk Sign visible to staff  - Offer Toileting every 2 Hours, in advance of need  - Initiate/Maintain bed alarm  - Obtain necessary fall risk  management equipment: n/a  - Apply yellow socks and bracelet for high fall risk patients  - Consider moving patient to room near nurses station  Outcome: Progressing     Problem: DISCHARGE PLANNING  Goal: Discharge to home or other facility with appropriate resources  Description: INTERVENTIONS:  - Identify barriers to discharge w/patient and caregiver  - Arrange for needed discharge resources and transportation as appropriate  - Identify discharge learning needs (meds, wound care, etc.)  - Arrange for interpretive services to assist at discharge as needed  - Refer to Case Management Department for coordinating discharge planning if the patient needs post-hospital services based on physician/advanced practitioner order or complex needs related to functional status, cognitive ability, or social support system  Outcome: Progressing     Problem: Knowledge Deficit  Goal: Patient/family/caregiver demonstrates understanding of disease process, treatment plan, medications, and discharge instructions  Description: Complete learning assessment and assess knowledge base.  Interventions:  - Provide teaching at level of understanding  - Provide teaching via preferred learning methods  Outcome: Progressing

## 2024-03-24 LAB
ATRIAL RATE: 300 BPM
ATRIAL RATE: 70 BPM
P AXIS: 77 DEGREES
P AXIS: 91 DEGREES
QRS AXIS: -41 DEGREES
QRS AXIS: -42 DEGREES
QRSD INTERVAL: 162 MS
QRSD INTERVAL: 162 MS
QT INTERVAL: 448 MS
QT INTERVAL: 458 MS
QTC INTERVAL: 486 MS
QTC INTERVAL: 494 MS
T WAVE AXIS: 104 DEGREES
T WAVE AXIS: 118 DEGREES
VENTRICULAR RATE: 70 BPM
VENTRICULAR RATE: 71 BPM

## 2024-03-24 PROCEDURE — 93010 ELECTROCARDIOGRAM REPORT: CPT | Performed by: INTERNAL MEDICINE

## 2024-03-25 ENCOUNTER — TRANSITIONAL CARE MANAGEMENT (OUTPATIENT)
Dept: FAMILY MEDICINE CLINIC | Facility: CLINIC | Age: 84
End: 2024-03-25

## 2024-03-26 ENCOUNTER — TELEPHONE (OUTPATIENT)
Age: 84
End: 2024-03-26

## 2024-03-26 NOTE — TELEPHONE ENCOUNTER
Contacted office spoke to Linda to confirm that  has been scheduled for patient's appt 4/9/24 - per Linda the note on chart appt indicates  contacted by  staff. She will double check with them.

## 2024-03-27 NOTE — UTILIZATION REVIEW
NOTIFICATION OF ADMISSION DISCHARGE   This is a Notification of Discharge from [facility]. Please be advised that this patient has been discharge from our facility. Below you will find the admission and discharge date and time including the patient’s disposition.   UTILIZATION REVIEW CONTACT:  Meli Dunn  Utilization   Network Utilization Review Department  Phone: [phone] x carefully listen to the prompts. All voicemails are confidential.  Email: [email or web address]     ADMISSION INFORMATION  PRESENTATION DATE: 3/20/2024 12:45 PM  OBERVATION ADMISSION DATE:   INPATIENT ADMISSION DATE: 3/21/24  4:44 PM   DISCHARGE DATE: 3/23/2024  5:00 PM   DISPOSITION:Home with Home Health Care    Network Utilization Review Department  ATTENTION: Please call with any questions or concerns to [phone] and carefully listen to the prompts so that you are directed to the right person. All voicemails are confidential.   For Discharge needs, contact Care Management DC Support Team at [phone] opt. 2  Send all requests for admission clinical reviews, approved or denied determinations and any other requests to dedicated fax number below belonging to the campus where the patient is receiving treatment. List of dedicated fax numbers for the Facilities:  FACILITY NAME UR FAX NUMBER   ADMISSION DENIALS (Administrative/Medical Necessity) [phone]   DISCHARGE SUPPORT TEAM ([facility]) [phone]   PARENT CHILD HEALTH (Maternity/NICU/Pediatrics) [phone]   [facility] [phone]   [facility] [phone]   [facility] [phone]   [facility] [phone]   [facility] [phone]   [facility] [phone]   [facility] [phone]   [facility]  [phone]   [facility] [phone]   [facility] [phone]   [facility] [phone]   [facility] [phone]

## 2024-04-04 ENCOUNTER — TELEPHONE (OUTPATIENT)
Dept: UROLOGY | Facility: CLINIC | Age: 84
End: 2024-04-04

## 2024-04-04 NOTE — TELEPHONE ENCOUNTER
Received a call from Dr. Callejas who requested patient to be seen by our office for acute urinary retention due to patient being deaf and needing ASL interpretation. Dr. Callejas requested a Monday appointment which I did advise would not be able to be accommodated.     Was able to schedule patient a New patient appt next week with Lex. Please call and confirm appt

## 2024-04-04 NOTE — TELEPHONE ENCOUNTER
I called and LVM for patient's  and daughter to call back and confirm appt. Will need  arranged once confirmed..

## 2024-04-04 NOTE — TELEPHONE ENCOUNTER
I would say yes because its recorded that patient is deaf. May you can try to call ? I am unsure if he is deaf as well    refused

## 2024-04-08 NOTE — TELEPHONE ENCOUNTER
Marisa from  services called me and said they were not able to secure an  for patient's appt. I called and LVM for patient's  to see if he would be able to help interpret. If not, per Marisa, will have to reschedule for another time when they have an .

## 2024-04-09 ENCOUNTER — OFFICE VISIT (OUTPATIENT)
Dept: NEPHROLOGY | Facility: CLINIC | Age: 84
End: 2024-04-09

## 2024-04-09 VITALS
HEIGHT: 55 IN | DIASTOLIC BLOOD PRESSURE: 80 MMHG | SYSTOLIC BLOOD PRESSURE: 162 MMHG | WEIGHT: 125 LBS | OXYGEN SATURATION: 96 % | BODY MASS INDEX: 28.93 KG/M2 | HEART RATE: 72 BPM

## 2024-04-09 DIAGNOSIS — Z09 HOSPITAL DISCHARGE FOLLOW-UP: ICD-10-CM

## 2024-04-09 DIAGNOSIS — I10 ESSENTIAL HYPERTENSION: ICD-10-CM

## 2024-04-09 DIAGNOSIS — I11.0 HYPERTENSIVE HEART DISEASE WITH HEART FAILURE (HCC): ICD-10-CM

## 2024-04-09 DIAGNOSIS — I50.22 CHRONIC SYSTOLIC CONGESTIVE HEART FAILURE (HCC): Primary | ICD-10-CM

## 2024-04-09 DIAGNOSIS — E87.1 HYPONATREMIA: ICD-10-CM

## 2024-04-09 DIAGNOSIS — I50.33 ACUTE ON CHRONIC DIASTOLIC (CONGESTIVE) HEART FAILURE (HCC): ICD-10-CM

## 2024-04-09 RX ORDER — TORSEMIDE 10 MG/1
10 TABLET ORAL DAILY
Qty: 90 TABLET | Refills: 1 | Status: SHIPPED | OUTPATIENT
Start: 2024-04-09

## 2024-04-09 NOTE — PATIENT INSTRUCTIONS
Fluid restriction of less than 1.5 L/day    Check repeat blood work this week or next week    Continue current management salt tablets    Increase Torsemide to 10 mg daily, new script sent to pharmacy    If the sodium level is better we will plan to make some adjustments and hopefully reduce the salt tablets further    Repeat blood work again in 3 months and again in 6 months

## 2024-04-09 NOTE — PROGRESS NOTES
NEPHROLOGY OFFICE VISIT   Corey Pulido 83 y.o. female MRN: [id number]  4/9/2024    Reason for Visit: Hospital follow-up for hyponatremia    History of Present Illness (HPI):    I had the pleasure of seeing Corey today in the renal clinic for the continued management of hyponatremia, hospital follow-up.     Patient is deaf and a  was utilized for the entire encounter who helped with instructions.     is also present during the encounter.    This is my first encounter with the patient.  She was seen by my colleague Dr. Reyes in the hospital back in March where the patient was hospitalized and we were consulted for hyponatremia.  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  She was discharged on salt tablets 1 g 3 times daily and hydrochlorothiazide was discontinued in the hospital and she was also on torsemide 5 mg daily.    She has an appointment with urology to follow-up on the placement of the Lang catheter.    Patient does have some swelling of the legs.  Blood pressure is elevated.    Goal is to eventually wean off the salt tablets given her chronic send congestive heart failure.    Will increase torsemide to 10 mg get repeat BMP and hopefully reduce the salt tablets once we get the results back.    ASSESSMENT and PLAN:    Hyponatremia  --Acute on chronic  -- Baseline sodium in the low 130s  -- Serum sodium level greater than 130  -- Urine sodium 24, urine osmolality 312  -- Serum osmolality 281, consistent with hypoosmolar  -- Patient has a history of congestive heart failure  -- ADH is present  -- Currently on salt tablets 1 g 3 times daily with torsemide 5 mg daily  -- Given the history of CHF I would like to avoid salt tablets  -- Increase torsemide to 10 mg daily  -- Check a repeat BMP now again in 3 months and again in 6 months  -- Fluid restriction of less than 1.5 L/day    Chronic systolic  "congestive heart failure  -- Get repeat BMP plan to wean off salt tablets  -- Increase torsemide to 10 mg daily  -- Fluid restriction  --Ejection fraction 37%  -- Continue ACE inhibitor    Hypertension  -- Blood pressure not at target  -- Repeat BMP if still sodium stable plan to wean down the salt tablet  -- Increase torsemide to 10 mg daily  -- Continue lisinopril      PATIENT INSTRUCTIONS:    Patient Instructions   Fluid restriction of less than 1.5 L/day    Check repeat blood work this week or next week    Continue current management salt tablets    Increase Torsemide to 10 mg daily, new script sent to pharmacy    If the sodium level is better we will plan to make some adjustments and hopefully reduce the salt tablets further    Repeat blood work again in 3 months and again in 6 months      Orders Placed This Encounter   Procedures    Basic metabolic panel     This is a patient instruction: Patient fasting for 8 hours or longer recommended.     Standing Status:   Standing     Number of Occurrences:   3     Standing Expiration Date:   4/9/2025       OBJECTIVE:  Current Weight: Weight - Scale: 56.7 kg (125 lb)  Vitals:    04/09/24 1102   BP: 162/80   BP Location: Left arm   Patient Position: Sitting   Cuff Size: Standard   Pulse: 72   SpO2: 96%   Weight: 56.7 kg (125 lb)   Height: 4' 7\" (1.397 m)    Body mass index is 29.05 kg/m².      REVIEW OF SYSTEMS:    Review of Systems   Constitutional:  Negative for activity change and fever.   Respiratory:  Negative for cough, chest tightness, shortness of breath and wheezing.    Cardiovascular:  Positive for leg swelling. Negative for chest pain.   Gastrointestinal:  Negative for abdominal pain, diarrhea, nausea and vomiting.   Endocrine: Negative for polyuria.   Genitourinary:  Negative for difficulty urinating, dysuria, flank pain, frequency and urgency.   Skin:  Negative for rash.   Neurological:  Negative for dizziness, syncope, light-headedness and headaches. "       PHYSICAL EXAM:      Physical Exam  Vitals and nursing note reviewed.   Constitutional:       General: She is not in acute distress.     Appearance: She is well-developed.   HENT:      Head: Normocephalic and atraumatic.   Eyes:      General: No scleral icterus.     Conjunctiva/sclera: Conjunctivae normal.      Pupils: Pupils are equal, round, and reactive to light.   Cardiovascular:      Rate and Rhythm: Normal rate and regular rhythm.      Heart sounds: S1 normal and S2 normal. No murmur heard.     No friction rub. No gallop.   Pulmonary:      Effort: Pulmonary effort is normal. No respiratory distress.      Breath sounds: Normal breath sounds. No wheezing or rales.   Abdominal:      General: Bowel sounds are normal.      Palpations: Abdomen is soft.      Tenderness: There is no abdominal tenderness. There is no rebound.   Musculoskeletal:         General: Normal range of motion.      Cervical back: Normal range of motion and neck supple.      Right lower leg: Edema present.      Left lower leg: Edema present.   Skin:     Findings: No rash.   Neurological:      Mental Status: She is alert and oriented to person, place, and time.   Psychiatric:         Behavior: Behavior normal.         Medications:    Current Outpatient Medications:     docusate sodium (COLACE) 100 mg capsule, Take 1 capsule (100 mg total) by mouth 2 (two) times a day, Disp: 60 capsule, Rfl: 0    levothyroxine (Synthroid) 75 mcg tablet, Take 1 tablet (75 mcg total) by mouth daily in the early morning BRAND MEDICALLY NECESSARY, Disp: 90 tablet, Rfl: 3    lisinopril (ZESTRIL) 2.5 mg tablet, Take 2 tablets (5 mg total) by mouth daily, Disp: , Rfl:     metoprolol succinate (TOPROL-XL) 25 mg 24 hr tablet, Take 1 tablet (25 mg total) by mouth daily, Disp: , Rfl:     pantoprazole (PROTONIX) 40 mg tablet, Take 1 tablet (40 mg total) by mouth daily in the early morning, Disp: 30 tablet, Rfl: 0    rosuvastatin (CRESTOR) 10 MG tablet, Take 1 tablet (10  "mg total) by mouth daily, Disp: 90 tablet, Rfl: 1    sodium chloride 1 g tablet, Take 1 tablet (1 g total) by mouth 3 (three) times a day, Disp: 90 tablet, Rfl: 0    tamsulosin (FLOMAX) 0.4 mg, Take 1 capsule (0.4 mg total) by mouth daily with dinner, Disp: 30 capsule, Rfl: 0    torsemide (DEMADEX) 10 mg tablet, Take 1 tablet (10 mg total) by mouth daily, Disp: 90 tablet, Rfl: 1    Xarelto 20 MG tablet, TAKE ONE TABLET BY MOUTH EVERY DAY WITH BREAKFAST, Disp: 90 tablet, Rfl: 3    acetaminophen (TYLENOL) 325 mg tablet, Take 2 tablets (650 mg total) by mouth every 6 (six) hours as needed for mild pain, moderate pain, severe pain, headaches or fever, Disp: , Rfl:     fluticasone (FLONASE) 50 mcg/act nasal spray, 1 spray into each nostril daily, Disp: , Rfl: 0    lidocaine (LIDODERM) 5 %, Apply 1 patch topically over 12 hours daily Remove & Discard patch within 12 hours or as directed by MD, Disp: 30 patch, Rfl: 0    methocarbamol (ROBAXIN) 500 mg tablet, Take 1 tablet (500 mg total) by mouth every 8 (eight) hours as needed for muscle spasms, Disp: 20 tablet, Rfl: 0    polyethylene glycol (MIRALAX) 17 g packet, Take 17 g by mouth daily as needed (constipation), Disp: , Rfl:     Laboratory Results:        Invalid input(s): \"ALBUMIN\"    Results for orders placed or performed during the hospital encounter of 03/20/24   CBC and differential   Result Value Ref Range    WBC 7.22 4.31 - 10.16 Thousand/uL    RBC 5.16 (H) 3.81 - 5.12 Million/uL    Hemoglobin 14.8 11.5 - 15.4 g/dL    Hematocrit 44.2 34.8 - 46.1 %    MCV 86 82 - 98 fL    MCH 28.7 26.8 - 34.3 pg    MCHC 33.5 31.4 - 37.4 g/dL    RDW 13.7 11.6 - 15.1 %    MPV 9.2 8.9 - 12.7 fL    Platelets 188 149 - 390 Thousands/uL    nRBC 0 /100 WBCs    Segmented % 72 43 - 75 %    Immature Grans % 0 0 - 2 %    Lymphocytes % 15 14 - 44 %    Monocytes % 11 4 - 12 %    Eosinophils Relative 1 0 - 6 %    Basophils Relative 1 0 - 1 %    Absolute Neutrophils 5.18 1.85 - 7.62 Thousands/µL " "   Absolute Immature Grans 0.03 0.00 - 0.20 Thousand/uL    Absolute Lymphocytes 1.11 0.60 - 4.47 Thousands/µL    Absolute Monocytes 0.79 0.17 - 1.22 Thousand/µL    Eosinophils Absolute 0.07 0.00 - 0.61 Thousand/µL    Basophils Absolute 0.04 0.00 - 0.10 Thousands/µL   Comprehensive metabolic panel   Result Value Ref Range    Sodium 132 (L) 135 - 147 mmol/L    Potassium 4.7 3.5 - 5.3 mmol/L    Chloride 101 96 - 108 mmol/L    CO2 25 21 - 32 mmol/L    ANION GAP 6 4 - 13 mmol/L    BUN 16 5 - 25 mg/dL    Creatinine 0.77 0.60 - 1.30 mg/dL    Glucose 95 65 - 140 mg/dL    Calcium 9.1 8.4 - 10.2 mg/dL    AST 18 13 - 39 U/L    ALT 10 7 - 52 U/L    Alkaline Phosphatase 51 34 - 104 U/L    Total Protein 6.9 6.4 - 8.4 g/dL    Albumin 3.7 3.5 - 5.0 g/dL    Total Bilirubin 0.90 0.20 - 1.00 mg/dL    eGFR 71 ml/min/1.73sq m   UA (URINE) with reflex to Scope   Result Value Ref Range    Color, UA Light Yellow     Clarity, UA Clear     Specific Gravity, UA <=1.005 1.000 - 1.030    pH, UA 6.5 5.0, 5.5, 6.0, 6.5, 7.0, 7.5, 8.0, 8.5, 9.0    Leukocytes, UA Negative Negative    Nitrite, UA Negative Negative    Protein, UA Negative Negative mg/dl    Glucose, UA Negative Negative mg/dl    Ketones, UA Negative Negative mg/dl    Urobilinogen, UA 0.2 0.2, 1.0 E.U./dl E.U./dl    Bilirubin, UA Negative Negative    Occult Blood, UA Trace-Intact (A) Negative   Urine Microscopic   Result Value Ref Range    RBC, UA 0-1 None Seen, 0-1, 1-2, 2-4, 0-5 /hpf    WBC, UA 0-1 None Seen, 0-1, 1-2, 0-5, 2-4 /hpf    Epithelial Cells Occasional None Seen, Occasional /hpf    Bacteria, UA Occasional None Seen, Occasional /hpf    AMORPH URATES Occasional /hpf   HS Troponin 0hr (reflex protocol)   Result Value Ref Range    hs TnI 0hr 12 \"Refer to ACS Flowchart\"- see link ng/L   B-Type Natriuretic Peptide(BNP)   Result Value Ref Range     (H) 0 - 100 pg/mL   HS Troponin I 2hr   Result Value Ref Range    hs TnI 2hr 10 \"Refer to ACS Flowchart\"- see link ng/L    " Delta 2hr hsTnI -2 <20 ng/L   Basic metabolic panel   Result Value Ref Range    Sodium 132 (L) 135 - 147 mmol/L    Potassium 5.1 3.5 - 5.3 mmol/L    Chloride 102 96 - 108 mmol/L    CO2 23 21 - 32 mmol/L    ANION GAP 7 4 - 13 mmol/L    BUN 17 5 - 25 mg/dL    Creatinine 0.88 0.60 - 1.30 mg/dL    Glucose 89 65 - 140 mg/dL    Glucose, Fasting 89 65 - 99 mg/dL    Calcium 8.7 8.4 - 10.2 mg/dL    eGFR 60 ml/min/1.73sq m   Magnesium   Result Value Ref Range    Magnesium 2.2 1.9 - 2.7 mg/dL   CBC (With Platelets)   Result Value Ref Range    WBC 5.63 4.31 - 10.16 Thousand/uL    RBC 5.12 3.81 - 5.12 Million/uL    Hemoglobin 14.5 11.5 - 15.4 g/dL    Hematocrit 44.8 34.8 - 46.1 %    MCV 88 82 - 98 fL    MCH 28.3 26.8 - 34.3 pg    MCHC 32.4 31.4 - 37.4 g/dL    RDW 13.9 11.6 - 15.1 %    Platelets 192 149 - 390 Thousands/uL    MPV 9.5 8.9 - 12.7 fL   High Sensitivity Troponin I Random   Result Value Ref Range    HS TnI random 10 8 - 18 ng/L   Basic metabolic panel   Result Value Ref Range    Sodium 127 (L) 135 - 147 mmol/L    Potassium 5.1 3.5 - 5.3 mmol/L    Chloride 91 (L) 96 - 108 mmol/L    CO2 31 21 - 32 mmol/L    ANION GAP 5 4 - 13 mmol/L    BUN 17 5 - 25 mg/dL    Creatinine 1.00 0.60 - 1.30 mg/dL    Glucose 101 65 - 140 mg/dL    Calcium 9.6 8.4 - 10.2 mg/dL    eGFR 51 ml/min/1.73sq m   Basic metabolic panel   Result Value Ref Range    Sodium 125 (L) 135 - 147 mmol/L    Potassium 4.5 3.5 - 5.3 mmol/L    Chloride 92 (L) 96 - 108 mmol/L    CO2 25 21 - 32 mmol/L    ANION GAP 8 4 - 13 mmol/L    BUN 14 5 - 25 mg/dL    Creatinine 0.91 0.60 - 1.30 mg/dL    Glucose 145 (H) 65 - 140 mg/dL    Calcium 9.5 8.4 - 10.2 mg/dL    eGFR 58 ml/min/1.73sq m   Sodium, urine, random   Result Value Ref Range    Sodium, Ur 24 Reference range not established.   Basic metabolic panel   Result Value Ref Range    Sodium 127 (L) 135 - 147 mmol/L    Potassium 4.4 3.5 - 5.3 mmol/L    Chloride 94 (L) 96 - 108 mmol/L    CO2 24 21 - 32 mmol/L    ANION GAP 9 4  - 13 mmol/L    BUN 13 5 - 25 mg/dL    Creatinine 0.87 0.60 - 1.30 mg/dL    Glucose 93 65 - 140 mg/dL    Calcium 9.4 8.4 - 10.2 mg/dL    eGFR 61 ml/min/1.73sq m   Basic metabolic panel   Result Value Ref Range    Sodium 133 (L) 135 - 147 mmol/L    Potassium 4.6 3.5 - 5.3 mmol/L    Chloride 99 96 - 108 mmol/L    CO2 27 21 - 32 mmol/L    ANION GAP 7 4 - 13 mmol/L    BUN 12 5 - 25 mg/dL    Creatinine 0.91 0.60 - 1.30 mg/dL    Glucose 91 65 - 140 mg/dL    Calcium 9.5 8.4 - 10.2 mg/dL    eGFR 58 ml/min/1.73sq m   Osmolality-If this is regarding a toxic alcohol, STOP. Test is not routinely indicated. Please consult medical  on call for further guidance.   Result Value Ref Range    Osmolality Serum 281 (L) 282 - 298 mmol/KG   Osmolality, urine   Result Value Ref Range    Osmolality, Ur 312 250 - 900 mmol/KG   CBC   Result Value Ref Range    WBC 10.60 (H) 4.31 - 10.16 Thousand/uL    RBC 5.39 (H) 3.81 - 5.12 Million/uL    Hemoglobin 15.6 (H) 11.5 - 15.4 g/dL    Hematocrit 45.4 34.8 - 46.1 %    MCV 84 82 - 98 fL    MCH 28.9 26.8 - 34.3 pg    MCHC 34.4 31.4 - 37.4 g/dL    RDW 13.2 11.6 - 15.1 %    Platelets 213 149 - 390 Thousands/uL    MPV 9.1 8.9 - 12.7 fL   Basic metabolic panel   Result Value Ref Range    Sodium 129 (L) 135 - 147 mmol/L    Potassium 4.3 3.5 - 5.3 mmol/L    Chloride 96 96 - 108 mmol/L    CO2 27 21 - 32 mmol/L    ANION GAP 6 4 - 13 mmol/L    BUN 14 5 - 25 mg/dL    Creatinine 1.05 0.60 - 1.30 mg/dL    Glucose 141 (H) 65 - 140 mg/dL    Calcium 8.8 8.4 - 10.2 mg/dL    eGFR 48 ml/min/1.73sq m   ECG 12 lead   Result Value Ref Range    Ventricular Rate 70 BPM    Atrial Rate 70 BPM    DC Interval  ms    QRSD Interval 162 ms    QT Interval 458 ms    QTC Interval 494 ms    P Axis 91 degrees    QRS Axis -41 degrees    T Wave Axis 104 degrees   ECG 12 lead   Result Value Ref Range    Ventricular Rate 71 BPM    Atrial Rate 300 BPM    DC Interval  ms    QRSD Interval 162 ms    QT Interval 448 ms    QTC  Interval 486 ms    P Axis 77 degrees    QRS Axis -42 degrees    T Wave Axis 118 degrees

## 2024-04-09 NOTE — TELEPHONE ENCOUNTER
Just want to keep clinical in the loop with this.  was set up then was called yesterday to be told they could not reserve one. I called patient's  and LVM if anyone else they know could help interpret. Will try reaching out to family again today. Just an fyi

## 2024-04-10 ENCOUNTER — TELEPHONE (OUTPATIENT)
Dept: UROLOGY | Facility: CLINIC | Age: 84
End: 2024-04-10

## 2024-04-10 ENCOUNTER — OFFICE VISIT (OUTPATIENT)
Dept: UROLOGY | Facility: CLINIC | Age: 84
End: 2024-04-10
Payer: COMMERCIAL

## 2024-04-10 VITALS
SYSTOLIC BLOOD PRESSURE: 142 MMHG | BODY MASS INDEX: 28.93 KG/M2 | WEIGHT: 125 LBS | DIASTOLIC BLOOD PRESSURE: 78 MMHG | HEIGHT: 55 IN

## 2024-04-10 DIAGNOSIS — R33.9 URINARY RETENTION: Primary | ICD-10-CM

## 2024-04-10 PROCEDURE — 99203 OFFICE O/P NEW LOW 30 MIN: CPT | Performed by: PHYSICIAN ASSISTANT

## 2024-04-10 NOTE — PROGRESS NOTES
UROLOGY CONSULTATION NOTE     Patient Identifiers: Corey Pulido (MRN: [id number])  Service Requesting Consultation: Irene Buck MD    Service Providing Consultation:  Urology, Broderick Vincent PA-C  Consults  Date of Service: 4/10/2024    Reason for Consultation: Urinary retention    The entire visit was done with of a video .    History of Present Illness:     Corey Pulido is a 83 y.o. female with no prior urologic history.  She is referred for evaluation of urinary retention.  She was hospitalized in March with an acute compression fracture of the L1 vertebrae.  She was discharged with an LSO brace and a Lang catheter for large volume urinary retention.  She was originally scheduled to see Dr. Schultz who apparently could not accommodate her need for a .  She is in the office today for follow-up and arrangements for trial of voiding.    Past Medical, Past Surgical History:     Past Medical History:   Diagnosis Date    Atrial flutter (HCC)     CAD (coronary artery disease)     s/p PCIx1 -2012    Coronary artery disease     Deafness     Disease of thyroid gland     GERD (gastroesophageal reflux disease)     Hyperlipidemia     Hypertension     Hypomagnesemia 9/18/2023    Itching     of the face, throat    Pacemaker 03/2021    medtronic-left chest    Wears glasses    :    Past Surgical History:   Procedure Laterality Date    BREAST BIOPSY Left     negative    CARDIAC PACEMAKER PLACEMENT  03/2021    medtronic    CARDIAC SURGERY      stents    COLONOSCOPY      ESOPHAGOGASTRODUODENOSCOPY N/A 02/20/2017    Procedure: ESOPHAGOGASTRODUODENOSCOPY (EGD);  Surgeon: Edouard Rojas MD;  Location: [facility] GI [facility];  Service:    :    Medications, Allergies:     Current Outpatient Medications:     acetaminophen (TYLENOL) 325 mg tablet, Take 2 tablets (650 mg total) by mouth every 6 (six) hours as needed for mild pain, moderate pain, severe pain, headaches or  fever, Disp: , Rfl:     docusate sodium (COLACE) 100 mg capsule, Take 1 capsule (100 mg total) by mouth 2 (two) times a day, Disp: 60 capsule, Rfl: 0    fluticasone (FLONASE) 50 mcg/act nasal spray, 1 spray into each nostril daily, Disp: , Rfl: 0    levothyroxine (Synthroid) 75 mcg tablet, Take 1 tablet (75 mcg total) by mouth daily in the early morning BRAND MEDICALLY NECESSARY, Disp: 90 tablet, Rfl: 3    lidocaine (LIDODERM) 5 %, Apply 1 patch topically over 12 hours daily Remove & Discard patch within 12 hours or as directed by MD, Disp: 30 patch, Rfl: 0    lisinopril (ZESTRIL) 2.5 mg tablet, Take 2 tablets (5 mg total) by mouth daily, Disp: , Rfl:     methocarbamol (ROBAXIN) 500 mg tablet, Take 1 tablet (500 mg total) by mouth every 8 (eight) hours as needed for muscle spasms, Disp: 20 tablet, Rfl: 0    metoprolol succinate (TOPROL-XL) 25 mg 24 hr tablet, Take 1 tablet (25 mg total) by mouth daily, Disp: , Rfl:     pantoprazole (PROTONIX) 40 mg tablet, Take 1 tablet (40 mg total) by mouth daily in the early morning, Disp: 30 tablet, Rfl: 0    rosuvastatin (CRESTOR) 10 MG tablet, Take 1 tablet (10 mg total) by mouth daily, Disp: 90 tablet, Rfl: 1    sodium chloride 1 g tablet, Take 1 tablet (1 g total) by mouth 3 (three) times a day, Disp: 90 tablet, Rfl: 0    tamsulosin (FLOMAX) 0.4 mg, Take 1 capsule (0.4 mg total) by mouth daily with dinner, Disp: 30 capsule, Rfl: 0    torsemide (DEMADEX) 10 mg tablet, Take 1 tablet (10 mg total) by mouth daily, Disp: 90 tablet, Rfl: 1    Xarelto 20 MG tablet, TAKE ONE TABLET BY MOUTH EVERY DAY WITH BREAKFAST, Disp: 90 tablet, Rfl: 3    polyethylene glycol (MIRALAX) 17 g packet, Take 17 g by mouth daily as needed (constipation), Disp: , Rfl:     Allergies:  Allergies   Allergen Reactions    Sotalol      Vomiting, convulsing, rash, cough    Famotidine Throat Swelling   :    Social and Family History:   Social History:   Social History     Tobacco Use    Smoking status: Never     " Passive exposure: Past    Smokeless tobacco: Never   Vaping Use    Vaping status: Never Used   Substance Use Topics    Alcohol use: Never    Drug use: Never   .    Social History     Tobacco Use   Smoking Status Never    Passive exposure: Past   Smokeless Tobacco Never       Family History:  Family History   Problem Relation Age of Onset    Cancer Mother         breast    Hypertension Mother         essential    Breast cancer Mother     Other Father         smoker    Tuberculosis Father     No Known Problems Sister     No Known Problems Sister     No Known Problems Sister     No Known Problems Daughter    :     Review of Systems:     General: Fever, chills, or night sweats: negative  Cardiac: Negative for chest pain.    Pulmonary: Negative for shortness of breath.  Gastrointestinal: Abdominal pain negative.  Nausea, vomiting, or diarrhea negative,  Genitourinary: See HPI above.  Patient does not have hematuria.  All other systems queried were negative.    Physical Exam:   General: Patient is pleasant and in NAD. Awake and alert  /78 (BP Location: Right arm, Patient Position: Sitting, Cuff Size: Adult)   Ht 4' 7\" (1.397 m)   Wt 56.7 kg (125 lb)   BMI 29.05 kg/m²   HEENT:  Conjunctiva are clear  Constitutional:  pleasant and cooperative     no apparent distress  Cardiac: Peripheral edema: negative  Pulmonary: Non-labored breathing  Abdomen: Soft, non-tender, non-distended.  No surgical scars.  No masses, tenderness, hernias noted.    Genitourinary: Negative CVA tenderness, negative suprapubic tenderness.  Extremities:  Moves all extremities  Neurological:CNII-XII intact. No numbness or tingling. Essentially non focal neurologic exam  Psychiatric:mood affect and behavior normal      REYES: in place draining clear yellow urine  no clots     Labs:     Lab Results   Component Value Date    HGB 15.6 (H) 03/23/2024    HCT 45.4 03/23/2024    WBC 10.60 (H) 03/23/2024     03/23/2024   ]    Lab Results "   Component Value Date     08/15/2017    K 4.3 03/23/2024    CL 96 03/23/2024    CO2 27 03/23/2024    BUN 14 03/23/2024    CREATININE 1.05 03/23/2024    CALCIUM 8.8 03/23/2024    GLUCOSE 94 08/15/2017   ]    Imaging:   I personally reviewed the images and report of the following studies, and reviewed them with the patient:     IMPRESSION:     Acute fracture of the inferior endplate of the L1 vertebral body with mild central depression     Marked bladder distention, evaluate for urinary retention or chronic bladder outlet obstruction     Cardiomegaly     Incidental 5 mm pancreatic head cyst. For simple cyst(s) less than 1.5 cm, recommend follow-up every 2 years for 2 times. After 4 years, no more follow-ups. Recommend next follow-up in 2 years. Preferred imaging modality: abdomen MRI and MRCP with and   without IV contrast, or triple phase abdomen CT with IV contrast, or abdomen MRI and MRCP without IV contrast.      ASSESSMENT:     #1.  Acute urinary retention  #2.  Acute lumbar compression fracture    PLAN:   -I reviewed the findings and the options of management with the patient and her  through the use of the video   -I suspect her urinary retention is related to her lumbar fracture and the pain.  -She will have her visiting nurses remove her catheter and then come to the office for follow-up and PVR      Thank you for allowing me to participate in this patients’ care.  Please do not hesitate to call with any additional questions.  Broderick Vincent PA-C

## 2024-04-10 NOTE — TELEPHONE ENCOUNTER
Tuesday, a PT with [facility], called in stating she is with pt and questioned who she should be seeing for home health nurse. Advised that pt can stay with the same company as she sees them for OT and PT. Tuesday states she needs an order with details on when catheter should be changed for the nursing staff. Please advise. CB # [phone]

## 2024-04-10 NOTE — TELEPHONE ENCOUNTER
Spoke with [facility]. They report they can remove chatman in the morning and then patient can come in the afternoon for the PVR. Requested orders be faxed to [phone]

## 2024-04-10 NOTE — TELEPHONE ENCOUNTER
Daughter called and rescheduled patient's PVR for 4/18. She's aware that [facility] could not remove the catheter at home in the morning and if she can't urinate in between appointments she should come right back in t the office. They will need  or if we have an iPad the daughter could be called and she will translate for them.    CB: [phone]

## 2024-04-10 NOTE — TELEPHONE ENCOUNTER
Patients daughter calling in stating her parents are still very confused after apt today with [facility] as they are def and had . She is wondering if [facility] could call her directly to explain next steps.       CB: [phone]

## 2024-04-10 NOTE — TELEPHONE ENCOUNTER
Tuesday, Physical Therapist with [facility] is calling to inform that patient has never had a referral for Home Health Nurses.    Her referral has been with PT and OT.    She states that Nurses will not be able to remove her catheter at home since they have not been managing her catheter.     Her CB # [phone]

## 2024-04-10 NOTE — TELEPHONE ENCOUNTER
CALLED PT'S DAUGHTER AND LM REGARDING TOV VISIT. SHE IS TO CALL BACK AND CONFIRM MESSAGE. ORDERS WERE ALSO FAXED TO THE [facility] AT NUMBER PROVIDED.

## 2024-04-10 NOTE — TELEPHONE ENCOUNTER
-[facility] to remove patients chatman between 8-9am on 4/17/24  -please encourage patient to adequately hydrate and attempt to urinate on her own throughout the day at timed intervals.  -Patient to come to office sooner then appointment time with any difficulty urinating with associated pain

## 2024-04-11 NOTE — TELEPHONE ENCOUNTER
Tuesday called from [facility] visiting nurses, states although the name of their establishment has visiting nurses in it, they are only seeing pt. For PT and OT. I explained that the daughter called us and patient is scheduled with us to take care of catheter. No further questions or concerns at this time.

## 2024-04-17 ENCOUNTER — TELEPHONE (OUTPATIENT)
Age: 84
End: 2024-04-17

## 2024-04-17 NOTE — TELEPHONE ENCOUNTER
Lucia from [facility] called to dinesh the last time Pt was seen in the office and has she scheduled another appointment with the

## 2024-04-18 ENCOUNTER — PROCEDURE VISIT (OUTPATIENT)
Dept: UROLOGY | Facility: CLINIC | Age: 84
End: 2024-04-18
Payer: COMMERCIAL

## 2024-04-18 VITALS
HEART RATE: 45 BPM | BODY MASS INDEX: 28.59 KG/M2 | SYSTOLIC BLOOD PRESSURE: 180 MMHG | OXYGEN SATURATION: 99 % | WEIGHT: 123 LBS | DIASTOLIC BLOOD PRESSURE: 92 MMHG

## 2024-04-18 DIAGNOSIS — R33.9 URINARY RETENTION: Primary | ICD-10-CM

## 2024-04-18 LAB — POST-VOID RESIDUAL VOLUME, ML POC: 515 ML

## 2024-04-18 PROCEDURE — 99211 OFF/OP EST MAY X REQ PHY/QHP: CPT

## 2024-04-18 PROCEDURE — 51702 INSERT TEMP BLADDER CATH: CPT

## 2024-04-18 PROCEDURE — 51798 US URINE CAPACITY MEASURE: CPT

## 2024-04-18 NOTE — PROGRESS NOTES
"4/18/2024    Corey Pulido  1940  [id number]    Diagnosis      Patient presents for chatman removal/void trial managed by our office    Plan  Patient will return in 2 weeks for 2nd void trial    Procedure Chatman removal/voiding trial    Chatman catheter removed after deflation of an intact balloon. Patient tolerated well. In person  (Eli) was translating.Encouraged patient to hydrate well and return this afternoon for post void residual.  she knows she may return early if uncomfortable and unable to urinate. Patient agrees to this plan.    Patient returned this afternoon. Patient states able to void. Patient voided 100 ml while in office. Bladder ultrasound performed and PVR measured 515 ml. Patient had a different ASL  in the afternoon.         Recent Results (from the past 24 hour(s))   POCT Measure PVR    Collection Time: 04/18/24  2:17 PM   Result Value Ref Range    POST-VOID RESIDUAL VOLUME, ML  mL        Vitals:    04/18/24 0818   BP: (!) 180/92   BP Location: Left arm   Patient Position: Sitting   Cuff Size: Standard   Pulse: (!) 45   SpO2: 99%   Weight: 55.8 kg (123 lb)     Universal Protocol:  Consent: Verbal consent obtained.  Risks and benefits: risks, benefits and alternatives were discussed  Consent given by: patient  Time out: Immediately prior to procedure a \"time out\" was called to verify the correct patient, procedure, equipment, support staff and site/side marked as required.  Patient understanding: patient states understanding of the procedure being performed  Patient consent: the patient's understanding of the procedure matches consent given  Patient identity confirmed: verbally with patient    Bladder catheterization    Date/Time: 4/18/2024 8:30 AM    Performed by: Maral Feng RN  Authorized by: Gavin Torres MD    Patient location:  Bedside  Consent:     Consent given by:  Patient  Universal protocol:     Procedure explained and questions answered to " patient or proxy's satisfaction: yes      Immediately prior to procedure a time out was called: yes      Patient identity confirmed:  Verbally with patient  Pre-procedure details:     Procedure purpose:  Therapeutic    Preparation: Patient was prepped and draped in usual sterile fashion    Anesthesia (see MAR for exact dosages):     Anesthesia method:  None  Procedure details:     Bladder irrigation: no      Catheter insertion:  Indwelling    Approach: natural orifice      Catheter type:  Latex and Lang    Catheter size:  16 Fr    Number of attempts:  1    Successful placement: yes      Urine characteristics:  Mildly cloudy and yellow  Post-procedure details:     Patient tolerance of procedure:  Tolerated well, no immediate complications  Comments:      Prepped with Betadine. Lang inserted without issue. 10 mL inflated into balloon. 600 mL of mildly cloudy urine drained. Patient tolerated well. Attached to leg bag. Extra supplies given to pt. Educated on how to change bags/clean them. Pt verbalized understanding.          Maral Feng RN

## 2024-04-19 ENCOUNTER — TELEPHONE (OUTPATIENT)
Age: 84
End: 2024-04-19

## 2024-04-19 ENCOUNTER — OFFICE VISIT (OUTPATIENT)
Dept: FAMILY MEDICINE CLINIC | Facility: CLINIC | Age: 84
End: 2024-04-19
Payer: COMMERCIAL

## 2024-04-19 VITALS
SYSTOLIC BLOOD PRESSURE: 138 MMHG | RESPIRATION RATE: 18 BRPM | TEMPERATURE: 99.1 F | WEIGHT: 124.2 LBS | HEART RATE: 64 BPM | DIASTOLIC BLOOD PRESSURE: 72 MMHG | BODY MASS INDEX: 28.74 KG/M2 | HEIGHT: 55 IN

## 2024-04-19 DIAGNOSIS — I10 ESSENTIAL HYPERTENSION: ICD-10-CM

## 2024-04-19 DIAGNOSIS — E03.9 HYPOTHYROIDISM, UNSPECIFIED TYPE: ICD-10-CM

## 2024-04-19 DIAGNOSIS — R33.9 URINARY RETENTION: ICD-10-CM

## 2024-04-19 DIAGNOSIS — E87.1 HYPONATREMIA: Primary | ICD-10-CM

## 2024-04-19 DIAGNOSIS — S32.010A COMPRESSION FRACTURE OF L1 VERTEBRA, INITIAL ENCOUNTER (HCC): ICD-10-CM

## 2024-04-19 DIAGNOSIS — R11.0 NAUSEA: Primary | ICD-10-CM

## 2024-04-19 DIAGNOSIS — I50.22 CHRONIC SYSTOLIC CONGESTIVE HEART FAILURE (HCC): ICD-10-CM

## 2024-04-19 DIAGNOSIS — E87.1 HYPONATREMIA: ICD-10-CM

## 2024-04-19 PROCEDURE — G2211 COMPLEX E/M VISIT ADD ON: HCPCS | Performed by: NURSE PRACTITIONER

## 2024-04-19 PROCEDURE — 99214 OFFICE O/P EST MOD 30 MIN: CPT | Performed by: NURSE PRACTITIONER

## 2024-04-19 RX ORDER — SODIUM CHLORIDE 1 G/1
1 TABLET ORAL 3 TIMES DAILY
Qty: 90 TABLET | Refills: 1 | Status: SHIPPED | OUTPATIENT
Start: 2024-04-19

## 2024-04-19 RX ORDER — ONDANSETRON 4 MG/1
4 TABLET, FILM COATED ORAL EVERY 8 HOURS PRN
Qty: 30 TABLET | Refills: 0 | Status: SHIPPED | OUTPATIENT
Start: 2024-04-19

## 2024-04-19 NOTE — ASSESSMENT & PLAN NOTE
Managed by cardiologist and on torsemide  Wt Readings from Last 3 Encounters:   04/19/24 56.3 kg (124 lb 3.2 oz)   04/18/24 55.8 kg (123 lb)   04/10/24 56.7 kg (125 lb)

## 2024-04-19 NOTE — TELEPHONE ENCOUNTER
Daughter ciera was calling in regards to her mothers sodium chloride tablet. She wanted to see if she can have that medication decreased to 2x a day. Please call [phone] back with an update.

## 2024-04-19 NOTE — TELEPHONE ENCOUNTER
Reason for call:   [x] Refill   [] Prior Auth  [] Other:     Office:   [] PCP/Provider -   [x] Specialty/Provider -     Medication: sodium chloride 1 g tablet     Dose/Frequency: Take 1 tablet (1 g total) by mouth 3 (three) times a day,     Quantity: 90 tablet     Pharmacy: [id number] - [place], [place] - [address] [place] [address] [phone]     Does the patient have enough for 3 days?   [x] Yes   [] No - Send as HP to POD

## 2024-04-19 NOTE — PATIENT INSTRUCTIONS
Ondansetron (By mouth, Into the mouth)   Ondansetron (on-DAN-se-nani)  Prevents nausea and vomiting.   Brand Name(s): Zofran, Zuplenz   There may be other brand names for this medicine.  When This Medicine Should Not Be Used:   This medicine is not right for everyone. Do not use it if you had an allergic reaction to ondansetron.  How to Use This Medicine:   Thin Sheet, Liquid, Tablet, Dissolving Tablet  Your doctor will tell you how much medicine to use. Do not use more than directed.  Measure the oral liquid medicine with a marked measuring spoon, oral syringe, or medicine cup.  To use the disintegrating tablet:   Do not open the blister pack that contains the tablet until you are ready to take it.  Make sure your hands are dry. Peel back the foil, then remove the tablet from the blister pack. Do not push the tablet through the foil.  Place the tablet on top of your tongue where it will dissolve in seconds. After the tablet has melted, swallow or take a sip of water.  To use the soluble film:   Make sure your hands are clean and dry.  Fold the pouch along the dotted line.  While still folded, tear the pouch carefully along the edge. Remove the film from the pouch.  Place the film on top of your tongue. It will dissolve in 4 to 20 seconds. Do not chew or swallow the film whole.  After the film has dissolved, you may swallow with or without water.  Read and follow the patient instructions that come with this medicine. Talk to your doctor or pharmacist if you have any questions.  Missed dose: Take a dose as soon as you remember. If it is almost time for your next dose, wait until then and take a regular dose. Do not take extra medicine to make up for a missed dose.  Store the medicine in a closed container at room temperature, away from heat, moisture, and direct light. Keep the soluble film in the foil pouch until you ready to use it.  Drugs and Foods to Avoid:   Ask your doctor or pharmacist before using any other  medicine, including over-the-counter medicines, vitamins, and herbal products.  Do not use this medicine together with apomorphine.  Some medicines can affect how ondansetron works. Tell your doctor if you are using any of the following:  Carbamazepine, fentanyl, methylene blue injection, phenytoin, rifampin, tramadol  Diuretics (water pills)  MAO inhibitor  Medicine used to treat depression (including lithium, mirtazapine, SNRIs, SSRIs)  Other medicine for nausea and vomiting  Warnings While Using This Medicine:   Tell your doctor if you are pregnant or breastfeeding, or if you have liver disease, congestive heart failure, heart rhythm problems (including prolonged QT interval, torsade de pointes), electrolyte imbalance, stomach or bowel problems (including a recent stomach surgery), or phenylketonuria (PKU).  This medicine may cause the following problems:  Heart rhythm problems, including QT prolongation  Serotonin syndrome (when used with certain other medicines)  Heart problems, including myocardial ischemia (low blood supply to the heart)  Stomach or bowel problems  This medicine may make you dizzy. Do not drive or do anything else that could be dangerous until you know how this medicine affects you.  Check with your doctor if severe nausea and vomiting continue after you leave the hospital or cancer treatment center.  Keep all medicine out of the reach of children. Never share your medicine with anyone.  Possible Side Effects While Using This Medicine:   Call your doctor right away if you notice any of these side effects:  Allergic reaction: Itching or hives, swelling in your face or hands, swelling or tingling in your mouth or throat, chest tightness, trouble breathing  Anxiety, restlessness, fever, muscle spasms, twitching, seeing or hearing things that are not there  Chest pain, trouble breathing, pain or discomfort in the arms, jaw, back or neck, sweating  Constipation, diarrhea, stomach pain or  swelling  Dark urine or pale stools, nausea, vomiting, loss of appetite, yellow skin or eyes  Fainting, dizziness, or lightheadedness  Fast, pounding, or uneven heartbeat  If you notice these less serious side effects, talk with your doctor:   Headache  Tiredness or weakness  If you notice other side effects that you think are caused by this medicine, tell your doctor.   Call your doctor for medical advice about side effects. You may report side effects to FDA at 8-224-ROS-4279  © Copyright [employer] 2023 Information is for End User's use only and may not be sold, redistributed or otherwise used for commercial purposes.  The above information is an  only. It is not intended as medical advice for individual conditions or treatments. Talk to your doctor, nurse or pharmacist before following any medical regimen to see if it is safe and effective for you.  Vertebral Compression Fracture   WHAT YOU NEED TO KNOW:   A vertebral compression fracture (VCF) is a collapse or breakdown in a bone in your spine. Compression fractures happen when there is too much pressure on the vertebra. VCFs most often occur in the thoracic (middle) and lumbar (lower) areas of your spine. Fractures may be mild to severe.       DISCHARGE INSTRUCTIONS:   Call your local emergency number (911 in the [place]) if:   You feel lightheaded, short of breath, and have chest pain.    You cough up blood.    You suddenly cannot feel your legs.    You suddenly have trouble moving your arms or legs.    Return to the emergency department if:   Your arm or leg feels warm, tender, and painful. It may look swollen and red.    You have new problems urinating or having bowel movements.    You have severe pain in your back after falling, bending forward, sneezing, or coughing strongly.    Call your doctor or orthopedist if:   You cannot sleep or rest because of back pain.    You have pain or swelling in your back that is getting worse, or does not go  away.    You have questions or concerns about your condition or care.    Medicines:  You may need any of the following:  NSAIDs , such as ibuprofen, help decrease swelling, pain, and fever. This medicine is available with or without a doctor's order. NSAIDs can cause stomach bleeding or kidney problems in certain people. If you take blood thinner medicine, always ask if NSAIDs are safe for you. Always read the medicine label and follow directions. Do not give these medicines to children younger than 6 months without direction from a healthcare provider.     Acetaminophen  decreases pain and fever. It is available without a doctor's order. Ask how much to take and how often to take it. Follow directions. Read the labels of all other medicines you are using to see if they also contain acetaminophen, or ask your doctor or pharmacist. Acetaminophen can cause liver damage if not taken correctly.    Prescription pain medicine  may be given. Ask your healthcare provider how to take this medicine safely. Some prescription pain medicines contain acetaminophen. Do not take other medicines that contain acetaminophen without talking to your healthcare provider. Too much acetaminophen may cause liver damage. Prescription pain medicine may cause constipation. Ask your healthcare provider how to prevent or treat constipation.     Bisphosphonates and calcitonin  may be recommended to help your bones get stronger. They can decrease the pain of a VCF caused by osteoporosis, and decrease your risk for another fracture.    Take your medicine as directed.  Contact your healthcare provider if you think your medicine is not helping or if you have side effects. Tell your provider if you are allergic to any medicine. Keep a list of the medicines, vitamins, and herbs you take. Include the amounts, and when and why you take them. Bring the list or the pill bottles to follow-up visits. Carry your medicine list with you in case of an  emergency.    Heat and ice:   Apply ice  on your back for 15 to 20 minutes every hour or as directed. Use an ice pack, or put crushed ice in a plastic bag. Cover it with a towel before you apply it. Ice helps prevent tissue damage and decreases swelling and pain.    Apply heat  on your back for 20 to 30 minutes every 2 hours for as many days as directed. Heat helps decrease pain and muscle spasms.    Activity:   Avoid activities that may make the pain worse, such as picking up heavy objects. When the pain decreases, begin normal, slow movements as directed by your healthcare provider. Your healthcare provider may have you do weight-bearing exercises such as walking. You may also do non-weight-bearing exercises such as swimming and bicycling.    You may need to use a walker or cane. Ask your healthcare provider for more information about how to use a cane or a walker.    When you  objects, bend at the hips and knees. Never bend from the waist only. Use bent knees and your leg muscles as you lift the object. While you lift the object, keep it close to your chest. Try not to twist or lift anything above your waist.    Physical and occupational therapy:  Your healthcare provider may recommend you start or continue one or both types of therapy. A physical therapist teaches you exercises to help improve movement and strength, and to decrease pain. An occupational therapist teaches you skills to help with your daily activities.  Manage pain during sleep:   Do not sleep on a waterbed. Waterbeds do not provide good back support.    Sleep on a firm mattress. You may also put a ½ to 1-inch piece of plywood between the mattress and box spring.    Sleep on your back with a pillow under your knees. This will decrease pressure on your back. You may also sleep on your side with 1 or both of your knees bent and a pillow between them. It may also be helpful to sleep on your stomach with a pillow under you at waist  level.    Follow up with your doctor or orthopedist as directed:  You may need to return for x-rays or other tests. Write down your questions so you remember to ask them during your visits.  © Copyright [employer] 2023 Information is for End User's use only and may not be sold, redistributed or otherwise used for commercial purposes.  The above information is an  only. It is not intended as medical advice for individual conditions or treatments. Talk to your doctor, nurse or pharmacist before following any medical regimen to see if it is safe and effective for you.

## 2024-04-19 NOTE — ASSESSMENT & PLAN NOTE
Lab Results   Component Value Date    [id number] 3.003 11/21/2020    TSH 2.290 10/23/2023    Tolerating current dose of levothyroxine

## 2024-04-19 NOTE — TELEPHONE ENCOUNTER
"Sodium level of 4/24/24 normal at 140.  Per Dr. Romero, continue torsemide 10 mg. QD and reduce salt tablets to 1 gm. Daily.   Continue fluid restrictions.   Repeat BMP in 2-3 weeks.  Order mailed to patient.        Talked with patient's daughter.  She will make sure mother has BMP done, hopefully tomorrow at [facility].    (Pt she states is being stubborn and just wants \"off all medicines\".  She is upset that her catheter has not been removed and is being stubborn about medications.  Advised daughter that once we get BMP results, Dr. Romero will review and will advise her accordingly regarding adjustments in salt tablets.    "

## 2024-04-19 NOTE — PROGRESS NOTES
Assessment/Plan:    1. Nausea  -     ondansetron (ZOFRAN) 4 mg tablet; Take 1 tablet (4 mg total) by mouth every 8 (eight) hours as needed for nausea or vomiting    2. Compression fracture of L1 vertebra, initial encounter (HCC)  -     Ambulatory Referral to Neurosurgery; Future    3. Essential hypertension  Assessment & Plan:  stable      4. Chronic systolic congestive heart failure (HCC)  Assessment & Plan:  Managed by cardiologist and on torsemide  Wt Readings from Last 3 Encounters:   04/19/24 56.3 kg (124 lb 3.2 oz)   04/18/24 55.8 kg (123 lb)   04/10/24 56.7 kg (125 lb)               5. Hypothyroidism, unspecified type  Assessment & Plan:  Lab Results   Component Value Date    XPS2KFUUQURH 3.003 11/21/2020    TSH 2.290 10/23/2023    Tolerating current dose of levothyroxine      6. Urinary retention  Assessment & Plan:  Has chatman and managed by urologist      7. Hyponatremia  Assessment & Plan:  Managed by nephrologist            Patient Instructions:  Supportive care discussed and advised.  Advised to RTO for any worsening and no improvement.   Follow up for no improvement and worsening of conditions.  Patient advised and educated when to see immediate medical care.    Return if symptoms worsen or fail to improve.      Future Appointments   Date Time Provider Department Center   4/22/2024 11:40 AM Rashawn Fine DO [facility]   5/2/2024  8:30 AM UROLOGY NURSE [facility]   5/2/2024  2:30 PM UROLOGY NURSE [facility]   6/27/2024 10:15 AM Irene Buck MD [facility]   7/19/2024  8:30 AM DEVICE REMOTE [facility]   9/27/2024  1:30 PM Renuka Romero MD [facility]   10/18/2024  8:00 AM DEVICE REMOTE [facility]           Subjective:      Patient ID: Corey Pulido is a 83 y.o. female.    Chief Complaint   Patient presents with   • Hospital Follow-up     YC          Vitals:  /72   Pulse 64   Temp 99.1 °F (37.3 °C)  "(Tympanic)   Resp 18   Ht 4' 7\" (1.397 m)   Wt 56.3 kg (124 lb 3.2 oz)   BMI 28.87 kg/m²     HPI  Patient is here to follow up on recent hospitalization secondary to compression fracture. Stated that not  having much pain and wearing back brace and pain is controlled with lidoderm patch. Not using robaxin much. Have not seen neurosurgery yet. Has in person interpretor with her and  is present at [facility].   Followed with urologist and chatman was removed but patient retained urine and was reinserted and will follow back with them in 2 weeks.  Had follow up with nephrologist and will continue with sodium tab and feels nauseated with them and will try zofran and advised not to stop salt tabs without consulting nephrologist.  Patient and  declined any treatment for depression and anxiety.                PHQ-2/9 Depression Screening    Little interest or pleasure in doing things: 0 - not at all  Feeling down, depressed, or hopeless: 3 - nearly every day  Trouble falling or staying asleep, or sleeping too much: 1 - several days  Feeling tired or having little energy: 0 - not at all  Poor appetite or overeatin - several days  Feeling bad about yourself - or that you are a failure or have let yourself or your family down: 0 - not at all  Trouble concentrating on things, such as reading the newspaper or watching television: 0 - not at all  Moving or speaking so slowly that other people could have noticed. Or the opposite - being so fidgety or restless that you have been moving around a lot more than usual: 0 - not at all  Thoughts that you would be better off dead, or of hurting yourself in some way: 0 - not at all  PHQ-2 Score: 3  PHQ-2 Interpretation: POSITIVE depression screen  PHQ-9 Score: 5  PHQ-9 Interpretation: Mild depression       Depression Screening Follow-up Plan: Patient's depression screening was positive with a PHQ-2 score of 3. Their PHQ-9 score was 5. Patient declines further evaluation by " mental health professional and/or medications. They have no active suicidal ideations. Brief counseling provided and recommend additional follow-up/re-evaluation at next office visit.       The following portions of the patient's history were reviewed and updated as appropriate: allergies, current medications, past family history, past medical history, past social history, past surgical history and problem list.      Review of Systems   HENT: Negative.     Respiratory: Negative.     Cardiovascular: Negative.    Gastrointestinal: Negative.    Musculoskeletal:  Positive for back pain (improving).         Objective:    Social History     Tobacco Use   Smoking Status Never   • Passive exposure: Past   Smokeless Tobacco Never       Allergies:   Allergies   Allergen Reactions   • Sotalol      Vomiting, convulsing, rash, cough   • Famotidine Throat Swelling         Current Outpatient Medications   Medication Sig Dispense Refill   • acetaminophen (TYLENOL) 325 mg tablet Take 2 tablets (650 mg total) by mouth every 6 (six) hours as needed for mild pain, moderate pain, severe pain, headaches or fever     • docusate sodium (COLACE) 100 mg capsule Take 1 capsule (100 mg total) by mouth 2 (two) times a day 60 capsule 0   • fluticasone (FLONASE) 50 mcg/act nasal spray 1 spray into each nostril daily  0   • levothyroxine (Synthroid) 75 mcg tablet Take 1 tablet (75 mcg total) by mouth daily in the early morning BRAND MEDICALLY NECESSARY 90 tablet 3   • lidocaine (LIDODERM) 5 % Apply 1 patch topically over 12 hours daily Remove & Discard patch within 12 hours or as directed by MD 30 patch 0   • lisinopril (ZESTRIL) 2.5 mg tablet Take 2 tablets (5 mg total) by mouth daily     • methocarbamol (ROBAXIN) 500 mg tablet Take 1 tablet (500 mg total) by mouth every 8 (eight) hours as needed for muscle spasms 20 tablet 0   • metoprolol succinate (TOPROL-XL) 25 mg 24 hr tablet Take 1 tablet (25 mg total) by mouth daily     • ondansetron  (ZOFRAN) 4 mg tablet Take 1 tablet (4 mg total) by mouth every 8 (eight) hours as needed for nausea or vomiting 30 tablet 0   • pantoprazole (PROTONIX) 40 mg tablet Take 1 tablet (40 mg total) by mouth daily in the early morning 30 tablet 0   • rosuvastatin (CRESTOR) 10 MG tablet Take 1 tablet (10 mg total) by mouth daily 90 tablet 1   • tamsulosin (FLOMAX) 0.4 mg Take 1 capsule (0.4 mg total) by mouth daily with dinner 30 capsule 0   • torsemide (DEMADEX) 10 mg tablet Take 1 tablet (10 mg total) by mouth daily 90 tablet 1   • Xarelto 20 MG tablet TAKE ONE TABLET BY MOUTH EVERY DAY WITH BREAKFAST 90 tablet 3   • polyethylene glycol (MIRALAX) 17 g packet Take 17 g by mouth daily as needed (constipation) (Patient not taking: Reported on 4/19/2024)     • sodium chloride 1 g tablet Take 1 tablet (1 g total) by mouth 3 (three) times a day 90 tablet 1     No current facility-administered medications for this visit.          Physical Exam  Constitutional:       Appearance: Normal appearance.   HENT:      Head: Normocephalic and atraumatic.      Nose: Nose normal.   Eyes:      Conjunctiva/sclera: Conjunctivae normal.   Cardiovascular:      Rate and Rhythm: Normal rate and regular rhythm.      Pulses: Normal pulses.      Heart sounds: Normal heart sounds.   Pulmonary:      Effort: Pulmonary effort is normal.      Breath sounds: Normal breath sounds.   Musculoskeletal:         General: Tenderness (mildly tender on lumbar spine area) present.   Skin:     General: Skin is warm and dry.      Findings: No rash.   Neurological:      Mental Status: She is alert and oriented to person, place, and time.   Psychiatric:         Mood and Affect: Mood normal.         Behavior: Behavior normal.         Thought Content: Thought content normal.         Judgment: Judgment normal.                     CHA Maharaj

## 2024-04-22 ENCOUNTER — OFFICE VISIT (OUTPATIENT)
Dept: CARDIOLOGY CLINIC | Facility: CLINIC | Age: 84
End: 2024-04-22
Payer: COMMERCIAL

## 2024-04-22 VITALS
SYSTOLIC BLOOD PRESSURE: 150 MMHG | DIASTOLIC BLOOD PRESSURE: 86 MMHG | OXYGEN SATURATION: 97 % | HEIGHT: 55 IN | BODY MASS INDEX: 28.7 KG/M2 | HEART RATE: 83 BPM | WEIGHT: 124 LBS

## 2024-04-22 DIAGNOSIS — I10 ESSENTIAL HYPERTENSION: Primary | ICD-10-CM

## 2024-04-22 DIAGNOSIS — E78.5 DYSLIPIDEMIA: ICD-10-CM

## 2024-04-22 DIAGNOSIS — I25.10 CORONARY ARTERY DISEASE INVOLVING NATIVE CORONARY ARTERY OF NATIVE HEART WITHOUT ANGINA PECTORIS: ICD-10-CM

## 2024-04-22 DIAGNOSIS — I50.32 CHRONIC DIASTOLIC CHF (CONGESTIVE HEART FAILURE) (HCC): ICD-10-CM

## 2024-04-22 DIAGNOSIS — Z95.0 CARDIAC PACEMAKER IN SITU: ICD-10-CM

## 2024-04-22 PROCEDURE — 99214 OFFICE O/P EST MOD 30 MIN: CPT | Performed by: INTERNAL MEDICINE

## 2024-04-22 NOTE — PROGRESS NOTES
Cardiology Follow Up    Corey Pulido  1940  [id number]      Interval History: Corey Pulido is here for followup of  CHF, hypertension and atrial fibrillation.  She was complaining of atypical chest pain and shortness of breath.  Stress test was ordered which showed infarction of the inferior and anterior walls with mild kay-infarct ischemia ejection fraction was 37%.  Her last echocardiogram showed ejection fraction of 45% with hypokinesis of inferior septal and apical walls.  She developed severe back pain and found to have spinal fracture.  Also diagnosed with hyponatremia and is on salt tablets now.  Follows with nephrology.  Repeat blood work schedule for tomorrow.         She has a history of atrial fibrillation with multiple failed cardioversions in the past. She eventually had AV node ablation along with permanent pacemaker insertion by Dr. Mary at [facility].  She was previously taking sotalol but stopped it independently because of side effects.   Amiodarone was stopped because of GI side effects.  Bystolic was stopped due to nausea.  Amlodipine was stopped in the past because of LE edema.    Echocardiogram in March 2020 showed EF of 45% with moderate to marked LA dilation.   Echocardiogram in June 2023 showed ejection fraction of 45% with severe LA dilation and moderate pulmonary hypertension.      The following portions of the patient's history were reviewed and updated as appropriate: allergies, current medications, past family history, past medical history, past social history, past surgical history and problem list.    Current Outpatient Medications on File Prior to Visit   Medication Sig Dispense Refill    acetaminophen (TYLENOL) 325 mg tablet Take 2 tablets (650 mg total) by mouth every 6 (six) hours as needed for mild pain, moderate pain, severe pain, headaches or fever      docusate sodium (COLACE) 100 mg capsule Take 1  capsule (100 mg total) by mouth 2 (two) times a day 60 capsule 0    fluticasone (FLONASE) 50 mcg/act nasal spray 1 spray into each nostril daily  0    levothyroxine (Synthroid) 75 mcg tablet Take 1 tablet (75 mcg total) by mouth daily in the early morning BRAND MEDICALLY NECESSARY 90 tablet 3    lidocaine (LIDODERM) 5 % Apply 1 patch topically over 12 hours daily Remove & Discard patch within 12 hours or as directed by MD 30 patch 0    lisinopril (ZESTRIL) 2.5 mg tablet Take 2 tablets (5 mg total) by mouth daily      metoprolol succinate (TOPROL-XL) 25 mg 24 hr tablet Take 1 tablet (25 mg total) by mouth daily (Patient taking differently: Take 12.5 mg by mouth daily)      ondansetron (ZOFRAN) 4 mg tablet Take 1 tablet (4 mg total) by mouth every 8 (eight) hours as needed for nausea or vomiting 30 tablet 0    pantoprazole (PROTONIX) 40 mg tablet Take 1 tablet (40 mg total) by mouth daily in the early morning 30 tablet 0    rosuvastatin (CRESTOR) 10 MG tablet Take 1 tablet (10 mg total) by mouth daily 90 tablet 1    sodium chloride 1 g tablet Take 1 tablet (1 g total) by mouth 3 (three) times a day 90 tablet 1    tamsulosin (FLOMAX) 0.4 mg Take 1 capsule (0.4 mg total) by mouth daily with dinner 30 capsule 0    torsemide (DEMADEX) 10 mg tablet Take 1 tablet (10 mg total) by mouth daily 90 tablet 1    Xarelto 20 MG tablet TAKE ONE TABLET BY MOUTH EVERY DAY WITH BREAKFAST 90 tablet 3    methocarbamol (ROBAXIN) 500 mg tablet Take 1 tablet (500 mg total) by mouth every 8 (eight) hours as needed for muscle spasms (Patient not taking: Reported on 4/22/2024) 20 tablet 0    polyethylene glycol (MIRALAX) 17 g packet Take 17 g by mouth daily as needed (constipation) (Patient not taking: Reported on 4/19/2024)       No current facility-administered medications on file prior to visit.          Review of Systems:  Review of Systems   HENT:  Positive for hearing loss.    Respiratory:  Negative for shortness of breath.   "  Cardiovascular:  Positive for palpitations. Negative for chest pain and leg swelling.   Musculoskeletal:  Positive for arthralgias, back pain and myalgias.   All other systems reviewed and are negative.      Physical Exam:  /86 (BP Location: Right arm, Patient Position: Sitting, Cuff Size: Standard)   Pulse 83   Ht 4' 7\" (1.397 m)   Wt 56.2 kg (124 lb)   SpO2 97%   BMI 28.82 kg/m²     Physical Exam  Constitutional:       General: She is not in acute distress.     Appearance: Normal appearance. She is well-developed. She is not diaphoretic.   HENT:      Head: Normocephalic and atraumatic.   Eyes:      General: No scleral icterus.     Conjunctiva/sclera: Conjunctivae normal.      Pupils: Pupils are equal, round, and reactive to light.   Neck:      Thyroid: No thyromegaly.      Vascular: No JVD.   Cardiovascular:      Rate and Rhythm: Normal rate. Rhythm irregular.      Heart sounds: Normal heart sounds. No murmur heard.     No friction rub. No gallop.   Pulmonary:      Effort: Pulmonary effort is normal.      Breath sounds: Normal breath sounds.   Musculoskeletal:      Cervical back: Neck supple.   Skin:     General: Skin is warm and dry.      Findings: No erythema or rash.   Neurological:      General: No focal deficit present.      Mental Status: She is alert and oriented to person, place, and time. Mental status is at baseline.   Psychiatric:         Mood and Affect: Mood normal.         Behavior: Behavior normal.         Thought Content: Thought content normal.         Judgment: Judgment normal.         Cardiographics  ECG:  Atrial flutter with Ventricular pacing and frequent PVCs    Labs:  Lab Results   Component Value Date     08/15/2017    K 4.3 03/23/2024    K 5.0 10/23/2023    CL 96 03/23/2024     10/23/2023    CO2 27 03/23/2024    CO2 23 10/23/2023    BUN 14 03/23/2024    BUN 16 10/23/2023    CREATININE 1.05 03/23/2024    CREATININE 0.88 08/15/2017    GLUCOSE 94 08/15/2017    CALCIUM " 8.8 03/23/2024    CALCIUM 9.2 08/15/2017     Lab Results   Component Value Date    WBC 10.60 (H) 03/23/2024    WBC 4.8 08/15/2017    HGB 15.6 (H) 03/23/2024    HGB 14.2 08/15/2017    HCT 45.4 03/23/2024    HCT 43.7 08/15/2017    MCV 84 03/23/2024    MCV 86 08/15/2017     03/23/2024     08/15/2017     Lab Results   Component Value Date    CHOL 164 08/15/2017    TRIG 82 10/23/2023    HDL 61 10/23/2023     Imaging: No results found.    Discussion/Summary:  1. Essential hypertension    2. Coronary artery disease involving native coronary artery of native heart without angina pectoris    3. Cardiac pacemaker in situ    4. Chronic diastolic CHF (congestive heart failure) (HCC)    5. Dyslipidemia        - BP is controlled today.  She has multiple medication intolerances.  We will continue lisinopril 2.5 mg daily. She is also using Toprol-XL 12.5 mg daily.  - She has established care with nephrology because of hyponatremia and urinary retention.  She is on Lasix now.  She is also using sodium tablets repeat blood work scheduled for tomorrow  -She had an abnormal stress test but is currently free of chest pain.  Will continue to monitor and she will be unable to have cardiac catheterization due to severe back pain.  - She does not have any shortness of breath or LE edema suggestive of CHF at this time.    - Will obtain pacemaker interrogation     - Patient had AV node ablation along with pacemaker insertion.    - Patient is on Xarelto.  Aspirin was discontinued and she was continued on Xarelto alone because of excessive bruising.

## 2024-04-25 LAB
BUN SERPL-MCNC: 22 MG/DL (ref 8–27)
BUN/CREAT SERPL: 21 (ref 12–28)
CALCIUM SERPL-MCNC: 9.2 MG/DL (ref 8.7–10.3)
CHLORIDE SERPL-SCNC: 102 MMOL/L (ref 96–106)
CO2 SERPL-SCNC: 24 MMOL/L (ref 20–29)
CREAT SERPL-MCNC: 1.05 MG/DL (ref 0.57–1)
EGFR: 52 ML/MIN/1.73
GLUCOSE SERPL-MCNC: 90 MG/DL (ref 70–99)
POTASSIUM SERPL-SCNC: 4.6 MMOL/L (ref 3.5–5.2)
SODIUM SERPL-SCNC: 140 MMOL/L (ref 134–144)

## 2024-05-02 ENCOUNTER — TELEPHONE (OUTPATIENT)
Dept: UROLOGY | Facility: CLINIC | Age: 84
End: 2024-05-02

## 2024-05-02 ENCOUNTER — PROCEDURE VISIT (OUTPATIENT)
Dept: UROLOGY | Facility: CLINIC | Age: 84
End: 2024-05-02
Payer: COMMERCIAL

## 2024-05-02 VITALS — DIASTOLIC BLOOD PRESSURE: 90 MMHG | HEART RATE: 73 BPM | SYSTOLIC BLOOD PRESSURE: 150 MMHG | OXYGEN SATURATION: 95 %

## 2024-05-02 DIAGNOSIS — R33.9 URINARY RETENTION: Primary | ICD-10-CM

## 2024-05-02 LAB — POST-VOID RESIDUAL VOLUME, ML POC: 500 ML

## 2024-05-02 PROCEDURE — 99211 OFF/OP EST MAY X REQ PHY/QHP: CPT

## 2024-05-02 PROCEDURE — 51798 US URINE CAPACITY MEASURE: CPT

## 2024-05-02 PROCEDURE — 51702 INSERT TEMP BLADDER CATH: CPT

## 2024-05-02 NOTE — PROGRESS NOTES
"5/2/2024    Corey Pulido  1940  [id number]    Diagnosis      Patient presents for 2nd void trial managed by our office    Plan  The patient will return in 1 month for next void trial and will call office with any questions or concerns.     Procedure Lang removal/voiding trial    Lang catheter removed after deflation of an intact balloon. Patient tolerated well. Encouraged patient to hydrate well and return this afternoon for post void residual.  she knows she may return early if uncomfortable and unable to urinate. Patient agrees to this plan. ASL  on iPad to communicate with pt.     Patient returned this afternoon. ASL  [id number] assisted. Patient states able to void. Patient voided 150 ml while in office. Bladder ultrasound performed and PVR measured 500 ml. Catheter reinserted and 750 mL of clear urine drained.     Recent Results (from the past 1 hour(s))   POCT Measure PVR    Collection Time: 05/02/24  3:15 PM   Result Value Ref Range    POST-VOID RESIDUAL VOLUME, ML  mL        Universal Protocol:  Consent: Verbal consent obtained.  Risks and benefits: risks, benefits and alternatives were discussed  Consent given by: patient  Time out: Immediately prior to procedure a \"time out\" was called to verify the correct patient, procedure, equipment, support staff and site/side marked as required.  Patient understanding: patient states understanding of the procedure being performed  Patient consent: the patient's understanding of the procedure matches consent given  Patient identity confirmed: verbally with patient    Bladder catheterization    Date/Time: 5/2/2024 8:30 AM    Performed by: Maral Feng RN  Authorized by: Oskar Kyle MD    Patient location:  Bedside  Consent:     Consent given by:  Patient  Universal protocol:     Procedure explained and questions answered to patient or proxy's satisfaction: yes      Immediately prior to procedure a time out was called: yes      " Patient identity confirmed:  Verbally with patient  Pre-procedure details:     Procedure purpose:  Therapeutic    Preparation: Patient was prepped and draped in usual sterile fashion    Anesthesia (see MAR for exact dosages):     Anesthesia method:  None  Procedure details:     Bladder irrigation: no      Catheter insertion:  Indwelling    Approach: natural orifice      Catheter type:  Latex and Lang    Catheter size:  16 Fr    Number of attempts:  1    Successful placement: yes      Urine characteristics:  Clear and yellow  Post-procedure details:     Patient tolerance of procedure:  Tolerated well, no immediate complications  Comments:         Prepped with Betadine. Lang inserted without issue. 10 mL inflated into balloon. 750 mL of clear urine drained. Patient tolerated well. Attached to leg bag.   Pt verbalized understanding.        Vitals:    05/02/24 0821   BP: 150/90   BP Location: Left arm   Patient Position: Sitting   Cuff Size: Adult   Pulse: 73   SpO2: 95%           Maral Feng RN

## 2024-05-03 DIAGNOSIS — E87.1 HYPONATREMIA: ICD-10-CM

## 2024-05-03 RX ORDER — SODIUM CHLORIDE 1 G/1
1 TABLET ORAL 3 TIMES DAILY
Qty: 90 TABLET | Refills: 1 | Status: SHIPPED | OUTPATIENT
Start: 2024-05-03

## 2024-05-03 NOTE — TELEPHONE ENCOUNTER
Pt's spouse called in with [employer]ak[employer] ([id number]) on the line. Pt's spouse states pt needs a refill on her sodium chloride. Pt only has 3 left.    [facility]

## 2024-05-06 ENCOUNTER — TELEPHONE (OUTPATIENT)
Age: 84
End: 2024-05-06

## 2024-05-06 NOTE — TELEPHONE ENCOUNTER
05/06/2024- CALLED [facility] [employer] AND SCHEDULED AN  FOR THE PT'S 05/08/2024 APT.

## 2024-05-07 ENCOUNTER — TELEPHONE (OUTPATIENT)
Dept: NEUROSURGERY | Facility: CLINIC | Age: 84
End: 2024-05-07

## 2024-05-08 ENCOUNTER — OFFICE VISIT (OUTPATIENT)
Dept: NEUROSURGERY | Facility: CLINIC | Age: 84
End: 2024-05-08
Payer: COMMERCIAL

## 2024-05-08 VITALS
DIASTOLIC BLOOD PRESSURE: 92 MMHG | SYSTOLIC BLOOD PRESSURE: 142 MMHG | HEIGHT: 55 IN | BODY MASS INDEX: 27.54 KG/M2 | WEIGHT: 119 LBS | HEART RATE: 78 BPM | OXYGEN SATURATION: 99 % | TEMPERATURE: 97.9 F | RESPIRATION RATE: 20 BRPM

## 2024-05-08 DIAGNOSIS — S32.010A COMPRESSION FRACTURE OF L1 VERTEBRA, INITIAL ENCOUNTER (HCC): Primary | ICD-10-CM

## 2024-05-08 PROCEDURE — 99205 OFFICE O/P NEW HI 60 MIN: CPT | Performed by: NURSE PRACTITIONER

## 2024-05-08 NOTE — PATIENT INSTRUCTIONS
Instructions for Patient Continued care     Get Xray back today or tomorrow    Will wear brace for approximately 6-12 weeks, serial x-rays at every visit to assess healing of lumbar fracture    Use TLSO brace when OOB or HOB > 45 degrees, standing or walking. Can remove brace when I bed     Pain control as per OTC medications use as needed for pain, acetaminophen 500 mg po every 4 hours or 1000 mg (2 tabs) every 8 hours.-   No driving while wearing brace.do not remove to drive until directed by neurosurgery    Thoracic and lumbar spine precautions  and rational to prevent fracture progression no bending at the waist , no lifting greater than 10 LBS  a gallon of milk weighs approximately 10 lbs , do not carry a purse or bag weighing greater than 10 - 20 lbs. No bending, pushing,pulling, trunk twisting, overhead or forward stretching until cleared by neurosurgery    Follow up sooner or go to closest ED uncontrolled back or leg pain, leg weakness, paresthesia (numbness or tingling worsening for baseline), bowel (incontinence) or bladder  (retention) issues, ambulatory dysfunction.    Contact neurosurgery with additional questions or concerns.

## 2024-05-08 NOTE — ASSESSMENT & PLAN NOTE
As addressed in HPI  Presents 7 weeks status post diagnosis of L1 compression fracture on imaging.  She is correctly wearing TLSO brace  She reports the severity of her pain at onset was 10/10.  Has now improved to approximately 6-7/10  Admits treating with regular strength Tylenol 1 in the morning and 1 in the evening.  Reports she is also treating with the lidocane patch.  She denies radicular symptoms in her buttocks or lower extremities, denies numbness and tingling in lower extremities.  Palpation of L1-L2 area patient admitted to slight discomfort.  Admits intermittently she has pain distribution into the left-sided rib area when she is wearing the brace tight but the pain subsides when she loosens the brace  Diagnosed with urinary retention as addressed in HPI.  Denies bowel incontinence/leakage, saddle anesthesia, or motor deficits in lower extremities.  She demonstrates intact plantar and dorsiflexion and motor function in legs.    Daughter requested if I could provide her mother with a copy of the CT image showing the fracture as patient does not believe that that is her report, provided.  Patient remarked I do not want surgery for my back.      Plan    Reviewed imagining with patient, , and daughter  Explained neurosurgery protocol for the conservative management of a vertebral compression fracture as per protocol.  Explained TLSO brace use and activity restrictions as per protocol.  Explained TLSO use and spine precautions as per protocol  Ordered x-ray lumbar spine 2-3 views upright in TLSO brace for completion between today through Friday, 5/10/2024, patient has never had upright lumbar spine imaging and TLSO brace.  Advised of red flag symptoms , if he/she were to develop go to the to go to closest ED for assessment,  uncontrolled back or leg pain, leg weakness, paresthesia, bowel or bladder issues, ambulatory dysfunction.    Contact neurosurgery with additional questions or concerns    Instructions for continued care documented in AVS.    Patient is now 7 weeks status post diagnosis of compression fracture.  Patient will return to office for next follow-up visit in 4 weeks, 11 weeks post fracture.  No x-ray imaging ordered for next follow-up visit, will perform flexion and extension in office after successful we will order x-ray flexion and extension.  Explained to patient if x-ray flexion and extension stable without evidence of malalignment of lumbar spine will start brace weaning over 1 week.  Explained at the end of brace weaning we will order physical therapy for strengthening of thoracic or lumbar spine and core muscles, and upper extremity muscles explained rationale for therapy requirement after brace weaning.      Metrics Compression Fracture   RM/ LBP13/24, ODQ 24/45,  [id number]=8.861,  VAS_ 5/10,  Your Health State Today 65 %_

## 2024-05-08 NOTE — PROGRESS NOTES
Assessment/Plan:    Compression fracture of L1 lumbar vertebra (HCC)  As addressed in HPI  Presents 7 weeks status post diagnosis of L1 compression fracture on imaging.  She is correctly wearing TLSO brace  She reports the severity of her pain at onset was 10/10.  Has now improved to approximately 6-7/10  Admits treating with regular strength Tylenol 1 in the morning and 1 in the evening.  Reports she is also treating with the lidocane patch.  She denies radicular symptoms in her buttocks or lower extremities, denies numbness and tingling in lower extremities.  Palpation of L1-L2 area patient admitted to slight discomfort.  Admits intermittently she has pain distribution into the left-sided rib area when she is wearing the brace tight but the pain subsides when she loosens the brace  Diagnosed with urinary retention as addressed in HPI.  Denies bowel incontinence/leakage, saddle anesthesia, or motor deficits in lower extremities.  She demonstrates intact plantar and dorsiflexion and motor function in legs.    Daughter requested if I could provide her mother with a copy of the CT image showing the fracture as patient does not believe that that is her report, provided.  Patient remarked I do not want surgery for my back.      Plan    Reviewed imagining with patient, , and daughter  Explained neurosurgery protocol for the conservative management of a vertebral compression fracture as per protocol.  Explained TLSO brace use and activity restrictions as per protocol.  Explained TLSO use and spine precautions as per protocol  Ordered x-ray lumbar spine 2-3 views upright in TLSO brace for completion between today through Friday, 5/10/2024, patient has never had upright lumbar spine imaging and TLSO brace.  Advised of red flag symptoms , if he/she were to develop go to the to go to closest ED for assessment,  uncontrolled back or leg pain, leg weakness, paresthesia, bowel or bladder issues, ambulatory dysfunction.     Contact neurosurgery with additional questions or concerns   Instructions for continued care documented in AVS.    Patient is now 7 weeks status post diagnosis of compression fracture.  Patient will return to office for next follow-up visit in 4 weeks, 11 weeks post fracture.  No x-ray imaging ordered for next follow-up visit, will perform flexion and extension in office after successful we will order x-ray flexion and extension.  Explained to patient if x-ray flexion and extension stable without evidence of malalignment of lumbar spine will start brace weaning over 1 week.  Explained at the end of brace weaning we will order physical therapy for strengthening of thoracic or lumbar spine and core muscles, and upper extremity muscles explained rationale for therapy requirement after brace weaning.      Metrics Compression Fracture   RM/ LBP13/24, ODQ 24/45,  EQ5D5L 55359=8.861,  VAS_ 5/10,  Your Health State Today 65 %_             Diagnoses and all orders for this visit:    Compression fracture of L1 vertebra, initial encounter (HCC)  -     Ambulatory Referral to Neurosurgery        Subjective: L1 compression fracture diagnosed on 3/20/2024.  Both patient and  are deaf non verbal ,  and daughter  , used amwell until she arrived     Patient ID: Corey Pulido is a 83 y.o. female PM/SH --as documented in designated section below.       HPI   She presented to the hospital on 3/20/2024 due to Low back pain which started on 3/8 when she had to lay down for stress test.  Over the last 5 days prior to admission her pain significantly worsened and patient was having difficulty moving due to pain.  Pain level was 10/10 in intensity at home.  Reported some shortness of breath, chest pain for which she had stress test ordered.  Imaging done in the ER showed L1 compression fracture and patient was admitted.     Of note daughter reports patient arrived to the emergency department around late  afternoon.  3/20/2024 CT abdomen and pelvis at 1430 with contrast demonstrated, Marked bladder distention, evaluate for urinary retention or chronic bladder outlet obstruction.  Urethral catheter was inserted and urology consulted.  Failed attempts to discontinue urethral catheter prior to discharge.  Daughter reports patient has since had a follow-up visit with urology as outpatient and has failed attempt to discontinue Lang catheter.  She continues with a urethral catheter with a leg bag in place.  Patient is currently on fluid restriction secondary to hyponatremia and treating with sodium tablets.  Urine and leg bag is very clear and dilute.  She is treating with Flomax since discharge  Patient admits during visit a problem with bowel issues and intermittently requiring Colace prior to admission.  Since discharge she continues to treat with daily Colace.  Is not treating with opiates methocarbamol for pain relief.  Reports she is treating with a Tylenol 325 mg 1 in the morning and 1 at night.    She presents 7 weeks status post diagnosis of L1 compression fracture on imaging.        REVIEW OF SYSTEMS  Review of Systems   Musculoskeletal:  Positive for back pain (lower back pain) and gait problem (using a rolling walker).   Allergic/Immunologic: Negative.    Neurological:  Positive for weakness.   Psychiatric/Behavioral:  Positive for sleep disturbance.          Meds/Allergies     Current Outpatient Medications   Medication Sig Dispense Refill    acetaminophen (TYLENOL) 325 mg tablet Take 2 tablets (650 mg total) by mouth every 6 (six) hours as needed for mild pain, moderate pain, severe pain, headaches or fever      docusate sodium (COLACE) 100 mg capsule Take 1 capsule (100 mg total) by mouth 2 (two) times a day 60 capsule 0    fluticasone (FLONASE) 50 mcg/act nasal spray 1 spray into each nostril daily  0    levothyroxine (Synthroid) 75 mcg tablet Take 1 tablet (75 mcg total) by mouth daily in the early  morning BRAND MEDICALLY NECESSARY 90 tablet 3    lidocaine (LIDODERM) 5 % Apply 1 patch topically over 12 hours daily Remove & Discard patch within 12 hours or as directed by MD 30 patch 0    lisinopril (ZESTRIL) 2.5 mg tablet Take 2 tablets (5 mg total) by mouth daily      metoprolol succinate (TOPROL-XL) 25 mg 24 hr tablet Take 1 tablet (25 mg total) by mouth daily (Patient taking differently: Take 12.5 mg by mouth daily)      ondansetron (ZOFRAN) 4 mg tablet Take 1 tablet (4 mg total) by mouth every 8 (eight) hours as needed for nausea or vomiting 30 tablet 0    pantoprazole (PROTONIX) 40 mg tablet Take 1 tablet (40 mg total) by mouth daily in the early morning 30 tablet 0    rosuvastatin (CRESTOR) 10 MG tablet Take 1 tablet (10 mg total) by mouth daily 90 tablet 1    sodium chloride 1 g tablet Take 1 tablet (1 g total) by mouth 3 (three) times a day 90 tablet 1    tamsulosin (FLOMAX) 0.4 mg Take 1 capsule (0.4 mg total) by mouth daily with dinner 30 capsule 0    torsemide (DEMADEX) 10 mg tablet Take 1 tablet (10 mg total) by mouth daily 90 tablet 1    Xarelto 20 MG tablet TAKE ONE TABLET BY MOUTH EVERY DAY WITH BREAKFAST 90 tablet 3    methocarbamol (ROBAXIN) 500 mg tablet Take 1 tablet (500 mg total) by mouth every 8 (eight) hours as needed for muscle spasms (Patient not taking: Reported on 4/22/2024) 20 tablet 0    polyethylene glycol (MIRALAX) 17 g packet Take 17 g by mouth daily as needed (constipation) (Patient not taking: Reported on 4/19/2024)       No current facility-administered medications for this visit.       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    Wears glasses        Past Surgical History:   Procedure Laterality Date    BREAST BIOPSY Left     negative    CARDIAC PACEMAKER PLACEMENT  03/2021    medtronic    CARDIAC SURGERY      stents    COLONOSCOPY      ESOPHAGOGASTRODUODENOSCOPY N/A 02/20/2017    Procedure: ESOPHAGOGASTRODUODENOSCOPY (EGD);  Surgeon: Edouard Rojas MD;  Location: [facility];  Service:        Social History     Tobacco Use    Smoking status: Never     Passive exposure: Past    Smokeless tobacco: Never   Vaping Use    Vaping status: Never Used   Substance Use Topics    Alcohol use: Never    Drug use: Never       Family History   Problem Relation Age of Onset    Cancer Mother         breast    Hypertension Mother         essential    Breast cancer Mother     Other Father         smoker    Tuberculosis Father     No Known Problems Sister     No Known Problems Sister     No Known Problems Sister     No Known Problems Daughter        The following portions of the patient's history were reviewed and updated as appropriate: allergies, current medications, past family history, past medical history, past social history, past surgical history and problem list.      EXAM    Vitals:Blood pressure 142/92, pulse 78, temperature 97.9 °F (36.6 °C), temperature source Temporal, resp. rate 20, height 4' 7\" (1.397 m), weight 54 kg (119 lb), SpO2 99%.,Body mass index is 27.66 kg/m².     Physical Exam  HENT:      Head:      Comments: Deaf non verbal ,  and daughter    Neurological:      Mental Status: She is oriented to person, place, and time.         Neurologic Exam     Mental Status   Oriented to person, place, and time.   Level of consciousness: alert    Motor Exam   Muscle bulk: decreased  Overall muscle tone: normal    Strength   Right deltoid: 4/5  Left deltoid: 4/5  Right biceps: 4/5  Left biceps: 4/5  Right triceps: 4/5  Left " triceps: 4/5  Right wrist flexion: 4/5  Left wrist flexion: 4/5  Right wrist extension: 4/5  Left wrist extension: 4/5  Right interossei: 4/5  Left interossei: 4/5  Right iliopsoas: 4/5  Left iliopsoas: 4/5  Right quadriceps: 4/5  Left quadriceps: 4/5  Right hamstrin/5  Left hamstrin/5  Right glutei: 4/5  Left glutei: 4/5  Right anterior tibial: 4/5  Left anterior tibial: 4/5  Right posterior tibial: 4/5  Left posterior tibial: 4/5  Right gastroc: 4/5  Left gastroc: 4/5    Sensory Exam   Light touch normal.     Gait, Coordination, and Reflexes     Gait  Gait: (Rollator seated walker,)      Imaging Studies    2024 CT recon only lumbar spineAcute mild inferior endplate compression fracture at L1. No bony retropulsion.  No significant canal or foraminal stenosis.  DEGENERATIVE CHANGES:  T12-L1: No disc herniation, canal or foraminal stenosis.  L1-2: No disc herniation, canal or foraminal stenosis.  L2-3: No disc herniation, canal or foraminal stenosis.  L3-4: Minimal disc bulge. No significant canal or foraminal stenosis.  L4-5: Minimal disc bulge. No canal or foraminal stenosis. Mild facet arthropathy.  L5-S1: No disc herniation, canal or foraminal stenosis. Mild facet arthropathy.          3/20/2024 CT ABDOMEN AND PELVIS WITH IV CONTRAST     INDICATION: back pain.     COMPARISON: CT of the chest abdomen pelvis on 2020.     TECHNIQUE: CT examination of the abdomen and pelvis was performed. Multiplanar 2D reformatted images were created from the source data.     This examination, like all CT scans performed in the [facility], was performed utilizing techniques to minimize radiation dose exposure, including the use of iterative reconstruction and automated exposure control. Radiation dose length   product (DLP) for this visit: 390.82 mGy-cm     IV Contrast: 100 mL of iohexol (OMNIPAQUE)  Enteric Contrast: Not administered.     FINDINGS:     ABDOMEN     LOWER CHEST: Cardiomegaly.  Scattered areas of groundglass density at the lung bases  LIVER/BILIARY TREE: Unremarkable.     GALLBLADDER: No calcified gallstones. No pericholecystic inflammatory change.     SPLEEN: Unremarkable.     PANCREAS: Pancreatic head cyst measuring up to 5 mm, 2:52, 601:50. Similar appearance to the exam in November 2020     ADRENAL GLANDS: Unremarkable.     KIDNEYS/URETERS: Prominence of the both renal pelvises and ureters seen     STOMACH AND BOWEL: Colonic diverticulosis without findings of acute diverticulitis.     APPENDIX: No findings to suggest appendicitis.     ABDOMINOPELVIC CAVITY: No ascites. No pneumoperitoneum. No lymphadenopathy.     VESSELS: The left gastric arises directly from the aorta and SMA is patent     PELVIS     REPRODUCTIVE ORGANS: Unremarkable for patient's age.     URINARY BLADDER: Markedly distended urinary bladder     ABDOMINAL WALL/INGUINAL REGIONS: Unremarkable.     BONES: Fracture along the inferior endplate of the L1 with mild central depression  IMPRESSION:     Acute fracture of the inferior endplate of the L1 vertebral body with mild central depression     Marked bladder distention, evaluate for urinary retention or chronic bladder outlet obstruction     Cardiomegaly     Incidental 5 mm pancreatic head cyst. For simple cyst(s) less than 1.5 cm, recommend follow-up every 2 years for 2 times. After 4 years, no more follow-ups. Recommend next follow-up in 2 years. Preferred imaging modality: abdomen MRI and MRCP with and   without IV contrast, or triple phase abdomen CT with IV contrast, or abdomen MRI and MRCP without IV contrast.        The study was marked in EPIC for immediate notification.              I have personally reviewed pertinent reports.   and I have personally reviewed pertinent films in PACS    I have spent a total time of 60 minutes on 05/08/24 in caring for this patient including Diagnostic results, Risks and benefits of tx options, Instructions for management, Patient  and family education, Importance of tx compliance, Risk factor reductions, Impressions, Counseling / Coordination of care, Documenting in the medical record, Reviewing / ordering tests, medicine, procedures  , Obtaining or reviewing history  , and Communicating with other healthcare professionals .     PLEASE NOTE:  This encounter may have been completed utilizing the [employer] Direct Speech Voice Recognition Software. Grammatical errors, random word insertions, pronoun errors and incomplete sentences are occasional consequences of the system due to software limitations, ambient noise and hardware issues.These may be missed even after proof reading prior to affixing electronic signature. Please do not hesitate to contact me directly if you have any questions or concerns about the content, text or information contained within the body of this dictation.

## 2024-05-14 ENCOUNTER — APPOINTMENT (OUTPATIENT)
Dept: RADIOLOGY | Facility: CLINIC | Age: 84
End: 2024-05-14
Payer: COMMERCIAL

## 2024-05-14 DIAGNOSIS — S32.010A COMPRESSION FRACTURE OF L1 VERTEBRA, INITIAL ENCOUNTER (HCC): ICD-10-CM

## 2024-05-14 PROCEDURE — 72100 X-RAY EXAM L-S SPINE 2/3 VWS: CPT

## 2024-05-21 LAB
BUN SERPL-MCNC: 21 MG/DL (ref 8–27)
BUN/CREAT SERPL: 22 (ref 12–28)
CALCIUM SERPL-MCNC: 8.9 MG/DL (ref 8.7–10.3)
CHLORIDE SERPL-SCNC: 100 MMOL/L (ref 96–106)
CO2 SERPL-SCNC: 21 MMOL/L (ref 20–29)
CREAT SERPL-MCNC: 0.95 MG/DL (ref 0.57–1)
EGFR: 59 ML/MIN/1.73
GLUCOSE SERPL-MCNC: 91 MG/DL (ref 70–99)
POTASSIUM SERPL-SCNC: 4.7 MMOL/L (ref 3.5–5.2)
SODIUM SERPL-SCNC: 137 MMOL/L (ref 134–144)

## 2024-05-28 ENCOUNTER — NURSE TRIAGE (OUTPATIENT)
Age: 84
End: 2024-05-28

## 2024-05-28 DIAGNOSIS — R31.0 GROSS HEMATURIA: Primary | ICD-10-CM

## 2024-05-28 DIAGNOSIS — R35.0 FREQUENCY OF URINATION: ICD-10-CM

## 2024-05-28 NOTE — TELEPHONE ENCOUNTER
Please triage further. Sounds like she needs urine testing. Conservative measures and Er precautions. Thanks

## 2024-05-28 NOTE — TELEPHONE ENCOUNTER
Additional Information   Negative: The patient has a fever of 101 or higher   Negative: The patient has persistent vomiting   Negative: The patient is unable to urinate and offices are closing/closed/after hours   Negative: The patient has severe uncontrolled pain    Answer Questions - Initial Assessment  When did this start: 1AM and again at 7AM This morning    Do you have dysuria: no    Do you have lower back, flank, or lower abdominal/bladder pain: no    Do you have urinary frequency, urgency, or changes in your urination: yes, frequency, for months.     Do you have any blood clots: no    Have you become unable to urinate: no    Is the hematuria continuous or intermittent:intermittent    Can you describe the color of the blood in the urine in relation to a beverage: unsure unable to assess due to language barrier.    Do you take any blood thinners: no    Have you had recent urologic surgery or procedure:no    Have you had any recent urine testing or imaging? (UA with micro, urine culture, kidney ultrasound, CT scan): no    Do you have a history of bladder cancer: no    Have you ever had a workup for blood in the urine: no    Denies fever, but has fatigue.    Reviewed bladder irritants to avoid, and to stay hydrated with at least 64 oz. Of water/day as long as no fluid restrictions, avoidance of constipation. ED precautions reviewed as well.    Protocols used: Gross Hemaruria  Reports since last week, starts under breast and goes around to the lower back pain, intermittent, unable to assess if it is aching or stabbing pain due to language barrier, takes tylenol for pain and this helps.   Urine testing ordered, patient will go for urine testing today.

## 2024-05-30 ENCOUNTER — PROCEDURE VISIT (OUTPATIENT)
Dept: UROLOGY | Facility: CLINIC | Age: 84
End: 2024-05-30
Payer: COMMERCIAL

## 2024-05-30 VITALS — OXYGEN SATURATION: 96 % | DIASTOLIC BLOOD PRESSURE: 100 MMHG | SYSTOLIC BLOOD PRESSURE: 160 MMHG | HEART RATE: 81 BPM

## 2024-05-30 DIAGNOSIS — E78.5 DYSLIPIDEMIA: ICD-10-CM

## 2024-05-30 DIAGNOSIS — R33.9 URINARY RETENTION: Primary | ICD-10-CM

## 2024-05-30 LAB — POST-VOID RESIDUAL VOLUME, ML POC: 596 ML

## 2024-05-30 PROCEDURE — 51798 US URINE CAPACITY MEASURE: CPT

## 2024-05-30 PROCEDURE — 99211 OFF/OP EST MAY X REQ PHY/QHP: CPT

## 2024-05-30 PROCEDURE — 51702 INSERT TEMP BLADDER CATH: CPT

## 2024-05-30 NOTE — PROGRESS NOTES
"5/30/2024    Corey Pulido  1940  [id number]    Diagnosis      Patient presents for chatman removal/tov managed by our office    Plan  Patient will return as scheduled in 4 weeks for routine catheter change    Procedure Chatman removal/voiding trial    Chatman catheter removed after deflation of an intact balloon. Patient tolerated well. Encouraged patient to hydrate well and return this afternoon for post void residual.  she knows she may return early if uncomfortable and unable to urinate. Patient agrees to this plan.    Patient returned this afternoon. Patient states able to void. Patient voided 125 ml while in office. Bladder ultrasound performed and PVR measured 596 ml. Discussed with Lex, patient to have chatman catheter placed and return every 4 weeks for changes.  used on ipad ([employer]). Patient signaled understanding.     Recent Results (from the past 1 hour(s))   POCT Measure PVR    Collection Time: 05/30/24  2:41 PM   Result Value Ref Range    POST-VOID RESIDUAL VOLUME, ML  mL    Universal Protocol:  Consent: Verbal consent obtained.  Risks and benefits: risks, benefits and alternatives were discussed  Consent given by: patient  Time out: Immediately prior to procedure a \"time out\" was called to verify the correct patient, procedure, equipment, support staff and site/side marked as required.  Patient understanding: patient states understanding of the procedure being performed  Patient consent: the patient's understanding of the procedure matches consent given  Patient identity confirmed: verbally with patient    Bladder catheterization    Date/Time: 5/30/2024 8:30 AM    Performed by: Maral Feng RN  Authorized by: Oskar Kyle MD    Patient location:  Bedside  Consent:     Consent given by:  Patient  Universal protocol:     Procedure explained and questions answered to patient or proxy's satisfaction: yes      Immediately prior to procedure a time out was called: yes      Patient " identity confirmed:  Verbally with patient  Pre-procedure details:     Procedure purpose:  Therapeutic    Preparation: Patient was prepped and draped in usual sterile fashion    Anesthesia (see MAR for exact dosages):     Anesthesia method:  None  Procedure details:     Bladder irrigation: no      Catheter insertion:  Indwelling    Approach: natural orifice      Catheter type:  Latex and Lang    Catheter size:  16 Fr    Number of attempts:  1    Successful placement: yes      Urine characteristics:  Mildly cloudy  Post-procedure details:     Patient tolerance of procedure:  Tolerated well, no immediate complications  Comments:          Prepped with Betadine. Lang inserted without issue. 10 mL inflated into balloon. 600 mL of mildly cloudy urine drained. Patient tolerated well. Attached to leg bag.             Vitals:    05/30/24 0832   BP: 160/100   BP Location: Left arm   Patient Position: Sitting   Cuff Size: Adult   Pulse: 81   SpO2: 96%           Maral Feng RN

## 2024-05-31 RX ORDER — ROSUVASTATIN CALCIUM 10 MG/1
10 TABLET, COATED ORAL DAILY
Qty: 90 TABLET | Refills: 1 | Status: SHIPPED | OUTPATIENT
Start: 2024-05-31

## 2024-06-01 LAB
APPEARANCE UR: ABNORMAL
BACTERIA UR CULT: ABNORMAL
BACTERIA URNS QL MICRO: ABNORMAL
BILIRUB UR QL STRIP: NEGATIVE
CASTS URNS QL MICRO: ABNORMAL /LPF
COLOR UR: ABNORMAL
EPI CELLS #/AREA URNS HPF: ABNORMAL /HPF (ref 0–10)
GLUCOSE UR QL: NEGATIVE
HGB UR QL STRIP: ABNORMAL
KETONES UR QL STRIP: NEGATIVE
LEUKOCYTE ESTERASE UR QL STRIP: ABNORMAL
Lab: ABNORMAL
MICRO URNS: ABNORMAL
NITRITE UR QL STRIP: NEGATIVE
PH UR STRIP: 8 [PH] (ref 5–7.5)
PROT UR QL STRIP: ABNORMAL
RBC #/AREA URNS HPF: >30 /HPF (ref 0–2)
SL AMB ANTIMICROBIAL SUSCEPTIBILITY: ABNORMAL
SP GR UR: 1.01 (ref 1–1.03)
UROBILINOGEN UR STRIP-ACNC: 0.2 MG/DL (ref 0.2–1)
WBC #/AREA URNS HPF: >30 /HPF (ref 0–5)

## 2024-06-05 ENCOUNTER — HOSPITAL ENCOUNTER (OUTPATIENT)
Dept: RADIOLOGY | Facility: HOSPITAL | Age: 84
Discharge: HOME | End: 2024-06-05
Payer: COMMERCIAL

## 2024-06-05 ENCOUNTER — OFFICE VISIT (OUTPATIENT)
Dept: NEUROSURGERY | Facility: CLINIC | Age: 84
End: 2024-06-05
Payer: COMMERCIAL

## 2024-06-05 VITALS
RESPIRATION RATE: 16 BRPM | HEIGHT: 55 IN | OXYGEN SATURATION: 96 % | HEART RATE: 81 BPM | TEMPERATURE: 98 F | SYSTOLIC BLOOD PRESSURE: 138 MMHG | DIASTOLIC BLOOD PRESSURE: 82 MMHG | BODY MASS INDEX: 27.66 KG/M2

## 2024-06-05 DIAGNOSIS — S32.010A COMPRESSION FRACTURE OF L1 VERTEBRA, INITIAL ENCOUNTER (HCC): Primary | ICD-10-CM

## 2024-06-05 DIAGNOSIS — M62.81 MUSCLE WEAKNESS (GENERALIZED): ICD-10-CM

## 2024-06-05 DIAGNOSIS — S32.010A COMPRESSION FRACTURE OF L1 VERTEBRA, INITIAL ENCOUNTER (HCC): ICD-10-CM

## 2024-06-05 PROCEDURE — 72114 X-RAY EXAM L-S SPINE BENDING: CPT

## 2024-06-05 PROCEDURE — 99214 OFFICE O/P EST MOD 30 MIN: CPT | Performed by: NURSE PRACTITIONER

## 2024-06-05 NOTE — PATIENT INSTRUCTIONS
Wean Back brace Rapid over 1 week    Day 1 through 2 June 6-7  off for two hours during the day and at HS   Day 3   through 4 June 8-9 off for four hours during the day and at HS   Day 5 through 6 Julita 10-11 off for six hours during the day and at HS    Day 7 June 12 off for 8 hours and at    Day 8 June 13 off all day and can start physical therapy     Day 8 Julita 13 order physical therapy to start for Evaluate and Treat--status post removal of brace for compression fracture  for strengthening of Lumbar, thoracic paraspinal muscle, core  and upper extremity exercise.    Explained during times he is out of brace during the day he/she should be sedentary relaxing reading, watching TV etc.    Continue to wear the BRACE when Sitting greater than 45 degrees, standing, or walking until the weaning period is complete. Continue Spine precautions as week during this time period.       Do not wear BRACE while LYING.        Continue spine precautions until completely weaned from brace      Continue Lumbar spine precautions to prevent any further injury, no twisting, bending, and no lifting anything heavier 10 LBS for example a gallon of milk, weight limitation explained this includes the weight of her purse being less than a carton of milk.     Red flag symptoms such as cannot urinate, bowel incontinence, saddle anesthesia, numbness, or tingling; should they arise to report to the ED for assessment     Advised If he/she develops uncontrolled back pain, leg pain, leg weakness, paresthesia, bowel or bladder issues, saddle anesthesia, or ambulatory dysfunction, report immediately to the ED for assessment.      If additional questions or concerns call neurosurgery @ [phone], press nurse line prompt.

## 2024-06-05 NOTE — PROGRESS NOTES
Assessment/Plan:    Compression fracture of L1 lumbar vertebra (HCC)  As Addressed in HPI   Arrives correctly wearing TLSO brace  Denies back pain during office visit.  Occasionally has back pain with certain movements rated 2/10 left-sided.  Ambulates using a rolling walker.  Reports chronic weakness in lower extremities.  Nonfocal neurologic examination.  Denies bowel or bladder dysfunction.  Admitted she has chronic weakness in lower extremities.      Plan  Removed brace, performed flexion, extension, pushing, pulling, stretching, and rotation in the office--patient tolerated procedure well she denied worsening or new onset pain and radicular symptoms.  Returned LSO brace.  Ordered x-ray lumbar spine flexion and extension, patient and  agreed to complete today at [facility].  Reviewed lumbar spine weaning schedule with patient to start tomorrow for 1 week if x-ray imaging stable  Reviewed weaning schedule in great detail with patient and , documented in AVS along with red flag signs.    Explained that end of wean will order physical therapy for strengthening of core, lumbar paraspinal and thoracic muscles as well as upper extremities.  Patient and  in agreement with plan for physical therapy.  Reviewed with red flag signs reviewed advised if develops she should report to the closest emergency room for immediate assessment  Advised if additional questions or concerns contact the neurosurgery office.      Metrics Compression Fracture   6/5/2023 RM LBP10, ODQ9/45-20%, EQ5D5L 95156 or 0.880, Your [facility] [place] Today 60,VAS severity 5/10 , today 2/10       5/8/2024 RM/ LBP13/24, ODQ 24/45,  EQ5D5L 03846=1.861,  VAS_ 5/10,  Your Health [place] Today 65 %_               Subjective: 11 weeks post diagnosis compression  fracture       Both patient and  are deaf and nonverbal,  present during visit.    Patient ID: Corey Pulido is a 84 y.o. female PM/SH --as documented in  designated section below.        HPI   She presented to the hospital on 3/20/2024 due to Low back pain which started on 3/8 when she had to lay down for stress test.  Over the last 5 days prior to admission her pain significantly worsened and patient was having difficulty moving due to pain.  Pain level was 10/10 in intensity at home.  Reported some shortness of breath, chest pain for which she had stress test ordered.  Imaging done in the ER showed L1 compression fracture and patient was admitted.      Of note daughter reports patient arrived to the emergency department around late afternoon.  3/20/2024 CT abdomen and pelvis at 1430 with contrast demonstrated, Marked bladder distention, evaluate for urinary retention or chronic bladder outlet obstruction.  Urethral catheter was inserted and urology consulted.  Failed attempts to discontinue urethral catheter prior to discharge.  Daughter reports patient has since had a follow-up visit with urology as outpatient and has failed attempt to discontinue Lang catheter.  She continues with a urethral catheter with a leg bag in place.  Patient is currently on fluid restriction secondary to hyponatremia and treating with sodium tablets.  Urine and leg bag is very clear and dilute.  She is treating with Flomax since discharge  Patient admits during visit a problem with bowel issues and intermittently requiring Colace prior to admission.  Since discharge she continues to treat with daily Colace.  Is not treating with opiates methocarbamol for pain relief.  Reports she is treating with a Tylenol 325 mg 1 in the morning and 1 at night.     She presents 11 weeks status post diagnosis of L1 compression fracture on imaging    REVIEW OF SYSTEMS  Review of Systems   Respiratory:  Negative for chest tightness and shortness of breath.    Gastrointestinal: Negative.    Genitourinary:         Bladder cath in place   Musculoskeletal:  Positive for back pain (left side pain intermittent,  wearing brace) and gait problem (Using rolling walker).        Wearing brace   Skin:  Negative for wound.   Neurological:  Positive for weakness (legs, not new). Negative for numbness.         Meds/Allergies     Current Outpatient Medications   Medication Sig Dispense Refill    acetaminophen (TYLENOL) 325 mg tablet Take 2 tablets (650 mg total) by mouth every 6 (six) hours as needed for mild pain, moderate pain, severe pain, headaches or fever      docusate sodium (COLACE) 100 mg capsule Take 1 capsule (100 mg total) by mouth 2 (two) times a day (Patient taking differently: Take 100 mg by mouth daily) 60 capsule 0    levothyroxine (Synthroid) 75 mcg tablet Take 1 tablet (75 mcg total) by mouth daily in the early morning BRAND MEDICALLY NECESSARY 90 tablet 3    lisinopril (ZESTRIL) 2.5 mg tablet Take 2 tablets (5 mg total) by mouth daily      metoprolol succinate (TOPROL-XL) 25 mg 24 hr tablet Take 1 tablet (25 mg total) by mouth daily (Patient taking differently: Take 12.5 mg by mouth daily)      rosuvastatin (CRESTOR) 10 MG tablet TAKE ONE TABLET BY MOUTH EVERY DAY 90 tablet 1    sodium chloride 1 g tablet Take 1 tablet (1 g total) by mouth 3 (three) times a day 90 tablet 1    tamsulosin (FLOMAX) 0.4 mg Take 1 capsule (0.4 mg total) by mouth daily with dinner 30 capsule 0    torsemide (DEMADEX) 10 mg tablet Take 1 tablet (10 mg total) by mouth daily 90 tablet 1    Xarelto 20 MG tablet TAKE ONE TABLET BY MOUTH EVERY DAY WITH BREAKFAST 90 tablet 3    fluticasone (FLONASE) 50 mcg/act nasal spray 1 spray into each nostril daily (Patient not taking: Reported on 6/5/2024)  0    lidocaine (LIDODERM) 5 % Apply 1 patch topically over 12 hours daily Remove & Discard patch within 12 hours or as directed by MD (Patient not taking: Reported on 6/5/2024) 30 patch 0    methocarbamol (ROBAXIN) 500 mg tablet Take 1 tablet (500 mg total) by mouth every 8 (eight) hours as needed for muscle spasms (Patient not taking: Reported on  4/22/2024) 20 tablet 0    ondansetron (ZOFRAN) 4 mg tablet Take 1 tablet (4 mg total) by mouth every 8 (eight) hours as needed for nausea or vomiting (Patient not taking: Reported on 6/5/2024) 30 tablet 0    pantoprazole (PROTONIX) 40 mg tablet Take 1 tablet (40 mg total) by mouth daily in the early morning (Patient not taking: Reported on 6/5/2024) 30 tablet 0    polyethylene glycol (MIRALAX) 17 g packet Take 17 g by mouth daily as needed (constipation) (Patient not taking: Reported on 4/19/2024)       No current facility-administered medications for this visit.       Allergies   Allergen Reactions    Sotalol      Vomiting, convulsing, rash, cough    Famotidine Throat Swelling       PAST HISTORY    Past Medical History:   Diagnosis Date    Atrial flutter (HCC)     CAD (coronary artery disease)     s/p PCIx1 -2012    Coronary artery disease     Deafness     Disease of thyroid gland     GERD (gastroesophageal reflux disease)     Hyperlipidemia     Hypertension     Hypomagnesemia 09/18/2023    Itching     of the face, throat    Lumbar vertebral fracture (HCC)     Pacemaker 03/2021    medtronic-left chest    Wears glasses        Past Surgical History:   Procedure Laterality Date    BREAST BIOPSY Left     negative    CARDIAC PACEMAKER PLACEMENT  03/2021    medtronic    CARDIAC SURGERY      stents    COLONOSCOPY      ESOPHAGOGASTRODUODENOSCOPY N/A 02/20/2017    Procedure: ESOPHAGOGASTRODUODENOSCOPY (EGD);  Surgeon: Edouard Rojas MD;  Location: [facility];  Service:        Social History     Tobacco Use    Smoking status: Never     Passive exposure: Past    Smokeless tobacco: Never   Vaping Use    Vaping status: Never Used   Substance Use Topics    Alcohol use: Never    Drug use: Never       Family History   Problem Relation Age of Onset    Cancer Mother         breast    Hypertension Mother         essential    Breast cancer Mother     Other Father         smoker    Tuberculosis Father     No Known Problems Sister      "No Known Problems Sister     No Known Problems Sister     No Known Problems Daughter        The following portions of the patient's history were reviewed and updated as appropriate: allergies, current medications, past family history, past medical history, past social history, past surgical history and problem list.      EXAM    Vitals:Blood pressure 138/82, pulse 81, temperature 98 °F (36.7 °C), temperature source Temporal, resp. rate 16, height 4' 7\" (1.397 m), SpO2 96%.,Body mass index is 27.66 kg/m².     Physical Exam  Vitals and nursing note reviewed. Exam conducted with a chaperone present ( and  for sign language).   Constitutional:       General: She is not in acute distress.     Appearance: Normal appearance. She is not ill-appearing, toxic-appearing or diaphoretic.   HENT:      Head: Normocephalic and atraumatic.   Eyes:      General: No scleral icterus.        Right eye: No discharge.         Left eye: No discharge.   Pulmonary:      Effort: Pulmonary effort is normal. No respiratory distress.   Musculoskeletal:      Comments: Limitation in lumbar and thoracic range of motion/movement secondary to LSO brace   Neurological:      General: No focal deficit present.      Mental Status: She is alert and oriented to person, place, and time.      Motor: Motor strength is normal.  Psychiatric:         Mood and Affect: Mood normal.         Behavior: Behavior normal.         Neurologic Exam     Mental Status   Oriented to person, place, and time.   Level of consciousness: alert    Motor Exam     Strength   Strength 5/5 throughout.     Sensory Exam   Light touch normal.     Gait, Coordination, and Reflexes     Gait  Gait: (Rolling walker, steady gait)      Imaging Studies  X-ray lumbar spine 2 or 3 views    Result Date: 5/20/2024  Narrative: XR SPINE LUMBAR 2 OR 3 VIEWS INJURY INDICATION: S32.010A: Wedge compression fracture of first lumbar vertebra, initial encounter for closed fracture. " COMPARISON: 3/20/2024 CT FINDINGS: Mild inferior wedge compression deformity L1, unchanged . Intact pedicles. Five non-rib-bearing lumbar vertebral bodies. Minimal grade 1 anterolisthesis L4-5, unchanged Multilevel degenerative spondylosis again evident Unremarkable soft tissues.     Impression: Mild inferior wedge compression deformity L1 Multilevel degenerative spondylosis Findings are stable Workstation performed: [id number]       I have personally reviewed pertinent reports.   and I have personally reviewed pertinent films in PACS    I have spent a total time of  30 minutes on 06/05/24 in caring for this patient including Diagnostic results, Risks and benefits of tx options, Instructions for management, Patient and family education, Importance of tx compliance, Risk factor reductions, Impressions, Counseling / Coordination of care, Documenting in the medical record, Reviewing / ordering tests, medicine, procedures  , Obtaining or reviewing history  , and Communicating with other healthcare professionals .     PLEASE NOTE:  This encounter may have been completed utilizing the [employer] Direct Speech Voice Recognition Software. Grammatical errors, random word insertions, pronoun errors and incomplete sentences are occasional consequences of the system due to software limitations, ambient noise and hardware issues.These may be missed even after proof reading prior to affixing electronic signature. Please do not hesitate to contact me directly if you have any questions or concerns about the content, text or information contained within the body of this dictation.

## 2024-06-05 NOTE — ASSESSMENT & PLAN NOTE
As Addressed in HPI   Arrives correctly wearing TLSO brace  Denies back pain during office visit.  Occasionally has back pain with certain movements rated 2/10 left-sided.  Ambulates using a rolling walker.  Reports chronic weakness in lower extremities.  Nonfocal neurologic examination.  Denies bowel or bladder dysfunction.  Admitted she has chronic weakness in lower extremities.      Plan  Removed brace, performed flexion, extension, pushing, pulling, stretching, and rotation in the office--patient tolerated procedure well she denied worsening or new onset pain and radicular symptoms.  Returned LSO brace.  Ordered x-ray lumbar spine flexion and extension, patient and  agreed to complete today at [facility].  Reviewed lumbar spine weaning schedule with patient to start tomorrow for 1 week if x-ray imaging stable  Reviewed weaning schedule in great detail with patient and , documented in AVS along with red flag signs.    Explained that end of wean will order physical therapy for strengthening of core, lumbar paraspinal and thoracic muscles as well as upper extremities.  Patient and  in agreement with plan for physical therapy.  Reviewed with red flag signs reviewed advised if develops she should report to the closest emergency room for immediate assessment  Advised if additional questions or concerns contact the neurosurgery office.      Metrics Compression Fracture   6/5/2023 RM LBP10, ODQ9/45-20%, EQ5D5L 66107 or 0.880, Your Health [place] Today 60,VAS severity 5/10 , today 2/10       5/8/2024 RM/ LBP13/24, ODQ 24/45,  EQ5D5L 18031=1.861,  VAS_ 5/10,  Your [facility] [place] Today 65 %_

## 2024-06-07 ENCOUNTER — TELEPHONE (OUTPATIENT)
Age: 84
End: 2024-06-07

## 2024-06-07 NOTE — TELEPHONE ENCOUNTER
Cynthia from [employer], a [employer], is calling in for patient. She is following up to the patient's compression fracture of L1 vertebra from 4/19. The patient is apart of a medicare initiative, they want to know if the provider can place an order for a updated bone density scan to see if the patient has any underlying osteopetrosis, etc.    Please follow up.   [phone] ext [phone]

## 2024-06-14 NOTE — TELEPHONE ENCOUNTER
Wilda  calling to have bone density test due to the spinal injury  completed by October of this year. Please review  and order.  Glenna Vance

## 2024-06-20 DIAGNOSIS — R33.9 URINARY RETENTION: ICD-10-CM

## 2024-06-20 RX ORDER — TAMSULOSIN HYDROCHLORIDE 0.4 MG/1
0.4 CAPSULE ORAL
Qty: 30 CAPSULE | Refills: 0 | Status: SHIPPED | OUTPATIENT
Start: 2024-06-20

## 2024-06-20 NOTE — TELEPHONE ENCOUNTER
Using a , the patient requested a refill of tamsulosin (FLOMAX) 0.4 mg be sent to [id number] - [place], [place] - [address].    This was ordered when she was an inpatient. Patient requesting additional refills so she does not have to call each month. She only has 1 capsule left.    Thank you.

## 2024-06-24 ENCOUNTER — EVALUATION (OUTPATIENT)
Dept: PHYSICAL THERAPY | Facility: CLINIC | Age: 84
End: 2024-06-24
Payer: COMMERCIAL

## 2024-06-24 DIAGNOSIS — M62.81 MUSCLE WEAKNESS (GENERALIZED): ICD-10-CM

## 2024-06-24 DIAGNOSIS — S32.010A COMPRESSION FRACTURE OF L1 VERTEBRA, INITIAL ENCOUNTER (HCC): ICD-10-CM

## 2024-06-24 PROCEDURE — 97161 PT EVAL LOW COMPLEX 20 MIN: CPT | Performed by: PHYSICAL THERAPIST

## 2024-06-24 PROCEDURE — 97110 THERAPEUTIC EXERCISES: CPT | Performed by: PHYSICAL THERAPIST

## 2024-06-24 NOTE — PROGRESS NOTES
PT Evaluation     Today's date: 2024  Patient name: Corey Pulido  : 1940  MRN: [id number]  Referring provider: Valorie Mack CRNP  Dx:   Encounter Diagnosis     ICD-10-CM    1. Compression fracture of L1 vertebra, initial encounter ([facility])  S32.010A Ambulatory Referral to Physical Therapy      2. Muscle weakness (generalized)  M62.81 Ambulatory Referral to Physical Therapy                     Assessment  Impairments: abnormal or restricted ROM, impaired physical strength, lacks appropriate home exercise program and pain with function    Assessment details: Corey Pulido is a 84 y.o. female who presents with pain, decreased strength, and decreased ROM.  Due to these impairments, patient has difficulty performing a/iadls and recreational activities.  Patient's clinical presentation is consistent with their referring diagnosis of S/P compression fracture of L1 vertebra. Patient would benefit from skilled physical therapy to address their aforementioned impairments, improve their level of function and to improve their overall quality of life.   Understanding of Dx/Px/POC: fair     Prognosis: good    Goals  Short term goals - to be achieved in 4 weeks:    Decrease pain 20-50%.   Increase strength by 1/2 grade.   Improve L/S ROM by 25%.  Long term goals - to be achieved by discharge:    Performance in related household activities is improved to maximal level of function.    IADL performance in related activities is improved to maximal level of function.    Sitting tolerance is improved to maximal level of function.     Standing tolerance is improved to maximal level of function.     Plan    Planned therapy interventions: manual therapy, neuromuscular re-education, patient/caregiver education, strengthening, stretching, therapeutic activities, therapeutic exercise, home exercise program and postural training    Frequency: 2x week  Duration in weeks: 6  Plan of Care beginning date: 2024  Plan of Care  expiration date: 2024  Treatment plan discussed with: patient        Subjective Evaluation    History of Present Illness  Mechanism of injury: Patient refers to PT S/P L1 compression fracture from having to lie down for a nuclear stress test on 3/8/24.  Patient wore TLSO brace for three months and three weeks after the initial fracture.  Patient states some decrease in pain in her low back.  Patient states she is currently only performing lifting of 5-10 pounds at maximum.  Patient states she is unable to lie flat in bed.  Patient states minimal pain with donning/doffing socks and shoes.  Patient states difficulty ascending and descending stairs.  Patient c/o weakness in bilateral LE's.    Pain  Current pain ratin  At best pain ratin  At worst pain rating: 10  Relieving factors: medications          Objective     Active Range of Motion     Lumbar   Flexion:  Restriction level: moderate  Extension:  Restriction level: moderate  Left lateral flexion:  Restriction level: moderate  Right lateral flexion:  Restriction level: moderate    Strength/Myotome Testing     Left Hip   Planes of Motion   Flexion: 3+  Extension: 4-  Abduction: 3+  Adduction: 4-    Right Hip   Planes of Motion   Flexion: 3+  Extension: 4-  Abduction: 3+  Adduction: 4-    Left Knee   Flexion: 4  Extension: 4    Right Knee   Flexion: 4  Extension: 4    Left Ankle/Foot   Dorsiflexion: 4  Plantar flexion: 4-    Right Ankle/Foot   Dorsiflexion: 4  Plantar flexion: 4-             Precautions: Patient is deaf, L1 compression fx - 3/8/24, CAD, HTN, Patient unable to lie supine      Manuals                                                                 Neuro Re-Ed             TA activation in reclined pos.             TA with marches in reclined pos.             TA with alt UE/LE in reclined pos.              Step ups             Step downs with ecc. focus                                       Ther Ex             Bike             SLR flexion  in reclined pos. HEP            Iso hip add in reclined pos. HEP            Mini squats HEP            TB rows             TB lat pull downs                                       Ther Activity                                       Gait Training                                       Modalities                                          HEP access code: [email or web address]

## 2024-06-26 ENCOUNTER — OFFICE VISIT (OUTPATIENT)
Age: 84
End: 2024-06-26
Payer: COMMERCIAL

## 2024-06-26 VITALS — WEIGHT: 119 LBS | HEIGHT: 55 IN | BODY MASS INDEX: 27.54 KG/M2

## 2024-06-26 DIAGNOSIS — L84 CALLUS OF FOOT: ICD-10-CM

## 2024-06-26 DIAGNOSIS — B35.1 ONYCHOMYCOSIS: Primary | ICD-10-CM

## 2024-06-26 PROCEDURE — 99203 OFFICE O/P NEW LOW 30 MIN: CPT | Performed by: STUDENT IN AN ORGANIZED HEALTH CARE EDUCATION/TRAINING PROGRAM

## 2024-06-27 ENCOUNTER — OFFICE VISIT (OUTPATIENT)
Dept: FAMILY MEDICINE CLINIC | Facility: CLINIC | Age: 84
End: 2024-06-27
Payer: COMMERCIAL

## 2024-06-27 ENCOUNTER — PROCEDURE VISIT (OUTPATIENT)
Dept: UROLOGY | Facility: CLINIC | Age: 84
End: 2024-06-27
Payer: COMMERCIAL

## 2024-06-27 VITALS
HEART RATE: 57 BPM | TEMPERATURE: 97.8 F | HEIGHT: 55 IN | DIASTOLIC BLOOD PRESSURE: 74 MMHG | BODY MASS INDEX: 28.33 KG/M2 | RESPIRATION RATE: 17 BRPM | SYSTOLIC BLOOD PRESSURE: 138 MMHG | WEIGHT: 122.4 LBS

## 2024-06-27 DIAGNOSIS — S32.010S COMPRESSION FRACTURE OF L1 VERTEBRA, SEQUELA: ICD-10-CM

## 2024-06-27 DIAGNOSIS — I50.32 CHRONIC DIASTOLIC (CONGESTIVE) HEART FAILURE (HCC): ICD-10-CM

## 2024-06-27 DIAGNOSIS — R33.9 URINARY RETENTION: Primary | ICD-10-CM

## 2024-06-27 DIAGNOSIS — I10 ESSENTIAL HYPERTENSION: Primary | ICD-10-CM

## 2024-06-27 DIAGNOSIS — I48.91 ATRIAL FIBRILLATION WITH RAPID VENTRICULAR RESPONSE (HCC): ICD-10-CM

## 2024-06-27 PROCEDURE — G2211 COMPLEX E/M VISIT ADD ON: HCPCS | Performed by: INTERNAL MEDICINE

## 2024-06-27 PROCEDURE — 51702 INSERT TEMP BLADDER CATH: CPT

## 2024-06-27 PROCEDURE — 99214 OFFICE O/P EST MOD 30 MIN: CPT | Performed by: INTERNAL MEDICINE

## 2024-06-27 NOTE — ASSESSMENT & PLAN NOTE
Wt Readings from Last 3 Encounters:   06/27/24 55.5 kg (122 lb 6.4 oz)   06/26/24 54 kg (119 lb)   05/08/24 54 kg (119 lb)       Managed by cardiology and denies current symptoms.  Exam with out s/s of recurrent CHF. Continue current medications.

## 2024-06-27 NOTE — PROGRESS NOTES
"6/27/2024  Corey Pulido is a 84 y.o. female  [id number]    Diagnosis:      Patient presents for routine chatman catheter change managed by our office    Plan:  Patient will return as scheduled for next change and UDS testing   Patient instructed to call with any questions or concerns in the meantime.            Procedure:    Universal Protocol:  Consent: Verbal consent obtained.  Risks and benefits: risks, benefits and alternatives were discussed  Consent given by: patient  Time out: Immediately prior to procedure a \"time out\" was called to verify the correct patient, procedure, equipment, support staff and site/side marked as required.  Patient understanding: patient states understanding of the procedure being performed  Patient consent: the patient's understanding of the procedure matches consent given  Patient identity confirmed: verbally with patient    Bladder catheterization    Date/Time: 6/27/2024 11:30 AM    Performed by: Maral Feng RN  Authorized by: Oskar Kyle MD    Patient location:  Bedside  Consent:     Consent given by:  Patient  Universal protocol:     Procedure explained and questions answered to patient or proxy's satisfaction: yes      Immediately prior to procedure a time out was called: yes      Patient identity confirmed:  Verbally with patient  Pre-procedure details:     Procedure purpose:  Therapeutic    Preparation: Patient was prepped and draped in usual sterile fashion    Anesthesia (see MAR for exact dosages):     Anesthesia method:  None  Procedure details:     Bladder irrigation: no      Catheter insertion:  Indwelling    Approach: natural orifice      Catheter type:  Chatman and latex    Catheter size:  16 Fr    Number of attempts:  1    Successful placement: yes      Urine characteristics:  Clear and yellow  Post-procedure details:     Patient tolerance of procedure:  Tolerated well, no immediate complications  Comments:      Prepped with Betadine. Chatman inserted without " issue. 10 mL inflated into balloon. Clear urine returned. Patient tolerated well. Attached to stat lock and leg bag.                Maral Feng RN

## 2024-06-27 NOTE — ASSESSMENT & PLAN NOTE
Currently rate controlled with beta blocker and continues on xarelto as well.  Managed by cardiology.

## 2024-06-27 NOTE — ASSESSMENT & PLAN NOTE
Advised patient that they can safely schedule the DXA despite her pacemaker and he will call and schedule this.  Denies pain and continues with PT.

## 2024-06-27 NOTE — PROGRESS NOTES
Ambulatory Visit  Name: Corey Pulido      : 1940      MRN: [id number]  Encounter Provider: Irene Buck MD  Encounter Date: 2024   Encounter department: [facility]    Assessment & Plan   1. Essential hypertension  Assessment & Plan:  Well controlled on current medications and will continue as ordered.   2. Atrial fibrillation with rapid ventricular response (HCC)  Assessment & Plan:  Currently rate controlled with beta blocker and continues on xarelto as well.  Managed by cardiology.  3. Chronic diastolic (congestive) heart failure (HCC)  Assessment & Plan:  Wt Readings from Last 3 Encounters:   24 55.5 kg (122 lb 6.4 oz)   24 54 kg (119 lb)   24 54 kg (119 lb)       Managed by cardiology and denies current symptoms.  Exam with out s/s of recurrent CHF. Continue current medications.      4. Compression fracture of L1 vertebra, sequela  Assessment & Plan:  Advised patient that they can safely schedule the DXA despite her pacemaker and he will call and schedule this.  Denies pain and continues with PT.         History of Present Illness     Here for follow up visit. She states she is taking all of her medications as ordered.  She still has fatigue but if she is doing anything she so top and rest and will go back to the activity later.  Her back is no longer painful. She is still in the brace and is doing PT.  Does not feel she needs anything for pain control.  Has not gotten her DXA yet because her  read that she could not take this if she had any jewelry or metal and he was concerned because she has a pacemaker in place.   There is no chest pain or palpitations.  She continues to follow with cardiology.      Review of Systems   Constitutional:  Positive for fatigue.   Respiratory: Negative.     Cardiovascular: Negative.    Musculoskeletal:  Negative for back pain.     Past Medical History:   Diagnosis Date   • Atrial flutter (HCC)    • CAD (coronary artery  disease)     s/p PCIx1 -2012   • Coronary artery disease    • Deafness    • Disease of thyroid gland    • GERD (gastroesophageal reflux disease)    • Hyperlipidemia    • Hypertension    • Hypomagnesemia 09/18/2023   • Itching     of the face, throat   • Lumbar vertebral fracture (HCC)    • Pacemaker 03/2021    medtronic-left chest   • Wears glasses      Past Surgical History:   Procedure Laterality Date   • BREAST BIOPSY Left     negative   • CARDIAC PACEMAKER PLACEMENT  03/2021    medtronic   • CARDIAC SURGERY      stents   • COLONOSCOPY     • ESOPHAGOGASTRODUODENOSCOPY N/A 02/20/2017    Procedure: ESOPHAGOGASTRODUODENOSCOPY (EGD);  Surgeon: Edouard Rojas MD;  Location: [facility];  Service:      Family History   Problem Relation Age of Onset   • Cancer Mother         breast   • Hypertension Mother         essential   • Breast cancer Mother    • Other Father         smoker   • Tuberculosis Father    • No Known Problems Sister    • No Known Problems Sister    • No Known Problems Sister    • No Known Problems Daughter      Social History     Tobacco Use   • Smoking status: Never     Passive exposure: Past   • Smokeless tobacco: Never   Vaping Use   • Vaping status: Never Used   Substance and Sexual Activity   • Alcohol use: Never   • Drug use: Never   • Sexual activity: Not on file     Current Outpatient Medications on File Prior to Visit   Medication Sig   • acetaminophen (TYLENOL) 325 mg tablet Take 2 tablets (650 mg total) by mouth every 6 (six) hours as needed for mild pain, moderate pain, severe pain, headaches or fever   • docusate sodium (COLACE) 100 mg capsule Take 1 capsule (100 mg total) by mouth 2 (two) times a day (Patient taking differently: Take 100 mg by mouth daily)   • levothyroxine (Synthroid) 75 mcg tablet Take 1 tablet (75 mcg total) by mouth daily in the early morning BRAND MEDICALLY NECESSARY   • lisinopril (ZESTRIL) 2.5 mg tablet Take 2 tablets (5 mg total) by mouth daily   • metoprolol  succinate (TOPROL-XL) 25 mg 24 hr tablet Take 1 tablet (25 mg total) by mouth daily (Patient taking differently: Take 12.5 mg by mouth daily)   • rosuvastatin (CRESTOR) 10 MG tablet TAKE ONE TABLET BY MOUTH EVERY DAY   • sodium chloride 1 g tablet Take 1 tablet (1 g total) by mouth 3 (three) times a day   • tamsulosin (FLOMAX) 0.4 mg Take 1 capsule (0.4 mg total) by mouth daily with dinner   • torsemide (DEMADEX) 10 mg tablet Take 1 tablet (10 mg total) by mouth daily   • Xarelto 20 MG tablet TAKE ONE TABLET BY MOUTH EVERY DAY WITH BREAKFAST   • [DISCONTINUED] fluticasone (FLONASE) 50 mcg/act nasal spray 1 spray into each nostril daily (Patient not taking: Reported on 6/5/2024)   • [DISCONTINUED] lidocaine (LIDODERM) 5 % Apply 1 patch topically over 12 hours daily Remove & Discard patch within 12 hours or as directed by MD (Patient not taking: Reported on 6/5/2024)   • [DISCONTINUED] methocarbamol (ROBAXIN) 500 mg tablet Take 1 tablet (500 mg total) by mouth every 8 (eight) hours as needed for muscle spasms (Patient not taking: Reported on 4/22/2024)   • [DISCONTINUED] ondansetron (ZOFRAN) 4 mg tablet Take 1 tablet (4 mg total) by mouth every 8 (eight) hours as needed for nausea or vomiting (Patient not taking: Reported on 6/5/2024)   • [DISCONTINUED] pantoprazole (PROTONIX) 40 mg tablet Take 1 tablet (40 mg total) by mouth daily in the early morning (Patient not taking: Reported on 6/5/2024)   • [DISCONTINUED] polyethylene glycol (MIRALAX) 17 g packet Take 17 g by mouth daily as needed (constipation) (Patient not taking: Reported on 4/19/2024)     Allergies   Allergen Reactions   • Sotalol      Vomiting, convulsing, rash, cough   • Famotidine Throat Swelling     Immunization History   Administered Date(s) Administered   • COVID-19 MODERNA VACC 0.25 ML IM BOOSTER 11/30/2021   • COVID-19 MODERNA VACC 0.5 ML IM 04/01/2021, 04/29/2021, 11/30/2021   • Influenza Split High Dose Preservative Free IM 10/22/2012,  "11/07/2013, 11/07/2013, 10/05/2015, 11/03/2016, 11/02/2017   • Influenza, high dose seasonal 0.7 mL 12/03/2018, 01/06/2020, 10/11/2020, 10/13/2021, 10/26/2022, 08/31/2023   • Influenza, seasonal, injectable 12/21/2011, 11/03/2014   • Pneumococcal Conjugate 13-Valent 11/03/2016   • Pneumococcal Conjugate PCV 7 12/21/2011   • Pneumococcal Polysaccharide PPV23 11/04/2020   • Tdap 07/02/2013     Objective     /74   Pulse 57   Temp 97.8 °F (36.6 °C)   Resp 17   Ht 4' 7\" (1.397 m)   Wt 55.5 kg (122 lb 6.4 oz)   BMI 28.45 kg/m²     Physical Exam  Constitutional:       Appearance: Normal appearance.   HENT:      Head: Normocephalic and atraumatic.   Eyes:      Pupils: Pupils are equal, round, and reactive to light.   Cardiovascular:      Rate and Rhythm: Normal rate. Rhythm irregularly irregular.      Heart sounds: No murmur heard.     No friction rub. No gallop.   Pulmonary:      Effort: Pulmonary effort is normal.      Breath sounds: Normal breath sounds. No wheezing or rales.   Abdominal:      General: Abdomen is flat. Bowel sounds are normal.      Palpations: Abdomen is soft.      Tenderness: There is no abdominal tenderness.   Musculoskeletal:         General: No swelling.   Skin:     General: Skin is warm and dry.      Findings: No rash.   Neurological:      Mental Status: She is alert.      Gait: Gait normal.       Administrative Statements         "

## 2024-06-27 NOTE — PROGRESS NOTES
"[facility] Podiatric Medicine and Surgery Office Visit    ASSESSMENT     Diagnoses and all orders for this visit:    Onychomycosis    Callus of foot         Problem List Items Addressed This Visit    None  Visit Diagnoses       Onychomycosis    -  Primary    Callus of foot              PLAN  -Digital nails x 10 sharply debrided utilizing a large nail nipper and maria luisa without incident  -Hyperkeratotic tissue to the left foot pared utilizing a #15 blade to level of healthy epidermis without incident  -Return to clinic as needed for new or worsening podiatric concerns    SUBJECTIVE    Chief Complaint:  Elongated toenails     Patient ID: Corey Pulido     6/26/2024: Corey is a pleasant 84-year-old female who presents today for routine nail care.  Her appointment was aided by  services considering that the patient speaks American sign language.  She states that her nails are elongated and she has trouble cutting these herself.        The following portions of the patient's history were reviewed and updated as appropriate: allergies, current medications, past family history, past medical history, past social history, past surgical history and problem list.    Review of Systems   Constitutional: Negative.    HENT: Negative.     Respiratory: Negative.     Cardiovascular: Negative.    Gastrointestinal: Negative.    Musculoskeletal: Negative.    Skin: Negative.    Neurological: Negative.          OBJECTIVE      Ht 4' 7\" (1.397 m)   Wt 54 kg (119 lb)   BMI 27.66 kg/m²        Physical Exam  Constitutional:       Appearance: Normal appearance.   HENT:      Head: Normocephalic and atraumatic.   Eyes:      General:         Right eye: No discharge.         Left eye: No discharge.   Cardiovascular:      Rate and Rhythm: Normal rate and regular rhythm.      Pulses:           Dorsalis pedis pulses are 2+ on the right side and 2+ on the left side.        Posterior tibial pulses are 2+ on the right side and 2+ on the " left side.   Pulmonary:      Effort: Pulmonary effort is normal.      Breath sounds: Normal breath sounds.   Skin:     General: Skin is warm.      Capillary Refill: Capillary refill takes less than 2 seconds.   Neurological:      Sensory: Sensation is intact. No sensory deficit.         Vascular:  -DP and PT pulses intact b/l  -Capillary refill time <2 sec b/l  -Skin temp: WNL    MSK:  -Pain on palpation to left fifth digit at the location of hyperkeratotic tissue  -No gross deformities noted   -MMT is 5/5 to all muscle compartments of the lower extremity  -Ankle dorsiflexion >10 degrees with knee extended and knee flexed b/l    Derm:  -No lesions, abrasions, or open wounds noted  -No noted interdigital maceration, peeling, malodor  - hyperkeratotic tissue noted to the left fifth digit laterally  -Digital nails x 10 are elongated, thickened, discolored, dystrophic.  He also present with subungual debris consistent with onychomycosis.

## 2024-07-01 ENCOUNTER — APPOINTMENT (OUTPATIENT)
Dept: PHYSICAL THERAPY | Facility: CLINIC | Age: 84
End: 2024-07-01
Payer: COMMERCIAL

## 2024-07-08 ENCOUNTER — OFFICE VISIT (OUTPATIENT)
Dept: PHYSICAL THERAPY | Facility: CLINIC | Age: 84
End: 2024-07-08
Payer: COMMERCIAL

## 2024-07-08 DIAGNOSIS — S32.010A COMPRESSION FRACTURE OF L1 VERTEBRA, INITIAL ENCOUNTER (HCC): Primary | ICD-10-CM

## 2024-07-08 DIAGNOSIS — M62.81 MUSCLE WEAKNESS (GENERALIZED): ICD-10-CM

## 2024-07-08 PROCEDURE — 97110 THERAPEUTIC EXERCISES: CPT

## 2024-07-08 NOTE — PROGRESS NOTES
Daily Note     Today's date: 2024  Patient name: Corey Pulido  : 1940  MRN: [id number]  Referring provider: Valorie Mack CRNP  Dx:   Encounter Diagnosis     ICD-10-CM    1. Compression fracture of L1 vertebra, initial encounter ([facility])  S32.010A       2. Muscle weakness (generalized)  M62.81           Start Time: 1500  Stop Time: 1600  Total time in clinic (min): 60 minutes    Subjective: Pt reports that she uses her brace more often than not. She would like to hold some of her HEP.  service utilized.       Objective: See treatment diary below      Assessment: Tolerated treatment well. Patient would benefit from continued PT      Plan: Continue per plan of care.      Precautions: Patient is deaf- use . , L1 compression fx - 3/8/24, CAD, HTN, Patient unable to lie supine      Manuals                                                                Neuro Re-Ed             TA activation in reclined pos.             TA with marches in reclined pos.             TA with alt UE/LE in reclined pos.              Step ups             Step downs with ecc. focus             NBOS EC  1 min           NBOS foam   1 min            Ther Ex             Bike  Trialed-declined           SLR flexion in reclined pos. HEP            Iso hip add in reclined pos. HEP 20x            Mini squats HEP 20x            TB rows             TB lat pull downs             Standing HR  20                         Ther Activity                                       Gait Training                                       Modalities

## 2024-07-09 ENCOUNTER — HOSPITAL ENCOUNTER (OUTPATIENT)
Dept: RADIOLOGY | Facility: HOSPITAL | Age: 84
Discharge: HOME | End: 2024-07-09
Attending: INTERNAL MEDICINE

## 2024-07-09 DIAGNOSIS — S32.010A COMPRESSION FRACTURE OF L1 VERTEBRA, INITIAL ENCOUNTER (HCC): ICD-10-CM

## 2024-07-10 ENCOUNTER — OFFICE VISIT (OUTPATIENT)
Dept: PHYSICAL THERAPY | Facility: CLINIC | Age: 84
End: 2024-07-10
Payer: COMMERCIAL

## 2024-07-10 DIAGNOSIS — S32.010A COMPRESSION FRACTURE OF L1 VERTEBRA, INITIAL ENCOUNTER (HCC): Primary | ICD-10-CM

## 2024-07-10 DIAGNOSIS — M62.81 MUSCLE WEAKNESS (GENERALIZED): ICD-10-CM

## 2024-07-10 PROCEDURE — 97110 THERAPEUTIC EXERCISES: CPT

## 2024-07-10 NOTE — PROGRESS NOTES
Daily Note     Today's date: 7/10/2024  Patient name: Corey Pulido  : 1940  MRN: [id number]  Referring provider: Valorie Mack CRNP  Dx:   Encounter Diagnosis     ICD-10-CM    1. Compression fracture of L1 vertebra, initial encounter ([facility])  S32.010A       2. Muscle weakness (generalized)  M62.81                      Subjective: Pt reports no changes since last session.       Objective: See treatment diary below      Assessment: Tolerated treatment well. Pt performed all exercises without issue. Pt challenged by balance exercises on foam. Continue to progress to tolerance. Patient demonstrated fatigue post treatment, exhibited good technique with therapeutic exercises, and would benefit from continued PT      Plan: Continue per plan of care.      Precautions: Patient is deaf- use . , L1 compression fx - 3/8/24, CAD, HTN, Patient unable to lie supine      Manuals 6/24 07/08 7/10                                                              Neuro Re-Ed             TA activation in reclined pos.             TA with marches in reclined pos.             TA with alt UE/LE in reclined pos.              Step ups             Step downs with ecc. focus             NBOS EC  1 min 1 min          NBOS foam   1 min  1 min          Ther Ex             Bike  Trialed-declined           SLR flexion in reclined pos. HEP  15x ea          Iso hip add in reclined pos. HEP 20x  20x          Mini squats HEP 20x  20x          TB rows   10x YTB          TB lat pull downs   10x YTB          Standing HR  20  20x                       Ther Activity                                       Gait Training                                       Modalities

## 2024-07-13 DIAGNOSIS — I10 ESSENTIAL HYPERTENSION: ICD-10-CM

## 2024-07-13 DIAGNOSIS — I25.10 CORONARY ARTERY DISEASE INVOLVING NATIVE CORONARY ARTERY OF NATIVE HEART WITHOUT ANGINA PECTORIS: ICD-10-CM

## 2024-07-15 RX ORDER — METOPROLOL SUCCINATE 25 MG/1
25 TABLET, EXTENDED RELEASE ORAL DAILY
Qty: 90 TABLET | Refills: 0 | Status: SHIPPED | OUTPATIENT
Start: 2024-07-15

## 2024-07-17 ENCOUNTER — OFFICE VISIT (OUTPATIENT)
Dept: PHYSICAL THERAPY | Facility: CLINIC | Age: 84
End: 2024-07-17
Payer: COMMERCIAL

## 2024-07-17 DIAGNOSIS — M62.81 MUSCLE WEAKNESS (GENERALIZED): ICD-10-CM

## 2024-07-17 DIAGNOSIS — S32.010A COMPRESSION FRACTURE OF L1 VERTEBRA, INITIAL ENCOUNTER (HCC): Primary | ICD-10-CM

## 2024-07-17 PROCEDURE — 97110 THERAPEUTIC EXERCISES: CPT

## 2024-07-17 NOTE — PROGRESS NOTES
Daily Note     Today's date: 2024  Patient name: Corey Pulido  : 1940  MRN: [id number]  Referring provider: Valorie Mack CRNP  Dx:   Encounter Diagnosis     ICD-10-CM    1. Compression fracture of L1 vertebra, initial encounter ([facility])  S32.010A       2. Muscle weakness (generalized)  M62.81                      Subjective: Pt reports no changes since last session.       Objective: See treatment diary below      Assessment: Tolerated treatment well. Pt performed all exercises without issue today with some progressions made. She was challenged with standing on the foam due to LE fatigue. Pt required frequent rest breaks throughout. Patient demonstrated fatigue post treatment, exhibited good technique with therapeutic exercises, and would benefit from continued PT      Plan: Continue per plan of care.      Precautions: Patient is deaf- use . , L1 compression fx - 3/8/24, CAD, HTN, Patient unable to lie supine      Manuals 6/24 07/08 7/10 7/17                                                             Neuro Re-Ed             TA activation in reclined pos.             TA with marches in reclined pos.             TA with alt UE/LE in reclined pos.              Step ups             Step downs with ecc. focus             NBOS EC  1 min 1 min 1 min         NBOS foam   1 min  1 min 1 min         Ther Ex             Bike  Trialed-declined           SLR flexion in reclined pos. HEP  15x ea 15x ea         Iso hip add in reclined pos. HEP 20x  20x 20x         Mini squats HEP 20x  20x 20x         TB rows   10x YTB 20x YTB         TB lat pull downs   10x YTB 20x YTB         Standing HR  20  20x 20x                      Ther Activity                                       Gait Training                                       Modalities

## 2024-07-22 ENCOUNTER — APPOINTMENT (OUTPATIENT)
Dept: PHYSICAL THERAPY | Facility: CLINIC | Age: 84
End: 2024-07-22
Payer: COMMERCIAL

## 2024-07-22 DIAGNOSIS — R33.9 URINARY RETENTION: ICD-10-CM

## 2024-07-22 RX ORDER — TAMSULOSIN HYDROCHLORIDE 0.4 MG/1
0.4 CAPSULE ORAL
Qty: 30 CAPSULE | Refills: 0 | Status: SHIPPED | OUTPATIENT
Start: 2024-07-22

## 2024-07-24 ENCOUNTER — OFFICE VISIT (OUTPATIENT)
Dept: PHYSICAL THERAPY | Facility: CLINIC | Age: 84
End: 2024-07-24
Payer: COMMERCIAL

## 2024-07-24 DIAGNOSIS — M62.81 MUSCLE WEAKNESS (GENERALIZED): ICD-10-CM

## 2024-07-24 DIAGNOSIS — S32.010A COMPRESSION FRACTURE OF L1 VERTEBRA, INITIAL ENCOUNTER (HCC): Primary | ICD-10-CM

## 2024-07-24 PROCEDURE — 97110 THERAPEUTIC EXERCISES: CPT

## 2024-07-24 NOTE — PROGRESS NOTES
Daily Note     Today's date: 2024  Patient name: Corey Pulido  : 1940  MRN: [id number]  Referring provider: Valorie Mack CRNP  Dx:   Encounter Diagnosis     ICD-10-CM    1. Compression fracture of L1 vertebra, initial encounter ([facility])  S32.010A       2. Muscle weakness (generalized)  M62.81                      Subjective: Reports doing exercises several times during the day.       Objective: See treatment diary below      Assessment: Tolerated treatment well. Feels that 20 reps is too much, begins to feel dizzy and fearful of falling, therefore reduced reps with improved tolerance. Attempt functional strengthening on step NV but continue with low repetitions. Patient demonstrated fatigue post treatment, exhibited good technique with therapeutic exercises, and would benefit from continued PT      Plan: Continue per plan of care.      Precautions: Patient is deaf- use . , L1 compression fx - 3/8/24, CAD, HTN, Patient unable to lie supine      Manuals 6/24 07/08 7/10 7/17 7/24                                                            Neuro Re-Ed             TA activation in reclined pos.             TA with marches in reclined pos.             TA with alt UE/LE in reclined pos.              Step ups     NV        Step downs with ecc. focus             NBOS EC  1 min 1 min 1 min 1 min        NBOS foam   1 min  1 min 1 min 1 min        Ther Ex             Bike  Trialed-declined           SLR flexion in reclined pos. HEP  15x ea 15x ea 10 ea         Iso hip add in reclined pos. HEP 20x  20x 20x 10        Mini squats HEP 20x  20x 20x 10        TB rows   10x YTB 20x YTB 10  YTB        TB lat pull downs   10x YTB 20x YTB 10 YTB        Standing HR  20  20x 20x 10                     Ther Activity                                       Gait Training                                       Modalities

## 2024-07-25 ENCOUNTER — PROCEDURE VISIT (OUTPATIENT)
Dept: UROLOGY | Facility: CLINIC | Age: 84
End: 2024-07-25
Payer: COMMERCIAL

## 2024-07-25 ENCOUNTER — TELEPHONE (OUTPATIENT)
Dept: NEPHROLOGY | Facility: CLINIC | Age: 84
End: 2024-07-25

## 2024-07-25 ENCOUNTER — DOCUMENTATION (OUTPATIENT)
Dept: NEPHROLOGY | Facility: CLINIC | Age: 84
End: 2024-07-25

## 2024-07-25 DIAGNOSIS — R33.9 URINARY RETENTION: Primary | ICD-10-CM

## 2024-07-25 PROCEDURE — 51702 INSERT TEMP BLADDER CATH: CPT

## 2024-07-25 NOTE — PROGRESS NOTES
"7/25/2024  Corey Pulido is a 84 y.o. female  [id number]    Diagnosis:      Patient presents for routine chatman catheter change managed by our office    Plan:  Patient will return as scheduled for next catheter change. She will keep upcoming appt for UDS testing.  Patient instructed to call with any questions or concerns in the meantime.             Procedure:    Universal Protocol:  Consent: Verbal consent obtained.  Risks and benefits: risks, benefits and alternatives were discussed  Consent given by: patient  Time out: Immediately prior to procedure a \"time out\" was called to verify the correct patient, procedure, equipment, support staff and site/side marked as required.  Patient understanding: patient states understanding of the procedure being performed  Patient consent: the patient's understanding of the procedure matches consent given  Patient identity confirmed: verbally with patient    Bladder catheterization    Date/Time: 7/25/2024 10:30 AM    Performed by: Maral Feng RN  Authorized by: Oskar Kyle MD    Patient location:  Bedside  Consent:     Consent given by:  Patient  Universal protocol:     Procedure explained and questions answered to patient or proxy's satisfaction: yes      Immediately prior to procedure a time out was called: yes      Patient identity confirmed:  Verbally with patient  Pre-procedure details:     Procedure purpose:  Therapeutic    Preparation: Patient was prepped and draped in usual sterile fashion    Anesthesia (see MAR for exact dosages):     Anesthesia method:  None  Procedure details:     Bladder irrigation: no      Catheter insertion:  Indwelling    Approach: natural orifice      Catheter type:  Latex and Chatman    Catheter size:  16 Fr    Number of attempts:  1    Successful placement: yes      Urine characteristics:  Clear  Post-procedure details:     Patient tolerance of procedure:  Tolerated well, no immediate complications  Comments:       Prepped with Betadine. " Lang inserted without issue. 10 mL inflated into balloon. Clear urine returned. Patient tolerated well. Attached to leg bag. Extra supplies provided per pt request.                   Maral Feng RN

## 2024-07-25 NOTE — TELEPHONE ENCOUNTER
Advised daughter to continue salt tablets 1-OD.  Pt has f/u visit in September.  If any changes, will be  made then per Jacque.      Talked with patient's daughter.  Advised that labs look great. Sodium 140.  Patient is taking 1 salt tablet daily.  Should she continue?  ----- Message from Amna CHI sent at 7/24/2024  1:44 PM EDT -----    ----- Message -----  From: CHA Karimi  Sent: 7/22/2024   3:38 PM EDT  To: Nephrology [facility]    Labs reviewed: please let the patient or family know that sodium level looks great. Labs are stable.  Thank you

## 2024-08-07 ENCOUNTER — TELEPHONE (OUTPATIENT)
Dept: UROLOGY | Facility: CLINIC | Age: 84
End: 2024-08-07

## 2024-08-07 NOTE — TELEPHONE ENCOUNTER
Precious change appt needed to be rescheduled to the next day due to no nurse being in the office. Please call and confirm new appt

## 2024-08-15 DIAGNOSIS — I48.3 TYPICAL ATRIAL FLUTTER (HCC): ICD-10-CM

## 2024-08-15 DIAGNOSIS — I48.91 A-FIB (HCC): ICD-10-CM

## 2024-08-16 RX ORDER — RIVAROXABAN 20 MG/1
TABLET, FILM COATED ORAL
Qty: 90 TABLET | Refills: 0 | Status: SHIPPED | OUTPATIENT
Start: 2024-08-16

## 2024-08-16 NOTE — TELEPHONE ENCOUNTER
Daughter of the patient was returning a call to the office to confirm the change in date and time of the appt. She will also relay the message and change to her mother.     She confirmed the day and time still work

## 2024-08-21 ENCOUNTER — OFFICE VISIT (OUTPATIENT)
Dept: CARDIOLOGY CLINIC | Facility: CLINIC | Age: 84
End: 2024-08-21
Payer: COMMERCIAL

## 2024-08-21 VITALS
OXYGEN SATURATION: 97 % | BODY MASS INDEX: 26.96 KG/M2 | WEIGHT: 116.5 LBS | SYSTOLIC BLOOD PRESSURE: 138 MMHG | HEIGHT: 55 IN | HEART RATE: 42 BPM | DIASTOLIC BLOOD PRESSURE: 72 MMHG

## 2024-08-21 DIAGNOSIS — I25.10 CORONARY ARTERY DISEASE INVOLVING NATIVE CORONARY ARTERY OF NATIVE HEART WITHOUT ANGINA PECTORIS: ICD-10-CM

## 2024-08-21 DIAGNOSIS — Z95.0 CARDIAC PACEMAKER IN SITU: ICD-10-CM

## 2024-08-21 DIAGNOSIS — I50.32 CHRONIC DIASTOLIC CHF (CONGESTIVE HEART FAILURE) (HCC): Primary | ICD-10-CM

## 2024-08-21 DIAGNOSIS — I10 ESSENTIAL HYPERTENSION: ICD-10-CM

## 2024-08-21 PROCEDURE — 99214 OFFICE O/P EST MOD 30 MIN: CPT | Performed by: INTERNAL MEDICINE

## 2024-08-21 PROCEDURE — 93000 ELECTROCARDIOGRAM COMPLETE: CPT | Performed by: INTERNAL MEDICINE

## 2024-08-21 NOTE — PROGRESS NOTES
Cardiology Follow Up    Corey Pulido  1940  [id number]      Interval History: Corey Pulido is here for followup of  CHF, hypertension and atrial fibrillation.  Since her last visit, she denies any chest pain or shortness of breath.  She denies any lower extremity edema.  She has back pain after developing spinal fracture but pain is improving as well.  She has difficulties laying flat because of pain.  She was previously diagnosed with hyponatremia and was put on salt tablets.  She follows with nephrology.  She is having difficulties swallowing salt tablets but is taking 1 tablet a day which she mixes into her food.  Her last sodium level was normal.  She has follow-up appointment next month.    She was complaining of atypical chest pain and shortness of breath.  Stress test was ordered which showed infarction of the inferior and anterior walls with mild kay-infarct ischemia ejection fraction was 37%.  Her last echocardiogram showed ejection fraction of 45% with hypokinesis of inferior septal and apical walls.       She has a history of atrial fibrillation with multiple failed cardioversions in the past. She eventually had AV node ablation along with permanent pacemaker insertion by Dr. Mary at [facility].  She was previously taking sotalol but stopped it independently because of side effects.   Amiodarone was stopped because of GI side effects.  Bystolic was stopped due to nausea.  Amlodipine was stopped in the past because of LE edema.    Echocardiogram in March 2020 showed EF of 45% with moderate to marked LA dilation.   Echocardiogram in June 2023 showed ejection fraction of 45% with severe LA dilation and moderate pulmonary hypertension.      The following portions of the patient's history were reviewed and updated as appropriate: allergies, current medications, past family history, past medical history, past social history, past surgical  "history and problem list.    Current Outpatient Medications on File Prior to Visit   Medication Sig Dispense Refill    acetaminophen (TYLENOL) 325 mg tablet Take 2 tablets (650 mg total) by mouth every 6 (six) hours as needed for mild pain, moderate pain, severe pain, headaches or fever      docusate sodium (COLACE) 100 mg capsule Take 1 capsule (100 mg total) by mouth 2 (two) times a day (Patient taking differently: Take 100 mg by mouth daily) 60 capsule 0    levothyroxine (Synthroid) 75 mcg tablet Take 1 tablet (75 mcg total) by mouth daily in the early morning BRAND MEDICALLY NECESSARY 90 tablet 3    lisinopril (ZESTRIL) 2.5 mg tablet Take 2 tablets (5 mg total) by mouth daily      metoprolol succinate (TOPROL-XL) 25 mg 24 hr tablet TAKE ONE TABLET BY MOUTH EVERY DAY 90 tablet 0    rosuvastatin (CRESTOR) 10 MG tablet TAKE ONE TABLET BY MOUTH EVERY DAY 90 tablet 1    sodium chloride 1 g tablet Take 1 tablet (1 g total) by mouth 3 (three) times a day (Patient taking differently: Take 1 g by mouth in the morning) 90 tablet 1    tamsulosin (FLOMAX) 0.4 mg TAKE ONE CAPSULE BY MOUTH EVERY DAY WITH DINNER 30 capsule 0    torsemide (DEMADEX) 10 mg tablet Take 1 tablet (10 mg total) by mouth daily 90 tablet 1    Xarelto 20 MG tablet TAKE ONE TABLET BY MOUTH EVERY DAY WITH BREAKFAST 90 tablet 0     No current facility-administered medications on file prior to visit.          Review of Systems:  Review of Systems   HENT:  Positive for hearing loss.    Respiratory:  Negative for shortness of breath.    Cardiovascular:  Negative for chest pain, palpitations and leg swelling.   Musculoskeletal:  Positive for arthralgias and myalgias.   All other systems reviewed and are negative.      Physical Exam:  /72 (BP Location: Right arm, Patient Position: Sitting, Cuff Size: Standard)   Pulse (!) 42   Ht 4' 7\" (1.397 m)   Wt 52.8 kg (116 lb 8 oz)   SpO2 97%   BMI 27.08 kg/m²     Physical Exam  Constitutional:       General: " She is not in acute distress.     Appearance: Normal appearance. She is well-developed. She is not diaphoretic.   HENT:      Head: Normocephalic and atraumatic.   Eyes:      General: No scleral icterus.     Conjunctiva/sclera: Conjunctivae normal.      Pupils: Pupils are equal, round, and reactive to light.   Neck:      Thyroid: No thyromegaly.      Vascular: No JVD.   Cardiovascular:      Rate and Rhythm: Normal rate. Rhythm irregular. Frequent Extrasystoles are present.     Heart sounds: Murmur heard.      No friction rub. No gallop.   Pulmonary:      Effort: Pulmonary effort is normal.      Breath sounds: Normal breath sounds.   Musculoskeletal:      Cervical back: Neck supple.      Right lower leg: No edema.      Left lower leg: No edema.   Skin:     General: Skin is warm and dry.      Findings: No erythema or rash.   Neurological:      General: No focal deficit present.      Mental Status: She is alert and oriented to person, place, and time. Mental status is at baseline.   Psychiatric:         Mood and Affect: Mood normal.         Behavior: Behavior normal.         Thought Content: Thought content normal.         Judgment: Judgment normal.         Cardiographics  ECG:  Atrial flutter with Ventricular pacing and frequent PVCs in a pattern of bigeminy    Labs:  Lab Results   Component Value Date     08/15/2017    K 5.1 07/18/2024     07/18/2024    CO2 22 07/18/2024    BUN 23 07/18/2024    CREATININE 1.10 (H) 07/18/2024    CREATININE 1.05 03/23/2024    CREATININE 0.88 08/15/2017    GLUCOSE 94 08/15/2017    CALCIUM 8.8 03/23/2024    CALCIUM 9.2 08/15/2017     Lab Results   Component Value Date    WBC 10.60 (H) 03/23/2024    WBC 4.8 08/15/2017    HGB 15.6 (H) 03/23/2024    HGB 14.2 08/15/2017    HCT 45.4 03/23/2024    HCT 43.7 08/15/2017    MCV 84 03/23/2024    MCV 86 08/15/2017     03/23/2024     08/15/2017     Lab Results   Component Value Date    CHOL 164 08/15/2017    TRIG 82  10/23/2023    HDL 61 10/23/2023     Imaging: No results found.    Discussion/Summary:  1. Chronic diastolic CHF (congestive heart failure) (HCC)    2. Cardiac pacemaker in situ    3. Coronary artery disease involving native coronary artery of native heart without angina pectoris    4. Essential hypertension        - BP is controlled today.  She has multiple medication intolerances.  We will continue lisinopril 5 mg daily. She is also using Toprol-XL 12.5 mg daily.  - She has established care with nephrology because of hyponatremia and urinary retention.  She is on torsemide now.  She is also using sodium tablets.  -She had an abnormal stress test but is currently free of chest pain.  Will continue to monitor and she will be unable to have cardiac catheterization due to severe back pain.  - She does not have any shortness of breath or LE edema suggestive of CHF at this time.    - Will obtain pacemaker interrogation     - Patient had AV node ablation along with pacemaker insertion.  She has a large amount of ventricular ectopy but cannot tolerate higher beta blocker dose or antiarrhythmic medications.    - Patient is on Xarelto.  Aspirin was discontinued and she was continued on Xarelto alone because of excessive bruising.

## 2024-08-23 ENCOUNTER — PROCEDURE VISIT (OUTPATIENT)
Dept: UROLOGY | Facility: CLINIC | Age: 84
End: 2024-08-23
Payer: COMMERCIAL

## 2024-08-23 DIAGNOSIS — R33.9 URINARY RETENTION: ICD-10-CM

## 2024-08-23 DIAGNOSIS — N32.89 BLADDER SPASMS: Primary | ICD-10-CM

## 2024-08-23 DIAGNOSIS — R39.9 UTI SYMPTOMS: ICD-10-CM

## 2024-08-23 LAB
AMORPH URATE CRY URNS QL MICRO: ABNORMAL
BACTERIA UR QL AUTO: ABNORMAL /HPF
BILIRUB UR QL STRIP: NEGATIVE
CLARITY UR: ABNORMAL
COLOR UR: ABNORMAL
GLUCOSE UR STRIP-MCNC: NEGATIVE MG/DL
HGB UR QL STRIP.AUTO: ABNORMAL
KETONES UR STRIP-MCNC: NEGATIVE MG/DL
LEUKOCYTE ESTERASE UR QL STRIP: ABNORMAL
NITRITE UR QL STRIP: POSITIVE
NON-SQ EPI CELLS URNS QL MICRO: ABNORMAL /HPF
PH UR STRIP.AUTO: 7 [PH]
PROT UR STRIP-MCNC: NEGATIVE MG/DL
RBC #/AREA URNS AUTO: ABNORMAL /HPF
SP GR UR STRIP.AUTO: 1 (ref 1–1.03)
UROBILINOGEN UR STRIP-ACNC: <2 MG/DL
WBC #/AREA URNS AUTO: ABNORMAL /HPF

## 2024-08-23 PROCEDURE — 51702 INSERT TEMP BLADDER CATH: CPT

## 2024-08-23 PROCEDURE — 87086 URINE CULTURE/COLONY COUNT: CPT

## 2024-08-23 PROCEDURE — 81001 URINALYSIS AUTO W/SCOPE: CPT

## 2024-08-23 RX ORDER — OXYBUTYNIN CHLORIDE 5 MG/1
5 TABLET ORAL 2 TIMES DAILY
Qty: 20 TABLET | Refills: 0 | Status: SHIPPED | OUTPATIENT
Start: 2024-08-23 | End: 2024-09-02

## 2024-08-23 NOTE — PROGRESS NOTES
8/23/2024  Corey Pulido is a 84 y.o. female  [id number]    Diagnosis:  Chief Complaint    Urinary Retention         Patient presents for routine chatman exchange managed by our office    Plan:  Urine testing sent out for continued bladder pain. Additionally patient will be sent oxybutynin to assist with pain  Also requested another TOV as she no longer wants chatman in   Patient instructed to call with any questions or concerns in the meantime.    Orders Placed This Encounter   Procedures    Urine culture    Urinalysis with microscopic        There were no vitals filed for this visit.    Long discussion with patient and  via . Patient wants chatman removed today due to constant pain associated with chatman. Wants it taken out today. Advised that I would not recommend this as she has failed 3 prior TOV. Advised we could do another TOV next week but also send out a urine test today to check for infection. Additionally I talked to LUIS MANUEL Phan to see if we can send oxybutynin for pain which she is agreeable. Office will monitor for urine testing     Procedure:    Universal Protocol:  procedure performed by consultantConsent: Verbal consent obtained.  Risks and benefits: risks, benefits and alternatives were discussed  Consent given by: patient  Patient understanding: patient states understanding of the procedure being performed  Patient consent: the patient's understanding of the procedure matches consent given  Patient identity confirmed: verbally with patient    Bladder catheterization    Date/Time: 8/23/2024 10:30 AM    Performed by: Emilie Mejia RN  Authorized by: Oskar Kyle MD    Patient location:  Bedside  Consent:     Consent given by:  Patient  Universal protocol:     Procedure explained and questions answered to patient or proxy's satisfaction: yes      Patient identity confirmed:  Verbally with patient  Pre-procedure details:     Procedure purpose:  Therapeutic  Anesthesia (see MAR for  exact dosages):     Anesthesia method:  None  Procedure details:     Bladder irrigation: no      Catheter insertion:  Indwelling    Approach: natural orifice      Catheter type:  Chatman and latex    Catheter size:  16 Fr    Number of attempts:  1    Successful placement: yes      Urine characteristics:  Yellow  Post-procedure details:     Patient tolerance of procedure:  Tolerated well, no immediate complications  Comments:        10 ml sterile water removed from previous balloon. Previous catheter removed without issue. Prepped with betadine, new chatman inserted without issue. 10 ml inserted into balloon. Sterile urine sample obtained. Flushed with 10 ml sterile water for return. Attached to leg bag. Extra bags supplied               Emilie Mejia RN

## 2024-08-24 LAB — BACTERIA UR CULT: NORMAL

## 2024-08-26 ENCOUNTER — TELEPHONE (OUTPATIENT)
Dept: UROLOGY | Facility: CLINIC | Age: 84
End: 2024-08-26

## 2024-08-26 DIAGNOSIS — R39.9 UTI SYMPTOMS: Primary | ICD-10-CM

## 2024-08-26 RX ORDER — CEPHALEXIN 500 MG/1
500 CAPSULE ORAL EVERY 8 HOURS SCHEDULED
Qty: 21 CAPSULE | Refills: 0 | Status: SHIPPED | OUTPATIENT
Start: 2024-08-26 | End: 2024-09-02

## 2024-08-26 NOTE — TELEPHONE ENCOUNTER
Please notify patient that I sent a 7 day course of keflex antibiotics to her pharmacy to take for a UTI. Thank you

## 2024-08-26 NOTE — TELEPHONE ENCOUNTER
Spoke to staff at the video  line for sign language purposes and informed her that Corey is to continue taking Keflex antibiotic per [facility] recommendations. They are to call back to the office if they have any additional questions or concerns.

## 2024-08-29 ENCOUNTER — TELEPHONE (OUTPATIENT)
Age: 84
End: 2024-08-29

## 2024-08-29 NOTE — TELEPHONE ENCOUNTER
Spoke with Corey by  who call informed that a message was left that medication was sent to pharmacy. Corey was confused as to the name and correct spelling of Medication. Went over medication that was sent to [facility] needed.

## 2024-08-30 ENCOUNTER — PROCEDURE VISIT (OUTPATIENT)
Dept: UROLOGY | Facility: CLINIC | Age: 84
End: 2024-08-30
Payer: COMMERCIAL

## 2024-08-30 VITALS
HEIGHT: 55 IN | DIASTOLIC BLOOD PRESSURE: 70 MMHG | OXYGEN SATURATION: 95 % | SYSTOLIC BLOOD PRESSURE: 110 MMHG | BODY MASS INDEX: 27.08 KG/M2 | HEART RATE: 73 BPM

## 2024-08-30 DIAGNOSIS — R33.9 URINARY RETENTION: Primary | ICD-10-CM

## 2024-08-30 LAB — POST-VOID RESIDUAL VOLUME, ML POC: 251 ML

## 2024-08-30 PROCEDURE — 51798 US URINE CAPACITY MEASURE: CPT

## 2024-08-30 NOTE — PROGRESS NOTES
"8/30/2024    Corey Pulido  1940  [id number]    Diagnosis  Chief Complaint    TOV         Patient presents for TOV managed by our office    Plan  Plan to follow up with provider as scheduled     Procedure Lang removal/voiding trial    Lang catheter removed after deflation of an intact balloon. Patient tolerated well. Encouraged patient to hydrate well and return this afternoon for post void residual.  she knows she may return early if uncomfortable and unable to urinate. Patient agrees to this plan.    Patient returned this afternoon. Patient states able to void. Patient voided 300 ml while in office. Bladder ultrasound performed and PVR measured 251ml.    No results found for this or any previous visit (from the past 4 hour(s)).        Vitals:    08/30/24 0832   BP: 110/70   Pulse: 73   SpO2: 95%   Height: 4' 7\" (1.397 m)     No catheter placed. ER precautions reviewed with pt.     Pt also stated that when she took oxybutynin, her throat began to swell and she couldn't breathe. Pt advised to stop taking the medication. Medication added to allergy list.       Irene Walter RN       "

## 2024-09-09 DIAGNOSIS — R39.9 UTI SYMPTOMS: ICD-10-CM

## 2024-09-12 LAB
BUN SERPL-MCNC: 19 MG/DL (ref 8–27)
BUN/CREAT SERPL: 18 (ref 12–28)
CALCIUM SERPL-MCNC: 9.3 MG/DL (ref 8.7–10.3)
CHLORIDE SERPL-SCNC: 98 MMOL/L (ref 96–106)
CO2 SERPL-SCNC: 25 MMOL/L (ref 20–29)
CREAT SERPL-MCNC: 1.03 MG/DL (ref 0.57–1)
EGFR: 53 ML/MIN/1.73
GLUCOSE SERPL-MCNC: 83 MG/DL (ref 70–99)
POTASSIUM SERPL-SCNC: 4.5 MMOL/L (ref 3.5–5.2)
SODIUM SERPL-SCNC: 136 MMOL/L (ref 134–144)

## 2024-09-16 DIAGNOSIS — I10 ESSENTIAL HYPERTENSION: ICD-10-CM

## 2024-09-17 RX ORDER — LISINOPRIL 2.5 MG/1
2.5 TABLET ORAL DAILY
Qty: 90 TABLET | Refills: 1 | Status: SHIPPED | OUTPATIENT
Start: 2024-09-17

## 2024-09-19 ENCOUNTER — TELEPHONE (OUTPATIENT)
Dept: UROLOGY | Facility: CLINIC | Age: 84
End: 2024-09-19

## 2024-09-19 ENCOUNTER — PROCEDURE VISIT (OUTPATIENT)
Dept: UROLOGY | Facility: CLINIC | Age: 84
End: 2024-09-19
Payer: COMMERCIAL

## 2024-09-19 DIAGNOSIS — R33.9 URINARY RETENTION: Primary | ICD-10-CM

## 2024-09-19 LAB
SL AMB  POCT GLUCOSE, UA: NEGATIVE
SL AMB LEUKOCYTE ESTERASE,UA: NEGATIVE
SL AMB POCT BILIRUBIN,UA: NEGATIVE
SL AMB POCT BLOOD,UA: ABNORMAL
SL AMB POCT CLARITY,UA: CLEAR
SL AMB POCT COLOR,UA: YELLOW
SL AMB POCT KETONES,UA: NEGATIVE
SL AMB POCT NITRITE,UA: NEGATIVE
SL AMB POCT PH,UA: 7
SL AMB POCT SPECIFIC GRAVITY,UA: 1.01
SL AMB POCT URINE PROTEIN: NEGATIVE
SL AMB POCT UROBILINOGEN: NEGATIVE

## 2024-09-19 PROCEDURE — 51702 INSERT TEMP BLADDER CATH: CPT

## 2024-09-19 PROCEDURE — 51784 ANAL/URINARY MUSCLE STUDY: CPT

## 2024-09-19 PROCEDURE — 81002 URINALYSIS NONAUTO W/O SCOPE: CPT

## 2024-09-19 PROCEDURE — 51726 COMPLEX CYSTOMETROGRAM: CPT

## 2024-09-19 RX ORDER — CEPHALEXIN 500 MG/1
CAPSULE ORAL
Qty: 21 CAPSULE | Refills: 0 | OUTPATIENT
Start: 2024-09-19

## 2024-09-19 NOTE — TELEPHONE ENCOUNTER
Pt has UDS today and no follow up as of now. I spoke with MIGUELINA Phan and she thought this pt would be added to [facility] as not seen an MD yet and lives in [place]. Can you add to [facility] on Oct 10th?    Other idea is wondering when  will be starting in office?

## 2024-09-19 NOTE — PROGRESS NOTES
"CC:\"frequency\"    Visit completed with  use of  service,  Mushtaq,  ID number [id number]  Consent obtained with use of , signed consent not obtained    Uroflow:         Voided volume:         53.1 ml       Max flow rate:            8.1 ml/sec       Average flow rate:      2.7 ml/sec       PVR:     375 ml       Patient unsure if voided volume was normal amount    CMG:       Position:  sitting           Fill sensation:     not expressed       First urge:          273 ml,     pdet   -4 cmH2O        Normal urge:      291 ml,     pdet   -2 cmH2O       Must urge:         not expressed    Permission to void:             394  ml,     pdet    -2 cmH2O       Max pdet during void    2 cm H2O       Voided volume:    2.1 ml    Bladder stability:   Questionable instability   throughout test, unable to determine if patient was leaking or trying to void.  Patient kept repeating to  how many times fluid was coming out, but did not say she was trying to void.      Compliance:  normal         EMG activity:       Normal during filling,        normal during test    Comments::     At 325 ml, patient started to leak / void, with use of , patient just said it's coming out, but wasn't able to distinguish leakage vs void, she did not seem to understand the difference.  Volumes leaking / voiding were small volumes.  This happened repeated throughout the test, with minimal detrusor pressures (2-8 cmH2O) when this occurred.    Total in 549 ml, total out (leak/void) was 192 ml   Calculated PVR was 357 ml   16 Fr chatman inserted post test and emptied 375 ml    Urodynamics    Date/Time: 9/19/2024 9:30 AM    Performed by: Meli Singh RN  Authorized by: Cruz Nick MD  [place] Protocol:  procedure performed by consultantConsent: Verbal consent obtained.  Risks and benefits: risks, benefits and alternatives were discussed  Consent given by: patient  Patient " understanding: patient states understanding of the procedure being performed  Patient consent: the patient's understanding of the procedure matches consent given  Procedure consent: procedure consent matches procedure scheduled  Patient identity confirmed: verbally with patient  Procedure - Urodynamics:  Procedure details: CMG and EMG    Voiding Pressure Study: No      Post-procedure:     Patient tolerance: Patient tolerated procedure well with no immediate complications      Universal Protocol:  procedure performed by consultantConsent: Verbal consent obtained.  Risks and benefits: risks, benefits and alternatives were discussed  Consent given by: patient  Patient understanding: patient states understanding of the procedure being performed  Patient identity confirmed: verbally with patient    Bladder catheterization    Date/Time: 9/19/2024 10:15 AM    Performed by: Meli Singh RN  Authorized by: Cruz Nick MD    Patient location:  Other (comment)  Consent:     Consent given by:  Patient  Universal protocol:     Procedure explained and questions answered to patient or proxy's satisfaction: yes      Patient identity confirmed:  Verbally with patient  Pre-procedure details:     Procedure purpose:  Therapeutic    Preparation: Patient was prepped and draped in usual sterile fashion    Anesthesia (see MAR for exact dosages):     Anesthesia method:  None  Procedure details:     Bladder irrigation: no      Catheter insertion:  Indwelling    Approach: natural orifice      Catheter type:  Latex and Lang    Catheter size:  16 Fr    Number of attempts:  1    Successful placement: yes      Urine characteristics:  Clear  Post-procedure details:     Patient tolerance of procedure:  Tolerated well, no immediate complications

## 2024-09-24 DIAGNOSIS — R33.9 URINARY RETENTION: ICD-10-CM

## 2024-09-24 RX ORDER — TAMSULOSIN HYDROCHLORIDE 0.4 MG/1
0.4 CAPSULE ORAL
Qty: 30 CAPSULE | Refills: 5 | Status: SHIPPED | OUTPATIENT
Start: 2024-09-24

## 2024-09-26 ENCOUNTER — OFFICE VISIT (OUTPATIENT)
Age: 84
End: 2024-09-26
Payer: COMMERCIAL

## 2024-09-26 VITALS
HEIGHT: 55 IN | DIASTOLIC BLOOD PRESSURE: 74 MMHG | BODY MASS INDEX: 26.85 KG/M2 | SYSTOLIC BLOOD PRESSURE: 118 MMHG | WEIGHT: 116 LBS

## 2024-09-26 DIAGNOSIS — M21.961 ACQUIRED DEFORMITY OF BOTH FEET: ICD-10-CM

## 2024-09-26 DIAGNOSIS — M21.962 ACQUIRED DEFORMITY OF BOTH FEET: ICD-10-CM

## 2024-09-26 DIAGNOSIS — M77.42 METATARSALGIA OF BOTH FEET: Primary | ICD-10-CM

## 2024-09-26 DIAGNOSIS — I70.209 PERIPHERAL ARTERIOSCLEROSIS (HCC): ICD-10-CM

## 2024-09-26 DIAGNOSIS — M77.41 METATARSALGIA OF BOTH FEET: Primary | ICD-10-CM

## 2024-09-26 DIAGNOSIS — B35.1 ONYCHOMYCOSIS: ICD-10-CM

## 2024-09-26 DIAGNOSIS — M54.16 RADICULOPATHY OF LUMBAR REGION: ICD-10-CM

## 2024-09-26 PROCEDURE — 99213 OFFICE O/P EST LOW 20 MIN: CPT | Performed by: PODIATRIST

## 2024-09-26 NOTE — PROGRESS NOTES
Assessment/Plan: Metatarsalgia secondary to radiculopathy.  Pain upon ambulation.  Mycosis of nail.  Mild peripheral artery disease.    Plan.  Chart reviewed.  PCP notes reviewed.  Patient examined.  At this time we recommend patient follow-up pain management to help with radiculopathy.  Patient has done foot hygiene and nail cutting technique.  Watch for signs of infection.  Return as needed.         Diagnoses and all orders for this visit:    Metatarsalgia of both feet    Radiculopathy of lumbar region    Onychomycosis    Acquired deformity of both feet    Peripheral arteriosclerosis (HCC)          Subjective: Patient gets pain.  She has pain in her feet and toes with ambulation and shoewear.  No history of trauma.  Patient has history of low back pain.    Allergies   Allergen Reactions    Oxybutynin Throat Swelling    Sotalol      Vomiting, convulsing, rash, cough    Famotidine Throat Swelling         Current Outpatient Medications:     acetaminophen (TYLENOL) 325 mg tablet, Take 2 tablets (650 mg total) by mouth every 6 (six) hours as needed for mild pain, moderate pain, severe pain, headaches or fever, Disp: , Rfl:     docusate sodium (COLACE) 100 mg capsule, Take 1 capsule (100 mg total) by mouth 2 (two) times a day (Patient taking differently: Take 100 mg by mouth daily), Disp: 60 capsule, Rfl: 0    levothyroxine (Synthroid) 75 mcg tablet, Take 1 tablet (75 mcg total) by mouth daily in the early morning BRAND MEDICALLY NECESSARY, Disp: 90 tablet, Rfl: 3    lisinopril (ZESTRIL) 2.5 mg tablet, TAKE ONE TABLET BY MOUTH EVERY DAY, Disp: 90 tablet, Rfl: 1    metoprolol succinate (TOPROL-XL) 25 mg 24 hr tablet, TAKE ONE TABLET BY MOUTH EVERY DAY, Disp: 90 tablet, Rfl: 0    oxybutynin (DITROPAN) 5 mg tablet, Take 1 tablet (5 mg total) by mouth 2 (two) times a day for 10 days (Patient not taking: Reported on 8/30/2024), Disp: 20 tablet, Rfl: 0    rosuvastatin (CRESTOR) 10 MG tablet, TAKE ONE TABLET BY MOUTH EVERY  DAY, Disp: 90 tablet, Rfl: 1    sodium chloride 1 g tablet, Take 1 tablet (1 g total) by mouth 3 (three) times a day (Patient taking differently: Take 1 g by mouth in the morning), Disp: 90 tablet, Rfl: 1    tamsulosin (FLOMAX) 0.4 mg, TAKE ONE CAPSULE BY MOUTH EVERY DAY WITH DINNER, Disp: 30 capsule, Rfl: 5    torsemide (DEMADEX) 10 mg tablet, Take 1 tablet (10 mg total) by mouth daily, Disp: 90 tablet, Rfl: 1    Xarelto 20 MG tablet, TAKE ONE TABLET BY MOUTH EVERY DAY WITH BREAKFAST, Disp: 90 tablet, Rfl: 0    Patient Active Problem List   Diagnosis    Hyperlipidemia    Essential hypertension    Hypothyroidism    Allergic rhinitis    Coronary artery disease involving native coronary artery of native heart without angina pectoris    Atrial flutter (HCC)    Esophageal dysmotility    Esophageal reflux    Osteoporosis    Thyroid nodule    Chronic systolic congestive heart failure (HCC)    Stasis dermatitis of both legs    Atrial fibrillation with rapid ventricular response (HCC)    Hyponatremia    Hypertensive heart disease with heart failure (HCC)    Cardiomyopathy (HCC)    Complete heart block (HCC)    Long term current use of anticoagulant    Tricuspid valve insufficiency    Anxiety    Pacemaker    Deafness    Compression fracture of L1 lumbar vertebra (HCC)    Urinary retention    Hospital discharge follow-up    Chronic diastolic (congestive) heart failure (HCC)          Patient ID: Corey Pulido is a 84 y.o. female.    HPI    The following portions of the patient's history were reviewed and updated as appropriate:     family history includes Breast cancer in her mother; Cancer in her mother; Hypertension in her mother; No Known Problems in her daughter, sister, sister, and sister; Other in her father; Tuberculosis in her father.      reports that she has never smoked. She has been exposed to tobacco smoke. She has never used smokeless tobacco. She reports that she does not drink alcohol and does not use  drugs.    Vitals:    09/26/24 0940   BP: 118/74       Review of Systems      Objective:  Patient's shoes and socks removed.   Foot Exam    General  General Appearance: appears stated age and healthy   Orientation: alert and oriented to person, place, and time   Affect: appropriate   Gait: antalgic       Right Foot/Ankle     Inspection and Palpation  Tenderness: metatarsals   Swelling: dorsum   Arch: pes planus  Hammertoes: fifth toe    Neurovascular  Dorsalis pedis: 2+  Posterior tibial: 1+      Left Foot/Ankle      Inspection and Palpation  Tenderness: metatarsals   Swelling: dorsum   Arch: pes planus  Hammertoes: fifth toe    Neurovascular  Dorsalis pedis: 2+  Posterior tibial: 1+      Physical Exam  Vitals and nursing note reviewed.   Constitutional:       Appearance: Normal appearance.   Cardiovascular:      Rate and Rhythm: Normal rate and regular rhythm.      Pulses:           Dorsalis pedis pulses are 2+ on the right side and 2+ on the left side.        Posterior tibial pulses are 1+ on the right side and 1+ on the left side.   Skin:     Capillary Refill: Capillary refill takes less than 2 seconds.      Comments: She test 4/5 L5 testing.  Positive edema with secondary varicosities.  All nails are dystrophic.  They demonstrate distal mycosis.  Toes are hammered.   Neurological:      Mental Status: She is alert.   Psychiatric:         Behavior: Behavior normal.

## 2024-09-27 ENCOUNTER — OFFICE VISIT (OUTPATIENT)
Dept: NEPHROLOGY | Facility: CLINIC | Age: 84
End: 2024-09-27
Payer: COMMERCIAL

## 2024-09-27 VITALS
DIASTOLIC BLOOD PRESSURE: 80 MMHG | BODY MASS INDEX: 27.77 KG/M2 | WEIGHT: 120 LBS | HEIGHT: 55 IN | SYSTOLIC BLOOD PRESSURE: 138 MMHG | HEART RATE: 55 BPM

## 2024-09-27 DIAGNOSIS — I10 ESSENTIAL HYPERTENSION: ICD-10-CM

## 2024-09-27 DIAGNOSIS — I50.22 CHRONIC SYSTOLIC CONGESTIVE HEART FAILURE (HCC): ICD-10-CM

## 2024-09-27 DIAGNOSIS — I12.9 BENIGN HYPERTENSION WITH CKD (CHRONIC KIDNEY DISEASE) STAGE III (HCC): ICD-10-CM

## 2024-09-27 DIAGNOSIS — E87.1 HYPONATREMIA: ICD-10-CM

## 2024-09-27 DIAGNOSIS — N18.30 BENIGN HYPERTENSION WITH CKD (CHRONIC KIDNEY DISEASE) STAGE III (HCC): ICD-10-CM

## 2024-09-27 DIAGNOSIS — I50.32 CHRONIC DIASTOLIC (CONGESTIVE) HEART FAILURE (HCC): Primary | ICD-10-CM

## 2024-09-27 PROCEDURE — 99214 OFFICE O/P EST MOD 30 MIN: CPT | Performed by: INTERNAL MEDICINE

## 2024-09-27 NOTE — ASSESSMENT & PLAN NOTE
-- Blood pressure is well-controlled, same and euvolemic  --Continue lisinopril 2.5 mg daily, metoprolol XL 12.5 mg daily continue torsemide 10 mg daily  --Discontinue salt tablet

## 2024-09-27 NOTE — PATIENT INSTRUCTIONS
1.)  Fluid restriction less then 1.8 L/day (60 ounces)    2.)  Monitor weights at home    3.)  Avoid NSAIDs (ibuprofen, Motrin, Advil, Aleve, naproxen)    4.)  Monitor blood pressure at home, call if blood pressure greater than 150/90 persistently    5.) I will plan to discuss all results including blood work, and/or imaging at our next visit, unless there is an urgent indication, in which case I will call you earlier. If you have any questions or concerns about your results, please feel free to call our office.    6.) No further need for salt tablets, can stop    7.) Blood work in 3 months and 7 months

## 2024-09-27 NOTE — ASSESSMENT & PLAN NOTE
Wt Readings from Last 3 Encounters:   09/27/24 54.4 kg (120 lb)   09/26/24 52.6 kg (116 lb)   08/21/24 52.8 kg (116 lb 8 oz)   -- Currently compensated  -- Will discontinue salt tablets as her serum sodium is now normal  -- Continue lisinopril 2.5 mg daily and continue torsemide 10 mg daily  -- Daily weights

## 2024-09-27 NOTE — PROGRESS NOTES
Nephrology ambulatory visit  Name: Corey Pulido      : 1940      MRN: [id number]  Encounter Provider: Renuka Romero MD  Encounter Date: 2024   Encounter department: [facility] [place]    Assessment & Plan  Chronic diastolic (congestive) heart failure (HCC)  Wt Readings from Last 3 Encounters:   24 54.4 kg (120 lb)   24 52.6 kg (116 lb)   24 52.8 kg (116 lb 8 oz)   -- Currently compensated  -- Will discontinue salt tablets as her serum sodium is now normal  -- Continue lisinopril 2.5 mg daily and continue torsemide 10 mg daily  -- Daily weights         Chronic systolic congestive heart failure (HCC)  Wt Readings from Last 3 Encounters:   24 54.4 kg (120 lb)   24 52.6 kg (116 lb)   24 52.8 kg (116 lb 8 oz)   -- Currently stable will discontinue salt tablets continue torsemide  -- Ejection fraction 37%  --Continue ACE inhibitor       Essential hypertension  -- Blood pressure is well-controlled, same and euvolemic  --Continue lisinopril 2.5 mg daily, metoprolol XL 12.5 mg daily continue torsemide 10 mg daily  --Discontinue salt tablet       Hyponatremia  -- Resolved, sodium is now normal at 137  --Baseline sodium in the low 130s  -- Urine sodium 24, urine osmolality 312  -- Serum osmolality 281, consistent with hypoosmolar  -- Underlying congestive heart failure  --Can discontinue salt tablet  --Aim to keep sodium level greater than 130  --Check repeat BMP in 3 months and at the next visit  --Continue torsemide 10 mg daily and fluid restriction  Orders:    Albumin / creatinine urine ratio; Future    Comprehensive metabolic panel; Future    Albumin / creatinine urine ratio    Comprehensive metabolic panel    Basic metabolic panel; Future    Basic metabolic panel    Benign hypertension with CKD (chronic kidney disease) stage III (HCC)  Lab Results   Component Value Date    EGFR 53 (L) 2024    EGFR 49 (L) 2024    EGFR 59 (L) 2024     CREATININE 1.03 (H) 09/12/2024    CREATININE 1.10 (H) 07/18/2024    CREATININE 0.95 05/20/2024   -- Renal function stable baseline creatinine low ones  -- In the setting of age-related nephron loss, hypertensive nephrosclerosis, cardiorenal syndrome  -- Creatinine currently stable at 1 mg/dL with a GFR greater than 45 mL/min  -- Continue RAAS blockade with an ACE inhibitor  -- Continue good blood pressure control    Orders:    Albumin / creatinine urine ratio; Future    Comprehensive metabolic panel; Future    Albumin / creatinine urine ratio    Comprehensive metabolic panel      History of Present Illness     Corey Pulido is a 84 y.o. female who presents follow-up of hyponatremia and underlying chronic kidney disease stage III.  Blood pressure is controlled.  We had made adjustments in slowly titrated her salt tablet and increased her torsemide.  Her sodium is now normal will discontinue salt tablet altogether given her history of congestive heart failure with reduced ejection fraction.  Blood work was reviewed from September 20.  Renal function stable with creatinine 1 mg/dL sodium normal at 137.  Potassium is normal.    History obtained from : patient, patient's Significant Other, and   Review of Systems   Constitutional:  Negative for activity change and fever.   Respiratory:  Negative for cough, chest tightness, shortness of breath and wheezing.    Cardiovascular:  Negative for chest pain and leg swelling.   Gastrointestinal:  Negative for abdominal pain, diarrhea, nausea and vomiting.   Endocrine: Negative for polyuria.   Genitourinary:  Negative for difficulty urinating, dysuria, flank pain, frequency and urgency.   Musculoskeletal:  Positive for back pain.   Skin:  Negative for rash.   Neurological:  Negative for dizziness, syncope, light-headedness and headaches.     Current Outpatient Medications on File Prior to Visit   Medication Sig Dispense Refill    acetaminophen (TYLENOL)  "325 mg tablet Take 2 tablets (650 mg total) by mouth every 6 (six) hours as needed for mild pain, moderate pain, severe pain, headaches or fever      docusate sodium (COLACE) 100 mg capsule Take 1 capsule (100 mg total) by mouth 2 (two) times a day (Patient taking differently: Take 100 mg by mouth daily) 60 capsule 0    levothyroxine (Synthroid) 75 mcg tablet Take 1 tablet (75 mcg total) by mouth daily in the early morning BRAND MEDICALLY NECESSARY 90 tablet 3    lisinopril (ZESTRIL) 2.5 mg tablet TAKE ONE TABLET BY MOUTH EVERY DAY 90 tablet 1    metoprolol succinate (TOPROL-XL) 25 mg 24 hr tablet TAKE ONE TABLET BY MOUTH EVERY DAY (Patient taking differently: Take 12.5 mg by mouth daily) 90 tablet 0    rosuvastatin (CRESTOR) 10 MG tablet TAKE ONE TABLET BY MOUTH EVERY DAY 90 tablet 1    tamsulosin (FLOMAX) 0.4 mg TAKE ONE CAPSULE BY MOUTH EVERY DAY WITH DINNER 30 capsule 5    torsemide (DEMADEX) 10 mg tablet Take 1 tablet (10 mg total) by mouth daily 90 tablet 1    Xarelto 20 MG tablet TAKE ONE TABLET BY MOUTH EVERY DAY WITH BREAKFAST 90 tablet 0    [DISCONTINUED] sodium chloride 1 g tablet Take 1 tablet (1 g total) by mouth 3 (three) times a day (Patient taking differently: Take 1 g by mouth in the morning) 90 tablet 1    oxybutynin (DITROPAN) 5 mg tablet Take 1 tablet (5 mg total) by mouth 2 (two) times a day for 10 days (Patient not taking: Reported on 8/30/2024) 20 tablet 0     No current facility-administered medications on file prior to visit.          Objective     /80   Pulse 55   Ht 4' 7\" (1.397 m)   Wt 54.4 kg (120 lb)   BMI 27.89 kg/m²     Physical Exam  Vitals and nursing note reviewed. Exam conducted with a chaperone present.   Constitutional:       General: She is not in acute distress.     Appearance: She is well-developed. She is not diaphoretic.   HENT:      Head: Normocephalic and atraumatic.   Eyes:      General: No scleral icterus.     Pupils: Pupils are equal, round, and reactive to " light.   Cardiovascular:      Rate and Rhythm: Normal rate and regular rhythm.      Heart sounds: Normal heart sounds. No murmur heard.     No friction rub. No gallop.   Pulmonary:      Effort: Pulmonary effort is normal. No respiratory distress.      Breath sounds: Normal breath sounds. No wheezing or rales.   Chest:      Chest wall: No tenderness.   Abdominal:      General: Bowel sounds are normal. There is no distension.      Palpations: Abdomen is soft.      Tenderness: There is no abdominal tenderness. There is no rebound.   Musculoskeletal:         General: Normal range of motion.      Cervical back: Normal range of motion and neck supple.   Skin:     Findings: No rash.   Neurological:      Mental Status: She is alert and oriented to person, place, and time.       Administrative Statements   I have spent a total time of 25 minutes in caring for this patient on the day of the visit/encounter including Patient and family education, Importance of tx compliance, Risk factor reductions, Impressions, Counseling / Coordination of care, Documenting in the medical record, Reviewing / ordering tests, medicine, procedures  , and Obtaining or reviewing history  .

## 2024-09-27 NOTE — ASSESSMENT & PLAN NOTE
-- Resolved, sodium is now normal at 137  --Baseline sodium in the low 130s  -- Urine sodium 24, urine osmolality 312  -- Serum osmolality 281, consistent with hypoosmolar  -- Underlying congestive heart failure  --Can discontinue salt tablet  --Aim to keep sodium level greater than 130  --Check repeat BMP in 3 months and at the next visit  --Continue torsemide 10 mg daily and fluid restriction  Orders:    Albumin / creatinine urine ratio; Future    Comprehensive metabolic panel; Future    Albumin / creatinine urine ratio    Comprehensive metabolic panel    Basic metabolic panel; Future    Basic metabolic panel

## 2024-09-27 NOTE — ASSESSMENT & PLAN NOTE
Wt Readings from Last 3 Encounters:   09/27/24 54.4 kg (120 lb)   09/26/24 52.6 kg (116 lb)   08/21/24 52.8 kg (116 lb 8 oz)   -- Currently stable will discontinue salt tablets continue torsemide  -- Ejection fraction 37%  --Continue ACE inhibitor

## 2024-09-27 NOTE — ASSESSMENT & PLAN NOTE
Lab Results   Component Value Date    EGFR 53 (L) 09/12/2024    EGFR 49 (L) 07/18/2024    EGFR 59 (L) 05/20/2024    CREATININE 1.03 (H) 09/12/2024    CREATININE 1.10 (H) 07/18/2024    CREATININE 0.95 05/20/2024   -- Renal function stable baseline creatinine low ones  -- In the setting of age-related nephron loss, hypertensive nephrosclerosis, cardiorenal syndrome  -- Creatinine currently stable at 1 mg/dL with a GFR greater than 45 mL/min  -- Continue RAAS blockade with an ACE inhibitor  -- Continue good blood pressure control    Orders:    Albumin / creatinine urine ratio; Future    Comprehensive metabolic panel; Future    Albumin / creatinine urine ratio    Comprehensive metabolic panel

## 2024-10-04 ENCOUNTER — TELEPHONE (OUTPATIENT)
Age: 84
End: 2024-10-04

## 2024-10-04 NOTE — TELEPHONE ENCOUNTER
Pt  called with  to assist with needing to r/s appt due to pt will be moving out of the area. Please review for sooner appt Will need  to assist with call     Pt call [phone]

## 2024-10-07 NOTE — TELEPHONE ENCOUNTER
Pt returning call via . As per clinical ok to offer urgent spot with Dr Sherman on 10/10 at 1130am. Pt confirmed this date and time will work.     Unable to schedule in this time slot, please assist with scheduling.

## 2024-10-07 NOTE — TELEPHONE ENCOUNTER
Unable to assist pt with  - was provided with incorrect number and waiting on correct number.     If pt calls back, pls assist pt with getting in at the time she needs. Thank you.

## 2024-10-08 NOTE — TELEPHONE ENCOUNTER
Pt's appt on 10/17 was cx and rescheduled for 10/10/24 at 11:30 am. Pt is aware of this new appt change per previous encounter.

## 2024-10-10 DIAGNOSIS — E87.1 HYPONATREMIA: ICD-10-CM

## 2024-10-10 RX ORDER — TORSEMIDE 10 MG/1
TABLET ORAL
Qty: 90 TABLET | Refills: 1 | Status: SHIPPED | OUTPATIENT
Start: 2024-10-10

## 2024-10-11 ENCOUNTER — TELEPHONE (OUTPATIENT)
Dept: NEPHROLOGY | Facility: CLINIC | Age: 84
End: 2024-10-11

## 2024-10-11 NOTE — TELEPHONE ENCOUNTER
Lm for pt to schedule April f/u with Dr. Romero or EAN. Please send message once scheduled so we can set up her preferred interpretor.

## 2024-11-05 DIAGNOSIS — R33.9 URINARY RETENTION: ICD-10-CM

## 2024-11-05 RX ORDER — TAMSULOSIN HYDROCHLORIDE 0.4 MG/1
0.4 CAPSULE ORAL
Qty: 30 CAPSULE | Refills: 5 | Status: SHIPPED | OUTPATIENT
Start: 2024-11-05

## 2024-11-05 NOTE — TELEPHONE ENCOUNTER
NOT A DUPLICATE:  Patient's daughter called requesting medication to go to [facility] pharmacy.     Reason for call:   [x] Refill   [] Prior Auth  [x] Other: Alternate pharmacy    Office:   [] PCP/Provider -   [x] Specialty/Provider - Urology    Medication: tamsulosin (FLOMAX) 0.4 mg     Dose/Frequency: TAKE ONE CAPSULE BY MOUTH EVERY DAY WITH DINNER     Quantity: 30    Pharmacy: [facility] [address] [place] [address] [address]     Does the patient have enough for 3 days?   [] Yes   [x] No - Send as HP to POD

## 2024-11-29 DIAGNOSIS — E78.5 DYSLIPIDEMIA: ICD-10-CM

## 2024-11-29 RX ORDER — ROSUVASTATIN CALCIUM 10 MG/1
10 TABLET, COATED ORAL DAILY
Qty: 30 TABLET | Refills: 0 | Status: SHIPPED | OUTPATIENT
Start: 2024-11-29

## 2024-12-02 DIAGNOSIS — I48.3 TYPICAL ATRIAL FLUTTER (HCC): ICD-10-CM

## 2024-12-02 DIAGNOSIS — E03.9 HYPOTHYROIDISM, UNSPECIFIED TYPE: ICD-10-CM

## 2024-12-02 DIAGNOSIS — I48.91 A-FIB (HCC): ICD-10-CM

## 2024-12-02 RX ORDER — LEVOTHYROXINE SODIUM 75 MCG
75 TABLET ORAL
Qty: 30 TABLET | Refills: 0 | Status: SHIPPED | OUTPATIENT
Start: 2024-12-02

## 2024-12-02 RX ORDER — RIVAROXABAN 20 MG/1
20 TABLET, FILM COATED ORAL
Qty: 90 TABLET | Refills: 3 | Status: SHIPPED | OUTPATIENT
Start: 2024-12-02

## 2024-12-02 NOTE — TELEPHONE ENCOUNTER
Reason for call:   [x] Refill   [] Prior Auth  [] Other:     Office:   [x] PCP/Provider -  Chadwick Burt,    [] Specialty/Provider -     Medication:  Xarelto 20 MG tablet    Dose/Frequency: TAKE ONE TABLET BY MOUTH EVERY DAY WITH BREAKFAST     Quantity: 90    Pharmacy: [facility] [address] [place] [address] [address]     Does the patient have enough for 3 days?   [] Yes   [x] No - Send as HP to POD

## 2024-12-02 NOTE — TELEPHONE ENCOUNTER
Reason for call:   [x] Refill   [] Prior Auth  [] Other:     Office:   [] PCP/Provider -   [x] Specialty/Provider - Cardio    Medication: levothyroxine (Synthroid) 75 mcg tablet     Dose/Frequency: Take 1 tablet (75 mcg total) by mouth daily in the early morning BRAND MEDICALLY NECESSARY,     Quantity: 90    Pharmacy: [facility] [address] [place] [address] [address]      Does the patient have enough for 3 days?   [] Yes   [x] No - Send as HP to POD

## 2024-12-10 DIAGNOSIS — E78.5 DYSLIPIDEMIA: ICD-10-CM

## 2024-12-10 NOTE — TELEPHONE ENCOUNTER
Reason for call:   [x] Refill   [] Prior Auth  [] Other:     Office:   [] PCP/Provider -   [x] Specialty/Provider - Cardio/Rody    Medication: Rosuvastatin 10mg    Dose/Frequency: 1 tab daily     Quantity: 30    Pharmacy: [facility] [address] [place] [address] [address] [place] [phone]     Does the patient have enough for 3 days?   [x] Yes   [] No - Send as HP to POD

## 2024-12-11 RX ORDER — ROSUVASTATIN CALCIUM 10 MG/1
10 TABLET, COATED ORAL DAILY
Qty: 30 TABLET | Refills: 0 | Status: SHIPPED | OUTPATIENT
Start: 2024-12-11

## 2025-01-02 DIAGNOSIS — I10 ESSENTIAL HYPERTENSION: ICD-10-CM

## 2025-01-02 DIAGNOSIS — E78.5 DYSLIPIDEMIA: ICD-10-CM

## 2025-01-02 DIAGNOSIS — E03.9 HYPOTHYROIDISM, UNSPECIFIED TYPE: ICD-10-CM

## 2025-01-02 RX ORDER — LISINOPRIL 2.5 MG/1
2.5 TABLET ORAL DAILY
Qty: 90 TABLET | Refills: 1 | Status: SHIPPED | OUTPATIENT
Start: 2025-01-02

## 2025-01-02 NOTE — TELEPHONE ENCOUNTER
Change pharmacy.   Patient is out of the medication.     Reason for call:   [x] Refill   [] Prior Auth  [] Other:     Office:   [] PCP/Provider -   [x] Specialty/Provider - [facility]  Authorized By: Irene Buck MD      Medication: lisinopril (ZESTRIL) 2.5 mg tablet    Dose/Frequency: TAKE ONE TABLET BY MOUTH EVERY DAY,    Quantity: 90 tablet    Pharmacy: [place] [id number] [address]     Does the patient have enough for 3 days?   [] Yes   [x] No - Send as HP to POD

## 2025-01-02 NOTE — TELEPHONE ENCOUNTER
Reason for call:   [x] Refill   [] Prior Auth  [] Other:     Office:   [] PCP/Provider -   [x] Specialty/Provider - Cardio    Medication: Synthroid 75 MCG tablet/Take 1 tablet (75 mcg total) by mouth daily in the early morning BRAND MEDICALLY NECESSARY/Qty 90    rosuvastatin (CRESTOR) 10 MG tablet / Take 1 tablet (10 mg total) by mouth daily / Qty 90      Pharmacy:  [place] [id number] [address]     Does the patient have enough for 3 days?   [x] Yes   [] No - Send as HP to POD

## 2025-01-03 RX ORDER — LEVOTHYROXINE SODIUM 75 MCG
75 TABLET ORAL
Qty: 90 TABLET | Refills: 1 | Status: SHIPPED | OUTPATIENT
Start: 2025-01-03

## 2025-01-03 RX ORDER — ROSUVASTATIN CALCIUM 10 MG/1
10 TABLET, COATED ORAL DAILY
Qty: 90 TABLET | Refills: 1 | Status: SHIPPED | OUTPATIENT
Start: 2025-01-03

## 2025-01-06 ENCOUNTER — TELEPHONE (OUTPATIENT)
Dept: FAMILY MEDICINE CLINIC | Facility: CLINIC | Age: 85
End: 2025-01-06

## 2025-01-06 NOTE — TELEPHONE ENCOUNTER
Patient called requesting refill for   Synthroid 75 MCG tablet . Patient made aware medication was refilled on 01/03 for 90 with 1 refills to [facility] pharmacy. Patient instructed to contact the pharmacy to obtain refills of medication. Patient verbalized understanding.

## 2025-01-13 ENCOUNTER — TELEPHONE (OUTPATIENT)
Age: 85
End: 2025-01-13

## 2025-01-13 NOTE — TELEPHONE ENCOUNTER
Received today in [facility], will be sent to [facility] our medical records copy service either today or tomorrow

## 2025-01-13 NOTE — TELEPHONE ENCOUNTER
Patient's daughter called to say the patient has moved closer to her location and has switched over to Dr. Leyva for future pcp needs.  A medical records release was faxed last week from Dr. Leyva's office, please keep an eye out.

## 2025-01-13 NOTE — TELEPHONE ENCOUNTER
Patient's daughter called to say they have switched the patient's pcp to a closer practice, Dr. Leyva as the patient has moved.  Please remove the current PCP from PCP field

## 2025-01-15 DIAGNOSIS — E87.1 HYPONATREMIA: ICD-10-CM

## 2025-01-15 RX ORDER — TORSEMIDE 10 MG/1
TABLET ORAL
Qty: 90 TABLET | Refills: 1 | Status: SHIPPED | OUTPATIENT
Start: 2025-01-15

## 2025-01-15 NOTE — TELEPHONE ENCOUNTER
Reason for call:   [x] Refill   [] Prior Auth  [] Other:     Office:   [] PCP/Provider -   [x] Specialty/Provider - Nephrology/ MD Nick    Medication: torsemide (DEMADEX) 10 mg tablet     Dose/Frequency: TAKE ONE TABLET BY MOUTH EVERY DAY (GENERIC FOR DEMADEX)    Quantity: 90    Pharmacy: [place] [id number] [address] [place] [phone]    Does the patient have enough for 3 days?   [] Yes   [x] No - Send as HP to POD

## 2025-01-17 ENCOUNTER — TELEPHONE (OUTPATIENT)
Age: 85
End: 2025-01-17

## 2025-01-17 DIAGNOSIS — I25.10 CORONARY ARTERY DISEASE INVOLVING NATIVE CORONARY ARTERY OF NATIVE HEART WITHOUT ANGINA PECTORIS: ICD-10-CM

## 2025-01-17 DIAGNOSIS — I10 ESSENTIAL HYPERTENSION: ICD-10-CM

## 2025-01-17 RX ORDER — METOPROLOL SUCCINATE 25 MG/1
25 TABLET, EXTENDED RELEASE ORAL DAILY
Qty: 90 TABLET | Refills: 1 | Status: SHIPPED | OUTPATIENT
Start: 2025-01-17

## 2025-01-17 NOTE — TELEPHONE ENCOUNTER
Reason for call:   [x] Refill   [] Prior Auth  [] Other:     Office:   [] PCP/Provider -   [x] Specialty/Provider - Cardio    Medication: metoprolol succinate (TOPROL-XL) 25 mg 24 hr tablet     Dose/Frequency: TAKE ONE TABLET BY MOUTH EVERY DAY,     Quantity: 90    Pharmacy: [id number] - [address]      Does the patient have enough for 3 days?   [] Yes   [x] No - Send as HP to POD

## 2025-01-17 NOTE — TELEPHONE ENCOUNTER
Caller: Jaimee @[facility]    Doctor/Office: Rody Nelson CB#:  [phone]      What needs to be faxed: Pt's cardio records for transfer of care.    ATTN to: Dr. Molina/Dr Brunson/Dr Leyva    Fax#: [phone]

## 2025-01-21 NOTE — TELEPHONE ENCOUNTER
01/21/25 10:46 AM        The office's request has been received, reviewed, and the patient chart updated. The PCP has successfully been removed with a patient attribution note. This message will now be completed.        Thank you  María Gordillo

## 2025-01-30 ENCOUNTER — TELEPHONE (OUTPATIENT)
Age: 85
End: 2025-01-30

## 2025-01-30 NOTE — TELEPHONE ENCOUNTER
Caller: Tiny Ramos    Doctor: Dr. Fine    Call back #: [phone]    Reason for call: Patient's daughter called to request for the cardiology office to release the most recent cardiology testing and office visit notes to patient's new cardiology group (closer to home for her). The new facility is called [facility], their fax number is [phone]. The release of records request is found in patient's media. Patient's daughter stated that they need all the information submitted to the office before 3 pm today, before patient's appointment.

## 2025-06-25 DIAGNOSIS — E87.1 HYPONATREMIA: ICD-10-CM

## 2025-06-25 RX ORDER — TORSEMIDE 10 MG/1
TABLET ORAL
Qty: 90 TABLET | Refills: 0 | Status: SHIPPED | OUTPATIENT
Start: 2025-06-25

## 2025-07-09 DIAGNOSIS — E03.9 HYPOTHYROIDISM, UNSPECIFIED TYPE: ICD-10-CM

## 2025-07-09 DIAGNOSIS — E78.5 DYSLIPIDEMIA: ICD-10-CM

## 2025-07-10 RX ORDER — ROSUVASTATIN CALCIUM 10 MG/1
10 TABLET, COATED ORAL DAILY
Qty: 90 TABLET | Refills: 0 | Status: SHIPPED | OUTPATIENT
Start: 2025-07-10

## 2025-07-10 RX ORDER — LEVOTHYROXINE SODIUM 75 MCG
TABLET ORAL
Qty: 90 TABLET | Refills: 0 | Status: SHIPPED | OUTPATIENT
Start: 2025-07-10

## (undated) DEVICE — SINGLE-USE BIOPSY FORCEPS: Brand: RADIAL JAW 4